# Patient Record
Sex: MALE | Race: WHITE | Employment: OTHER | URBAN - METROPOLITAN AREA
[De-identification: names, ages, dates, MRNs, and addresses within clinical notes are randomized per-mention and may not be internally consistent; named-entity substitution may affect disease eponyms.]

---

## 2017-01-20 ENCOUNTER — TRANSFERRED RECORDS (OUTPATIENT)
Dept: HEALTH INFORMATION MANAGEMENT | Facility: CLINIC | Age: 67
End: 2017-01-20

## 2017-04-21 ENCOUNTER — TRANSFERRED RECORDS (OUTPATIENT)
Dept: HEALTH INFORMATION MANAGEMENT | Facility: CLINIC | Age: 67
End: 2017-04-21

## 2017-04-25 ENCOUNTER — TRANSFERRED RECORDS (OUTPATIENT)
Dept: HEALTH INFORMATION MANAGEMENT | Facility: CLINIC | Age: 67
End: 2017-04-25

## 2017-05-16 ENCOUNTER — TRANSFERRED RECORDS (OUTPATIENT)
Dept: HEALTH INFORMATION MANAGEMENT | Facility: CLINIC | Age: 67
End: 2017-05-16

## 2017-05-23 ENCOUNTER — TRANSFERRED RECORDS (OUTPATIENT)
Dept: HEALTH INFORMATION MANAGEMENT | Facility: CLINIC | Age: 67
End: 2017-05-23

## 2017-05-31 ENCOUNTER — TRANSFERRED RECORDS (OUTPATIENT)
Dept: HEALTH INFORMATION MANAGEMENT | Facility: CLINIC | Age: 67
End: 2017-05-31

## 2017-06-08 ENCOUNTER — TRANSFERRED RECORDS (OUTPATIENT)
Dept: HEALTH INFORMATION MANAGEMENT | Facility: CLINIC | Age: 67
End: 2017-06-08

## 2017-06-13 ENCOUNTER — TRANSFERRED RECORDS (OUTPATIENT)
Dept: HEALTH INFORMATION MANAGEMENT | Facility: CLINIC | Age: 67
End: 2017-06-13

## 2017-06-14 ENCOUNTER — TRANSFERRED RECORDS (OUTPATIENT)
Dept: HEALTH INFORMATION MANAGEMENT | Facility: CLINIC | Age: 67
End: 2017-06-14

## 2017-06-27 ENCOUNTER — TRANSFERRED RECORDS (OUTPATIENT)
Dept: HEALTH INFORMATION MANAGEMENT | Facility: CLINIC | Age: 67
End: 2017-06-27

## 2017-07-24 ENCOUNTER — TRANSFERRED RECORDS (OUTPATIENT)
Dept: HEALTH INFORMATION MANAGEMENT | Facility: CLINIC | Age: 67
End: 2017-07-24

## 2017-08-03 ENCOUNTER — TRANSFERRED RECORDS (OUTPATIENT)
Dept: HEALTH INFORMATION MANAGEMENT | Facility: CLINIC | Age: 67
End: 2017-08-03

## 2017-09-18 ENCOUNTER — TRANSFERRED RECORDS (OUTPATIENT)
Dept: HEALTH INFORMATION MANAGEMENT | Facility: CLINIC | Age: 67
End: 2017-09-18

## 2017-12-29 ENCOUNTER — TRANSFERRED RECORDS (OUTPATIENT)
Dept: HEALTH INFORMATION MANAGEMENT | Facility: CLINIC | Age: 67
End: 2017-12-29

## 2018-01-02 PROBLEM — I21.9 MI (MYOCARDIAL INFARCTION) (H): Status: ACTIVE | Noted: 2018-01-02

## 2018-01-02 PROBLEM — F41.9 ANXIETY: Status: ACTIVE | Noted: 2018-01-02

## 2018-01-02 PROBLEM — I70.213 ATHEROSCLEROSIS OF NATIVE ARTERIES OF EXTREMITIES WITH INTERMITTENT CLAUDICATION, BILATERAL LEGS (H): Status: ACTIVE | Noted: 2018-01-02

## 2018-01-02 PROBLEM — R91.8 LUNG MASS: Status: ACTIVE | Noted: 2018-01-02

## 2018-01-02 PROBLEM — R06.09 DYSPNEA ON EXERTION: Status: ACTIVE | Noted: 2018-01-02

## 2018-01-02 PROBLEM — J44.9 COPD (CHRONIC OBSTRUCTIVE PULMONARY DISEASE) (H): Status: ACTIVE | Noted: 2018-01-02

## 2018-01-02 PROBLEM — E78.5 HYPERLIPIDEMIA: Status: ACTIVE | Noted: 2018-01-02

## 2018-01-02 PROBLEM — Z95.810 AUTOMATIC IMPLANTABLE CARDIOVERTER-DEFIBRILLATOR IN SITU: Status: ACTIVE | Noted: 2018-01-02

## 2018-01-02 PROBLEM — I50.9 CHF (CONGESTIVE HEART FAILURE) (H): Status: ACTIVE | Noted: 2018-01-02

## 2018-01-02 PROBLEM — C34.90 MALIGNANT NEOPLASM OF LUNG (H): Status: ACTIVE | Noted: 2018-01-02

## 2018-01-02 PROBLEM — I10 HTN (HYPERTENSION): Status: ACTIVE | Noted: 2018-01-02

## 2018-01-02 PROBLEM — I73.9 PVD (PERIPHERAL VASCULAR DISEASE) (H): Status: ACTIVE | Noted: 2018-01-02

## 2018-01-02 PROBLEM — F32.A DEPRESSION: Status: ACTIVE | Noted: 2018-01-02

## 2018-01-02 PROBLEM — I25.5 ISCHEMIC CARDIOMYOPATHY: Status: ACTIVE | Noted: 2018-01-02

## 2018-01-02 PROBLEM — I42.9 CARDIOMYOPATHY (H): Status: ACTIVE | Noted: 2018-01-02

## 2018-01-03 ENCOUNTER — RADIANT APPOINTMENT (OUTPATIENT)
Dept: GENERAL RADIOLOGY | Facility: CLINIC | Age: 68
End: 2018-01-03
Payer: COMMERCIAL

## 2018-01-03 ENCOUNTER — APPOINTMENT (OUTPATIENT)
Dept: ULTRASOUND IMAGING | Facility: CLINIC | Age: 68
DRG: 247 | End: 2018-01-03
Attending: EMERGENCY MEDICINE
Payer: COMMERCIAL

## 2018-01-03 ENCOUNTER — APPOINTMENT (OUTPATIENT)
Dept: GENERAL RADIOLOGY | Facility: CLINIC | Age: 68
DRG: 247 | End: 2018-01-03
Attending: EMERGENCY MEDICINE
Payer: COMMERCIAL

## 2018-01-03 ENCOUNTER — OFFICE VISIT (OUTPATIENT)
Dept: RADIOLOGY | Facility: CLINIC | Age: 68
End: 2018-01-03
Payer: COMMERCIAL

## 2018-01-03 ENCOUNTER — HOSPITAL ENCOUNTER (INPATIENT)
Facility: CLINIC | Age: 68
LOS: 20 days | Discharge: NURSING, REHAB OR LONG-TERM CARE | DRG: 247 | End: 2018-01-23
Attending: EMERGENCY MEDICINE | Admitting: INTERNAL MEDICINE
Payer: COMMERCIAL

## 2018-01-03 VITALS
SYSTOLIC BLOOD PRESSURE: 101 MMHG | DIASTOLIC BLOOD PRESSURE: 63 MMHG | HEART RATE: 67 BPM | RESPIRATION RATE: 16 BRPM | OXYGEN SATURATION: 96 %

## 2018-01-03 DIAGNOSIS — I73.9 PVD (PERIPHERAL VASCULAR DISEASE) (H): ICD-10-CM

## 2018-01-03 DIAGNOSIS — M79.661 PAIN OF RIGHT LOWER LEG: ICD-10-CM

## 2018-01-03 DIAGNOSIS — I70.209: ICD-10-CM

## 2018-01-03 DIAGNOSIS — M86.9 OSTEOMYELITIS, UNSPECIFIED SITE, UNSPECIFIED TYPE (H): ICD-10-CM

## 2018-01-03 DIAGNOSIS — I20.0 UNSTABLE ANGINA (H): Primary | ICD-10-CM

## 2018-01-03 DIAGNOSIS — I70.229 CRITICAL ISCHEMIA OF LOWER EXTREMITY (H): Primary | ICD-10-CM

## 2018-01-03 LAB
ALBUMIN SERPL-MCNC: 3.5 G/DL (ref 3.4–5)
ALP SERPL-CCNC: 58 U/L (ref 40–150)
ALT SERPL W P-5'-P-CCNC: 37 U/L (ref 0–70)
ANION GAP SERPL CALCULATED.3IONS-SCNC: 5 MMOL/L (ref 3–14)
AST SERPL W P-5'-P-CCNC: 21 U/L (ref 0–45)
BASOPHILS # BLD AUTO: 0 10E9/L (ref 0–0.2)
BASOPHILS NFR BLD AUTO: 0.2 %
BILIRUB SERPL-MCNC: 0.7 MG/DL (ref 0.2–1.3)
BUN SERPL-MCNC: 12 MG/DL (ref 7–30)
CALCIUM SERPL-MCNC: 9.2 MG/DL (ref 8.5–10.1)
CHLORIDE SERPL-SCNC: 104 MMOL/L (ref 94–109)
CK SERPL-CCNC: 56 U/L (ref 30–300)
CO2 SERPL-SCNC: 27 MMOL/L (ref 20–32)
CREAT SERPL-MCNC: 0.75 MG/DL (ref 0.66–1.25)
CRP SERPL-MCNC: 6.6 MG/L (ref 0–8)
DIFFERENTIAL METHOD BLD: ABNORMAL
EOSINOPHIL # BLD AUTO: 0.1 10E9/L (ref 0–0.7)
EOSINOPHIL NFR BLD AUTO: 0.8 %
ERYTHROCYTE [DISTWIDTH] IN BLOOD BY AUTOMATED COUNT: 14 % (ref 10–15)
GFR SERPL CREATININE-BSD FRML MDRD: >90 ML/MIN/1.7M2
GLUCOSE SERPL-MCNC: 81 MG/DL (ref 70–99)
HCT VFR BLD AUTO: 42.1 % (ref 40–53)
HGB BLD-MCNC: 14.3 G/DL (ref 13.3–17.7)
IMM GRANULOCYTES # BLD: 0 10E9/L (ref 0–0.4)
IMM GRANULOCYTES NFR BLD: 0.4 %
INR PPP: 0.99 (ref 0.86–1.14)
LACTATE BLD-SCNC: 1.1 MMOL/L (ref 0.7–2)
LYMPHOCYTES # BLD AUTO: 1.4 10E9/L (ref 0.8–5.3)
LYMPHOCYTES NFR BLD AUTO: 13.5 %
MCH RBC QN AUTO: 33.9 PG (ref 26.5–33)
MCHC RBC AUTO-ENTMCNC: 34 G/DL (ref 31.5–36.5)
MCV RBC AUTO: 100 FL (ref 78–100)
MONOCYTES # BLD AUTO: 1.2 10E9/L (ref 0–1.3)
MONOCYTES NFR BLD AUTO: 11.9 %
NEUTROPHILS # BLD AUTO: 7.4 10E9/L (ref 1.6–8.3)
NEUTROPHILS NFR BLD AUTO: 73.2 %
NRBC # BLD AUTO: 0 10*3/UL
NRBC BLD AUTO-RTO: 0 /100
PLATELET # BLD AUTO: 241 10E9/L (ref 150–450)
POTASSIUM SERPL-SCNC: 4 MMOL/L (ref 3.4–5.3)
PROT SERPL-MCNC: 6.7 G/DL (ref 6.8–8.8)
RBC # BLD AUTO: 4.22 10E12/L (ref 4.4–5.9)
SODIUM SERPL-SCNC: 136 MMOL/L (ref 133–144)
WBC # BLD AUTO: 10.2 10E9/L (ref 4–11)

## 2018-01-03 PROCEDURE — 99223 1ST HOSP IP/OBS HIGH 75: CPT | Mod: AI | Performed by: INTERNAL MEDICINE

## 2018-01-03 PROCEDURE — 99285 EMERGENCY DEPT VISIT HI MDM: CPT | Mod: Z6 | Performed by: EMERGENCY MEDICINE

## 2018-01-03 PROCEDURE — 83605 ASSAY OF LACTIC ACID: CPT | Performed by: EMERGENCY MEDICINE

## 2018-01-03 PROCEDURE — 85610 PROTHROMBIN TIME: CPT | Performed by: EMERGENCY MEDICINE

## 2018-01-03 PROCEDURE — 85025 COMPLETE CBC W/AUTO DIFF WBC: CPT | Performed by: EMERGENCY MEDICINE

## 2018-01-03 PROCEDURE — 96376 TX/PRO/DX INJ SAME DRUG ADON: CPT

## 2018-01-03 PROCEDURE — 96374 THER/PROPH/DIAG INJ IV PUSH: CPT

## 2018-01-03 PROCEDURE — 93925 LOWER EXTREMITY STUDY: CPT

## 2018-01-03 PROCEDURE — 99285 EMERGENCY DEPT VISIT HI MDM: CPT | Mod: 25

## 2018-01-03 PROCEDURE — 12000008 ZZH R&B INTERMEDIATE UMMC

## 2018-01-03 PROCEDURE — 86140 C-REACTIVE PROTEIN: CPT | Performed by: EMERGENCY MEDICINE

## 2018-01-03 PROCEDURE — 80053 COMPREHEN METABOLIC PANEL: CPT | Performed by: EMERGENCY MEDICINE

## 2018-01-03 PROCEDURE — 71046 X-RAY EXAM CHEST 2 VIEWS: CPT

## 2018-01-03 PROCEDURE — 25000132 ZZH RX MED GY IP 250 OP 250 PS 637: Performed by: EMERGENCY MEDICINE

## 2018-01-03 PROCEDURE — 82550 ASSAY OF CK (CPK): CPT | Performed by: EMERGENCY MEDICINE

## 2018-01-03 PROCEDURE — 25000128 H RX IP 250 OP 636: Performed by: EMERGENCY MEDICINE

## 2018-01-03 RX ORDER — FENTANYL 25 UG/1
1 PATCH TRANSDERMAL
Status: ON HOLD | COMMUNITY
End: 2018-01-31

## 2018-01-03 RX ORDER — CARVEDILOL 6.25 MG/1
6.25 TABLET ORAL 2 TIMES DAILY WITH MEALS
Status: DISCONTINUED | OUTPATIENT
Start: 2018-01-04 | End: 2018-01-13

## 2018-01-03 RX ORDER — SPIRONOLACTONE 25 MG/1
25 TABLET ORAL DAILY
Status: DISCONTINUED | OUTPATIENT
Start: 2018-01-04 | End: 2018-01-05

## 2018-01-03 RX ORDER — EZETIMIBE 10 MG/1
10 TABLET ORAL DAILY
Status: DISCONTINUED | OUTPATIENT
Start: 2018-01-04 | End: 2018-01-23 | Stop reason: HOSPADM

## 2018-01-03 RX ORDER — NALOXONE HYDROCHLORIDE 0.4 MG/ML
.1-.4 INJECTION, SOLUTION INTRAMUSCULAR; INTRAVENOUS; SUBCUTANEOUS
Status: DISCONTINUED | OUTPATIENT
Start: 2018-01-03 | End: 2018-01-06

## 2018-01-03 RX ORDER — PREDNISONE 10 MG/1
10 TABLET ORAL DAILY
Status: DISCONTINUED | OUTPATIENT
Start: 2018-01-04 | End: 2018-01-23 | Stop reason: HOSPADM

## 2018-01-03 RX ORDER — FENTANYL 25 UG/1
25 PATCH TRANSDERMAL
Status: DISCONTINUED | OUTPATIENT
Start: 2018-01-03 | End: 2018-01-11

## 2018-01-03 RX ORDER — FUROSEMIDE 20 MG
20 TABLET ORAL 2 TIMES DAILY
Status: DISCONTINUED | OUTPATIENT
Start: 2018-01-03 | End: 2018-01-05

## 2018-01-03 RX ORDER — OXYCODONE HYDROCHLORIDE 10 MG/1
10 TABLET ORAL
Status: DISCONTINUED | OUTPATIENT
Start: 2018-01-03 | End: 2018-01-04

## 2018-01-03 RX ORDER — HYDROMORPHONE HYDROCHLORIDE 1 MG/ML
0.5 INJECTION, SOLUTION INTRAMUSCULAR; INTRAVENOUS; SUBCUTANEOUS
Status: COMPLETED | OUTPATIENT
Start: 2018-01-03 | End: 2018-01-03

## 2018-01-03 RX ORDER — HYDROMORPHONE HYDROCHLORIDE 1 MG/ML
0.5 INJECTION, SOLUTION INTRAMUSCULAR; INTRAVENOUS; SUBCUTANEOUS
Status: DISCONTINUED | OUTPATIENT
Start: 2018-01-03 | End: 2018-01-03

## 2018-01-03 RX ORDER — ONDANSETRON 4 MG/1
4 TABLET, ORALLY DISINTEGRATING ORAL EVERY 6 HOURS PRN
Status: DISCONTINUED | OUTPATIENT
Start: 2018-01-03 | End: 2018-01-23 | Stop reason: HOSPADM

## 2018-01-03 RX ORDER — ENALAPRIL MALEATE 2.5 MG/1
2.5 TABLET ORAL 2 TIMES DAILY
Status: DISCONTINUED | OUTPATIENT
Start: 2018-01-03 | End: 2018-01-13

## 2018-01-03 RX ORDER — ESCITALOPRAM OXALATE 20 MG/1
20 TABLET ORAL DAILY
Status: DISCONTINUED | OUTPATIENT
Start: 2018-01-04 | End: 2018-01-23 | Stop reason: HOSPADM

## 2018-01-03 RX ORDER — ASPIRIN 81 MG/1
81 TABLET ORAL DAILY
Status: DISCONTINUED | OUTPATIENT
Start: 2018-01-04 | End: 2018-01-05

## 2018-01-03 RX ORDER — AMOXICILLIN 250 MG
1 CAPSULE ORAL 2 TIMES DAILY PRN
Status: DISCONTINUED | OUTPATIENT
Start: 2018-01-03 | End: 2018-01-23 | Stop reason: HOSPADM

## 2018-01-03 RX ORDER — HYDROMORPHONE HYDROCHLORIDE 2 MG/1
2 TABLET ORAL ONCE
Status: COMPLETED | OUTPATIENT
Start: 2018-01-03 | End: 2018-01-03

## 2018-01-03 RX ORDER — MULTIPLE VITAMINS W/ MINERALS TAB 9MG-400MCG
1 TAB ORAL DAILY
Status: DISCONTINUED | OUTPATIENT
Start: 2018-01-04 | End: 2018-01-23 | Stop reason: HOSPADM

## 2018-01-03 RX ORDER — CLOPIDOGREL BISULFATE 75 MG/1
75 TABLET ORAL DAILY
Status: DISCONTINUED | OUTPATIENT
Start: 2018-01-04 | End: 2018-01-16

## 2018-01-03 RX ORDER — ONDANSETRON 2 MG/ML
4 INJECTION INTRAMUSCULAR; INTRAVENOUS EVERY 6 HOURS PRN
Status: DISCONTINUED | OUTPATIENT
Start: 2018-01-03 | End: 2018-01-23 | Stop reason: HOSPADM

## 2018-01-03 RX ORDER — ROSUVASTATIN CALCIUM 20 MG/1
20 TABLET, COATED ORAL DAILY
Status: DISCONTINUED | OUTPATIENT
Start: 2018-01-04 | End: 2018-01-23 | Stop reason: HOSPADM

## 2018-01-03 RX ORDER — OXYCODONE HYDROCHLORIDE 10 MG/1
10 TABLET ORAL ONCE
Status: DISCONTINUED | OUTPATIENT
Start: 2018-01-03 | End: 2018-01-04

## 2018-01-03 RX ORDER — AMOXICILLIN 250 MG
2 CAPSULE ORAL 2 TIMES DAILY PRN
Status: DISCONTINUED | OUTPATIENT
Start: 2018-01-03 | End: 2018-01-23 | Stop reason: HOSPADM

## 2018-01-03 RX ORDER — MULTIPLE VITAMINS W/ MINERALS TAB 9MG-400MCG
1 TAB ORAL DAILY
Status: ON HOLD | COMMUNITY
End: 2018-01-04

## 2018-01-03 RX ADMIN — HYDROMORPHONE HYDROCHLORIDE 2 MG: 2 TABLET ORAL at 18:57

## 2018-01-03 RX ADMIN — HYDROMORPHONE HYDROCHLORIDE 0.5 MG: 1 INJECTION, SOLUTION INTRAMUSCULAR; INTRAVENOUS; SUBCUTANEOUS at 16:53

## 2018-01-03 RX ADMIN — HYDROMORPHONE HYDROCHLORIDE 0.5 MG: 1 INJECTION, SOLUTION INTRAMUSCULAR; INTRAVENOUS; SUBCUTANEOUS at 21:13

## 2018-01-03 ASSESSMENT — ENCOUNTER SYMPTOMS
FEVER: 0
SHORTNESS OF BREATH: 0
ABDOMINAL PAIN: 0

## 2018-01-03 ASSESSMENT — PAIN SCALES - GENERAL: PAINLEVEL: EXTREME PAIN (8)

## 2018-01-03 NOTE — IP AVS SNAPSHOT
[address] [address]    Phone:  [phone]                                       After Visit Summary   1/3/2018    Boom Kahn    MRN: [id number]           After Visit Summary Signature Page     I have received my discharge instructions, and my questions have been answered. I have discussed any challenges I see with this plan with the nurse or doctor.    ..........................................................................................................................................  Patient/Patient Representative Signature      ..........................................................................................................................................  Patient Representative Print Name and Relationship to Patient    ..................................................               ................................................  Date                                            Time    ..........................................................................................................................................  Reviewed by Signature/Title    ...................................................              ..............................................  Date                                                            Time

## 2018-01-03 NOTE — NURSING NOTE
Chief Complaint   Patient presents with     Consult     Consult wounds to lower extremities        Vitals:    01/03/18 1332   BP: 101/63   BP Location: Right arm   Pulse: 67   Resp: 16   SpO2: 96%       There is no height or weight on file to calculate BMI.                  Doris Baumann LPN

## 2018-01-03 NOTE — LETTER
1/3/2018       RE: Boom Kahn  [address] [place] [id number]     Dear Colleague,    Thank you for referring your patient, Boom Kahn, to the [facility] VASCULAR CLINIC at [facility]. Please see a copy of my visit note below.        Interventional Radiology Clinic Visit    Date of this visit: 1/4/2018    Boom Kahn presents for consultation for evaluation of chronic bilateral shin wounds and severe rest pain.    Primary Physician: Willian Arrington      History Of Present Illness:    Boom Kahn is a 67 year old male who presents with his son for evaluation of chronic severely painful bilateral shin wounds. He moved/relocated from [place] just yesterday to be closer to his family/son for support. He has a history of endovascular stent placement in his right leg.     He has a significant past medical history for ischemic cardiomyopathy s/p CABG with EF reported to be around 25-30%.     He reports his right shin wound first developed in 2015 after a minor trauma. He received wound cares and it healed. Following this he had a similar minor trauma inciting a near mirror image wound on his left shin.     During his cares for his left shin wound, the right wound reopened, this was apporoximately one year ago. He has been struggling to heal the wound since then, and he reports the wound has progressed. His biggest complaint is the severe, 10/10, rest pain associated with the wound. The pain is not only localized to the wound, it affects the surrounding tissue as well.     He is not walking much due to pain. He cannot sleep well. He is on a chronic pain regimen with fentanyl patch and Codeine which is not controlling the pain. He denies fevers, chills, or foul smelling discharge.'    Review of Systems:    10 Point ROS is positive for what is described in the HPI. Otherwise, the remainder of the ROS is negative.    Past Medical/Surgical History:    Past Medical  History:   Diagnosis Date     Anxiety      CAD (coronary artery disease)     s/p 3v CABG     CHF (congestive heart failure) (H)     EF 10-15%     Chronic pain      COPD (chronic obstructive pulmonary disease) (H)      Depression      Hyperlipidemia      Hypertension      Lung cancer (H)     s/p radiation therapy     PAD (peripheral artery disease) (H)     s/p lower extremity stents     Tobacco abuse      Past Surgical History:   Procedure Laterality Date     ANGIOPLASTY Right 10/2016     BYPASS GRAFT ARTERY CORONARY  1999    [place]     cardiac catheterization       Cardiac defibrillator placement       CHEST TUBE INSERTION       COLON SURGERY  2008    colon resection for diverticulitis     FINE NEEDLE ASPIRATION Right 12/2016     IMPLANT PACEMAKER       previous radiation       Current Medications:    No current outpatient prescriptions on file.     Allergies:    Collagenase clostridium histolyticum; Flagyl [metronidazole]; Iodine; and Santyl [collagenase]    Family History:    No family history on file.    Social History:    Social History     Social History     Marital status: Single     Spouse name: N/A     Number of children: N/A     Years of education: N/A     Social History Main Topics     Smoking status: Current Every Day Smoker     Packs/day: 1.00     Types: Cigarettes     Smokeless tobacco: Never Used      Comment: 0.5-1 ppd.     Alcohol use None     Drug use: None     Sexual activity: Not Asked     Other Topics Concern     None     Social History Narrative       Physical Exam:    /63 (BP Location: Right arm)  Pulse 67  Resp 16  SpO2 96%     GENERAL APPEARANCE: Lying on exam table, nad  HEENT: ncat  RESP: cta  CARDIOVASCULAR: rrr   ABDOMEN: soft, nontender  VASCULAR: +1 CFA pulses. 0/1 popliteal pulses.  R: +2 pitting edema to knee. Bronzing, erthema, not warm. Approx 4x6 cm ovoid wound to subq tissue anterior shin. No discharge. Some corona radiata in the ankle area. Dry scaly patches  without open wounds and dressings on the toes. Pulse evaluation limited by edema. Motor flexion/extension of foot and lower leg preserved.  Left: +1 pitting edema. Bronzing. 4x5 cm wound with healthy appearing fat layer exposed. No discharge. +1 PTA/DPA.       Laboratory Studies:    Lab Results   Component Value Date    HGB 14.1 01/04/2018     Lab Results   Component Value Date     01/04/2018     Lab Results   Component Value Date    WBC 10.0 01/04/2018       Lab Results   Component Value Date    INR 1.01 01/04/2018         Lab Results   Component Value Date    CR 0.57 01/04/2018     Lab Results   Component Value Date    BUN 11 01/04/2018       No results found for: FETO    Imaging:     I reviewed the x-rays of the right lower leg today which does not definitively show any signs of osteomyelitis.    ASSESSMENT/PLAN:      Boom Kahn is a 67 year old male with chronic bilateral shin wounds, right worse than left, with clear signs of chronic venous pooling/insufficiency right lower leg, CEAP 5/6 and arterial insuffiency [place] category 5.     I believe he has a multifactorial venous and arterial related wound in the right shin. The biggest issue right now for the patient and his family/son is the exquisite pain associated with it. He cannot sleep and cannot function. Before transferring down her from [place], he had to be hospitalized for a few days due to the pain.     His left leg wound is healing but still active, without as much associated pain.     Unfortunately due to the quick presentation I don't have any of his outside images only paper records. He has stents in his right SFA that appear to have occluded potentially.     He is clearly a high risk surgical candidate due to ischemic cardiomyopathy and EF of 25%. Unfortunately he is not MRI candidate due to incompatible pacer leads, so we cannot evaluate his shin wounds for osteo or perform a cardiac stress MRI. He has a scar along his left medial  thigh likely from GSV harvesting.     Plan:  - Transfer to ED for hopeful admission for expedited treatment of his wound and better pain management  - Called ER and discussed with physician informing of transfer.  - Called IR to inform of patient and plan for inpatient angiogram tomorrow  - Recommend referral to our vascular surgery colleagues for concomitant consultation regarding surgical revascularization options, limb salvage options.  - I recommend venous competency ultrasound, clear signs of venous insufficiency of the right leg contributing to poor wound healing.     A total of 45 minutes was spent in care for the patient, of which >50% was spent in counseling and co-ordination of care.     It was a pleasure seeing the patient.     SignedPerry M.D.  Department of Interventional Radiology  [facility]    CC  Patient Care Team:  Willian Arrington MD as PCP - Perry Clarke MD as Resident (Radiology)  SELF, REFERRED

## 2018-01-03 NOTE — ED PROVIDER NOTES
[place] EMERGENCY DEPARTMENT ([facility])  1/03/18 ED 4   History     Chief Complaint   Patient presents with     Wound Check     The history is provided by the patient and medical records ([facility] and [facility]).     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 He is relatively new to the [place] area, previously resided in [place] and has had most of his prior cares through [facility] or [facility].  The patient reports that he was seen in the IR clinic today to establish cares related to his peripheral vascular disease.  He describes long-standing PVD affecting both lower extremities.  He reports that he has had bilateral lower extremity ulcerations for over a year but most recently he developed increasing leg pain and sensation of weakness in the leg.  he describes that the weakness has been provoked with exertion, such as getting out of bed.  No numbness of the leg. In addition over the last 2-3 weeks he has new were ulcerations of the right toes.  There is also erythema of the right and left lower leg around the ulcerations which has been long-standing.  He also denies any fevers.  As above the patient has undergone numerous stenting procedures of both of these extremities.  The patient states that he was seeing interventional radiology today who recommended he come to the ED for pain control and expedited vascular surgery evaluation.  He is currently on Ativan, fentanyl patches, oxycodone 10 mg every 3 hours as needed, as well as codeine.  The patient is on  an anticoagulation therapy of Plavix and aspirin.  The patient was previously applying saline dressings as well as Vaseline to the ulcer, but this aggravated his pain so he has stopped doing so.      Current Facility-Administered Medications   Medication     HYDROmorphone (PF) (DILAUDID) injection 0.5 mg     Current Outpatient Prescriptions   Medication     OXYCODONE HCL PO     fentaNYL (DURAGESIC) 25 mcg/hr 72 hr patch     PREDNISONE PO     ENALAPRIL MALEATE PO     ASPIRIN EC PO     CARVEDILOL PO     Rosuvastatin Calcium (CRESTOR PO)     Ezetimibe (ZETIA PO)     CLOPIDOGREL BISULFATE PO     ESCITALOPRAM OXALATE PO     SPIRONOLACTONE PO     FUROSEMIDE PO     isosorbide dinitrate (ISOCHRON) 40 MG CR tablet     multivitamin, therapeutic with minerals (MULTI-VITAMIN) TABS tablet     History reviewed. No pertinent past medical history.    Past Surgical History:   Procedure Laterality Date     ANGIOPLASTY Right 10/2016     BYPASS GRAFT ARTERY CORONARY  1999    [place]     cardiac catheterization       Cardiac defibrillator placement       CHEST TUBE INSERTION       COLON SURGERY  2008    colon resection for diverticulitis     FINE NEEDLE ASPIRATION Right 12/2016     IMPLANT PACEMAKER       previous radiation         No family history on file.    Social History   Substance Use Topics     Smoking status: Current Every Day Smoker     Types: Cigarettes     Smokeless tobacco: Never Used     Alcohol use Not on file        Allergies   Allergen Reactions     Collagenase Clostridium Histolyticum      Flagyl [Metronidazole] Other (See Comments)     Flu symptoms  Flu like symptoms     Iodine Unknown     Santyl [Collagenase]          I have reviewed the Medications, Allergies, Past Medical and Surgical History, and Social History in the Epic system.    Review of Systems   Constitutional: Negative for fever.   Respiratory: Negative for shortness of breath.    Cardiovascular: Negative for chest pain.   Gastrointestinal:  Negative for abdominal pain.   Musculoskeletal:        Pain in both lower legs, L>R   Skin:        Ulceration of right shin and right toes with surrounding right leg erythema, known PVD; additionally with left leg erythema   All other systems reviewed and are negative.      Physical Exam   BP: (!) 146/98  Pulse: 58  Temp: 98.3  F (36.8  C)  Resp: 16  Weight: 64.6 kg (142 lb 6.7 oz)  SpO2: 97 %      Physical Exam  General: patient is alert and oriented and in no acute distress   Head: atraumatic and normocephalic   EENT: moist mucus membranes, pupils round and reactive, sclera anicteric  Neck: supple   Cardiovascular: regular rate and rhythm, extremities warm, edema in the right lower extremity to the mid shin, trace edema in the left foot, palpable DP pulses bilaterally  Pulmonary: lungs clear to auscultation bilaterally   Abdomen: soft, non-tender   Musculoskeletal: normal range of motion of extremities  Neurological: alert and oriented, moving all extremities symmetrically, strength 5/5 and symmetric hip flexion/extension, knee flexion/extension and ankle plantar/dorsiflexion  Skin: right deep shin ulceration without purulence and surrounding erythema not warm to touch, left superficial shin ulceration with distal erythema, multiple ulcerations on the right toes with pale surrounding tissue    ED Course     ED Course     Procedures             Critical Care time:  none             Labs Ordered and Resulted from Time of ED Arrival Up to the Time of Departure from the ED - No data to display         Assessments & Plan (with Medical Decision Making)   Mr. 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  He does have palpable distal pulses.  Arterial duplex US of the lower extremities obtained with results as follows:     1. Right leg: Significant atherosclerotic calcification with minimal  flow within the SFA in the distal thigh, consistent with  hemodynamically significant stenosis. Post obstructive waveforms in  the popliteal, PTA and TEDDY are noted.  2. Left leg: Significant atherosclerotic calcification with minimal  flow within the SFA in the mid and distal thigh, consistent with  hemodynamically significant stenosis. Post obstructive waveforms in  the popliteal, PTA and TEDDY are noted.    Labs including CBC, lactate and CMP are WNL.  Exam is not suggestive for cellulitis and xrays negative for osteomyelitis.  Vascular surgery was consulted.  Recommended admission for IR angiogram, pain control and operative planning/optomization.  Patient currently is staying with his son however is unable to walk even 10 feet without severe pain causing him to be unsteady.  Patient's son does not feel he is safe at home given his instability and may require short term rehab facility placement.      I have reviewed the nursing notes.    I have reviewed the findings, diagnosis, plan and need for follow up with the patient.    New Prescriptions    No medications on file       Final diagnoses:   Pain of right lower leg   Stenosis of femoral artery (H)     IJosue, am serving as a trained medical scribe to document services personally performed by Paty Reis MD, based on the provider's statements to me.      Paty WILBURN MD, was physically present and have reviewed and verified the accuracy of this note documented by Josue Platt.    1/3/2018   [facility], [place], EMERGENCY DEPARTMENT     Paty Reis MD  01/03/18 5531

## 2018-01-03 NOTE — IP AVS SNAPSHOT
` ` Patient Information     Patient Name Sex     Boom Kahn ([id number]) Male 1950       Room Bed    6234 6234-01      Patient Demographics     Address Phone    [address] [place], [place] [address] [phone] (Home)      Patient Ethnicity & Race     Ethnic Group Patient Race    American White      Emergency Contact(s)     Name Relation Home Work Mobile    Oleg Kahn Son [phone]      [place] Kahn Son [phone]        Documents on File        Status Date Received Description       Documents for the Patient    Consent for Services - [facility]       External Medication Information Consent       Consent for Services/Privacy Notice - Hospital/Clinic Received 18     Privacy Notice - [place] Received 18     Care Everywhere Prospective Auth Received 18     Consent for EHR Access-Received-ESign Received 18     Consent for EHR Access       Insurance Card Received 18 old bcbs-not current    Patient ID       [facility] Specified Other       Patient Photo   Photo of Patient       Documents for the Encounter    CMS IM for Patient Signature Received 18     Photograph   Bilateral LE ulcers, R toe ulcers    Photograph       Monitoring Device Output  18 INITIAL MONITORING STRIPS    Photograph   WOC 18 right leg     Cardiac Cath - HIM Scan   MacLab Report    Cardiac Cath Preliminary - HIM Scan   Cardiac Cath Preliminary report      Admission Information     Attending Provider Admitting Provider Admission Type Admission Date/Time    Lexis Ag MD Faizer, Rumi, MD Emergency 18  1554    Discharge Date Hospital Service Auth/Cert Status Service Area     Vascular Surgery [facility]    Unit Room/Bed Admission Status        U6B 6234/6234-01 Admission (Confirmed)       Admission     Complaint    PVD (peripheral vascular disease) (H), Peripheral Vascular Disease With Ulceration , Rest Pain, Ischemic Limb , Atherosclerotic PVD with  ulceration (H)      Hospital Account     Name Acct ID Class Status Primary Coverage    Boom Kahn [id number] Inpatient Open BCBS - MEDICARE BCBS OUT OF STATE REPLACEMENT            Guarantor Account (for Hospital Account #[id number])     Name Relation to Pt Service Area Active? Acct Type    Boom Kahn Self FCS Yes Personal/Family    Address Phone          [address] [place] [address] [phone](H)              Coverage Information (for Hospital Account #[id number])     F/O Payor/Plan Precert #    BCBS/MEDICARE BCBS OUT OF STATE REPLACEMENT     Subscriber Subscriber #    Boom Kahn [id number]    Address Phone    [address] [address]  [place] [address] [phone]

## 2018-01-03 NOTE — ED NOTES
Pt presents via W/C to triage from vascular clinic. Pt states has had increasing pain at bilateral anterior legs where Pt has open wounds r/t peripheral vascular disease. PT has fentanyl patch in place and last took oxycodone today around 1230. Pt states he needs to be admitted for pain and control. Pt states has been feeling dizzy since stating fentanyl.

## 2018-01-03 NOTE — ED NOTES
Bed: IN03  Expected date:   Expected time:   Means of arrival:   Comments:  Patient Name: Boom Kahn [id number]   Coming from  clinic   Critical limb ischemia, shin wounds, severe pain   No sign of infection, XR negative for osteomyelitis  Coming for pain control and possible admission for pain

## 2018-01-04 ENCOUNTER — APPOINTMENT (OUTPATIENT)
Dept: ULTRASOUND IMAGING | Facility: CLINIC | Age: 68
DRG: 247 | End: 2018-01-04
Attending: INTERNAL MEDICINE
Payer: COMMERCIAL

## 2018-01-04 ENCOUNTER — APPOINTMENT (OUTPATIENT)
Dept: INTERVENTIONAL RADIOLOGY/VASCULAR | Facility: CLINIC | Age: 68
DRG: 247 | End: 2018-01-04
Attending: RADIOLOGY PRACTITIONER ASSISTANT
Payer: COMMERCIAL

## 2018-01-04 ENCOUNTER — APPOINTMENT (OUTPATIENT)
Dept: CARDIOLOGY | Facility: CLINIC | Age: 68
DRG: 247 | End: 2018-01-04
Attending: INTERNAL MEDICINE
Payer: COMMERCIAL

## 2018-01-04 LAB
ANION GAP SERPL CALCULATED.3IONS-SCNC: 11 MMOL/L (ref 3–14)
BUN SERPL-MCNC: 11 MG/DL (ref 7–30)
CALCIUM SERPL-MCNC: 9.2 MG/DL (ref 8.5–10.1)
CHLORIDE SERPL-SCNC: 105 MMOL/L (ref 94–109)
CO2 SERPL-SCNC: 20 MMOL/L (ref 20–32)
CREAT SERPL-MCNC: 0.57 MG/DL (ref 0.66–1.25)
ERYTHROCYTE [DISTWIDTH] IN BLOOD BY AUTOMATED COUNT: 14 % (ref 10–15)
GFR SERPL CREATININE-BSD FRML MDRD: >90 ML/MIN/1.7M2
GLUCOSE SERPL-MCNC: 93 MG/DL (ref 70–99)
HCT VFR BLD AUTO: 42.3 % (ref 40–53)
HGB BLD-MCNC: 14.1 G/DL (ref 13.3–17.7)
INR PPP: 1.01 (ref 0.86–1.14)
INTERPRETATION ECG - MUSE: NORMAL
MCH RBC QN AUTO: 33.1 PG (ref 26.5–33)
MCHC RBC AUTO-ENTMCNC: 33.3 G/DL (ref 31.5–36.5)
MCV RBC AUTO: 99 FL (ref 78–100)
PLATELET # BLD AUTO: 248 10E9/L (ref 150–450)
POTASSIUM SERPL-SCNC: 4 MMOL/L (ref 3.4–5.3)
RBC # BLD AUTO: 4.26 10E12/L (ref 4.4–5.9)
SODIUM SERPL-SCNC: 136 MMOL/L (ref 133–144)
WBC # BLD AUTO: 10 10E9/L (ref 4–11)

## 2018-01-04 PROCEDURE — 27210804 ZZH SHEATH CR3

## 2018-01-04 PROCEDURE — 99153 MOD SED SAME PHYS/QHP EA: CPT

## 2018-01-04 PROCEDURE — 25000128 H RX IP 250 OP 636: Performed by: RADIOLOGY

## 2018-01-04 PROCEDURE — 27210905 ZZH KIT CR7

## 2018-01-04 PROCEDURE — 25000128 H RX IP 250 OP 636: Performed by: INTERNAL MEDICINE

## 2018-01-04 PROCEDURE — C1769 GUIDE WIRE: HCPCS

## 2018-01-04 PROCEDURE — 25000125 ZZHC RX 250: Performed by: RADIOLOGY

## 2018-01-04 PROCEDURE — 12000008 ZZH R&B INTERMEDIATE UMMC

## 2018-01-04 PROCEDURE — 93306 TTE W/DOPPLER COMPLETE: CPT

## 2018-01-04 PROCEDURE — 93306 TTE W/DOPPLER COMPLETE: CPT | Mod: 26 | Performed by: INTERNAL MEDICINE

## 2018-01-04 PROCEDURE — C1887 CATHETER, GUIDING: HCPCS

## 2018-01-04 PROCEDURE — 25000132 ZZH RX MED GY IP 250 OP 250 PS 637: Performed by: STUDENT IN AN ORGANIZED HEALTH CARE EDUCATION/TRAINING PROGRAM

## 2018-01-04 PROCEDURE — 75716 ARTERY X-RAYS ARMS/LEGS: CPT | Mod: RT

## 2018-01-04 PROCEDURE — 27210780 ZZH KIT CR3

## 2018-01-04 PROCEDURE — 99233 SBSQ HOSP IP/OBS HIGH 50: CPT | Performed by: INTERNAL MEDICINE

## 2018-01-04 PROCEDURE — 27210908 ZZH NEEDLE CR4

## 2018-01-04 PROCEDURE — 25000128 H RX IP 250 OP 636: Performed by: STUDENT IN AN ORGANIZED HEALTH CARE EDUCATION/TRAINING PROGRAM

## 2018-01-04 PROCEDURE — 85027 COMPLETE CBC AUTOMATED: CPT | Performed by: STUDENT IN AN ORGANIZED HEALTH CARE EDUCATION/TRAINING PROGRAM

## 2018-01-04 PROCEDURE — 25500064 ZZH RX 255 OP 636: Performed by: INTERNAL MEDICINE

## 2018-01-04 PROCEDURE — 99152 MOD SED SAME PHYS/QHP 5/>YRS: CPT

## 2018-01-04 PROCEDURE — 27210845 ZZH DEVICE INFLATION CR5

## 2018-01-04 PROCEDURE — 25000125 ZZHC RX 250: Performed by: STUDENT IN AN ORGANIZED HEALTH CARE EDUCATION/TRAINING PROGRAM

## 2018-01-04 PROCEDURE — 93922 UPR/L XTREMITY ART 2 LEVELS: CPT

## 2018-01-04 PROCEDURE — 85610 PROTHROMBIN TIME: CPT | Performed by: STUDENT IN AN ORGANIZED HEALTH CARE EDUCATION/TRAINING PROGRAM

## 2018-01-04 PROCEDURE — C1760 CLOSURE DEV, VASC: HCPCS

## 2018-01-04 PROCEDURE — 25000132 ZZH RX MED GY IP 250 OP 250 PS 637: Performed by: INTERNAL MEDICINE

## 2018-01-04 PROCEDURE — 36246 INS CATH ABD/L-EXT ART 2ND: CPT | Mod: 50

## 2018-01-04 PROCEDURE — 36415 COLL VENOUS BLD VENIPUNCTURE: CPT | Performed by: STUDENT IN AN ORGANIZED HEALTH CARE EDUCATION/TRAINING PROGRAM

## 2018-01-04 PROCEDURE — 27210914 ZZH SHEATH CR8

## 2018-01-04 PROCEDURE — 27210732 ZZH ACCESSORY CR1

## 2018-01-04 PROCEDURE — 80048 BASIC METABOLIC PNL TOTAL CA: CPT | Performed by: STUDENT IN AN ORGANIZED HEALTH CARE EDUCATION/TRAINING PROGRAM

## 2018-01-04 RX ORDER — IPRATROPIUM BROMIDE AND ALBUTEROL SULFATE 2.5; .5 MG/3ML; MG/3ML
3 SOLUTION RESPIRATORY (INHALATION) EVERY 4 HOURS PRN
Status: DISCONTINUED | OUTPATIENT
Start: 2018-01-04 | End: 2018-01-23 | Stop reason: HOSPADM

## 2018-01-04 RX ORDER — FOLIC ACID 1 MG/1
1 TABLET ORAL DAILY
Status: DISCONTINUED | OUTPATIENT
Start: 2018-01-04 | End: 2018-01-23 | Stop reason: HOSPADM

## 2018-01-04 RX ORDER — SODIUM CHLORIDE 9 MG/ML
INJECTION, SOLUTION INTRAVENOUS CONTINUOUS
Status: DISCONTINUED | OUTPATIENT
Start: 2018-01-04 | End: 2018-01-05

## 2018-01-04 RX ORDER — ACETAMINOPHEN 325 MG/1
975 TABLET ORAL 4 TIMES DAILY
Status: DISCONTINUED | OUTPATIENT
Start: 2018-01-04 | End: 2018-01-08

## 2018-01-04 RX ORDER — KETOROLAC TROMETHAMINE 30 MG/ML
15 INJECTION, SOLUTION INTRAMUSCULAR; INTRAVENOUS ONCE
Status: COMPLETED | OUTPATIENT
Start: 2018-01-04 | End: 2018-01-04

## 2018-01-04 RX ORDER — FENTANYL CITRATE 50 UG/ML
25-50 INJECTION, SOLUTION INTRAMUSCULAR; INTRAVENOUS EVERY 5 MIN PRN
Status: DISCONTINUED | OUTPATIENT
Start: 2018-01-04 | End: 2018-01-04 | Stop reason: HOSPADM

## 2018-01-04 RX ORDER — IPRATROPIUM BROMIDE AND ALBUTEROL SULFATE 2.5; .5 MG/3ML; MG/3ML
3 SOLUTION RESPIRATORY (INHALATION)
Status: DISCONTINUED | OUTPATIENT
Start: 2018-01-04 | End: 2018-01-04

## 2018-01-04 RX ORDER — ACETAMINOPHEN 500 MG
1000 TABLET ORAL EVERY 4 HOURS PRN
Status: DISCONTINUED | OUTPATIENT
Start: 2018-01-04 | End: 2018-01-04

## 2018-01-04 RX ORDER — LANOLIN ALCOHOL/MO/W.PET/CERES
100 CREAM (GRAM) TOPICAL DAILY
Status: COMPLETED | OUTPATIENT
Start: 2018-01-04 | End: 2018-01-06

## 2018-01-04 RX ORDER — LORAZEPAM 1 MG/1
1-4 TABLET ORAL EVERY 30 MIN PRN
Status: DISCONTINUED | OUTPATIENT
Start: 2018-01-04 | End: 2018-01-08

## 2018-01-04 RX ORDER — IODIXANOL 320 MG/ML
150 INJECTION, SOLUTION INTRAVASCULAR ONCE
Status: COMPLETED | OUTPATIENT
Start: 2018-01-04 | End: 2018-01-04

## 2018-01-04 RX ORDER — FLUMAZENIL 0.1 MG/ML
0.2 INJECTION, SOLUTION INTRAVENOUS
Status: DISCONTINUED | OUTPATIENT
Start: 2018-01-04 | End: 2018-01-04 | Stop reason: HOSPADM

## 2018-01-04 RX ORDER — ACETAMINOPHEN 500 MG
500-1000 TABLET ORAL EVERY 6 HOURS PRN
Status: DISCONTINUED | OUTPATIENT
Start: 2018-01-04 | End: 2018-01-06

## 2018-01-04 RX ORDER — HYDROMORPHONE HYDROCHLORIDE 1 MG/ML
0.5 INJECTION, SOLUTION INTRAMUSCULAR; INTRAVENOUS; SUBCUTANEOUS ONCE
Status: COMPLETED | OUTPATIENT
Start: 2018-01-04 | End: 2018-01-04

## 2018-01-04 RX ORDER — ISOSORBIDE MONONITRATE 60 MG/1
60 TABLET, EXTENDED RELEASE ORAL DAILY
Status: ON HOLD | COMMUNITY
End: 2018-01-31

## 2018-01-04 RX ORDER — GABAPENTIN 300 MG/1
300 CAPSULE ORAL 3 TIMES DAILY
Status: DISCONTINUED | OUTPATIENT
Start: 2018-01-04 | End: 2018-01-08

## 2018-01-04 RX ORDER — ISOSORBIDE MONONITRATE 30 MG/1
60 TABLET, EXTENDED RELEASE ORAL DAILY
Status: DISCONTINUED | OUTPATIENT
Start: 2018-01-05 | End: 2018-01-23 | Stop reason: HOSPADM

## 2018-01-04 RX ORDER — OXYCODONE HYDROCHLORIDE 5 MG/1
5-10 TABLET ORAL EVERY 4 HOURS PRN
Status: DISCONTINUED | OUTPATIENT
Start: 2018-01-04 | End: 2018-01-04

## 2018-01-04 RX ORDER — HYDROMORPHONE HYDROCHLORIDE 1 MG/ML
.5-1 INJECTION, SOLUTION INTRAMUSCULAR; INTRAVENOUS; SUBCUTANEOUS
Status: DISCONTINUED | OUTPATIENT
Start: 2018-01-04 | End: 2018-01-06

## 2018-01-04 RX ORDER — OXYCODONE HYDROCHLORIDE 5 MG/1
5-10 TABLET ORAL
Status: DISCONTINUED | OUTPATIENT
Start: 2018-01-04 | End: 2018-01-04

## 2018-01-04 RX ORDER — NALOXONE HYDROCHLORIDE 0.4 MG/ML
.1-.4 INJECTION, SOLUTION INTRAMUSCULAR; INTRAVENOUS; SUBCUTANEOUS
Status: DISCONTINUED | OUTPATIENT
Start: 2018-01-04 | End: 2018-01-04 | Stop reason: HOSPADM

## 2018-01-04 RX ORDER — ACETAMINOPHEN 500 MG
1000 TABLET ORAL 4 TIMES DAILY
Status: DISCONTINUED | OUTPATIENT
Start: 2018-01-04 | End: 2018-01-04

## 2018-01-04 RX ORDER — HYDROMORPHONE HYDROCHLORIDE 1 MG/ML
.3-.5 INJECTION, SOLUTION INTRAMUSCULAR; INTRAVENOUS; SUBCUTANEOUS
Status: DISCONTINUED | OUTPATIENT
Start: 2018-01-04 | End: 2018-01-04

## 2018-01-04 RX ADMIN — SULFUR HEXAFLUORIDE 5 ML: KIT at 09:32

## 2018-01-04 RX ADMIN — OXYCODONE HYDROCHLORIDE 10 MG: 5 TABLET ORAL at 08:42

## 2018-01-04 RX ADMIN — FUROSEMIDE 20 MG: 20 TABLET ORAL at 19:08

## 2018-01-04 RX ADMIN — KETOROLAC TROMETHAMINE 15 MG: 30 INJECTION, SOLUTION INTRAMUSCULAR at 06:07

## 2018-01-04 RX ADMIN — IODIXANOL 110 ML: 320 INJECTION, SOLUTION INTRAVASCULAR at 12:23

## 2018-01-04 RX ADMIN — FENTANYL CITRATE 25 MCG: 50 INJECTION INTRAMUSCULAR; INTRAVENOUS at 12:14

## 2018-01-04 RX ADMIN — PREDNISONE 10 MG: 10 TABLET ORAL at 08:45

## 2018-01-04 RX ADMIN — Medication 10000 UNITS: at 11:25

## 2018-01-04 RX ADMIN — FENTANYL CITRATE 25 MCG: 50 INJECTION INTRAMUSCULAR; INTRAVENOUS at 11:53

## 2018-01-04 RX ADMIN — NICOTINE 1 PATCH: 7 PATCH, EXTENDED RELEASE TRANSDERMAL at 19:08

## 2018-01-04 RX ADMIN — MIDAZOLAM 0.5 MG: 1 INJECTION INTRAMUSCULAR; INTRAVENOUS at 12:03

## 2018-01-04 RX ADMIN — EZETIMIBE 10 MG: 10 TABLET ORAL at 08:45

## 2018-01-04 RX ADMIN — CARVEDILOL 6.25 MG: 6.25 TABLET, FILM COATED ORAL at 08:45

## 2018-01-04 RX ADMIN — NICOTINE 1 PATCH: 7 PATCH, EXTENDED RELEASE TRANSDERMAL at 02:42

## 2018-01-04 RX ADMIN — LIDOCAINE HYDROCHLORIDE 15 ML: 10 INJECTION, SOLUTION EPIDURAL; INFILTRATION; INTRACAUDAL; PERINEURAL at 11:24

## 2018-01-04 RX ADMIN — FOLIC ACID 1 MG: 1 TABLET ORAL at 08:45

## 2018-01-04 RX ADMIN — Medication 1 MG: at 15:56

## 2018-01-04 RX ADMIN — FENTANYL 1 PATCH: 25 PATCH, EXTENDED RELEASE TRANSDERMAL at 06:07

## 2018-01-04 RX ADMIN — FENTANYL CITRATE 100 MCG: 50 INJECTION INTRAMUSCULAR; INTRAVENOUS at 11:10

## 2018-01-04 RX ADMIN — ROSUVASTATIN CALCIUM 20 MG: 20 TABLET, FILM COATED ORAL at 08:45

## 2018-01-04 RX ADMIN — ENALAPRIL MALEATE 2.5 MG: 2.5 TABLET ORAL at 08:46

## 2018-01-04 RX ADMIN — MIDAZOLAM 2 MG: 1 INJECTION INTRAMUSCULAR; INTRAVENOUS at 11:10

## 2018-01-04 RX ADMIN — Medication 1 MG: at 21:29

## 2018-01-04 RX ADMIN — SODIUM CHLORIDE: 9 INJECTION, SOLUTION INTRAVENOUS at 22:18

## 2018-01-04 RX ADMIN — ENALAPRIL MALEATE 2.5 MG: 2.5 TABLET ORAL at 02:41

## 2018-01-04 RX ADMIN — ESCITALOPRAM OXALATE 20 MG: 20 TABLET ORAL at 08:45

## 2018-01-04 RX ADMIN — MULTIPLE VITAMINS W/ MINERALS TAB 1 TABLET: TAB at 08:45

## 2018-01-04 RX ADMIN — FENTANYL CITRATE 25 MCG: 50 INJECTION INTRAMUSCULAR; INTRAVENOUS at 11:37

## 2018-01-04 RX ADMIN — MIDAZOLAM 0.5 MG: 1 INJECTION INTRAMUSCULAR; INTRAVENOUS at 11:36

## 2018-01-04 RX ADMIN — Medication 100 MG: at 08:46

## 2018-01-04 RX ADMIN — FUROSEMIDE 20 MG: 20 TABLET ORAL at 08:45

## 2018-01-04 RX ADMIN — OXYCODONE HYDROCHLORIDE 10 MG: 5 TABLET ORAL at 13:11

## 2018-01-04 RX ADMIN — Medication 1 MG: at 19:08

## 2018-01-04 RX ADMIN — Medication 0.5 MG: at 08:42

## 2018-01-04 RX ADMIN — CARVEDILOL 6.25 MG: 6.25 TABLET, FILM COATED ORAL at 18:18

## 2018-01-04 RX ADMIN — SPIRONOLACTONE 25 MG: 25 TABLET ORAL at 08:46

## 2018-01-04 RX ADMIN — MIDAZOLAM 0.5 MG: 1 INJECTION INTRAMUSCULAR; INTRAVENOUS at 12:13

## 2018-01-04 RX ADMIN — ENALAPRIL MALEATE 2.5 MG: 2.5 TABLET ORAL at 19:09

## 2018-01-04 RX ADMIN — ACETAMINOPHEN 975 MG: 325 TABLET, FILM COATED ORAL at 19:09

## 2018-01-04 RX ADMIN — MIDAZOLAM 0.5 MG: 1 INJECTION INTRAMUSCULAR; INTRAVENOUS at 11:53

## 2018-01-04 RX ADMIN — SODIUM CHLORIDE: 9 INJECTION, SOLUTION INTRAVENOUS at 18:03

## 2018-01-04 RX ADMIN — FENTANYL CITRATE 25 MCG: 50 INJECTION INTRAMUSCULAR; INTRAVENOUS at 12:04

## 2018-01-04 RX ADMIN — GABAPENTIN 300 MG: 300 CAPSULE ORAL at 19:08

## 2018-01-04 NOTE — PLAN OF CARE
Problem: Patient Care Overview  Goal: Plan of Care/Patient Progress Review  Outcome: No Change  Pt is A/Ox4. Pt is having pain and is receiving both IV and oral pain medications, pt is comfortable with new regime. Writer received call from pharmacy regarding isosorbide dinitrate pt home supply will need to be taken. When writer checked bag for medication, writer found a bag full of meds, writer called son [place] and was told son will  medications after work. Continue to monitor per plan of care.

## 2018-01-04 NOTE — H&P
"  History and Physical  Internal Medicine      Boom Kahn MRN:[id number] YOB: 1950  Date of Admission:1/3/2018  Primary care provider: Willian Arrington           History of Present Illness:   Boom Kahn is a 67 year old male with a past medical history significant for HTN, anxiety/depression, alcohol abuse (8-10 beers/day), tobacco abuse (1/2-1 PPD, 50 years), ischemic cardiomyopathy (MI 2008), CAD s/p 3v CABG (1999) and 3-4 stents, HFrEF (last EF 25-30%, approximately 6 months ago) s/p ICD, , R lung cancer s/p radiation therapy,  peripheral vascular disease s/p stenting of R iliac, R common femoral, right SFA, and \"3-4 stents in the left leg\" in [place], and chronic pain who presented to the ED complaining of worsening pain in his lower extremities and right shin wound.     Patient states he has a history of 17 years of progressive claudication.  However, since July 2015 his symptoms have become much worse.  Since July patient reports multiple minor traumas which have developed into chronic wounds.  Currently, he complains of pain with walking and at rest, pain is improved with rest, denies any radiation, pain is localized to lower calf bilaterally, pain is 10/10 even at rest.  Recently a right lower extremity wound has progressed, and several new wounds are noted in his right toes.   Pain is sufficient to complicate weightbearing.  Pain significantly impacts his ability to ambulate, restricted him to an estimated 30 feet before having to stop. He denies any history or symptoms of related infections including fevers, chills, or drainage.    On review of systems patient denies fever, chills, sweats, change in appetite, shortness of breath, chest pain, cough, wheezing, abdominal pain, urinary symptoms including frequency/urge/burning/change in flow, sick exposures, suicidal ideation, homicidal ideation, auditory or visual hallucinations, or any additional skin rashes or breakdown aside from " "present on the lower extremities.  Patient does endorse nausea, vomiting, constipation, depression, anxiety, easy bruising, easy bleeding.  Of note patient is on anticoagulation.    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  He is actively in the process of moving to the [place] area, in order to seek medical attention.  Previously he lived alone in [place], but given progression of disease and possibility of new interventions he wishes to relocate closer to family for support and cares while undergoing treatment.    History provided by patient.  Patient has limited records available in EMR.  Awaiting release of records from outside hospital.    REVIEW OF SYSTEMS: A 10 point review of systems was negative except as noted above.         Assessment and Plan:   Boom Kahn is a 67 year old male with a past medical history significant for HTN, anxiety/depression, alcohol abuse, tobacco abuse, ICM/MI, CAD s/p 3v CABG and PCI, HFrEF (last EF 25-30%, approximately 6 months ago) s/p ICD, , R lung cancer s/p radiation therapy,  peripheral vascular disease s/p multiple stents, and chronic pain who presented c/o of worsening pain in his lower extremities and right shin wound.     # Peripheral Artery Disease   # Chronic Wounds   Extensive hx of PAD w/multiple stents, current smoker, EtOH abuse, pain at rest, minimal exercise tolerance, no hx of infection. U/S demonstrating b/l significant calcifications with minimal flow distal to thigh.  X-ray without evidence of osteo-myelitis.  Exam notable for bilateral wounds, erythema consistent with vascular changes, loss of hair. States that he is on 10mg of prednisone for \"inflammation\" of his lower extremities - unclear if this is beneficial in the setting of poor wound healing. Will differ to vascular surgery and primary team  - consulted wound care  - consulted vascular surgery   - consulted IR for angiogram   - RAVI ordered for AM  - NPO at " midnight   - CBC/BMP/INR am labs  - continue prednisone 10mg      # EtOH Dependence  Patient reports 8-10 beers per day, with maximum of 2 days w/o drinking and no hx of withdrawal. States that recently has been cutting back in the setting of worsening PVD to 2-3 beers per day. Last drink was Tuesday, as best as he is able to recall.   - MSSA protocol w/ativan   - thiamin/folate   - continue multivitamin   - consider chem dep consult if pt willing      # Ischemic Pain, chronic uncontrolled  In the setting of extensive and progressive PVD, pain is likely ischemic in etiology. Unlikely DVT in the setting of chronic anticoagulation and lower extremity U/S findings. Pain management hx is per patient report as no OSH records available at this time. Given risk of reported doses, will begin with lower doses and titrate up as tolerated. Can consider adjunct therapies such as gabapentin, per primary team. Fentanyl patch is in place, patient is unclear when it was placed - will replace (sunday or Monday?).   - Tylenol 1000mg qid PRN  - Toradol 15mg IV once  - continue fentanyl patch, currently using  - Oxycodon 5-10mg q4 PRN, will adjust if needed (on re-examination, patient is sleeping comfortably)  - Diclofenac Gel PRN to lower extremities       # HFrEF (EF reported 25-30%)  # CAD s/p CABGx3 and PCIs  # ICM hx of MI (2008)  # HTN  Hx reported per patient. Unable to review records at this time. Can consider cardiology consult.  - f/u outside records   - ECHO  - 2L fluid/2gm Na restriction   - strict I/O and daily weights  - continue Plavix, ASA, Carvedilol, Rosuvastatin, Ezetimibe, Spironolactone, Furosemide, Isosorbide dinitrate, enalapril)      # Tobacco Dependence   50 pack year hx, last cigarette this AM (1/4). Patient endorses desire to quit smoking.  - nicotine patch 7mg  - Smoking Cessation Program IP consult  - consider nicotine lozenges/gum if needed      # Restrictive Airway Disease   Diffuse expiratory wheeze. CXR  possibly with some air trapping/hyperinflation. Likely COPD, given smoking hx. Patient denies any hx of COPD. Patient denies any symptoms of SOB or hypoxia.   - DuoNebs x4   - f/u outpatient       # Malignancy of Lung, Right Upper Lobe    Known malignancy, patient unable to tell specific diagnosis. States that cancer responded with radiation. F/u PET scan due in February   - f/u out patient       # Depression   Patient reports a hx of depression and reports depression and axiety  - holding home alprazolam 0.5 PRN  - home ecitalopram 20mg opd      CODE: Full  DVT: On Plavix and ASA  FEN: Cardiac Diet 2L fluid/2gm Na restriction, NPO after midnight   Disposition: Pending    This patient was staffed with Dr. NORRIS Childress, the attending physician on service.     Davey Dickson IV, MD, MSc  Internal Medicine Resident, PGY1  [phone]    PAST MEDICAL HISTORY:  Past Medical History:   Diagnosis Date     Anxiety      CAD (coronary artery disease)     s/p 3v CABG     CHF (congestive heart failure) (H)     EF 10-15%     Chronic pain      COPD (chronic obstructive pulmonary disease) (H)      Depression      Hyperlipidemia      Hypertension      Lung cancer (H)     s/p radiation therapy     PAD (peripheral artery disease) (H)     s/p lower extremity stents     Tobacco abuse      Patient Active Problem List   Diagnosis     Ischemic cardiomyopathy     CHF (congestive heart failure) (H)     PVD (peripheral vascular disease) (H)     COPD (chronic obstructive pulmonary disease) (H)     Anxiety     Atherosclerosis of native arteries of extremities with intermittent claudication, bilateral legs (H)     Automatic implantable cardioverter-defibrillator in situ     Depression     Cardiomyopathy (H)     Hyperlipidemia     HTN (hypertension)     Lung mass     Dyspnea on exertion     Malignant neoplasm of lung (H)     MI (myocardial infarction)       PAST SURGICAL HISTORY:  Past Surgical History:   Procedure Laterality Date      ANGIOPLASTY Right 10/2016     BYPASS GRAFT ARTERY CORONARY  1999    [facility]     cardiac catheterization       Cardiac defibrillator placement       CHEST TUBE INSERTION       COLON SURGERY  2008    colon resection for diverticulitis     FINE NEEDLE ASPIRATION Right 12/2016     IMPLANT PACEMAKER       previous radiation          MEDICATIONS:  PTA Meds  Prior to Admission medications    Medication Sig Last Dose Taking? Auth Provider   OXYCODONE HCL PO Take 10 mg by mouth every 3 hours as needed 1/3/2018 at Unknown time Yes Reported, Patient   fentaNYL (DURAGESIC) 25 mcg/hr 72 hr patch Place 1 patch onto the skin every 72 hours 1/3/2018 at Unknown time Yes Reported, Patient   PREDNISONE PO Take 10 mg by mouth daily 1/3/2018 at Unknown time Yes Reported, Patient   ENALAPRIL MALEATE PO Take 2.5 mg by mouth 2 times daily 1/3/2018 at Unknown time Yes Reported, Patient   ASPIRIN EC PO Take 81 mg by mouth daily 1/3/2018 at Unknown time Yes Reported, Patient   CARVEDILOL PO Take 6.25 mg by mouth 2 times daily (with meals) 1/3/2018 at Unknown time Yes Reported, Patient   Rosuvastatin Calcium (CRESTOR PO) Take 20 mg by mouth daily 1/3/2018 at Unknown time Yes Reported, Patient   Ezetimibe (ZETIA PO) Take 10 mg by mouth daily 1/3/2018 at Unknown time Yes Reported, Patient   CLOPIDOGREL BISULFATE PO Take 75 mg by mouth daily 1/3/2018 at Unknown time Yes Reported, Patient   ESCITALOPRAM OXALATE PO Take 20 mg by mouth daily 1/3/2018 at Unknown time Yes Reported, Patient   SPIRONOLACTONE PO Take 25 mg by mouth daily 1/3/2018 at Unknown time Yes Reported, Patient   FUROSEMIDE PO Take 20 mg by mouth 2 times daily 1/3/2018 at Unknown time Yes Reported, Patient   isosorbide dinitrate (ISOCHRON) 40 MG CR tablet Take 60 mg by mouth every 12 hours 1/3/2018 at Unknown time Yes Reported, Patient   multivitamin, therapeutic with minerals (MULTI-VITAMIN) TABS tablet Take 1 tablet by mouth daily Unknown at Unknown time  Reported,  "Patient        ALLERGIES:    Allergies   Allergen Reactions     Collagenase Clostridium Histolyticum      Flagyl [Metronidazole] Other (See Comments)     Flu symptoms  Flu like symptoms     Iodine Unknown     Santyl [Collagenase]        SOCIAL HISTORY:   Social History     Social History     Marital status: Single     Spouse name: N/A     Number of children: N/A     Years of education: N/A     Occupational History     Not on file.     Social History Main Topics     Smoking status: Current Every Day Smoker     Packs/day: 1.00     Types: Cigarettes     Smokeless tobacco: Never Used      Comment: 0.5-1 ppd.     Alcohol use Not on file     Drug use: Not on file     Sexual activity: Not on file     Other Topics Concern     Not on file     Social History Narrative       FAMILY MEDICAL HISTORY:   Asked, non-contributory    PHYSICAL EXAM:   /54 (BP Location: Right arm)  Pulse 66  Temp 96.5  F (35.8  C) (Oral)  Resp 18  Ht 0.68 m (2' 2.77\")  Wt 62.8 kg (138 lb 6.4 oz)  SpO2 94%  .76 kg/m2     Intake/Output Summary (Last 24 hours) at 01/03/18 2354  Last data filed at 01/03/18 2300   Gross per 24 hour   Intake              240 ml   Output                0 ml   Net              240 ml       GEN: A&Ox3, in NAD, pleasant, cooperative, thin  HEENT: AT/NC, PERRLA, MMM, no jaundice or scleral icterus  CV: RRR no m/r/g  LUNGS: faint expiratory wheeze in all lung fields otherwise CTAB, no increased work of breathing  ABD: +BS, soft, NT/ND, no HSM appreciated  EXT: Normal muscle bulk and tone  SKIN: w/d/i  NEURO: bicep & patellar reflexes 2+ b/l, no focal deficits appreciated, no tremor noted  PSYCH:    Mood:    Affect: Congruent    Tp/Tc: logical, linear, goal oriented    Denies SI/HI & VH/AH       LABS:   CMP  Recent Labs  Lab 01/03/18  1647      POTASSIUM 4.0   CHLORIDE 104   CO2 27   ANIONGAP 5   GLC 81   BUN 12   CR 0.75   GFRESTIMATED >90   GFRESTBLACK >90   SUMAN 9.2   PROTTOTAL 6.7*   ALBUMIN 3.5 "   BILITOTAL 0.7   ALKPHOS 58   AST 21   ALT 37     CBC  Recent Labs  Lab 01/03/18  1647   HGB 14.3   WBC 10.2   RBC 4.22*   HCT 42.1      MCH 33.9*   MCHC 34.0   RDW 14.0        INR  Recent Labs  Lab 01/03/18  1647   INR 0.99     ABGNo lab results found in last 7 days.   URINE STUDIES  No lab results found.  No lab results found.    IRON STUDIES  No lab results found.    IMAGING:  All imaging studies reviewed by me.       Attestation:  Physician Attestation   I, Alexandra Childress, saw this patient with the resident and agree with the resident s findings and plan of care as documented in the resident s note.       I personally reviewed vital signs, medications, labs and imaging.     Alexandra Childress  Date of Service (when I saw the patient): 1/3/18

## 2018-01-04 NOTE — PROGRESS NOTES
Emergency Social Work Services Note    Date of  Intervention: 01/03/18  Last Emergency Department Visit:  N/A  Care Plan:  N/A  Collaborated with:  Pt, RN, MD    Data:  SW consulted for Pt whose family has expressed concern about Pt staying with family as family works during the day.     Intervention:  Writer met with Pt who just came to [place] from [place] to seek specialty care for his PVD. Pt arrived in town yesterday. Pt has 3 sons in different states and discussed the plan with them. They all agreed that Pt would come here to be with his son, Dallas. Pt states that Dallas is now concerned about leaving Pt at home during the day due to concerns for fall risk. Of note, during interview Pt was walking around his ED room.    Pt states that he is open to discussing options, but would prefer to have his son present. Pt's son recently went home, but will be available tomorrow. Writer assured Pt that SW will be available on the unit tomorrow. Writer briefly discussed options such as private-duty in-home care, assisted living, and LTC. Pt appears to have some assets to help cover private costs and is aware that insurance will not cover personal care. Pt expressed frustration about his situation, which Writer validated. Pt is agreeable to meeting with SW tomorrow and grateful for the opportunity.    Assessment:  SW consulted regarding Pt who will require assistance with discharge planning.    Plan:    Anticipated Disposition:  pending further workup and discharge planning.    Barriers to d/c plan:  Discharge planning, medical clearance.    Follow Up:  Unit CHRIS to meet with Pt tomorrow.      Kim Syed [facility]  Emergency Department   Pager: [phone]

## 2018-01-04 NOTE — CONSULTS
Patient is on IR schedule 12/4/18 for a Right lower extremity angiogram   Labs WNL for procedure.    Orders for NPO, scrubs and antibiotics have been entered.   Consent will be done prior to procedure.   Please contact the IR charge RN at [phone] for estimated time of procedure.     Yahaira Valadez IR RPA  [phone] Call pager  [phone] pager

## 2018-01-04 NOTE — PROVIDER NOTIFICATION
Order in MAR for isosorbide dinitrate.  Chart shows that at home pt takes isosorbide mononitrate.  Web page sent to [phone] to see if MD wants to fix order. MD d/c'd isosorbide dinitrate and ordered imdur.

## 2018-01-04 NOTE — PROGRESS NOTES
"[facility] Nurse Inpatient Wound Assessment     Initial Assessment of wound(s) on pt's:   Bilateral Lower extremity wounds         Data:   Patient History:      per MD note(s): Boom Kahn is a 67 year old male with a past medical history significant for HTN, anxiety/depression, alcohol abuse (8-10 beers/day), tobacco abuse (1/2-1 PPD, 50 years), ischemic cardiomyopathy (MI ), CAD s/p 3v CABG () and 3-4 stents, HFrEF (last EF 25-30%, approximately 6 months ago) s/p ICD, , R lung cancer s/p radiation therapy,  peripheral vascular disease s/p stenting of R iliac, R common femoral, right SFA, and \"3-4 stents in the left leg\" in [place], and chronic pain who presented to the ED complaining of worsening pain in his lower extremities and right shin wound.      Patient states he has a history of 17 years of progressive claudication.  However, since 2015 his symptoms have become much worse.  Since July patient reports multiple minor traumas which have developed into chronic wounds.  Currently, he complains of pain with walking and at rest, pain is improved with rest, denies any radiation, pain is localized to lower calf bilaterally, pain is 10/10 even at rest.  Recently a right lower extremity wound has progressed, and several new wounds are noted in his right toes.   Pain is sufficient to complicate weightbearing.  Pain significantly impacts his ability to ambulate, restricted him to an estimated 30 feet before having to stop. He denies any history or symptoms of related infections including fevers, chills, or drainage.         Current Diet / Nutrition:           Active Diet Order      NPO per Anesthesia Guidelines for Procedure/Surgery Except for: Meds           Chance Assessment and sub scores:   Chance Score  Av  Min: 19  Max: 19     Labs:      Recent Labs   Lab Test  18   0600  18   1647   ALBUMIN   --   3.5   HGB  14.1  14.3   RBC  4.26*  4.22*   WBC  10.0  10.2   PLT "  248  241   INR  1.01  0.99   CRP   --   6.6      Wound Assessment (location #1Bilateral lower extremity wounds)  Wound History:      Wound Base:   non-granulation    Specific Dimensions (length x width x depth, in cm) :       Tunneling:  N/A    Undermining: N/A    Palpation of the wound bed:  boggy    Slough appearance:adherent  on the left leg wound     Eschar appearance:  dry and black  on the right leg wound     Periwound Skin: edema,      Color: normal and consistent with surrounding tissue    Temperature  cool    Drainage: . Amount: small . Color: serosanguinous     Odor: none    Pain:  moderate , sharp          Intervention:     Patient's chart evaluated.      Wound(s) was fully assessed    Wound Care: was done:      Orders  Written    Supplies  Reviewed and Ordered: as indicated belwo    Discussed plan of care with Patient and Nurse          Assessment:   Lower extremity wound          Plan:     Nursing to notify the Provider(s) and re-consult the WO Nurse if wound(s) deteriorate(s).    Plan of care for wound located on Right anterior shin wound : Paint with betadine allow to dry and cover with mepilex dressing change dressing every other day        Plan of care for wound located on Left anterior shin wound : cleanse the wound with Vashe solution pat dry           moisten a 2x2 with Vashe solution place over the wound and secure with Mepilex dressing change        dressing Daily      Plan of care for wound located on toes Paint with betadine allow to dry weave lambs wool between the toes     change dressing every other day    WO Nurse will return: Weekly     Face to face time: 15 Minutes

## 2018-01-04 NOTE — PLAN OF CARE
Problem: Patient Care Overview  Goal: Plan of Care/Patient Progress Review  Outcome: No Change  Pt alert and oriented. Vitally stable on ra. Up ad thony. PIV SL. Right LE wound scabbing and VIANEY; left LE wound dressing CDI. C/o of uncontrolled pain on bilateral LE; specifically where wounds are located. PRN oxycodone available and offered however pt refused. Mentioned that oxycodone didn't do much for him. [facility] contacted about inadequate pain management and one time Toradol ordered and administered. New Fentanyl patch on and old one removed. NPO since midnight for angiogram today. [facility] nurse consulted for wounds.  R: Continue to monitor and implement POC.

## 2018-01-04 NOTE — CONSULTS
"VASCULAR SURGERY CONSULTATION  1/3/2018    Date of Admission:  1/3/2018  Reason for Consultation: We were asked by Dr. Reis of ED to evaluate Boom Kahn for worsening pain and non-healing ulcer in the setting of chronic peripheral vascular disease. The patient was seen and evaluated.  Attending: Dr. Graf    HPI: Boom Kahn is a 67 year old male with significant history of peripheral vascular disease s/p stenting of R iliac, R common femoral, right SFA, and \"3-4 stents in the left leg\" in [place], CAD s/p 3v CABG, CHF with EF 10-15%, s/p ICD, R lung cancer s/p radiation therapy, and chronic pain who presented to the ED complaining of worsening pain in his lower extremities and right shin wound. His pain is R > L and is unable to bear weight on his R foot due to new ulcers on his 5th, 4th, and great toe. He otherwise thinks he could make it ~30 feet before having to stop walking due to calf/shin pain. His right shin wound has been chronic since 2015 with some periods of healing, however has grown from quarter- to greater than half-dollar-sized over the past 5-6 weeks. He is concerned about the swelling and redness associated with the wounds but denies any fevers or purulent drainage. He is chronically on oxycodone 10mg q3h PRN and just started a fentanyl patch 25mcg, but says that his pain is unbearable. His PCP prescribes his narcotics. Has to sleep with R leg dangling over his bed. He takes plavix and aspirin for anti-platelet therapy. He follows with a cardiologist in [place]. He's a 0.5-1 ppd smoker.    He has a complex social situation. He lives alone in [place]. When his PCP became worried about his worsening symptom profile, he urged the patient to seek vascular surgery consultation in hopes of an intervention. He wanted to do this closer to family, so he is living temporarily with his son, Dallas, whom is here with him today. Dallas does not feel that his family can take care of him, especially while they " are away at work with concern for the patient's mobility and safety. He is curious about housing options.     ROS:   The Review of Systems is negative other than noted in the HPI.    Past Medical History:  History reviewed. No pertinent past medical history.    Past Surgical History:   Past Surgical History:   Procedure Laterality Date     ANGIOPLASTY Right 10/2016     BYPASS GRAFT ARTERY CORONARY  1999    [place]     cardiac catheterization       Cardiac defibrillator placement       CHEST TUBE INSERTION       COLON SURGERY  2008    colon resection for diverticulitis     FINE NEEDLE ASPIRATION Right 12/2016     IMPLANT PACEMAKER       previous radiation         Medications:     No current facility-administered medications on file prior to encounter.   Current Outpatient Prescriptions on File Prior to Encounter:  OXYCODONE HCL PO Take 10 mg by mouth every 3 hours as needed   fentaNYL (DURAGESIC) 25 mcg/hr 72 hr patch Place 1 patch onto the skin every 72 hours   PREDNISONE PO Take 10 mg by mouth daily   ENALAPRIL MALEATE PO Take 2.5 mg by mouth 2 times daily   ASPIRIN EC PO Take 81 mg by mouth daily   CARVEDILOL PO Take 6.25 mg by mouth 2 times daily (with meals)   Rosuvastatin Calcium (CRESTOR PO) Take 20 mg by mouth daily   Ezetimibe (ZETIA PO) Take 10 mg by mouth daily   CLOPIDOGREL BISULFATE PO Take 75 mg by mouth daily   ESCITALOPRAM OXALATE PO Take 20 mg by mouth daily   SPIRONOLACTONE PO Take 25 mg by mouth daily   FUROSEMIDE PO Take 20 mg by mouth 2 times daily   isosorbide dinitrate (ISOCHRON) 40 MG CR tablet Take 60 mg by mouth every 12 hours   multivitamin, therapeutic with minerals (MULTI-VITAMIN) TABS tablet Take 1 tablet by mouth daily     Allergies:   Allergies   Allergen Reactions     Collagenase Clostridium Histolyticum      Flagyl [Metronidazole] Other (See Comments)     Flu symptoms  Flu like symptoms     Iodine Unknown     Santyl [Collagenase]      Social History:   Social History      Social History     Marital status: Single     Spouse name: N/A     Number of children: N/A     Years of education: N/A     Occupational History     Not on file.     Social History Main Topics     Smoking status: Current Every Day Smoker     Types: Cigarettes     Smokeless tobacco: Never Used     Alcohol use Not on file     Drug use: Not on file     Sexual activity: Not on file     Other Topics Concern     Not on file     Social History Narrative       Family History:   No family history on file.    Exam:  /59  Pulse 58  Temp 98.3  F (36.8  C) (Oral)  Resp 16  Wt 64.6 kg (142 lb 6.7 oz)  SpO2 100%  NAD  RRR  NLB on RA  RLE: Monophasic PT, palpable femoral pulse. 4cm eschar anterior shin, dry. Surrounding erythema and edema. No drainage. R 1st, 4th, 5th toe ulcers  LLE: Monophasic AT, Biphasic PT, palpable femoral pulse. Epithelialized wound anterior shin.      Labs: Reviewed in full.  136 104 12 / 81  4 27 0.75 \    10.2 > 14.3 < 241    Lactate 1.1  CK 56    Imaging: Reviewed.   Recent Results (from the past 24 hour(s))   XR Tibia & Fibula Right 2 Views    Narrative    EXAMINATION: XR TIBIA & FIBULA RT 2 VW  1/3/2018 9:46 AM     CLINICAL HISTORY:  ; Osteomyelitis, unspecified site, unspecified type  (H)     COMPARISON: None available    FINDINGS: AP and lateral views of the tibia and fibula were obtained.  There is no comparison available. A vascular stent posterior to the  distal femoral condyles is noted. There are some vascular  calcifications more distally. Joint spaces appear preserved. There is  a soft tissue wound anterior to the mid shaft of the tibia. The bone  does not reach the cortical surface of the tibia. No definite evidence  of osseous involvement. No evidence of acute osseous abnormalities on  this large field-of-view radiograph.      Impression    IMPRESSION: No evidence of acute osseous abnormalities on this large  field-of-view radiograph.  Should there be concern for osteomyelitis  a marker should be placed in  the area of concern and a dedicated small field-of-view radiograph  could be obtained.    JUTTA ELLERMANN, MD   US Lower Extremity Arterial Duplex Bilateral    Narrative    Exam: Duplex ultrasound of bilateral lower extremity arteries dated  1/3/2018 7:47 PM     Clinical information: PVD, nonhealing lower extremity wounds with a  new wounds and increasing pain     Comparison: None available    Technique: Includes Gray Scale images, color Doppler, spectral Doppler  waveforms and velocities with appropriate angles of 60 degrees or  less.    Findings:     Right lower extremity:     Common femoral artery: 291 cm/sec. Waveforms: Monophasic  Deep femoral artery: 200 cm/sec. Waveforms: Monophasic  SFA:  *  Mid thigh: 88 cm/sec. Waveforms: Monophasic  *  Distal thigh: Not clearly visualized    Popliteal artery: 23 cm/sec. Waveforms: Monophasic  PTA ankle: 28 cm/sec. Waveforms: Monophasic  TEDDY ankle: 9 cm/sec. Waveforms: Monophasic    Left lower extremity:    Common femoral artery: 184 cm/sec. Waveforms: Monophasic  Deep femoral artery: 290 cm/sec. Waveforms: Monophasic  SFA:  *  Proximal thigh: 173 cm/sec. Waveforms: Monophasic  *  Mid thigh: 10 cm/sec. Waveforms: Monophasic  *  Distal thigh: 6 cm/sec. Waveforms: Monophasic  Popliteal artery: 72 cm/sec. Waveforms: Monophasic  PTA ankle: 40 cm/sec. Waveforms: Monophasic  TEDDY ankle: 40 cm/sec. Waveforms: Monophasic      Impression    Impression:     1. Right leg: Significant atherosclerotic calcification with minimal  flow within the SFA in the distal thigh, consistent with  hemodynamically significant stenosis. Post obstructive waveforms in  the popliteal, PTA and TEDDY are noted.  2. Left leg: Significant atherosclerotic calcification with minimal  flow within the SFA in the mid and distal thigh, consistent with  hemodynamically significant stenosis. Post obstructive waveforms in  the popliteal, PTA and TEDDY are noted.    Guidelines:  [place]  [facility] duplex criteria for lower limb arterial  occlusive disease  -Percent stenosis- Normal (1-19%): Peak systolic velocity (cm/s):  <150, End-diastolic velocity (cm/s): <40, Velocity ration (Vr): <1.5,  Distal arterial waveform: Triphasic  -Percent stenosis- 20-49%: Peak systolic velocity (cm/s): 150-200,  End-diastolic velocity (cm/s): <40, Velocity ration (Vr): 1.5-2.0,  Distal arterial waveform: Triphasic  -Percent stenosis- 50-75%: Peak systolic velocity (cm/s): 200-300,  End-diastolic velocity (cm/s): <90, Velocity ration (Vr): 2.0-3.9,  Distal arterial waveform: Poststenotic turbulence distal to stenosis,  monophasic distal waveform  -Percent stenosis- >75%: Peak systolic velocity (cm/s): >300,  End-diastolic velocity (cm/s): <90, Velocity ration (Vr): >4.0, Distal  arterial waveform: Dampened distal waveform and low PSV/EDV* in the  stenosis  -Percent stenosis- Occlusion: Absent flow by color Doppler/pulsed  Doppler spectral analysis; length of occlusion estimated from distance  between exit and reentry collateral arteries  *PSV = peak systolic velocity, EDV = end-diastolic velocity  http://[email or web address].[email or web address]  html      Assessment:   Boom Kahn is a 67 year old male with significant history of PAD s/p stents to R iliac, R common femoral, right SFA, CAD s/p 3v CABG, CHF with EF 10-15%, and chronic pain now with ischemic rest pain and non-healing wounds in his lower extremities due to chronic limb ischemia.     Recommendations:  - Admit to medicine for management of complex medical issues and pain control.  - Consider Pain Management consult.  - IR consult for conventional angiogram bilateral lower extremities.  - NPO at midnight in case IR procedure can take place in the morning.  - ABIs  - Wound care consult.  - No indication for antibiotics or urgent surgical intervention.  - Smoking cessation.  - SW consult for discharge/disposition planning.  -  Obtain operative reports from [place].  - Vascular Surgery will follow as consulting service.    The patient was discussed with Dr. Graf, vascular surgery staff, who agrees with the plan.    Myron Cadet MD  General Surgery PGY-3  Pager [phone]

## 2018-01-04 NOTE — CONSULTS
Inpatient Pain Management Service: Consultation      DATE OF CONSULT: January 4, 2018      REASON FOR PAIN CONSULTATION:  Boom Kahn is a 67 year old male I am seeing in consultation at the request of Dr. Davey Dickson (pager #[phone]) Gold Team for evaluation and recommendations for his acute on chronic pain.       CHIEF PAIN COMPLAINT: pain in B/L lower extremities R>L        ASSESSMENT:   Acute pain in lower extremities associated with B/L ulcers anterior tibia due to PVD - XR negative for osteomyelitis    Chronic pain syndrome    Pt is opiate tolerant - Ativan 0.5mg TID PRN, fentanyl 25 mcg/hr patches changing q 48 hrs, oxycodone 10 mg every 3 hours PRN (6 tabs daily)    Tobacco abuse 0.5-1.0PPD for 50yrs    ETOH abuse - on [facility] protocol    Hypertension    Ischemic cardiomyopathy - MI 2008    Coronary Artery Disease s/p 3 vessel CABG 1999 with multiple stents and ICD placed    Chronic anticoagulation on plavix 75mg daily and aspirin 81mg daily     R) upper lobe lung cancer s/p radiation therapy    Peripheral Vascular disease s/p stenting R) iliac, R) common Femoral, R) SFA and 3-4 stents in L) leg in [place]    Consults this hospitalization:  Vascular Surgery, Wound Care, Social Work,      -- Outpatient opioid requirements prior to admission:   Per [place] :  Fentanyl 25mcg changing q 48 hrs last filled on 12/27/17 15 patches, Oxycodone/acet 10/325mg qty 90 tabs for 15 days last filled on 12/21/17 (6 tabs daily on average), gabapentin 300mg tabs 90 tabs for 30 days last filled on 11/6/17, alprazolam 0.5mg 90 tabs for 30 days last filled on 12/1/17.  Pt has been receiving the above medications since January 2017.    Primary Care Provider: Dr. Willian Arrington in [place]   Chronic Pain Provider: Dr. Willian Arrington in [place]        TREATMENT RECOMMENDATIONS/PLAN:   -Continue Fentanyl patch 25mcg q48hrs as he was previously prescribed    -Agree with temporary increase in oxycodone (decreased interval) but  "would increase the interval when able. If patient is not agreeable to using oxycodone or if it is ineffective (it appears that he has been refusing due to lack of perceived efficacy), could switch to dilaudid oral 2-4mg every 4 hours.    -Restart gabapentin 300mg TID, consider increasing this dose if tolerated. Would follow renal dosing if renal impairment is a concern.    -Agree with use of voltaren gel. If ineffective could donsider PLO ointment if no contraindications as far as wound status is concerned. If contraindicated consider compounded morphine ointment to be applied around his wounds.    -If patient is to undergo amputation, would highly recommend peripheral nerve catheters to be used pre-op as they have been found to decrease postop opioid use, development of phantom limb and chronic pain.    -Could also consider intraoperative or postoperative ketamine infusions for pain control if he is to undergo amputations.      Continue aggressive bowel regimen to prevent opioid induced constipation  Pain Service will Continue to follow peripherally, but will not put notes in every day.     Recommendations were discussed with   Plan was reviewed by the Pain Service consisting of the pain team and Dr. Lara, Anesthesiologist.    Thank you for consulting the Inpatient Pain Management Service.   The above recommendations are to be acted upon at the primary team s discretion.    To reach us:  Mon - Friday 8 AM - 3 PM: Pager [phone] (Text Page)  After hours, weekends and holidays: Primary service should call [phone] for the on-call pain specialist    HISTORY OF PRESENT ILLNESS:   According to H&P by Dr. yMron Cadet, Vascular Surgery Team:  Boom Kahn is a 67 year old male with significant history of peripheral vascular disease s/p stenting of R iliac, R common femoral, right SFA, and \"3-4 stents in the left leg\" in [place], CAD s/p 3v CABG, CHF with EF 10-15%, s/p ICD, R) upper lobe lung cancer s/p " "radiation therapy, and chronic pain who presented to the ED complaining of worsening pain in his lower extremities and right shin wound. His pain is R > L and is unable to bear weight on his R foot due to new ulcers on his 5th, 4th, and great toe. He otherwise thinks he could make it ~30 feet before having to stop walking due to calf/shin pain. His right shin wound has been chronic since 2015 with some periods of healing, however has grown from quarter- to greater than half-dollar-sized over the past 5-6 weeks. He is concerned about the swelling and redness associated with the wounds but denies any fevers or purulent drainage. He is chronically on oxycodone 10mg q3h PRN and just started a fentanyl patch 25mcg, but says that his pain is unbearable. His PCP prescribes his narcotics. Has to sleep with R) leg dangling over his bed. He takes plavix and aspirin for anti-platelet therapy. He follows with a cardiologist in [place]. He's a 0.5-1 ppd smoker.     He has a complex social situation. He lives alone in [place]. When his PCP became worried about his worsening symptom profile, he urged the patient to seek vascular surgery consultation in hopes of an intervention. He wanted to do this closer to family, so he is living temporarily with his son, Dallas. There are concerns regarding his family's ability to care for him at home and his current living situation (after discharge) is uncertain.     Mr. Kahn has been taking oxycodone for a long time and feels that he has become tolerant to the current doses, he states that it wasn't working very well and he was recently started on fentanyl patches. He feels this has provided some benefit. He also takes gabapentin currently but has been taking it intermittently when his pain is severe. He does not recall trying many other medications and has not tried any topicals in the past.    PAIN HISTORY:   Location: B/L LE  R>L  Duration: months-years  Pain intensity: \"severe\"  Quality " of the pain: burning/deep aching  Aggravating factors: Movement  Relieving factors : Rest    CAPA (Clinically Aligned Pain Assessment)  Comfort (How is your pain?): Tolerable with discomfort  Change in Pain (Since your last medication/intervention?): Getting worse  Pain Control (How are your pain treatments working?): Partially effective pain control  Functioning (Are you able to do activities to get better?) : Pain keeps me from doing most of what I need to do  Sleep (Does your pain management allow you to sleep or rest?): Awake with occasional pain       FUNCTIONAL STATUS:  Change:      getting worse  Oral intake:     Regular  Bowel function:    Normal  Activity level:     Bedrest  Sleep:      Occasionally waking due to pain  Mood:      depressed affect, flat and tired, poor energy      REVIEW OF 10 BODY SYSTEMS: 10 point ROS of systems including Constitutional, Eyes, Respiratory, Cardiovascular, Gastroenterology, Genitourinary, Integumentary, Musculoskeletal, Psychiatric were all negative except for pertinent positives noted in my HPI.       INPATIENT MEDICATIONS PERTINENT TO PAIN CONSULT:   Medications related to Pain Management (Future)    Start     Dose/Rate Route Frequency Ordered Stop    01/04/18 0922  HYDROmorphone (PF) (DILAUDID) injection 0.3-0.5 mg      0.3-0.5 mg Intravenous EVERY 3 HOURS PRN 01/04/18 0922      01/04/18 0821  oxyCODONE IR (ROXICODONE) tablet 5-10 mg      5-10 mg Oral EVERY 3 HOURS PRN 01/04/18 0821      01/04/18 0800  aspirin EC EC tablet 81 mg      81 mg Oral DAILY 01/03/18 2253      01/04/18 0516  acetaminophen (TYLENOL) tablet 1,000 mg      1,000 mg Oral EVERY 4 HOURS PRN 01/04/18 0516      01/04/18 0047  diclofenac (VOLTAREN) 1 % topical gel 2 g      2 g Topical 4 TIMES DAILY PRN 01/04/18 0047      01/04/18 0044  LORazepam (ATIVAN) tablet 1-4 mg      1-4 mg Oral EVERY 30 MIN PRN 01/04/18 0044      01/03/18 2300  fentaNYL (DURAGESIC) 25 mcg/hr 72 hr patch 1 patch      25 mcg  Transdermal EVERY 72 HOURS 01/03/18 2253 01/03/18 2253  senna-docusate (SENOKOT-S;PERICOLACE) 8.6-50 MG per tablet 1 tablet      1 tablet Oral 2 TIMES DAILY PRN 01/03/18 2253 01/03/18 2253  senna-docusate (SENOKOT-S;PERICOLACE) 8.6-50 MG per tablet 2 tablet      2 tablet Oral 2 TIMES DAILY PRN 01/03/18 2253 01/03/18 1806  oxyCODONE IR (ROXICODONE) tablet 10 mg     Comments:  DO NOT USE THIS FIELD FOR ADMIN INSTRUCTIONS; INFORMATION DOES NOT SHOW ON MAR. USE THE FIELD ABOVE MARKED ADMIN INSTRUCTIONS    10 mg Oral ONCE 01/03/18 1805              CURRENT MEDICATIONS:   Current Facility-Administered Medications Ordered in Epic   Medication Dose Route Frequency Provider Last Rate Last Dose     LORazepam (ATIVAN) tablet 1-4 mg  1-4 mg Oral Q30 Min PRN Davey Dickson MD         thiamine tablet 100 mg  100 mg Oral Daily Davey Dickson MD   100 mg at 01/04/18 0846     folic acid (FOLVITE) tablet 1 mg  1 mg Oral Daily Davey Dickson MD   1 mg at 01/04/18 0845     diclofenac (VOLTAREN) 1 % topical gel 2 g  2 g Topical 4x Daily PRN Davey Dickson MD         acetaminophen (TYLENOL) tablet 1,000 mg  1,000 mg Oral Q4H PRN Davey Dickson MD         oxyCODONE IR (ROXICODONE) tablet 5-10 mg  5-10 mg Oral Q3H PRN Blue Vail MD   10 mg at 01/04/18 0842     ipratropium - albuterol 0.5 mg/2.5 mg/3 mL (DUONEB) neb solution 3 mL  3 mL Nebulization Q4H PRN Alexandra Childress MD         HYDROmorphone (PF) (DILAUDID) injection 0.3-0.5 mg  0.3-0.5 mg Intravenous Q3H PRN Blue Vail MD         oxyCODONE IR (ROXICODONE) tablet 10 mg  10 mg Oral Once O'Clifton, Paty Bauer MD         aspirin EC EC tablet 81 mg  81 mg Oral Daily Dickson, Shilo, MD         carvedilol (COREG) tablet 6.25 mg  6.25 mg Oral BID w/meals Davey Dickson MD   6.25 mg at 01/04/18 0845     clopidogrel (PLAVIX) tablet 75 mg  75 mg Oral Daily Davey Dickson  MD Jose         enalapril (VASOTEC) tablet 2.5 mg  2.5 mg Oral BID Davey Dickson MD   2.5 mg at 01/04/18 0846     escitalopram (LEXAPRO) tablet 20 mg  20 mg Oral Daily Davey Dickson MD   20 mg at 01/04/18 0845     ezetimibe (ZETIA) tablet 10 mg  10 mg Oral Daily Davey Dickson MD   10 mg at 01/04/18 0845     fentaNYL (DURAGESIC) 25 mcg/hr 72 hr patch 1 patch  25 mcg Transdermal Q72H Davey Dickson MD   1 patch at 01/04/18 0607     furosemide (LASIX) tablet 20 mg  20 mg Oral BID Davey Dickson MD   20 mg at 01/04/18 0845     isosorbide dinitrate (ISOCHRON) CR tablet 80 mg  80 mg Oral Q12H Davey Dickson MD   Stopped at 01/04/18 0040     multivitamin, therapeutic with minerals (THERA-VIT-M) tablet 1 tablet  1 tablet Oral Daily Davey Dickson MD   1 tablet at 01/04/18 0845     predniSONE (DELTASONE) tablet 10 mg  10 mg Oral Daily Davey Dickson MD   10 mg at 01/04/18 0845     rosuvastatin (CRESTOR) tablet 20 mg  20 mg Oral Daily Davey Dickson MD   20 mg at 01/04/18 0845     spironolactone (ALDACTONE) tablet 25 mg  25 mg Oral Daily Davey Dickson MD   25 mg at 01/04/18 0846     naloxone (NARCAN) injection 0.1-0.4 mg  0.1-0.4 mg Intravenous Q2 Min PRN Davey Dickson MD         Patient is already receiving anticoagulation with heparin, enoxaparin (LOVENOX), warfarin (COUMADIN)  or other anticoagulant medication   Does not apply Continuous PRN Davey Dickson MD         senna-docusate (SENOKOT-S;PERICOLACE) 8.6-50 MG per tablet 1 tablet  1 tablet Oral BID PRN Davey Dickson MD        Or     senna-docusate (SENOKOT-S;PERICOLACE) 8.6-50 MG per tablet 2 tablet  2 tablet Oral BID PRN Davey Dickson MD         ondansetron (ZOFRAN-ODT) ODT tab 4 mg  4 mg Oral Q6H PRN Davey Dickson MD        Or     ondansetron (ZOFRAN) injection 4 mg  4 mg  Intravenous Q6H PRN Davey Dickson MD         [START ON 1/6/2018] fentaNYL (DURAGESIC) patch REMOVAL   Transdermal Q72H Alexandra Childress MD         fentaNYL (DURAGESIC) Patch in Place   Transdermal Q8H Alexandra Childress MD         nicotine Patch in Place   Transdermal Q8H Davey Dickson MD         nicotine patch REMOVAL   Transdermal Daily Davey Dickson MD   Stopped at 01/04/18 0905     nicotine (NICODERM CQ) 7 MG/24HR 24 hr patch 1 patch  1 patch Transdermal Daily Davey Dickson MD   1 patch at 01/04/18 0242     No current Epic-ordered outpatient prescriptions on file.           HOME/PREVIOUS MEDICATIONS:   Prior to Admission medications    Medication Sig Start Date End Date Taking? Authorizing Provider   OXYCODONE HCL PO Take 10 mg by mouth every 3 hours as needed   Yes Reported, Patient   fentaNYL (DURAGESIC) 25 mcg/hr 72 hr patch Place 1 patch onto the skin every 72 hours   Yes Reported, Patient   PREDNISONE PO Take 10 mg by mouth daily   Yes Reported, Patient   ENALAPRIL MALEATE PO Take 2.5 mg by mouth 2 times daily   Yes Reported, Patient   ASPIRIN EC PO Take 81 mg by mouth daily   Yes Reported, Patient   CARVEDILOL PO Take 6.25 mg by mouth 2 times daily (with meals)   Yes Reported, Patient   Rosuvastatin Calcium (CRESTOR PO) Take 20 mg by mouth daily   Yes Reported, Patient   Ezetimibe (ZETIA PO) Take 10 mg by mouth daily   Yes Reported, Patient   CLOPIDOGREL BISULFATE PO Take 75 mg by mouth daily   Yes Reported, Patient   ESCITALOPRAM OXALATE PO Take 20 mg by mouth daily   Yes Reported, Patient   SPIRONOLACTONE PO Take 25 mg by mouth daily   Yes Reported, Patient   FUROSEMIDE PO Take 20 mg by mouth 2 times daily   Yes Reported, Patient   isosorbide dinitrate (ISOCHRON) 40 MG CR tablet Take 60 mg by mouth every 12 hours   Yes Reported, Patient   multivitamin, therapeutic with minerals (MULTI-VITAMIN) TABS tablet Take 1 tablet by mouth daily     Reported, Patient         ALLERGIES:    Allergies   Allergen Reactions     Collagenase Clostridium Histolyticum      Flagyl [Metronidazole] Other (See Comments)     Flu symptoms  Flu like symptoms     Iodine Unknown     Santyl [Collagenase]           PAST MEDICAL AND PSYCHIATRIC HISTORY:    Past Medical History:   Diagnosis Date     Anxiety      CAD (coronary artery disease)     s/p 3v CABG     CHF (congestive heart failure) (H)     EF 10-15%     Chronic pain      COPD (chronic obstructive pulmonary disease) (H)      Depression      Hyperlipidemia      Hypertension      Lung cancer (H)     s/p radiation therapy     PAD (peripheral artery disease) (H)     s/p lower extremity stents     Tobacco abuse            PAST SURGICAL HISTORY:   Past Surgical History:   Procedure Laterality Date     ANGIOPLASTY Right 10/2016     BYPASS GRAFT ARTERY CORONARY  1999    [facility]     cardiac catheterization       Cardiac defibrillator placement       CHEST TUBE INSERTION       COLON SURGERY  2008    colon resection for diverticulitis     FINE NEEDLE ASPIRATION Right 12/2016     IMPLANT PACEMAKER       previous radiation             FAMILY HISTORY: family history is not on file.      HEALTH & LIFESTYLE PRACTICES:   Tobacco:  reports that he has been smoking Cigarettes.  He has been smoking about 1.00 pack per day. He has never used smokeless tobacco.  Alcohol:  has no alcohol history on file.  Illicit drugs:  has no drug history on file.      SOCIAL HISTORY: Pt is from [place].  He lives alone and has three sons.  One son, Dallas, lives in [place], [place].      LABORATORY VALUES:   Last Basic Metabolic Panel:  Lab Results   Component Value Date     01/04/2018      Lab Results   Component Value Date    POTASSIUM 4.0 01/04/2018     Lab Results   Component Value Date    CHLORIDE 105 01/04/2018     Lab Results   Component Value Date    SUMAN 9.2 01/04/2018     Lab Results   Component Value Date    CO2 20 01/04/2018     Lab  Results   Component Value Date    BUN 11 01/04/2018     Lab Results   Component Value Date    CR 0.57 01/04/2018     Lab Results   Component Value Date    GLC 93 01/04/2018       CBC results:  Lab Results   Component Value Date    WBC 10.0 01/04/2018     Lab Results   Component Value Date    HGB 14.1 01/04/2018     Lab Results   Component Value Date    HCT 42.3 01/04/2018     Lab Results   Component Value Date     01/04/2018         DIAGNOSTIC TESTS:   Recent Results (from the past 24 hour(s))   XR Tibia & Fibula Right 2 Views     Narrative     EXAMINATION: XR TIBIA & FIBULA RT 2 VW  1/3/2018 9:46 AM      CLINICAL HISTORY:  ; Osteomyelitis, unspecified site, unspecified type  (H)      COMPARISON: None available     FINDINGS: AP and lateral views of the tibia and fibula were obtained.  There is no comparison available. A vascular stent posterior to the  distal femoral condyles is noted. There are some vascular  calcifications more distally. Joint spaces appear preserved. There is  a soft tissue wound anterior to the mid shaft of the tibia. The bone  does not reach the cortical surface of the tibia. No definite evidence  of osseous involvement. No evidence of acute osseous abnormalities on  this large field-of-view radiograph.     Impression     IMPRESSION: No evidence of acute osseous abnormalities on this large  field-of-view radiograph.  Should there be concern for osteomyelitis a marker should be placed in  the area of concern and a dedicated small field-of-view radiograph  could be obtained.     JUTTA ELLERMANN, MD   US Lower Extremity Arterial Duplex Bilateral     Narrative     Exam: Duplex ultrasound of bilateral lower extremity arteries dated  1/3/2018 7:47 PM      Clinical information: PVD, nonhealing lower extremity wounds with a  new wounds and increasing pain      Comparison: None available     Technique: Includes Gray Scale images, color Doppler, spectral Doppler  waveforms and velocities with  appropriate angles of 60 degrees or  less.     Findings:      Right lower extremity:      Common femoral artery: 291 cm/sec. Waveforms: Monophasic  Deep femoral artery: 200 cm/sec. Waveforms: Monophasic  SFA:  *  Mid thigh: 88 cm/sec. Waveforms: Monophasic  *  Distal thigh: Not clearly visualized     Popliteal artery: 23 cm/sec. Waveforms: Monophasic  PTA ankle: 28 cm/sec. Waveforms: Monophasic  TEDDY ankle: 9 cm/sec. Waveforms: Monophasic     Left lower extremity:     Common femoral artery: 184 cm/sec. Waveforms: Monophasic  Deep femoral artery: 290 cm/sec. Waveforms: Monophasic  SFA:  *  Proximal thigh: 173 cm/sec. Waveforms: Monophasic  *  Mid thigh: 10 cm/sec. Waveforms: Monophasic  *  Distal thigh: 6 cm/sec. Waveforms: Monophasic  Popliteal artery: 72 cm/sec. Waveforms: Monophasic  PTA ankle: 40 cm/sec. Waveforms: Monophasic  TEDDY ankle: 40 cm/sec. Waveforms: Monophasic     Impression     Impression:      1. Right leg: Significant atherosclerotic calcification with minimal  flow within the SFA in the distal thigh, consistent with  hemodynamically significant stenosis. Post obstructive waveforms in  the popliteal, PTA and TEDDY are noted.  2. Left leg: Significant atherosclerotic calcification with minimal  flow within the SFA in the mid and distal thigh, consistent with  hemodynamically significant stenosis. Post obstructive waveforms in  the popliteal, PTA and TEDDY are noted.     Guidelines:  [facility] duplex criteria for lower limb arterial  occlusive disease  -Percent stenosis- Normal (1-19%): Peak systolic velocity (cm/s):  <150, End-diastolic velocity (cm/s): <40, Velocity ration (Vr): <1.5,  Distal arterial waveform: Triphasic  -Percent stenosis- 20-49%: Peak systolic velocity (cm/s): 150-200,  End-diastolic velocity (cm/s): <40, Velocity ration (Vr): 1.5-2.0,  Distal arterial waveform: Triphasic  -Percent stenosis- 50-75%: Peak systolic velocity (cm/s): 200-300,  End-diastolic velocity  (cm/s): <90, Velocity ration (Vr): 2.0-3.9,  Distal arterial waveform: Poststenotic turbulence distal to stenosis,  monophasic distal waveform  -Percent stenosis- >75%: Peak systolic velocity (cm/s): >300,  End-diastolic velocity (cm/s): <90, Velocity ration (Vr): >4.0, Distal  arterial waveform: Dampened distal waveform and low PSV/EDV* in the  stenosis  -Percent stenosis- Occlusion: Absent flow by color Doppler/pulsed  Doppler spectral analysis; length of occlusion estimated from distance  between exit and reentry collateral arteries  *PSV = peak systolic velocity, EDV = end-diastolic velocity  [email or web address]  html         Labs above reviewed as well as additional relevant diagnostic studies from the EPIC record.       PHYSICAL EXAMINATION: Exam limited due to recent angiogram and strict bed rest orders  VITAL SIGNS:  B/P: 117/47, T: 96.2, P: 60, R: 16    CONSTITUTIONAL/GENERAL APPEARANCE: Fatigued, coperative  HEENT: NC/AT. Sclera are clear. MMM. Hearing is functional.  RESPIRATORY: Non-labored on room air  CARDIOVASCULAR: RRR  GI: Soft, NT/ND  MUSCULOSKELETAL/BACK/SPINE/EXTREMITIES: 3 inch ulcer on surface of right lateral tibia. Covered wound on left tibia. Toes on right foot with ulcerations as well. Right leg is erythematous with purple discoloration of the right lower extremity.  NEURO:  Speech is intact, follows commands. Moves all extremities spontaneously.             Simeon Stephenson DO  Pain medicine Fellow  January 4, 2018, 12:45 PM  Inpatient Pain Management Service

## 2018-01-04 NOTE — PROGRESS NOTES
[facility], [place]   Hospitalist Daily Progress Note                                                 Date of Admission:1/3/2018  ___________________________________  INTERVAL HISTORY (24 Hrs)/SUBJECTIVE:   Last 24 hr events, care team notes reviewed.     Patient continues to report pain both legs- uncontrolled.   No other concern.   No fever or chills  No cp or sob  No NV    ROS: 4 point ROS neg other than the symptoms noted above in the interval history.    OBJECTIVE :   VITALS:    Temp:  [96.2  F (35.7  C)-97.6  F (36.4  C)] 97.6  F (36.4  C)  Pulse:  [57-79] 79  Heart Rate:  [58-61] 60  Resp:  [10-18] 16  BP: (103-147)/(38-69) 132/61  SpO2:  [94 %-100 %] 95 %     Temp (24hrs), Av.8  F (36  C), Min:96.2  F (35.7  C), Max:97.6  F (36.4  C)    Wt Readings from Last 5 Encounters:   18 62.8 kg (138 lb 6.4 oz)        Intake/Output Summary (Last 24 hours) at 18 1746  Last data filed at 18 0800   Gross per 24 hour   Intake              480 ml   Output              325 ml   Net              155 ml       PHYSICAL EXAM:  General: alert, interactive, NAD  HEENT: AT/NC, Moist MM  Neck: Supple, no JVD or Lymphadenopathy  Respi/Chest: Non labored. Clear BL  CVS/Heart: S1S2 regular,   Gi/Abd: Soft, non tender, non distended,   MSK/Ext: did not examine his legs as patient having severe pain.  D  Skin: R shin dry black wound. Further per WOCN  Neuro: AO x 4, Grossly intact.     Medications:   I have reviewed this patient's current medications.      Data:   All laboratory and imaging data in the past 24 hours reviewed:    LABS:  CMP  Recent Labs  Lab 18  0600 18  1647    136   POTASSIUM 4.0 4.0   CHLORIDE 105 104   CO2 20 27   ANIONGAP 11 5   GLC 93 81   BUN 11 12   CR 0.57* 0.75   GFRESTIMATED >90 >90   GFRESTBLACK >90 >90   SUMAN 9.2 9.2   PROTTOTAL  --  6.7*   ALBUMIN  --  3.5   BILITOTAL  --  0.7   ALKPHOS  --  58   AST  --  21   ALT  --  37     CBC  Recent  Labs  Lab 01/04/18  0600 01/03/18  1647   WBC 10.0 10.2   RBC 4.26* 4.22*   HGB 14.1 14.3   HCT 42.3 42.1   MCV 99 100   MCH 33.1* 33.9*   MCHC 33.3 34.0   RDW 14.0 14.0    241     INR  Recent Labs  Lab 01/04/18  0600 01/03/18  1647   INR 1.01 0.99       Echo Complete: 1/4/2018    Interpretation Summary  Ischemic CM. Severely reduced LV systoilc function. Biplane LVEF measured at  20%. Severe left ventricular dilation is present. Akinesis of the basal to mid  inferior, inferolateral, and lateral segments noted.  The right ventricle is normal size. Global right ventricular function is  normal.  Pulmonary artery systolic pressure cannot be assessed.  No pericardial effusion is present.  The inferior vena cava was normal in size with preserved respiratory  variability.  Previous study not available for comparison.      US RAVI Doppler No Exercise 1/4/2018     Impression:     Right leg: Resting RAVI is 0.29, Abnormal, 0.00-0.40 (Increased  cardiovascular risk with severe PAD). Findings consistent with severe  stenosis of the distal SFA on comparison duplex arterial ultrasound  examination. Unable to obtain right great toe pressure for calculation  of right TBI.    Left leg: Resting RAVI is 0.53, Abnormal, 0.41-0.90 (Increased  cardiovascular risk along with mild to moderate PVD). Findings  consistent with severe stenosis of the mid SFA on comparison duplex  arterial ultrasound examination. Abnormal left TBI of 0.28.      ASSESSMENT & PLAN :    Boom Kahn is a 67 year old male with a past medical history significant for HTN, anxiety/depression, alcohol abuse, tobacco abuse, ICM/MI, CAD s/p 3v CABG and PCI, HFrEF (last EF 25-30%, approximately 6 months ago) s/p ICD, , R lung cancer s/p radiation therapy,  peripheral vascular disease s/p multiple stents, and chronic pain who presented c/o of worsening pain in his lower extremities and right shin wound.      # Peripheral Artery Disease - Acute on chronic LE pain  #  Chronic Wounds (R shin)    Extensive hx of PAD w/multiple stents, current smoker, EtOH abuse, pain at rest, minimal exercise tolerance, no hx of infection. U/S demonstrating b/l significant calcifications with minimal flow distal to thigh.  X-ray without evidence of osteo-myelitis.  Exam notable for bilateral wounds, erythema consistent with vascular changes, loss of hair.     - Patient seen by Vascular surgery team. Rec: IR angio bl LE- done, final read pending. IR to discuss with vascular regarding finding and further plan.   - WOCN consult  - Ct wound care  - no antibiotics for now.     - Pain control  At current reports pain uncontrolled. Pain consult- pending. Will dc oral narcotics. Increase iv hydromorphone 0.5-1 Q 2hr prn. Start gabapentin 300 tid  Continue fentanyl patch  Diclofenac topical  Continue acetaminophen 975 qid    Fu Pain recs.     1/4/2018  D/w Vascular NP. Unlikely any procedure tomorrow. Ok to resume diet. Ordered  Vascular attending to round on him later.   Fu Vascular recs    - continue current prednisone 10mg      # HFrEF (EF reported 25-30%)  # CAD s/p CABGx3 and PCIs  # ICM hx of MI (2008)  # HTN  Clinically stable. Asymptomatic.     - ECHO as above- reviewed.   - 2L fluid/2gm Na restriction   - strict I/O and daily weights  - continue Plavix, ASA, Carvedilol, Rosuvastatin, Ezetimibe, Spironolactone, Furosemide, Isosorbide dinitrate, enalapril  - Cardiology consult prn.         # EtOH Dependence  Patient reports 8-10 beers per day, with maximum of 2 days w/o drinking and no hx of withdrawal. States that recently has been cutting back in the setting of worsening PVD to 2-3 beers per day. Last drink was Tuesday per pt.   - MSSA protocol w/ativan : no e/o withdrawal at current.  - MVI/thiamine/folate   - consider chem dep consult if pt willing       # Tobacco Dependence   50 pack year hx, last cigarette this AM (1/4). Patient endorses desire to quit smoking.  - nicotine patch 7mg  - Smoking  Cessation Program IP consult  - consider nicotine lozenges/gum if needed        # Restrictive Airway Disease   ? COPD, given smoking hx. Patient denies any hx of COPD.    Patient denies any symptoms of SOB or cough at current.    - DuoNebs x4 hr prn  - outpatient PFT  - smoking cessation        # Malignancy of Lung, Right Upper Lobe    Known malignancy, patient unable to tell specific diagnosis. States that cancer responded with radiation.   F/u PET scan due in February   - f/u outpatient         # Depression   Patient reports a hx of depression and reports depression and axiety  - holding home alprazolam 0.5 PRN  - home ecitalopram 20mg opd        CODE: Full  DVT: On Plavix and ASA  FEN: Cardiac Diet 2L fluid/2gm Na restriction.      Dispo: Disposition Plan   Expected discharge in several days ->TBD, pending clinical course.      Entered: [place] 01/04/2018, 5:46 PM         Plan discussed with patient, RN and SAMIRA PEREZ during Care Team Rounds.  Dw Vascular team.       Blue [place] MD   Hospitalist (Internal Medicine)  Pager: [phone].

## 2018-01-04 NOTE — PROGRESS NOTES
Interventional Radiology Intra-procedural Nursing Note    Patient Name: Boom Kahn  Medical Record Number: [id number]  Today's Date: January 4, 2018         Start Time: 1100  End of procedure time: 1210  Procedure: Right leg angiogram and intervention.  Fire Safety Score: 1    Consent Review/Timeout Performed by: Dr. Marcus Gong / Dr. Yossi Hernandez  Procedure Performed By: Dr. Marcus Gong / Dr. Yossi Hernandez     Fentanyl administered: 200 mcg  Versed administered: 4 mg    Start of Sedation Time: 1100  End of Sedation Time: 1220  Total Sedation Time: 80 minutes    Procedure start time: 1100  Puncture time: 1105  Procedure end time: 1210    Report given to: Rosetta FRANCIS  Time pt departs:  1235      Other Notes:  Alert male transported via cart from inpatient room to IR Procedure Room 4 for planned intervention.  ID band confirmed and patient acknowledges understanding of planned procedure. Patient repositioned to procedure table via hover-mat and positioned supine.  Patient prepped and draped per policy see VS flowsheet, MAR for further information.       Prior to procedure, only left post-tibial distal pulses were identified and marked via doppler. No pulses identified on right lower extermty.    LFA identified under image guidance.  Sheath in place at 1115.     Diagnostic procedure performed.  No intervention done.     Introducer removed at 1215.  Manual pressure held for 5 minutes.  MYNXGRIP Closure device (LOT # [id number] / Expiration date 2019-09-30) deployed at 1215.  Left groin site appearance is WDL.  Distal pulses post procedure are un changed.  Left groin site is cleansed and dressed per protocol.     Per MD, bedrest for 3  Hours. BEDREST UNTIL 1525.      Patient condition post procedure is stable.   Patient returned to inpatient room for post-procedure monitoring.    Doris Watson RN

## 2018-01-04 NOTE — PROGRESS NOTES
Pt admitted to [facility]- on 5B at  2250 from ED via w/c.  VSS, reports pain in BLEs.  Will give report to incoming RN.

## 2018-01-04 NOTE — ED NOTES
[facility], [place]   ED Nurse to Floor Handoff     Boom Kahn is a 67 year old male who speaks English and lives alone,  in a home  They arrived in the ED by car from home    ED Chief Complaint: Wound Check    ED Dx;   Final diagnoses:   Pain of right lower leg   Stenosis of femoral artery (H)         Needed?: No    Allergies:   Allergies   Allergen Reactions     Collagenase Clostridium Histolyticum      Flagyl [Metronidazole] Other (See Comments)     Flu symptoms  Flu like symptoms     Iodine Unknown     Santyl [Collagenase]    .  Past Medical Hx:   Past Medical History:   Diagnosis Date     Anxiety      CAD (coronary artery disease)     s/p 3v CABG     CHF (congestive heart failure) (H)     EF 10-15%     Chronic pain      COPD (chronic obstructive pulmonary disease) (H)      Depression      Hyperlipidemia      Hypertension      Lung cancer (H)     s/p radiation therapy     PAD (peripheral artery disease) (H)     s/p lower extremity stents     Tobacco abuse       Baseline Mental status: WDL  Current Mental Status changes: at basesline    Infection: No  Sepsis suspected: No  Isolation type: No active isolations     Activity level - Baseline/Home:  Independent  Activity Level - Current:   Independent    Bariatric equipment needed?: No    In the ED these meds were given:   Medications   oxyCODONE IR (ROXICODONE) tablet 10 mg (10 mg Oral Not Given 1/3/18 1815)   HYDROmorphone (PF) (DILAUDID) injection 0.5 mg (0.5 mg Intravenous Given 1/3/18 2113)   HYDROmorphone (PF) (DILAUDID) injection 0.5 mg (0.5 mg Intravenous Given 1/3/18 1653)   HYDROmorphone (DILAUDID) tablet 2 mg (2 mg Oral Given 1/3/18 1857)       Drips running?  No    Home pump or pre-existing LDA's present? No    Labs results:   Labs Ordered and Resulted from Time of ED Arrival Up to the Time of Departure from the ED   CBC WITH PLATELETS DIFFERENTIAL - Abnormal; Notable for the following:        Result Value    RBC  Count 4.22 (*)     MCH 33.9 (*)     All other components within normal limits   COMPREHENSIVE METABOLIC PANEL - Abnormal; Notable for the following:     Protein Total 6.7 (*)     All other components within normal limits   INR   CK TOTAL   LACTIC ACID WHOLE BLOOD   CRP INFLAMMATION       Imaging Studies:   Recent Results (from the past 24 hour(s))   XR Tibia & Fibula Right 2 Views    Narrative    EXAMINATION: XR TIBIA & FIBULA RT 2 VW  1/3/2018 9:46 AM     CLINICAL HISTORY:  ; Osteomyelitis, unspecified site, unspecified type  (H)     COMPARISON: None available    FINDINGS: AP and lateral views of the tibia and fibula were obtained.  There is no comparison available. A vascular stent posterior to the  distal femoral condyles is noted. There are some vascular  calcifications more distally. Joint spaces appear preserved. There is  a soft tissue wound anterior to the mid shaft of the tibia. The bone  does not reach the cortical surface of the tibia. No definite evidence  of osseous involvement. No evidence of acute osseous abnormalities on  this large field-of-view radiograph.      Impression    IMPRESSION: No evidence of acute osseous abnormalities on this large  field-of-view radiograph.  Should there be concern for osteomyelitis a marker should be placed in  the area of concern and a dedicated small field-of-view radiograph  could be obtained.    JUTTA ELLERMANN, MD   US Lower Extremity Arterial Duplex Bilateral    Narrative    Exam: Duplex ultrasound of bilateral lower extremity arteries dated  1/3/2018 7:47 PM     Clinical information: PVD, nonhealing lower extremity wounds with a  new wounds and increasing pain     Comparison: None available    Technique: Includes Gray Scale images, color Doppler, spectral Doppler  waveforms and velocities with appropriate angles of 60 degrees or  less.    Findings:     Right lower extremity:     Common femoral artery: 291 cm/sec. Waveforms: Monophasic  Deep femoral artery: 200  cm/sec. Waveforms: Monophasic  SFA:  *  Mid thigh: 88 cm/sec. Waveforms: Monophasic  *  Distal thigh: Not clearly visualized    Popliteal artery: 23 cm/sec. Waveforms: Monophasic  PTA ankle: 28 cm/sec. Waveforms: Monophasic  TEDDY ankle: 9 cm/sec. Waveforms: Monophasic    Left lower extremity:    Common femoral artery: 184 cm/sec. Waveforms: Monophasic  Deep femoral artery: 290 cm/sec. Waveforms: Monophasic  SFA:  *  Proximal thigh: 173 cm/sec. Waveforms: Monophasic  *  Mid thigh: 10 cm/sec. Waveforms: Monophasic  *  Distal thigh: 6 cm/sec. Waveforms: Monophasic  Popliteal artery: 72 cm/sec. Waveforms: Monophasic  PTA ankle: 40 cm/sec. Waveforms: Monophasic  TEDDY ankle: 40 cm/sec. Waveforms: Monophasic      Impression    Impression:     1. Right leg: Significant atherosclerotic calcification with minimal  flow within the SFA in the distal thigh, consistent with  hemodynamically significant stenosis. Post obstructive waveforms in  the popliteal, PTA and TEDDY are noted.  2. Left leg: Significant atherosclerotic calcification with minimal  flow within the SFA in the mid and distal thigh, consistent with  hemodynamically significant stenosis. Post obstructive waveforms in  the popliteal, PTA and TEDDY are noted.    Guidelines:  [facility] duplex criteria for lower limb arterial  occlusive disease  -Percent stenosis- Normal (1-19%): Peak systolic velocity (cm/s):  <150, End-diastolic velocity (cm/s): <40, Velocity ration (Vr): <1.5,  Distal arterial waveform: Triphasic  -Percent stenosis- 20-49%: Peak systolic velocity (cm/s): 150-200,  End-diastolic velocity (cm/s): <40, Velocity ration (Vr): 1.5-2.0,  Distal arterial waveform: Triphasic  -Percent stenosis- 50-75%: Peak systolic velocity (cm/s): 200-300,  End-diastolic velocity (cm/s): <90, Velocity ration (Vr): 2.0-3.9,  Distal arterial waveform: Poststenotic turbulence distal to stenosis,  monophasic distal waveform  -Percent stenosis- >75%: Peak systolic  velocity (cm/s): >300,  End-diastolic velocity (cm/s): <90, Velocity ration (Vr): >4.0, Distal  arterial waveform: Dampened distal waveform and low PSV/EDV* in the  stenosis  -Percent stenosis- Occlusion: Absent flow by color Doppler/pulsed  Doppler spectral analysis; length of occlusion estimated from distance  between exit and reentry collateral arteries  *PSV = peak systolic velocity, EDV = end-diastolic velocity  http://link.valverde.com/chapter/10.1007/[email or web address]_23/fulltext  html       Recent vital signs:   /59  Pulse 58  Temp 98.3  F (36.8  C) (Oral)  Resp 16  Wt 64.6 kg (142 lb 6.7 oz)  SpO2 100%    Cardiac Rhythm: Other  Pt needs tele? Yes  Skin/wound Issues: wounds bilaterally on lower legs    Code Status: Full Code    Pain control: fairly well controlled    Nausea control: pt had none    Abnormal labs/tests/findings requiring intervention: see vascular surgery note and ultrasound results. Pt also requires care coordination with social work  Family present during ED course? no  Family Comments/Social Situation comments: pt requires care coordination with social work    Tasks needing completion: RAVI doppler    Amy Colon, RN  ascom-- [phone] [address] ED  [phone] [address] ED

## 2018-01-04 NOTE — PROGRESS NOTES
Interventional Radiology Clinic Visit    Date of this visit: 1/4/2018    Boom Kahn presents for consultation for evaluation of chronic bilateral shin wounds and severe rest pain.    Primary Physician: Willian Arrington      History Of Present Illness:    Boom Kahn is a 67 year old male who presents with his son for evaluation of chronic severely painful bilateral shin wounds. He moved/relocated from [place] just yesterday to be closer to his family/son for support. He has a history of endovascular stent placement in his right leg.     He has a significant past medical history for ischemic cardiomyopathy s/p CABG with EF reported to be around 25-30%.     He reports his right shin wound first developed in 2015 after a minor trauma. He received wound cares and it healed. Following this he had a similar minor trauma inciting a near mirror image wound on his left shin.     During his cares for his left shin wound, the right wound reopened, this was apporoximately one year ago. He has been struggling to heal the wound since then, and he reports the wound has progressed. His biggest complaint is the severe, 10/10, rest pain associated with the wound. The pain is not only localized to the wound, it affects the surrounding tissue as well.     He is not walking much due to pain. He cannot sleep well. He is on a chronic pain regimen with fentanyl patch and Codeine which is not controlling the pain. He denies fevers, chills, or foul smelling discharge.'    Review of Systems:    10 Point ROS is positive for what is described in the HPI. Otherwise, the remainder of the ROS is negative.    Past Medical/Surgical History:    Past Medical History:   Diagnosis Date     Anxiety      CAD (coronary artery disease)     s/p 3v CABG     CHF (congestive heart failure) (H)     EF 10-15%     Chronic pain      COPD (chronic obstructive pulmonary disease) (H)      Depression      Hyperlipidemia      Hypertension      Lung cancer (H)      s/p radiation therapy     PAD (peripheral artery disease) (H)     s/p lower extremity stents     Tobacco abuse      Past Surgical History:   Procedure Laterality Date     ANGIOPLASTY Right 10/2016     BYPASS GRAFT ARTERY CORONARY  1999    [facility]     cardiac catheterization       Cardiac defibrillator placement       CHEST TUBE INSERTION       COLON SURGERY  2008    colon resection for diverticulitis     FINE NEEDLE ASPIRATION Right 12/2016     IMPLANT PACEMAKER       previous radiation       Current Medications:    No current outpatient prescriptions on file.     Allergies:    Collagenase clostridium histolyticum; Flagyl [metronidazole]; Iodine; and Santyl [collagenase]    Family History:    No family history on file.    Social History:    Social History     Social History     Marital status: Single     Spouse name: N/A     Number of children: N/A     Years of education: N/A     Social History Main Topics     Smoking status: Current Every Day Smoker     Packs/day: 1.00     Types: Cigarettes     Smokeless tobacco: Never Used      Comment: 0.5-1 ppd.     Alcohol use None     Drug use: None     Sexual activity: Not Asked     Other Topics Concern     None     Social History Narrative       Physical Exam:    /63 (BP Location: Right arm)  Pulse 67  Resp 16  SpO2 96%     GENERAL APPEARANCE: Lying on exam table, nad  HEENT: ncat  RESP: cta  CARDIOVASCULAR: rrr   ABDOMEN: soft, nontender  VASCULAR: +1 CFA pulses. 0/1 popliteal pulses.  R: +2 pitting edema to knee. Bronzing, erthema, not warm. Approx 4x6 cm ovoid wound to subq tissue anterior shin. No discharge. Some corona radiata in the ankle area. Dry scaly patches without open wounds and dressings on the toes. Pulse evaluation limited by edema. Motor flexion/extension of foot and lower leg preserved.  Left: +1 pitting edema. Bronzing. 4x5 cm wound with healthy appearing fat layer exposed. No discharge. +1 PTA/DPA.       Laboratory Studies:    Lab  Results   Component Value Date    HGB 14.1 01/04/2018     Lab Results   Component Value Date     01/04/2018     Lab Results   Component Value Date    WBC 10.0 01/04/2018       Lab Results   Component Value Date    INR 1.01 01/04/2018         Lab Results   Component Value Date    CR 0.57 01/04/2018     Lab Results   Component Value Date    BUN 11 01/04/2018       No results found for: FETO    Imaging:     I reviewed the x-rays of the right lower leg today which does not definitively show any signs of osteomyelitis.    ASSESSMENT/PLAN:      Boom Kahn is a 67 year old male with chronic bilateral shin wounds, right worse than left, with clear signs of chronic venous pooling/insufficiency right lower leg, CEAP 5/6 and arterial insuffiency [place] category 5.     I believe he has a multifactorial venous and arterial related wound in the right shin. The biggest issue right now for the patient and his family/son is the exquisite pain associated with it. He cannot sleep and cannot function. Before transferring down her from [place], he had to be hospitalized for a few days due to the pain.     His left leg wound is healing but still active, without as much associated pain.     Unfortunately due to the quick presentation I don't have any of his outside images only paper records. He has stents in his right SFA that appear to have occluded potentially.     He is clearly a high risk surgical candidate due to ischemic cardiomyopathy and EF of 25%. Unfortunately he is not MRI candidate due to incompatible pacer leads, so we cannot evaluate his shin wounds for osteo or perform a cardiac stress MRI. He has a scar along his left medial thigh likely from GSV harvesting.     Plan:  - Transfer to ED for hopeful admission for expedited treatment of his wound and better pain management  - Called ER and discussed with physician informing of transfer.  - Called IR to inform of patient and plan for inpatient angiogram  tomorrow  - Recommend referral to our vascular surgery colleagues for concomitant consultation regarding surgical revascularization options, limb salvage options.  - I recommend venous competency ultrasound, clear signs of venous insufficiency of the right leg contributing to poor wound healing.     A total of 45 minutes was spent in care for the patient, of which >50% was spent in counseling and co-ordination of care.     It was a pleasure seeing the patient.     SignedPerry M.D.  Department of Interventional Radiology  [facility]  Patient Care Team:  Willian Arrington MD as PCP - General  Perry Martinez MD as Resident (Radiology)  SELF, REFERRED

## 2018-01-04 NOTE — PROGRESS NOTES
"CLINICAL NUTRITION SERVICES - ASSESSMENT NOTE     Nutrition Prescription    RECOMMENDATIONS FOR MDs/PROVIDERS TO ORDER:  Diet advance post-procedure per provider discretion    Malnutrition Status:    Unable to determine due to pt unavailable during attempts to visit    Recommendations already ordered by Registered Dietitian (RD):  None at this time     Future/Additional Recommendations:  Encourage patient to consume at least 75% of meals TID or the equivalent with snacks/supplements.  If consuming less than this offer scheduled supplements and/or snacks.  Consider ordering calorie counts to assess PO intake adequacy.       REASON FOR ASSESSMENT  Boom Kahn is a/an 67 year old male assessed by the dietitian for Admission Nutrition Risk Screen for unintentional loss of 10# or more in the past two months    NUTRITION HISTORY  Pt unavailable during attempts to visit, obtained info from chart. PMH includes CHF, COPD, and PVD.    CURRENT NUTRITION ORDERS  Diet: NPO. 2 L fluid restriction  Intake/Tolerance: Was on low saturated fat/ Na <2400 mg diet last night until midnight.  NPO since midnight for IR procedure today    LABS  Labs reviewed  - Cr 0.57 (L), may indicate low muscle mass    MEDICATIONS  Medications reviewed  - Lasix, Spironolactone  - Thera-Vit-M  - Folic acid, thiamine    ANTHROPOMETRICS  Height: 170.2 cm (5' 7\")  Most Recent Weight: 62.8 kg (138 lb 6.4 oz)    IBW: 67.3 kg  BMI: Normal BMI  Weight History: Per Care Everywhere, pt weighed 68.2 kg 8/9/17 and 67.1 kg on 10/12/17.  Wt is down 4.3 kg over the past 3 months (6.4% wt loss)  Wt Readings from Last 10 Encounters:   01/03/18 62.8 kg (138 lb 6.4 oz)      Dosing Weight: 63 kg (actual wt from 1/3)    ASSESSED NUTRITION NEEDS  Estimated Energy Needs: 2495-2988 kcals/day (30 - 35 kcals/kg )  Justification: Repletion and Underweight (actual wt < IBW)  Estimated Protein Needs: 76-95 grams protein/day (1.2 - 1.5 grams of pro/kg)  Justification: " Hypercatabolism with acute illness  Estimated Fluid Needs: on a 2L fluid restriction per provider    PHYSICAL FINDINGS  See malnutrition section below.    MALNUTRITION  % Intake: Unable to assess  % Weight Loss: Up to 7.5% in 3 months (non-severe)  Subcutaneous Fat Loss: Unable to assess  Muscle Loss: Unable to assess  Fluid Accumulation/Edema: Trace/mild BLE per nursing documentation, but likely related to to lower extremity wounds  Malnutrition Diagnosis: Unable to determine due to pt unavailable during attempts to visit    NUTRITION DIAGNOSIS  Predicted inadequate nutrient intake (protein-energy) related to pt denying recent appetite changes PTA per provider note but documented 6% wt loss over the past ~3 months     INTERVENTIONS  Implementation  Nutrition Education: Unable to complete due to pt unavailable during attempts to visit     Goals  Patient to consume % of nutritionally adequate meal trays TID, or the equivalent with supplements/snacks.     Monitoring/Evaluation  Progress toward goals will be monitored and evaluated per protocol.     Wendy Calixto RD, LD   5A/5B RD Pager: [phone]

## 2018-01-05 ENCOUNTER — APPOINTMENT (OUTPATIENT)
Dept: ULTRASOUND IMAGING | Facility: CLINIC | Age: 68
DRG: 247 | End: 2018-01-05
Attending: SURGERY
Payer: COMMERCIAL

## 2018-01-05 ENCOUNTER — APPOINTMENT (OUTPATIENT)
Dept: PHYSICAL THERAPY | Facility: CLINIC | Age: 68
DRG: 247 | End: 2018-01-05
Attending: INTERNAL MEDICINE
Payer: COMMERCIAL

## 2018-01-05 ENCOUNTER — APPOINTMENT (OUTPATIENT)
Dept: CARDIOLOGY | Facility: CLINIC | Age: 68
DRG: 247 | End: 2018-01-05
Attending: INTERNAL MEDICINE
Payer: COMMERCIAL

## 2018-01-05 ENCOUNTER — APPOINTMENT (OUTPATIENT)
Dept: ULTRASOUND IMAGING | Facility: CLINIC | Age: 68
DRG: 247 | End: 2018-01-05
Attending: INTERNAL MEDICINE
Payer: COMMERCIAL

## 2018-01-05 LAB
ANION GAP SERPL CALCULATED.3IONS-SCNC: 7 MMOL/L (ref 3–14)
BUN SERPL-MCNC: 9 MG/DL (ref 7–30)
CALCIUM SERPL-MCNC: 8.5 MG/DL (ref 8.5–10.1)
CHLORIDE SERPL-SCNC: 107 MMOL/L (ref 94–109)
CO2 SERPL-SCNC: 25 MMOL/L (ref 20–32)
CREAT SERPL-MCNC: 0.68 MG/DL (ref 0.66–1.25)
CRP SERPL-MCNC: 6.5 MG/L (ref 0–8)
ERYTHROCYTE [DISTWIDTH] IN BLOOD BY AUTOMATED COUNT: 13.9 % (ref 10–15)
GFR SERPL CREATININE-BSD FRML MDRD: >90 ML/MIN/1.7M2
GLUCOSE SERPL-MCNC: 80 MG/DL (ref 70–99)
HCT VFR BLD AUTO: 43 % (ref 40–53)
HGB BLD-MCNC: 14.2 G/DL (ref 13.3–17.7)
KCT BLD-ACNC: 286 SEC (ref 105–167)
MAGNESIUM SERPL-MCNC: 2.2 MG/DL (ref 1.6–2.3)
MCH RBC QN AUTO: 33.1 PG (ref 26.5–33)
MCHC RBC AUTO-ENTMCNC: 33 G/DL (ref 31.5–36.5)
MCV RBC AUTO: 100 FL (ref 78–100)
PLATELET # BLD AUTO: 273 10E9/L (ref 150–450)
POTASSIUM SERPL-SCNC: 3.8 MMOL/L (ref 3.4–5.3)
RBC # BLD AUTO: 4.29 10E12/L (ref 4.4–5.9)
SODIUM SERPL-SCNC: 140 MMOL/L (ref 133–144)
WBC # BLD AUTO: 9.5 10E9/L (ref 4–11)

## 2018-01-05 PROCEDURE — 27210760 ZZH DEVICE INFLATION CR7

## 2018-01-05 PROCEDURE — 97116 GAIT TRAINING THERAPY: CPT | Mod: GP

## 2018-01-05 PROCEDURE — 27210946 ZZH KIT HC TOTES DISP CR8

## 2018-01-05 PROCEDURE — 27210787 ZZH MANIFOLD CR2

## 2018-01-05 PROCEDURE — 25000128 H RX IP 250 OP 636: Performed by: INTERNAL MEDICINE

## 2018-01-05 PROCEDURE — 99222 1ST HOSP IP/OBS MODERATE 55: CPT | Mod: 25 | Performed by: INTERNAL MEDICINE

## 2018-01-05 PROCEDURE — C9600 PERC DRUG-EL COR STENT SING: HCPCS

## 2018-01-05 PROCEDURE — 27211089 ZZH KIT ACIST INJECTOR CR3

## 2018-01-05 PROCEDURE — 97530 THERAPEUTIC ACTIVITIES: CPT | Mod: GP

## 2018-01-05 PROCEDURE — 83735 ASSAY OF MAGNESIUM: CPT | Performed by: INTERNAL MEDICINE

## 2018-01-05 PROCEDURE — 27210843 ZZH DEVICE COMPRESSION CR12

## 2018-01-05 PROCEDURE — 99233 SBSQ HOSP IP/OBS HIGH 50: CPT | Performed by: INTERNAL MEDICINE

## 2018-01-05 PROCEDURE — 25000132 ZZH RX MED GY IP 250 OP 250 PS 637: Performed by: INTERNAL MEDICINE

## 2018-01-05 PROCEDURE — 93455 CORONARY ART/GRFT ANGIO S&I: CPT | Mod: 26 | Performed by: INTERNAL MEDICINE

## 2018-01-05 PROCEDURE — 93005 ELECTROCARDIOGRAM TRACING: CPT

## 2018-01-05 PROCEDURE — C1874 STENT, COATED/COV W/DEL SYS: HCPCS

## 2018-01-05 PROCEDURE — 97161 PT EVAL LOW COMPLEX 20 MIN: CPT | Mod: GP

## 2018-01-05 PROCEDURE — 99207 ZZC NO CHARGE FIRST FOLLOW UP PS: CPT

## 2018-01-05 PROCEDURE — 93455 CORONARY ART/GRFT ANGIO S&I: CPT

## 2018-01-05 PROCEDURE — 40000556 ZZH STATISTIC PERIPHERAL IV START W US GUIDANCE

## 2018-01-05 PROCEDURE — 92928 PRQ TCAT PLMT NTRAC ST 1 LES: CPT | Mod: LC | Performed by: INTERNAL MEDICINE

## 2018-01-05 PROCEDURE — C1887 CATHETER, GUIDING: HCPCS

## 2018-01-05 PROCEDURE — 25000132 ZZH RX MED GY IP 250 OP 250 PS 637: Performed by: STUDENT IN AN ORGANIZED HEALTH CARE EDUCATION/TRAINING PROGRAM

## 2018-01-05 PROCEDURE — 027034Z DILATION OF CORONARY ARTERY, ONE ARTERY WITH DRUG-ELUTING INTRALUMINAL DEVICE, PERCUTANEOUS APPROACH: ICD-10-PCS | Performed by: INTERNAL MEDICINE

## 2018-01-05 PROCEDURE — B2111ZZ FLUOROSCOPY OF MULTIPLE CORONARY ARTERIES USING LOW OSMOLAR CONTRAST: ICD-10-PCS | Performed by: INTERNAL MEDICINE

## 2018-01-05 PROCEDURE — 93970 EXTREMITY STUDY: CPT

## 2018-01-05 PROCEDURE — B2181ZZ FLUOROSCOPY OF LEFT INTERNAL MAMMARY BYPASS GRAFT USING LOW OSMOLAR CONTRAST: ICD-10-PCS | Performed by: INTERNAL MEDICINE

## 2018-01-05 PROCEDURE — 86140 C-REACTIVE PROTEIN: CPT | Performed by: INTERNAL MEDICINE

## 2018-01-05 PROCEDURE — 93010 ELECTROCARDIOGRAM REPORT: CPT | Performed by: INTERNAL MEDICINE

## 2018-01-05 PROCEDURE — 93567 NJX CAR CTH SPRVLV AORTGRPHY: CPT

## 2018-01-05 PROCEDURE — C1725 CATH, TRANSLUMIN NON-LASER: HCPCS

## 2018-01-05 PROCEDURE — C1769 GUIDE WIRE: HCPCS

## 2018-01-05 PROCEDURE — 25000125 ZZHC RX 250: Performed by: INTERNAL MEDICINE

## 2018-01-05 PROCEDURE — 99152 MOD SED SAME PHYS/QHP 5/>YRS: CPT

## 2018-01-05 PROCEDURE — 36415 COLL VENOUS BLD VENIPUNCTURE: CPT | Performed by: INTERNAL MEDICINE

## 2018-01-05 PROCEDURE — 27210998 ZZH ACCESS HEART CATH CR6

## 2018-01-05 PROCEDURE — 85027 COMPLETE CBC AUTOMATED: CPT | Performed by: INTERNAL MEDICINE

## 2018-01-05 PROCEDURE — 80048 BASIC METABOLIC PNL TOTAL CA: CPT | Performed by: INTERNAL MEDICINE

## 2018-01-05 PROCEDURE — C1894 INTRO/SHEATH, NON-LASER: HCPCS

## 2018-01-05 PROCEDURE — 25000125 ZZHC RX 250: Performed by: STUDENT IN AN ORGANIZED HEALTH CARE EDUCATION/TRAINING PROGRAM

## 2018-01-05 PROCEDURE — 40000193 ZZH STATISTIC PT WARD VISIT

## 2018-01-05 PROCEDURE — B2131ZZ FLUOROSCOPY OF MULTIPLE CORONARY ARTERY BYPASS GRAFTS USING LOW OSMOLAR CONTRAST: ICD-10-PCS | Performed by: INTERNAL MEDICINE

## 2018-01-05 PROCEDURE — 93880 EXTRACRANIAL BILAT STUDY: CPT

## 2018-01-05 PROCEDURE — 85347 COAGULATION TIME ACTIVATED: CPT

## 2018-01-05 PROCEDURE — 12000001 ZZH R&B MED SURG/OB UMMC

## 2018-01-05 PROCEDURE — 99153 MOD SED SAME PHYS/QHP EA: CPT

## 2018-01-05 PROCEDURE — 25000132 ZZH RX MED GY IP 250 OP 250 PS 637: Performed by: NURSE PRACTITIONER

## 2018-01-05 RX ORDER — NITROGLYCERIN 20 MG/100ML
.07-2 INJECTION INTRAVENOUS CONTINUOUS PRN
Status: DISCONTINUED | OUTPATIENT
Start: 2018-01-05 | End: 2018-01-05

## 2018-01-05 RX ORDER — SPIRONOLACTONE 25 MG/1
25 TABLET ORAL
Status: DISCONTINUED | OUTPATIENT
Start: 2018-01-05 | End: 2018-01-23 | Stop reason: HOSPADM

## 2018-01-05 RX ORDER — NALOXONE HYDROCHLORIDE 0.4 MG/ML
.2-.4 INJECTION, SOLUTION INTRAMUSCULAR; INTRAVENOUS; SUBCUTANEOUS
Status: ACTIVE | OUTPATIENT
Start: 2018-01-05 | End: 2018-01-06

## 2018-01-05 RX ORDER — SODIUM CHLORIDE 9 MG/ML
INJECTION, SOLUTION INTRAVENOUS CONTINUOUS
Status: DISCONTINUED | OUTPATIENT
Start: 2018-01-05 | End: 2018-01-06

## 2018-01-05 RX ORDER — NICARDIPINE HYDROCHLORIDE 2.5 MG/ML
100 INJECTION INTRAVENOUS
Status: DISCONTINUED | OUTPATIENT
Start: 2018-01-05 | End: 2018-01-05

## 2018-01-05 RX ORDER — SODIUM CHLORIDE 9 MG/ML
INJECTION, SOLUTION INTRAVENOUS CONTINUOUS
Status: ACTIVE | OUTPATIENT
Start: 2018-01-05 | End: 2018-01-06

## 2018-01-05 RX ORDER — ENALAPRILAT 1.25 MG/ML
1.25-2.5 INJECTION INTRAVENOUS
Status: DISCONTINUED | OUTPATIENT
Start: 2018-01-05 | End: 2018-01-05

## 2018-01-05 RX ORDER — CLOPIDOGREL BISULFATE 75 MG/1
75 TABLET ORAL
Status: DISCONTINUED | OUTPATIENT
Start: 2018-01-05 | End: 2018-01-05

## 2018-01-05 RX ORDER — PHENYLEPHRINE HCL IN 0.9% NACL 1 MG/10 ML
20-100 SYRINGE (ML) INTRAVENOUS
Status: DISCONTINUED | OUTPATIENT
Start: 2018-01-05 | End: 2018-01-05

## 2018-01-05 RX ORDER — ASPIRIN 81 MG/1
81 TABLET ORAL DAILY
Status: DISCONTINUED | OUTPATIENT
Start: 2018-01-05 | End: 2018-01-16

## 2018-01-05 RX ORDER — LORAZEPAM 2 MG/ML
.5-2 INJECTION INTRAMUSCULAR EVERY 4 HOURS PRN
Status: DISCONTINUED | OUTPATIENT
Start: 2018-01-05 | End: 2018-01-05

## 2018-01-05 RX ORDER — DIPHENHYDRAMINE HYDROCHLORIDE 50 MG/ML
25-50 INJECTION INTRAMUSCULAR; INTRAVENOUS
Status: DISCONTINUED | OUTPATIENT
Start: 2018-01-05 | End: 2018-01-05

## 2018-01-05 RX ORDER — FENTANYL CITRATE 50 UG/ML
25-50 INJECTION, SOLUTION INTRAMUSCULAR; INTRAVENOUS
Status: ACTIVE | OUTPATIENT
Start: 2018-01-05 | End: 2018-01-06

## 2018-01-05 RX ORDER — NALOXONE HYDROCHLORIDE 0.4 MG/ML
.1-.4 INJECTION, SOLUTION INTRAMUSCULAR; INTRAVENOUS; SUBCUTANEOUS
Status: DISCONTINUED | OUTPATIENT
Start: 2018-01-05 | End: 2018-01-16

## 2018-01-05 RX ORDER — ASPIRIN 325 MG
325 TABLET ORAL
Status: DISCONTINUED | OUTPATIENT
Start: 2018-01-05 | End: 2018-01-05

## 2018-01-05 RX ORDER — POTASSIUM CHLORIDE 7.45 MG/ML
10 INJECTION INTRAVENOUS
Status: DISCONTINUED | OUTPATIENT
Start: 2018-01-05 | End: 2018-01-05

## 2018-01-05 RX ORDER — MAGNESIUM HYDROXIDE 1200 MG/15ML
1000 LIQUID ORAL CONTINUOUS PRN
Status: DISCONTINUED | OUTPATIENT
Start: 2018-01-05 | End: 2018-01-05

## 2018-01-05 RX ORDER — IOPAMIDOL 755 MG/ML
250 INJECTION, SOLUTION INTRAVASCULAR ONCE
Status: COMPLETED | OUTPATIENT
Start: 2018-01-05 | End: 2018-01-05

## 2018-01-05 RX ORDER — FUROSEMIDE 10 MG/ML
20-100 INJECTION INTRAMUSCULAR; INTRAVENOUS
Status: DISCONTINUED | OUTPATIENT
Start: 2018-01-05 | End: 2018-01-05

## 2018-01-05 RX ORDER — FUROSEMIDE 20 MG
20 TABLET ORAL
Status: DISCONTINUED | OUTPATIENT
Start: 2018-01-05 | End: 2018-01-10

## 2018-01-05 RX ORDER — ARGATROBAN 1 MG/ML
350 INJECTION, SOLUTION INTRAVENOUS
Status: DISCONTINUED | OUTPATIENT
Start: 2018-01-05 | End: 2018-01-05

## 2018-01-05 RX ORDER — ATROPINE SULFATE 0.1 MG/ML
.5-1 INJECTION INTRAVENOUS
Status: DISCONTINUED | OUTPATIENT
Start: 2018-01-05 | End: 2018-01-05

## 2018-01-05 RX ORDER — PROMETHAZINE HYDROCHLORIDE 25 MG/ML
6.25-25 INJECTION, SOLUTION INTRAMUSCULAR; INTRAVENOUS EVERY 4 HOURS PRN
Status: DISCONTINUED | OUTPATIENT
Start: 2018-01-05 | End: 2018-01-05

## 2018-01-05 RX ORDER — ASPIRIN 81 MG/1
81-324 TABLET, CHEWABLE ORAL
Status: DISCONTINUED | OUTPATIENT
Start: 2018-01-05 | End: 2018-01-05

## 2018-01-05 RX ORDER — NITROGLYCERIN 0.4 MG/1
0.4 TABLET SUBLINGUAL EVERY 5 MIN PRN
Status: DISCONTINUED | OUTPATIENT
Start: 2018-01-05 | End: 2018-01-23 | Stop reason: HOSPADM

## 2018-01-05 RX ORDER — PROTAMINE SULFATE 10 MG/ML
1-5 INJECTION, SOLUTION INTRAVENOUS
Status: DISCONTINUED | OUTPATIENT
Start: 2018-01-05 | End: 2018-01-05

## 2018-01-05 RX ORDER — ATROPINE SULFATE 0.1 MG/ML
0.5 INJECTION INTRAVENOUS EVERY 5 MIN PRN
Status: ACTIVE | OUTPATIENT
Start: 2018-01-05 | End: 2018-01-06

## 2018-01-05 RX ORDER — METHYLPREDNISOLONE SODIUM SUCCINATE 125 MG/2ML
125 INJECTION, POWDER, LYOPHILIZED, FOR SOLUTION INTRAMUSCULAR; INTRAVENOUS
Status: DISCONTINUED | OUTPATIENT
Start: 2018-01-05 | End: 2018-01-05

## 2018-01-05 RX ORDER — ADENOSINE 3 MG/ML
12-12000 INJECTION, SOLUTION INTRAVENOUS
Status: DISCONTINUED | OUTPATIENT
Start: 2018-01-05 | End: 2018-01-05

## 2018-01-05 RX ORDER — VERAPAMIL HYDROCHLORIDE 2.5 MG/ML
1-5 INJECTION, SOLUTION INTRAVENOUS
Status: DISCONTINUED | OUTPATIENT
Start: 2018-01-05 | End: 2018-01-05

## 2018-01-05 RX ORDER — NITROGLYCERIN 0.4 MG/1
0.4 TABLET SUBLINGUAL EVERY 5 MIN PRN
Status: DISCONTINUED | OUTPATIENT
Start: 2018-01-05 | End: 2018-01-05

## 2018-01-05 RX ORDER — ARGATROBAN 1 MG/ML
150 INJECTION, SOLUTION INTRAVENOUS
Status: DISCONTINUED | OUTPATIENT
Start: 2018-01-05 | End: 2018-01-05

## 2018-01-05 RX ORDER — SODIUM NITROPRUSSIDE 25 MG/ML
100-200 INJECTION INTRAVENOUS
Status: DISCONTINUED | OUTPATIENT
Start: 2018-01-05 | End: 2018-01-05

## 2018-01-05 RX ORDER — ONDANSETRON 2 MG/ML
4 INJECTION INTRAMUSCULAR; INTRAVENOUS EVERY 4 HOURS PRN
Status: DISCONTINUED | OUTPATIENT
Start: 2018-01-05 | End: 2018-01-05

## 2018-01-05 RX ORDER — PRASUGREL 10 MG/1
10-60 TABLET, FILM COATED ORAL
Status: DISCONTINUED | OUTPATIENT
Start: 2018-01-05 | End: 2018-01-05

## 2018-01-05 RX ORDER — NIFEDIPINE 10 MG/1
10 CAPSULE ORAL
Status: DISCONTINUED | OUTPATIENT
Start: 2018-01-05 | End: 2018-01-05

## 2018-01-05 RX ORDER — HYDRALAZINE HYDROCHLORIDE 20 MG/ML
10-20 INJECTION INTRAMUSCULAR; INTRAVENOUS
Status: DISCONTINUED | OUTPATIENT
Start: 2018-01-05 | End: 2018-01-05

## 2018-01-05 RX ORDER — EPINEPHRINE 1 MG/ML
0.3 INJECTION, SOLUTION, CONCENTRATE INTRAVENOUS
Status: DISCONTINUED | OUTPATIENT
Start: 2018-01-05 | End: 2018-01-05

## 2018-01-05 RX ORDER — METOPROLOL TARTRATE 1 MG/ML
5 INJECTION, SOLUTION INTRAVENOUS EVERY 5 MIN PRN
Status: DISCONTINUED | OUTPATIENT
Start: 2018-01-05 | End: 2018-01-05

## 2018-01-05 RX ORDER — LIDOCAINE HYDROCHLORIDE 10 MG/ML
30 INJECTION, SOLUTION EPIDURAL; INFILTRATION; INTRACAUDAL; PERINEURAL
Status: DISCONTINUED | OUTPATIENT
Start: 2018-01-05 | End: 2018-01-05

## 2018-01-05 RX ORDER — DOPAMINE HYDROCHLORIDE 160 MG/100ML
2-20 INJECTION, SOLUTION INTRAVENOUS CONTINUOUS PRN
Status: DISCONTINUED | OUTPATIENT
Start: 2018-01-05 | End: 2018-01-05

## 2018-01-05 RX ORDER — NITROGLYCERIN 5 MG/ML
100-200 VIAL (ML) INTRAVENOUS
Status: DISCONTINUED | OUTPATIENT
Start: 2018-01-05 | End: 2018-01-05

## 2018-01-05 RX ORDER — DEXTROSE MONOHYDRATE 25 G/50ML
12.5-5 INJECTION, SOLUTION INTRAVENOUS EVERY 30 MIN PRN
Status: DISCONTINUED | OUTPATIENT
Start: 2018-01-05 | End: 2018-01-05

## 2018-01-05 RX ORDER — EPTIFIBATIDE 2 MG/ML
2 INJECTION, SOLUTION INTRAVENOUS CONTINUOUS PRN
Status: DISCONTINUED | OUTPATIENT
Start: 2018-01-05 | End: 2018-01-05

## 2018-01-05 RX ORDER — DOBUTAMINE HYDROCHLORIDE 200 MG/100ML
2-20 INJECTION INTRAVENOUS CONTINUOUS PRN
Status: DISCONTINUED | OUTPATIENT
Start: 2018-01-05 | End: 2018-01-05

## 2018-01-05 RX ORDER — NALOXONE HYDROCHLORIDE 0.4 MG/ML
0.4 INJECTION, SOLUTION INTRAMUSCULAR; INTRAVENOUS; SUBCUTANEOUS EVERY 5 MIN PRN
Status: DISCONTINUED | OUTPATIENT
Start: 2018-01-05 | End: 2018-01-05

## 2018-01-05 RX ORDER — FLUMAZENIL 0.1 MG/ML
0.2 INJECTION, SOLUTION INTRAVENOUS
Status: DISCONTINUED | OUTPATIENT
Start: 2018-01-05 | End: 2018-01-05

## 2018-01-05 RX ORDER — FENTANYL CITRATE 50 UG/ML
25-50 INJECTION, SOLUTION INTRAMUSCULAR; INTRAVENOUS
Status: DISCONTINUED | OUTPATIENT
Start: 2018-01-05 | End: 2018-01-05

## 2018-01-05 RX ORDER — EPTIFIBATIDE 2 MG/ML
180 INJECTION, SOLUTION INTRAVENOUS EVERY 10 MIN PRN
Status: DISCONTINUED | OUTPATIENT
Start: 2018-01-05 | End: 2018-01-05

## 2018-01-05 RX ORDER — LIDOCAINE 40 MG/G
CREAM TOPICAL
Status: DISCONTINUED | OUTPATIENT
Start: 2018-01-05 | End: 2018-01-19

## 2018-01-05 RX ORDER — NITROGLYCERIN 5 MG/ML
100-500 VIAL (ML) INTRAVENOUS
Status: DISCONTINUED | OUTPATIENT
Start: 2018-01-05 | End: 2018-01-05

## 2018-01-05 RX ORDER — FLUMAZENIL 0.1 MG/ML
0.2 INJECTION, SOLUTION INTRAVENOUS
Status: ACTIVE | OUTPATIENT
Start: 2018-01-05 | End: 2018-01-06

## 2018-01-05 RX ORDER — PROTAMINE SULFATE 10 MG/ML
25-100 INJECTION, SOLUTION INTRAVENOUS EVERY 5 MIN PRN
Status: DISCONTINUED | OUTPATIENT
Start: 2018-01-05 | End: 2018-01-05

## 2018-01-05 RX ORDER — POTASSIUM CHLORIDE 29.8 MG/ML
20 INJECTION INTRAVENOUS
Status: DISCONTINUED | OUTPATIENT
Start: 2018-01-05 | End: 2018-01-05

## 2018-01-05 RX ORDER — HEPARIN SODIUM 1000 [USP'U]/ML
1000-10000 INJECTION, SOLUTION INTRAVENOUS; SUBCUTANEOUS EVERY 5 MIN PRN
Status: DISCONTINUED | OUTPATIENT
Start: 2018-01-05 | End: 2018-01-05

## 2018-01-05 RX ORDER — CLOPIDOGREL BISULFATE 75 MG/1
300-600 TABLET ORAL
Status: COMPLETED | OUTPATIENT
Start: 2018-01-05 | End: 2018-01-05

## 2018-01-05 RX ADMIN — PREDNISONE 10 MG: 10 TABLET ORAL at 09:05

## 2018-01-05 RX ADMIN — ROSUVASTATIN CALCIUM 20 MG: 20 TABLET, FILM COATED ORAL at 09:03

## 2018-01-05 RX ADMIN — Medication 1 MG: at 20:07

## 2018-01-05 RX ADMIN — FENTANYL CITRATE 50 MCG: 50 INJECTION, SOLUTION INTRAMUSCULAR; INTRAVENOUS at 17:20

## 2018-01-05 RX ADMIN — HEPARIN SODIUM 2000 UNITS: 1000 INJECTION, SOLUTION INTRAVENOUS; SUBCUTANEOUS at 18:40

## 2018-01-05 RX ADMIN — Medication 500 UNITS: at 17:20

## 2018-01-05 RX ADMIN — GABAPENTIN 300 MG: 300 CAPSULE ORAL at 20:07

## 2018-01-05 RX ADMIN — Medication 1 MG: at 01:16

## 2018-01-05 RX ADMIN — MULTIPLE VITAMINS W/ MINERALS TAB 1 TABLET: TAB at 09:04

## 2018-01-05 RX ADMIN — GABAPENTIN 300 MG: 300 CAPSULE ORAL at 13:33

## 2018-01-05 RX ADMIN — ASPIRIN 81 MG: 81 TABLET, COATED ORAL at 09:05

## 2018-01-05 RX ADMIN — ESCITALOPRAM OXALATE 20 MG: 20 TABLET ORAL at 09:04

## 2018-01-05 RX ADMIN — Medication 1 MG: at 06:06

## 2018-01-05 RX ADMIN — SODIUM CHLORIDE: 9 INJECTION, SOLUTION INTRAVENOUS at 20:28

## 2018-01-05 RX ADMIN — EZETIMIBE 10 MG: 10 TABLET ORAL at 09:05

## 2018-01-05 RX ADMIN — Medication 100 MG: at 09:05

## 2018-01-05 RX ADMIN — MIDAZOLAM 1 MG: 1 INJECTION INTRAMUSCULAR; INTRAVENOUS at 17:21

## 2018-01-05 RX ADMIN — CARVEDILOL 6.25 MG: 6.25 TABLET, FILM COATED ORAL at 09:05

## 2018-01-05 RX ADMIN — Medication 1 MG: at 22:32

## 2018-01-05 RX ADMIN — IOPAMIDOL 250 ML: 755 INJECTION, SOLUTION INTRAVASCULAR at 19:00

## 2018-01-05 RX ADMIN — FOLIC ACID 1 MG: 1 TABLET ORAL at 09:04

## 2018-01-05 RX ADMIN — Medication 1 MG: at 16:04

## 2018-01-05 RX ADMIN — GABAPENTIN 300 MG: 300 CAPSULE ORAL at 09:05

## 2018-01-05 RX ADMIN — MIDAZOLAM 1 MG: 1 INJECTION INTRAMUSCULAR; INTRAVENOUS at 18:18

## 2018-01-05 RX ADMIN — CLOPIDOGREL BISULFATE 300 MG: 75 TABLET ORAL at 18:57

## 2018-01-05 RX ADMIN — ACETAMINOPHEN 975 MG: 325 TABLET, FILM COATED ORAL at 09:04

## 2018-01-05 RX ADMIN — ACETAMINOPHEN 975 MG: 325 TABLET, FILM COATED ORAL at 20:07

## 2018-01-05 RX ADMIN — Medication 1 MG: at 13:34

## 2018-01-05 RX ADMIN — Medication 1 MG: at 11:01

## 2018-01-05 RX ADMIN — Medication 1 MG: at 09:12

## 2018-01-05 RX ADMIN — ASPIRIN 81 MG: 81 TABLET, COATED ORAL at 20:10

## 2018-01-05 RX ADMIN — ENALAPRIL MALEATE 2.5 MG: 2.5 TABLET ORAL at 09:03

## 2018-01-05 RX ADMIN — HEPARIN SODIUM 5000 UNITS: 1000 INJECTION, SOLUTION INTRAVENOUS; SUBCUTANEOUS at 17:23

## 2018-01-05 RX ADMIN — Medication 1500 UNITS: at 17:20

## 2018-01-05 RX ADMIN — FENTANYL CITRATE 50 MCG: 50 INJECTION, SOLUTION INTRAMUSCULAR; INTRAVENOUS at 18:12

## 2018-01-05 RX ADMIN — HEPARIN SODIUM 3000 UNITS: 1000 INJECTION, SOLUTION INTRAVENOUS; SUBCUTANEOUS at 18:07

## 2018-01-05 RX ADMIN — ISOSORBIDE MONONITRATE 60 MG: 60 TABLET, EXTENDED RELEASE ORAL at 09:04

## 2018-01-05 RX ADMIN — CARVEDILOL 6.25 MG: 6.25 TABLET, FILM COATED ORAL at 20:08

## 2018-01-05 RX ADMIN — NICOTINE 1 PATCH: 7 PATCH, EXTENDED RELEASE TRANSDERMAL at 21:47

## 2018-01-05 RX ADMIN — CLOPIDOGREL 75 MG: 75 TABLET, FILM COATED ORAL at 09:05

## 2018-01-05 ASSESSMENT — PAIN DESCRIPTION - DESCRIPTORS
DESCRIPTORS: STABBING
DESCRIPTORS: STABBING

## 2018-01-05 NOTE — PLAN OF CARE
Problem: Patient Care Overview  Goal: Plan of Care/Patient Progress Review  OT 5B: Per discussion with PT, pt currently independent with bed mobility and ambulation; limited by LE pain. OT to hold at this time pending medical course and decisions made.

## 2018-01-05 NOTE — PLAN OF CARE
Problem: Patient Care Overview  Goal: Plan of Care/Patient Progress Review  Outcome: No Change  A&Ox4.  Pt c/o pain in BLEs, received dilaudid which was effective.  MSSA completed, score of 5.  Wound care completed (pt allergic to betadine, did not use).  Pt son came to visit, took pt's home medications with him.  Appetite is fair, FR of 2L maintained.  Denies SOB.  L groin entry site covered with transparent drsg, no bleeding or hematoma present.  Continue to monitor and follow POC.

## 2018-01-05 NOTE — PLAN OF CARE
Problem: Patient Care Overview  Goal: Plan of Care/Patient Progress Review  Discharge Planner PT 5B  Patient plan for discharge: home vs local housing with family pending course of medical care  Current status: completes bed mobility IND as well as functional transfers. Ambulates x 70' with FWW and SBA. Limited by BLE pain and fatigue.   Barriers to return to prior living situation: pending course of medical care  Recommendations for discharge: currently anticipate safe d/c home with assist; may require TCU placement pending course of medical care.  Rationale for recommendations: pt completing mobility near baseline, per pt, the course of medical treatment is still undefined with potential of LE amputation.        Entered by: Td Tran 01/05/2018 10:43 AM

## 2018-01-05 NOTE — PROGRESS NOTES
Patient: Boom Kahn  Date of Service: January 5, 2018 Admission Date:1/3/2018   * No surgery found *     Chief Pain Endorsement: bilateral lower extremity pain (right more painful than left)    Recommendations were discussed and relayed to Dr. Blue Vail  Plan was reviewed by the Inpatient Pain Service and staff attending, Dr. Bree Lara      1. Discontinue IV hydromorphone  2. Start hydromorphone 4-6 mg orally Q 3 hours PRN. If pain is uncontrolled on this regimen over the weekend, can consider increasing to 6-8 mg Q 3 hours PRN vs. Restarting IV hydromorphone  3. Start gabapentin 8%, ketamine 5%, lidocaine 2.5% in PLO gel TID. Apply to a small area first, if tolerated, apply to areas around and not over leg ulcers.  4. Continue Fentanyl 25 mcg/hr patch, 1 patch TD Q 72 hours    Patient was changing patch every 2-3 days. If requiring high doses of PRN hydromorphone, could consider decreasing interval to Q 48 hours.  5. Continue gabapentin 300 mg orally TID    On 1/7, increase gabapentin to 300 mg Q AM and Q PM, 600 mg Q HS  6. Continue acetaminophen 975 mg orally QID  7. Discontinue PRN acetaminophen order  8. If patient is to undergo amputation, would highly recommend peripheral nerve catheters to be used pre-op.  9. Could also consider intraoperative or postoperative ketamine infusions for pain control if he is to undergo amputation.  10. Anxiety and depression management per primary team  11. Bowel regimen per primary team to prevent opioid induced constipation.    Pain Service will continue to follow.    Thank you for consulting the Inpatient Pain Management Service. The above recommendations are to be acted upon at the primary team s discretion.     To reach us:  Mon - Friday 8 AM - 3 PM: Pager [phone] (Text Page)  After hours, weekends and holidays: Primary service should call [phone] for the on-call pain specialist    PAIN MEDICATION SAFE USE:   Prior to discharge instruct patient  on the following in addition to the medication fact sheet:    Caution: these medications can cause sedation    Take prescription medicine only if it has been prescribed by your doctor    Do not take more medicine or take it more often than instructed     Call your doctor if pain gets worse    Never mix pain medicine with alcohol, sleeping pills, or any illicit drugs    Do not operate heavy machinery, including vehicles, when initiation opioid therapy or increasing dosage    Store prescription opioids in a locked container, whenever possible     Dispose of unused opioids appropriately     Do not stop abruptly once at higher doses.  These medications must be tapered off.    Opioid pain medications do carry the risk for physical dependence and addiction and patients should be counseled about this.       1. Acute pain in lower extremities associated with B/L ulcers anterior tibia due to PVD - XR negative for osteomyelitis  2. Chronic pain syndrome  3. Pt is opiate tolerant  4. Tobacco abuse 0.5-1.0PPD for 50yrs  5. ETOH abuse - on [facility] protocol  6. Hypertension  7. Ischemic cardiomyopathy (MI 2008), Congestive heart failure (EF ~25%)  8. Coronary Artery Disease s/p 3 vessel CABG 1999 with multiple stents and ICD placed  9. Chronic anticoagulation on plavix 75mg daily and aspirin 81mg daily   10. Right upper lobe lung cancer s/p radiation therapy  11. Peripheral Vascular disease s/p stenting of right iliac, right common Femoral, right SFA and 3-4 stents in left leg in [place]    -- Outpatient opioid requirements prior to admission:  --Fentanyl 25 mcg/hr, 1 patch TD Q 48 hours (patient changes patch every 2-3 days in an inconsistent manner). Last filled 12/27/17 for 15 patches  --Oxycodone acetaminophen 10/325 mg tabs, Qty 90 tabs for 15 days supply. Takes on average 6 tablets per day    --Adjuvants: gabapentin 300 mg TID  --Others: alprazolam 0.5 mg TID PRN    Primary Care Provider: Dr. Willian Arrington(in  [place])  Chronic Pain Provider: Dr. Willian Arrington    Interval History:  Boom Kahn was seen today (January 5, 2018) and he reports that his pain is okay today but a little worse due to skin irritation at the edge of his right leg ulcer from a wound dressing. He rates his pain at a 6-7/10 but states 2-3 is more tolerable for him. The current IV hydromorphone only lasts about 2 hours and he's been using it pretty much every 2-3 hours. He reports his pain as burning, sharp, tingling, and very tender to touch. He was open to the idea of using a topical cream for local pain control. We also discussed the duration of IV vs oral pain medication and that he'd have a longer lasting benefit from orals since he's tolerating a regular diet.    CAPA (Clinically Aligned Pain Assessment):    Comfort (How is your pain?): Tolerable with discomfort  Change in Pain (Since your last medication/intervention?): About the same  Pain Control (How are your pain treatments working?):  Partially effective control  Functioning (Are you able to do activities to get better?) : Can do most things, but pain gets in the way of some   Sleep (Does your pain management allow you to sleep or rest?): Normal sleep      FUNCTIONAL STATUS:  Change:      staying the same  Oral intake:     Regular (2 gm sodium restriction)  Activity level:     Up with assistance ambulating in room  Mood:      adjusting to situation     -- Inpatient Medications Related to Pain Management:   Medications related to Pain Management (Future)    Start     Dose/Rate Route Frequency Ordered Stop    01/04/18 2000  gabapentin (NEURONTIN) capsule 300 mg      300 mg Oral 3 TIMES DAILY 01/04/18 1745      01/04/18 2000  acetaminophen (TYLENOL) tablet 975 mg      975 mg Oral 4 TIMES DAILY 01/04/18 1758      01/04/18 1724  acetaminophen (TYLENOL) tablet 500-1,000 mg      500-1,000 mg Oral EVERY 6 HOURS PRN 01/04/18 1724      01/04/18 1544  HYDROmorphone (PF) (DILAUDID) injection 0.5-1  mg      0.5-1 mg Intravenous EVERY 2 HOURS PRN 01/04/18 1544      01/04/18 0800  aspirin EC EC tablet 81 mg      81 mg Oral DAILY 01/03/18 2253      01/04/18 0047  diclofenac (VOLTAREN) 1 % topical gel 2 g      2 g Topical 4 TIMES DAILY PRN 01/04/18 0047      01/04/18 0044  LORazepam (ATIVAN) tablet 1-4 mg      1-4 mg Oral EVERY 30 MIN PRN 01/04/18 0044      01/03/18 2300  fentaNYL (DURAGESIC) 25 mcg/hr 72 hr patch 1 patch      25 mcg Transdermal EVERY 72 HOURS 01/03/18 2253      01/03/18 2253  senna-docusate (SENOKOT-S;PERICOLACE) 8.6-50 MG per tablet 1 tablet      1 tablet Oral 2 TIMES DAILY PRN 01/03/18 2253      01/03/18 2253  senna-docusate (SENOKOT-S;PERICOLACE) 8.6-50 MG per tablet 2 tablet      2 tablet Oral 2 TIMES DAILY PRN 01/03/18 2253            LAB DATA:    Recent Labs  Lab 01/05/18  0643 01/04/18  0600 01/03/18  1647   CR 0.68 0.57* 0.75   WBC 9.5 10.0 10.2   HGB 14.2 14.1 14.3   AST  --   --  21   ALT  --   --  37         ----------------------------------------------------------------------------------  Darleen Wright [facility]  Inpatient Pain Service     To reach us:  Mon - Friday 8 AM - 3 PM: Pager [phone] (Text Page)  After hours, weekends and holidays: Primary service should call [phone] for the on-call pain specialist    Helpful Resources:  Getting Rid of Unwanted Medications (printable PDF for patients)   Opioid Overdose Prevention Toolkit (printable PDF for patients)   Prescription Opioids: What You Need To Know (printable PDF for patients)

## 2018-01-05 NOTE — PLAN OF CARE
Problem: Patient Care Overview  Goal: Plan of Care/Patient Progress Review  Pt A/Ox4. Pt has had pain and has been receiving IV dilaudid Q2-3hrs this shift. Pt is NPO for procedure. Pt left floor at for Ultrasound. Writer will update oncoming staff.  VSS. Continue to monitor per plan of care.

## 2018-01-05 NOTE — PROGRESS NOTES
[facility], [place]   Hospitalist Daily Progress Note                                                 Date of Admission:1/3/2018  ___________________________________  INTERVAL HISTORY (24 Hrs)/SUBJECTIVE:   Last 24 hr events, care team notes reviewed.     Pain both legs- Rt > Lt LE  Low bp overnight, felt slightly dizzy  No other concern.   No fever or chills  No cp or sob  No NV    ROS: 4 point ROS neg other than the symptoms noted above in the interval history.    OBJECTIVE :   VITALS:    Temp:  [96.2  F (35.7  C)-97.6  F (36.4  C)] 96.2  F (35.7  C)  Pulse:  [53-84] 67  Heart Rate:  [58-61] 60  Resp:  [10-16] 16  BP: ()/(41-68) 106/56  SpO2:  [93 %-98 %] 98 %     Temp (24hrs), Av.3  F (35.7  C), Min:96.2  F (35.7  C), Max:96.5  F (35.8  C)      Wt Readings from Last 5 Encounters:   18 62.8 kg (138 lb 6.4 oz)        Intake/Output Summary (Last 24 hours) at 18 1538  Last data filed at 18 1339   Gross per 24 hour   Intake             1860 ml   Output             1900 ml   Net              -40 ml         PHYSICAL EXAM:  General: alert, interactive, NAD  HEENT: AT/NC, Moist MM  Respi/Chest: Non labored. Clear BL  CVS/Heart: S1S2 regular,   Gi/Abd: Soft, non tender, non distended,   MSK/Ext: both shin wound. Chronic changes from PAD.   Neuro: AO x 4, Grossly intact.     Medications:   I have reviewed this patient's current medications.      Data:   All laboratory and imaging data in the past 24 hours reviewed:    LABS:  CMP    Recent Labs  Lab 18  0643 18  0600 18  1647    136 136   POTASSIUM 3.8 4.0 4.0   CHLORIDE 107 105 104   CO2 25 20 27   ANIONGAP 7 11 5   GLC 80 93 81   BUN 9 11 12   CR 0.68 0.57* 0.75   GFRESTIMATED >90 >90 >90   GFRESTBLACK >90 >90 >90   SUMAN 8.5 9.2 9.2   MAG 2.2  --   --    PROTTOTAL  --   --  6.7*   ALBUMIN  --   --  3.5   BILITOTAL  --   --  0.7   ALKPHOS  --   --  58   AST  --   --  21   ALT  --   --  37      CBC    Recent Labs  Lab 01/05/18  0643 01/04/18  0600 01/03/18  1647   WBC 9.5 10.0 10.2   RBC 4.29* 4.26* 4.22*   HGB 14.2 14.1 14.3   HCT 43.0 42.3 42.1    99 100   MCH 33.1* 33.1* 33.9*   MCHC 33.0 33.3 34.0   RDW 13.9 14.0 14.0    248 241     INR    Recent Labs  Lab 01/04/18  0600 01/03/18  1647   INR 1.01 0.99       Echo Complete: 1/4/2018    Interpretation Summary  Ischemic CM. Severely reduced LV systoilc function. Biplane LVEF measured at  20%. Severe left ventricular dilation is present. Akinesis of the basal to mid  inferior, inferolateral, and lateral segments noted.  The right ventricle is normal size. Global right ventricular function is  normal.  Pulmonary artery systolic pressure cannot be assessed.  No pericardial effusion is present.  The inferior vena cava was normal in size with preserved respiratory  variability.  Previous study not available for comparison.      US RAVI Doppler No Exercise 1/4/2018     Impression:     Right leg: Resting RAVI is 0.29, Abnormal, 0.00-0.40 (Increased  cardiovascular risk with severe PAD). Findings consistent with severe  stenosis of the distal SFA on comparison duplex arterial ultrasound  examination. Unable to obtain right great toe pressure for calculation  of right TBI.    Left leg: Resting RAVI is 0.53, Abnormal, 0.41-0.90 (Increased  cardiovascular risk along with mild to moderate PVD). Findings  consistent with severe stenosis of the mid SFA on comparison duplex  arterial ultrasound examination. Abnormal left TBI of 0.28.      ASSESSMENT & PLAN :    Boom Kahn is a 67 year old male with a past medical history significant for HTN, anxiety/depression, alcohol abuse, tobacco abuse, ICM/MI, CAD s/p 3v CABG and PCI, HFrEF (last EF 25-30%, approximately 6 months ago) s/p ICD, , R lung cancer s/p radiation therapy,  peripheral vascular disease s/p multiple stents, and chronic pain who presented c/o of worsening pain in his lower extremities and  right shin wound.      # Peripheral Artery Disease - Acute on chronic LE pain  # Chronic Wounds (R shin)    Extensive hx of PAD w/multiple stents, current smoker, EtOH abuse, pain at rest, minimal exercise tolerance, no hx of infection. U/S demonstrating b/l significant calcifications with minimal flow distal to thigh.  X-ray without evidence of osteo-myelitis.  Exam notable for bilateral wounds, erythema consistent with vascular changes, loss of hair.     - Patient seen by Vascular surgery team. Rec: IR angio bl LE- done, final read pending. IR to discuss with vascular regarding finding and further plan.   - WOCN consult  - Ct wound care  - no antibiotics for now.     - Pain control   Pain fu consult- pending. Keep on iv hydromorphone 0.5-1 Q 2hr prn.   gabapentin 300 tid  Continue fentanyl patch  Diclofenac topical  Continue acetaminophen 975 qid    Fu Pain recs.     - continue current prednisone 10mg  - Further plan per Vascular. See recs--> In reviewing his angiogram from yesterday patient would benefit from a right femoral endarterectomy with femoral to posterior tibial bypass.  Awaiting vein mapping US.  - Cardiology consultation for Pre op evaluation given high risk patient.       # HFrEF (EF reported 25-30%)  # CAD s/p CABGx3 and PCIs  # ICM hx of MI (2008)  # HTN  Clinically stable. Asymptomatic.     - ECHO as above- reviewed.   - 2L fluid/2gm Na restriction   - strict I/O and daily weights  - continue Plavix, ASA, Carvedilol, Rosuvastatin, Ezetimibe, Spironolactone, Furosemide, Isosorbide dinitrate, enalapril  - Cardiology consult     1/5/2018  Low bp early am, symptomatic- mild  Hold diuretics  Dc fluid restriction for now  Ct to monitor.         # EtOH Dependence  Patient reports 8-10 beers per day, with maximum of 2 days w/o drinking and no hx of withdrawal. States that recently has been cutting back in the setting of worsening PVD to 2-3 beers per day. Last drink was Tuesday per pt.   - MSSA protocol  w/ativan : no e/o withdrawal at current.  - MVI/thiamine/folate   - consider chem dep consult if pt willing       # Tobacco Dependence   50 pack year hx, last cigarette this AM (1/4). Patient endorses desire to quit smoking.  - nicotine patch 7mg  - Smoking Cessation Program IP consult  - consider nicotine lozenges/gum if needed        # Restrictive Airway Disease   ? COPD, given smoking hx. Patient denies any hx of COPD.    Patient denies any symptoms of SOB or cough at current.    - DuoNebs x4 hr prn  - outpatient PFT  - smoking cessation        # Malignancy of Lung, Right Upper Lobe    Known malignancy, patient unable to tell specific diagnosis. States that cancer responded with radiation.   F/u PET scan due in February   - f/u outpatient         # Depression   Patient reports a hx of depression and reports depression and axiety  - holding home alprazolam 0.5 PRN  - home ecitalopram 20mg opd        CODE: Full  DVT: On Plavix and ASA  FEN: Cardiac Diet 2L fluid/2gm Na restriction.      Dispo: Disposition Plan   Expected discharge in several days ->TBD, pending clinical course.      Entered: Blue Vail 01/05/2018, 11:00 AM         Plan discussed with patient, RN and [facility] CC during Care Team Rounds.  Dw Son at bedside.       Blue Vail MD   Hospitalist (Internal Medicine)  Pager: [phone].         Addendum:   Talked to cardiology. Rec: Coronary angio. Likely today. Npo.   Also rec Carotid US- ordered  Pt son updated.

## 2018-01-05 NOTE — CONSULTS
"     Cardiology Inpatient Consultation  January 5, 2018    Reason for Consult:  A cardiology consult was requested by Dr. Vail from the [facility] service to provide clinical guidance regarding pre-op cardiac evaluation for upcoming vascular surgery.    HPI:   Boom Kahn is a 67 year old male with hx of CAD s/p 3v ('99) and numerous PCI since (last '16?), ICM with HFrEF (EF 25-30% in 06/17) s/p ICD, HTN, HLD, active tobacco and alcohol abuse, R lung cancer s/p radiation therapy, PAD s/p multiple stents, and chronic pain who recently moved from [place] and was admitted to the hospital for worsening b/l LE wounds. Pt reports he previously has done all of his care through two [place] hospital systems. At baseline, he has been dealing with non-healing ulcers of his LEs for \"years.\" He reports his last coronary angiogram was \"a year ago,\" but he has continued to have stable angina with walking up 1-2 flights of stairs that resolves with nitro. Pt otherwise denies any chest pain, shortness of breath, or palpitations. Pt has been dealing with issues of \"dizziness for awhile.\" Pt reports he recently was being evaluated for vascular surgery on his legs in [place] this past Fall, but he underwent a cardiac stress test that was abnormal, and he was recommended to undergo another coronary angiogram, but he moved up here before the test could be completed. Pt has extreme pain in both of his legs from the knees down that have been difficult to control with pain medications. Pt otherwise has no acute complaints at this time.    Review of Systems:    Complete review of systems was performed and negative except per HPI.    PMH:  Past Medical History:   Diagnosis Date     Anxiety      CAD (coronary artery disease)     s/p 3v CABG     CHF (congestive heart failure) (H)     EF 10-15%     Chronic pain      COPD (chronic obstructive pulmonary disease) (H)      Depression      Hyperlipidemia      Hypertension      Lung cancer (H)     " s/p radiation therapy     PAD (peripheral artery disease) (H)     s/p lower extremity stents     Tobacco abuse      Active Problems:  Patient Active Problem List    Diagnosis Date Noted     Ischemic cardiomyopathy 01/02/2018     Priority: Medium     CHF (congestive heart failure) (H) 01/02/2018     Priority: Medium     PVD (peripheral vascular disease) (H) 01/02/2018     Priority: Medium     COPD (chronic obstructive pulmonary disease) (H) 01/02/2018     Priority: Medium     Anxiety 01/02/2018     Priority: Medium     Atherosclerosis of native arteries of extremities with intermittent claudication, bilateral legs (H) 01/02/2018     Priority: Medium     Automatic implantable cardioverter-defibrillator in situ 01/02/2018     Priority: Medium     2009 in [place] at [facility] 01/02/2018     Priority: Medium     Cardiomyopathy (H) 01/02/2018     Priority: Medium     Hyperlipidemia 01/02/2018     Priority: Medium     HTN (hypertension) 01/02/2018     Priority: Medium     Lung mass 01/02/2018     Priority: Medium     Dyspnea on exertion 01/02/2018     Priority: Medium     Malignant neoplasm of lung (H) 01/02/2018     Priority: Medium     MI (myocardial infarction) 01/02/2018     Priority: Medium     Social History:  Social History   Substance Use Topics     Smoking status: Current Every Day Smoker     Packs/day: 1.00     Types: Cigarettes     Smokeless tobacco: Never Used      Comment: 0.5-1 ppd.     Alcohol use Not on file     Family History:  No family history on file.    Medications:    thiamine  100 mg Oral Daily     folic acid  1 mg Oral Daily     gabapentin  300 mg Oral TID     isosorbide mononitrate  60 mg Oral Daily     acetaminophen  975 mg Oral 4x Daily     aspirin EC EC tablet 81 mg  81 mg Oral Daily     carvedilol (COREG) tablet 6.25 mg  6.25 mg Oral BID w/meals     clopidogrel (PLAVIX) tablet 75 mg  75 mg Oral Daily     enalapril (VASOTEC) tablet 2.5 mg  2.5 mg Oral BID     escitalopram  (LEXAPRO) tablet 20 mg  20 mg Oral Daily     ezetimibe (ZETIA) tablet 10 mg  10 mg Oral Daily     fentaNYL  25 mcg Transdermal Q72H     multivitamin, therapeutic with minerals  1 tablet Oral Daily     predniSONE (DELTASONE) tablet 10 mg  10 mg Oral Daily     rosuvastatin (CRESTOR) tablet 20 mg  20 mg Oral Daily     [START ON 1/6/2018] fentaNYL   Transdermal Q72H     fentaNYL   Transdermal Q8H     nicotine   Transdermal Q8H     nicotine   Transdermal Daily     nicotine  1 patch Transdermal Daily         NaCl       - MEDICATION INSTRUCTIONS -       - MEDICATION INSTRUCTIONS -         Physical Exam:  Temp:  [96.2  F (35.7  C)-97.6  F (36.4  C)] 96.2  F (35.7  C)  Pulse:  [53-84] 67  Resp:  [16] 16  BP: ()/(41-61) 106/56  SpO2:  [93 %-98 %] 98 %    Intake/Output Summary (Last 24 hours) at 01/05/18 1315  Last data filed at 01/05/18 1028   Gross per 24 hour   Intake             1860 ml   Output             1800 ml   Net               60 ml     GEN: alert, interactive, pleasant, no acute distress  HEENT: no icterus, b/l soft carotid bruits  CV: RRR, normal s1/s2, no murmurs/rubs/s3/s4, no heave. No JVD  CHEST: clear to ausculation bilaterally, no crackles or wheezes  ABD: soft, non-tender, normal active bowel sounds  EXTR: large right shin dry wound with black eschar and induration, smaller wound on left shin, erythema along b/l shins, numerous foot wounds, significant pain with light touch, unable to palpate pulses 2/2 severe pain  NEURO: alert and oriented x3, speech fluent/appropriate, motor grossly nonfocal    Diagnostics:  All labs and imaging were reviewed, of note:    All laboratory and imaging data in the past 24 hours reviewed    No results found for: TROPI, TROPONIN, TROPR, TROPN    EKG:  Atrial paced, LVH, T-wave inversions of leads I, aVL, V4-V6    Transthoracic echocardiogram:   Interpretation Summary  Ischemic CM. Severely reduced LV systoilc function. Biplane LVEF measured at 20%. Severe left  ventricular dilation is present. Akinesis of the basal to mid inferior, inferolateral, and lateral segments noted.  The right ventricle is normal size. Global right ventricular function is normal.  Pulmonary artery systolic pressure cannot be assessed.  No pericardial effusion is present.  The inferior vena cava was normal in size with preserved respiratory  variability.  Previous study not available for comparison.  _____________________________________________________________________     Left Ventricle  Akinesis of the basal to mid inferior, inferolateral, and lateral segments noted. Left ventricular wall thickness is normal. Severe left ventricular dilation is present. Severely reduced LV systoilc function. Biplane LVEF measured at 20%.     Right Ventricle  The right ventricle is normal size. Global right ventricular function is normal. A pacemaker lead is noted in the right ventricle.     Atria  The right atria appears normal. Severe left atrial enlargement is present.        Mitral Valve  Restricted posterior mitral leaflet with an eccentric posteriorly directed MR jet. Mild mitral annular calcification is present. Mild mitral insufficiency is present.     Aortic Valve  The aortic valve is tricuspid. Mild aortic valve sclerosis is present.     Tricuspid Valve  Trace to mild tricuspid insufficiency is present. The peak velocity of the tricuspid regurgitant jet is not obtainable. Pulmonary artery systolic pressure cannot be assessed.     Pulmonic Valve  The pulmonic valve is normal.     Vessels  The aorta root is normal. The inferior vena cava was normal in size with preserved respiratory variability. Estimated mean right atrial pressure is 3 mmHg.     Pericardium  No pericardial effusion is present.     Compared to Previous Study  Previous study not available for comparison.  _____________________________________________________________________  MMode/2D Measurements & Calculations  IVSd: 0.81 cm  LVIDd: 8.0  cm  LVIDs: 7.5 cm  LVPWd: 0.94 cm  FS: 5.6 %  EDV(Teich): 341.7 ml  ESV(Teich): 300.1 ml  LV mass(C)d: 343.4 grams  LV mass(C)dI: 196.7 grams/m2  Ao root diam: 3.7 cm  asc Aorta Diam: 3.3 cm  LVOT diam: 2.7 cm  LVOT area: 5.7 cm2  LA Volume (BP): 106.0 ml     LA Volume Index (BP): 60.6 ml/m2  RWT: 0.24     Doppler Measurements & Calculations  MV E max ana maria: 45.1 cm/sec  MV A max ana maria: 70.8 cm/sec  MV E/A: 0.64  MV dec slope: 177.0 cm/sec2  PA acc time: 0.13 sec  E/E' avg: 10.4  Lateral E/e': 9.6  Medial E/e': 11.2    Nuclear stress test with MPI Stress/Rest (10/23/17):  1. Abnormal Lexiscan Cardiolite study  2. Large inferior and lateral wall infarct. Polar maps suggest medium size lateral reversible ischemia that is not well appreciated on SPECT imaging.  3. Moderate-severe LV systolic dysfunction with calculated LVEF 27% with akinetic inferior and lateral walls.  4. Elevated transient ischemic dilation, ratio of 1.3   (See Care Everywhere for further details)    Assessment and Recommendation:  Boom Kahn is a 67 year old male with hx of CAD s/p 3v ('99) and numerous PCI since (last '16?), ICM with HFrEF (EF 25-30% in 06/17) s/p ICD, HTN, HLD, active tobacco and alcohol abuse, R lung cancer s/p radiation therapy, PAD s/p multiple stents, and chronic pain who recently moved from [place] and was admitted to the hospital for worsening b/l LE wounds. Cardiology consulted for pre-op evaluation for potential right femoral endarterectomy with femoral to posterior tibial bypass.    # Pre-Op Eval  # Hx of Severe CAD s/p 3v CABG in '99 and Multiple PCI Since  # ICM, HFrEF (EF 20%)  Currently asymptomatic from cardiac standpoint and HD stable. Given recent hx of CAD with prior CABG and PCI, Nuc Stress with MPI in Oct. '17 at an OSH that was abnormal with large territory of infarct and medium size area of reversible ischemia and elevated transient ischemic dilation, ICM with most recent EF of 20% (currently compensated),  poor functional capacity (<4 METS), ongoing tobacco and ETOH use, and planning to undergo vascular surgery, Pt is currently high risk for the procedure.  - Given high risk, recommend proceeding with coronary angiogram (already ordered for today, made NPO, and consented)  - Will make final recommendations based on results of coronary angiogram  - Would also recommend b/l carotid ultrasounds if not recently completed given b/l bruits  - For now continue ASA 81 mg daily, carvedilol 6.25 mg BID, plavix 75 mg daily, ezetimibe 10 mg daily, Imdur 60 mg daily, and rosuvastatin 20 mg daily  - Encouraged smoking cessation  - Agree with ongoing management of potential ETOH withdrawals    I have discussed the above with Dr. Brown, who agrees with the assessment and plan.    Thank you for consulting the cardiovascular services at the [facility]. Please do not hesitate to call us with any questions.     Jenny Cuevas MD  PGY-3, Internal Medicine  Pager [phone] or [phone]

## 2018-01-05 NOTE — PHARMACY-ADMISSION MEDICATION HISTORY
"Admission medication history interview status for the 1/3/2018 admission is complete. See Epic admission navigator for allergy information, pharmacy, prior to admission medications and immunization status.     Medication history interview sources:  Patient    Changes made to PTA medication list (reason)  Added:   Docusate: Patients states he takes one OTC stool softener daily.  Deleted:   Multivitamin: Patient stated he no longer takes.  Changed:   Prednisone: Changed 10 mg daily to 5 mg BID per patient. Patient states he splits his 10 mg tablet and takes one half tablet (5 mg) twice daily.    Additional medication history information (including reliability of information, actions taken by pharmacist):  Patient was a moderate historian. He used a medication list kept in his wallet to confirm his current medications and doses. He stated the fentanyl patch, prednisone, and oxycodone were his \"new\" medications. Patient stated he filled his medication at [facility] ([phone]) in [place]. They were closed for the evening, so the medications could not be confirmed with them. Patient confirmed receiving the flu shot this season.  - Fentanyl: Patient stated that his doctor wrote the prescription for changing the patch every 48 hours. However, the patient read that it should be changed every 72 hours, so the patient changes it every 2-3 days (not consistent).   - Prednisone: Patient admitted to struggling with adherence to prednisone. He stated its more like he is taking it every other day, but he should be taking it every day.  - Oxycodone: Patient estimates his total daily dose is 30-40 mg.       Prior to Admission medications    Medication Sig Last Dose Taking? Auth Provider   isosorbide mononitrate (IMDUR) 60 MG 24 hr tablet Take 60 mg by mouth daily 1/3/2018 at Unknown time Yes Unknown, Entered By History   DOCUSATE SODIUM PO Take 100 mg by mouth daily 1/3/2018 at Unknown time Yes Unknown, Entered By History "   OXYCODONE HCL PO Take 10 mg by mouth every 3 hours as needed 1/3/2018 at Unknown time Yes Reported, Patient   fentaNYL (DURAGESIC) 25 mcg/hr 72 hr patch Place 1 patch onto the skin every 72 hours 1/3/2018 at Unknown time Yes Reported, Patient   PREDNISONE PO Take 5 mg by mouth 2 times daily  1/3/2018 at Unknown time Yes Reported, Patient   ENALAPRIL MALEATE PO Take 2.5 mg by mouth 2 times daily 1/3/2018 at Unknown time Yes Reported, Patient   ASPIRIN EC PO Take 81 mg by mouth daily 1/3/2018 at Unknown time Yes Reported, Patient   CARVEDILOL PO Take 6.25 mg by mouth 2 times daily (with meals) 1/3/2018 at Unknown time Yes Reported, Patient   Rosuvastatin Calcium (CRESTOR PO) Take 20 mg by mouth daily 1/3/2018 at Unknown time Yes Reported, Patient   Ezetimibe (ZETIA PO) Take 10 mg by mouth daily 1/3/2018 at Unknown time Yes Reported, Patient   CLOPIDOGREL BISULFATE PO Take 75 mg by mouth daily 1/3/2018 at Unknown time Yes Reported, Patient   ESCITALOPRAM OXALATE PO Take 20 mg by mouth daily 1/3/2018 at Unknown time Yes Reported, Patient   SPIRONOLACTONE PO Take 25 mg by mouth daily 1/3/2018 at Unknown time Yes Reported, Patient   FUROSEMIDE PO Take 20 mg by mouth 2 times daily 1/3/2018 at Unknown time Yes Reported, Patient         Medication history completed by: Zully Wakefield

## 2018-01-05 NOTE — PROGRESS NOTES
No acute events overnight, overall pain moderately controlled.  S/p angiogram and RAVI yesterday    B/P: 87/57, T: 96.2, P: 63, R: 16  Alert oriented no acute distress  Right anterior shin wound ~4 cm dry with surrounding erythema, mild induration  Multiple toe wounds, + pain  Palp bilateral femoral pulses    WBC   Date Value Ref Range Status   01/05/2018 9.5 4.0 - 11.0 10e9/L Final   ]  Hemoglobin   Date Value Ref Range Status   01/05/2018 14.2 13.3 - 17.7 g/dL Final   ]  INR/Prothrombin Time  Creatinine   Date Value Ref Range Status   01/05/2018 0.68 0.66 - 1.25 mg/dL Final   ]      Intake/Output Summary (Last 24 hours) at 01/05/18 0808  Last data filed at 01/05/18 0609   Gross per 24 hour   Intake             1620 ml   Output             1400 ml   Net              220 ml       Assessment/Plan:  68 yo male with critical limb ischemia, bilateral LE wounds right worse than left    In reviewing his angiogram from yesterday patient would benefit from a right femoral endarterectomy with femoral to posterior tibial bypass.  Awaiting vein mapping US.  Per patient he says that his work up at a hospital down [facility] revealed him to be a too high of a cardiac risk.  Would appreciate further medical work up to assess this.  He does ambulate but not far enough to assess full functional status.  Discussed with patient keeping wounds clean and dry.  Continue aspirin plavix and statin  Further vascular surgery recommendations to follow.  Patient understands that he is at high risk for amputation given his severe disease and extent of wounds.    Leandra Marques MD  Vascular Surgery Fellow  Pager [phone]

## 2018-01-05 NOTE — PLAN OF CARE
Problem: Patient Care Overview  Goal: Plan of Care/Patient Progress Review  Outcome: No Change  Pt alert and oriented. Vitals stable on ra. Up with SBA. LE wounds CDI. C/o of LE pain; prn dilaudid administered X2 with relief. Slept throughout the night. PIV running IVMF at 100cc/hr. Angiogram site transparent dressing CDI. MSSA resulted at 2. Fentanyl patch place on right deltoid.   R: Providers to clarify IVMF order because pt was also placed on 2L fluid restriction. Otherwise continue to monitor and implement poc

## 2018-01-05 NOTE — PROGRESS NOTES
Care Coordinator Progress Note     Admission Date/Time:  1/3/2018  Attending MD:  Alexandra Childress*     Data  Chart reviewed, discussed with interdisciplinary team.   Patient was admitted for:    Pain of right lower leg  Stenosis of femoral artery (H).    Coordination of Care and Referrals: Provided patient/family with options for nothing at this time.        Assessment  Attempted to see patient.  He is down for procedure at this time.  PT/OT recommending home with assist.  Chart reviewed.  See SW note.  Patient just moved here from [place] to live with his son.  Patient would be home alone during the day as son works.  Patient undergoing vascular and cardiac work up.  Will see patient when discharge needs known.     Plan  Anticipated Discharge Date:  TBD  Anticipated Discharge Plan:  TBD    Santa Ames RN, BSN  Care Coordinator [facility]  Pager: [phone]  Phone: [phone]

## 2018-01-05 NOTE — PROGRESS NOTES
01/05/18 0957   Quick Adds   Type of Visit Initial PT Evaluation   Living Environment   Lives With alone   Living Arrangements house   Home Accessibility bed and bath on same level   Number of Stairs to Enter Home 13  (from garage; or ~10 total to main door)   Number of Stairs Within Home 0   Stair Railings at Home outside, present on right side   Transportation Available car;family or friend will provide   Living Environment Comment Pt reports he may split time at his home which is in [place]; reports he will be likely staying locally with his family but the living environment is not known at this time.   Self-Care   Dominant Hand right   Usual Activity Tolerance poor   Current Activity Tolerance poor   Regular Exercise no   Equipment Currently Used at Home none   Activity/Exercise/Self-Care Comment reports owning a cane however he very rarely uses it.   Functional Level Prior   Ambulation 0-->independent   Transferring 0-->independent   Toileting 0-->independent   Bathing 0-->independent   Dressing 0-->independent   Eating 0-->independent   Communication 0-->understands/communicates without difficulty   Swallowing 0-->swallows foods/liquids without difficulty   Cognition 0 - no cognition issues reported   Fall history within last six months yes   Number of times patient has fallen within last six months 1  (trying to get out of the bathtub.)   Which of the above functional risks had a recent onset or change? fall history   General Information   Onset of Illness/Injury or Date of Surgery - Date 01/03/18   Referring Physician Davey Dickson MD    Patient/Family Goals Statement Pt did not state specific PT goals   Pertinent History of Current Problem (include personal factors and/or comorbidities that impact the POC) Boom Kahn is a 67 year old male with a past medical history significant for HTN, anxiety/depression, alcohol abuse (8-10 beers/day), tobacco abuse (1/2-1 PPD, 50 years), ischemic  "cardiomyopathy (MI 2008), CAD s/p 3v CABG (1999) and 3-4 stents, HFrEF (last EF 25-30%, approximately 6 months ago) s/p ICD, , R lung cancer s/p radiation therapy,  peripheral vascular disease s/p stenting of R iliac, R common femoral, right SFA, and \"3-4 stents in the left leg\" in [place], and chronic pain who presented to the ED complaining of worsening pain in his lower extremities and right shin wound.   Precautions/Limitations fall precautions   Weight-Bearing Status - LUE full weight-bearing   Weight-Bearing Status - RUE full weight-bearing   Weight-Bearing Status - LLE full weight-bearing   Weight-Bearing Status - RLE full weight-bearing   General Observations PIV; R > L sided LE pain due to PVD.   General Info Comments Activity: up ad thony   Cognitive Status Examination   Orientation orientation to person, place and time   Level of Consciousness alert   Follows Commands and Answers Questions 100% of the time   Personal Safety and Judgment intact   Memory intact   Cognitive Comment Pt is alert and oriented   Pain Assessment   Patient Currently in Pain Yes, see Vital Sign flowsheet  (improved control today (3/10 with meds))   Integumentary/Edema   Integumentary/Edema Comments wounds and skin appearance consistent with PVD in BLE; R anterior shin wound dry; L anterior shin wound moist with reddened appearance.   Posture    Posture Forward head position;Protracted shoulders   Range of Motion (ROM)   ROM Comment BUE/BLE wfl    Strength   Strength Comments BUE/BLE >3+/5 per observation   Bed Mobility   Bed Mobility Comments IND supine <> sit   Transfer Skills   Transfer Comments IND sit <> stand    Gait   Gait Comments Amb x 20' with FWW and SBA, short step lengths, flat foot landing, forward flexed posture at hips, forward head positioning   Balance   Balance Comments SBA without AE   Sensory Examination   Sensory Perception Comments denies light touch sensation deficit; endorses N/T in bilateral LE toes " "  General Therapy Interventions   Planned Therapy Interventions balance training;gait training;strengthening;neuromuscular re-education;transfer training;ROM;home program guidelines   Clinical Impression   Criteria for Skilled Therapeutic Intervention yes, treatment indicated   PT Diagnosis Impaired activity tolerance   Influenced by the following impairments medical condition, deconditioning, pain   Functional limitations due to impairments decreased tolerance of functional activity   Clinical Presentation Evolving/Changing   Clinical Presentation Rationale pt presentation, clinical reasoning   Clinical Decision Making (Complexity) Low complexity   Therapy Frequency` 5 times/week   Predicted Duration of Therapy Intervention (days/wks) pending medical course   Anticipated Equipment Needs at Discharge shower chair;front wheeled walker   Anticipated Discharge Disposition Home with Assist   Risk & Benefits of therapy have been explained Yes   Patient, Family & other staff in agreement with plan of care Yes   Clinical Impression Comments evaluation completed, treatment initiated   [facility] \"6 Clicks\"   2016, Trustees of [facility], under license to [employer].  All rights reserved.   6 Clicks Short Forms Basic Mobility Inpatient Short Form   [facility]  \"6 Clicks\" V.2 Basic Mobility Inpatient Short Form   1. Turning from your back to your side while in a flat bed without using bedrails? 4 - None   2. Moving from lying on your back to sitting on the side of a flat bed without using bedrails? 4 - None   3. Moving to and from a bed to a chair (including a wheelchair)? 4 - None   4. Standing up from a chair using your arms (e.g., wheelchair, or bedside chair)? 4 - None   5. To walk in hospital room? 4 - None   6. Climbing 3-5 steps with a railing? 4 - None  (estimated)   Basic Mobility Raw Score (Score out of 24.Lower scores equate to lower levels of function) 24   Total Evaluation " Time   Total Evaluation Time (Minutes) 10

## 2018-01-06 ENCOUNTER — APPOINTMENT (OUTPATIENT)
Dept: ULTRASOUND IMAGING | Facility: CLINIC | Age: 68
DRG: 247 | End: 2018-01-06
Attending: SURGERY
Payer: COMMERCIAL

## 2018-01-06 ENCOUNTER — APPOINTMENT (OUTPATIENT)
Dept: PHYSICAL THERAPY | Facility: CLINIC | Age: 68
DRG: 247 | End: 2018-01-06
Attending: INTERNAL MEDICINE
Payer: COMMERCIAL

## 2018-01-06 LAB
ANION GAP SERPL CALCULATED.3IONS-SCNC: 7 MMOL/L (ref 3–14)
BUN SERPL-MCNC: 8 MG/DL (ref 7–30)
CALCIUM SERPL-MCNC: 8.3 MG/DL (ref 8.5–10.1)
CHLORIDE SERPL-SCNC: 110 MMOL/L (ref 94–109)
CO2 SERPL-SCNC: 24 MMOL/L (ref 20–32)
CREAT SERPL-MCNC: 0.66 MG/DL (ref 0.66–1.25)
ERYTHROCYTE [DISTWIDTH] IN BLOOD BY AUTOMATED COUNT: 13.8 % (ref 10–15)
GFR SERPL CREATININE-BSD FRML MDRD: >90 ML/MIN/1.7M2
GLUCOSE BLDC GLUCOMTR-MCNC: 110 MG/DL (ref 70–99)
GLUCOSE SERPL-MCNC: 79 MG/DL (ref 70–99)
HCT VFR BLD AUTO: 38.8 % (ref 40–53)
HGB BLD-MCNC: 12.7 G/DL (ref 13.3–17.7)
MCH RBC QN AUTO: 33 PG (ref 26.5–33)
MCHC RBC AUTO-ENTMCNC: 32.7 G/DL (ref 31.5–36.5)
MCV RBC AUTO: 101 FL (ref 78–100)
PLATELET # BLD AUTO: 236 10E9/L (ref 150–450)
POTASSIUM SERPL-SCNC: 4 MMOL/L (ref 3.4–5.3)
RBC # BLD AUTO: 3.85 10E12/L (ref 4.4–5.9)
SODIUM SERPL-SCNC: 141 MMOL/L (ref 133–144)
WBC # BLD AUTO: 8.4 10E9/L (ref 4–11)

## 2018-01-06 PROCEDURE — 93970 EXTREMITY STUDY: CPT

## 2018-01-06 PROCEDURE — 25000132 ZZH RX MED GY IP 250 OP 250 PS 637: Performed by: STUDENT IN AN ORGANIZED HEALTH CARE EDUCATION/TRAINING PROGRAM

## 2018-01-06 PROCEDURE — 25000125 ZZHC RX 250: Performed by: STUDENT IN AN ORGANIZED HEALTH CARE EDUCATION/TRAINING PROGRAM

## 2018-01-06 PROCEDURE — 99233 SBSQ HOSP IP/OBS HIGH 50: CPT | Performed by: INTERNAL MEDICINE

## 2018-01-06 PROCEDURE — 25000132 ZZH RX MED GY IP 250 OP 250 PS 637: Performed by: INTERNAL MEDICINE

## 2018-01-06 PROCEDURE — 12000001 ZZH R&B MED SURG/OB UMMC

## 2018-01-06 PROCEDURE — 12000008 ZZH R&B INTERMEDIATE UMMC

## 2018-01-06 PROCEDURE — 80048 BASIC METABOLIC PNL TOTAL CA: CPT | Performed by: INTERNAL MEDICINE

## 2018-01-06 PROCEDURE — 99233 SBSQ HOSP IP/OBS HIGH 50: CPT | Mod: 24 | Performed by: INTERNAL MEDICINE

## 2018-01-06 PROCEDURE — 36415 COLL VENOUS BLD VENIPUNCTURE: CPT | Performed by: INTERNAL MEDICINE

## 2018-01-06 PROCEDURE — 00000146 ZZHCL STATISTIC GLUCOSE BY METER IP

## 2018-01-06 PROCEDURE — 40000193 ZZH STATISTIC PT WARD VISIT: Performed by: REHABILITATION PRACTITIONER

## 2018-01-06 PROCEDURE — 85027 COMPLETE CBC AUTOMATED: CPT | Performed by: INTERNAL MEDICINE

## 2018-01-06 PROCEDURE — 25000128 H RX IP 250 OP 636: Performed by: RADIOLOGY

## 2018-01-06 PROCEDURE — 25000132 ZZH RX MED GY IP 250 OP 250 PS 637: Performed by: NURSE PRACTITIONER

## 2018-01-06 PROCEDURE — 25000128 H RX IP 250 OP 636: Performed by: INTERNAL MEDICINE

## 2018-01-06 PROCEDURE — 97116 GAIT TRAINING THERAPY: CPT | Mod: GP | Performed by: REHABILITATION PRACTITIONER

## 2018-01-06 RX ORDER — HYDROMORPHONE HYDROCHLORIDE 1 MG/ML
.3-.5 INJECTION, SOLUTION INTRAMUSCULAR; INTRAVENOUS; SUBCUTANEOUS 2 TIMES DAILY PRN
Status: DISCONTINUED | OUTPATIENT
Start: 2018-01-06 | End: 2018-01-07

## 2018-01-06 RX ORDER — HYDROMORPHONE HYDROCHLORIDE 2 MG/1
4-6 TABLET ORAL
Status: DISCONTINUED | OUTPATIENT
Start: 2018-01-06 | End: 2018-01-07

## 2018-01-06 RX ADMIN — Medication: at 20:27

## 2018-01-06 RX ADMIN — ENALAPRIL MALEATE 2.5 MG: 2.5 TABLET ORAL at 08:22

## 2018-01-06 RX ADMIN — MULTIPLE VITAMINS W/ MINERALS TAB 1 TABLET: TAB at 08:24

## 2018-01-06 RX ADMIN — PREDNISONE 10 MG: 10 TABLET ORAL at 08:19

## 2018-01-06 RX ADMIN — FENTANYL 1 PATCH: 25 PATCH, EXTENDED RELEASE TRANSDERMAL at 22:10

## 2018-01-06 RX ADMIN — ACETAMINOPHEN 975 MG: 325 TABLET, FILM COATED ORAL at 08:19

## 2018-01-06 RX ADMIN — GABAPENTIN 300 MG: 300 CAPSULE ORAL at 13:15

## 2018-01-06 RX ADMIN — GABAPENTIN 300 MG: 300 CAPSULE ORAL at 20:26

## 2018-01-06 RX ADMIN — Medication 1 MG: at 13:14

## 2018-01-06 RX ADMIN — ACETAMINOPHEN 975 MG: 325 TABLET, FILM COATED ORAL at 20:25

## 2018-01-06 RX ADMIN — Medication 100 MG: at 08:22

## 2018-01-06 RX ADMIN — CARVEDILOL 6.25 MG: 6.25 TABLET, FILM COATED ORAL at 08:23

## 2018-01-06 RX ADMIN — Medication 1 MG: at 03:10

## 2018-01-06 RX ADMIN — Medication 1 MG: at 08:15

## 2018-01-06 RX ADMIN — HYDROMORPHONE HYDROCHLORIDE 4 MG: 2 TABLET ORAL at 15:00

## 2018-01-06 RX ADMIN — ROSUVASTATIN CALCIUM 20 MG: 20 TABLET, FILM COATED ORAL at 20:25

## 2018-01-06 RX ADMIN — ASPIRIN 81 MG: 81 TABLET, COATED ORAL at 08:22

## 2018-01-06 RX ADMIN — CLOPIDOGREL 75 MG: 75 TABLET, FILM COATED ORAL at 20:26

## 2018-01-06 RX ADMIN — Medication 1 MG: at 00:50

## 2018-01-06 RX ADMIN — SODIUM CHLORIDE: 9 INJECTION, SOLUTION INTRAVENOUS at 08:09

## 2018-01-06 RX ADMIN — GABAPENTIN 300 MG: 300 CAPSULE ORAL at 08:24

## 2018-01-06 RX ADMIN — ACETAMINOPHEN 975 MG: 325 TABLET, FILM COATED ORAL at 16:11

## 2018-01-06 RX ADMIN — ACETAMINOPHEN 975 MG: 325 TABLET, FILM COATED ORAL at 13:15

## 2018-01-06 RX ADMIN — Medication 1 MG: at 10:09

## 2018-01-06 RX ADMIN — Medication 1 MG: at 05:57

## 2018-01-06 RX ADMIN — HYDROMORPHONE HYDROCHLORIDE 4 MG: 2 TABLET ORAL at 20:25

## 2018-01-06 RX ADMIN — ISOSORBIDE MONONITRATE 60 MG: 60 TABLET, EXTENDED RELEASE ORAL at 08:22

## 2018-01-06 RX ADMIN — ESCITALOPRAM OXALATE 20 MG: 20 TABLET ORAL at 08:19

## 2018-01-06 RX ADMIN — EZETIMIBE 10 MG: 10 TABLET ORAL at 20:26

## 2018-01-06 RX ADMIN — NICOTINE 1 PATCH: 7 PATCH, EXTENDED RELEASE TRANSDERMAL at 20:28

## 2018-01-06 RX ADMIN — FOLIC ACID 1 MG: 1 TABLET ORAL at 08:22

## 2018-01-06 NOTE — PROGRESS NOTES
Card cath completed yesterday, overall feeling ok today, no complaints.  Wounds stable, ambulating with walker. Tolerating po no fevers or chills.    B/P: 137/45, T: 97.1, P: 60, R: 18  Alert oriented no acute distress  Palpable bilateral femoral pulses  Left shin wound with pink granulation tissue,  Right shin wound stable with dry eschar, mild surrounding erythema  Toes dusky on right foot with small wounds, dressing with lamb's wool and wrap in place.    WBC   Date Value Ref Range Status   01/06/2018 8.4 4.0 - 11.0 10e9/L Final   ]  Hemoglobin   Date Value Ref Range Status   01/06/2018 12.7 (L) 13.3 - 17.7 g/dL Final   ]  INR/Prothrombin Time  Creatinine   Date Value Ref Range Status   01/06/2018 0.66 0.66 - 1.25 mg/dL Final   ]      Intake/Output Summary (Last 24 hours) at 01/06/18 1245  Last data filed at 01/06/18 0809   Gross per 24 hour   Intake              695 ml   Output             2275 ml   Net            -1580 ml       Assessment/Plan:  66 yo male with critical limb ischemia R>L with bilateral LE wounds, left side appears to be healing.    Extensive conversation between Dr. Ag and patient regarding options for revascularization.  Patient would most benefit from a right femoral endarterectomy with femoral to posterior tibial bypass with in situ greater saphenous vein.  This admission talk of possible venous insufficiency although patient denies ever being told he had venous disease in the past.  US deep veins ordered.    Dr. Ag discussed with cardiology and interventional radiology teams regarding surgical intervention.  All teams are on board and cardiology deemed patient appropriate risk for the procedure despite his recent cardiac intervention and cardiac history.  Risks and benefits discussed with the patient who is in agreement with plan for surgery this Tuesday 1/9/18.    Continue aspirin and plavix no need to discontinue for surgery.  Continue statin.  Local wound care, protection of right  toes with Rooke boot.    Leandra Marques MD  Vascular Surgery Fellow  Pager [phone]

## 2018-01-06 NOTE — PROGRESS NOTES
D/I/A: Pt arrived on unit at 1915 w/ TR band in place. MAPs noted to be somewhat low, Gold 2 paged and advised holding BP meds. Cardiology was also asked to look at pt and determined that pt's current BP readings were not much different from his range at home and pt remained asymptomatic or at his baseline of dizziness while sitting or changing positions. Pt did require reminders not to move his wrist or do heavy lifting with it. After air was removed from TR band a small amount of bleeding was noted. TR band re-inflated per CSI and deflated again after half an hour. This time the site remained stable. After TR band removed a pea-sized hematoma was palpable proximal to the site; this resolved w/ application of pressure. IV dilaudid given for leg pain.  P: Pt transferred to 5B, report called to staff and radial site examined w/ 5B RN.

## 2018-01-06 NOTE — PLAN OF CARE
Problem: Patient Care Overview  Goal: Plan of Care/Patient Progress Review  PT - per plan established by the Physical Therapist, according to functional mobility the  discharge recommendation is home with assist as needed. Pt is progressing well but limited by pain. Pt demo all bed mob with SBA and sit to stand to WW. Pt amb up to 100'x 2 with WW needing V.c for up right posture.   Discharge Planner PT   Patient plan for discharge: home with assist.   Current status: see above  Barriers to return to prior living situation: pain,   Recommendations for discharge: home with assist.   Rationale for recommendations: pt is progressing welling and has good system at home.        Entered by: Wyatt Boucher 01/06/2018 11:04 AM

## 2018-01-06 NOTE — PROGRESS NOTES
[facility], [place]   Hospitalist Daily Progress Note                                                 Date of Admission:1/3/2018  ___________________________________  INTERVAL HISTORY (24 Hrs)/SUBJECTIVE:   Last 24 hr events, care team notes reviewed.     S/p Coronary Angiography & Successful deployment of a 3.0 x 28 mm drug eluting stent to the proximal LCX and inflated with 3.5 mm NC balloon                                   Patient doing well at current  Pain both legs- Rt > Lt LE  Bp stable.   No fever or chills  No cp or sob  No NV  No other concern.       ROS: 4 point ROS neg other than the symptoms noted above in the interval history.    OBJECTIVE :   VITALS:    Temp:  [96.2  F (35.7  C)-97.1  F (36.2  C)] 97.1  F (36.2  C)  Pulse:  [60-67] 60  Heart Rate:  [62-95] 62  Resp:  [16-18] 18  BP: ()/(45-84) 137/45  SpO2:  [92 %-99 %] 97 %     Temp (24hrs), Av.7  F (35.9  C), Min:96.2  F (35.7  C), Max:97.1  F (36.2  C)        Wt Readings from Last 5 Encounters:   18 62.8 kg (138 lb 6.4 oz)        Intake/Output Summary (Last 24 hours) at 18 1354  Last data filed at 18 0809   Gross per 24 hour   Intake              695 ml   Output             2175 ml   Net            -1480 ml         PHYSICAL EXAM:  General: alert, interactive, NAD  HEENT: AT/NC, Moist MM  Respi/Chest: Non labored. Clear BL  CVS/Heart: S1S2 regular,   Gi/Abd: Soft, non tender, non distended,   MSK/Ext: ulcers both shin wound. r shin w dry black escar w mild erythema around. Chronic changes from PAD. Distal pulses not palpable  Neuro: AO x 4, Grossly intact.     Medications:   I have reviewed this patient's current medications.      Data:   All laboratory and imaging data in the past 24 hours reviewed:    LABS:  CMP    Recent Labs  Lab 18  0628 18  0643 18  0600 18  1647    140 136 136   POTASSIUM 4.0 3.8 4.0 4.0   CHLORIDE 110* 107 105 104   CO2 24 25 20 27    ANIONGAP 7 7 11 5   GLC 79 80 93 81   BUN 8 9 11 12   CR 0.66 0.68 0.57* 0.75   GFRESTIMATED >90 >90 >90 >90   GFRESTBLACK >90 >90 >90 >90   SUMAN 8.3* 8.5 9.2 9.2   MAG  --  2.2  --   --    PROTTOTAL  --   --   --  6.7*   ALBUMIN  --   --   --  3.5   BILITOTAL  --   --   --  0.7   ALKPHOS  --   --   --  58   AST  --   --   --  21   ALT  --   --   --  37     CBC    Recent Labs  Lab 01/06/18  0628 01/05/18  0643 01/04/18  0600 01/03/18  1647   WBC 8.4 9.5 10.0 10.2   RBC 3.85* 4.29* 4.26* 4.22*   HGB 12.7* 14.2 14.1 14.3   HCT 38.8* 43.0 42.3 42.1   * 100 99 100   MCH 33.0 33.1* 33.1* 33.9*   MCHC 32.7 33.0 33.3 34.0   RDW 13.8 13.9 14.0 14.0    273 248 241     INR    Recent Labs  Lab 01/04/18  0600 01/03/18  1647   INR 1.01 0.99       Echo Complete: 1/4/2018    Interpretation Summary  Ischemic CM. Severely reduced LV systoilc function. Biplane LVEF measured at  20%. Severe left ventricular dilation is present. Akinesis of the basal to mid  inferior, inferolateral, and lateral segments noted.  The right ventricle is normal size. Global right ventricular function is  normal.  Pulmonary artery systolic pressure cannot be assessed.  No pericardial effusion is present.  The inferior vena cava was normal in size with preserved respiratory  variability.  Previous study not available for comparison.      US LE Venous duplex: No DVT      IR Lower Extremity Angiogram 01/04/18    Impression:    Right:  1. Right lower extremity angiogram shows totally occluded mid SFA to  above-the-knee popliteal stents, short segment below the knee  popliteal occlusion, and an occluded anterior tibial artery.  2. Angiogram also demonstrates a severe (80-90%) short segment  shelflike right CFA stenosis. Therefore, recanalization of the chronic  total occlusion was not performed.  3. Patent upper SFA and iliac stents.    Left:  1. 13 cm chronic total occlusion of the mid SFA.  2. Two-vessel runoff with total occlusion of the  TEDDY.  3. Short segment narrowing of the TP trunk.  4. Patent iliac stents with less severe short segment common femoral  disease than on the right.    01/05/18  Coronary Angiography:  SUMMARY:   Severe 3 vessel CAD  100% stenosis of the RCA  100% occlusion of the proximal LAD   95% stenosis of the proximal circumflex artery  Patent LIMA to LAD   Patent SVG to RPDA     Successful deployment of a 3.0 x 28 mm drug eluting stent to the proximal LCX and inflated with 3.5 mm NC balloon                                           ASSESSMENT & PLAN :    Boom Kahn is a 67 year old male with a past medical history significant for HTN, anxiety/depression, alcohol abuse, tobacco abuse, ICM/MI, CAD s/p 3v CABG and PCI, HFrEF (last EF 25-30%, approximately 6 months ago) s/p ICD, , R lung cancer s/p radiation therapy,  peripheral vascular disease s/p multiple stents, and chronic pain who presented c/o of worsening pain in his lower extremities and right shin wound.      # Severe Peripheral Artery Disease -   Critical limb ischemia R>L with bilateral LE wounds; Acute on Chronic pain    Extensive hx of PAD w/multiple stents, current smoker, EtOH abuse, pain at rest, minimal exercise tolerance, no hx of infection. U/S demonstrating b/l significant calcifications with minimal flow distal to thigh.  X-ray without evidence of osteo-myelitis.  Exam notable for bilateral wounds, erythema consistent with vascular changes, loss of hair.     IR angio LE done, see above.     Vascular surgery on board. Following. Per Vascular: Patient would most benefit from a right femoral endarterectomy with femoral to posterior tibial bypass with in situ greater saphenous vein.  Cardiology consulted for Pre op evaluation. Under Coronary Angio and AYDE stenting as above. Carotid US bl done.     Final revascularization plan per Vascular team. D/w Vascular team 1/6/2018. Vascular team to talk to Cardiology team and decide on final procedure plan/date.     -  Wound care per WOCN, Vascular.   - no antibiotics for now.   - Continue antiplatelet agents. Statin.   - continue current prednisone 10mg daily    >>Pain control: acute on Chronic pain.   Appreciate pain team recommendations.     1/6/2018  Will switch iv hydromorphone to PO per Pain team recs 01/05. Plus couple iv hydromorphone for breakthrough.   Topical gabapentin 8%, ketamine 5%, lidocaine 2.5% in PLO gel TID  Gabapentin 300 tid, On 1/7, increase gabapentin to 300 mg Q AM and Q PM, 600 mg Q HS  Continue fentanyl patch 25 mcg/hr   Continue acetaminophen 975 qid    Fu Pain team PRN           # HFrEF (EF reported 25-30%)  # CAD s/p CABGx3 and PCIs  # ICM hx of MI (2008)  # HTN  Clinically stable. Asymptomatic.   Angio and AYDE 01/05 as above.   ECHO as above- reviewed.     Per Cardiology:   - Continue ASA and clopidogrel for 12 months  - Continue Carvedilol, Rosuvastatin, Ezetimibe, Isosorbide dinitrate, enalapril  - Holding diuretics for soft bp and cath 01/05.resume as davonte.      - 2L fluid/2gm Na restriction   - strict I/O and daily weights    FU Cardiology PRN     # EtOH Dependence  Patient reports 8-10 beers per day, with maximum of 2 days w/o drinking and no hx of withdrawal. States that recently has been cutting back in the setting of worsening PVD to 2-3 beers per day. Last drink was Tuesday per pt.   - MSSA protocol w/ativan : no e/o withdrawal at current.  - MVI/thiamine/folate   - consider chem dep consult if pt willing     # Tobacco Dependence   50 pack year hx, last cigarette this AM (1/4). Patient endorses desire to quit smoking.  - nicotine patch 7mg  - Smoking Cessation Program IP consult  - consider nicotine lozenges/gum if needed      # Restrictive Airway Disease   ? COPD, given smoking hx. Patient denies any hx of COPD.    Patient denies any symptoms of SOB or cough at current.    - DuoNebs x4 hr prn  - outpatient PFT  - smoking cessation     # Malignancy of Lung, Right Upper Lobe    Known  malignancy, patient unable to tell specific diagnosis. States that cancer responded with radiation.   F/u PET scan due in February   - f/u outpatient      # Depression   Patient reports a hx of depression and reports depression and axiety  - holding home alprazolam 0.5 PRN  - home ecitalopram 20mg opd        CODE: Full  DVT: On Plavix and ASA  FEN: Cardiac Diet 2L fluid/2gm Na restriction.      Dispo: Disposition Plan   Expected discharge in several days ->TBD, pending clinical course.      Entered: [place] 01/06/2018, 8:30 AM         Plan discussed with patient, RN and [facility]IS CC during Care Team Rounds.    D/w Cardiology and Vascular team. 1/6/2018      Blue [place] MD   Hospitalist (Internal Medicine)  Pager: [phone].

## 2018-01-06 NOTE — PLAN OF CARE
Problem: Patient Care Overview  Goal: Plan of Care/Patient Progress Review  Pt was transferred to us from . Pt had angiogram done. Access was obtained through L arterial artery. A stent was placed. Access site was monitored overnight. No signs of a hematoma. Pt's BPs have been higher but still WDL. Pt came off of NPO and tolerated a regular diet well. Pt has fragile skin and skin can tear easily. Wound nurse will come to change wound care to lower extremity stasis ulcers because pt is allergic to betadine. VSS, A&Ox4, LS clear, heart sounds irregular. Pt stated that he might be going through withdrawals from codeine, nicotine or alcohol but didn't want me to tell the doctors. Writer couldn't quite understand why. Pt is independent in room but needs a walker for longer distances. Will continue to monitor and follow POC. Dilaudid given q2 hrs for leg pain with relief.

## 2018-01-06 NOTE — PROCEDURES
CARDIAC CATH REPORT:   PROCEDURES PERFORMED:   Coronary Angiography  Coronary Bypass Angiography  Percutaneous Coronary Intervention    PHYSICIANS:  Attending Physician: Kota Hatch MD  Interventional Cardiology Fellow: Homero Fish MD  Cardiology Fellow: Antony Olmos MD    INDICATION:  Boom Kahn is a 67 year old male with hx of CAD s/p 3v CABG (possibly LIMA to LAD, SVGs to PDA and OM in 1999) and numerous PCI since (no records available), ICM with HFrEF (EF 25-30% in 06/17) s/p ICD, HTN, HLD, active tobacco and alcohol abuse, R lung cancer s/p radiation therapy, PAD s/p multiple stents, and chronic pain who was admitted to the hospital for critical limb ischemia. He also has chronic stable angina. Coronary angiogram was ordered to evaluate for preoperative evaluation.     DESCRIPTION:  1. Consent obtained with discussion of risks.  All questions were answered.  2. Sterile prep and procedure.  3. Location with Sheaths:   Lf Radial Arterial  5 Fr Slender 10 cm [short]  4. Access: Local anesthetic with lidocaine.  A micropuncture 21 guage needle with ultrasound guidance was used to establish vascular access using a modified Seldinger technique.  5. Diagnostic Catheters:   6 Fr  JR4 and AL 1 Pigtail  6. Guiding Catheters:  6 Fr  XB LF 3.5 GC  6. Estimated blood loss: < 50 ml    MEDICATIONS:  The procedure was performed under conscious sedation for 95 minutes from 1718 to 1857.  The patient was assessed immediately before the first sedation medication was administered.  Midazolam 2 mg and Fentanyl 100 mcg were administered.  Heart rate, BP, respiration, oxygen saturation and patient responses were monitored throughout the procedure with the assistance of the RN under my supervision.  >> IA Nicardipine and IA NTG were administered to prophylax against radial artery spasm.  >> Antiplatelet Therapy: ASA 81 mg  and Clopidogrel 75mg    Procedures:  CORONARY ANGIOGRAM:   1. Both coronary arteries arise from  "their respective cusps.  2. Dominance: Right  3. The LM is very short and heavily calcified with diffuse 30% disease.   4. The LAD is a Type 3 vessel. It has many stents and is 100% occluded just distal to the D1. Proximal to the occlusion there is a long 70-80% stenosis. D1 is a small caliber vessel with serial 80-90% stenoses.   5. LCX is a large vessel giving rise to 3 moderate caliber OM vessels.  The pLCx has a 95% stenosis just proximal to a stent with up to 75% in-stent restenosis at the proximal edge, mLCx has diffuse (40%) disease and continues as a tiny vessel in the AV groove. OM 1 has moderate (40%) diffuse disease. There is a 50-60% stenosis at the bifurcation of OM2 and OM3.   6. RCA has a 99% proximal stenosis followed by a 100% occlusion in the mRCA.     CORONARY ARTERY BYPASS ANGIOGRAPHY:  Coronary bypass angiography was performed on LIMA to LAD and SVG to RPDA. We were unable to identify a third bypass graft even after a pigtail was used for an aortic root shot.  1. LIMA to LAD is patent and anastomosis in the mLAD. There is anterograde and retrograde flow. There is a 40-50% stenosis in the dLAD.   2. SVG fills the RPDA and RPLA. The SVG has a proximal 30% stenosis followed by mild luminal irregularities. The RPDA has a 50% stenosis distal to the SVG touchdown.  The RPLA has mild (10-20%) diffuse disease.  3. An aortic root angiogram was performed to assess for additional grafts but no further grafts were observed.  While it remains unclear if there was an SVG-OM graft, there is no patent graft at this time.    PERCUTANEOUS CORONARY INTERVENTION:   Lesion #1:  LCX: proximal  A 6 Fr  XB LF 3.5 GC was positioned at the ostium of the LMCA.  >> IV UFH was administered to achieve anticoagulation.    A 6Fr XB 3.5 GC was positioned at the ostium of LM. A 0.014\" runthrough wire was advanced across the heavily calcifed proximal circumflex lesion and positioned in the OM.  Due to calcification, the pLCx " lesion was pre-dilated with sequential 2.5 x20 mm compliant, 2.5 x 12 mm NC, and 3.0 x20 mm NC balloons up to 18 asael. Attempt to pass a Synergy stent across the proximal circumflex was unsuccessful. A 6F guidelier was then advanced and positioned in the proximal circumflex artery. Finally 3.0x28 mm Synergy drug eluting stent was successfully deployed across the proximal circumflex lesion with inflation to 16 asael.  The stent was post-dilated with a 3.5 x12 mm non-compliant balloon upto 22 asael.  Final angiography showed no evidence of perforation or dissection with residual stenosis of 10% and RYAN 3 flow.  No complications.    Sheath Removal:  The LRA sheath was manually removed in the cardiac catheterization laboratory.    Contrast: Isovue, 250 ml     Fluoroscopy Time: 33.9 min    COMPLICATIONS:  1. None    SUMMARY:   --Severe 3 vessel CAD with prior LIMA-LAD and SVG to RPDA.   of the mRCA and pLAD.  Severe diffuse disease of the LCx.  --Patent LIMA to LAD and SVG to RPDA  --No graft to the OM circulation is observed  --Successful deployment of a drug eluting stent to the proximal LCX post-dilated to 3.5mm                                  PLAN:   >> Continue ASA and clopidogrel for 12 months  >> Bedrest per protocol.  >> Continued medical management and lifestyle modification for cardiovascular risk factor optimization.   >> Return to the primary inpatient team for further evaluation and management.    The attending interventional cardiologist was present and supervised all critical aspects the procedure.    Findings discussed with Dr. Brown.    See CVIS report for final draft.    Cardiology Fellow  MD Homero Brito MD        ATTENDING ATTESTATION: I was present and supervised the cardiology fellow throughout the procedure and reviewed the findings with the fellow at the completion of the procedure.  I agree with the documentation above.    Kota Hatch MD, PhD  Interventional/Critical  [facility] Cardiology  [phone]    January 5, 2018

## 2018-01-07 LAB
ANION GAP SERPL CALCULATED.3IONS-SCNC: 6 MMOL/L (ref 3–14)
BUN SERPL-MCNC: 7 MG/DL (ref 7–30)
CALCIUM SERPL-MCNC: 9 MG/DL (ref 8.5–10.1)
CHLORIDE SERPL-SCNC: 106 MMOL/L (ref 94–109)
CO2 SERPL-SCNC: 25 MMOL/L (ref 20–32)
CREAT SERPL-MCNC: 0.53 MG/DL (ref 0.66–1.25)
ERYTHROCYTE [DISTWIDTH] IN BLOOD BY AUTOMATED COUNT: 13.6 % (ref 10–15)
GFR SERPL CREATININE-BSD FRML MDRD: >90 ML/MIN/1.7M2
GLUCOSE SERPL-MCNC: 78 MG/DL (ref 70–99)
HCT VFR BLD AUTO: 41.1 % (ref 40–53)
HGB BLD-MCNC: 13.4 G/DL (ref 13.3–17.7)
MCH RBC QN AUTO: 32.7 PG (ref 26.5–33)
MCHC RBC AUTO-ENTMCNC: 32.6 G/DL (ref 31.5–36.5)
MCV RBC AUTO: 100 FL (ref 78–100)
PLATELET # BLD AUTO: 249 10E9/L (ref 150–450)
POTASSIUM SERPL-SCNC: 3.7 MMOL/L (ref 3.4–5.3)
RBC # BLD AUTO: 4.1 10E12/L (ref 4.4–5.9)
SODIUM SERPL-SCNC: 137 MMOL/L (ref 133–144)
WBC # BLD AUTO: 9.3 10E9/L (ref 4–11)

## 2018-01-07 PROCEDURE — 25000128 H RX IP 250 OP 636: Performed by: SURGERY

## 2018-01-07 PROCEDURE — 25000132 ZZH RX MED GY IP 250 OP 250 PS 637: Performed by: INTERNAL MEDICINE

## 2018-01-07 PROCEDURE — 85027 COMPLETE CBC AUTOMATED: CPT | Performed by: INTERNAL MEDICINE

## 2018-01-07 PROCEDURE — 25000132 ZZH RX MED GY IP 250 OP 250 PS 637: Performed by: PHYSICIAN ASSISTANT

## 2018-01-07 PROCEDURE — 80048 BASIC METABOLIC PNL TOTAL CA: CPT | Performed by: INTERNAL MEDICINE

## 2018-01-07 PROCEDURE — 99233 SBSQ HOSP IP/OBS HIGH 50: CPT | Performed by: INTERNAL MEDICINE

## 2018-01-07 PROCEDURE — 25000132 ZZH RX MED GY IP 250 OP 250 PS 637: Performed by: NURSE PRACTITIONER

## 2018-01-07 PROCEDURE — 12000008 ZZH R&B INTERMEDIATE UMMC

## 2018-01-07 PROCEDURE — 25000125 ZZHC RX 250: Performed by: STUDENT IN AN ORGANIZED HEALTH CARE EDUCATION/TRAINING PROGRAM

## 2018-01-07 PROCEDURE — 12000001 ZZH R&B MED SURG/OB UMMC

## 2018-01-07 PROCEDURE — 25000132 ZZH RX MED GY IP 250 OP 250 PS 637: Performed by: STUDENT IN AN ORGANIZED HEALTH CARE EDUCATION/TRAINING PROGRAM

## 2018-01-07 PROCEDURE — 40000141 ZZH STATISTIC PERIPHERAL IV START W/O US GUIDANCE

## 2018-01-07 PROCEDURE — 36415 COLL VENOUS BLD VENIPUNCTURE: CPT | Performed by: INTERNAL MEDICINE

## 2018-01-07 PROCEDURE — 25000128 H RX IP 250 OP 636: Performed by: INTERNAL MEDICINE

## 2018-01-07 RX ORDER — FUROSEMIDE 20 MG
20 TABLET ORAL ONCE
Status: COMPLETED | OUTPATIENT
Start: 2018-01-07 | End: 2018-01-07

## 2018-01-07 RX ORDER — BISACODYL 10 MG
10 SUPPOSITORY, RECTAL RECTAL DAILY PRN
Status: DISCONTINUED | OUTPATIENT
Start: 2018-01-07 | End: 2018-01-23 | Stop reason: HOSPADM

## 2018-01-07 RX ORDER — HYDROMORPHONE HYDROCHLORIDE 2 MG/1
6-8 TABLET ORAL
Status: DISCONTINUED | OUTPATIENT
Start: 2018-01-07 | End: 2018-01-11

## 2018-01-07 RX ORDER — HYDROMORPHONE HYDROCHLORIDE 1 MG/ML
.3-.5 INJECTION, SOLUTION INTRAMUSCULAR; INTRAVENOUS; SUBCUTANEOUS EVERY 6 HOURS PRN
Status: DISCONTINUED | OUTPATIENT
Start: 2018-01-07 | End: 2018-01-20 | Stop reason: CLARIF

## 2018-01-07 RX ORDER — CEFAZOLIN SODIUM 2 G/100ML
2 INJECTION, SOLUTION INTRAVENOUS
Status: CANCELLED | OUTPATIENT
Start: 2018-01-07

## 2018-01-07 RX ADMIN — HYDROMORPHONE HYDROCHLORIDE 6 MG: 2 TABLET ORAL at 18:36

## 2018-01-07 RX ADMIN — GABAPENTIN 300 MG: 300 CAPSULE ORAL at 20:24

## 2018-01-07 RX ADMIN — ISOSORBIDE MONONITRATE 60 MG: 60 TABLET, EXTENDED RELEASE ORAL at 08:32

## 2018-01-07 RX ADMIN — HYDROMORPHONE HYDROCHLORIDE 6 MG: 2 TABLET ORAL at 04:06

## 2018-01-07 RX ADMIN — Medication 0.3 MG: at 02:39

## 2018-01-07 RX ADMIN — FOLIC ACID 1 MG: 1 TABLET ORAL at 08:32

## 2018-01-07 RX ADMIN — HYDROMORPHONE HYDROCHLORIDE 8 MG: 2 TABLET ORAL at 13:32

## 2018-01-07 RX ADMIN — HYDROMORPHONE HYDROCHLORIDE 8 MG: 2 TABLET ORAL at 10:13

## 2018-01-07 RX ADMIN — ENALAPRIL MALEATE 2.5 MG: 2.5 TABLET ORAL at 20:24

## 2018-01-07 RX ADMIN — HYDROMORPHONE HYDROCHLORIDE 4 MG: 2 TABLET ORAL at 00:28

## 2018-01-07 RX ADMIN — EZETIMIBE 10 MG: 10 TABLET ORAL at 20:24

## 2018-01-07 RX ADMIN — ACETAMINOPHEN 975 MG: 325 TABLET, FILM COATED ORAL at 13:32

## 2018-01-07 RX ADMIN — ACETAMINOPHEN 975 MG: 325 TABLET, FILM COATED ORAL at 08:32

## 2018-01-07 RX ADMIN — SENNOSIDES AND DOCUSATE SODIUM 2 TABLET: 8.6; 5 TABLET ORAL at 21:57

## 2018-01-07 RX ADMIN — NICOTINE 1 PATCH: 7 PATCH, EXTENDED RELEASE TRANSDERMAL at 20:18

## 2018-01-07 RX ADMIN — GABAPENTIN 300 MG: 300 CAPSULE ORAL at 08:32

## 2018-01-07 RX ADMIN — ACETAMINOPHEN 325 MG: 325 TABLET, FILM COATED ORAL at 20:22

## 2018-01-07 RX ADMIN — MULTIPLE VITAMINS W/ MINERALS TAB 1 TABLET: TAB at 08:32

## 2018-01-07 RX ADMIN — ESCITALOPRAM OXALATE 20 MG: 20 TABLET ORAL at 08:32

## 2018-01-07 RX ADMIN — PREDNISONE 10 MG: 10 TABLET ORAL at 08:32

## 2018-01-07 RX ADMIN — HYDROMORPHONE HYDROCHLORIDE 8 MG: 2 TABLET ORAL at 21:58

## 2018-01-07 RX ADMIN — ENALAPRIL MALEATE 2.5 MG: 2.5 TABLET ORAL at 08:32

## 2018-01-07 RX ADMIN — HYDROMORPHONE HYDROCHLORIDE 0.5 MG: 1 INJECTION, SOLUTION INTRAMUSCULAR; INTRAVENOUS; SUBCUTANEOUS at 08:45

## 2018-01-07 RX ADMIN — SENNOSIDES AND DOCUSATE SODIUM 2 TABLET: 8.6; 5 TABLET ORAL at 10:04

## 2018-01-07 RX ADMIN — ROSUVASTATIN CALCIUM 20 MG: 20 TABLET, FILM COATED ORAL at 21:58

## 2018-01-07 RX ADMIN — FUROSEMIDE 20 MG: 20 TABLET ORAL at 10:18

## 2018-01-07 RX ADMIN — BISACODYL 10 MG: 10 SUPPOSITORY RECTAL at 22:00

## 2018-01-07 RX ADMIN — ACETAMINOPHEN 325 MG: 325 TABLET, FILM COATED ORAL at 16:38

## 2018-01-07 RX ADMIN — SODIUM CHLORIDE 250 ML: 9 INJECTION, SOLUTION INTRAVENOUS at 15:39

## 2018-01-07 RX ADMIN — ASPIRIN 81 MG: 81 TABLET, COATED ORAL at 08:32

## 2018-01-07 RX ADMIN — CLOPIDOGREL 75 MG: 75 TABLET, FILM COATED ORAL at 20:24

## 2018-01-07 RX ADMIN — GABAPENTIN 300 MG: 300 CAPSULE ORAL at 13:32

## 2018-01-07 RX ADMIN — CARVEDILOL 6.25 MG: 6.25 TABLET, FILM COATED ORAL at 08:32

## 2018-01-07 ASSESSMENT — PAIN DESCRIPTION - DESCRIPTORS
DESCRIPTORS: SHARP
DESCRIPTORS: CONSTANT
DESCRIPTORS: SHARP

## 2018-01-07 NOTE — PROGRESS NOTES
Severe pain to right shin after application of topical lotion to wound by nursing.    B/P: 145/63, T: 96.6, P: 71, R: 16  Alert oriented mild distress due to pain  Right shin wound with erythema, scab receding from border, no significant drainage  Left leg wounds stable.    WBC   Date Value Ref Range Status   01/07/2018 9.3 4.0 - 11.0 10e9/L Final   ]  Hemoglobin   Date Value Ref Range Status   01/07/2018 13.4 13.3 - 17.7 g/dL Final   ]  INR/Prothrombin Time  Creatinine   Date Value Ref Range Status   01/07/2018 0.53 (L) 0.66 - 1.25 mg/dL Final   ]      Intake/Output Summary (Last 24 hours) at 01/07/18 0754  Last data filed at 01/07/18 0500   Gross per 24 hour   Intake              490 ml   Output             1950 ml   Net            -1460 ml       Assessment/Plan:  68 yo male with bilateral lower extremity wounds, right critical limb ischemia    Plan for right femoral endarterectomy with femoral to posterior tibial bypass with in situ greater saphenous vein.  Please keep right leg wounds dry, no application of topical agents.  Left shin wet to dry dressing as needed.  Ambulate as able  Continue aspirin and plavix, do not stop for surgery  Pain management, appreciate pain consult team involvement.    Leandra Marques MD  Vascular Surgery Fellow  Pager [phone]

## 2018-01-07 NOTE — PROGRESS NOTES
[facility], [place]   Hospitalist Daily Progress Note                                                 Date of Admission:1/3/2018  ___________________________________  INTERVAL HISTORY (24 Hrs)/SUBJECTIVE:   Last 24 hr events, care team notes reviewed.     S/p Coronary Angiography & Successful deployment of a 3.0 x 28 mm drug eluting stent to the proximal LCX and inflated with 3.5 mm NC balloon    Vascular planning for right femoral endarterectomy with femoral to posterior tibial bypass with in situ greater saphenous vein. Likely 18                                   Patient reports uncontrolled - Pain both legs- Rt > Lt LE  Bp stable.   No fever or chills  No cp or sob  No NV  No other concern.       ROS: 4 point ROS neg other than the symptoms noted above in the interval history.    OBJECTIVE :   VITALS:    Temp:  [96  F (35.6  C)-96.6  F (35.9  C)] 96.2  F (35.7  C)  Pulse:  [68-71] 68  Heart Rate:  [51-60] 51  Resp:  [16-18] 16  BP: ()/(39-63) 87/42  SpO2:  [95 %-98 %] 97 %     Temp (24hrs), Av.3  F (35.7  C), Min:96  F (35.6  C), Max:96.6  F (35.9  C)      Wt Readings from Last 5 Encounters:   18 66.8 kg (147 lb 3.2 oz)     Intake/Output Summary (Last 24 hours) at 18 1504  Last data filed at 18 1329   Gross per 24 hour   Intake              970 ml   Output             2450 ml   Net            -1480 ml       PHYSICAL EXAM:  General: alert, interactive, NAD  HEENT: AT/NC, Moist MM  Respi/Chest: Non labored. Clear BL  CVS/Heart: S1S2 regular,   Gi/Abd: Soft, non tender, non distended,   MSK/Ext: ulcers both shin wound. r shin w dry black escar w mild erythema around. Chronic changes from PAD. Distal pulses not palpable  Neuro: AO x 4, Grossly intact.     Medications:   I have reviewed this patient's current medications.      Data:   All laboratory and imaging data in the past 24 hours reviewed:    LABS:  CMP    Recent Labs  Lab 18  0681  01/06/18  0628 01/05/18  0643 01/04/18  0600 01/03/18  1647    141 140 136 136   POTASSIUM 3.7 4.0 3.8 4.0 4.0   CHLORIDE 106 110* 107 105 104   CO2 25 24 25 20 27   ANIONGAP 6 7 7 11 5   GLC 78 79 80 93 81   BUN 7 8 9 11 12   CR 0.53* 0.66 0.68 0.57* 0.75   GFRESTIMATED >90 >90 >90 >90 >90   GFRESTBLACK >90 >90 >90 >90 >90   SUMAN 9.0 8.3* 8.5 9.2 9.2   MAG  --   --  2.2  --   --    PROTTOTAL  --   --   --   --  6.7*   ALBUMIN  --   --   --   --  3.5   BILITOTAL  --   --   --   --  0.7   ALKPHOS  --   --   --   --  58   AST  --   --   --   --  21   ALT  --   --   --   --  37     CBC    Recent Labs  Lab 01/07/18  0624 01/06/18  0628 01/05/18  0643 01/04/18  0600   WBC 9.3 8.4 9.5 10.0   RBC 4.10* 3.85* 4.29* 4.26*   HGB 13.4 12.7* 14.2 14.1   HCT 41.1 38.8* 43.0 42.3    101* 100 99   MCH 32.7 33.0 33.1* 33.1*   MCHC 32.6 32.7 33.0 33.3   RDW 13.6 13.8 13.9 14.0    236 273 248     INR    Recent Labs  Lab 01/04/18  0600 01/03/18  1647   INR 1.01 0.99       Echo Complete: 1/4/2018    Ischemic CM. Severely reduced LV systoilc function. Biplane LVEF measured at  20%. Severe left ventricular dilation is present. Akinesis of the basal to mid  inferior, inferolateral, and lateral segments noted.  The right ventricle is normal size. Global right ventricular function is  normal.  Pulmonary artery systolic pressure cannot be assessed.  No pericardial effusion is present.  The inferior vena cava was normal in size with preserved respiratory  variability.  Previous study not available for comparison.      US LE Venous duplex: No DVT    IR Lower Extremity Angiogram 01/04/18    Right:  1. Right lower extremity angiogram shows totally occluded mid SFA to  above-the-knee popliteal stents, short segment below the knee  popliteal occlusion, and an occluded anterior tibial artery.  2. Angiogram also demonstrates a severe (80-90%) short segment  shelflike right CFA stenosis. Therefore, recanalization of the  chronic  total occlusion was not performed.  3. Patent upper SFA and iliac stents.    Left:  1. 13 cm chronic total occlusion of the mid SFA.  2. Two-vessel runoff with total occlusion of the TEDDY.  3. Short segment narrowing of the TP trunk.  4. Patent iliac stents with less severe short segment common femoral  disease than on the right.    01/05/18  Coronary Angiography:  SUMMARY:   Severe 3 vessel CAD  100% stenosis of the RCA  100% occlusion of the proximal LAD   95% stenosis of the proximal circumflex artery  Patent LIMA to LAD   Patent SVG to RPDA     Successful deployment of a 3.0 x 28 mm drug eluting stent to the proximal LCX and inflated with 3.5 mm NC balloon                                           ASSESSMENT & PLAN :    Boom Kahn is a 67 year old male with a past medical history significant for HTN, anxiety/depression, alcohol abuse, tobacco abuse, ICM/MI, CAD s/p 3v CABG and PCI, HFrEF (last EF 25-30%, approximately 6 months ago) s/p ICD, , R lung cancer s/p radiation therapy,  peripheral vascular disease s/p multiple stents, and chronic pain who presented c/o of worsening pain in his lower extremities and right shin wound.      # Severe Peripheral Artery Disease -   Critical limb ischemia R>L with bilateral LE wounds; Acute on Chronic pain    Extensive hx of PAD w/multiple stents, current smoker, EtOH abuse, pain at rest, minimal exercise tolerance, no hx of infection. U/S demonstrating b/l significant calcifications with minimal flow distal to thigh.  X-ray without evidence of osteo-myelitis.  Exam notable for bilateral wounds, erythema consistent with vascular changes, loss of hair.     IR angio LE done, see above.     Vascular surgery on board. Following.   Cardiology consulted for Pre op evaluation. Under Coronary Angio and AYDE stenting as above. Carotid US bl done.   Final revascularization plan per Vascular team. Likely -right femoral endarterectomy with femoral to posterior tibial bypass  with in situ greater saphenous vein.  01/09/18     - Wound care per WOCN, Vascular.   - no antibiotics for now.   - Continue antiplatelet agents. Statin.   - continue current prednisone 10mg daily    >>Pain control: acute on Chronic pain.   Appreciate pain team recommendations.     1/6/2018  Will switch iv hydromorphone to PO per Pain team recs 01/05. Plus couple iv hydromorphone for breakthrough.   Topical gabapentin 8%, ketamine 5%, lidocaine 2.5% in PLO gel TID  Gabapentin 300 tid, On 1/7, increase gabapentin to 300 mg Q AM and Q PM, 600 mg Q HS  Continue fentanyl patch 25 mcg/hr   Continue acetaminophen 975 qid    Fu Pain team PRN     01/7/2018  Pain uncontrolled.   Increase hydromorphone 6-8 Q 3 hr prn.   Continue iv hydromorphone for breakthrough Q 6 hour prn 0.3-0.5  Rest as above      # HFrEF (EF reported 25-30%)  # CAD s/p CABGx3 and PCIs  # ICM hx of MI (2008)  # HTN  Clinically stable. Asymptomatic.   Angio and AYDE 01/05 as above.   ECHO as above- reviewed.     Per Cardiology:   - Continue ASA and clopidogrel for 12 months  - Continue Carvedilol, Rosuvastatin, Ezetimibe, Isosorbide dinitrate, enalapril  - Holding diuretics for soft bp and cath 01/05.resume as davonte.      - 2L fluid/2gm Na restriction   - strict I/O and daily weights    1/7/2018  Furosemide 20 mg po x1     FU Cardiology PRN     # EtOH Dependence  Patient reports 8-10 beers per day, with maximum of 2 days w/o drinking and no hx of withdrawal. States that recently has been cutting back in the setting of worsening PVD to 2-3 beers per day. Last drink was Tuesday per pt.   - MSSA protocol w/ativan : no e/o withdrawal at current.  - MVI/thiamine/folate   - consider chem dep consult if pt willing     # Tobacco Dependence   50 pack year hx, last cigarette this AM (1/4). Patient endorses desire to quit smoking.  - nicotine patch 7mg  - Smoking Cessation Program IP consult  - consider nicotine lozenges/gum if needed      # Restrictive Airway Disease    ? COPD, given smoking hx. Patient denies any hx of COPD.    Patient denies any symptoms of SOB or cough at current.    - DuoNebs x4 hr prn  - outpatient PFT  - smoking cessation     # Malignancy of Lung, Right Upper Lobe    Known malignancy, patient unable to tell specific diagnosis. States that cancer responded with radiation.   F/u PET scan due in February   - f/u outpatient      # Depression   Patient reports a hx of depression and reports depression and axiety  - holding home alprazolam 0.5 PRN  - home ecitalopram 20mg opd         CODE: Full  DVT: On Plavix and ASA  FEN: Cardiac Diet 2L fluid/2gm Na restriction.      Dispo: Disposition Plan   Expected discharge in several days ->TBD, pending clinical course.      Entered: [place] 01/07/2018, 3:04 PM         Plan discussed with patient, PENNY Vail MD   Hospitalist (Internal Medicine)  Pager: [phone].

## 2018-01-07 NOTE — PROGRESS NOTES
[facility] Cardiology Progress Note           Assessment and Plan:   # Pre-Op Eval  # Hx of Severe CAD s/p 3v CABG in '99 and Multiple PCI Since  # ICM, HFrEF (EF 20%)  Angiogram done yesterday.  Report detailed below.  No s/p LCx stenting.  No symptoms currently.  On ASA and Plavix which was okayed prior to cath that he could remain on those medications as he is evaluated for vascular surgery  - Uninterupted DAPT x 12 months at least with ASA and Plavix  - ASA lifelong  - Continue other cardiac medications including: ASA 81 mg daily, carvedilol 6.25 mg BID, plavix 75 mg daily,  Imdur 60 mg daily, and rosuvastatin 20 mg daily  - Encouraged smoking cessation    We will sign off.  Please contact us if we can be of further assistance in the perioperative period.    Patient seen and discussed with Dr. Stephanie Marques MD  Cardiovascular Disease Fellow        Interval History:   No significant events overnight  Underwent angiogram as detailed below with stenting  No chest pain          Medications:       ketamine (KETALAR) 5%-gabapentin (NEURONTIN) 8%-lidocaine 2.5%   Topical TID     sodium chloride (PF)  3 mL Intracatheter Q8H     aspirin EC  81 mg Oral Daily     folic acid  1 mg Oral Daily     gabapentin  300 mg Oral TID     isosorbide mononitrate  60 mg Oral Daily     acetaminophen  975 mg Oral 4x Daily     carvedilol (COREG) tablet 6.25 mg  6.25 mg Oral BID w/meals     clopidogrel (PLAVIX) tablet 75 mg  75 mg Oral Daily     enalapril (VASOTEC) tablet 2.5 mg  2.5 mg Oral BID     escitalopram (LEXAPRO) tablet 20 mg  20 mg Oral Daily     ezetimibe (ZETIA) tablet 10 mg  10 mg Oral Daily     fentaNYL  25 mcg Transdermal Q72H     multivitamin, therapeutic with minerals  1 tablet Oral Daily     predniSONE (DELTASONE) tablet 10 mg  10 mg Oral Daily     rosuvastatin (CRESTOR) tablet 20 mg  20 mg Oral Daily     fentaNYL   Transdermal Q72H     fentaNYL   Transdermal Q8H     nicotine    Transdermal Q8H     nicotine   Transdermal Daily     nicotine  1 patch Transdermal Daily             Physical Exam:   Temp:  [96.9  F (36.1  C)-97.1  F (36.2  C)] 96.9  F (36.1  C)  Pulse:  [60] 60  Heart Rate:  [62-95] 62  Resp:  [18] 18  BP: ()/(45-84) 106/57  SpO2:  [92 %-99 %] 97 %    Intake/Output Summary (Last 24 hours) at 01/06/18 1837  Last data filed at 01/06/18 0809   Gross per 24 hour   Intake              695 ml   Output             1675 ml   Net             -980 ml     GEN: alert, interactive, pleasant, no acute distress  HEENT: no icterus, b/l soft carotid bruits  CV: RRR, normal s1/s2, no murmurs/rubs/s3/s4, no heave. No JVD  CHEST: clear to ausculation bilaterally, no crackles or wheezes  ABD: soft, non-tender, normal active bowel sounds  EXTR: large right shin dry wound with black eschar and induration, smaller wound on left shin, erythema along b/l shins, numerous foot wounds, significant pain with light touch, unable to palpate pulses 2/2 severe pain.  Left wrist cath site appears well without hematoma.  NEURO: alert and oriented x3, speech fluent/appropriate, motor grossly nonfocal            Data:   ROUTINE IP LABS (Last four results)  BMP  Recent Labs  Lab 01/06/18 0628 01/05/18 0643 01/04/18 0600 01/03/18  1647    140 136 136   POTASSIUM 4.0 3.8 4.0 4.0   CHLORIDE 110* 107 105 104   SUMAN 8.3* 8.5 9.2 9.2   CO2 24 25 20 27   BUN 8 9 11 12   CR 0.66 0.68 0.57* 0.75   GLC 79 80 93 81     CBC  Recent Labs  Lab 01/06/18 0628 01/05/18  0643 01/04/18  0600 01/03/18  1647   WBC 8.4 9.5 10.0 10.2   RBC 3.85* 4.29* 4.26* 4.22*   HGB 12.7* 14.2 14.1 14.3   HCT 38.8* 43.0 42.3 42.1   * 100 99 100   MCH 33.0 33.1* 33.1* 33.9*   MCHC 32.7 33.0 33.3 34.0   RDW 13.8 13.9 14.0 14.0    273 248 241     INR  Recent Labs  Lab 01/04/18  0600 01/03/18  1647   INR 1.01 0.99     Angiogram:  CORONARY ARTERY BYPASS ANGIOGRAPHY:  Coronary bypass angiography was performed on LIMA to LAD and  "SVG to RPDA. We were unable to identify a third bypass graft even after a pigtail was used for an aortic root shot.  1. LIMA to LAD is patent and anastomosis in the mLAD. There is anterograde and retrograde flow. There is a 40-50% stenosis in the dLAD.   2. SVG fills the RPDA with retrograde flow to the mRCA. The SVG has a proximal 50% stenosis followed by mild luminal irregularities. The RPDA has a 70-80% stenosis distal to the SVG touchdown.     PERCUTANEOUS CORONARY INTERVENTION:   Lesion #1:  LCX: proximal  A 6 Fr  XB LF 3.5 GC was positioned at the ostium of the LMCA.  >> IV UFH was administered to achieve anticoagulation.     XB 3.5 GC was positioned at the ostium of LM. A 0.014\" runthrough wire was advanced across the heavily calcifed proximal circumflex lesion and positioned in the OM.  A 2.5 x20 mm balloon was used to pre-dilate the proximal circumflex lesion upto 14 asael. A 2.5 x 12 mm NC balloon was used to pre-dilate the proximal circumflex lesion upto 20 asael. Another 3.0 x20 mm NC balloon was used to pre-dilate the proximal circumflex lesion upto 18 asael. Attempt to pass a Synergy stent across the proximal circumflex was unsuccessful. Using over the balloon technique , a 6F guidelier was then advanced and positioned proximal circumflex artery. Finally  3.0x28 mm Synergy drug eluting stent was successfully deployed across the proximal circumflex lesion with inflation to 16 asael.  The stent was post-dilated with a 3.5 x12 mm non-compliant balloon upto 22 asael.  Final angiography showed no evidence of perforation or dissection with residual stenosis of 10% and RYAN 3 flow.  No complications.  "

## 2018-01-07 NOTE — PLAN OF CARE
Problem: Patient Care Overview  Goal: Plan of Care/Patient Progress Review  Outcome: No Change  WC to R shin completed. R shin cleansed with vashe d/t allergies to betadine. Pt removed part of scab on R anterior shin (no bleeding). Writer encouraged patient to leave wound alone. Fentanyl patch replaced. PRN PO dilaudid 4mg controlling pain to legs. Pt SR HR 60's with PVC's. Pt declined walk.

## 2018-01-07 NOTE — PLAN OF CARE
Problem: Patient Care Overview  Goal: Plan of Care/Patient Progress Review  Outcome: No Change  A&O x4, VSS except BP, Dilaudid tablet given x1 4mg, x1 6mg, used IV dilaudid once for breakthrough pain. Pt said that the PO medication was not helping with his pain and that he really wanted to talk to the doctors about better pain management, Gold team was paged. Will continue to monitor pain and treat pain and follow POC.

## 2018-01-08 LAB
ANION GAP SERPL CALCULATED.3IONS-SCNC: 7 MMOL/L (ref 3–14)
BUN SERPL-MCNC: 7 MG/DL (ref 7–30)
CALCIUM SERPL-MCNC: 8.9 MG/DL (ref 8.5–10.1)
CHLORIDE SERPL-SCNC: 104 MMOL/L (ref 94–109)
CO2 SERPL-SCNC: 27 MMOL/L (ref 20–32)
CREAT SERPL-MCNC: 0.68 MG/DL (ref 0.66–1.25)
ERYTHROCYTE [DISTWIDTH] IN BLOOD BY AUTOMATED COUNT: 13.7 % (ref 10–15)
GFR SERPL CREATININE-BSD FRML MDRD: >90 ML/MIN/1.7M2
GLUCOSE SERPL-MCNC: 89 MG/DL (ref 70–99)
HCT VFR BLD AUTO: 40.4 % (ref 40–53)
HGB BLD-MCNC: 13.3 G/DL (ref 13.3–17.7)
INTERPRETATION ECG - MUSE: NORMAL
MCH RBC QN AUTO: 33 PG (ref 26.5–33)
MCHC RBC AUTO-ENTMCNC: 32.9 G/DL (ref 31.5–36.5)
MCV RBC AUTO: 100 FL (ref 78–100)
PLATELET # BLD AUTO: 235 10E9/L (ref 150–450)
POTASSIUM SERPL-SCNC: 4 MMOL/L (ref 3.4–5.3)
RBC # BLD AUTO: 4.03 10E12/L (ref 4.4–5.9)
SODIUM SERPL-SCNC: 138 MMOL/L (ref 133–144)
WBC # BLD AUTO: 11.8 10E9/L (ref 4–11)

## 2018-01-08 PROCEDURE — 25000132 ZZH RX MED GY IP 250 OP 250 PS 637: Performed by: INTERNAL MEDICINE

## 2018-01-08 PROCEDURE — 25000132 ZZH RX MED GY IP 250 OP 250 PS 637: Performed by: CLINICAL NURSE SPECIALIST

## 2018-01-08 PROCEDURE — 25000125 ZZHC RX 250: Performed by: STUDENT IN AN ORGANIZED HEALTH CARE EDUCATION/TRAINING PROGRAM

## 2018-01-08 PROCEDURE — 36415 COLL VENOUS BLD VENIPUNCTURE: CPT | Performed by: INTERNAL MEDICINE

## 2018-01-08 PROCEDURE — 99207 ZZC NO CHARGE FOLLOW UP PS: CPT

## 2018-01-08 PROCEDURE — 99233 SBSQ HOSP IP/OBS HIGH 50: CPT | Performed by: INTERNAL MEDICINE

## 2018-01-08 PROCEDURE — 25000132 ZZH RX MED GY IP 250 OP 250 PS 637: Performed by: STUDENT IN AN ORGANIZED HEALTH CARE EDUCATION/TRAINING PROGRAM

## 2018-01-08 PROCEDURE — 25000132 ZZH RX MED GY IP 250 OP 250 PS 637: Performed by: NURSE PRACTITIONER

## 2018-01-08 PROCEDURE — 12000008 ZZH R&B INTERMEDIATE UMMC

## 2018-01-08 PROCEDURE — 80048 BASIC METABOLIC PNL TOTAL CA: CPT | Performed by: INTERNAL MEDICINE

## 2018-01-08 PROCEDURE — 40000556 ZZH STATISTIC PERIPHERAL IV START W US GUIDANCE

## 2018-01-08 PROCEDURE — 85027 COMPLETE CBC AUTOMATED: CPT | Performed by: INTERNAL MEDICINE

## 2018-01-08 RX ORDER — GABAPENTIN 600 MG/1
600 TABLET ORAL AT BEDTIME
Status: DISCONTINUED | OUTPATIENT
Start: 2018-01-08 | End: 2018-01-23 | Stop reason: HOSPADM

## 2018-01-08 RX ORDER — ACETAMINOPHEN 325 MG/1
650 TABLET ORAL EVERY 8 HOURS
Status: DISCONTINUED | OUTPATIENT
Start: 2018-01-08 | End: 2018-01-09

## 2018-01-08 RX ORDER — AMOXICILLIN 250 MG
2 CAPSULE ORAL 2 TIMES DAILY
Status: DISCONTINUED | OUTPATIENT
Start: 2018-01-08 | End: 2018-01-23 | Stop reason: HOSPADM

## 2018-01-08 RX ORDER — CEFAZOLIN SODIUM 2 G/100ML
2 INJECTION, SOLUTION INTRAVENOUS
Status: CANCELLED | OUTPATIENT
Start: 2018-01-08

## 2018-01-08 RX ORDER — POLYETHYLENE GLYCOL 3350 17 G/17G
17 POWDER, FOR SOLUTION ORAL DAILY
Status: DISCONTINUED | OUTPATIENT
Start: 2018-01-08 | End: 2018-01-23 | Stop reason: HOSPADM

## 2018-01-08 RX ORDER — GABAPENTIN 300 MG/1
300 CAPSULE ORAL 2 TIMES DAILY
Status: DISCONTINUED | OUTPATIENT
Start: 2018-01-08 | End: 2018-01-23 | Stop reason: HOSPADM

## 2018-01-08 RX ADMIN — ACETAMINOPHEN 975 MG: 325 TABLET, FILM COATED ORAL at 08:01

## 2018-01-08 RX ADMIN — HYDROMORPHONE HYDROCHLORIDE 8 MG: 2 TABLET ORAL at 06:00

## 2018-01-08 RX ADMIN — ASPIRIN 81 MG: 81 TABLET, COATED ORAL at 08:00

## 2018-01-08 RX ADMIN — HYDROMORPHONE HYDROCHLORIDE 6 MG: 2 TABLET ORAL at 20:48

## 2018-01-08 RX ADMIN — ACETAMINOPHEN 650 MG: 325 TABLET, FILM COATED ORAL at 20:51

## 2018-01-08 RX ADMIN — GABAPENTIN 300 MG: 300 CAPSULE ORAL at 14:13

## 2018-01-08 RX ADMIN — CARVEDILOL 6.25 MG: 6.25 TABLET, FILM COATED ORAL at 08:01

## 2018-01-08 RX ADMIN — HYDROMORPHONE HYDROCHLORIDE 8 MG: 2 TABLET ORAL at 17:32

## 2018-01-08 RX ADMIN — ISOSORBIDE MONONITRATE 60 MG: 60 TABLET, EXTENDED RELEASE ORAL at 08:01

## 2018-01-08 RX ADMIN — FOLIC ACID 1 MG: 1 TABLET ORAL at 08:01

## 2018-01-08 RX ADMIN — NICOTINE 1 PATCH: 7 PATCH, EXTENDED RELEASE TRANSDERMAL at 20:50

## 2018-01-08 RX ADMIN — CLOPIDOGREL 75 MG: 75 TABLET, FILM COATED ORAL at 20:51

## 2018-01-08 RX ADMIN — HYDROMORPHONE HYDROCHLORIDE 8 MG: 2 TABLET ORAL at 02:43

## 2018-01-08 RX ADMIN — HYDROMORPHONE HYDROCHLORIDE 6 MG: 2 TABLET ORAL at 14:13

## 2018-01-08 RX ADMIN — ENALAPRIL MALEATE 2.5 MG: 2.5 TABLET ORAL at 08:01

## 2018-01-08 RX ADMIN — ESCITALOPRAM OXALATE 20 MG: 20 TABLET ORAL at 08:01

## 2018-01-08 RX ADMIN — HYDROMORPHONE HYDROCHLORIDE 8 MG: 2 TABLET ORAL at 09:45

## 2018-01-08 RX ADMIN — GABAPENTIN 300 MG: 300 CAPSULE ORAL at 16:32

## 2018-01-08 RX ADMIN — MULTIPLE VITAMINS W/ MINERALS TAB 1 TABLET: TAB at 08:01

## 2018-01-08 RX ADMIN — GABAPENTIN 300 MG: 300 CAPSULE ORAL at 08:01

## 2018-01-08 RX ADMIN — GABAPENTIN 600 MG: 600 TABLET, FILM COATED ORAL at 20:51

## 2018-01-08 RX ADMIN — POLYETHYLENE GLYCOL 3350 17 G: 17 POWDER, FOR SOLUTION ORAL at 16:32

## 2018-01-08 RX ADMIN — ROSUVASTATIN CALCIUM 20 MG: 20 TABLET, FILM COATED ORAL at 20:49

## 2018-01-08 RX ADMIN — EZETIMIBE 10 MG: 10 TABLET ORAL at 20:52

## 2018-01-08 RX ADMIN — PREDNISONE 10 MG: 10 TABLET ORAL at 08:01

## 2018-01-08 NOTE — PROVIDER NOTIFICATION
Patient reporting constipation. He also told writer he attempted to stick a Q tip into rectum in an attempt to remove stool.  Per previous RN dilaudid dose was increased today. Patient not currently on any scheduled bowel medications. DENI Jain notified. Order received for PRN suppository. In addition, PRN senna administered.     Suppository ineffective. MD Anushka notified, order received for tap water enema. Enema administered and was also ineffective. Patient w/ impacted stool. Digital stimulation attempted, patient unable to tolerate. Currently sitting on commode. Will report to oncoming staff.

## 2018-01-08 NOTE — PLAN OF CARE
Problem: Patient Care Overview  Goal: Plan of Care/Patient Progress Review  Outcome: No Change  Pt alert and oriented. Vss on ra. Telemetry indicates paced rhythm with trigeminy; providers notified. Pt asymptomatic. Up ad thony with use of walker. C/o bilateral LE pain; prn dilaudid administered with some relief. Pt refused prn senna and stated that he is currently having loose stools following the suppository and enema that he received last night. Writer educated pt to notify staff when experiencing symptoms of constipation to avoid being impacted from narcotic use. Son here this morning visiting pt. Plan to have pt consented for endarterectomy tomorrow AM. Writer witness consent for procedure. Pt to change dressing later today; held off this AM d/t sleep and pain. Writer will pass this on to incoming RN.   R: Continue to monitor and implement poc. Continue anticoagulation therapy as scheduled per vascular surgery.

## 2018-01-08 NOTE — PLAN OF CARE
Problem: Patient Care Overview  Goal: Plan of Care/Patient Progress Review  OT: 5B: OT orders received, OT cx'd and reschedule as needed post surgery 1/10/18

## 2018-01-08 NOTE — PLAN OF CARE
Problem: Patient Care Overview  Goal: Plan of Care/Patient Progress Review  Outcome: No Change  A&O x4, VSS. Dilaudid 8mg given x2 for BLE pain with relief. Pt had constipation earlier in the shift. Enema was given and rectal digital stimulation was done, pt reported later that he had a big bowel movement. Will continue to monitor and treat pain as well as prevent constipation. Procedure on Tuesday.

## 2018-01-08 NOTE — PLAN OF CARE
Problem: Patient Care Overview  Goal: Plan of Care/Patient Progress Review  Outcome: No Change   01/07/18 7949   OTHER   Plan Of Care Reviewed With patient   Plan of Care Review   Progress no change     A&Ox4. A/VSS ex. Hypotensive episode, bolus given, bp returning to baseline. Pt moves with walker/SBA. Pt c/o of pain on R shin where venous ulcer is present. Pt prefers to have wound open to air d/t pain. Pain prns increased per pain mgmt needs. Pt received IV dilaudid this am for breakthrough pain and is now managed with q3hr PO dilaudid. Pt ate breakfast and dinner.  wnl, uses bedside urinal. Pt received senna prn x1 this shift, no bm. Continue with POC.

## 2018-01-08 NOTE — PROGRESS NOTES
Patient: Boom Kahn  Date of Service: January 8, 2018 Admission Date:1/3/2018   * No surgery date entered *     Chief Pain Endorsement:  Right shin pain    Recommendations were discussed and relayed to DEDRA Ron (Vascular)  Plan was reviewed by the Inpatient Pain Service and staff attending, Dr. Bree Lara      1. Continue fentanyl 25 mcg/hr q72h (this is a home dose).    On POD #0, continue this medication at the home dose.  2. Continue hydromorphone 6-8mg po q3h prn moderate to severe pain.    On POD #0, resume this oral hydromorphone dose.  3. Continue hydromorphone 0.3-0.5mg IV q6h prn severe pain not controlled by oral opioids.    On POD #0, increase the hydromorphone to 0.3-0.5mg IV q2h prn severe pain.  4. Increase gabapentin to [id number].  Give morning dose on the day of surgery 1/9/18.    On POD #0, resume this dosing.  Will continue to up titrate the dose every 2-3 days if tolerated.  5. Change acetaminophen to 650mg po q8h.    On POD #0, continue this dosing, dose reduced per patient request.  6. Continue nicotine patch 7mg TD daily.    On POD #0, resume this scheduled dose.  7. Discontinue diclofenac gel.  8. Hold gabapentin - ketamine - lidocaine in PLO gel until after surgery (Vascular Surgery wants right leg wound kept dry).    On POD #0, consider restarting gabapentin 8% - ketamine 5% - lidocaine 2.5% in PLO cream topically tid to right leg wound area if Vascular Surgery is in agreement.  9. Bowel regimen per primary team to prevent opioid induced constipation.  Would suggest scheduled bowel regimen post op due to recent episode of impacted stool.    Pain Service will Continue to follow at this time, will not see patient on the day of surgery 1/9/18, will follow up 1/10/18.    Thank you for consulting the Inpatient Pain Management Service. The above recommendations are to be acted upon at the primary team s discretion.     To reach us:  Mon - Friday 8 AM - 3 PM: Pager  [phone] (Text Page)  After hours, weekends and holidays: Primary service should call [phone] for the on-call pain specialist    PAIN MEDICATION SAFE USE:   Prior to discharge instruct patient on the following in addition to the medication fact sheet:    Caution: these medications can cause sedation    Take prescription medicine only if it has been prescribed by your doctor    Do not take more medicine or take it more often than instructed     Call your doctor if pain gets worse    Never mix pain medicine with alcohol, sleeping pills, or any illicit drugs    Do not operate heavy machinery, including vehicles, when initiation opioid therapy or increasing dosage    Store prescription opioids in a locked container, whenever possible     Dispose of unused opioids appropriately     Do not stop abruptly once at higher doses.  These medications must be tapered off.    Opioid pain medications do carry the risk for physical dependence and addiction and patients should be counseled about this.         1. Acute pain in lower extremities associated with B/L ulcers anterior tibia due to PVD - XR negative for osteomyelitis  2. Chronic pain syndrome  3. Pt is opiate tolerant  4. Tobacco abuse 0.5-1.0PPD for 50yrs  5. [facility]TOH abuse - [facility]  6. Hypertension  7. Ischemic cardiomyopathy (MI 2008), Congestive heart failure (EF ~25%)  8. Coronary Artery Disease s/p 3 vessel CABG 1999 with multiple stents and ICD placed  9. Chronic anticoagulation on plavix 75mg daily and aspirin 81mg daily   10. Right upper lobe lung cancer s/p radiation therapy  11. Peripheral Vascular disease s/p stenting of right iliac, right common Femoral, right SFA and 3-4 stents in left leg in [place]    -- Outpatient opioid requirements prior to admission:   --Fentanyl 25 mcg/hr, 1 patch TD Q 48 hours (patient changes patch every 2-3 days in an inconsistent manner). Last filled 12/27/17 for 15 patches  --Oxycodone acetaminophen 10/325 mg tabs,  "Qty 90 tabs for 15 days supply. Takes on average 6 tablets per day     --Adjuvants: gabapentin 300 mg TID  --Others: alprazolam 0.5 mg TID PRN    Primary Care Provider: Willian Arringotn (Provider in [place])  Chronic Pain Provider: none, opioids prescribed by Dr. Arrington prior to admission    Interval History:  Boom Kahn was seen today (January 8, 2018) and he reports that he is disappointed in his pain management thus far because he was told on admission that he would be pain free.  We talked about realistic expectations related to pain control and that it would be unlikely the patient would be pain free.  Pain is located in the right leg (open wound is on the shin) and is described as burning, sharp and piercing.  The pain comes and goes, is made better by opioid medication, ice and lowering his leg, is made worse with walking and turning.  Patient ambulates to the bathroom and does walk around the nursing unit.  Reports poor sleep, has only been able to sleep for 2-3 hours per night for a couple of weeks.  Oral hydromorphone dosing was increased yesterday, patient reports feeling \"goofy\" with the dose increase yesterday, is tolerating better today.  Patient had issues with impacted stool yesterday, would recommend a scheduled bowel regimen post op.      CAPA (Clinically Aligned Pain Assessment):    Comfort (How is your pain?): Tolerable with discomfort  Change in Pain (Since your last medication/intervention?): About the same  Pain Control (How are your pain treatments working?):  Partially effective control  Functioning (Are you able to do activities to get better?) : Can do most things, but pain gets in the way of some   Sleep (Does your pain management allow you to sleep or rest?): Awake with pain most of night      FUNCTIONAL STATUS:  Change:      Staying the same, plan is for surgery tomorrow  Oral intake:     Regular  Activity level:     Ambulating in horta, up to bathroom  Mood:      Stable     -- " Inpatient Medications Related to Pain Management:   Medications related to Pain Management (Future)    Start     Dose/Rate Route Frequency Ordered Stop    01/07/18 2130  bisacodyl (DULCOLAX) Suppository 10 mg      10 mg Rectal DAILY PRN 01/07/18 2130      01/07/18 0934  HYDROmorphone (DILAUDID) tablet 6-8 mg      6-8 mg Oral EVERY 3 HOURS PRN 01/07/18 0945      01/07/18 0800  HYDROmorphone (PF) (DILAUDID) injection 0.3-0.5 mg      0.3-0.5 mg Intravenous EVERY 6 HOURS PRN 01/07/18 0754      01/06/18 1400  ketamine (KETALAR) 5%-gabapentin (NEURONTIN) 8%-lidocaine 2.5% topical PLO cream       Topical 3 TIMES DAILY 01/06/18 1353      01/05/18 1945  aspirin EC EC tablet 81 mg      81 mg Oral DAILY 01/05/18 1933      01/05/18 1933  lidocaine 1 % 1 mL      1 mL Other EVERY 1 HOUR PRN 01/05/18 1933      01/05/18 1933  lidocaine (LMX4) kit       Topical EVERY 1 HOUR PRN 01/05/18 1933      01/04/18 2000  gabapentin (NEURONTIN) capsule 300 mg      300 mg Oral 3 TIMES DAILY 01/04/18 1745      01/04/18 2000  acetaminophen (TYLENOL) tablet 975 mg      975 mg Oral 4 TIMES DAILY 01/04/18 1758      01/04/18 0047  diclofenac (VOLTAREN) 1 % topical gel 2 g      2 g Topical 4 TIMES DAILY PRN 01/04/18 0047      01/04/18 0044  LORazepam (ATIVAN) tablet 1-4 mg      1-4 mg Oral EVERY 30 MIN PRN 01/04/18 0044      01/03/18 2300  fentaNYL (DURAGESIC) 25 mcg/hr 72 hr patch 1 patch      25 mcg Transdermal EVERY 72 HOURS 01/03/18 2253      01/03/18 2253  senna-docusate (SENOKOT-S;PERICOLACE) 8.6-50 MG per tablet 1 tablet      1 tablet Oral 2 TIMES DAILY PRN 01/03/18 2253      01/03/18 2253  senna-docusate (SENOKOT-S;PERICOLACE) 8.6-50 MG per tablet 2 tablet      2 tablet Oral 2 TIMES DAILY PRN 01/03/18 8468            LAB DATA:    Recent Labs  Lab 01/08/18  0739 01/07/18  0624 01/06/18  0628  01/03/18  1647   CR 0.68 0.53* 0.66  < > 0.75   WBC 11.8* 9.3 8.4  < > 10.2   HGB 13.3 13.4 12.7*  < > 14.3   AST  --   --   --   --  21   ALT  --   --    --   --  37   < > = values in this interval not displayed.      ----------------------------------------------------------------------------------  Joann Gant PharmD, MS  Inpatient Pain Service     To reach us:  Mon - Friday 8 AM - 3 PM: Pager [phone] (Text Page)  After hours, weekends and holidays: Primary service should call [phone] for the on-call pain specialist    Helpful Resources:  Getting Rid of Unwanted Medications (printable PDF for patients)   Opioid Overdose Prevention Toolkit (printable PDF for patients)   Prescription Opioids: What You Need To Know (printable PDF for patients)

## 2018-01-08 NOTE — PLAN OF CARE
Problem: Patient Care Overview  Goal: Plan of Care/Patient Progress Review  PT/5B: Attempted to see pt in PM, family member present stating pt fatigued, wanting to rest. Cancel therapy

## 2018-01-08 NOTE — PROGRESS NOTES
Patient with insurance issues unable to perform surgery tomorrow.  Otherwise no acute events, patient has significant concerns about post operative pain management.    B/P: 153/90, T: 99.6, P: 83, R: 16  Alert oriented no acute distress  Lower extremity wounds stable, mild edema    WBC   Date Value Ref Range Status   01/08/2018 11.8 (H) 4.0 - 11.0 10e9/L Final   ]  Hemoglobin   Date Value Ref Range Status   01/08/2018 13.3 13.3 - 17.7 g/dL Final   ]  INR/Prothrombin Time  Creatinine   Date Value Ref Range Status   01/07/2018 0.53 (L) 0.66 - 1.25 mg/dL Final   ]      Intake/Output Summary (Last 24 hours) at 01/08/18 0817  Last data filed at 01/07/18 1840   Gross per 24 hour   Intake              960 ml   Output              780 ml   Net              180 ml       Assessment/Plan:  66 yo male with bilateral lower extremity wounds, right critical limb ischemia     Plan for right femoral endarterectomy with femoral to posterior tibial bypass with in situ greater saphenous vein once insurance approves.  Please keep right leg wounds dry, no application of topical agents.  Left shin wet to dry dressing as needed.  Ambulate as able  Continue aspirin and plavix, do not stop for surgery.  Pain management, appreciate pain consult team involvement.    Leandra Marques MD  Vascular Surgery Fellow  Pager [phone]

## 2018-01-08 NOTE — PROGRESS NOTES
Care Coordinator Progress Note     Admission Date/Time:  1/3/2018  Attending MD:  Blue Vail MD     Data  Chart reviewed, discussed with interdisciplinary team.   Patient was admitted for:    Pain of right lower leg  Stenosis of femoral artery (H)  Unstable angina (H)  PVD (peripheral vascular disease) (H).    Concerns with insurance coverage for discharge needs: family is not certain if hospital is covered here.  Son has called insurance x 2 and received 2 different answeres (no coverage or out of network).  Current Living Situation: Patient lives alone and here to stay with son until he can get back home to [place].  Support System: Supportive and Involved  Services Involved: none  Transportation: Family or Friend will provide  Barriers to Discharge: chronically ill and pending medical clearance    Coordination of Care and Referrals: Provided patient/family with options for Assisted Living, Home Care and Skilled Nursing Facility, TCU.        Assessment  Spoke with son at bedside.  Patient kept sleeping.  Patient still has his home in [place] and plans to return there after his medical issues are resolved here.  Son states patient was supposed to be staying with him, but the house is already full with family.  Son was hoping to be able to get patient into an [facility] or have TCU stay prior to going back home.  Discussed HHC as well as private pay PCA services.  Family all work during the day and patient would be alone if home with assist is recommended.  Gave list of [facility] private pay costs and [employer] son is going to contact to help with finding [facility].  Family plans to provide transportation upon discharge if he is able to get in and out of a car.  Patient will have right femoral endartectomy tomorrow.  Await PT/OT recommendations.  Also called Financial counselors to talk with son regarding out of state insurance questions.     Plan  Anticipated Discharge Date:  TBD  Anticipated Discharge Plan:   TBD    Santa Ames RN, BSN  Care Coordinator [facility]  Pager: [phone]  Phone: [phone]

## 2018-01-08 NOTE — PROGRESS NOTES
[facility], [place]   Hospitalist Daily Progress Note                                                 Date of Admission:1/3/2018  ___________________________________  INTERVAL HISTORY (24 Hrs)/SUBJECTIVE:   Last 24 hr events, care team notes reviewed.     S/p Coronary Angiography & Successful deployment of a 3.0 x 28 mm drug eluting stent to the proximal LCX and inflated with 3.5 mm NC balloon    Vascular planning for right femoral endarterectomy with femoral to posterior tibial bypass with in situ greater saphenous vein.  18                                   Patient pain better controlled  Constipation++    No fever or chills  No cp or sob  No LH or dizziness.   No NV  No other concern.       ROS: 4 point ROS neg other than the symptoms noted above in the interval history.    OBJECTIVE :   VITALS:    Temp:  [98.3  F (36.8  C)-99.6  F (37.6  C)] 98.3  F (36.8  C)  Pulse:  [60-83] 83  Heart Rate:  [90] 90  Resp:  [16-18] 18  BP: ()/(41-90) 95/47  SpO2:  [99 %-100 %] 100 %     Temp (24hrs), Av  F (37.2  C), Min:98.3  F (36.8  C), Max:99.6  F (37.6  C)        Wt Readings from Last 5 Encounters:   18 66.2 kg (145 lb 15.1 oz)       Intake/Output Summary (Last 24 hours) at 18 1527  Last data filed at 18 1400   Gross per 24 hour   Intake              240 ml   Output              880 ml   Net             -640 ml         PHYSICAL EXAM:  General: alert, interactive, NAD  HEENT: AT/NC, Moist MM  Respi/Chest: Non labored. Clear BL  CVS/Heart: S1S2 regular,   Gi/Abd: Soft, non tender, non distended,   MSK/Ext: ulcers both shin wound. r shin w dry black escar w mild erythema around. Chronic changes from PAD. Distal pulses not palpable  Neuro: AO x 4, Grossly intact.     Medications:   I have reviewed this patient's current medications.      Data:   All laboratory and imaging data in the past 24 hours reviewed:    LABS:  CMP    Recent Labs  Lab 18  0762  01/07/18  0624 01/06/18  0628 01/05/18  0643  01/03/18  1647    137 141 140  < > 136   POTASSIUM 4.0 3.7 4.0 3.8  < > 4.0   CHLORIDE 104 106 110* 107  < > 104   CO2 27 25 24 25  < > 27   ANIONGAP 7 6 7 7  < > 5   GLC 89 78 79 80  < > 81   BUN 7 7 8 9  < > 12   CR 0.68 0.53* 0.66 0.68  < > 0.75   GFRESTIMATED >90 >90 >90 >90  < > >90   GFRESTBLACK >90 >90 >90 >90  < > >90   SUMAN 8.9 9.0 8.3* 8.5  < > 9.2   MAG  --   --   --  2.2  --   --    PROTTOTAL  --   --   --   --   --  6.7*   ALBUMIN  --   --   --   --   --  3.5   BILITOTAL  --   --   --   --   --  0.7   ALKPHOS  --   --   --   --   --  58   AST  --   --   --   --   --  21   ALT  --   --   --   --   --  37   < > = values in this interval not displayed.  CBC    Recent Labs  Lab 01/08/18  0739 01/07/18  0624 01/06/18  0628 01/05/18  0643   WBC 11.8* 9.3 8.4 9.5   RBC 4.03* 4.10* 3.85* 4.29*   HGB 13.3 13.4 12.7* 14.2   HCT 40.4 41.1 38.8* 43.0    100 101* 100   MCH 33.0 32.7 33.0 33.1*   MCHC 32.9 32.6 32.7 33.0   RDW 13.7 13.6 13.8 13.9    249 236 273     INR    Recent Labs  Lab 01/04/18  0600 01/03/18  1647   INR 1.01 0.99       Echo Complete: 1/4/2018    Ischemic CM. Severely reduced LV systoilc function. Biplane LVEF measured at  20%. Severe left ventricular dilation is present. Akinesis of the basal to mid  inferior, inferolateral, and lateral segments noted.  The right ventricle is normal size. Global right ventricular function is  normal.  Pulmonary artery systolic pressure cannot be assessed.  No pericardial effusion is present.  The inferior vena cava was normal in size with preserved respiratory  variability.  Previous study not available for comparison.      US LE Venous duplex: No DVT    IR Lower Extremity Angiogram 01/04/18    Right:  1. Right lower extremity angiogram shows totally occluded mid SFA to  above-the-knee popliteal stents, short segment below the knee  popliteal occlusion, and an occluded anterior tibial artery.  2.  Angiogram also demonstrates a severe (80-90%) short segment  shelflike right CFA stenosis. Therefore, recanalization of the chronic  total occlusion was not performed.  3. Patent upper SFA and iliac stents.    Left:  1. 13 cm chronic total occlusion of the mid SFA.  2. Two-vessel runoff with total occlusion of the TEDDY.  3. Short segment narrowing of the TP trunk.  4. Patent iliac stents with less severe short segment common femoral  disease than on the right.    01/05/18  Coronary Angiography:  SUMMARY:   Severe 3 vessel CAD  100% stenosis of the RCA  100% occlusion of the proximal LAD   95% stenosis of the proximal circumflex artery  Patent LIMA to LAD   Patent SVG to RPDA     Successful deployment of a 3.0 x 28 mm drug eluting stent to the proximal LCX and inflated with 3.5 mm NC balloon                                           ASSESSMENT & PLAN :    Boom Kahn is a 67 year old male with a past medical history significant for HTN, anxiety/depression, alcohol abuse, tobacco abuse, ICM/MI, CAD s/p 3v CABG and PCI, HFrEF (last EF 25-30%, approximately 6 months ago) s/p ICD, , R lung cancer s/p radiation therapy,  peripheral vascular disease s/p multiple stents, and chronic pain who presented c/o of worsening pain in his lower extremities and right shin wound.      # Severe Peripheral Artery Disease -   Critical limb ischemia R>L with bilateral LE wounds; Acute on Chronic pain    Extensive hx of PAD w/multiple stents, current smoker, EtOH abuse, pain at rest, minimal exercise tolerance, no hx of infection. U/S demonstrating b/l significant calcifications with minimal flow distal to thigh.  X-ray without evidence of osteo-myelitis.  Exam notable for bilateral wounds, erythema consistent with vascular changes, loss of hair.     IR angio LE done, see above.     Vascular surgery on board. Following.   Cardiology consulted for Pre op evaluation. Under Coronary Angio and AYDE stenting as above. Carotid US bl done.    Final revascularization plan per Vascular team. Likely -right femoral endarterectomy with femoral to posterior tibial bypass with in situ greater saphenous vein.  01/09/18     - Wound care per WOCN, Vascular.   - no antibiotics for now.   - Continue antiplatelet agents. Statin.   - continue current prednisone 10mg daily    * Further per Vascular team  Vascular team ordering Pre op Orders  See Cardiology note for Pre op eval  Continue aspirin and plavix  Hold acei am of the procedure  Holding diuretics    >>Pain control: acute on Chronic pain.   Appreciate pain team recommendations.     1/6/2018  Will switch iv hydromorphone to PO per Pain team recs 01/05. Plus couple iv hydromorphone for breakthrough.   Topical gabapentin 8%, ketamine 5%, lidocaine 2.5% in PLO gel TID  Gabapentin 300 tid, On 1/7, increase gabapentin to 300 mg Q AM and Q PM, 600 mg Q HS  Continue fentanyl patch 25 mcg/hr   Continue acetaminophen 975 qid    Fu Pain team PRN     01/7/2018  Pain uncontrolled.   Increase hydromorphone 6-8 Q 3 hr prn.   Continue iv hydromorphone for breakthrough Q 6 hour prn 0.3-0.5  Rest as above      1/8/2018  Pain medicine adjusted per pain team recs  Fu post op pain recs  Bowel regimen-escalated    # HFrEF (EF reported 25-30%)  # CAD s/p CABGx3 and PCIs  # ICM hx of MI (2008)  # HTN  Clinically stable. Asymptomatic.   Angio and AYDE 01/05 as above.   ECHO as above- reviewed.     Per Cardiology:   - Continue ASA and clopidogrel for 12 months  - Continue Carvedilol, Rosuvastatin, Ezetimibe, Isosorbide dinitrate, enalapril  - Holding diuretics for soft bp and cath 01/05 and now procedure 01/09/18     - 2L fluid/2gm Na restriction   - strict I/O and daily weights    1/7/2018  Furosemide 20 mg po x1     FU Cardiology PRN     # EtOH Dependence  Patient reports 8-10 beers per day, with maximum of 2 days w/o drinking and no hx of withdrawal. States that recently has been cutting back in the setting of worsening PVD to 2-3  beers per day. Last drink was Tuesday per pt.   - MSSA protocol w/ativan : no e/o withdrawal at current.  - MVI/thiamine/folate   - consider chem dep consult if pt willing     1/8/2018  [facility]    # Tobacco Dependence   50 pack year hx, last cigarette this AM (1/4). Patient endorses desire to quit smoking.  - nicotine patch 7mg  - Smoking Cessation Program IP consult  - consider nicotine lozenges/gum if needed      # Restrictive Airway Disease   ? COPD, given smoking hx. Patient denies any hx of COPD.    Patient denies any symptoms of SOB or cough at current.    - DuoNebs x4 hr prn  - outpatient PFT  - smoking cessation     # Malignancy of Lung, Right Upper Lobe    Known malignancy, patient unable to tell specific diagnosis. States that cancer responded with radiation.   F/u PET scan due in February   - f/u outpatient      # Depression   Patient reports a hx of depression and reports depression and axiety  - holding home alprazolam 0.5 PRN  - home ecitalopram 20mg opd         CODE: Full  DVT: On Plavix and ASA  FEN: Cardiac Diet 2L fluid/2gm Na restriction. Npo past MN for the procedure tomorrow.       Dispo: Disposition Plan   Expected discharge in several days ->TBD, pending clinical course.      Entered: Blue Vail 01/08/2018, 3:23 PM         Plan discussed with patient, RN, Sw, CC        Blue Vail MD   Hospitalist (Internal Medicine)  Pager: [phone].

## 2018-01-09 LAB
ABO + RH BLD: NORMAL
ABO + RH BLD: NORMAL
ANION GAP SERPL CALCULATED.3IONS-SCNC: 8 MMOL/L (ref 3–14)
BUN SERPL-MCNC: 11 MG/DL (ref 7–30)
CALCIUM SERPL-MCNC: 8.6 MG/DL (ref 8.5–10.1)
CHLORIDE SERPL-SCNC: 103 MMOL/L (ref 94–109)
CO2 SERPL-SCNC: 25 MMOL/L (ref 20–32)
CREAT SERPL-MCNC: 0.57 MG/DL (ref 0.66–1.25)
ERYTHROCYTE [DISTWIDTH] IN BLOOD BY AUTOMATED COUNT: 14 % (ref 10–15)
GFR SERPL CREATININE-BSD FRML MDRD: >90 ML/MIN/1.7M2
GLUCOSE SERPL-MCNC: 108 MG/DL (ref 70–99)
HCT VFR BLD AUTO: 38.8 % (ref 40–53)
HGB BLD-MCNC: 12.8 G/DL (ref 13.3–17.7)
INR PPP: 1.05 (ref 0.86–1.14)
MAGNESIUM SERPL-MCNC: 2.2 MG/DL (ref 1.6–2.3)
MCH RBC QN AUTO: 33 PG (ref 26.5–33)
MCHC RBC AUTO-ENTMCNC: 33 G/DL (ref 31.5–36.5)
MCV RBC AUTO: 100 FL (ref 78–100)
PLATELET # BLD AUTO: 204 10E9/L (ref 150–450)
POTASSIUM SERPL-SCNC: 3.6 MMOL/L (ref 3.4–5.3)
RBC # BLD AUTO: 3.88 10E12/L (ref 4.4–5.9)
SODIUM SERPL-SCNC: 136 MMOL/L (ref 133–144)
SPECIMEN EXP DATE BLD: NORMAL
WBC # BLD AUTO: 8.5 10E9/L (ref 4–11)

## 2018-01-09 PROCEDURE — 25000132 ZZH RX MED GY IP 250 OP 250 PS 637: Performed by: STUDENT IN AN ORGANIZED HEALTH CARE EDUCATION/TRAINING PROGRAM

## 2018-01-09 PROCEDURE — 25000132 ZZH RX MED GY IP 250 OP 250 PS 637: Performed by: INTERNAL MEDICINE

## 2018-01-09 PROCEDURE — 25000132 ZZH RX MED GY IP 250 OP 250 PS 637: Performed by: NURSE PRACTITIONER

## 2018-01-09 PROCEDURE — 25000132 ZZH RX MED GY IP 250 OP 250 PS 637: Performed by: CLINICAL NURSE SPECIALIST

## 2018-01-09 PROCEDURE — 36415 COLL VENOUS BLD VENIPUNCTURE: CPT | Performed by: INTERNAL MEDICINE

## 2018-01-09 PROCEDURE — 83735 ASSAY OF MAGNESIUM: CPT | Performed by: INTERNAL MEDICINE

## 2018-01-09 PROCEDURE — 25000128 H RX IP 250 OP 636: Performed by: SURGERY

## 2018-01-09 PROCEDURE — 99233 SBSQ HOSP IP/OBS HIGH 50: CPT | Performed by: INTERNAL MEDICINE

## 2018-01-09 PROCEDURE — 85610 PROTHROMBIN TIME: CPT | Performed by: INTERNAL MEDICINE

## 2018-01-09 PROCEDURE — 80048 BASIC METABOLIC PNL TOTAL CA: CPT | Performed by: INTERNAL MEDICINE

## 2018-01-09 PROCEDURE — 86900 BLOOD TYPING SEROLOGIC ABO: CPT | Performed by: INTERNAL MEDICINE

## 2018-01-09 PROCEDURE — 85027 COMPLETE CBC AUTOMATED: CPT | Performed by: INTERNAL MEDICINE

## 2018-01-09 PROCEDURE — 12000008 ZZH R&B INTERMEDIATE UMMC

## 2018-01-09 PROCEDURE — 99207 ZZC NO CHARGE FOLLOW UP PS: CPT

## 2018-01-09 PROCEDURE — 93010 ELECTROCARDIOGRAM REPORT: CPT | Performed by: INTERNAL MEDICINE

## 2018-01-09 PROCEDURE — 93005 ELECTROCARDIOGRAM TRACING: CPT

## 2018-01-09 PROCEDURE — 25000125 ZZHC RX 250: Performed by: STUDENT IN AN ORGANIZED HEALTH CARE EDUCATION/TRAINING PROGRAM

## 2018-01-09 PROCEDURE — 86901 BLOOD TYPING SEROLOGIC RH(D): CPT | Performed by: INTERNAL MEDICINE

## 2018-01-09 RX ORDER — POTASSIUM CHLORIDE 750 MG/1
20-40 TABLET, EXTENDED RELEASE ORAL
Status: DISCONTINUED | OUTPATIENT
Start: 2018-01-09 | End: 2018-01-16

## 2018-01-09 RX ORDER — POTASSIUM CHLORIDE 1.5 G/1.58G
20-40 POWDER, FOR SOLUTION ORAL
Status: DISCONTINUED | OUTPATIENT
Start: 2018-01-09 | End: 2018-01-16

## 2018-01-09 RX ORDER — POTASSIUM CHLORIDE 7.45 MG/ML
10 INJECTION INTRAVENOUS
Status: DISCONTINUED | OUTPATIENT
Start: 2018-01-09 | End: 2018-01-16

## 2018-01-09 RX ORDER — MAGNESIUM SULFATE HEPTAHYDRATE 40 MG/ML
4 INJECTION, SOLUTION INTRAVENOUS EVERY 4 HOURS PRN
Status: DISCONTINUED | OUTPATIENT
Start: 2018-01-09 | End: 2018-01-16

## 2018-01-09 RX ORDER — POTASSIUM CL/LIDO/0.9 % NACL 10MEQ/0.1L
10 INTRAVENOUS SOLUTION, PIGGYBACK (ML) INTRAVENOUS
Status: DISCONTINUED | OUTPATIENT
Start: 2018-01-09 | End: 2018-01-16

## 2018-01-09 RX ORDER — POTASSIUM CHLORIDE 29.8 MG/ML
20 INJECTION INTRAVENOUS
Status: DISCONTINUED | OUTPATIENT
Start: 2018-01-09 | End: 2018-01-16

## 2018-01-09 RX ADMIN — ACETAMINOPHEN 325 MG: 325 TABLET, FILM COATED ORAL at 03:22

## 2018-01-09 RX ADMIN — HYDROMORPHONE HYDROCHLORIDE 8 MG: 2 TABLET ORAL at 03:22

## 2018-01-09 RX ADMIN — HYDROMORPHONE HYDROCHLORIDE 8 MG: 2 TABLET ORAL at 10:39

## 2018-01-09 RX ADMIN — GABAPENTIN 300 MG: 300 CAPSULE ORAL at 08:32

## 2018-01-09 RX ADMIN — HYDROMORPHONE HYDROCHLORIDE 0.5 MG: 1 INJECTION, SOLUTION INTRAMUSCULAR; INTRAVENOUS; SUBCUTANEOUS at 08:56

## 2018-01-09 RX ADMIN — HYDROMORPHONE HYDROCHLORIDE 0.5 MG: 1 INJECTION, SOLUTION INTRAMUSCULAR; INTRAVENOUS; SUBCUTANEOUS at 16:37

## 2018-01-09 RX ADMIN — HYDROMORPHONE HYDROCHLORIDE 8 MG: 2 TABLET ORAL at 06:25

## 2018-01-09 RX ADMIN — GABAPENTIN 300 MG: 300 CAPSULE ORAL at 15:00

## 2018-01-09 RX ADMIN — HYDROMORPHONE HYDROCHLORIDE 8 MG: 2 TABLET ORAL at 14:56

## 2018-01-09 RX ADMIN — CARVEDILOL 6.25 MG: 6.25 TABLET, FILM COATED ORAL at 08:33

## 2018-01-09 RX ADMIN — FOLIC ACID 1 MG: 1 TABLET ORAL at 08:33

## 2018-01-09 RX ADMIN — ISOSORBIDE MONONITRATE 60 MG: 60 TABLET, EXTENDED RELEASE ORAL at 08:32

## 2018-01-09 RX ADMIN — HYDROMORPHONE HYDROCHLORIDE 8 MG: 2 TABLET ORAL at 18:19

## 2018-01-09 RX ADMIN — HYDROMORPHONE HYDROCHLORIDE 8 MG: 2 TABLET ORAL at 21:10

## 2018-01-09 RX ADMIN — PREDNISONE 10 MG: 10 TABLET ORAL at 08:32

## 2018-01-09 RX ADMIN — CLOPIDOGREL 75 MG: 75 TABLET, FILM COATED ORAL at 20:18

## 2018-01-09 RX ADMIN — GABAPENTIN 600 MG: 600 TABLET, FILM COATED ORAL at 20:18

## 2018-01-09 RX ADMIN — CARVEDILOL 6.25 MG: 6.25 TABLET, FILM COATED ORAL at 18:50

## 2018-01-09 RX ADMIN — FENTANYL 1 PATCH: 25 PATCH, EXTENDED RELEASE TRANSDERMAL at 22:37

## 2018-01-09 RX ADMIN — EZETIMIBE 10 MG: 10 TABLET ORAL at 21:10

## 2018-01-09 RX ADMIN — MULTIPLE VITAMINS W/ MINERALS TAB 1 TABLET: TAB at 08:33

## 2018-01-09 RX ADMIN — ROSUVASTATIN CALCIUM 20 MG: 20 TABLET, FILM COATED ORAL at 21:10

## 2018-01-09 RX ADMIN — HYDROMORPHONE HYDROCHLORIDE 8 MG: 2 TABLET ORAL at 00:34

## 2018-01-09 RX ADMIN — ESCITALOPRAM OXALATE 20 MG: 20 TABLET ORAL at 08:32

## 2018-01-09 RX ADMIN — NICOTINE 1 PATCH: 7 PATCH, EXTENDED RELEASE TRANSDERMAL at 22:43

## 2018-01-09 RX ADMIN — ASPIRIN 81 MG: 81 TABLET, COATED ORAL at 08:33

## 2018-01-09 ASSESSMENT — PAIN DESCRIPTION - DESCRIPTORS
DESCRIPTORS: ACHING
DESCRIPTORS: ACHING

## 2018-01-09 NOTE — PLAN OF CARE
Problem: Patient Care Overview  Goal: Plan of Care/Patient Progress Review  Outcome: No Change  Pt alert and oriented. Vitals stable on ra. However writer noticed that pt is becoming increasingly more forgetful. Son has reported that pt at times would be asking random questions, hallucinating and thinking that he was at home. Dr. Snowden notified and advised to taper off dilaudid. Telemetry indicates paced rhythm with occasional PVCs; electrolyte replacement protocol ordered and EKG completed and pending. Up with walker. PIV SL. C/o of moderate-severe pain; prn dilaudid administered. Wound cares to left LE and right toes completed per poc. Care coordinator paged and communicated with son regarding insurance coverage for procedure tomorrow; procedure plans pending.  R: Continue to monitor and implement poc; taper dilaudid and place pt on BA when son leaves.

## 2018-01-09 NOTE — PLAN OF CARE
Problem: Pain, Chronic (Adult)  Intervention: Manage Persistent Pain Analgesia  Pt's AVSS. Bp 105/57. Tele with trigeminy. Pt asymptomatic. C/O pain in lower extremities requesting for dilaudid Q3H. Scheduled tylenol 325 mg PO given per pt's request, order is for 650 mg. Dressing intact on left shin. Wound on right lower leg open to air per order. Pt requesting to restart the prn ketalamine, neurontin, lidocaine topical cream. Med on hold right now. Order says hold until resumed. Surgery cancelled due to insurance issues. Pt wants to see primary team in AM regarding insurance questions. New redness on back of hands and buttock. Encouraged pt to reposition frequently to prevent bed sore and to discuss with primary team about his new redness in his hands. Continue to monitor closely and follow POC.

## 2018-01-09 NOTE — PROGRESS NOTES
Patient: Boom Kahn  Date of Service: January 9, 2018 Admission Date:1/3/2018   * No surgery date entered *     Chief Pain Endorsement:  Right shin pain    Recommendations were discussed and relayed to DEDRA Ron (Vascular)  Plan was reviewed by the Inpatient Pain Service and staff attending, Dr. Felicia Price      1. Continue fentanyl patch 25 mcg/hr TD q72h.  2. Continue hydromorphone 6-8mg po q3h prn moderate to severe pain.    If patient would prefer to rotate back to oxycodone would suggest 10-15mg po q3h prn moderate to severe pain, discontinue prn oral hydromorphone  3. Continue hydromorphone 0.3-0.5mg IV q6h prn severe pain, use oral opioids first.  4. Increase nicotine patch to 14mg TD daily, patient reports smoking 0.5 ppd prior to admission.  5. Discontinue acetaminophen.  Will restart after surgery.  6. Continue gabapentin [id number].  7. Bowel regimen per primary team to prevent opioid induced constipation.    Pain Service will Continue to follow peripherally, but will not put notes in every day.     Thank you for consulting the Inpatient Pain Management Service. The above recommendations are to be acted upon at the primary team s discretion.     To reach us:  Mon - Friday 8 AM - 3 PM: Pager [phone] (Text Page)  After hours, weekends and holidays: Primary service should call [phone] for the on-call pain specialist    PAIN MEDICATION SAFE USE:   Prior to discharge instruct patient on the following in addition to the medication fact sheet:    Caution: these medications can cause sedation    Take prescription medicine only if it has been prescribed by your doctor    Do not take more medicine or take it more often than instructed     Call your doctor if pain gets worse    Never mix pain medicine with alcohol, sleeping pills, or any illicit drugs    Do not operate heavy machinery, including vehicles, when initiation opioid therapy or increasing dosage    Store prescription  opioids in a locked container, whenever possible     Dispose of unused opioids appropriately     Do not stop abruptly once at higher doses.  These medications must be tapered off.    Opioid pain medications do carry the risk for physical dependence and addiction and patients should be counseled about this.         1. Acute pain in lower extremities associated with B/L ulcers anterior tibia due to PVD - XR negative for osteomyelitis  2. Chronic pain syndrome  3. Pt is opiate tolerant  4. Tobacco abuse 0.5-1.0PPD for 50yrs  5. ETOH abuse - on [facility] protocol  6. Hypertension  7. Ischemic cardiomyopathy (MI 2008), Congestive heart failure (EF ~25%)  8. Coronary Artery Disease s/p 3 vessel CABG 1999 with multiple stents and ICD placed  9. Chronic anticoagulation on plavix 75mg daily and aspirin 81mg daily   10. Right upper lobe lung cancer s/p radiation therapy  11. Peripheral Vascular disease s/p stenting of right iliac, right common Femoral, right SFA and 3-4 stents in left leg in [place]    -- Outpatient opioid requirements prior to admission:   --Fentanyl 25 mcg/hr, 1 patch TD Q 48 hours (patient changes patch every 2-3 days in an inconsistent manner). Last filled 12/27/17 for 15 patches  --Oxycodone acetaminophen 10/325 mg tabs, Qty 90 tabs for 15 days supply. Takes on average 6 tablets per day      --Adjuvants: gabapentin 300 mg TID  --Others: alprazolam 0.5 mg TID PRN    Primary Care Provider: Willian Arrington  Chronic Pain Provider: none, opioids prescribed by Dr. Arrington prior to admission    Interval History:  Boom Kahn was seen today (January 9, 2018) and he reports that his surgery was postponed due to insurance issues.  States his left leg pain is under good control, right leg (shin) pain is terrible, patient wondering if he has some infection in the right foot.  Patient also c/o rash on the tops of his fingers, thighs and buttocks.  Patient appears comfortable sitting in bed, does not appear sedated.   States pain is worse in the late evening when he has episodes of twitching.  Patient was thinking he was having withdrawal from opioids and has symptoms of twitching, headache and confusion (likely not due to opioid withdrawal).  Son present in room, helped to clarify the symptoms could also be from nicotine withdrawal or alcohol withdrawal.  Shared with son and patient that current oral hydromorphone dose is equivalent to double the home oxycodone dose so opioid withdrawal is unlikely. Patient was requesting to have both oral oxycodone and hydromorphone available, explained that we maximize one short acting opioid then would move to the next.  Patient can continue oral hydromorphone or start oxycodone but cannot do both at the same time.  Post operative plan is for the patient is to stay in the [place] area temporarily while he recovers and then he may return to home in [place] with some additional home services.  When patient gets closer to discharge will need a plan for prescribing of chronic opioids as an outpatient in [place].    CAPA (Clinically Aligned Pain Assessment):    Comfort (How is your pain?): Tolerable with discomfort  Change in Pain (Since your last medication/intervention?): About the same  Pain Control (How are your pain treatments working?):  Partially effective control  Functioning (Are you able to do activities to get better?) : Pain keeps me from doing most of what I need to do  Sleep (Does your pain management allow you to sleep or rest?): Awake with pain most of night      FUNCTIONAL STATUS:  Change:      Staying the same  Oral intake:     Regular  Activity level:     Ambulating in horta  Mood:      Stable     -- Inpatient Medications Related to Pain Management:   Medications related to Pain Management (Future)    Start     Dose/Rate Route Frequency Ordered Stop    01/08/18 2100  gabapentin (NEURONTIN) tablet 600 mg      600 mg Oral AT BEDTIME 01/08/18 1525      01/08/18 2006  acetaminophen  (TYLENOL) tablet 650 mg      650 mg Oral EVERY 8 HOURS 01/08/18 1345      01/08/18 2000  senna-docusate (SENOKOT-S;PERICOLACE) 8.6-50 MG per tablet 2 tablet      2 tablet Oral 2 TIMES DAILY 01/08/18 1534      01/08/18 1600  gabapentin (NEURONTIN) capsule 300 mg      300 mg Oral 2 TIMES DAILY 01/08/18 1525      01/08/18 1545  polyethylene glycol (MIRALAX/GLYCOLAX) Packet 17 g      17 g Oral DAILY 01/08/18 1534      01/08/18 1530  [Rx hold ]  ketamine (KETALAR) 5%-gabapentin (NEURONTIN) 8%-lidocaine 2.5% topical PLO cream     (Rx hold  since 01/08/18 1552)     Topical HOLD 01/08/18 1524      01/07/18 2130  bisacodyl (DULCOLAX) Suppository 10 mg      10 mg Rectal DAILY PRN 01/07/18 2130      01/07/18 0934  HYDROmorphone (DILAUDID) tablet 6-8 mg      6-8 mg Oral EVERY 3 HOURS PRN 01/07/18 0945      01/07/18 0800  HYDROmorphone (PF) (DILAUDID) injection 0.3-0.5 mg      0.3-0.5 mg Intravenous EVERY 6 HOURS PRN 01/07/18 0754      01/05/18 1945  aspirin EC EC tablet 81 mg      81 mg Oral DAILY 01/05/18 1933      01/05/18 1933  lidocaine 1 % 1 mL      1 mL Other EVERY 1 HOUR PRN 01/05/18 1933      01/05/18 1933  lidocaine (LMX4) kit       Topical EVERY 1 HOUR PRN 01/05/18 1933      01/03/18 2300  fentaNYL (DURAGESIC) 25 mcg/hr 72 hr patch 1 patch      25 mcg Transdermal EVERY 72 HOURS 01/03/18 2253      01/03/18 2253  senna-docusate (SENOKOT-S;PERICOLACE) 8.6-50 MG per tablet 1 tablet      1 tablet Oral 2 TIMES DAILY PRN 01/03/18 2253      01/03/18 2253  senna-docusate (SENOKOT-S;PERICOLACE) 8.6-50 MG per tablet 2 tablet      2 tablet Oral 2 TIMES DAILY PRN 01/03/18 2253            LAB DATA:    Recent Labs  Lab 01/09/18  0610 01/08/18  0739 01/07/18  0624  01/03/18  3937   CR 0.57* 0.68 0.53*  < > 0.75   WBC 8.5 11.8* 9.3  < > 10.2   HGB 12.8* 13.3 13.4  < > 14.3   AST  --   --   --   --  21   ALT  --   --   --   --  37   < > = values in this interval not  displayed.      ----------------------------------------------------------------------------------  Joann Gant PharmD, MS  Inpatient Pain Service     To reach us:  Mon - Friday 8 AM - 3 PM: Pager [phone] (Text Page)  After hours, weekends and holidays: Primary service should call [phone] for the on-call pain specialist    Helpful Resources:  Getting Rid of Unwanted Medications (printable PDF for patients)   Opioid Overdose Prevention Toolkit (printable PDF for patients)   Prescription Opioids: What You Need To Know (printable PDF for patients)

## 2018-01-09 NOTE — PROGRESS NOTES
[facility], [place]    Internal Medicine Progress Note - Gold Service      Assessment & Plan   Boom Kahn is a 67 year old male with a past medical history significant for HTN, anxiety/depression, alcohol abuse, tobacco abuse, ICM/MI, CAD s/p 3v CABG and PCI, HFrEF (last EF 25-30%, approximately 6 months ago) s/p ICD, , R lung cancer s/p radiation therapy,  peripheral vascular disease s/p multiple stents, and chronic pain who presented c/o of worsening pain in his lower extremities and right shin wound.       # Severe Peripheral Artery Disease -   Critical limb ischemia R>L with bilateral LE wounds; Acute on Chronic pain  U/S demonstrating b/l significant calcifications with minimal flow distal to thigh.  X-ray without evidence of osteo-myelitis.  Exam notable for bilateral wounds, erythema consistent with vascular changes, loss of hair.   Vascular surgery on board. Following.   Cardiology consulted for Pre op evaluation. Under Coronary Angio and AYDE stenting as above. Carotid US bl done.   Final revascularization plan per Vascular team. Likely -right femoral endarterectomy with femoral to posterior tibial bypass with in situ greater saphenous vein.  cancelled 01/09/18 pending insurance approval   - Wound care per WOCN, Vascular.   - Continue antiplatelet agents. Statin.   - continue current prednisone 10mg daily  - Continue aspirin and plavix  Hold acei am of the procedure  Holding diuretics     >>Pain control: acute on Chronic pain.   Appreciate pain team recommendations.      Topical gabapentin 8%, ketamine 5%, lidocaine 2.5% in PLO gel TID  Gabapentin 300 tid, On 1/7, increase gabapentin to 300 mg Q AM and Q PM, 600 mg Q HS  Continue fentanyl patch 25 mcg/hr    acetaminophen 975 qid- discontinued 1/9     *01/09-  redness over hands and back of knee- nearly resolved. No urticaria. Denies any new med/ cosmetic.    - if worsens may try benadryl (cautious for sedation)     # HFrEF (EF  reported 25-30%)  # CAD s/p CABGx3 and PCIs  # ICM hx of MI (2008)  # HTN  Clinically stable. Asymptomatic.   Angio and AYDE 01/05 as above.   ECHO as above- reviewed.      Per Cardiology:   - Continue ASA and clopidogrel for 12 months  - Continue Carvedilol, Rosuvastatin, Ezetimibe, Isosorbide dinitrate, enalapril  - Holding diuretics     - 2L fluid/2gm Na restriction   - strict I/O and daily weights     1/9 - D.W Cardiology: has frequent PVC.       # EtOH Dependence-Patient reports 8-10 beers per day  - s/p MSSA protocol w/ativan : no e/o withdrawal at current.  - MVI/thiamine/folate   - consider chem dep consult if pt willing    # Tobacco Dependence - nicotine patch 7mg  - Smoking Cessation Program IP consult      # Restrictive Airway Disease   ? COPD, given smoking hx. Patient denies any hx of COPD.   - DuoNebs x4 hr prn  - outpatient PFT  - smoking cessation      # Malignancy of Lung, Right Upper Lobe    Known malignancy, patient unable to tell specific diagnosis. States that cancer responded with radiation.   F/u PET scan due in February   - f/u outpatient       # Depression   Patient reports a hx of depression and reports depression and axiety  - holding home alprazolam 0.5 PRN  - home ecitalopram 20mg opd        CODE: Full  DVT: On Plavix and ASA  FEN: Cardiac Diet 2L fluid/2gm Na restriction.         Disposition Plan   Expected discharge: unknown, recommended to unknown once adequate pain management/ tolerating PO medications and plan for vascular surgery.     Entered: Rodriguez Snowden MD 01/09/2018, 2:20 PM   Information in the above section will display in the discharge planner report.      The patient's care was discussed with the Bedside Nurse, Care Coordinator/, Patient and Patient's Family.    Rodriguez Snowden MD  Internal Medicine Staff Hospitalist Service  [facility]  Pager: [phone]  Please see sticky note for cross cover information    Interval History   C/o noticing new  rash this am on hands and behind knee- better already    No fever.  4 point ROS done and neg unless mentioned    Data reviewed today: I reviewed all medications, new labs and imaging results over the last 24 hours. I personally reviewed the EKG tracing showing NSR w ectopy/ trigeminy .    Physical Exam   Vital Signs: Temp: 96.6  F (35.9  C) Temp src: Oral BP: 130/71 Pulse: 89 Heart Rate: 60 Resp: 16 SpO2: 97 % O2 Device: None (Room air)    Weight: 146 lbs 2.64 oz  General Appearance: pleasant  Respiratory: CTAB  Cardiovascular: SI+II irregular   GI: abdo soft, non tender  Skin: ulcers both shin. Skin changes both feet (further details as per Vascular Sx).    Hands-dorsum mildly red, no urticarial rash  Other: oriented        Data   BMP  Recent Labs  Lab 01/09/18  0610 01/08/18  0739 01/07/18  0624 01/06/18  0628    138 137 141   POTASSIUM 3.6 4.0 3.7 4.0   CHLORIDE 103 104 106 110*   SUMAN 8.6 8.9 9.0 8.3*   CO2 25 27 25 24   BUN 11 7 7 8   CR 0.57* 0.68 0.53* 0.66   * 89 78 79     CBC  Recent Labs  Lab 01/09/18  0610 01/08/18  0739 01/07/18  0624 01/06/18  0628   WBC 8.5 11.8* 9.3 8.4   RBC 3.88* 4.03* 4.10* 3.85*   HGB 12.8* 13.3 13.4 12.7*   HCT 38.8* 40.4 41.1 38.8*    100 100 101*   MCH 33.0 33.0 32.7 33.0   MCHC 33.0 32.9 32.6 32.7   RDW 14.0 13.7 13.6 13.8    235 249 236     INR  Recent Labs  Lab 01/09/18  0610 01/04/18  0600 01/03/18  1647   INR 1.05 1.01 0.99     LFTs  Recent Labs  Lab 01/03/18  1647   ALKPHOS 58   AST 21   ALT 37   BILITOTAL 0.7   PROTTOTAL 6.7*   ALBUMIN 3.5      PANCNo lab results found in last 7 days.    Echo Complete: 1/4/2018     Ischemic CM. Severely reduced LV systoilc function. Biplane LVEF measured at  20%. Severe left ventricular dilation is present. Akinesis of the basal to mid  inferior, inferolateral, and lateral segments noted.  The right ventricle is normal size. Global right ventricular function is  normal.  Pulmonary artery systolic pressure cannot  be assessed.  No pericardial effusion is present.  The inferior vena cava was normal in size with preserved respiratory  variability.  Previous study not available for comparison.        US LE Venous duplex: No DVT     IR Lower Extremity Angiogram 01/04/18     Right:  1. Right lower extremity angiogram shows totally occluded mid SFA to  above-the-knee popliteal stents, short segment below the knee  popliteal occlusion, and an occluded anterior tibial artery.  2. Angiogram also demonstrates a severe (80-90%) short segment  shelflike right CFA stenosis. Therefore, recanalization of the chronic  total occlusion was not performed.  3. Patent upper SFA and iliac stents.    Left:  1. 13 cm chronic total occlusion of the mid SFA.  2. Two-vessel runoff with total occlusion of the TEDDY.  3. Short segment narrowing of the TP trunk.  4. Patent iliac stents with less severe short segment common femoral  disease than on the right.     01/05/18  Coronary Angiography:  SUMMARY:   Severe 3 vessel CAD  100% stenosis of the RCA  100% occlusion of the proximal LAD   95% stenosis of the proximal circumflex artery  Patent LIMA to LAD   Patent SVG to RPDA      Successful deployment of a 3.0 x 28 mm drug eluting stent to the proximal LCX and inflated with 3.5 mm NC balloon

## 2018-01-09 NOTE — PLAN OF CARE
Problem: Patient Care Overview  Goal: Plan of Care/Patient Progress Review  Outcome: No Change  Vascular surgery on 1/9 cancelled d/t insurance issues. Per pt and son they would like to speak with team in am regarding insurance questions (see sticky note). Low BP in evening with recheck WNL. Scheduled BP meds held. PRN dilaudid x 2 for pain to BLE. WC completed to L anterior leg wound. Pt declined going for walk on unit. Tele SR HR 60's with occasional PVC's.

## 2018-01-09 NOTE — PROGRESS NOTES
Care Coordinator- Discharge Planning     Admission Date/Time:  1/3/2018  Attending MD:  Rodriguez Snowden MD     Data  Date of initial CC assessment:    Chart reviewed, discussed with interdisciplinary team.   Patient was admitted for:   1. Unstable angina (H)    2. Pain of right lower leg    3. Stenosis of femoral artery (H)    4. PVD (peripheral vascular disease) (H)         Assessment  Full assessment completed in previous note    Coordination of Care and Referrals: Notified patient states he needs a PA before doing endartectomy.  Progress notes for prior authorization were faxed to [employer] this morning.  Called [employer] at 1300 to check status.  Ref# [id number].  Updated UR to work on pre cert.    1/10/18 Addendum: Spoke with Megan in finance ([phone]).  Patient came through the emergency room and this stay would be covered because he came emergently.  Authorization# [id number].      Plan  Anticipated Discharge Date:  TBD  Anticipated Discharge Plan:  TBD    CTS Handoff completed:  NO    Santa Ames RN, BSN  Care Coordinator [facility]  Pager: [phone]  Phone: [phone]

## 2018-01-10 ENCOUNTER — APPOINTMENT (OUTPATIENT)
Dept: PHYSICAL THERAPY | Facility: CLINIC | Age: 68
DRG: 247 | End: 2018-01-10
Attending: INTERNAL MEDICINE
Payer: COMMERCIAL

## 2018-01-10 PROCEDURE — 40000193 ZZH STATISTIC PT WARD VISIT

## 2018-01-10 PROCEDURE — 99207 ZZC NO CHARGE FOLLOW UP PS: CPT

## 2018-01-10 PROCEDURE — 25000132 ZZH RX MED GY IP 250 OP 250 PS 637: Performed by: NURSE PRACTITIONER

## 2018-01-10 PROCEDURE — 97116 GAIT TRAINING THERAPY: CPT | Mod: GP

## 2018-01-10 PROCEDURE — 97530 THERAPEUTIC ACTIVITIES: CPT | Mod: GP

## 2018-01-10 PROCEDURE — 25000132 ZZH RX MED GY IP 250 OP 250 PS 637: Performed by: INTERNAL MEDICINE

## 2018-01-10 PROCEDURE — 12000008 ZZH R&B INTERMEDIATE UMMC

## 2018-01-10 PROCEDURE — 25000128 H RX IP 250 OP 636: Performed by: NURSE PRACTITIONER

## 2018-01-10 PROCEDURE — 99233 SBSQ HOSP IP/OBS HIGH 50: CPT | Performed by: INTERNAL MEDICINE

## 2018-01-10 PROCEDURE — 25000128 H RX IP 250 OP 636: Performed by: SURGERY

## 2018-01-10 PROCEDURE — 25000125 ZZHC RX 250: Performed by: STUDENT IN AN ORGANIZED HEALTH CARE EDUCATION/TRAINING PROGRAM

## 2018-01-10 PROCEDURE — 25000132 ZZH RX MED GY IP 250 OP 250 PS 637: Performed by: STUDENT IN AN ORGANIZED HEALTH CARE EDUCATION/TRAINING PROGRAM

## 2018-01-10 RX ORDER — FUROSEMIDE 20 MG
20 TABLET ORAL 2 TIMES DAILY
Status: DISCONTINUED | OUTPATIENT
Start: 2018-01-10 | End: 2018-01-11

## 2018-01-10 RX ADMIN — HYDROMORPHONE HYDROCHLORIDE 8 MG: 2 TABLET ORAL at 10:56

## 2018-01-10 RX ADMIN — CARVEDILOL 6.25 MG: 6.25 TABLET, FILM COATED ORAL at 19:01

## 2018-01-10 RX ADMIN — HYDROMORPHONE HYDROCHLORIDE 0.5 MG: 1 INJECTION, SOLUTION INTRAMUSCULAR; INTRAVENOUS; SUBCUTANEOUS at 01:23

## 2018-01-10 RX ADMIN — FUROSEMIDE 20 MG: 20 TABLET ORAL at 14:02

## 2018-01-10 RX ADMIN — HYDROMORPHONE HYDROCHLORIDE 8 MG: 2 TABLET ORAL at 16:46

## 2018-01-10 RX ADMIN — EZETIMIBE 10 MG: 10 TABLET ORAL at 20:41

## 2018-01-10 RX ADMIN — ISOSORBIDE MONONITRATE 60 MG: 60 TABLET, EXTENDED RELEASE ORAL at 09:20

## 2018-01-10 RX ADMIN — CARVEDILOL 6.25 MG: 6.25 TABLET, FILM COATED ORAL at 09:21

## 2018-01-10 RX ADMIN — FOLIC ACID 1 MG: 1 TABLET ORAL at 09:21

## 2018-01-10 RX ADMIN — ASPIRIN 81 MG: 81 TABLET, COATED ORAL at 09:22

## 2018-01-10 RX ADMIN — FUROSEMIDE 20 MG: 20 TABLET ORAL at 20:29

## 2018-01-10 RX ADMIN — GABAPENTIN 600 MG: 600 TABLET, FILM COATED ORAL at 20:29

## 2018-01-10 RX ADMIN — ROSUVASTATIN CALCIUM 20 MG: 20 TABLET, FILM COATED ORAL at 20:41

## 2018-01-10 RX ADMIN — PREDNISONE 10 MG: 10 TABLET ORAL at 09:21

## 2018-01-10 RX ADMIN — HYDROMORPHONE HYDROCHLORIDE 8 MG: 2 TABLET ORAL at 00:09

## 2018-01-10 RX ADMIN — GABAPENTIN 300 MG: 300 CAPSULE ORAL at 09:20

## 2018-01-10 RX ADMIN — ENALAPRIL MALEATE 2.5 MG: 2.5 TABLET ORAL at 09:21

## 2018-01-10 RX ADMIN — HYDROMORPHONE HYDROCHLORIDE 8 MG: 2 TABLET ORAL at 03:11

## 2018-01-10 RX ADMIN — HYDROMORPHONE HYDROCHLORIDE 8 MG: 2 TABLET ORAL at 06:41

## 2018-01-10 RX ADMIN — ESCITALOPRAM OXALATE 20 MG: 20 TABLET ORAL at 09:20

## 2018-01-10 RX ADMIN — NICOTINE 1 PATCH: 7 PATCH, EXTENDED RELEASE TRANSDERMAL at 20:41

## 2018-01-10 RX ADMIN — HYDROMORPHONE HYDROCHLORIDE 8 MG: 2 TABLET ORAL at 20:29

## 2018-01-10 RX ADMIN — GABAPENTIN 300 MG: 300 CAPSULE ORAL at 14:02

## 2018-01-10 RX ADMIN — MULTIPLE VITAMINS W/ MINERALS TAB 1 TABLET: TAB at 09:21

## 2018-01-10 RX ADMIN — HYDROMORPHONE HYDROCHLORIDE 8 MG: 2 TABLET ORAL at 13:58

## 2018-01-10 RX ADMIN — SODIUM CHLORIDE 250 ML: 9 INJECTION, SOLUTION INTRAVENOUS at 21:13

## 2018-01-10 RX ADMIN — CLOPIDOGREL 75 MG: 75 TABLET, FILM COATED ORAL at 20:29

## 2018-01-10 NOTE — PLAN OF CARE
Problem: Patient Care Overview  Goal: Plan of Care/Patient Progress Review  Outcome: No Change  Pt alert and oriented. Vitals stable ra. Up with walker. Telemetry paced with occasional PVCs and pt is asymptomatic. C/o LE pain and prn dilaudid administered. Wound dressing on left LE intact. Son by bedside all of AM. Personal Fentanyl patch sent to security. Plan for endarterectomy next week; whether or pt will be waiting as inpatient for procedure is being discussed with team and decision pending.  R: Continue with poc

## 2018-01-10 NOTE — PLAN OF CARE
Problem: Patient Care Overview  Goal: Individualization & Mutuality  Afebrile,heart rate  60's Sinus rythym with PVC's.Prn dilaudid 8 mg given every 3 hrs for control of lower leg pain,also had prn .5mg prn IV dilaudid for breakthrough pain,no signs of confusion noted this shift..Ambulated with walker..Apettite good.Patient waiting for surgery approval by his insurance.Continue per plan of care..

## 2018-01-10 NOTE — PROGRESS NOTES
No issues overnight. Eating breakfast on my rounds This a.m. Still trying to work out insurance issues. Tentatively try for surgery next week Tuesday.

## 2018-01-10 NOTE — PLAN OF CARE
Problem: Patient Care Overview  Goal: Plan of Care/Patient Progress Review  OT: 5B: PT currently following pt, OT will continue to hold until post surgery unless otherwise needed per PT recs. OT rescheduled for 1/12/18 as appropriate.

## 2018-01-10 NOTE — PROGRESS NOTES
[facility], [place]    Internal Medicine Progress Note - Gold Service    Assessment & Plan   Boom Kahn is a 67 year old male with a past medical history significant for HTN, anxiety/depression, alcohol abuse, tobacco abuse, ICM/MI, CAD s/p 3v CABG and PCI, HFrEF (last EF 25-30%, approximately 6 months ago) s/p ICD, , R lung cancer s/p radiation therapy,  peripheral vascular disease s/p multiple stents, and chronic pain who presented c/o of worsening pain in his lower extremities and right shin wound.       # Severe Peripheral Artery Disease -   Critical limb ischemia R>L with bilateral LE wounds; Acute on Chronic pain  U/S demonstrating b/l significant calcifications with minimal flow distal to thigh.  X-ray without evidence of osteo-myelitis.  Exam notable for bilateral wounds, erythema consistent with vascular changes, loss of hair.   Vascular surgery on board. Following.   Cardiology consulted for Pre op evaluation. Under Coronary Angio and AYDE stenting as above. Carotid US bl done.   Final revascularization plan per Vascular team. Likely -right femoral endarterectomy with femoral to posterior tibial bypass with in situ greater saphenous vein.  cancelled 01/09/18 pending insurance approval -- 1/10- D/W CC, Vascular Sx apparently it is covered and now Sx being re-scheduled.   - Wound care per WOCN, Vascular.   - Continue antiplatelet agents. Statin.   - continue current prednisone 10mg daily  - Continue aspirin and plavix  Hold acei am of the procedure     >>Pain control: acute on Chronic pain.   Appreciate pain team recommendations.      Topical gabapentin 8%, ketamine 5%, lidocaine 2.5% in PLO gel TID  Gabapentin 300 tid, On 1/7, increase gabapentin to 300 mg Q AM and Q PM, 600 mg Q HS  Continue fentanyl patch 25 mcg/hr   acetaminophen 975 qid- discontinued 1/9     *01/09-  redness over hands and back of knee- better. No urticaria. Denies any new med/ cosmetic.    - if worsens  may try benadryl (cautious for sedation)     # HFrEF (EF reported 25-30%)  # CAD s/p CABGx3 and PCIs  # ICM hx of MI (2008)  # HTN  Clinically stable. Asymptomatic.   Angio and AYDE 01/05 as above.   ECHO as above- reviewed.      Per Cardiology:   - Continue ASA and clopidogrel for 12 months  - Continue Carvedilol, Rosuvastatin, Ezetimibe, Isosorbide dinitrate, enalapril  - 1/10- resumed Lasix as leg swelling    - 2L fluid/2gm Na restriction   - strict I/O and daily weights     1/9 - D.W Cardiology: has frequent PVC.       # EtOH Dependence-Patient reports 8-10 beers per day  - s/p MSSA protocol w/ativan : no e/o withdrawal at current.  - MVI/thiamine/folate   - consider chem dep consult if pt willing    # Tobacco Dependence - nicotine patch 7mg  - Smoking Cessation Program IP consult      # Restrictive Airway Disease   ? COPD, given smoking hx. Patient denies any hx of COPD.   - DuoNebs x4 hr prn  - outpatient PFT  - smoking cessation      # Malignancy of Lung, Right Upper Lobe    Known malignancy, patient unable to tell specific diagnosis. States that cancer responded with radiation.   F/u PET scan due in February   - f/u outpatient       # Depression   Patient reports a hx of depression and reports depression and axiety  - holding home alprazolam 0.5 PRN  - home ecitalopram 20mg opd        CODE: Full  DVT: On Plavix and ASA  FEN: Cardiac Diet 2L fluid/2gm Na restriction.         Disposition Plan   Expected discharge: unknown, recommended to unknown once adequate pain management/ tolerating PO medications and plan for vascular surgery.     Entered: Rodriguez Snowden MD 01/10/2018, 1:18 PM   Information in the above section will display in the discharge planner report.      The patient's care was discussed with the Bedside Nurse, Care Coordinator/, Patient and Patient's Family.    Rodriguez Snowden MD  Internal Medicine Staff Hospitalist Service  [facility]  Pager: [phone]  Please see sticky  note for cross cover information    Interval History   Feels better today. Much clearer. Rash better but increased swelling of legs.    No fever.  4 point ROS done and neg unless mentioned    Data reviewed today: I reviewed all medications, new labs and imaging results over the last 24 hours. I personally reviewed the EKG tracing showing NSR w ectopy/ trigeminy .    Physical Exam   Vital Signs: Temp: 97.6  F (36.4  C) Temp src: Oral BP: 110/48   Heart Rate: 60 Resp: 16 SpO2: 97 % O2 Device: None (Room air)    Weight: 145 lbs 1.6 oz  General Appearance: pleasant  Respiratory: CTAB  Cardiovascular: SI+II irregular   GI: abdo soft, non tender  Skin: ulcers both shin. Skin changes both feet (further details as per Vascular Sx).    Hands-dorsum mildly red, no urticarial rash  Other: oriented        Data   BMP    Recent Labs  Lab 01/09/18  0610 01/08/18  0739 01/07/18  0624 01/06/18  0628    138 137 141   POTASSIUM 3.6 4.0 3.7 4.0   CHLORIDE 103 104 106 110*   SUMAN 8.6 8.9 9.0 8.3*   CO2 25 27 25 24   BUN 11 7 7 8   CR 0.57* 0.68 0.53* 0.66   * 89 78 79     CBC    Recent Labs  Lab 01/09/18  0610 01/08/18  0739 01/07/18  0624 01/06/18  0628   WBC 8.5 11.8* 9.3 8.4   RBC 3.88* 4.03* 4.10* 3.85*   HGB 12.8* 13.3 13.4 12.7*   HCT 38.8* 40.4 41.1 38.8*    100 100 101*   MCH 33.0 33.0 32.7 33.0   MCHC 33.0 32.9 32.6 32.7   RDW 14.0 13.7 13.6 13.8    235 249 236     INR    Recent Labs  Lab 01/09/18  0610 01/04/18  0600 01/03/18  1647   INR 1.05 1.01 0.99     LFTs    Recent Labs  Lab 01/03/18  1647   ALKPHOS 58   AST 21   ALT 37   BILITOTAL 0.7   PROTTOTAL 6.7*   ALBUMIN 3.5      PANCNo lab results found in last 7 days.    Echo Complete: 1/4/2018     Ischemic CM. Severely reduced LV systoilc function. Biplane LVEF measured at  20%. Severe left ventricular dilation is present. Akinesis of the basal to mid  inferior, inferolateral, and lateral segments noted.  The right ventricle is normal size. Global  right ventricular function is  normal.  Pulmonary artery systolic pressure cannot be assessed.  No pericardial effusion is present.  The inferior vena cava was normal in size with preserved respiratory  variability.  Previous study not available for comparison.        US LE Venous duplex: No DVT     IR Lower Extremity Angiogram 01/04/18     Right:  1. Right lower extremity angiogram shows totally occluded mid SFA to  above-the-knee popliteal stents, short segment below the knee  popliteal occlusion, and an occluded anterior tibial artery.  2. Angiogram also demonstrates a severe (80-90%) short segment  shelflike right CFA stenosis. Therefore, recanalization of the chronic  total occlusion was not performed.  3. Patent upper SFA and iliac stents.    Left:  1. 13 cm chronic total occlusion of the mid SFA.  2. Two-vessel runoff with total occlusion of the TDEDY.  3. Short segment narrowing of the TP trunk.  4. Patent iliac stents with less severe short segment common femoral  disease than on the right.     01/05/18  Coronary Angiography:  SUMMARY:   Severe 3 vessel CAD  100% stenosis of the RCA  100% occlusion of the proximal LAD   95% stenosis of the proximal circumflex artery  Patent LIMA to LAD   Patent SVG to RPDA      Successful deployment of a 3.0 x 28 mm drug eluting stent to the proximal LCX and inflated with 3.5 mm NC balloon

## 2018-01-10 NOTE — PLAN OF CARE
Problem: Patient Care Overview  Goal: Plan of Care/Patient Progress Review  Discharge Planner PT   Patient plan for discharge: Remain in rehab vs home with home PT vs hotel  Current status: Patient seen today for PT as possibility of d/c home as surgery not until Tuesday. Completes bed mobility with supervision and transfers with SBA. Had patient complete all dressing activities EOB at SBA, fatigued following. Ambulated in horta 2 x 150' with CGA. Had multiple small LOBs and 1 significant LOB. Required significant seated rest break in between. Spent significant time discussing d/c options and recommendation with patient and son. Also question possible cognitive component, OT notified to assess  Barriers to return to prior living situation: safety, independence, balance, activity tolerance, stairs, cognition  Recommendations for discharge: Rehab  Rationale for recommendations: In order to better prepare for surgery on Tuesday. Not safe to d/c home alone and unable to stay with son as son has roommates.       Entered by: Kathi Alanis 01/10/2018 4:14 PM

## 2018-01-10 NOTE — PLAN OF CARE
Problem: Patient Care Overview  Goal: Individualization & Mutuality  D/I: Pt is alert and oriented x 4, on telemetry monitoring with paced rhythm and PVC's. Chronic leg pain is controlled with Dilaudid  8 mg every 3 hrs and  0.5mg prn IV dilaudid for breakthrough pain. .Ambulated with walker. Slept in between cares. Calm and cooperative with cares.   P: Procedure was cancelled yesterday and patient now waiting for surgery approval by his insurance. Continue with plan of care..

## 2018-01-11 ENCOUNTER — APPOINTMENT (OUTPATIENT)
Dept: PHYSICAL THERAPY | Facility: CLINIC | Age: 68
DRG: 247 | End: 2018-01-11
Attending: INTERNAL MEDICINE
Payer: COMMERCIAL

## 2018-01-11 LAB
INTERPRETATION ECG - MUSE: NORMAL
POTASSIUM SERPL-SCNC: 3.7 MMOL/L (ref 3.4–5.3)

## 2018-01-11 PROCEDURE — 36415 COLL VENOUS BLD VENIPUNCTURE: CPT | Performed by: INTERNAL MEDICINE

## 2018-01-11 PROCEDURE — 25000132 ZZH RX MED GY IP 250 OP 250 PS 637: Performed by: PHYSICIAN ASSISTANT

## 2018-01-11 PROCEDURE — 40000902 ZZH STATISTIC WOC PT EDUCATION, 16-30 MIN

## 2018-01-11 PROCEDURE — 99233 SBSQ HOSP IP/OBS HIGH 50: CPT | Performed by: INTERNAL MEDICINE

## 2018-01-11 PROCEDURE — 97530 THERAPEUTIC ACTIVITIES: CPT | Mod: GP

## 2018-01-11 PROCEDURE — 25000132 ZZH RX MED GY IP 250 OP 250 PS 637: Performed by: INTERNAL MEDICINE

## 2018-01-11 PROCEDURE — 84132 ASSAY OF SERUM POTASSIUM: CPT | Performed by: INTERNAL MEDICINE

## 2018-01-11 PROCEDURE — 12000008 ZZH R&B INTERMEDIATE UMMC

## 2018-01-11 PROCEDURE — 25000125 ZZHC RX 250: Performed by: STUDENT IN AN ORGANIZED HEALTH CARE EDUCATION/TRAINING PROGRAM

## 2018-01-11 PROCEDURE — 25000132 ZZH RX MED GY IP 250 OP 250 PS 637: Performed by: STUDENT IN AN ORGANIZED HEALTH CARE EDUCATION/TRAINING PROGRAM

## 2018-01-11 PROCEDURE — 99222 1ST HOSP IP/OBS MODERATE 55: CPT | Performed by: PSYCHIATRY & NEUROLOGY

## 2018-01-11 PROCEDURE — 40000193 ZZH STATISTIC PT WARD VISIT

## 2018-01-11 PROCEDURE — 99207 ZZC NO CHARGE FOLLOW UP PS: CPT

## 2018-01-11 PROCEDURE — 25000132 ZZH RX MED GY IP 250 OP 250 PS 637: Performed by: NURSE PRACTITIONER

## 2018-01-11 RX ORDER — FUROSEMIDE 20 MG
20 TABLET ORAL DAILY
Status: DISCONTINUED | OUTPATIENT
Start: 2018-01-11 | End: 2018-01-11

## 2018-01-11 RX ORDER — DIPHENHYDRAMINE HCL 25 MG
25 CAPSULE ORAL EVERY 6 HOURS PRN
Status: DISCONTINUED | OUTPATIENT
Start: 2018-01-11 | End: 2018-01-23 | Stop reason: HOSPADM

## 2018-01-11 RX ORDER — HYDROMORPHONE HYDROCHLORIDE 2 MG/1
4-6 TABLET ORAL
Status: DISCONTINUED | OUTPATIENT
Start: 2018-01-11 | End: 2018-01-17

## 2018-01-11 RX ORDER — EMOLLIENT BASE
CREAM (GRAM) TOPICAL 3 TIMES DAILY
Status: DISCONTINUED | OUTPATIENT
Start: 2018-01-11 | End: 2018-01-23 | Stop reason: HOSPADM

## 2018-01-11 RX ORDER — MOMETASONE FUROATE 1 MG/G
OINTMENT TOPICAL DAILY
Status: DISCONTINUED | OUTPATIENT
Start: 2018-01-11 | End: 2018-01-17

## 2018-01-11 RX ORDER — DIPHENHYDRAMINE HYDROCHLORIDE 50 MG/ML
25 INJECTION INTRAMUSCULAR; INTRAVENOUS EVERY 6 HOURS PRN
Status: DISCONTINUED | OUTPATIENT
Start: 2018-01-11 | End: 2018-01-23 | Stop reason: HOSPADM

## 2018-01-11 RX ORDER — FENTANYL 12.5 UG/1
12 PATCH TRANSDERMAL
Status: DISCONTINUED | OUTPATIENT
Start: 2018-01-11 | End: 2018-01-12

## 2018-01-11 RX ORDER — FENTANYL 25 UG/1
25 PATCH TRANSDERMAL
Status: DISCONTINUED | OUTPATIENT
Start: 2018-01-11 | End: 2018-01-12

## 2018-01-11 RX ORDER — QUETIAPINE FUMARATE 25 MG/1
25 TABLET, FILM COATED ORAL AT BEDTIME
Status: DISCONTINUED | OUTPATIENT
Start: 2018-01-11 | End: 2018-01-12

## 2018-01-11 RX ADMIN — ESCITALOPRAM OXALATE 20 MG: 20 TABLET ORAL at 09:23

## 2018-01-11 RX ADMIN — POTASSIUM CHLORIDE 20 MEQ: 750 TABLET, EXTENDED RELEASE ORAL at 11:47

## 2018-01-11 RX ADMIN — NICOTINE 1 PATCH: 7 PATCH, EXTENDED RELEASE TRANSDERMAL at 22:10

## 2018-01-11 RX ADMIN — ENALAPRIL MALEATE 2.5 MG: 2.5 TABLET ORAL at 09:23

## 2018-01-11 RX ADMIN — HYDROMORPHONE HYDROCHLORIDE 8 MG: 2 TABLET ORAL at 13:57

## 2018-01-11 RX ADMIN — HYDROMORPHONE HYDROCHLORIDE 8 MG: 2 TABLET ORAL at 00:12

## 2018-01-11 RX ADMIN — MULTIPLE VITAMINS W/ MINERALS TAB 1 TABLET: TAB at 09:22

## 2018-01-11 RX ADMIN — HYDROMORPHONE HYDROCHLORIDE 6 MG: 2 TABLET ORAL at 17:32

## 2018-01-11 RX ADMIN — FOLIC ACID 1 MG: 1 TABLET ORAL at 09:23

## 2018-01-11 RX ADMIN — GABAPENTIN 300 MG: 300 CAPSULE ORAL at 09:23

## 2018-01-11 RX ADMIN — HYDROMORPHONE HYDROCHLORIDE 6 MG: 2 TABLET ORAL at 03:34

## 2018-01-11 RX ADMIN — PREDNISONE 10 MG: 10 TABLET ORAL at 09:23

## 2018-01-11 RX ADMIN — ROSUVASTATIN CALCIUM 20 MG: 20 TABLET, FILM COATED ORAL at 22:08

## 2018-01-11 RX ADMIN — FENTANYL 1 PATCH: 25 PATCH, EXTENDED RELEASE TRANSDERMAL at 17:33

## 2018-01-11 RX ADMIN — CARVEDILOL 6.25 MG: 6.25 TABLET, FILM COATED ORAL at 09:22

## 2018-01-11 RX ADMIN — HYDROMORPHONE HYDROCHLORIDE 6 MG: 2 TABLET ORAL at 09:24

## 2018-01-11 RX ADMIN — GABAPENTIN 300 MG: 300 CAPSULE ORAL at 15:05

## 2018-01-11 RX ADMIN — FENTANYL 1 PATCH: 12 PATCH TRANSDERMAL at 17:33

## 2018-01-11 RX ADMIN — HYDROMORPHONE HYDROCHLORIDE 6 MG: 2 TABLET ORAL at 20:47

## 2018-01-11 RX ADMIN — MOMETASONE FUROATE: 1 OINTMENT TOPICAL at 22:19

## 2018-01-11 RX ADMIN — ASPIRIN 81 MG: 81 TABLET, COATED ORAL at 09:23

## 2018-01-11 RX ADMIN — Medication 25 MG: at 22:16

## 2018-01-11 RX ADMIN — DIPHENHYDRAMINE HYDROCHLORIDE 25 MG: 25 CAPSULE ORAL at 13:58

## 2018-01-11 RX ADMIN — GABAPENTIN 600 MG: 600 TABLET, FILM COATED ORAL at 22:08

## 2018-01-11 RX ADMIN — EZETIMIBE 10 MG: 10 TABLET ORAL at 22:07

## 2018-01-11 RX ADMIN — ENALAPRIL MALEATE 2.5 MG: 2.5 TABLET ORAL at 22:17

## 2018-01-11 RX ADMIN — CLOPIDOGREL 75 MG: 75 TABLET, FILM COATED ORAL at 22:07

## 2018-01-11 RX ADMIN — ISOSORBIDE MONONITRATE 60 MG: 60 TABLET, EXTENDED RELEASE ORAL at 09:22

## 2018-01-11 ASSESSMENT — PAIN DESCRIPTION - DESCRIPTORS: DESCRIPTORS: ACHING;SHOOTING;STABBING

## 2018-01-11 NOTE — PLAN OF CARE
"Problem: Patient Care Overview  Goal: Plan of Care/Patient Progress Review  Outcome: No Change  D/I: Pt remains  alert and oriented x 4, but son states \"he's more confused then I've ever seen\".  Patient on telemetry monitoring with paced rhythm and occasional PVC's. C/O Chronic leg pain which is controlled with Dilaudid  8 mg every 3 hrs and  0.5mg prn IV dilaudid for breakthrough pain. Up ambulating with walker and SBA as needed.  Son Dallas here most of the evening.   BP's soft.  70's - 80's /30's - 40's.   cc's IV Bolus x one with BP up to 90's /50's.   Calm and cooperative with cares.   P: Procedure was cancelled yesterday and patient now waiting for surgery approval by his insurance which son state's was given, may be able to coordinate procedure for Friday.   Continue with plan of care. Son will return in am.            "

## 2018-01-11 NOTE — PROGRESS NOTES
CLINICAL NUTRITION SERVICES - REASSESSMENT NOTE     Nutrition Prescription    RECOMMENDATIONS FOR MDs/PROVIDERS TO ORDER:  None     Malnutrition Status:    Unable to determine due to pt unavailable during attempts to visit    Recommendations already ordered by Registered Dietitian (RD):  Ensure plus once daily     Future/Additional Recommendations:  None      EVALUATION OF THE PROGRESS TOWARD GOALS   Diet: 2 gm Na+ diet   Intake: Patient not available to see.   Per RN/flow sheet review, patient is consuming % of 1-2 meals daily.      NEW FINDINGS   Chart reviewed: Past medical history for HFrEF (last EF 25-30%, approximately 6 months ago), R lung cancer s/p radiation therapy,  PVD s/p multiple stents, and chronic pain.  - Presented c/o of worsening pain in his lower extremities and right shin wound.     - Wound cares to lower extremities completed by C RN - Assessment on 1/11 ( Lower extremity wound ).     - Wt stable since admit at ~ 64 kg       MALNUTRITION  % Intake: Decreased intake does not meet criteria  % Weight Loss: None noted this admit   Subcutaneous Fat Loss: Unable to assess  Muscle Loss: Unable to assess  Fluid Accumulation/Edema: Unable to assess  Malnutrition Diagnosis: Unable to determine due to lack of information     Previous Goals   Patient to consume % of nutritionally adequate meal trays TID, or the equivalent with supplements/snacks.  Evaluation: Likely Met    Previous Nutrition Diagnosis  Predicted inadequate nutrient intake (protein-energy) related to pt denying recent appetite changes PTA per provider note but documented 6% wt loss over the past ~3 months      Evaluation: No change    CURRENT NUTRITION DIAGNOSIS  Predicted inadequate nutrient intake related to acute illness, variable Po intake     INTERVENTIONS  Implementation  Medical food supplement therapy - Ordered Ensure plus once daily to augment po     Goals  Patient to consume % of nutritionally adequate meal  trays TID, or the equivalent with supplements/snacks.    Monitoring/Evaluation  Progress toward goals will be monitored and evaluated per protocol.    Mariah Davila RD/LIZA  Pager [phone]

## 2018-01-11 NOTE — PROGRESS NOTES
[facility], [place]    Internal Medicine Progress Note - Gold Service    Assessment & Plan   Boom Kahn is a 67 year old male with a past medical history significant for HTN, anxiety/depression, alcohol abuse, tobacco abuse, ICM/MI, CAD s/p 3v CABG and PCI, HFrEF (last EF 25-30%, approximately 6 months ago) s/p ICD, , R lung cancer s/p radiation therapy,  peripheral vascular disease s/p multiple stents, and chronic pain who presented c/o of worsening pain in his lower extremities and right shin wound.       # Severe Peripheral Artery Disease -   Critical limb ischemia R>L with bilateral LE wounds; Acute on Chronic pain  U/S demonstrating b/l significant calcifications with minimal flow distal to thigh.  X-ray without evidence of osteo-myelitis.  Exam notable for bilateral wounds, erythema consistent with vascular changes, loss of hair.   Vascular surgery on board. Following.   Cardiology consulted for Pre op evaluation. Under Coronary Angio and AYDE stenting as above. Carotid US bl done.   Final revascularization plan per Vascular team. Likely -right femoral endarterectomy with femoral to posterior tibial bypass with in situ greater saphenous vein.  cancelled 01/09/18 pending insurance approval -- 1/10- D/W CC, Vascular Sx apparently it is covered and now Sx being re-scheduled.   1/11- D/w Vascular- Sx planned for Monday   - Wound care per CAESAR, Vascular.   - Continue antiplatelet agents. Statin.   - continue current prednisone 10mg daily  - Continue aspirin and plavix  Hold acei am of the procedure     >>Pain control: acute on Chronic pain.   Appreciate pain team recommendations. reconsult 1/11      Topical gabapentin 8%, ketamine 5%, lidocaine 2.5% in PLO gel TID  Gabapentin 300 tid, On 1/7, increase gabapentin to 300 mg Q AM and Q PM, 600 mg Q HS  Continue fentanyl patch 25 mcg/hr   acetaminophen 975 qid- discontinued 1/9   -   *01/09-  redness over hands and back of knee- better.  No urticaria. Denies any new med/ cosmetic.    - benadryl   - dermatology consult     # HFrEF (EF reported 25-30%)  # CAD s/p CABGx3 and PCIs  # ICM hx of MI (2008)  # HTN  Clinically stable. Asymptomatic.   Angio and AYDE 01/05 as above.   ECHO as above- reviewed.      Per Cardiology:   - Continue ASA and clopidogrel for 12 months  - Continue Carvedilol, Rosuvastatin, Ezetimibe, Isosorbide dinitrate, enalapril  - 1/10- resumed Lasix as leg swelling but d/c 1/11 as soft BP  - 2L fluid/2gm Na restriction   - strict I/O and daily weights     1/9 - D.W Cardiology: has frequent PVC.       # EtOH Dependence-Patient reports 8-10 beers per day  - s/p MSSA protocol w/ativan : no e/o withdrawal at current.  - MVI/thiamine/folate     # Tobacco Dependence - nicotine patch 7mg  - Smoking Cessation Program IP consult      # Restrictive Airway Disease   ? COPD, given smoking hx. Patient denies any hx of COPD.   - DuoNebs x4 hr prn  - outpatient PFT  - smoking cessation      # Malignancy of Lung, Right Upper Lobe    Known malignancy, patient unable to tell specific diagnosis. States that cancer responded with radiation.   F/u PET scan due in February   - f/u outpatient       # Depression   Patient reports a hx of depression and reports depression and axiety  - holding home alprazolam 0.5 PRN  - home ecitalopram 20mg opd        CODE: Full  DVT: On Plavix and ASA  FEN: Cardiac Diet 2L fluid/2gm Na restriction.      Disposition Plan   Expected discharge: unknown, recommended to unknown once adequate pain management/ tolerating PO medications and plan for vascular surgery.     Entered: Rodriguez Snowden MD 01/11/2018, 11:30 AM   Information in the above section will display in the discharge planner report.      The patient's care was discussed with the Bedside Nurse, Care Coordinator/, Patient and Patient's Family.    Rodriguez Snowden MD  Internal Medicine Staff Hospitalist Service  [facility]  Pager:  [phone]  Please see sticky note for cross cover information    Interval History   Worsened pain with the rash on hands and behind knees.  Mentation better but feels hallucinating early morning while waking up.     No fever.  4 point ROS done and neg unless mentioned    Data reviewed today: I reviewed all medications, new labs and imaging results over the last 24 hours.Physical Exam   Vital Signs: Temp: 98  F (36.7  C) Temp src: Oral BP: 133/58 Pulse: 85   Resp: 16 SpO2: 98 % O2 Device: None (Room air)    Weight: 141 lbs 8 oz  General Appearance: pleasant  Respiratory: CTAB  Cardiovascular: SI+II irregular   GI: abdo soft, non tender  Skin: ulcers both shin. Skin changes both feet (further details as per Vascular Sx).    Hands-dorsum mildly red, no urticarial rash  Other: oriented      Data   BMP    Recent Labs  Lab 01/11/18  1038 01/09/18  0610 01/08/18  0739 01/07/18  0624 01/06/18  0628   NA  --  136 138 137 141   POTASSIUM 3.7 3.6 4.0 3.7 4.0   CHLORIDE  --  103 104 106 110*   SUMAN  --  8.6 8.9 9.0 8.3*   CO2  --  25 27 25 24   BUN  --  11 7 7 8   CR  --  0.57* 0.68 0.53* 0.66   GLC  --  108* 89 78 79     CBC    Recent Labs  Lab 01/09/18  0610 01/08/18  0739 01/07/18  0624 01/06/18  0628   WBC 8.5 11.8* 9.3 8.4   RBC 3.88* 4.03* 4.10* 3.85*   HGB 12.8* 13.3 13.4 12.7*   HCT 38.8* 40.4 41.1 38.8*    100 100 101*   MCH 33.0 33.0 32.7 33.0   MCHC 33.0 32.9 32.6 32.7   RDW 14.0 13.7 13.6 13.8    235 249 236     INR    Recent Labs  Lab 01/09/18  0610   INR 1.05     Echo Complete: 1/4/2018     Ischemic CM. Severely reduced LV systoilc function. Biplane LVEF measured at  20%. Severe left ventricular dilation is present. Akinesis of the basal to mid  inferior, inferolateral, and lateral segments noted.  The right ventricle is normal size. Global right ventricular function is  normal.  Pulmonary artery systolic pressure cannot be assessed.  No pericardial effusion is present.  The inferior vena cava was normal  in size with preserved respiratory  variability.  Previous study not available for comparison.        US LE Venous duplex: No DVT     IR Lower Extremity Angiogram 01/04/18     Right:  1. Right lower extremity angiogram shows totally occluded mid SFA to  above-the-knee popliteal stents, short segment below the knee  popliteal occlusion, and an occluded anterior tibial artery.  2. Angiogram also demonstrates a severe (80-90%) short segment  shelflike right CFA stenosis. Therefore, recanalization of the chronic  total occlusion was not performed.  3. Patent upper SFA and iliac stents.    Left:  1. 13 cm chronic total occlusion of the mid SFA.  2. Two-vessel runoff with total occlusion of the TEDDY.  3. Short segment narrowing of the TP trunk.  4. Patent iliac stents with less severe short segment common femoral  disease than on the right.     01/05/18  Coronary Angiography:  SUMMARY:   Severe 3 vessel CAD  100% stenosis of the RCA  100% occlusion of the proximal LAD   95% stenosis of the proximal circumflex artery  Patent LIMA to LAD   Patent SVG to RPDA      Successful deployment of a 3.0 x 28 mm drug eluting stent to the proximal LCX and inflated with 3.5 mm NC balloon

## 2018-01-11 NOTE — PLAN OF CARE
Problem: Patient Care Overview  Goal: Individualization & Mutuality  D/I: Pt is alert but intermittently confused this shift. , on telemetry monitoring with paced rhythm and PVC's. Chronic leg pain is controlled with Dilaudid  8mg every 3 hrs, however, @ 0300 only gave pt 6mg due to his intermittent confusion. Ambulated with walker. Slept in between cares. Calm and cooperative with cares.   P: Procedure approved by insurance per family and surgery being rescheduled. Continue with plan of care..

## 2018-01-11 NOTE — CONSULTS
PSYCHIATRIC CONSULTATION      DATE OF SERVICE:  01/11/2018      IDENTIFICATION:  Mr. Boom Kahn is a 67-year-old white male who is currently hospitalized with severe claudication, large wounds bilaterally on his shins and a variety of significant medical issues including cardio issues.  I am asked to evaluate his hallucinations and insomnia by Dr. Snowden.      Prior to interviewing the patient, I had an opportunity to review the electronic medical record and note that the patient has had a very long and significant cardiac and peripheral vascular history.  He also has a significant history of alcohol use, as does many of his family members.      On interview, he reports that since starting on fentanyl when he wakes up he is quite confused and sometimes sees things.  He does not think this is a problem during the day.  He has family members who are really very high functioning in the room with him who suggest that actually he has intermittent confusion during the day, but it does seem to be worse at night.  Although the patient was drinking quite a large quantity of beer prior to coming to our hospital, he does not appear to be in alcohol withdrawal, rather the confusion and hallucinations seem more likely to be secondary to his narcotic analgesia in combination with his peripheral vascular disease and a variety of other health concerns including CHF, COPD and atherosclerosis.  He also has an implantable cardiac defibrillator and a history of carcinoma of the lung.      Along with his intermittent delirium, he does have a history of anxiety and depression and is being treated with escitalopram 20 mg.  At home, he was also receiving alprazolam 0.5 p.r.n. which has appropriately been held.      PAST MEDICAL HISTORY:  Includes ischemic cardiomyopathy, CHF, peripheral vascular disease, COPD, anxiety, atherosclerosis, automatic implantable cardio-defibrillator, depression, hyperlipidemia, hypertension, carcinoma of the  lung, dyspnea on exertion and myocardial infarction.      PAST SURGICAL HISTORY:  Includes angioplasty and bypass graft, colon surgery, aspiration of the lung and radiation to the lung.      FAMILY HISTORY:  The patient and his family members report that many of his relatives have had alcohol use disorder.  His father had depression as well as alcohol use disorder.  The family members in the room suggest that they and their families have generally been treated for depression and anxiety.      SOCIAL HISTORY:  The patient was a  of a hospital, he had to retire.  He is a current cigarette smoker and the chart suggests that he drinks 5-8 beers per day.  He was living in [place].  His family members were in [place], but they have all moved to the [facility].      ALLERGIES:  Collaginase, Flagyl, iodine.      REVIEW OF SYSTEMS:  The patient reports he does have a headache.  He has no difficulty with hearing or seeing.  He does not have chest pain but has intermittent shortness of breath.  He had a bowel impaction and has had very runny stools ever since which has forced him to use a diaper and he does not appear happy about that at all.  He did not have genitourinary symptoms.  He has significant pain, particularly in his right leg, but actually in both legs, and his legs appear very red with large ulcers and somewhat swollen.      MENTAL STATUS EXAMINATION:  On my interview, the patient was generally pleasant and cooperative.  He says his mood is somewhat dysphoric and anxious about an upcoming procedure.  He had a full affect and was able to smile and laugh appropriately.  His speech was coherent and goal oriented.  His associations were tight.  His thought process logical and linear.  His content of thought was without psychosis or suicidal ideation.  Recent and remote memory, concentration, fund of knowledge and use of language appear to be at baseline.  He is alert and oriented x3.  Insight and judgment are  somewhat guarded regarding his alcohol consumption.  Muscle strength is decreased in the lowers.  Recent vital signs include a temperature of 98, heart rate of 60, respiration rate of 16 with a blood pressure of 133/58 and 98% oxygen saturation.      ASSESSMENT:     1.  Intermittent delirium.   2.  History of major depressive disorder.   3.  Alcohol use disorder.      RECOMMENDATION:  Seroquel 25 mg at bedtime; if he does not sleep, please repeat x1.  If he continues to have confusion during the day, he could also have Seroquel 25 mg b.i.d. as needed during the day.  I note that his QTc is 453.         RIAZ PINEDA MD             D: 2018 13:37   T: 2018 14:29   MT: SK      Name:     MARS STARKS   MRN:      [id number]        Account:       [id number]   :      1950           Consult Date:  2018      Document: [id number]

## 2018-01-11 NOTE — PLAN OF CARE
Problem: Patient Care Overview  Goal: Plan of Care/Patient Progress Review  Outcome: No Change  Pt alert and oriented. Vss on ra. Up with walker. Wound cares to lower extremities completed by WOC RN. Dilaudid dosing decreased; pain partially managed so far. Son by bedside most of AM. Continues on telemetry monitoring; no change in paced rhythm (occasional PVCs). Vascular surgery and team had chance to communicate with pt and pt's son about plan for endarterectomy; scheduled for next week.  R: To continue monitoring and implementing POC

## 2018-01-11 NOTE — CONSULTS
[facility] Inpatient Consult Dermatology Note    Impression/Plan:  1. Eczema craquele- Morphologically, the rash on the patient's lower extremities, thighs, and hands are consistent with eczema. The lace-like scaly pattern is a clue to eczema craquele, often due to dry skin. However, he has underlying venous stasis, due to the redness surrounding his lower extremity ulcers. He has significant history of PAD, so likely ulcers are mainly arterial in origin, but chronic venous insufficiency may also cause lower extremity ulcers. Venous stasis can also precipitate eczema. He is being followed by vascular surgery and plan for revascularization on Monday.  Start Elocon (mometasone) 0.1% ointment BID to giorgio of stasis change (avoid ulcers)  [place] vanicream or Aquaphor to areas of eczema craquele on legs, hands, and abdomen  Elevate legs  If no medical/surgical contraindication, then can start Trental 800 mg TID with meals for the pain  2. Leg ulcers- Likely arterial due to significant PAD history, but the patient does also have component of chronic venous insufficiency and this may also be contributing to the ulcers. Often arterial ulcers are more punched out in appearance, while venous ulcers are more superficial with granulation tissue.    Management per vascular surgery and as above    Thank you for the dermatology consultation. Please do not hesitate to contact the dermatology resident/faculty on call for any additional questions or concerns. We will sign off at this time    Xena Sanabria MD  Internal Medicine-Dermatology, PGY-2    I have seen and examined this patient and agree with the assessment and plan as documented in the resident's note    Td Martinez MD  Dermatology Attending      Dermatology Problem List:  1. Leg ulcers- likely arterial due to PAD but also has chronic venous insufficiency  2. Chronic venous insufficiency  3. Eczema craquele    Date of Admission: Stan 3, 2018  "  Encounter Date: 1/11/2018     Reason for Consultation:   Rash    History of Present Illness:  Mr. Boom Kahn is a 67 year old male with severe PAD (pending revascularization therapy) who was admitted for worsening pain in lower extremities and right shin wound. Dermatology was consulted for rash.   The patient states that he has started to develop a pruritic rash on his lower extremities and thighs. He mentions he has dry skin.  He states that the leg ulcers are quite painful and he has started to get more redness around the lesions. He mentions sensitivity to numerous topical, including \"saline.\" He feels the rash improved after not putting anything on his legs, but after \"the saline\" the pruritic rash got worse. He also mentions he has redness and irritation on his hands.      Past Medical History:   Patient Active Problem List   Diagnosis     Ischemic cardiomyopathy     CHF (congestive heart failure) (H)     PVD (peripheral vascular disease) (H)     COPD (chronic obstructive pulmonary disease) (H)     Anxiety     Atherosclerosis of native arteries of extremities with intermittent claudication, bilateral legs (H)     Automatic implantable cardioverter-defibrillator in situ     Depression     Cardiomyopathy (H)     Hyperlipidemia     HTN (hypertension)     Lung mass     Dyspnea on exertion     Malignant neoplasm of lung (H)     MI (myocardial infarction)     Past Medical History:   Diagnosis Date     Anxiety      CAD (coronary artery disease)     s/p 3v CABG     CHF (congestive heart failure) (H)     EF 10-15%     Chronic pain      COPD (chronic obstructive pulmonary disease) (H)      Depression      Hyperlipidemia      Hypertension      Lung cancer (H)     s/p radiation therapy     PAD (peripheral artery disease) (H)     s/p lower extremity stents     Tobacco abuse      Past Surgical History:   Procedure Laterality Date     ANGIOPLASTY Right 10/2016     BYPASS GRAFT ARTERY CORONARY  1999    [place] "     cardiac catheterization       Cardiac defibrillator placement       CHEST TUBE INSERTION       COLON SURGERY  2008    colon resection for diverticulitis     FINE NEEDLE ASPIRATION Right 12/2016     IMPLANT PACEMAKER       previous radiation           Social History:  Reviewed    Family History:  There is no family history of melanoma.    Medications:  Current Facility-Administered Medications   Medication     diphenhydrAMINE (BENADRYL) capsule 25 mg    Or     diphenhydrAMINE (BENADRYL) injection 25 mg     fentaNYL (DURAGESIC) patch REMOVAL     fentaNYL (DURAGESIC) 12 mcg/hr 72 hr patch 1 patch     fentaNYL (DURAGESIC) 25 mcg/hr 72 hr patch 1 patch     HYDROmorphone (DILAUDID) tablet 4-6 mg     potassium chloride SA (K-DUR/KLOR-CON M) CR tablet 20-40 mEq     potassium chloride (KLOR-CON) Packet 20-40 mEq     potassium chloride 10 mEq in 100 mL sterile water intermittent infusion (premix)     potassium chloride 10 mEq in 100 mL intermittent infusion with 10 mg lidocaine     potassium chloride 20 mEq in 50 mL intermittent infusion     magnesium sulfate 2 g in NS intermittent infusion ([facility]ar[facility] or [facility] Cmpd)     magnesium sulfate 4 g in 100 mL sterile water (premade)     [Rx hold ] ketamine (KETALAR) 5%-gabapentin (NEURONTIN) 8%-lidocaine 2.5% topical PLO cream     gabapentin (NEURONTIN) capsule 300 mg     gabapentin (NEURONTIN) tablet 600 mg     senna-docusate (SENOKOT-S;PERICOLACE) 8.6-50 MG per tablet 2 tablet     polyethylene glycol (MIRALAX/GLYCOLAX) Packet 17 g     HYDROmorphone (PF) (DILAUDID) injection 0.3-0.5 mg     bisacodyl (DULCOLAX) Suppository 10 mg     spironolactone (ALDACTONE) tablet 25 mg     lidocaine 1 % 1 mL     lidocaine (LMX4) kit     sodium chloride (PF) 0.9% PF flush 3 mL     sodium chloride (PF) 0.9% PF flush 3 mL     nitroGLYcerin (NITROSTAT) sublingual tablet 0.4 mg     aspirin EC EC tablet 81 mg     naloxone (NARCAN) injection 0.1-0.4 mg     Percutaneous Coronary Intervention orders  placed (this is information for BPA alerting)     Continuing antiplatelet from home medication list OR antiplatelet order already placed during this visit     Continuing beta blocker from home medication list OR beta blocker order already placed during this visit     Continuing ACE inhibitor/ARB/ARNI from home medication list OR ACE inhibitor/ARB order already placed during this visit     Continuing statin from home medication list OR statin order already placed during this visit     folic acid (FOLVITE) tablet 1 mg     ipratropium - albuterol 0.5 mg/2.5 mg/3 mL (DUONEB) neb solution 3 mL     isosorbide mononitrate (IMDUR) 24 hr tablet 60 mg     carvedilol (COREG) tablet 6.25 mg     clopidogrel (PLAVIX) tablet 75 mg     enalapril (VASOTEC) tablet 2.5 mg     escitalopram (LEXAPRO) tablet 20 mg     ezetimibe (ZETIA) tablet 10 mg     multivitamin, therapeutic with minerals (THERA-VIT-M) tablet 1 tablet     predniSONE (DELTASONE) tablet 10 mg     rosuvastatin (CRESTOR) tablet 20 mg     Patient is already receiving anticoagulation with heparin, enoxaparin (LOVENOX), warfarin (COUMADIN)  or other anticoagulant medication     senna-docusate (SENOKOT-S;PERICOLACE) 8.6-50 MG per tablet 1 tablet    Or     senna-docusate (SENOKOT-S;PERICOLACE) 8.6-50 MG per tablet 2 tablet     ondansetron (ZOFRAN-ODT) ODT tab 4 mg    Or     ondansetron (ZOFRAN) injection 4 mg     fentaNYL (DURAGESIC) Patch in Place     nicotine Patch in Place     nicotine patch REMOVAL     nicotine (NICODERM CQ) 7 MG/24HR 24 hr patch 1 patch          Allergies   Allergen Reactions     Betadine [Povidone Iodine] Blisters     Pt reports erythema, increased pain, and blistering when using betadine on R big toe in past     Collagenase Clostridium Histolyticum      Flagyl [Metronidazole] Other (See Comments)     Flu symptoms  Flu like symptoms     Iodine Unknown     Santyl [Collagenase]          Review of Systems:  -As per HPI      Physical exam:  Vitals: /50  "(BP Location: Left arm)  Pulse 60  Temp 97.5  F (36.4  C) (Oral)  Resp 18  Ht 0.68 m (2' 2.77\")  Wt 64.2 kg (141 lb 8 oz)  SpO2 97%  .8 kg/m2  GEN: This is a well developed, well-nourished male in no acute distress, in a pleasant mood.    SKIN: Total skin excluding the undergarment areas was performed. The exam included the head/face, neck, both arms, chest, back, abdomen, both legs, digits and/or nails.   -Right lower extremity shin with large 4x4 cm crusted plaque. Surrounding is red patch of circumferential erythema. Proximally up the leg, the patient has developed lace-like scaly plaques and on the upper thighs the patient has more confluent scaly pink plaques. The LLE ulcer is bandaged well.   -Dorsum of hands with thin red scaly plaques  -Abdomen with thin scaly pink plaques, well demarcated in areas of prior fentanyl patches  -No other lesions of concern on areas examined.     Laboratory:  Results for orders placed or performed during the hospital encounter of 01/03/18 (from the past 24 hour(s))   Psychiatry IP Consult: hallucinations ? delirium; Consultant may enter orders: Yes; Patient to be seen: Routine; Call back #: [phone]    Jair Servin MD     1/11/2018  1:41 PM  Patient seen and chart reviewed. Formal consult   dictated.(initial)    Jair Hannah MD   Potassium   Result Value Ref Range    Potassium 3.7 3.4 - 5.3 mmol/L       Dr. Martinez staffed the patient.    Staff Involved:  Resident(Xena Sanabria)/Staff(as above)        "

## 2018-01-11 NOTE — PROGRESS NOTES
Mr. Burns is here with advanced prefer a vascular disease and significant risk of limbloss with active wounds and rest pain. He came here with an emergency admission and an urgent work up was recognized to have critical coronary disease and required coronary intervention last week. I ve been reluctant to rush into surgery to ensure the stability of his coronary intervention, but he cannot be discharged and on the go elective operation for his limb in my opinion because of the high risk of progression of his disease to the point where he result an amputation. I think it is reasonableTo plan for surgery this coming Monday, about one week from the time of his coronary intervention. I will keep him in hospital until then. Will also keep wound care involved as well as the pain service due to his cHronic neuropathic and ischemic pain.

## 2018-01-11 NOTE — PLAN OF CARE
Problem: Patient Care Overview  Goal: Plan of Care/Patient Progress Review  Discharge Planner PT 5B  Patient plan for discharge: did not state  Current status: completes functional transfers and short distance ambulation with SBA with use of FWW. Mild LOB noted without use of FWW when ambulating around EOB.  Barriers to return to prior living situation: safety; limited assist available; pain/medical complexity  Recommendations for discharge: rehab  Rationale for recommendations: to progress functional strength, mobility and endurance.        Entered by: Td Tran 01/11/2018 12:56 PM

## 2018-01-11 NOTE — PROGRESS NOTES
Patient: Boom Kahn  Date of Service: January 11, 2018 Admission Date:1/3/2018   * No surgery date entered *     Chief Pain Endorsement: right shin pain    Recommendations were discussed and relayed to Dr. Snowden  Plan was reviewed by the Inpatient Pain Service and staff attending, Dr. Sainz      1. Increase fentanyl patch to 37.5 mcg/hr TD q72h  2. Decrease hydromorphone to 4-6 mg PO q3h PRN moderate to severe pain  3. Continue hydromorphone 0.3 - 0.5 mg IV q6h PRN severe pain; use oral opioids first  4.  Continue gabapentin PO at 300 mg in the morning, 300 mg in the afternoon, and 600 mg in the evening.  5.  Discussed epidural catheter with staff attending and RAPS team; consider putting in an epidural catheter only if patient is off aspirin and clopidogrel. Peripheral catheter would not be an option in this case, as location covered by peripheral catheter would not be adequate; epidural would be preferred.  6.  Bowel regimen per primary team to prevent opioid induced constipation.  7.  When patient is discharged, could consider initiating PO methadone in the outpatient setting    Pain Service will Continue to follow at this time.     Thank you for consulting the Inpatient Pain Management Service. The above recommendations are to be acted upon at the primary team s discretion.     To reach us:  Mon - Friday 8 AM - 3 PM: Pager [phone] (Text Page)  After hours, weekends and holidays: Primary service should call [phone] for the on-call pain specialist    PAIN MEDICATION SAFE USE:   Prior to discharge instruct patient on the following in addition to the medication fact sheet:    Caution: these medications can cause sedation    Take prescription medicine only if it has been prescribed by your doctor    Do not take more medicine or take it more often than instructed     Call your doctor if pain gets worse    Never mix pain medicine with alcohol, sleeping pills, or any illicit drugs    Do not operate  heavy machinery, including vehicles, when initiation opioid therapy or increasing dosage    Store prescription opioids in a locked container, whenever possible     Dispose of unused opioids appropriately     Do not stop abruptly once at higher doses.  These medications must be tapered off.    Opioid pain medications do carry the risk for physical dependence and addiction and patients should be counseled about this.         1. Acute pain in lower extremities associated with B/L ulcers anterior tibia due to PVD - XR negative for osteomyelitis  2. Chronic pain syndrome  3. Pt is opiate tolerant  4. Tobacco abuse 0.5-1.0PPD for 50yrs  5. ETOH abuse - on [facility] protocol  6. Hypertension  7. Ischemic cardiomyopathy (MI 2008), Congestive heart failure (EF ~25%)  8. Coronary Artery Disease s/p 3 vessel CABG 1999 with multiple stents and ICD placed  9. Chronic anticoagulation on plavix 75mg daily and aspirin 81 mg daily   10. Right upper lobe lung cancer s/p radiation therapy  11. Peripheral Vascular disease s/p stenting of right iliac, right common Femoral, right SFA and 3-4 stents in left leg in [place]     -- Outpatient opioid requirements prior to admission:   --Fentanyl 25 mcg/hr, 1 patch TD Q 48 hours (patient changes patch every 2-3 days in an inconsistent manner). Last filled 12/27/17 for 15 patches  --Oxycodone acetaminophen 10/325 mg tabs, Qty 90 tabs for 15 days supply. Takes on average 6 tablets per day      --Adjuvants: gabapentin 300 mg TID  --Others: alprazolam 0.5 mg TID PRN     Primary Care Provider: Willian Arrington  Chronic Pain Provider: none, opioids prescribed by Dr. Arrington prior to admission     Interval History:  Boom Kahn was seen today (January 11, 2018). Pt expresses distress over his pain control; endorses 10/10 pain in his right shin (in ulcer and surrounding erythemic skin); pt is resting in bed, eating breakfast, and appears to be moderately comfortable. Pt is able to ambulate in room and in  "horta today; reports pain is improved if he hangs right leg over edge of bed \"below my heart.\" Pain is worse with movement. Overnight, pt was exhibiting confusion; pt was given hydromorphone 6 mg x 2 doses overnight and this morning instead of prior dosing of 8 mg q3h PRN pt received consistently during previous day. Pt is moderately forgetful this morning; is unable to recall names of some of his current and past medications but does not seem to be overly confused or lethargic. Patient expresses concern that his pain has not been managed to his satisfaction since admission. Explained to patient that his goal of \"being pain-free\" may be unrealistic at this time given the nature of his wounds, especially prior to surgery. Pt states he is nervous that having uncontrolled pain before surgery may make his recovery more difficult. His last bowel movement was today, and it was soft.   Would opt to increase fentanyl over increasing PO hydromorphone, since pt expresses concern with absorption issues and stated that IV hydromorphone was most effective. Additionally, would not opt to increase PO hydromorphone in order to avoid peaks and troughs that may be contributing to sedation/confusion.      CAPA (Clinically Aligned Pain Assessment):    Comfort (How is your pain?): Intolerable  Change in Pain (Since your last medication/intervention?): About the same  Pain Control (How are your pain treatments working?):  Inadequate pain control  Functioning (Are you able to do activities to get better?) : Can do most things, but pain gets in the way of some   Sleep (Does your pain management allow you to sleep or rest?): Awake with pain most of night      FUNCTIONAL STATUS:  Change:      Staying the same  Oral intake:     Regular  Activity level:     Ambulating in horta  Mood:      Irritable; adjusting to situation    -- Inpatient Medications Related to Pain Management:   Medications related to Pain Management (Future)    Start     " Dose/Rate Route Frequency Ordered Stop    01/11/18 0852  diphenhydrAMINE (BENADRYL) capsule 25 mg      25 mg Oral EVERY 6 HOURS PRN 01/11/18 0853      01/11/18 0852  diphenhydrAMINE (BENADRYL) injection 25 mg      25 mg  over 1-2 Minutes Intravenous EVERY 6 HOURS PRN 01/11/18 0853      01/08/18 2100  gabapentin (NEURONTIN) tablet 600 mg      600 mg Oral AT BEDTIME 01/08/18 1525      01/08/18 2000  senna-docusate (SENOKOT-S;PERICOLACE) 8.6-50 MG per tablet 2 tablet      2 tablet Oral 2 TIMES DAILY 01/08/18 1534      01/08/18 1600  gabapentin (NEURONTIN) capsule 300 mg      300 mg Oral 2 TIMES DAILY 01/08/18 1525      01/08/18 1545  polyethylene glycol (MIRALAX/GLYCOLAX) Packet 17 g      17 g Oral DAILY 01/08/18 1534      01/08/18 1530  [Rx hold ]  ketamine (KETALAR) 5%-gabapentin (NEURONTIN) 8%-lidocaine 2.5% topical PLO cream     (Rx hold  since 01/08/18 1552)     Topical HOLD 01/08/18 1524      01/07/18 2130  bisacodyl (DULCOLAX) Suppository 10 mg      10 mg Rectal DAILY PRN 01/07/18 2130      01/07/18 0934  HYDROmorphone (DILAUDID) tablet 6-8 mg      6-8 mg Oral EVERY 3 HOURS PRN 01/07/18 0945      01/07/18 0800  HYDROmorphone (PF) (DILAUDID) injection 0.3-0.5 mg      0.3-0.5 mg Intravenous EVERY 6 HOURS PRN 01/07/18 0754      01/05/18 1945  aspirin EC EC tablet 81 mg      81 mg Oral DAILY 01/05/18 1933      01/05/18 1933  lidocaine 1 % 1 mL      1 mL Other EVERY 1 HOUR PRN 01/05/18 1933      01/05/18 1933  lidocaine (LMX4) kit       Topical EVERY 1 HOUR PRN 01/05/18 1933      01/03/18 2300  fentaNYL (DURAGESIC) 25 mcg/hr 72 hr patch 1 patch      25 mcg Transdermal EVERY 72 HOURS 01/03/18 2253 01/03/18 2253  senna-docusate (SENOKOT-S;PERICOLACE) 8.6-50 MG per tablet 1 tablet      1 tablet Oral 2 TIMES DAILY PRN 01/03/18 2253 01/03/18 2253  senna-docusate (SENOKOT-S;PERICOLACE) 8.6-50 MG per tablet 2 tablet      2 tablet Oral 2 TIMES DAILY PRN 01/03/18 2253            LAB DATA:    Recent Labs  Lab  01/09/18  0610 01/08/18  0739 01/07/18  0624   CR 0.57* 0.68 0.53*   WBC 8.5 11.8* 9.3   HGB 12.8* 13.3 13.4         ----------------------------------------------------------------------------------  Graciela Gilliam PharmD  Inpatient Pain Service     To reach us:  Mon - Friday 8 AM - 3 PM: Pager [phone] (Text Page)  After hours, weekends and holidays: Primary service should call [phone] for the on-call pain specialist    Helpful Resources:  Getting Rid of Unwanted Medications (printable PDF for patients)   Opioid Overdose Prevention Toolkit (printable PDF for patients)   Prescription Opioids: What You Need To Know (printable PDF for patients)

## 2018-01-11 NOTE — PROGRESS NOTES
"[facility] Nurse Inpatient Wound Assessment     Initial Assessment of wound(s) on pt's:   Bilateral Lower extremity wounds         Data:   Patient History:      per MD note(s): Boom Kahn is a 67 year old male with a past medical history significant for HTN, anxiety/depression, alcohol abuse (8-10 beers/day), tobacco abuse (1/2-1 PPD, 50 years), ischemic cardiomyopathy (MI ), CAD s/p 3v CABG () and 3-4 stents, HFrEF (last EF 25-30%, approximately 6 months ago) s/p ICD, , R lung cancer s/p radiation therapy,  peripheral vascular disease s/p stenting of R iliac, R common femoral, right SFA, and \"3-4 stents in the left leg\" in [place], and chronic pain who presented to the ED complaining of worsening pain in his lower extremities and right shin wound.      Patient states he has a history of 17 years of progressive claudication.  However, since 2015 his symptoms have become much worse.  Since July patient reports multiple minor traumas which have developed into chronic wounds.  Currently, he complains of pain with walking and at rest, pain is improved with rest, denies any radiation, pain is localized to lower calf bilaterally, pain is 10/10 even at rest.  Recently a right lower extremity wound has progressed, and several new wounds are noted in his right toes.   Pain is sufficient to complicate weightbearing.  Pain significantly impacts his ability to ambulate, restricted him to an estimated 30 feet before having to stop. He denies any history or symptoms of related infections including fevers, chills, or drainage.         Current Diet / Nutrition:         Active Diet Order      2 Gram Sodium Diet           Chance Assessment and sub scores:   Chance Score  Av  Min: 19  Max: 19     Labs:      Recent Labs   Lab Test  18   0600  18   1647   ALBUMIN   --   3.5   HGB  14.1  14.3   RBC  4.26*  4.22*   WBC  10.0  10.2   PLT  248  241   INR  1.01  0.99   CRP   --   6.6      " Wound Assessment (location #1Bilateral lower extremity wounds)  Wound History:  Was to have LE bypass surg 1/9 which was cancelled 2/2 insurance.  Awaiting re-schedule    Wound Base:   non-granulation    Specific Dimensions (length x width x depth, in cm) :       Right: 4 x 4 x at least 0.2 cm.  Firmly adherent dried drainage/slough    Left: 4 x 2.5 x 0.2 cm and 1 x 1 x 0.1 cm yellow dried drainage.  Pt states he's been leaving open to air.     Tunneling:  N/A    Undermining: N/A    Palpation of the wound bed:  boggy    Slough appearance:adherent  on the left leg wound     Eschar appearance:  dry and black  on the right leg wound     Periwound Skin: edema,      Color: normal and consistent with surrounding tissue    Temperature  cool    Drainage: . Amount: small . Color: serosanguinous     Odor: none    Pain:  moderate , sharp          Intervention:     Patient's chart evaluated.      Wound(s) was fully assessed    Wound Care: was done:      Orders  Written    Supplies  Reviewed and Ordered: as indicated belwo    Discussed plan of care with Patient and Nurse          Assessment:   Lower extremity wound          Plan:     Nursing to notify the Provider(s) and re-consult the WOC Nurse if wound(s) deteriorate(s).    Plan of care for wound located on Right anterior shin wound : Paint with betadine allow to dry and cover with mepilex dressing change dressing every other day        Plan of care for wound located on Left anterior shin wound : cleanse the wound with Vashe solution pat dry           moisten a 2x2 with Vashe solution place over the wound and secure with Mepilex dressing change        dressing Daily      Plan of care for wound located on toes Paint with betadine allow to dry weave lambs wool between the toes     change dressing every other day    WOC Nurse will return: Weekly     Face to face time: 20 Minutes

## 2018-01-12 ENCOUNTER — MEDICAL CORRESPONDENCE (OUTPATIENT)
Dept: HEALTH INFORMATION MANAGEMENT | Facility: CLINIC | Age: 68
End: 2018-01-12

## 2018-01-12 LAB
ALBUMIN SERPL-MCNC: 3.2 G/DL (ref 3.4–5)
ALP SERPL-CCNC: 50 U/L (ref 40–150)
ALT SERPL W P-5'-P-CCNC: 27 U/L (ref 0–70)
ANION GAP SERPL CALCULATED.3IONS-SCNC: 10 MMOL/L (ref 3–14)
AST SERPL W P-5'-P-CCNC: 19 U/L (ref 0–45)
BILIRUB SERPL-MCNC: 0.5 MG/DL (ref 0.2–1.3)
BUN SERPL-MCNC: 6 MG/DL (ref 7–30)
CALCIUM SERPL-MCNC: 8.6 MG/DL (ref 8.5–10.1)
CHLORIDE SERPL-SCNC: 102 MMOL/L (ref 94–109)
CO2 SERPL-SCNC: 23 MMOL/L (ref 20–32)
CREAT SERPL-MCNC: 0.57 MG/DL (ref 0.66–1.25)
CRP SERPL-MCNC: 32 MG/L (ref 0–8)
ERYTHROCYTE [DISTWIDTH] IN BLOOD BY AUTOMATED COUNT: 13.5 % (ref 10–15)
GFR SERPL CREATININE-BSD FRML MDRD: >90 ML/MIN/1.7M2
GLUCOSE SERPL-MCNC: 91 MG/DL (ref 70–99)
HCT VFR BLD AUTO: 36.1 % (ref 40–53)
HGB BLD-MCNC: 12 G/DL (ref 13.3–17.7)
MCH RBC QN AUTO: 32.9 PG (ref 26.5–33)
MCHC RBC AUTO-ENTMCNC: 33.2 G/DL (ref 31.5–36.5)
MCV RBC AUTO: 99 FL (ref 78–100)
PLATELET # BLD AUTO: 258 10E9/L (ref 150–450)
POTASSIUM SERPL-SCNC: 3.6 MMOL/L (ref 3.4–5.3)
PROCALCITONIN SERPL-MCNC: <0.05 NG/ML
PROT SERPL-MCNC: 6.4 G/DL (ref 6.8–8.8)
RBC # BLD AUTO: 3.65 10E12/L (ref 4.4–5.9)
SODIUM SERPL-SCNC: 135 MMOL/L (ref 133–144)
WBC # BLD AUTO: 8.6 10E9/L (ref 4–11)

## 2018-01-12 PROCEDURE — 25000132 ZZH RX MED GY IP 250 OP 250 PS 637: Performed by: STUDENT IN AN ORGANIZED HEALTH CARE EDUCATION/TRAINING PROGRAM

## 2018-01-12 PROCEDURE — 25000125 ZZHC RX 250: Performed by: STUDENT IN AN ORGANIZED HEALTH CARE EDUCATION/TRAINING PROGRAM

## 2018-01-12 PROCEDURE — 36415 COLL VENOUS BLD VENIPUNCTURE: CPT | Performed by: INTERNAL MEDICINE

## 2018-01-12 PROCEDURE — 25000132 ZZH RX MED GY IP 250 OP 250 PS 637: Performed by: PHYSICIAN ASSISTANT

## 2018-01-12 PROCEDURE — 25000132 ZZH RX MED GY IP 250 OP 250 PS 637: Performed by: INTERNAL MEDICINE

## 2018-01-12 PROCEDURE — 85027 COMPLETE CBC AUTOMATED: CPT | Performed by: INTERNAL MEDICINE

## 2018-01-12 PROCEDURE — 86140 C-REACTIVE PROTEIN: CPT | Performed by: INTERNAL MEDICINE

## 2018-01-12 PROCEDURE — 12000008 ZZH R&B INTERMEDIATE UMMC

## 2018-01-12 PROCEDURE — 25000132 ZZH RX MED GY IP 250 OP 250 PS 637: Performed by: PEDIATRICS

## 2018-01-12 PROCEDURE — 80053 COMPREHEN METABOLIC PANEL: CPT | Performed by: INTERNAL MEDICINE

## 2018-01-12 PROCEDURE — 99233 SBSQ HOSP IP/OBS HIGH 50: CPT | Performed by: INTERNAL MEDICINE

## 2018-01-12 PROCEDURE — 84145 PROCALCITONIN (PCT): CPT | Performed by: INTERNAL MEDICINE

## 2018-01-12 PROCEDURE — 25000132 ZZH RX MED GY IP 250 OP 250 PS 637: Performed by: NURSE PRACTITIONER

## 2018-01-12 RX ORDER — ACETAMINOPHEN 325 MG/1
650 TABLET ORAL EVERY 8 HOURS
Status: DISCONTINUED | OUTPATIENT
Start: 2018-01-12 | End: 2018-01-21

## 2018-01-12 RX ORDER — MINERAL OIL/HYDROPHIL PETROLAT
OINTMENT (GRAM) TOPICAL
Status: DISCONTINUED | OUTPATIENT
Start: 2018-01-12 | End: 2018-01-23 | Stop reason: HOSPADM

## 2018-01-12 RX ORDER — FENTANYL 25 UG/1
25 PATCH TRANSDERMAL
Status: DISCONTINUED | OUTPATIENT
Start: 2018-01-15 | End: 2018-01-12

## 2018-01-12 RX ORDER — FENTANYL 25 UG/1
25 PATCH TRANSDERMAL
Status: DISCONTINUED | OUTPATIENT
Start: 2018-01-12 | End: 2018-01-16

## 2018-01-12 RX ORDER — OLANZAPINE 5 MG/1
5 TABLET, ORALLY DISINTEGRATING ORAL AT BEDTIME
Status: DISCONTINUED | OUTPATIENT
Start: 2018-01-12 | End: 2018-01-23 | Stop reason: HOSPADM

## 2018-01-12 RX ORDER — ACETAMINOPHEN 325 MG/1
650 TABLET ORAL EVERY 4 HOURS PRN
Status: DISCONTINUED | OUTPATIENT
Start: 2018-01-12 | End: 2018-01-12

## 2018-01-12 RX ORDER — OLANZAPINE 2.5 MG/1
2.5 TABLET, FILM COATED ORAL ONCE
Status: COMPLETED | OUTPATIENT
Start: 2018-01-12 | End: 2018-01-12

## 2018-01-12 RX ORDER — MOMETASONE FUROATE 1 MG/G
CREAM TOPICAL DAILY
Status: DISCONTINUED | OUTPATIENT
Start: 2018-01-12 | End: 2018-01-17

## 2018-01-12 RX ADMIN — EZETIMIBE 10 MG: 10 TABLET ORAL at 20:29

## 2018-01-12 RX ADMIN — MULTIPLE VITAMINS W/ MINERALS TAB 1 TABLET: TAB at 08:36

## 2018-01-12 RX ADMIN — ENALAPRIL MALEATE 2.5 MG: 2.5 TABLET ORAL at 08:37

## 2018-01-12 RX ADMIN — PREDNISONE 10 MG: 10 TABLET ORAL at 08:36

## 2018-01-12 RX ADMIN — ISOSORBIDE MONONITRATE 60 MG: 60 TABLET, EXTENDED RELEASE ORAL at 08:36

## 2018-01-12 RX ADMIN — ACETAMINOPHEN 650 MG: 325 TABLET ORAL at 16:10

## 2018-01-12 RX ADMIN — ENALAPRIL MALEATE 2.5 MG: 2.5 TABLET ORAL at 20:29

## 2018-01-12 RX ADMIN — FENTANYL 1 PATCH: 12 PATCH TRANSDERMAL at 10:20

## 2018-01-12 RX ADMIN — CLOPIDOGREL 75 MG: 75 TABLET, FILM COATED ORAL at 20:29

## 2018-01-12 RX ADMIN — Medication: at 20:30

## 2018-01-12 RX ADMIN — HYDROMORPHONE HYDROCHLORIDE 6 MG: 2 TABLET ORAL at 21:54

## 2018-01-12 RX ADMIN — HYDROMORPHONE HYDROCHLORIDE 4 MG: 2 TABLET ORAL at 18:14

## 2018-01-12 RX ADMIN — FOLIC ACID 1 MG: 1 TABLET ORAL at 08:36

## 2018-01-12 RX ADMIN — FENTANYL 1 PATCH: 25 PATCH, EXTENDED RELEASE TRANSDERMAL at 16:11

## 2018-01-12 RX ADMIN — CARVEDILOL 6.25 MG: 6.25 TABLET, FILM COATED ORAL at 18:14

## 2018-01-12 RX ADMIN — OLANZAPINE 2.5 MG: 2.5 TABLET, FILM COATED ORAL at 06:08

## 2018-01-12 RX ADMIN — CARVEDILOL 6.25 MG: 6.25 TABLET, FILM COATED ORAL at 08:36

## 2018-01-12 RX ADMIN — SENNOSIDES AND DOCUSATE SODIUM 2 TABLET: 8.6; 5 TABLET ORAL at 08:37

## 2018-01-12 RX ADMIN — NICOTINE 1 PATCH: 7 PATCH, EXTENDED RELEASE TRANSDERMAL at 20:29

## 2018-01-12 RX ADMIN — Medication: at 08:44

## 2018-01-12 RX ADMIN — DIPHENHYDRAMINE HYDROCHLORIDE 25 MG: 25 CAPSULE ORAL at 02:09

## 2018-01-12 RX ADMIN — SENNOSIDES AND DOCUSATE SODIUM 1 TABLET: 8.6; 5 TABLET ORAL at 20:29

## 2018-01-12 RX ADMIN — ROSUVASTATIN CALCIUM 20 MG: 20 TABLET, FILM COATED ORAL at 20:29

## 2018-01-12 RX ADMIN — ACETAMINOPHEN 650 MG: 325 TABLET ORAL at 22:51

## 2018-01-12 RX ADMIN — GABAPENTIN 300 MG: 300 CAPSULE ORAL at 08:37

## 2018-01-12 RX ADMIN — Medication 12.5 MG: at 02:09

## 2018-01-12 RX ADMIN — HYDROMORPHONE HYDROCHLORIDE 6 MG: 2 TABLET ORAL at 10:13

## 2018-01-12 RX ADMIN — FENTANYL 1 PATCH: 25 PATCH, EXTENDED RELEASE TRANSDERMAL at 10:13

## 2018-01-12 RX ADMIN — HYDROMORPHONE HYDROCHLORIDE 6 MG: 2 TABLET ORAL at 06:08

## 2018-01-12 RX ADMIN — GABAPENTIN 600 MG: 600 TABLET, FILM COATED ORAL at 20:29

## 2018-01-12 RX ADMIN — HYDROMORPHONE HYDROCHLORIDE 6 MG: 2 TABLET ORAL at 02:17

## 2018-01-12 RX ADMIN — ESCITALOPRAM OXALATE 20 MG: 20 TABLET ORAL at 08:36

## 2018-01-12 RX ADMIN — ASPIRIN 81 MG: 81 TABLET, COATED ORAL at 08:36

## 2018-01-12 ASSESSMENT — ACTIVITIES OF DAILY LIVING (ADL)
GROOMING: INDEPENDENT
ORAL_HYGIENE: INDEPENDENT
ORAL_HYGIENE: INDEPENDENT
DRESS: STREET CLOTHES
LAUNDRY: UNABLE TO COMPLETE
GROOMING: INDEPENDENT
DRESS: STREET CLOTHES;INDEPENDENT

## 2018-01-12 NOTE — PROGRESS NOTES
01/12/18 1400   Behavioral Health   Hallucinations auditory;visual;appears responding   Thinking confused   Orientation place, disoriented;date, disoriented;time, disoriented;person: oriented   Memory new learning, recall loss   Insight poor   Judgement impaired   Eye Contact at examiner   Affect full range affect   Mood anxious   Physical Appearance/Attire attire appropriate to age and situation   Hygiene well groomed   Suicidality other (see comments)  (None stated or observed )   1. Wish to be Dead No   2. Non-Specific Active Suicidal Thoughts  No   3. Active Sucidal Ideation with any Methods (Not Plan) Without Intent to Act  No   4. Active Suicidal Ideation with Some Intent to Act, Without Specific Plan  No   5. Active Suicidal Ideation with Specific Plan and Intent  No   Duration (Lifetime) NA   Change in Protective Factors? Yes (see comments)  (No permanant living arrangement locally)   Enviromental Risk Factors Medical bed   Self Injury other (see comment)  (None stated or observed)   Elopement Statements about wanting to leave   Activity restless   Speech clear;coherent   Medication Sensitivity sedation   Psychomotor / Gait unsteady  (Uses walker)   Activities of Daily Living   Hygiene/Grooming independent   Oral Hygiene independent   Dress street clothes;independent   Laundry unable to complete   Room Organization unable   Bedside Attendant   Attendant Observation Sleeping     Pt was reportedly up for most of the night with acute agitation (See RN note). Awake when writer arrived at approximately 0715. Pt is only intermittently oriented to place, date, time, and situation. Generally confused, restless, and appeared to be responding to internal stimuli intermittently. Of note, pt had several instances in which he asked writer to repeat self even though writer did not say anything. On another occasion, pt pointed and referred to a perceived individual who was not in the room. Also made mention of there  "being a stove in the room and \"going to my bed upstairs.\" However, pt is receptive to reorientation and verbal redirection and is able to answer direct questions logically. Able to make needs known. Generally restless when awake, picks at items and bedding. At times insists on walking purposefully with no stated intention or destination. Seems to have little insight of unsteadiness on feet, frequently requires prompting to use walker and plan movements when ambulating. No episodes of acute agitation. Did sleep approximately 3 hours this afternoon. Has not eaten today as patient declined to order food when prompted. Son present and helpful at bedside. Pasquale AMES. 1/12/18  "

## 2018-01-12 NOTE — PROGRESS NOTES
Patient: Boom Kahn  Date of Service: January 12, 2018 Admission Date:1/3/2018   * No surgery date entered *     Chief Pain Endorsement: right shin pain    Recommendations were relayed to Dr. Snowden  Plan was reviewed by the Inpatient Pain Service and staff attending, Dr. Sainz        1. Initiate acetaminophen 650 mg PO q8h  2. Continue fentanyl patch 37 mcg/hr TD q72h  3. Continue hydromorphone 4-6 mg PO q3h PRN moderate to severe pain  4. Continue hydromorphone 0.3 - 0.5 mg IV q6h PRN severe pain; use oral opioids first  5. Continue gabapentin  mg-300 mg-600 mg  6. Bowel regimen per primary team to prevent opioid induced constipation.  7. Discussed epidural catheter with staff attending and RAPS team; consider putting in an epidural catheter only if patient is off aspirin and clopidogrel. Peripheral catheter would not be an option in this case, as location covered by peripheral catheter would not be adequate; epidural would be preferred.      Post surgical plan:  IF patient is no longer delirious and is determined to be a good candidate for PCA:  1. Hydromorphone PCA 0.2 - 0.3 mg with 10 min lockout, no basal rate  2. Resume fentanyl patch at dose pt was receiving immediately prior to surgery  3. Continue gabapentin 300 mg-300 mg-600 mg  4. Continue acetaminophen 650 mg PO q8h    If patient is still delirious and is determined to not be a good candidate for PCA:  1. Continue hydromorphone 4-6 mg PO q3h PRN moderate to severe pain with a low threshold to increase to 6-8 mg q3h PRN if patient is tolerating and pain is uncontrolled  2. Continue hydromorphone 0.3 - 0.5 mg IV q6h PRN severe pain; use oral opioids first  3. Resume fentanyl patch at dose pt was receiving immediately prior to surgery  4. Continue gabapentin 300 mg-300 mg-600 mg  5. Continue acetaminophen 650 mg q8h    Pain Service will Continue to follow at this time.     Thank you for consulting the Inpatient Pain Management Service. The  above recommendations are to be acted upon at the primary team s discretion.     To reach us:  Mon - Friday 8 AM - 3 PM: Pager [phone] (Text Page)  After hours, weekends and holidays: Primary service should call [phone] for the on-call pain specialist    PAIN MEDICATION SAFE USE:   Prior to discharge instruct patient on the following in addition to the medication fact sheet:    Caution: these medications can cause sedation    Take prescription medicine only if it has been prescribed by your doctor    Do not take more medicine or take it more often than instructed     Call your doctor if pain gets worse    Never mix pain medicine with alcohol, sleeping pills, or any illicit drugs    Do not operate heavy machinery, including vehicles, when initiation opioid therapy or increasing dosage    Store prescription opioids in a locked container, whenever possible     Dispose of unused opioids appropriately     Do not stop abruptly once at higher doses.  These medications must be tapered off.    Opioid pain medications do carry the risk for physical dependence and addiction and patients should be counseled about this.         1. Acute pain in lower extremities associated with B/L ulcers anterior tibia due to PVD - XR negative for osteomyelitis  2. Chronic pain syndrome  3. Pt is opiate tolerant  4. Tobacco abuse 0.5-1.0PPD for 50yrs  5. ETOH abuse - on [facility] protocol  6. Hypertension  7. Ischemic cardiomyopathy (MI 2008), Congestive heart failure (EF ~25%)  8. Coronary Artery Disease s/p 3 vessel CABG 1999 with multiple stents and ICD placed  9. Chronic anticoagulation on plavix 75mg daily and aspirin 81 mg daily   10. Right upper lobe lung cancer s/p radiation therapy  11. Peripheral Vascular disease s/p stenting of right iliac, right common Femoral, right SFA and 3-4 stents in left leg in [place]  12. Pt delirious overnight on 1/11-1/12. Quetiapine started 1/11.      -- Outpatient opioid requirements prior to  "admission:   --Fentanyl 25 mcg/hr, 1 patch TD Q 48 hours (patient changes patch every 2-3 days in an inconsistent manner). Last filled 12/27/17 for 15 patches  --Oxycodone acetaminophen 10/325 mg tabs, Qty 90 tabs for 15 days supply. Takes on average 6 tablets per day      --Adjuvants: gabapentin 300 mg TID  --Others: alprazolam 0.5 mg TID PRN      Primary Care Provider: Willian Arrington  Chronic Pain Provider: none, opioids prescribed by Dr. Arrington prior to admission    Interval History:  Boom Kahn was seen today (January 12, 2018). Pt was delirious overnight; attempted to leave AMA and had a code 21 called. Pt is not excessively delirious this morning. Is alert and conversing appropriately, although he did state it was Sunday and that the year was \"19.\" Pt complains of 10/10 pain but appears to be resting comfortably. Of note, pt's right leg wound is leaking fluid today, and pt's son present in room believes patient's lower leg is \"larger\" and more edematous than yesterday. Discussed restarting acetaminophen with patient. This was originally discontinued due to pt request, as he was planning on drinking alcohol; however, pt was agreeable to restarting acetaminophen today. Regarding pt's delirium, it is unlikely that increase in fentanyl patch from 25 to 37 mcg/hr started last night would have contributed significantly to delirium. Pt was on a 25 mcg/hr fentanyl patch prior to admission; additionally pt's overall opioid dose was decreased yesterday due to concurrent decrease in oral hydromorphone dosing. Patient reports getting very little sleep each night (estimates he got 3 hours of sleep in the past 48 hours), which could be a contributing factor to worsening delirium.    His last bowel movement was yesterday, and it was soft.  CAPA (Clinically Aligned Pain Assessment):    Comfort (How is your pain?): Tolerable with discomfort  Change in Pain (Since your last medication/intervention?): About the same  Pain " Control (How are your pain treatments working?):  Partially effective control  Functioning (Are you able to do activities to get better?) : Can do most things, but pain gets in the way of some   Sleep (Does your pain management allow you to sleep or rest?): Awake with pain most of night      FUNCTIONAL STATUS:  Change:      Staying the same  Oral intake:     Regular  Activity level:     Ambulating in horta  Mood:      Mildly confused; adjusting to situation     -- Inpatient Medications Related to Pain Management:   Medications related to Pain Management (Future)    Start     Dose/Rate Route Frequency Ordered Stop    01/11/18 1415  fentaNYL (DURAGESIC) 12 mcg/hr 72 hr patch 1 patch      12 mcg Transdermal EVERY 72 HOURS 01/11/18 1402      01/11/18 1415  fentaNYL (DURAGESIC) 25 mcg/hr 72 hr patch 1 patch      25 mcg Transdermal EVERY 72 HOURS 01/11/18 1402      01/11/18 1402  HYDROmorphone (DILAUDID) tablet 4-6 mg      4-6 mg Oral EVERY 3 HOURS PRN 01/11/18 1402      01/11/18 0852  diphenhydrAMINE (BENADRYL) capsule 25 mg      25 mg Oral EVERY 6 HOURS PRN 01/11/18 0853      01/11/18 0852  diphenhydrAMINE (BENADRYL) injection 25 mg      25 mg  over 1-2 Minutes Intravenous EVERY 6 HOURS PRN 01/11/18 0853      01/08/18 2100  gabapentin (NEURONTIN) tablet 600 mg      600 mg Oral AT BEDTIME 01/08/18 1525      01/08/18 2000  senna-docusate (SENOKOT-S;PERICOLACE) 8.6-50 MG per tablet 2 tablet      2 tablet Oral 2 TIMES DAILY 01/08/18 1534      01/08/18 1600  gabapentin (NEURONTIN) capsule 300 mg      300 mg Oral 2 TIMES DAILY 01/08/18 1525      01/08/18 1545  polyethylene glycol (MIRALAX/GLYCOLAX) Packet 17 g      17 g Oral DAILY 01/08/18 1534      01/08/18 1530  [Rx hold ]  ketamine (KETALAR) 5%-gabapentin (NEURONTIN) 8%-lidocaine 2.5% topical PLO cream     (Rx hold  since 01/08/18 1552)     Topical HOLD 01/08/18 1524      01/07/18 2130  bisacodyl (DULCOLAX) Suppository 10 mg      10 mg Rectal DAILY PRN 01/07/18 3809       01/07/18 0800  HYDROmorphone (PF) (DILAUDID) injection 0.3-0.5 mg      0.3-0.5 mg Intravenous EVERY 6 HOURS PRN 01/07/18 0754      01/05/18 1945  aspirin EC EC tablet 81 mg      81 mg Oral DAILY 01/05/18 1933 01/05/18 1933  lidocaine 1 % 1 mL      1 mL Other EVERY 1 HOUR PRN 01/05/18 1933 01/05/18 1933  lidocaine (LMX4) kit       Topical EVERY 1 HOUR PRN 01/05/18 1933 01/03/18 2253  senna-docusate (SENOKOT-S;PERICOLACE) 8.6-50 MG per tablet 1 tablet      1 tablet Oral 2 TIMES DAILY PRN 01/03/18 2253 01/03/18 2253  senna-docusate (SENOKOT-S;PERICOLACE) 8.6-50 MG per tablet 2 tablet      2 tablet Oral 2 TIMES DAILY PRN 01/03/18 2253            LAB DATA:    Recent Labs  Lab 01/12/18  0712 01/09/18  0610 01/08/18  0739   CR 0.57* 0.57* 0.68   WBC 8.6 8.5 11.8*   HGB 12.0* 12.8* 13.3   AST 19  --   --    ALT 27  --   --          ----------------------------------------------------------------------------------  Graciela Gilliam PharmD  Inpatient Pain Service     To reach us:  Mon - Friday 8 AM - 3 PM: Pager [phone] (Text Page)  After hours, weekends and holidays: Primary service should call [phone] for the on-call pain specialist    Helpful Resources:  Getting Rid of Unwanted Medications (printable PDF for patients)   Opioid Overdose Prevention Toolkit (printable PDF for patients)   Prescription Opioids: What You Need To Know (printable PDF for patients)

## 2018-01-12 NOTE — PLAN OF CARE
Problem: Patient Care Overview  Goal: Plan of Care/Patient Progress Review  Pt a&O x4. VSS on ra. Wound care complete by WOC this morning. Dilaudid x2 dosing decrease today. Fentanyl patches in place back right shoulder blade and left shoulder. Nicotine patch in place R side of chest. Continuing telemetry monitoring. PVCs present. Plan for endartectomy scheduled for next week. Will continue to monitor.

## 2018-01-12 NOTE — CONSULTS
PSYCHIATRIC CONSULTATION       DATE OF SERVICE:  01/12/2017.       IDENTIFICATION:  Mr. Boom Kahn is a 57-year-old white male who is hospitalized and potentially awaiting a fem-pop bypass.  I am asked to reevaluate his delirium by Dr. Snowden.      HISTORY OF PRESENT ILLNESS:  Mr. Kahn apparently became quite agitated last night, demanding to leave, trying to pull out lines and for reasons that are unclear, taking off his pants in front of security.  He did get 2.5 of olanzapine and calmed down somewhat.  On my interview this morning, he was still quite confused.  He felt that people had been loading and unloading something in his room all night.  He had a very difficult time explaining what happened and he was still delusional about what was going on.  His son is very helpful and was in the room.  His son reports that this patient's delirium has been increasing in both frequency and severity for the last 4 days.  The son thinks it has nothing to do with the Seroquel last night but rather thinks this is a trend towards increasing issues with delirium and confusion.      MENTAL STATUS EXAMINATION:  On my interview today, the patient was sitting in his chair.  His shirt was off, but he was pleasant and cooperative.  His mood was neutral.  His affect was somewhat labile.  Speech was coherent and generally goal oriented.  Associations were generally tight and thought processes were logical and linear, except when regarding his belief that people were loading and unloading items throughout the night and he is somewhat confused.  His content of thought has included hallucinations and delusions, not suicidal ideation.  Recent memory is impaired.  Remote memory is better spared.  Fund of knowledge and use of language were within normal limits.  Concentration is impaired.  He is alert and oriented x3 but has been variably oriented.  Last night he demanded to be taken to the [facility] and did not  actually believe that he was here.  His insight and judgment are extremely poor.  Muscle strength and tone appear to be at his baseline.  Recent vital signs include a temperature of 97.2, heart rate is 69, respiration rate of 18 with 96% oxygen saturation and a blood pressure of 122/54.      ASSESSMENT:  Delirium, potentially secondary to infection versus medication.      RECOMMENDATIONS:  Consider decreasing fentanyl, please discontinue Seroquel and try Zyprexa Zydis 2.5 mg b.i.d. p.r.n. and 5 mg at bedtime.  If the patient continues to have severe and potentially life-threatening agitation, I would use Haldol 2-4 mg IV q.4 p.r.n.  I did have an opportunity to discuss the case with Dr. Snowden.         RIAZ PINEDA MD             D: 2018 11:29   T: 2018 11:44   MT: MARGRET      Name:     MARS STARKS   MRN:      [id number]        Account:       [id number]   :      1950           Consult Date:  2018      Document: [id number]

## 2018-01-12 NOTE — PROGRESS NOTES
Social Work Services Progress Note    Hospital Day: 10  Collaborated with:  Primary team Gold 2, pt's son     Data:  Pt is a 67-year-old male who presented c/o worsening pain in his lower extremities and right shin wound. TCU is recommended at discharge.     Intervention:  Pt originally from [place], son lives in [place]. Pt does not have his own residence in [place] at this time. TCU is recommended at discharge. Met with pt and pt's son. Pt currently delirious and on 1:1. Pt's son in agreement with TCU placement. Pt's son lives in [place] in [place] code [address] and would like placement near his home, does not have any specific facilities in mind. Pt has planned surgery on Monday, 1/15. Will send referrals after pt's surgery and/or when off 1:1 attendant.      Assessment:  Pt needs TCU placement    Plan:    Anticipated Disposition:  Facility:  TCU    Barriers to d/c plan:  1:1; medical stability    Follow Up:  SW to follow for discharge planning    SID Rocha, LGSW  5A Unit   Pager [phone]  Phone [phone]

## 2018-01-12 NOTE — PROGRESS NOTES
VASCULAR SURGERY PROGRESS NOTE    SUBJECTIVE:  Patient sitting up in bed, delirious.  Asking to go home.  Threatened to leave AMA last night and was smoking in his room.  72 hour hold was placed.    OBJECTIVE:  Vital signs:  Temp: 97.2  F (36.2  C) Temp src: Oral BP: 122/54 Pulse: 64 Heart Rate: 69 Resp: 18 SpO2: 96 % O2 Device: None (Room air)        Intake/Output Summary (Last 24 hours) at 01/12/18 1025  Last data filed at 01/12/18 0900   Gross per 24 hour   Intake                0 ml   Output              625 ml   Net             -625 ml       Vitals:    01/08/18 2039 01/09/18 2220 01/10/18 1840   Weight: 66.3 kg (146 lb 2.6 oz) 65.8 kg (145 lb 1.6 oz) 64.2 kg (141 lb 8 oz)       PHYSICAL EXAM:  NEURO/PSYCH: The patient is confused and impulsive.   Moves all extremities.    SKIN: warm and dry.  PULMONARY: non-labored breathing, not requiring supplemental oxygen.  EXTREMITIES: lower extremity wounds stable, moderate amount LE edema, right worse than left       BMP  Recent Labs  Lab 01/12/18  0712 01/11/18  1038 01/09/18  0610 01/08/18  0739 01/07/18  0624     --  136 138 137   POTASSIUM 3.6 3.7 3.6 4.0 3.7   CHLORIDE 102  --  103 104 106   CO2 23  --  25 27 25   BUN 6*  --  11 7 7   CR 0.57*  --  0.57* 0.68 0.53*   GLC 91  --  108* 89 78   MAG  --   --  2.2  --   --      CBC  Recent Labs  Lab 01/12/18  0712 01/09/18  0610 01/08/18  0739 01/07/18  0624   WBC 8.6 8.5 11.8* 9.3   HGB 12.0* 12.8* 13.3 13.4    204 235 249     INR/PTT  Recent Labs  Lab 01/09/18  0610   INR 1.05     ABGNo lab results found in last 7 days.  LFT  Recent Labs  Lab 01/12/18  0712   AST 19   ALT 27   ALKPHOS 50   BILITOTAL 0.5   ALBUMIN 3.2*         ASSESSMENT:  67 year old male with PAD,  bilateral lower extremity wounds and right lower extremity critical limb ischemia         PLAN:  - plans for OR on Monday, right femoral endarterectomy with femoral to posterior tibial bypass with in situ greater saphenous vein.  - NPO after  midnight Sunday, pre-op orders placed   - continue to keep right lower extremity wound dry, no topical agents  - left lower extremity wound management per WOC  - continue Plavix and ASA, do not stop for surgery  - appreciate pain team consultation  - psychiatry consulted to evaluate for hallucinations and insomnia, Seroquel started        Shannan COLMENARES, CNS  Division of Vascular Surgery  [facility]  Pager [phone]

## 2018-01-12 NOTE — PROGRESS NOTES
"XC note:    S: Asked to see patient, whom was pulling out IV, threatening to leave.  Pt voiced several times wished to leave hospital.   Over course of 20 minutes voices lack of understanding that is in hospital, later that wishes to transfer to [facility] (did not believe when told he was here), attempted to remove IV in front of examiner (tells me that [facility] rules indicate these cannot be in longer than 2 days, voices that has to show a property in morning (indicates that it is Tuesday AM); and that has been in hospital 3 days.   Later when agitated in hallway with security pulls pants down purposefully)    BP 91/51 (BP Location: Left arm)  Pulse 64  Temp 98.6  F (37  C) (Oral)  Resp 24  Ht 0.68 m (2' 2.77\")  Wt 64.2 kg (141 lb 8 oz)  SpO2 96%  .8 kg/m2  Gen: anxious, agitated.  Occasionally jokes  HEENT: MMM  CV: RRR, no r/g/m  Pulm: CTAB  Abd: soft, NT, ND  PV: cool bilateral extremities.    Covered ulcer on right side; uncovered on left.   Rash (petechiael?) on both shins-- nursing indicates there for appx 3 days.    Neuro: AO x 1.  No tremor    MAR: no ativan x several days.  Had just received olanzapine    A/P: 67 year old male with h/o heavy alcohol use, severe PVF, HFrEF, vascular ulcers, chronic pain now with process most consistent with delirium.   Contributors could include age, multiple medical problems, pain, polypharmacy, sundowning.   Alcohol withdrawal seems less likely given timing since admission, lack of benzos last several days to prolong withdrawal, etc.  -delirium protocol  -low dose zyprexa x 1  -1:1 (psych if possible)  -hope to avoid restratrains  -serial re-examining  -discuss pain mgmt further in am  -72 hr hold (attempted to leave hospital)  -family notified.    Td Woo MD  Med-Peds Hospitalist      "

## 2018-01-12 NOTE — PLAN OF CARE
Problem: Patient Care Overview  Goal: Plan of Care/Patient Progress Review  PT / 5A - Cancel. Per RN, pt not appropriate for therapy. Pt continues to be confused this day, sleeping on PT attempt.  Re-scheduled per POC.

## 2018-01-12 NOTE — PLAN OF CARE
Problem: Patient Care Overview  Goal: Plan of Care/Patient Progress Review  Pt arrived to 5A at 0215 from 5B. Pt restless, anxious and delirious. C/O pain to R leg ulcer, PRN Dilaudid administered. Seroquel administered for anxiety. Benadryl for C/O itching. Pt continued to be delirious and trying to leave AMA, MD came to see pt and 72 hr hold was placed. Pt smoking cigarettes in room, belongings removed. Code 21 called when pt still trying to go AMA and ambulated down the horta. Bedside attendant placed. RLE cool to touch and pulses weak to absent. RLE wound open to air. Plan for vascular surgery Monday. Up with SBA.

## 2018-01-12 NOTE — PROGRESS NOTES
[facility], [place]    Internal Medicine Progress Note - Gold Service    Assessment & Plan   Boom Kahn is a 67 year old male with a past medical history significant for HTN, anxiety/depression, alcohol abuse, tobacco abuse, ICM/MI, CAD s/p 3v CABG and PCI, HFrEF (last EF 25-30%, approximately 6 months ago) s/p ICD, , R lung cancer s/p radiation therapy,  peripheral vascular disease s/p multiple stents, and chronic pain who presented c/o of worsening pain in his lower extremities and right shin wound.       1/12: Worsened delirium: D?W Psych: currently on 1:1 , will decrease fentanyl back to 25 mcg and change seroquel to zyprexa  - electrolytes ok and WBC 8.6 and procal <0.05 : unlikely infection contributing    Other  # Severe Peripheral Artery Disease -   Critical limb ischemia R>L with bilateral LE wounds; Acute on Chronic pain  U/S demonstrating b/l significant calcifications with minimal flow distal to thigh.  X-ray without evidence of osteo-myelitis.  Exam notable for bilateral wounds, erythema consistent with vascular changes, loss of hair.   Vascular surgery on board. Following.   Cardiology consulted for Pre op evaluation. Under Coronary Angio and AYDE stenting as above. Carotid US bl done.   Final revascularization plan per Vascular team. Likely -right femoral endarterectomy with femoral to posterior tibial bypass with in situ greater saphenous vein.  cancelled 01/09/18 pending insurance approval -- 1/10- D/W CC, Vascular Sx apparently it is covered and now Sx being re-scheduled.   1/11- D/w Vascular- Sx planned for Monday   - Wound care per WOCN, Vascular.   - Continue antiplatelet agents. Statin.   - continue current prednisone 10mg daily  - Continue aspirin and plavix  Hold acei am of the procedure     >>Pain control: acute on Chronic pain.   Appreciate pain team recommendations. reconsult 1/11 - decreased Dilaudid and increased fentanyl (1/12- will decrease  fentanyl)     Topical gabapentin 8%, ketamine 5%, lidocaine 2.5% in PLO gel TID  Gabapentin 300 tid, On 1/7, increase gabapentin to 300 mg Q AM and Q PM, 600 mg Q HS  Continue fentanyl patch 25 mcg/hr   acetaminophen 975 qid- discontinued 1/9   -   *01/09-  redness over hands and back of knee- better. No urticaria. Denies any new med/ cosmetic.    - benadryl   - dermatology consult appreciated    # HFrEF (EF reported 25-30%)  # CAD s/p CABGx3 and PCIs  # ICM hx of MI (2008)  # HTN  Clinically stable. Asymptomatic.   Angio and AYDE 01/05 as above.   ECHO as above- reviewed.      Per Cardiology:   - Continue ASA and clopidogrel for 12 months  - Continue Carvedilol, Rosuvastatin, Ezetimibe, Isosorbide dinitrate, enalapril  - 1/10- resumed Lasix as leg swelling but d/c 1/11 due to soft BP   - 2L fluid/2gm Na restriction   - strict I/O and daily weights     1/9 - D.W Cardiology: has frequent PVC.       # EtOH Dependence-Patient reports 8-10 beers per day  - s/p MSSA protocol w/ativan : no e/o withdrawal at current.  - MVI/thiamine/folate     # Tobacco Dependence - nicotine patch 7mg  - Smoking Cessation Program IP consult      # Restrictive Airway Disease   ? COPD, given smoking hx. Patient denies any hx of COPD.   - DuoNebs x4 hr prn  - outpatient PFT  - smoking cessation      # Malignancy of Lung, Right Upper Lobe    Known malignancy, patient unable to tell specific diagnosis. States that cancer responded with radiation.   F/u PET scan due in February   - f/u outpatient       # Depression   Patient reports a hx of depression and reports depression and axiety  - holding home alprazolam 0.5 PRN  - home ecitalopram 20mg opd        CODE: Full  DVT: On Plavix and ASA  FEN: Cardiac Diet 2L fluid/2gm Na restriction.      Disposition Plan   Expected discharge: unknown, recommended to unknown once adequate pain management/ tolerating PO medications and plan for vascular surgery.     Entered: Rodriguez Snowden MD 01/12/2018, 2:04  PM   Information in the above section will display in the discharge planner report.      The patient's care was discussed with the Bedside Nurse, Care Coordinator/, Patient and Patient's Family.    Rodriguez Snowden MD  Internal Medicine Staff Hospitalist Service  [facility]  Pager: [phone]  Please see sticky note for cross cover information    Interval History   Last night events noted, worsened delirium, some improvement after zyprexa.   Feels ok. Pain 60% controlled.    No chills/ fever     No fever.  4 point ROS done and neg unless mentioned    Data reviewed today: I reviewed all medications, new labs and imaging results over the last 24 hours.Physical Exam   Vital Signs: Temp: 97.2  F (36.2  C) Temp src: Oral BP: 122/54 Pulse: 64 Heart Rate: 69 Resp: 18 SpO2: 96 % O2 Device: None (Room air)    Weight: 141 lbs 8 oz  General Appearance: anxious  Respiratory: CTAB, decreased at base  Cardiovascular: SI+II irregular   GI: abdo soft, non tender  Skin: ulcers both shin. Skin changes both feet (further details as per Vascular Sx).    Hands-dorsum mildly red, no urticarial rash  Other: oriented      Data   BMP    Recent Labs  Lab 01/12/18  0712 01/11/18  1038 01/09/18  0610 01/08/18  0739 01/07/18  0624     --  136 138 137   POTASSIUM 3.6 3.7 3.6 4.0 3.7   CHLORIDE 102  --  103 104 106   SUMAN 8.6  --  8.6 8.9 9.0   CO2 23  --  25 27 25   BUN 6*  --  11 7 7   CR 0.57*  --  0.57* 0.68 0.53*   GLC 91  --  108* 89 78     CBC    Recent Labs  Lab 01/12/18  0712 01/09/18  0610 01/08/18  0739 01/07/18  0624   WBC 8.6 8.5 11.8* 9.3   RBC 3.65* 3.88* 4.03* 4.10*   HGB 12.0* 12.8* 13.3 13.4   HCT 36.1* 38.8* 40.4 41.1   MCV 99 100 100 100   MCH 32.9 33.0 33.0 32.7   MCHC 33.2 33.0 32.9 32.6   RDW 13.5 14.0 13.7 13.6    204 235 249     INR    Recent Labs  Lab 01/09/18  0610   INR 1.05     Echo Complete: 1/4/2018     Ischemic CM. Severely reduced LV systoilc function. Biplane LVEF measured  at  20%. Severe left ventricular dilation is present. Akinesis of the basal to mid  inferior, inferolateral, and lateral segments noted.  The right ventricle is normal size. Global right ventricular function is  normal.  Pulmonary artery systolic pressure cannot be assessed.  No pericardial effusion is present.  The inferior vena cava was normal in size with preserved respiratory  variability.  Previous study not available for comparison.        US LE Venous duplex: No DVT     IR Lower Extremity Angiogram 01/04/18     Right:  1. Right lower extremity angiogram shows totally occluded mid SFA to  above-the-knee popliteal stents, short segment below the knee  popliteal occlusion, and an occluded anterior tibial artery.  2. Angiogram also demonstrates a severe (80-90%) short segment  shelflike right CFA stenosis. Therefore, recanalization of the chronic  total occlusion was not performed.  3. Patent upper SFA and iliac stents.    Left:  1. 13 cm chronic total occlusion of the mid SFA.  2. Two-vessel runoff with total occlusion of the TEDDY.  3. Short segment narrowing of the TP trunk.  4. Patent iliac stents with less severe short segment common femoral  disease than on the right.     01/05/18  Coronary Angiography:  SUMMARY:   Severe 3 vessel CAD  100% stenosis of the RCA  100% occlusion of the proximal LAD   95% stenosis of the proximal circumflex artery  Patent LIMA to LAD   Patent SVG to RPDA      Successful deployment of a 3.0 x 28 mm drug eluting stent to the proximal LCX and inflated with 3.5 mm NC balloon

## 2018-01-12 NOTE — PROVIDER NOTIFICATION
Pt has not slept all night. Delirious. Disoriented to place. Pt restless, agitated, threatening to leave AMA, and attempting to pull out PIV. PRN Seroquel already administered. MD notified and came to assess pt.

## 2018-01-13 ENCOUNTER — APPOINTMENT (OUTPATIENT)
Dept: PHYSICAL THERAPY | Facility: CLINIC | Age: 68
DRG: 247 | End: 2018-01-13
Attending: INTERNAL MEDICINE
Payer: COMMERCIAL

## 2018-01-13 LAB
ALBUMIN SERPL-MCNC: 2.7 G/DL (ref 3.4–5)
ALP SERPL-CCNC: 46 U/L (ref 40–150)
ALT SERPL W P-5'-P-CCNC: 28 U/L (ref 0–70)
ANION GAP SERPL CALCULATED.3IONS-SCNC: 6 MMOL/L (ref 3–14)
AST SERPL W P-5'-P-CCNC: 21 U/L (ref 0–45)
BILIRUB SERPL-MCNC: 0.4 MG/DL (ref 0.2–1.3)
BUN SERPL-MCNC: 7 MG/DL (ref 7–30)
CALCIUM SERPL-MCNC: 8.3 MG/DL (ref 8.5–10.1)
CHLORIDE SERPL-SCNC: 107 MMOL/L (ref 94–109)
CO2 SERPL-SCNC: 26 MMOL/L (ref 20–32)
CREAT SERPL-MCNC: 0.56 MG/DL (ref 0.66–1.25)
ERYTHROCYTE [DISTWIDTH] IN BLOOD BY AUTOMATED COUNT: 13.8 % (ref 10–15)
GFR SERPL CREATININE-BSD FRML MDRD: >90 ML/MIN/1.7M2
GLUCOSE SERPL-MCNC: 123 MG/DL (ref 70–99)
HCT VFR BLD AUTO: 35 % (ref 40–53)
HGB BLD-MCNC: 11.7 G/DL (ref 13.3–17.7)
LACTATE BLD-SCNC: 1.4 MMOL/L (ref 0.7–2)
MAGNESIUM SERPL-MCNC: 2.4 MG/DL (ref 1.6–2.3)
MCH RBC QN AUTO: 33.4 PG (ref 26.5–33)
MCHC RBC AUTO-ENTMCNC: 33.4 G/DL (ref 31.5–36.5)
MCV RBC AUTO: 100 FL (ref 78–100)
PLATELET # BLD AUTO: 251 10E9/L (ref 150–450)
POTASSIUM SERPL-SCNC: 4 MMOL/L (ref 3.4–5.3)
PROT SERPL-MCNC: 5.8 G/DL (ref 6.8–8.8)
RBC # BLD AUTO: 3.5 10E12/L (ref 4.4–5.9)
SODIUM SERPL-SCNC: 139 MMOL/L (ref 133–144)
WBC # BLD AUTO: 9.3 10E9/L (ref 4–11)

## 2018-01-13 PROCEDURE — 40000193 ZZH STATISTIC PT WARD VISIT

## 2018-01-13 PROCEDURE — 85027 COMPLETE CBC AUTOMATED: CPT | Performed by: INTERNAL MEDICINE

## 2018-01-13 PROCEDURE — 36415 COLL VENOUS BLD VENIPUNCTURE: CPT | Performed by: INTERNAL MEDICINE

## 2018-01-13 PROCEDURE — 83605 ASSAY OF LACTIC ACID: CPT | Performed by: INTERNAL MEDICINE

## 2018-01-13 PROCEDURE — 25000132 ZZH RX MED GY IP 250 OP 250 PS 637: Performed by: INTERNAL MEDICINE

## 2018-01-13 PROCEDURE — 25000128 H RX IP 250 OP 636: Performed by: INTERNAL MEDICINE

## 2018-01-13 PROCEDURE — 25000125 ZZHC RX 250: Performed by: STUDENT IN AN ORGANIZED HEALTH CARE EDUCATION/TRAINING PROGRAM

## 2018-01-13 PROCEDURE — 25000132 ZZH RX MED GY IP 250 OP 250 PS 637: Performed by: STUDENT IN AN ORGANIZED HEALTH CARE EDUCATION/TRAINING PROGRAM

## 2018-01-13 PROCEDURE — 25000132 ZZH RX MED GY IP 250 OP 250 PS 637: Performed by: NURSE PRACTITIONER

## 2018-01-13 PROCEDURE — 83735 ASSAY OF MAGNESIUM: CPT | Performed by: INTERNAL MEDICINE

## 2018-01-13 PROCEDURE — 99233 SBSQ HOSP IP/OBS HIGH 50: CPT | Performed by: INTERNAL MEDICINE

## 2018-01-13 PROCEDURE — 12000008 ZZH R&B INTERMEDIATE UMMC

## 2018-01-13 PROCEDURE — 80053 COMPREHEN METABOLIC PANEL: CPT | Performed by: INTERNAL MEDICINE

## 2018-01-13 PROCEDURE — 97530 THERAPEUTIC ACTIVITIES: CPT | Mod: GP

## 2018-01-13 RX ADMIN — ACETAMINOPHEN 650 MG: 325 TABLET ORAL at 08:05

## 2018-01-13 RX ADMIN — OLANZAPINE 5 MG: 5 TABLET, ORALLY DISINTEGRATING ORAL at 01:47

## 2018-01-13 RX ADMIN — EZETIMIBE 10 MG: 10 TABLET ORAL at 19:52

## 2018-01-13 RX ADMIN — HYDROMORPHONE HYDROCHLORIDE 6 MG: 2 TABLET ORAL at 11:53

## 2018-01-13 RX ADMIN — SODIUM CHLORIDE 250 ML: 9 INJECTION, SOLUTION INTRAVENOUS at 02:45

## 2018-01-13 RX ADMIN — HYDROMORPHONE HYDROCHLORIDE 6 MG: 2 TABLET ORAL at 00:56

## 2018-01-13 RX ADMIN — HYDROMORPHONE HYDROCHLORIDE 4 MG: 2 TABLET ORAL at 16:55

## 2018-01-13 RX ADMIN — SENNOSIDES AND DOCUSATE SODIUM 2 TABLET: 8.6; 5 TABLET ORAL at 19:51

## 2018-01-13 RX ADMIN — ACETAMINOPHEN 650 MG: 325 TABLET ORAL at 14:29

## 2018-01-13 RX ADMIN — MOMETASONE FUROATE: 1 CREAM TOPICAL at 08:10

## 2018-01-13 RX ADMIN — Medication: at 08:10

## 2018-01-13 RX ADMIN — FOLIC ACID 1 MG: 1 TABLET ORAL at 08:09

## 2018-01-13 RX ADMIN — ISOSORBIDE MONONITRATE 60 MG: 60 TABLET, EXTENDED RELEASE ORAL at 08:10

## 2018-01-13 RX ADMIN — CLOPIDOGREL 75 MG: 75 TABLET, FILM COATED ORAL at 19:51

## 2018-01-13 RX ADMIN — ASPIRIN 81 MG: 81 TABLET, COATED ORAL at 08:08

## 2018-01-13 RX ADMIN — SENNOSIDES AND DOCUSATE SODIUM 2 TABLET: 8.6; 5 TABLET ORAL at 08:09

## 2018-01-13 RX ADMIN — Medication: at 19:56

## 2018-01-13 RX ADMIN — OLANZAPINE 5 MG: 5 TABLET, ORALLY DISINTEGRATING ORAL at 21:25

## 2018-01-13 RX ADMIN — Medication: at 14:30

## 2018-01-13 RX ADMIN — ROSUVASTATIN CALCIUM 20 MG: 20 TABLET, FILM COATED ORAL at 19:52

## 2018-01-13 RX ADMIN — ESCITALOPRAM OXALATE 20 MG: 20 TABLET ORAL at 08:09

## 2018-01-13 RX ADMIN — PREDNISONE 10 MG: 10 TABLET ORAL at 08:09

## 2018-01-13 RX ADMIN — POTASSIUM CHLORIDE 20 MEQ: 750 TABLET, EXTENDED RELEASE ORAL at 14:30

## 2018-01-13 RX ADMIN — GABAPENTIN 300 MG: 300 CAPSULE ORAL at 14:29

## 2018-01-13 RX ADMIN — GABAPENTIN 300 MG: 300 CAPSULE ORAL at 08:06

## 2018-01-13 RX ADMIN — ACETAMINOPHEN 650 MG: 325 TABLET ORAL at 22:46

## 2018-01-13 RX ADMIN — POLYETHYLENE GLYCOL 3350 17 G: 17 POWDER, FOR SOLUTION ORAL at 08:05

## 2018-01-13 RX ADMIN — HYDROMORPHONE HYDROCHLORIDE 6 MG: 2 TABLET ORAL at 21:25

## 2018-01-13 RX ADMIN — HYDROMORPHONE HYDROCHLORIDE 6 MG: 2 TABLET ORAL at 05:48

## 2018-01-13 RX ADMIN — GABAPENTIN 600 MG: 600 TABLET, FILM COATED ORAL at 19:52

## 2018-01-13 RX ADMIN — MULTIPLE VITAMINS W/ MINERALS TAB 1 TABLET: TAB at 08:08

## 2018-01-13 NOTE — PROGRESS NOTES
"   01/13/18 0523   Vital Signs   BP 93/59   BP Location Left arm     BP went up since last reading. Pt seemed happy with this since it is his \"normal numbers\".   "

## 2018-01-13 NOTE — PROGRESS NOTES
"Vascular Surgery Progress Note     Patient seen and evaluated at the bedside.  Sitting up on the bedside. No complaints.     BP 92/42 (BP Location: Left arm)  Pulse 61  Temp 96  F (35.6  C) (Oral)  Resp 18  Ht 2' 2.77\"  Wt 141 lb 8 oz  SpO2 95%  .8 kg/m2    Intake/Output Summary (Last 24 hours) at 01/13/18 0945  Last data filed at 01/13/18 0831   Gross per 24 hour   Intake             1160 ml   Output                0 ml   Net             1160 ml     General: Elderly  male resting supine NAD   Cor: RRR  Pulm: Unlabored breathing   LE: Bilateral ulcers localized to the anterior shin overlying the tibia.  Dependent rubor with pallor on elevation is evident.     Assessment:  Mr. Kahn is a 67 year old male with a history of active tobacco abuse, PAD,  bilateral lower extremity wounds and right lower extremity critical limb ischemia.     Plan:     1. Right Femoral distal Bypass with femoral endart to be performed on Monday.   2. Preop orders placed   3. Continue Asa/Plavix  4. Psychiatry Recs appreciated.      Kevan Lawson MD  Vascular Surgery Fellow  Pager: [phone]            "

## 2018-01-13 NOTE — PLAN OF CARE
Problem: Patient Care Overview  Goal: Plan of Care/Patient Progress Review  Outcome: No Change  Pt is oriented to self, VSS, tele in place and on room air. Pt placed on 72 hour hold, belongings in med room. Pt has 1 on 1 sitter. Pt leg wounds uncovered, pt pulled off dressings and refused to be changed. Fentynal patches removed per order. Psych consulted w/ pt. Oral Dilaudid given for leg pain.  Droplet isolation discontinued. Pt tolerating 2g Na+ diet well. Son in room at bedside. Up w/ SBA in room. Continue to monitor and POC.

## 2018-01-13 NOTE — PROGRESS NOTES
Gold [place]:   Notified by RN of lower blood pressure readings of 92/55 and then repeated BP of 82/45 while patient sitting up at edge of bed, Pt asymptomatic. BP trend reviewed and although readings have ranged somewhat over last 2 days from SBP , BP reading this AM lower than recent pressures. Confirmed same blood pressure cuff used for readings this AM. Pt with minimal PO intake including minimal PO hydration per nursing. Will give gentle fluid bolus of NS 250mL over 2 hours (given reduced EF and recent echo findings) and will discontinue enalapril and carvedilol for now until BP improving.   Sienna Aragon MD  [phone]

## 2018-01-13 NOTE — PLAN OF CARE
Problem: Patient Care Overview  Goal: Plan of Care/Patient Progress Review  Discharge Planner PT   Patient plan for discharge: TCU  Current status: Patient presented with symptomatic low BP during vital screen at onset of session. 5 attempts to get BP reading. Discussed BP with RN after session. Performed postural training EOB during BP attempts and educated patient on posture affecting neck pain. Unable to perform any OOB activity this date.  Barriers to return to prior living situation: limited activity tolerance, symptomatic low BP, weakness  Recommendations for discharge: TCU  Rationale for recommendations: Patient unable to perform OOB activity this date second to BP and lightheadedness. Requires TCU to progress independence with bed mobility, transfers, gait and stairs.        Entered by: Subha Marcus 01/13/2018 12:07 PM

## 2018-01-13 NOTE — PROGRESS NOTES
"Upon arrival, the pt was sleeping and woke up less than an hour later. When he woke up the pt was confused and disoriented to place, date and time. I did not see the pt responding to auditory or visual hallucinations throughout the shift. He was talking about \"being at a party\" which was part of a dream he was having. The pt complained of having very vivid dreams that confused him, that he thinks was caused by the medication. The pt is intermittently oriented X4 after his helpful son oriented him. He ate almost all of his dinner, had 1 BM and voided X3. Pt got approximately 2 hours of sleep this evening shift. Son visited till 9pm. Pt's judgement is impaired and insight is poor. No acute agitation this evening. Pt is unsteady on his feet. No SI or SIB.      01/12/18 2200   Behavioral Health   Hallucinations denies / not responding to hallucinations   Thinking confused   Orientation person: oriented;place: oriented;date: oriented;time: oriented   Memory new learning, recall loss   Insight poor   Judgement impaired   Eye Contact at examiner   Affect full range affect   Mood mood is calm   Physical Appearance/Attire appears stated age   Hygiene well groomed   Suicidality other (see comments)  (none stated or observed )   1. Wish to be Dead No   2. Non-Specific Active Suicidal Thoughts  No   Self Injury other (see comment)  (none stated or observed )   Elopement (none observed )   Activity restless   Speech clear;coherent   Psychomotor / Gait unsteady  (uses walker )   Activities of Daily Living   Hygiene/Grooming independent   Oral Hygiene independent   Dress street clothes   Room Organization independent   Bedside Attendant   Attendant Observation Awake;Quiet   Attendant Comment pt is awake, alert and oriented.      "

## 2018-01-13 NOTE — PLAN OF CARE
"Problem: Patient Care Overview  Goal: Plan of Care/Patient Progress Review  Outcome: Improving  [id number]: Pt alert to self only at start of shift, making illogical comments and very lethargic. Throughout shift, mentation improved and pt now A&Ox4, but forgetful. VSS on RA, ex hypotensive. MD notified and 1 time bolus given. 1:1 sitter, on 72 hour hold. C/o pain in BLE, most severe in R leg, managed with PRN dilaudid and scheduled Tylenol. Wound cares and new mepilex on L leg, R leg VIANEY per family request. BLE edema. Up SBA + walker, ambulating around unit. New dose fentanyl patch in place on L arm. Nicotine patch on R arm. Pt tolerating 2g Na diet, and states that appetite is improving. BM x2. Scheduled zyprexa qhs. Slept ~2hrs overnight, but pt stating he feels \"well rested.\" Plan for endartectomy on Monday. Will continue to monitor and follow POC.          "

## 2018-01-13 NOTE — PLAN OF CARE
"Problem: Patient Care Overview  Goal: Plan of Care/Patient Progress Review  Pt A&Ox4 w/ intermittent confusion. VSS on RA, displaying orthostatic drop in BP, please see flow sheet.  C/o pain,  PRN dilaudid given 1X. Need influenza nasal swab & RSV sample. K 4.0, replaced & recheck in a.m. Pt on 72 hour hold, has 1:1 sitter. Leg wounds uncovered by patient, \"hates the feeling of tape or covering on area.\"  Suggested wrapping w/ kerlix, Pt open to it, will consult with P.M. Nurse. Plan for endartectomy on Monday. Will continue w/ POC      "

## 2018-01-14 ENCOUNTER — APPOINTMENT (OUTPATIENT)
Dept: PHYSICAL THERAPY | Facility: CLINIC | Age: 68
DRG: 247 | End: 2018-01-14
Attending: INTERNAL MEDICINE
Payer: COMMERCIAL

## 2018-01-14 PROBLEM — I70.229 CRITICAL LOWER LIMB ISCHEMIA (H): Status: ACTIVE | Noted: 2018-01-14

## 2018-01-14 PROCEDURE — 85025 COMPLETE CBC W/AUTO DIFF WBC: CPT | Performed by: CLINICAL NURSE SPECIALIST

## 2018-01-14 PROCEDURE — 25000132 ZZH RX MED GY IP 250 OP 250 PS 637: Performed by: STUDENT IN AN ORGANIZED HEALTH CARE EDUCATION/TRAINING PROGRAM

## 2018-01-14 PROCEDURE — 25000132 ZZH RX MED GY IP 250 OP 250 PS 637: Performed by: INTERNAL MEDICINE

## 2018-01-14 PROCEDURE — 25000125 ZZHC RX 250: Performed by: STUDENT IN AN ORGANIZED HEALTH CARE EDUCATION/TRAINING PROGRAM

## 2018-01-14 PROCEDURE — 25000132 ZZH RX MED GY IP 250 OP 250 PS 637: Performed by: NURSE PRACTITIONER

## 2018-01-14 PROCEDURE — 86923 COMPATIBILITY TEST ELECTRIC: CPT | Performed by: CLINICAL NURSE SPECIALIST

## 2018-01-14 PROCEDURE — 99233 SBSQ HOSP IP/OBS HIGH 50: CPT | Performed by: INTERNAL MEDICINE

## 2018-01-14 PROCEDURE — 97116 GAIT TRAINING THERAPY: CPT | Mod: GP

## 2018-01-14 PROCEDURE — 40000193 ZZH STATISTIC PT WARD VISIT

## 2018-01-14 PROCEDURE — 86900 BLOOD TYPING SEROLOGIC ABO: CPT | Performed by: CLINICAL NURSE SPECIALIST

## 2018-01-14 PROCEDURE — 12000008 ZZH R&B INTERMEDIATE UMMC

## 2018-01-14 PROCEDURE — 86850 RBC ANTIBODY SCREEN: CPT | Performed by: CLINICAL NURSE SPECIALIST

## 2018-01-14 PROCEDURE — 97530 THERAPEUTIC ACTIVITIES: CPT | Mod: GP

## 2018-01-14 PROCEDURE — 25000128 H RX IP 250 OP 636: Performed by: SURGERY

## 2018-01-14 PROCEDURE — 40000141 ZZH STATISTIC PERIPHERAL IV START W/O US GUIDANCE

## 2018-01-14 PROCEDURE — 84132 ASSAY OF SERUM POTASSIUM: CPT | Performed by: CLINICAL NURSE SPECIALIST

## 2018-01-14 PROCEDURE — 36415 COLL VENOUS BLD VENIPUNCTURE: CPT | Performed by: CLINICAL NURSE SPECIALIST

## 2018-01-14 PROCEDURE — 25000132 ZZH RX MED GY IP 250 OP 250 PS 637: Performed by: PHYSICIAN ASSISTANT

## 2018-01-14 PROCEDURE — 86901 BLOOD TYPING SEROLOGIC RH(D): CPT | Performed by: CLINICAL NURSE SPECIALIST

## 2018-01-14 PROCEDURE — 82565 ASSAY OF CREATININE: CPT | Performed by: CLINICAL NURSE SPECIALIST

## 2018-01-14 RX ORDER — CEFAZOLIN SODIUM 2 G/100ML
2 INJECTION, SOLUTION INTRAVENOUS
Status: DISCONTINUED | OUTPATIENT
Start: 2018-01-15 | End: 2018-01-15 | Stop reason: HOSPADM

## 2018-01-14 RX ORDER — CARVEDILOL 3.12 MG/1
6.25 TABLET ORAL 2 TIMES DAILY WITH MEALS
Status: DISCONTINUED | OUTPATIENT
Start: 2018-01-14 | End: 2018-01-16

## 2018-01-14 RX ADMIN — POTASSIUM CHLORIDE 20 MEQ: 750 TABLET, EXTENDED RELEASE ORAL at 14:50

## 2018-01-14 RX ADMIN — MULTIPLE VITAMINS W/ MINERALS TAB 1 TABLET: TAB at 09:09

## 2018-01-14 RX ADMIN — Medication: at 13:20

## 2018-01-14 RX ADMIN — PREDNISONE 10 MG: 10 TABLET ORAL at 09:10

## 2018-01-14 RX ADMIN — HYDROMORPHONE HYDROCHLORIDE 6 MG: 2 TABLET ORAL at 22:38

## 2018-01-14 RX ADMIN — OLANZAPINE 5 MG: 5 TABLET, ORALLY DISINTEGRATING ORAL at 22:39

## 2018-01-14 RX ADMIN — NICOTINE 1 PATCH: 7 PATCH, EXTENDED RELEASE TRANSDERMAL at 20:26

## 2018-01-14 RX ADMIN — CLOPIDOGREL 75 MG: 75 TABLET, FILM COATED ORAL at 20:26

## 2018-01-14 RX ADMIN — SENNOSIDES AND DOCUSATE SODIUM 1 TABLET: 8.6; 5 TABLET ORAL at 09:09

## 2018-01-14 RX ADMIN — EZETIMIBE 10 MG: 10 TABLET ORAL at 20:26

## 2018-01-14 RX ADMIN — SENNOSIDES AND DOCUSATE SODIUM 1 TABLET: 8.6; 5 TABLET ORAL at 20:25

## 2018-01-14 RX ADMIN — HYDROMORPHONE HYDROCHLORIDE 6 MG: 2 TABLET ORAL at 00:36

## 2018-01-14 RX ADMIN — HYDROMORPHONE HYDROCHLORIDE 6 MG: 2 TABLET ORAL at 16:21

## 2018-01-14 RX ADMIN — ESCITALOPRAM OXALATE 20 MG: 20 TABLET ORAL at 09:09

## 2018-01-14 RX ADMIN — GABAPENTIN 600 MG: 600 TABLET, FILM COATED ORAL at 20:26

## 2018-01-14 RX ADMIN — ISOSORBIDE MONONITRATE 60 MG: 60 TABLET, EXTENDED RELEASE ORAL at 09:09

## 2018-01-14 RX ADMIN — MOMETASONE FUROATE: 1 CREAM TOPICAL at 13:21

## 2018-01-14 RX ADMIN — CARVEDILOL 6.25 MG: 6.25 TABLET, FILM COATED ORAL at 11:53

## 2018-01-14 RX ADMIN — HYDROMORPHONE HYDROCHLORIDE 6 MG: 2 TABLET ORAL at 09:08

## 2018-01-14 RX ADMIN — ASPIRIN 81 MG: 81 TABLET, COATED ORAL at 09:09

## 2018-01-14 RX ADMIN — Medication: at 17:57

## 2018-01-14 RX ADMIN — GABAPENTIN 300 MG: 300 CAPSULE ORAL at 09:08

## 2018-01-14 RX ADMIN — HYDROMORPHONE HYDROCHLORIDE 6 MG: 2 TABLET ORAL at 12:58

## 2018-01-14 RX ADMIN — ACETAMINOPHEN 650 MG: 325 TABLET ORAL at 22:39

## 2018-01-14 RX ADMIN — HYDROMORPHONE HYDROCHLORIDE 6 MG: 2 TABLET ORAL at 03:45

## 2018-01-14 RX ADMIN — GABAPENTIN 300 MG: 300 CAPSULE ORAL at 13:21

## 2018-01-14 RX ADMIN — ROSUVASTATIN CALCIUM 20 MG: 20 TABLET, FILM COATED ORAL at 20:25

## 2018-01-14 RX ADMIN — HYDROMORPHONE HYDROCHLORIDE 0.5 MG: 1 INJECTION, SOLUTION INTRAMUSCULAR; INTRAVENOUS; SUBCUTANEOUS at 11:53

## 2018-01-14 RX ADMIN — HYDROMORPHONE HYDROCHLORIDE 6 MG: 2 TABLET ORAL at 19:26

## 2018-01-14 RX ADMIN — MOMETASONE FUROATE: 1 OINTMENT TOPICAL at 13:21

## 2018-01-14 RX ADMIN — FOLIC ACID 1 MG: 1 TABLET ORAL at 09:09

## 2018-01-14 RX ADMIN — ACETAMINOPHEN 650 MG: 325 TABLET ORAL at 16:21

## 2018-01-14 RX ADMIN — ACETAMINOPHEN 650 MG: 325 TABLET ORAL at 06:02

## 2018-01-14 NOTE — PLAN OF CARE
Problem: Patient Care Overview  Goal: Plan of Care/Patient Progress Review  Outcome: No Change  Pt is A&O x 4 w/ intermittent confusion, intermittent hypotensive, tele in place, and on room air. Pt has 1 on 1 for 72 hour hold that ends on 1/18. Leg wounds dressing changed, pt requested legs wrapped w/ kerlix instead of covered w/ mepilex. Fentynal patch in place on L shoulder and nicotine patch in place on R chest. Flu swab d/c per MD. Pt up w/ SBA in room. Calls appropriately. PRN Dilaudid given for leg pain. Tolerating 2g Na+ diet well. Plan for vascular surgery on Monday. Continue to monitor and POC.

## 2018-01-14 NOTE — PROGRESS NOTES
[facility], [place]    Internal Medicine Progress Note - Gold Service    Assessment & Plan   Boom Kahn is a 67 year old male with a past medical history significant for HTN, anxiety/depression, alcohol abuse, tobacco abuse, ICM/MI, CAD s/p 3v CABG and PCI, HFrEF (last EF 25-30%, approximately 6 months ago) s/p ICD, , R lung cancer s/p radiation therapy,  peripheral vascular disease s/p multiple stents, and chronic pain who presented c/o of worsening pain in his lower extremities and right shin wound.     Today- resume Carvedilol and ct to hold enalapril. Encourage PO fluids and monitor BP closely  - no indication to continue 72 hr hold/ sitter    Other  # Severe Peripheral Artery Disease -   Critical limb ischemia R>L with bilateral LE wounds; Acute on Chronic pain  U/S demonstrating b/l significant calcifications with minimal flow distal to thigh.  X-ray without evidence of osteo-myelitis.  Exam notable for bilateral wounds, erythema consistent with vascular changes, loss of hair.   Vascular surgery on board. Following.   Cardiology consulted for Pre op evaluation. Under Coronary Angio and AYDE stenting as above. Carotid US bl done.   Final revascularization plan per Vascular team. Likely -right femoral endarterectomy with femoral to posterior tibial bypass with in situ greater saphenous vein.  cancelled 01/09/18 pending insurance approval -- 1/10- D/W CC, Vascular Sx apparently it is covered and now Sx being re-scheduled.   1/11- D/w Vascular- Sx planned for Monday   - Wound care per WOCN, Vascular.   - Continue antiplatelet agents. Statin.   - continue current prednisone 10mg daily  - Continue aspirin and plavix  Hold acei am of the procedure     >>Pain control: acute on Chronic pain.   Appreciate pain team recommendations. reconsult 1/11 - decreased Dilaudid and increased fentanyl (1/12- will decrease fentanyl)     Topical gabapentin 8%, ketamine 5%, lidocaine 2.5% in PLO gel  TID  Gabapentin 300 tid, On 1/7, increase gabapentin to 300 mg Q AM and Q PM, 600 mg Q HS  Continue fentanyl patch 25 mcg/hr   acetaminophen 975 qid- discontinued 1/9   -   *01/09-  redness over hands and back of knee- better. No urticaria. Denies any new med/ cosmetic.    - benadryl   - dermatology consult appreciated    # HFrEF (EF reported 25-30%)  # CAD s/p CABGx3 and PCIs  # ICM hx of MI (2008)  # HTN  Angio and AYDE 01/05 as above.   ECHO as above- reviewed.      Per Cardiology:   - Continue ASA and clopidogrel for 12 months  - Continue Carvedilol, Rosuvastatin, Ezetimibe, Isosorbide dinitrate, enalapril  - 1/10- resumed Lasix as leg swelling but d/c 1/11 due to soft BP   - 2L fluid/2gm Na restriction   - strict I/O and daily weights      1/13: HOLD Carvedilol and enalapril as well. Encourage PO fluids and monitor BP closely  1/14- resume carvedilol    # EtOH Dependence-Patient reports 8-10 beers per day  - s/p MSSA protocol w/ativan : no e/o withdrawal at current.  - MVI/thiamine/folate     # Tobacco Dependence - nicotine patch 7mg  - Smoking Cessation Program IP consult      # Restrictive Airway Disease   ? COPD, given smoking hx. Patient denies any hx of COPD.   - DuoNebs x4 hr prn  - outpatient PFT  - smoking cessation      # Malignancy of Lung, Right Upper Lobe    Known malignancy, patient unable to tell specific diagnosis. States that cancer responded with radiation.   F/u PET scan due in February   - f/u outpatient       # Depression   Patient reports a hx of depression and reports depression and axiety  - holding home alprazolam 0.5 PRN  - home ecitalopram 20mg opd       1/13 - better   1/12: Worsened delirium: D/W Psych: currently on 1:1 , decreased fentanyl back to 25 mcg and changed seroquel to zyprexa  - electrolytes ok and WBC 8.6 and procal <0.05 : unlikely infection contributing  1/14- resolved    CODE: Full  DVT: On Plavix and ASA  FEN: Cardiac Diet 2L fluid/2gm Na restriction.      Disposition  Plan   Expected discharge: unknown, recommended to unknown once adequate pain management/ tolerating PO medications and plan for vascular surgery.     Entered: Rodriguez Snowden MD 01/14/2018, 1:37 PM   Information in the above section will display in the discharge planner report.      The patient's care was discussed with the Bedside Nurse, Care Coordinator/, Patient and Patient's Family.    Rodriguez Snowden MD  Internal Medicine Staff Hospitalist Service  [facility]  Pager: [phone]  Please see sticky note for cross cover information    Interval History   Mentally feels much better, no more confusion/ hallucinations.   C/o pain     No fever.  4 point ROS done and neg unless mentioned    Data reviewed today: I reviewed all medications, new labs and imaging results over the last 24 hours.Physical Exam   Vital Signs: Temp: 97.8  F (36.6  C) Temp src: Oral BP: 111/45   Heart Rate: 82 Resp: 18 SpO2: 97 % O2 Device: None (Room air)    Weight: 140 lbs 4.8 oz  General Appearance: pleasant  Respiratory: CTAB  Cardiovascular: SI+II irregular   GI: abdo soft, non tender  Skin: ulcers both shin. Skin changes both feet (further details as per Vascular Sx).    Hands-dorsum mildly red, no urticarial rash  Other: oriented      Data   BMP    Recent Labs  Lab 01/13/18  1009 01/12/18  0712 01/11/18  1038 01/09/18  0610 01/08/18  0739    135  --  136 138   POTASSIUM 4.0 3.6 3.7 3.6 4.0   CHLORIDE 107 102  --  103 104   SUMAN 8.3* 8.6  --  8.6 8.9   CO2 26 23  --  25 27   BUN 7 6*  --  11 7   CR 0.56* 0.57*  --  0.57* 0.68   * 91  --  108* 89     CBC    Recent Labs  Lab 01/13/18  1009 01/12/18  0712 01/09/18  0610 01/08/18  0739   WBC 9.3 8.6 8.5 11.8*   RBC 3.50* 3.65* 3.88* 4.03*   HGB 11.7* 12.0* 12.8* 13.3   HCT 35.0* 36.1* 38.8* 40.4    99 100 100   MCH 33.4* 32.9 33.0 33.0   MCHC 33.4 33.2 33.0 32.9   RDW 13.8 13.5 14.0 13.7    258 204 235     INR    Recent Labs  Lab  01/09/18  0610   INR 1.05     Echo Complete: 1/4/2018     Ischemic CM. Severely reduced LV systoilc function. Biplane LVEF measured at  20%. Severe left ventricular dilation is present. Akinesis of the basal to mid  inferior, inferolateral, and lateral segments noted.  The right ventricle is normal size. Global right ventricular function is  normal.  Pulmonary artery systolic pressure cannot be assessed.  No pericardial effusion is present.  The inferior vena cava was normal in size with preserved respiratory  variability.  Previous study not available for comparison.        US LE Venous duplex: No DVT     IR Lower Extremity Angiogram 01/04/18     Right:  1. Right lower extremity angiogram shows totally occluded mid SFA to  above-the-knee popliteal stents, short segment below the knee  popliteal occlusion, and an occluded anterior tibial artery.  2. Angiogram also demonstrates a severe (80-90%) short segment  shelflike right CFA stenosis. Therefore, recanalization of the chronic  total occlusion was not performed.  3. Patent upper SFA and iliac stents.    Left:  1. 13 cm chronic total occlusion of the mid SFA.  2. Two-vessel runoff with total occlusion of the TEDDY.  3. Short segment narrowing of the TP trunk.  4. Patent iliac stents with less severe short segment common femoral  disease than on the right.     01/05/18  Coronary Angiography:  SUMMARY:   Severe 3 vessel CAD  100% stenosis of the RCA  100% occlusion of the proximal LAD   95% stenosis of the proximal circumflex artery  Patent LIMA to LAD   Patent SVG to RPDA      Successful deployment of a 3.0 x 28 mm drug eluting stent to the proximal LCX and inflated with 3.5 mm NC balloon

## 2018-01-14 NOTE — PLAN OF CARE
Problem: Patient Care Overview  Goal: Plan of Care/Patient Progress Review  Discharge Planner PT   Patient plan for discharge: TCU  Current status: Patient showing significant improvements in functional mobility with increased independence noted in bed mobility and transfers. Patient ambulated 175 feet continuously with FWW with CGA.   Barriers to return to prior living situation: decreased activity tolerance, decreased functional strength  Recommendations for discharge: TCU  Rationale for recommendations: Patient requires continued skilled PT intervention to progress independence with functional mobility and increase overall activity tolerance for safe transition home.        Entered by: Subha Marcus 01/14/2018 5:00 PM

## 2018-01-14 NOTE — PROVIDER NOTIFICATION
Primary team text paged to pass on pt's son's request of bedside update on POC and clarification concerning wound care.

## 2018-01-14 NOTE — PLAN OF CARE
Problem: Patient Care Overview  Goal: Plan of Care/Patient Progress Review  Outcome: No Change  Pt is A&O x 4 w/ intermittent confusion,VSS, tele in place, and on room air. Pt has 1 on 1 for 72 hour hold that ends on 1/18. Leg wounds dressing CDI. Fentynal patch in place on L shoulder and nicotine patch in place on R chest. Pt up w/ SBA in room. Calls appropriately. PRN Dilaudid given for leg pain. Tolerating 2g Na+ diet well. Plan for vascular surgery on Monday. Continue to monitor and POC.

## 2018-01-14 NOTE — PLAN OF CARE
Problem: Patient Care Overview  Goal: Plan of Care/Patient Progress Review  Pt A&Ox4. VSS on RA, permanent pacemaker. UP SBA w/ walker to bathroom. Family at bedside and helps w/ cares.  R PIV SL. NPO at midnight for morning procedure. 72-hour hold cancelled, VPM ordered for safety. C/o of leg pain, PRN PO dilaudid given 2X & PRN IV dilaudid given 1X for breakthrough. Wound cares completed. Mg 2.4 & K 4.0, replaced K only and recheck in am. Will continue w/ POC

## 2018-01-14 NOTE — PROGRESS NOTES
"Vascular Surgery Progress Note     Patient seen and evaluated at the bedside.  No overnight events. Pain well controlled. Many questions regarding operative intervention answered    /45 (BP Location: Right arm)  Pulse 61  Temp 97.8  F (36.6  C) (Oral)  Resp 18  Ht 2' 2.77\"  Wt 140 lb 4.8 oz  SpO2 97%  .63 kg/m2    Intake/Output Summary (Last 24 hours) at 01/14/18 1415  Last data filed at 01/14/18 0834   Gross per 24 hour   Intake              240 ml   Output              400 ml   Net             -160 ml     General: Elderly  male resting supine NAD dangling right foot from the bed.  Cor: RRR  Pulm: Unlabored breathing   LE: Bilateral ulcers localized to the anterior shin overlying the tibia.  Dependent rubor with pallor on elevation is evident.      Assessment:  Mr. Kahn is a 67 year old male with a history of active tobacco abuse, PAD,  bilateral lower extremity wounds and right lower extremity critical limb ischemia.      Plan:      1. NPO after midnight.   2. Right Femoral distal Bypass with femoral endart to be performed tomorrow.   3. Preop orders placed   4. Continue Asa/Plavix    Kevan Lawson MD  Vascular Surgery Fellow  Pager: [phone]            "

## 2018-01-15 ENCOUNTER — ANESTHESIA EVENT (OUTPATIENT)
Dept: SURGERY | Facility: CLINIC | Age: 68
DRG: 247 | End: 2018-01-15
Payer: COMMERCIAL

## 2018-01-15 ENCOUNTER — ANESTHESIA (OUTPATIENT)
Dept: SURGERY | Facility: CLINIC | Age: 68
DRG: 247 | End: 2018-01-15
Payer: COMMERCIAL

## 2018-01-15 ENCOUNTER — APPOINTMENT (OUTPATIENT)
Dept: GENERAL RADIOLOGY | Facility: CLINIC | Age: 68
DRG: 247 | End: 2018-01-15
Attending: ANESTHESIOLOGY
Payer: COMMERCIAL

## 2018-01-15 ENCOUNTER — ALLIED HEALTH/NURSE VISIT (OUTPATIENT)
Dept: CARDIOLOGY | Facility: CLINIC | Age: 68
DRG: 247 | End: 2018-01-15
Attending: INTERNAL MEDICINE
Payer: COMMERCIAL

## 2018-01-15 DIAGNOSIS — I25.5 ISCHEMIC CARDIOMYOPATHY: Primary | ICD-10-CM

## 2018-01-15 PROBLEM — I70.209 ATHEROSCLEROTIC PVD WITH ULCERATION (H): Status: ACTIVE | Noted: 2018-01-15

## 2018-01-15 PROBLEM — L98.499 ATHEROSCLEROTIC PVD WITH ULCERATION (H): Status: ACTIVE | Noted: 2018-01-15

## 2018-01-15 LAB
ABO + RH BLD: NORMAL
ABO + RH BLD: NORMAL
BASE DEFICIT BLDA-SCNC: 0.7 MMOL/L
BASE DEFICIT BLDA-SCNC: 1 MMOL/L
BASE DEFICIT BLDA-SCNC: 2 MMOL/L
BASOPHILS # BLD AUTO: 0 10E9/L (ref 0–0.2)
BASOPHILS NFR BLD AUTO: 0.3 %
BLD GP AB SCN SERPL QL: NORMAL
BLD PROD TYP BPU: NORMAL
BLOOD BANK CMNT PATIENT-IMP: NORMAL
CA-I BLD-MCNC: 4.6 MG/DL (ref 4.4–5.2)
CA-I BLD-MCNC: 4.8 MG/DL (ref 4.4–5.2)
CA-I BLD-MCNC: 4.8 MG/DL (ref 4.4–5.2)
CREAT SERPL-MCNC: 0.55 MG/DL (ref 0.66–1.25)
CRP SERPL-MCNC: 20 MG/L (ref 0–8)
DIFFERENTIAL METHOD BLD: ABNORMAL
EOSINOPHIL # BLD AUTO: 0 10E9/L (ref 0–0.7)
EOSINOPHIL NFR BLD AUTO: 0.4 %
ERYTHROCYTE [DISTWIDTH] IN BLOOD BY AUTOMATED COUNT: 14.1 % (ref 10–15)
GFR SERPL CREATININE-BSD FRML MDRD: >90 ML/MIN/1.7M2
GLUCOSE BLD-MCNC: 150 MG/DL (ref 70–99)
GLUCOSE BLD-MCNC: 181 MG/DL (ref 70–99)
GLUCOSE BLD-MCNC: 190 MG/DL (ref 70–99)
GLUCOSE BLDC GLUCOMTR-MCNC: 103 MG/DL (ref 70–99)
GLUCOSE BLDC GLUCOMTR-MCNC: 116 MG/DL (ref 70–99)
GLUCOSE BLDC GLUCOMTR-MCNC: 86 MG/DL (ref 70–99)
GRAM STN SPEC: ABNORMAL
HCO3 BLD-SCNC: 24 MMOL/L (ref 21–28)
HCO3 BLD-SCNC: 25 MMOL/L (ref 21–28)
HCO3 BLD-SCNC: 25 MMOL/L (ref 21–28)
HCT VFR BLD AUTO: 34.1 % (ref 40–53)
HGB BLD-MCNC: 10.7 G/DL (ref 13.3–17.7)
HGB BLD-MCNC: 11.2 G/DL (ref 13.3–17.7)
HGB BLD-MCNC: 11.2 G/DL (ref 13.3–17.7)
HGB BLD-MCNC: 11.4 G/DL (ref 13.3–17.7)
IMM GRANULOCYTES # BLD: 0 10E9/L (ref 0–0.4)
IMM GRANULOCYTES NFR BLD: 0.2 %
INR PPP: 0.97 (ref 0.86–1.14)
KCT BLD-ACNC: 176 SEC (ref 105–167)
KCT BLD-ACNC: 188 SEC (ref 105–167)
KCT BLD-ACNC: 221 SEC (ref 105–167)
KCT BLD-ACNC: 225 SEC (ref 105–167)
KCT BLD-ACNC: 233 SEC (ref 105–167)
LYMPHOCYTES # BLD AUTO: 1 10E9/L (ref 0.8–5.3)
LYMPHOCYTES NFR BLD AUTO: 10.5 %
MAGNESIUM SERPL-MCNC: 2.1 MG/DL (ref 1.6–2.3)
MCH RBC QN AUTO: 33.2 PG (ref 26.5–33)
MCHC RBC AUTO-ENTMCNC: 32.8 G/DL (ref 31.5–36.5)
MCV RBC AUTO: 101 FL (ref 78–100)
MONOCYTES # BLD AUTO: 1 10E9/L (ref 0–1.3)
MONOCYTES NFR BLD AUTO: 10.8 %
NEUTROPHILS # BLD AUTO: 7.2 10E9/L (ref 1.6–8.3)
NEUTROPHILS NFR BLD AUTO: 77.8 %
NRBC # BLD AUTO: 0 10*3/UL
NRBC BLD AUTO-RTO: 0 /100
NUM BPU REQUESTED: 2
O2/TOTAL GAS SETTING VFR VENT: 50 %
PCO2 BLD: 44 MM HG (ref 35–45)
PCO2 BLD: 46 MM HG (ref 35–45)
PCO2 BLD: 47 MM HG (ref 35–45)
PH BLD: 7.34 PH (ref 7.35–7.45)
PH BLD: 7.34 PH (ref 7.35–7.45)
PH BLD: 7.35 PH (ref 7.35–7.45)
PLATELET # BLD AUTO: 291 10E9/L (ref 150–450)
PO2 BLD: 106 MM HG (ref 80–105)
PO2 BLD: 114 MM HG (ref 80–105)
PO2 BLD: 115 MM HG (ref 80–105)
POTASSIUM BLD-SCNC: 4.1 MMOL/L (ref 3.4–5.3)
POTASSIUM BLD-SCNC: 4.3 MMOL/L (ref 3.4–5.3)
POTASSIUM BLD-SCNC: 4.3 MMOL/L (ref 3.4–5.3)
POTASSIUM SERPL-SCNC: 4 MMOL/L (ref 3.4–5.3)
POTASSIUM SERPL-SCNC: 4.1 MMOL/L (ref 3.4–5.3)
PROCALCITONIN SERPL-MCNC: <0.05 NG/ML
RBC # BLD AUTO: 3.37 10E12/L (ref 4.4–5.9)
SODIUM BLD-SCNC: 140 MMOL/L (ref 133–144)
SODIUM BLD-SCNC: 141 MMOL/L (ref 133–144)
SODIUM BLD-SCNC: 141 MMOL/L (ref 133–144)
SPECIMEN EXP DATE BLD: NORMAL
SPECIMEN SOURCE: ABNORMAL
TROPONIN I SERPL-MCNC: 0.02 UG/L (ref 0–0.04)
TROPONIN I SERPL-MCNC: 0.03 UG/L (ref 0–0.04)
WBC # BLD AUTO: 9.2 10E9/L (ref 4–11)

## 2018-01-15 PROCEDURE — 93005 ELECTROCARDIOGRAM TRACING: CPT

## 2018-01-15 PROCEDURE — 25000128 H RX IP 250 OP 636: Performed by: SURGERY

## 2018-01-15 PROCEDURE — 87186 SC STD MICRODIL/AGAR DIL: CPT | Performed by: SURGERY

## 2018-01-15 PROCEDURE — 83735 ASSAY OF MAGNESIUM: CPT | Performed by: INTERNAL MEDICINE

## 2018-01-15 PROCEDURE — 00000146 ZZHCL STATISTIC GLUCOSE BY METER IP

## 2018-01-15 PROCEDURE — 87076 CULTURE ANAEROBE IDENT EACH: CPT | Performed by: SURGERY

## 2018-01-15 PROCEDURE — 99233 SBSQ HOSP IP/OBS HIGH 50: CPT | Performed by: INTERNAL MEDICINE

## 2018-01-15 PROCEDURE — 25000128 H RX IP 250 OP 636: Performed by: CLINICAL NURSE SPECIALIST

## 2018-01-15 PROCEDURE — 87077 CULTURE AEROBIC IDENTIFY: CPT | Performed by: SURGERY

## 2018-01-15 PROCEDURE — 84145 PROCALCITONIN (PCT): CPT | Performed by: INTERNAL MEDICINE

## 2018-01-15 PROCEDURE — C9399 UNCLASSIFIED DRUGS OR BIOLOG: HCPCS | Performed by: NURSE ANESTHETIST, CERTIFIED REGISTERED

## 2018-01-15 PROCEDURE — 25000125 ZZHC RX 250: Performed by: ANESTHESIOLOGY

## 2018-01-15 PROCEDURE — 25000132 ZZH RX MED GY IP 250 OP 250 PS 637: Performed by: INTERNAL MEDICINE

## 2018-01-15 PROCEDURE — 25000125 ZZHC RX 250: Performed by: NURSE ANESTHETIST, CERTIFIED REGISTERED

## 2018-01-15 PROCEDURE — 87070 CULTURE OTHR SPECIMN AEROBIC: CPT | Performed by: SURGERY

## 2018-01-15 PROCEDURE — 36000053 ZZH SURGERY LEVEL 2 EA 15 ADDTL MIN - UMMC: Performed by: SURGERY

## 2018-01-15 PROCEDURE — 85610 PROTHROMBIN TIME: CPT | Performed by: CLINICAL NURSE SPECIALIST

## 2018-01-15 PROCEDURE — 37000008 ZZH ANESTHESIA TECHNICAL FEE, 1ST 30 MIN: Performed by: SURGERY

## 2018-01-15 PROCEDURE — 99207 ZZC NO CHARGE FOLLOW UP PS: CPT

## 2018-01-15 PROCEDURE — 40000170 ZZH STATISTIC PRE-PROCEDURE ASSESSMENT II: Performed by: SURGERY

## 2018-01-15 PROCEDURE — 12000008 ZZH R&B INTERMEDIATE UMMC

## 2018-01-15 PROCEDURE — 40000014 ZZH STATISTIC ARTERIAL MONITORING DAILY

## 2018-01-15 PROCEDURE — 06BP4ZZ EXCISION OF RIGHT SAPHENOUS VEIN, PERCUTANEOUS ENDOSCOPIC APPROACH: ICD-10-PCS | Performed by: SURGERY

## 2018-01-15 PROCEDURE — 88304 TISSUE EXAM BY PATHOLOGIST: CPT | Performed by: SURGERY

## 2018-01-15 PROCEDURE — 25000132 ZZH RX MED GY IP 250 OP 250 PS 637: Performed by: NURSE PRACTITIONER

## 2018-01-15 PROCEDURE — 84132 ASSAY OF SERUM POTASSIUM: CPT | Performed by: ANESTHESIOLOGY

## 2018-01-15 PROCEDURE — 25000128 H RX IP 250 OP 636: Performed by: ANESTHESIOLOGY

## 2018-01-15 PROCEDURE — 37000009 ZZH ANESTHESIA TECHNICAL FEE, EACH ADDTL 15 MIN: Performed by: SURGERY

## 2018-01-15 PROCEDURE — C9290 INJ, BUPIVACAINE LIPOSOME: HCPCS | Performed by: ANESTHESIOLOGY

## 2018-01-15 PROCEDURE — 88311 DECALCIFY TISSUE: CPT | Performed by: SURGERY

## 2018-01-15 PROCEDURE — 36415 COLL VENOUS BLD VENIPUNCTURE: CPT | Performed by: CLINICAL NURSE SPECIALIST

## 2018-01-15 PROCEDURE — 041K09N BYPASS RIGHT FEMORAL ARTERY TO POSTERIOR TIBIAL ARTERY WITH AUTOLOGOUS VENOUS TISSUE, OPEN APPROACH: ICD-10-PCS | Performed by: SURGERY

## 2018-01-15 PROCEDURE — 84295 ASSAY OF SERUM SODIUM: CPT | Performed by: INTERNAL MEDICINE

## 2018-01-15 PROCEDURE — 82330 ASSAY OF CALCIUM: CPT | Performed by: INTERNAL MEDICINE

## 2018-01-15 PROCEDURE — 25000125 ZZHC RX 250: Performed by: INTERNAL MEDICINE

## 2018-01-15 PROCEDURE — 84132 ASSAY OF SERUM POTASSIUM: CPT | Performed by: INTERNAL MEDICINE

## 2018-01-15 PROCEDURE — 25000565 ZZH ISOFLURANE, EA 15 MIN: Performed by: SURGERY

## 2018-01-15 PROCEDURE — 82947 ASSAY GLUCOSE BLOOD QUANT: CPT | Performed by: INTERNAL MEDICINE

## 2018-01-15 PROCEDURE — 0JBL0ZZ EXCISION OF RIGHT UPPER LEG SUBCUTANEOUS TISSUE AND FASCIA, OPEN APPROACH: ICD-10-PCS | Performed by: SURGERY

## 2018-01-15 PROCEDURE — 82803 BLOOD GASES ANY COMBINATION: CPT | Performed by: INTERNAL MEDICINE

## 2018-01-15 PROCEDURE — 87075 CULTR BACTERIA EXCEPT BLOOD: CPT | Performed by: SURGERY

## 2018-01-15 PROCEDURE — 36000051 ZZH SURGERY LEVEL 2 1ST 30 MIN - UMMC: Performed by: SURGERY

## 2018-01-15 PROCEDURE — 36415 COLL VENOUS BLD VENIPUNCTURE: CPT | Performed by: INTERNAL MEDICINE

## 2018-01-15 PROCEDURE — 27210794 ZZH OR GENERAL SUPPLY STERILE: Performed by: SURGERY

## 2018-01-15 PROCEDURE — 40000986 XR CHEST PORT 1 VW

## 2018-01-15 PROCEDURE — 93283 PRGRMG EVAL IMPLANTABLE DFB: CPT | Mod: 26 | Performed by: INTERNAL MEDICINE

## 2018-01-15 PROCEDURE — 84484 ASSAY OF TROPONIN QUANT: CPT | Performed by: STUDENT IN AN ORGANIZED HEALTH CARE EDUCATION/TRAINING PROGRAM

## 2018-01-15 PROCEDURE — 82947 ASSAY GLUCOSE BLOOD QUANT: CPT | Performed by: ANESTHESIOLOGY

## 2018-01-15 PROCEDURE — 40000275 ZZH STATISTIC RCP TIME EA 10 MIN

## 2018-01-15 PROCEDURE — 25000128 H RX IP 250 OP 636: Performed by: INTERNAL MEDICINE

## 2018-01-15 PROCEDURE — 87176 TISSUE HOMOGENIZATION CULTR: CPT | Performed by: SURGERY

## 2018-01-15 PROCEDURE — 27210995 ZZH RX 272: Performed by: SURGERY

## 2018-01-15 PROCEDURE — 86140 C-REACTIVE PROTEIN: CPT | Performed by: INTERNAL MEDICINE

## 2018-01-15 PROCEDURE — 25000128 H RX IP 250 OP 636: Performed by: NURSE ANESTHETIST, CERTIFIED REGISTERED

## 2018-01-15 PROCEDURE — P9041 ALBUMIN (HUMAN),5%, 50ML: HCPCS | Performed by: NURSE ANESTHETIST, CERTIFIED REGISTERED

## 2018-01-15 PROCEDURE — 25000128 H RX IP 250 OP 636: Performed by: STUDENT IN AN ORGANIZED HEALTH CARE EDUCATION/TRAINING PROGRAM

## 2018-01-15 PROCEDURE — 71000017 ZZH RECOVERY PHASE 1 LEVEL 3 EA ADDTL HR: Performed by: SURGERY

## 2018-01-15 PROCEDURE — 82330 ASSAY OF CALCIUM: CPT | Performed by: ANESTHESIOLOGY

## 2018-01-15 PROCEDURE — 93283 PRGRMG EVAL IMPLANTABLE DFB: CPT | Mod: ZF

## 2018-01-15 PROCEDURE — 25000132 ZZH RX MED GY IP 250 OP 250 PS 637: Performed by: STUDENT IN AN ORGANIZED HEALTH CARE EDUCATION/TRAINING PROGRAM

## 2018-01-15 PROCEDURE — C1757 CATH, THROMBECTOMY/EMBOLECT: HCPCS | Performed by: SURGERY

## 2018-01-15 PROCEDURE — 84484 ASSAY OF TROPONIN QUANT: CPT | Performed by: INTERNAL MEDICINE

## 2018-01-15 PROCEDURE — 87205 SMEAR GRAM STAIN: CPT | Performed by: SURGERY

## 2018-01-15 PROCEDURE — 85347 COAGULATION TIME ACTIVATED: CPT

## 2018-01-15 PROCEDURE — 25000132 ZZH RX MED GY IP 250 OP 250 PS 637: Performed by: SURGERY

## 2018-01-15 PROCEDURE — 82803 BLOOD GASES ANY COMBINATION: CPT | Performed by: ANESTHESIOLOGY

## 2018-01-15 PROCEDURE — 71000016 ZZH RECOVERY PHASE 1 LEVEL 3 FIRST HR: Performed by: SURGERY

## 2018-01-15 PROCEDURE — 25000125 ZZHC RX 250: Performed by: STUDENT IN AN ORGANIZED HEALTH CARE EDUCATION/TRAINING PROGRAM

## 2018-01-15 PROCEDURE — 84295 ASSAY OF SERUM SODIUM: CPT | Performed by: ANESTHESIOLOGY

## 2018-01-15 RX ORDER — BUPIVACAINE HYDROCHLORIDE 5 MG/ML
INJECTION, SOLUTION EPIDURAL; INTRACAUDAL PRN
Status: DISCONTINUED | OUTPATIENT
Start: 2018-01-15 | End: 2018-01-15

## 2018-01-15 RX ORDER — FLUMAZENIL 0.1 MG/ML
0.2 INJECTION, SOLUTION INTRAVENOUS
Status: CANCELLED | OUTPATIENT
Start: 2018-01-15

## 2018-01-15 RX ORDER — ETOMIDATE 2 MG/ML
INJECTION INTRAVENOUS PRN
Status: DISCONTINUED | OUTPATIENT
Start: 2018-01-15 | End: 2018-01-15

## 2018-01-15 RX ORDER — SODIUM CHLORIDE 9 MG/ML
INJECTION, SOLUTION INTRAVENOUS CONTINUOUS
Status: DISCONTINUED | OUTPATIENT
Start: 2018-01-15 | End: 2018-01-22 | Stop reason: CLARIF

## 2018-01-15 RX ORDER — HEPARIN SODIUM 1000 [USP'U]/ML
INJECTION, SOLUTION INTRAVENOUS; SUBCUTANEOUS PRN
Status: DISCONTINUED | OUTPATIENT
Start: 2018-01-15 | End: 2018-01-15

## 2018-01-15 RX ORDER — ALBUMIN, HUMAN INJ 5% 5 %
SOLUTION INTRAVENOUS CONTINUOUS PRN
Status: DISCONTINUED | OUTPATIENT
Start: 2018-01-15 | End: 2018-01-15

## 2018-01-15 RX ORDER — ERTAPENEM 1 G/1
INJECTION, POWDER, LYOPHILIZED, FOR SOLUTION INTRAMUSCULAR; INTRAVENOUS PRN
Status: DISCONTINUED | OUTPATIENT
Start: 2018-01-15 | End: 2018-01-15

## 2018-01-15 RX ORDER — CALCIUM CHLORIDE 100 MG/ML
INJECTION INTRAVENOUS; INTRAVENTRICULAR PRN
Status: DISCONTINUED | OUTPATIENT
Start: 2018-01-15 | End: 2018-01-15

## 2018-01-15 RX ORDER — METOPROLOL TARTRATE 1 MG/ML
5 INJECTION, SOLUTION INTRAVENOUS EVERY 6 HOURS
Status: DISCONTINUED | OUTPATIENT
Start: 2018-01-16 | End: 2018-01-17

## 2018-01-15 RX ORDER — CEFAZOLIN SODIUM 1 G/3ML
1 INJECTION, POWDER, FOR SOLUTION INTRAMUSCULAR; INTRAVENOUS EVERY 8 HOURS
Status: DISCONTINUED | OUTPATIENT
Start: 2018-01-15 | End: 2018-01-16

## 2018-01-15 RX ORDER — CLOPIDOGREL BISULFATE 75 MG/1
75 TABLET ORAL DAILY
Status: DISCONTINUED | OUTPATIENT
Start: 2018-01-16 | End: 2018-01-23 | Stop reason: HOSPADM

## 2018-01-15 RX ORDER — ONDANSETRON 2 MG/ML
4 INJECTION INTRAMUSCULAR; INTRAVENOUS EVERY 30 MIN PRN
Status: DISCONTINUED | OUTPATIENT
Start: 2018-01-15 | End: 2018-01-16 | Stop reason: HOSPADM

## 2018-01-15 RX ORDER — SODIUM CHLORIDE, SODIUM LACTATE, POTASSIUM CHLORIDE, CALCIUM CHLORIDE 600; 310; 30; 20 MG/100ML; MG/100ML; MG/100ML; MG/100ML
INJECTION, SOLUTION INTRAVENOUS CONTINUOUS
Status: DISCONTINUED | OUTPATIENT
Start: 2018-01-15 | End: 2018-01-16 | Stop reason: HOSPADM

## 2018-01-15 RX ORDER — FENTANYL CITRATE 50 UG/ML
25-50 INJECTION, SOLUTION INTRAMUSCULAR; INTRAVENOUS
Status: CANCELLED | OUTPATIENT
Start: 2018-01-15

## 2018-01-15 RX ORDER — FENTANYL CITRATE 50 UG/ML
25-50 INJECTION, SOLUTION INTRAMUSCULAR; INTRAVENOUS EVERY 5 MIN PRN
Status: DISCONTINUED | OUTPATIENT
Start: 2018-01-15 | End: 2018-01-16 | Stop reason: HOSPADM

## 2018-01-15 RX ORDER — SODIUM CHLORIDE 9 MG/ML
INJECTION, SOLUTION INTRAVENOUS CONTINUOUS
Status: DISCONTINUED | OUTPATIENT
Start: 2018-01-15 | End: 2018-01-15

## 2018-01-15 RX ORDER — ASPIRIN 600 MG/1
600 SUPPOSITORY RECTAL ONCE
Status: COMPLETED | OUTPATIENT
Start: 2018-01-15 | End: 2018-01-15

## 2018-01-15 RX ORDER — SODIUM CHLORIDE, SODIUM LACTATE, POTASSIUM CHLORIDE, CALCIUM CHLORIDE 600; 310; 30; 20 MG/100ML; MG/100ML; MG/100ML; MG/100ML
INJECTION, SOLUTION INTRAVENOUS CONTINUOUS PRN
Status: DISCONTINUED | OUTPATIENT
Start: 2018-01-15 | End: 2018-01-15

## 2018-01-15 RX ORDER — ONDANSETRON 4 MG/1
4 TABLET, ORALLY DISINTEGRATING ORAL EVERY 30 MIN PRN
Status: DISCONTINUED | OUTPATIENT
Start: 2018-01-15 | End: 2018-01-16 | Stop reason: HOSPADM

## 2018-01-15 RX ORDER — HALOPERIDOL 5 MG/ML
2 INJECTION INTRAMUSCULAR EVERY 5 MIN PRN
Status: COMPLETED | OUTPATIENT
Start: 2018-01-15 | End: 2018-01-15

## 2018-01-15 RX ORDER — NALOXONE HYDROCHLORIDE 0.4 MG/ML
.1-.4 INJECTION, SOLUTION INTRAMUSCULAR; INTRAVENOUS; SUBCUTANEOUS
Status: ACTIVE | OUTPATIENT
Start: 2018-01-15 | End: 2018-01-16

## 2018-01-15 RX ORDER — FENTANYL CITRATE 50 UG/ML
INJECTION, SOLUTION INTRAMUSCULAR; INTRAVENOUS PRN
Status: DISCONTINUED | OUTPATIENT
Start: 2018-01-15 | End: 2018-01-15

## 2018-01-15 RX ORDER — ALBUMIN (HUMAN) 12.5 G/50ML
12.5 SOLUTION INTRAVENOUS ONCE
Status: DISCONTINUED | OUTPATIENT
Start: 2018-01-15 | End: 2018-01-16 | Stop reason: HOSPADM

## 2018-01-15 RX ORDER — HEPARIN SODIUM 5000 [USP'U]/.5ML
5000 INJECTION, SOLUTION INTRAVENOUS; SUBCUTANEOUS EVERY 8 HOURS
Status: DISCONTINUED | OUTPATIENT
Start: 2018-01-16 | End: 2018-01-23 | Stop reason: HOSPADM

## 2018-01-15 RX ORDER — NALOXONE HYDROCHLORIDE 0.4 MG/ML
.1-.4 INJECTION, SOLUTION INTRAMUSCULAR; INTRAVENOUS; SUBCUTANEOUS
Status: CANCELLED | OUTPATIENT
Start: 2018-01-15

## 2018-01-15 RX ORDER — SODIUM CHLORIDE 9 MG/ML
INJECTION, SOLUTION INTRAVENOUS CONTINUOUS PRN
Status: DISCONTINUED | OUTPATIENT
Start: 2018-01-15 | End: 2018-01-15

## 2018-01-15 RX ORDER — VANCOMYCIN HYDROCHLORIDE 1 G/200ML
1000 INJECTION, SOLUTION INTRAVENOUS ONCE
Status: DISCONTINUED | OUTPATIENT
Start: 2018-01-15 | End: 2018-01-16

## 2018-01-15 RX ORDER — LIDOCAINE HYDROCHLORIDE 20 MG/ML
INJECTION, SOLUTION INFILTRATION; PERINEURAL PRN
Status: DISCONTINUED | OUTPATIENT
Start: 2018-01-15 | End: 2018-01-15

## 2018-01-15 RX ORDER — LIDOCAINE 40 MG/G
CREAM TOPICAL
Status: DISCONTINUED | OUTPATIENT
Start: 2018-01-15 | End: 2018-01-23 | Stop reason: HOSPADM

## 2018-01-15 RX ORDER — ASPIRIN 81 MG/1
81 TABLET ORAL DAILY
Status: DISCONTINUED | OUTPATIENT
Start: 2018-01-16 | End: 2018-01-23 | Stop reason: HOSPADM

## 2018-01-15 RX ADMIN — ROCURONIUM BROMIDE 10 MG: 10 INJECTION INTRAVENOUS at 16:42

## 2018-01-15 RX ADMIN — SUGAMMADEX 150 MG: 100 INJECTION, SOLUTION INTRAVENOUS at 17:59

## 2018-01-15 RX ADMIN — PHENYLEPHRINE HYDROCHLORIDE 100 MCG: 10 INJECTION, SOLUTION INTRAMUSCULAR; INTRAVENOUS; SUBCUTANEOUS at 15:23

## 2018-01-15 RX ADMIN — FENTANYL CITRATE 50 MCG: 50 INJECTION, SOLUTION INTRAMUSCULAR; INTRAVENOUS at 11:47

## 2018-01-15 RX ADMIN — HYDROMORPHONE HYDROCHLORIDE 6 MG: 2 TABLET ORAL at 02:42

## 2018-01-15 RX ADMIN — NOREPINEPHRINE BITARTRATE 3.2 MCG: 1 INJECTION INTRAVENOUS at 13:33

## 2018-01-15 RX ADMIN — PHENYLEPHRINE HYDROCHLORIDE 100 MCG: 10 INJECTION, SOLUTION INTRAMUSCULAR; INTRAVENOUS; SUBCUTANEOUS at 11:50

## 2018-01-15 RX ADMIN — ROCURONIUM BROMIDE 10 MG: 10 INJECTION INTRAVENOUS at 12:40

## 2018-01-15 RX ADMIN — Medication: at 08:44

## 2018-01-15 RX ADMIN — LIDOCAINE HYDROCHLORIDE 80 MG: 20 INJECTION, SOLUTION INFILTRATION; PERINEURAL at 11:47

## 2018-01-15 RX ADMIN — FENTANYL CITRATE 25 MCG: 50 INJECTION, SOLUTION INTRAMUSCULAR; INTRAVENOUS at 17:15

## 2018-01-15 RX ADMIN — HEPARIN SODIUM 5000 UNITS: 1000 INJECTION, SOLUTION INTRAVENOUS; SUBCUTANEOUS at 14:19

## 2018-01-15 RX ADMIN — ROCURONIUM BROMIDE 10 MG: 10 INJECTION INTRAVENOUS at 14:48

## 2018-01-15 RX ADMIN — ROCURONIUM BROMIDE 10 MG: 10 INJECTION INTRAVENOUS at 13:28

## 2018-01-15 RX ADMIN — PHENYLEPHRINE HYDROCHLORIDE 200 MCG: 10 INJECTION, SOLUTION INTRAMUSCULAR; INTRAVENOUS; SUBCUTANEOUS at 16:08

## 2018-01-15 RX ADMIN — HYDROCORTISONE SODIUM SUCCINATE 100 MG: 100 INJECTION, POWDER, FOR SOLUTION INTRAMUSCULAR; INTRAVENOUS at 12:22

## 2018-01-15 RX ADMIN — NOREPINEPHRINE BITARTRATE 3.2 MCG: 1 INJECTION INTRAVENOUS at 13:47

## 2018-01-15 RX ADMIN — PHENYLEPHRINE HYDROCHLORIDE 100 MCG: 10 INJECTION, SOLUTION INTRAMUSCULAR; INTRAVENOUS; SUBCUTANEOUS at 11:49

## 2018-01-15 RX ADMIN — ROCURONIUM BROMIDE 50 MG: 10 INJECTION INTRAVENOUS at 11:47

## 2018-01-15 RX ADMIN — FENTANYL CITRATE 25 MCG: 50 INJECTION, SOLUTION INTRAMUSCULAR; INTRAVENOUS at 17:37

## 2018-01-15 RX ADMIN — PHENYLEPHRINE HYDROCHLORIDE 100 MCG: 10 INJECTION, SOLUTION INTRAMUSCULAR; INTRAVENOUS; SUBCUTANEOUS at 15:26

## 2018-01-15 RX ADMIN — ERTAPENEM SODIUM 1 G: 1 INJECTION, POWDER, LYOPHILIZED, FOR SOLUTION INTRAMUSCULAR; INTRAVENOUS at 12:28

## 2018-01-15 RX ADMIN — VASOPRESSIN 0.5 UNITS: 20 INJECTION, SOLUTION INTRAMUSCULAR; SUBCUTANEOUS at 17:15

## 2018-01-15 RX ADMIN — NOREPINEPHRINE BITARTRATE 3.2 MCG: 1 INJECTION INTRAVENOUS at 16:00

## 2018-01-15 RX ADMIN — CEFAZOLIN 1 G: 330 INJECTION, POWDER, FOR SOLUTION INTRAMUSCULAR; INTRAVENOUS at 23:50

## 2018-01-15 RX ADMIN — MOMETASONE FUROATE: 1 CREAM TOPICAL at 08:44

## 2018-01-15 RX ADMIN — PHENYLEPHRINE HYDROCHLORIDE 100 MCG: 10 INJECTION, SOLUTION INTRAMUSCULAR; INTRAVENOUS; SUBCUTANEOUS at 15:28

## 2018-01-15 RX ADMIN — NOREPINEPHRINE BITARTRATE 3.2 MCG: 1 INJECTION INTRAVENOUS at 13:19

## 2018-01-15 RX ADMIN — PHENYLEPHRINE HYDROCHLORIDE 100 MCG: 10 INJECTION, SOLUTION INTRAMUSCULAR; INTRAVENOUS; SUBCUTANEOUS at 11:47

## 2018-01-15 RX ADMIN — HYDROMORPHONE HYDROCHLORIDE 0.5 MG: 1 INJECTION, SOLUTION INTRAMUSCULAR; INTRAVENOUS; SUBCUTANEOUS at 07:55

## 2018-01-15 RX ADMIN — SODIUM CHLORIDE: 9 INJECTION, SOLUTION INTRAVENOUS at 20:38

## 2018-01-15 RX ADMIN — MIDAZOLAM 1 MG: 1 INJECTION INTRAMUSCULAR; INTRAVENOUS at 18:15

## 2018-01-15 RX ADMIN — HALOPERIDOL LACTATE 2 MG: 5 INJECTION, SOLUTION INTRAMUSCULAR at 18:40

## 2018-01-15 RX ADMIN — NOREPINEPHRINE BITARTRATE 3.2 MCG: 1 INJECTION INTRAVENOUS at 13:37

## 2018-01-15 RX ADMIN — VASOPRESSIN 1 UNITS: 20 INJECTION, SOLUTION INTRAMUSCULAR; SUBCUTANEOUS at 16:20

## 2018-01-15 RX ADMIN — NOREPINEPHRINE BITARTRATE 3.2 MCG: 1 INJECTION INTRAVENOUS at 13:10

## 2018-01-15 RX ADMIN — ALBUMIN HUMAN: 0.05 INJECTION, SOLUTION INTRAVENOUS at 13:24

## 2018-01-15 RX ADMIN — MOMETASONE FUROATE: 1 OINTMENT TOPICAL at 08:44

## 2018-01-15 RX ADMIN — HYDROMORPHONE HYDROCHLORIDE 6 MG: 2 TABLET ORAL at 06:20

## 2018-01-15 RX ADMIN — PHENYLEPHRINE HYDROCHLORIDE 200 MCG: 10 INJECTION, SOLUTION INTRAMUSCULAR; INTRAVENOUS; SUBCUTANEOUS at 12:21

## 2018-01-15 RX ADMIN — HEPARIN SODIUM 2000 UNITS: 1000 INJECTION, SOLUTION INTRAVENOUS; SUBCUTANEOUS at 15:33

## 2018-01-15 RX ADMIN — SODIUM CHLORIDE, POTASSIUM CHLORIDE, SODIUM LACTATE AND CALCIUM CHLORIDE: 600; 310; 30; 20 INJECTION, SOLUTION INTRAVENOUS at 11:10

## 2018-01-15 RX ADMIN — NOREPINEPHRINE BITARTRATE 0.03 MCG/KG/MIN: 1 INJECTION INTRAVENOUS at 13:46

## 2018-01-15 RX ADMIN — VASOPRESSIN 0.5 UNITS: 20 INJECTION, SOLUTION INTRAMUSCULAR; SUBCUTANEOUS at 17:08

## 2018-01-15 RX ADMIN — SODIUM CHLORIDE, POTASSIUM CHLORIDE, SODIUM LACTATE AND CALCIUM CHLORIDE: 600; 310; 30; 20 INJECTION, SOLUTION INTRAVENOUS at 13:00

## 2018-01-15 RX ADMIN — NOREPINEPHRINE BITARTRATE 6.4 MCG: 1 INJECTION INTRAVENOUS at 12:32

## 2018-01-15 RX ADMIN — ROCURONIUM BROMIDE 10 MG: 10 INJECTION INTRAVENOUS at 13:29

## 2018-01-15 RX ADMIN — CALCIUM CHLORIDE 0.5 G: 100 INJECTION, SOLUTION INTRAVENOUS at 16:18

## 2018-01-15 RX ADMIN — PHENYLEPHRINE HYDROCHLORIDE 100 MCG: 10 INJECTION, SOLUTION INTRAMUSCULAR; INTRAVENOUS; SUBCUTANEOUS at 12:08

## 2018-01-15 RX ADMIN — ALBUMIN HUMAN: 0.05 INJECTION, SOLUTION INTRAVENOUS at 15:23

## 2018-01-15 RX ADMIN — PHENYLEPHRINE HYDROCHLORIDE 100 MCG: 10 INJECTION, SOLUTION INTRAMUSCULAR; INTRAVENOUS; SUBCUTANEOUS at 15:29

## 2018-01-15 RX ADMIN — NOREPINEPHRINE BITARTRATE 3.2 MCG: 1 INJECTION INTRAVENOUS at 13:45

## 2018-01-15 RX ADMIN — HALOPERIDOL LACTATE 2 MG: 5 INJECTION, SOLUTION INTRAMUSCULAR at 18:55

## 2018-01-15 RX ADMIN — ASPIRIN 600 MG: 600 SUPPOSITORY RECTAL at 19:18

## 2018-01-15 RX ADMIN — NOREPINEPHRINE BITARTRATE 3.2 MCG: 1 INJECTION INTRAVENOUS at 12:56

## 2018-01-15 RX ADMIN — ROCURONIUM BROMIDE 10 MG: 10 INJECTION INTRAVENOUS at 16:10

## 2018-01-15 RX ADMIN — PREDNISONE 10 MG: 10 TABLET ORAL at 08:39

## 2018-01-15 RX ADMIN — PHENYLEPHRINE HYDROCHLORIDE 100 MCG: 10 INJECTION, SOLUTION INTRAMUSCULAR; INTRAVENOUS; SUBCUTANEOUS at 11:48

## 2018-01-15 RX ADMIN — ASPIRIN 81 MG: 81 TABLET, COATED ORAL at 08:39

## 2018-01-15 RX ADMIN — NOREPINEPHRINE BITARTRATE 6.4 MCG: 1 INJECTION INTRAVENOUS at 12:27

## 2018-01-15 RX ADMIN — VASOPRESSIN 1 UNITS: 20 INJECTION, SOLUTION INTRAMUSCULAR; SUBCUTANEOUS at 16:16

## 2018-01-15 RX ADMIN — ROCURONIUM BROMIDE 10 MG: 10 INJECTION INTRAVENOUS at 12:53

## 2018-01-15 RX ADMIN — ISOSORBIDE MONONITRATE 60 MG: 60 TABLET, EXTENDED RELEASE ORAL at 08:43

## 2018-01-15 RX ADMIN — PHENYLEPHRINE HYDROCHLORIDE 100 MCG: 10 INJECTION, SOLUTION INTRAMUSCULAR; INTRAVENOUS; SUBCUTANEOUS at 15:30

## 2018-01-15 RX ADMIN — NOREPINEPHRINE BITARTRATE 3.2 MCG: 1 INJECTION INTRAVENOUS at 16:05

## 2018-01-15 RX ADMIN — PHENYLEPHRINE HYDROCHLORIDE 100 MCG: 10 INJECTION, SOLUTION INTRAMUSCULAR; INTRAVENOUS; SUBCUTANEOUS at 12:07

## 2018-01-15 RX ADMIN — ACETAMINOPHEN 650 MG: 325 TABLET ORAL at 08:34

## 2018-01-15 RX ADMIN — PHENYLEPHRINE HYDROCHLORIDE 200 MCG: 10 INJECTION, SOLUTION INTRAMUSCULAR; INTRAVENOUS; SUBCUTANEOUS at 15:40

## 2018-01-15 RX ADMIN — SODIUM CHLORIDE: 9 INJECTION, SOLUTION INTRAVENOUS at 12:35

## 2018-01-15 RX ADMIN — GABAPENTIN 300 MG: 300 CAPSULE ORAL at 08:37

## 2018-01-15 RX ADMIN — NOREPINEPHRINE BITARTRATE 6.4 MCG: 1 INJECTION INTRAVENOUS at 12:25

## 2018-01-15 RX ADMIN — PHENYLEPHRINE HYDROCHLORIDE 100 MCG: 10 INJECTION, SOLUTION INTRAMUSCULAR; INTRAVENOUS; SUBCUTANEOUS at 15:38

## 2018-01-15 RX ADMIN — HYDROMORPHONE HYDROCHLORIDE 0.2 MG: 1 INJECTION, SOLUTION INTRAMUSCULAR; INTRAVENOUS; SUBCUTANEOUS at 20:26

## 2018-01-15 RX ADMIN — ROCURONIUM BROMIDE 10 MG: 10 INJECTION INTRAVENOUS at 15:14

## 2018-01-15 RX ADMIN — CALCIUM CHLORIDE 0.5 G: 100 INJECTION, SOLUTION INTRAVENOUS at 16:16

## 2018-01-15 RX ADMIN — ROCURONIUM BROMIDE 10 MG: 10 INJECTION INTRAVENOUS at 15:36

## 2018-01-15 RX ADMIN — HYDROMORPHONE HYDROCHLORIDE 0.4 MG: 1 INJECTION, SOLUTION INTRAMUSCULAR; INTRAVENOUS; SUBCUTANEOUS at 23:19

## 2018-01-15 RX ADMIN — BUPIVACAINE 10 ML: 13.3 INJECTION, SUSPENSION, LIPOSOMAL INFILTRATION at 11:00

## 2018-01-15 RX ADMIN — NOREPINEPHRINE BITARTRATE 6.4 MCG: 1 INJECTION INTRAVENOUS at 12:49

## 2018-01-15 RX ADMIN — CARVEDILOL 6.25 MG: 6.25 TABLET, FILM COATED ORAL at 08:38

## 2018-01-15 RX ADMIN — PHENYLEPHRINE HYDROCHLORIDE 100 MCG: 10 INJECTION, SOLUTION INTRAMUSCULAR; INTRAVENOUS; SUBCUTANEOUS at 15:42

## 2018-01-15 RX ADMIN — ROCURONIUM BROMIDE 10 MG: 10 INJECTION INTRAVENOUS at 17:10

## 2018-01-15 RX ADMIN — ROCURONIUM BROMIDE 10 MG: 10 INJECTION INTRAVENOUS at 14:12

## 2018-01-15 RX ADMIN — ETOMIDATE 20 MG: 2 INJECTION INTRAVENOUS at 11:49

## 2018-01-15 RX ADMIN — EPINEPHRINE 0.01 MCG/KG/MIN: 1 INJECTION PARENTERAL at 12:33

## 2018-01-15 RX ADMIN — BUPIVACAINE HYDROCHLORIDE 10 ML: 5 INJECTION, SOLUTION EPIDURAL; INTRACAUDAL at 11:00

## 2018-01-15 RX ADMIN — PHENYLEPHRINE HYDROCHLORIDE 200 MCG: 10 INJECTION, SOLUTION INTRAMUSCULAR; INTRAVENOUS; SUBCUTANEOUS at 12:15

## 2018-01-15 RX ADMIN — ALBUMIN HUMAN: 0.05 INJECTION, SOLUTION INTRAVENOUS at 11:44

## 2018-01-15 RX ADMIN — FENTANYL CITRATE 100 MCG: 50 INJECTION, SOLUTION INTRAMUSCULAR; INTRAVENOUS at 12:34

## 2018-01-15 RX ADMIN — FENTANYL CITRATE 50 MCG: 50 INJECTION, SOLUTION INTRAMUSCULAR; INTRAVENOUS at 17:43

## 2018-01-15 RX ADMIN — ESCITALOPRAM OXALATE 20 MG: 20 TABLET ORAL at 08:36

## 2018-01-15 ASSESSMENT — PAIN DESCRIPTION - DESCRIPTORS: DESCRIPTORS: ACHING

## 2018-01-15 ASSESSMENT — COPD QUESTIONNAIRES
CAT_SEVERITY: MILD
COPD: 1

## 2018-01-15 NOTE — PLAN OF CARE
Problem: Patient Care Overview  Goal: Plan of Care/Patient Progress Review  Outcome: No Change  Pt A&Ox4, but intermittently confused. VPM for pt safety and son at bedside. VSS on RA, ex hypotensive. NSR w/ PVCs on tele. C/o pain in BLE, worse in R leg, managed with prn Diluadid. Pt NPO at 0000 for procedure tomorrow. BG 86 at 0200. Plan for R femoral bypass and endarterectomy 1/15 at 1210. Will continue to monitor and follow POC.

## 2018-01-15 NOTE — ANESTHESIA PREPROCEDURE EVALUATION
Anesthesia Evaluation     .             ROS/MED HX    ENT/Pulmonary:     (+)mild COPD, , . .    Neurologic:  - neg neurologic ROS     Cardiovascular:     (+) hypertension--CAD, --. : . CHF . BROWNLEE, . pacemaker Reason placed: Low Ejection fraction 20%:- Patientt is not dependent on pacemaker . .       METS/Exercise Tolerance:     Hematologic:  - neg hematologic  ROS       Musculoskeletal:  - neg musculoskeletal ROS       GI/Hepatic:  - neg GI/hepatic ROS       Renal/Genitourinary:  - ROS Renal section negative       Endo:  - neg endo ROS       Psychiatric:  - neg psychiatric ROS       Infectious Disease:  - neg infectious disease ROS       Malignancy:      - no malignancy   Other:    (+) H/O Chronic Pain,H/O chronic opiod use ,                    Physical Exam  Normal systems: cardiovascular, pulmonary and dental    Airway   Mallampati: I  TM distance: >3 FB  Neck ROM: full    Dental     Cardiovascular   Rhythm and rate: regular and normal      Pulmonary    breath sounds clear to auscultation                    Anesthesia Plan      History & Physical Review  History and physical reviewed and following examination; no interval change.    ASA Status:  4 .    NPO Status:  > 8 hours    Plan for General, ETT and Periph. Nerve Block for postop pain with Intravenous and Etomidate induction. Maintenance will be Balanced.    PONV prophylaxis:  Ondansetron (or other 5HT-3) and Dexamethasone or Solumedrol  Additional equipment: Videolaryngoscope, Arterial Line and Central Line      Postoperative Care  Postoperative pain management:  IV analgesics and Peripheral nerve block (Single Shot).      Consents  Anesthetic plan, risks, benefits and alternatives discussed with:  Patient..                          .

## 2018-01-15 NOTE — ANESTHESIA PROCEDURE NOTES
Arterial Line Procedure Note  Staff:     Anesthesiologist:  HARPREET ASKEW    Resident/CRNA:  DAIJA ALTAMIRANO    Arterial line performed by resident/CRNA in presence of a teaching physician    Location: In OR Before Induction  Procedure Start/Stop Times:      1/15/2018 11:30 AM     1/15/2018 11:35 AM    patient identified, IV checked, site marked, risks and benefits discussed, informed consent, monitors and equipment checked, pre-op evaluation and at physician/surgeon's request      Correct Patient: Yes      Correct Position: Yes      Correct Site: Yes      Correct Procedure: Yes      Correct Laterality:  Yes    Site Marked:  Yes  Line Placement:     Procedure:  Arterial Line    Insertion Site:  Radial    Insertion laterality:  Left    Skin Prep: Chloraprep      Patient Prep: patient draped, mask, sterile gloves and hat      Local skin infiltration:  1% lidocaine    amount (mL):  2    Ultrasound Guided?: Yes      Artery evaluated via ultrasound confirming patency.   Using realtime imaging, the artery was punctured and the needle was observed entering the artery.      A permanent image is entered into patient's chart.      Catheter size:  20 gauge, 12 cm    Cath secured with: suture      Dressing:  Tegaderm    Complications:  None obvious    Arterial waveform: Yes      IBP within 10% of NIBP: Yes

## 2018-01-15 NOTE — MR AVS SNAPSHOT
"              After Visit Summary   1/15/2018    Boom Kahn    MRN: [id number]           Patient Information     Date Of Birth          1950        Visit Information        Provider Department      1/15/2018 6:00 AM 1, [facility]        Today's Diagnoses     Ischemic cardiomyopathy    -  1       Follow-ups after your visit        Who to contact     If you have questions or need follow up information about today's clinic visit or your schedule please contact [facility] directly at [phone].  Normal or non-critical lab and imaging results will be communicated to you by [employer], letter or phone within 4 business days after the clinic has received the results. If you do not hear from us within 7 days, please contact the clinic through [employer] or phone. If you have a critical or abnormal lab result, we will notify you by phone as soon as possible.  Submit refill requests through [employer] or call your pharmacy and they will forward the refill request to us. Please allow 3 business days for your refill to be completed.          Additional Information About Your Visit        [employer] Information     [employer] lets you send messages to your doctor, view your test results, renew your prescriptions, schedule appointments and more. To sign up, go to [email or web address] . Click on \"Log in\" on the left side of the screen, which will take you to the Welcome page. Then click on \"Sign up Now\" on the right side of the page.     You will be asked to enter the access code listed below, as well as some personal information. Please follow the directions to create your username and password.     Your access code is: [email or web address]  Expires: 2018  6:31 AM     Your access code will  in 90 days. If you need help or a new code, please call your [place] clinic or [phone].        Care EveryWhere ID     This is your Care EveryWhere ID. This could be used by other organizations to access " your [place] medical records  [id number]         Blood Pressure from Last 3 Encounters:   01/15/18 (!) 117/91   01/03/18 101/63    Weight from Last 3 Encounters:   01/14/18 63.6 kg (140 lb 4.8 oz)              We Performed the Following     Arterial Panel     Arterial Panel     ICD DEVICE PROGRAMMING EVAL, DUAL LEAD ICD          Today's Medication Changes      Notice     This visit is during an admission. Changes to the med list made in this visit will be reflected in the After Visit Summary of the admission.             Primary Care Provider Office Phone # Fax #    Willian Arrington [phone]       [facility] [address]  [place] [address]        Equal Access to Services     CHANEL DORADO : Hadii vishnu soares hadasho Soomaali, waaxda luqadaha, qaybta kaalmada adeegyada, vijay castillo . So [facility] [phone].    ATENCIÓN: Si habla español, tiene a bhatia disposición servicios gratuitos de asistencia lingüística. Llame al [phone].    We comply with applicable federal civil rights laws and [place] laws. We do not discriminate on the basis of race, color, national origin, age, disability, sex, sexual orientation, or gender identity.            Thank you!     Thank you for choosing [facility]  for your care. Our goal is always to provide you with excellent care. Hearing back from our patients is one way we can continue to improve our services. Please take a few minutes to complete the written survey that you may receive in the mail after your visit with us. Thank you!             Your Updated Medication List - Protect others around you: Learn how to safely use, store and throw away your medicines at www.disposemymeds.org.      Notice     This visit is during an admission. Changes to the med list made in this visit will be reflected in the After Visit Summary of the admission.

## 2018-01-15 NOTE — PROGRESS NOTES
Pt seen on PCU 3C for evaluation and reprogramming of his Medtronic dual chamber ICD per MD order, pre op fem-pop bypass surgery.  His ICD check shows 4 NSVT episodes, 1-3 sec.  His PVC counters show 293 PVC's/ hr, he is RV pacing <0.1% of the time, his heart rate histograms show good heart rate variation and his optivol suggests significant fluid retention and his ICD battery estimates 10.4 years left.  Pts ICD was turend OFF for surgery and his ICD was programmed AAI<>DDD  bpm from AAI<>DDD  bpm.  Please page Shannan Angel RN for kayleigh/post op reprogramming @ [phone] bpm.    Dual ICD

## 2018-01-15 NOTE — PROGRESS NOTES
[facility], [place]    Internal Medicine Progress Note - Gold Service    Assessment & Plan   Boom Kahn is a 67 year old male with a past medical history significant for HTN, anxiety/depression, alcohol abuse, tobacco abuse, ICM/MI, CAD s/p 3v CABG and PCI, HFrEF (last EF 25-30%, approximately 6 months ago) s/p ICD, , R lung cancer s/p radiation therapy,  peripheral vascular disease s/p multiple stents, and chronic pain who presented c/o of worsening pain in his lower extremities and right shin wound.     Today- a/w Surgery: will need PCA post op (Pain team recs) - note 1/12  - Consider Abx as possible early infection (I see cefazolin ordered pre-op, may consider post op as well)    Other  # Severe Peripheral Artery Disease -   Critical limb ischemia R>L with bilateral LE wounds; Acute on Chronic pain  U/S demonstrating b/l significant calcifications with minimal flow distal to thigh.  X-ray without evidence of osteo-myelitis.  Exam notable for bilateral wounds, erythema consistent with vascular changes, loss of hair.   Vascular surgery on board. Following.   Cardiology consulted for Pre op evaluation. Under Coronary Angio and AYDE stenting as above. Carotid US bl done.   Final revascularization plan per Vascular team. Likely -right femoral endarterectomy with femoral to posterior tibial bypass with in situ greater saphenous vein.  cancelled 01/09/18 pending insurance approval -- 1/10- D/W CC, Vascular Sx apparently it is covered and now Sx being re-scheduled.   1/11- D/w Vascular- Sx planned for today  - Wound care per CAESAR, Vascular.   - Continue antiplatelet agents. Statin.   - continue current prednisone 10mg daily  - Continue aspirin and plavix  Hold acei am of the procedure     >>Pain control: acute on Chronic pain.   Appreciate pain team recommendations. reconsult 1/11 - decreased Dilaudid and increased fentanyl (1/12- will decrease fentanyl)     Topical gabapentin 8%, ketamine  5%, lidocaine 2.5% in PLO gel TID  Gabapentin 300 tid, On 1/7, increase gabapentin to 300 mg Q AM and Q PM, 600 mg Q HS  Continue fentanyl patch 25 mcg/hr   acetaminophen 975 qid- discontinued 1/9   -   *01/09-  redness over hands and back of knee- better. No urticaria. Denies any new med/ cosmetic.    - benadryl   - dermatology consult appreciated    # HFrEF (EF reported 25-30%)  # CAD s/p CABGx3 and PCIs  # ICM hx of MI (2008)  # HTN  Angio and AYDE 01/05 as above.   ECHO as above- reviewed.      Per Cardiology:   - Continue ASA and clopidogrel for 12 months  - Continue Carvedilol, Rosuvastatin, Ezetimibe, Isosorbide dinitrate, enalapril  - 1/10- resumed Lasix as leg swelling but d/c 1/11 due to soft BP   - 2L fluid/2gm Na restriction   - strict I/O and daily weights      1/13: HOLD Carvedilol and enalapril as well. Encourage PO fluids and monitor BP closely  1/14- resume carvedilol    # EtOH Dependence-Patient reports 8-10 beers per day  - s/p MSSA protocol w/ativan : no e/o withdrawal at current.  - MVI/thiamine/folate     # Tobacco Dependence - nicotine patch 7mg  - Smoking Cessation Program IP consult      # Restrictive Airway Disease   ? COPD, given smoking hx. Patient denies any hx of COPD.   - DuoNebs x4 hr prn  - outpatient PFT  - smoking cessation      # Malignancy of Lung, Right Upper Lobe    Known malignancy, patient unable to tell specific diagnosis. States that cancer responded with radiation.   F/u PET scan due in February   - f/u outpatient       # Depression   Patient reports a hx of depression and reports depression and axiety  - holding home alprazolam 0.5 PRN  - home ecitalopram 20mg opd       1/13 - better   1/12: Worsened delirium: D/W Psych: currently on 1:1 , decreased fentanyl back to 25 mcg and changed seroquel to zyprexa  - electrolytes ok and WBC 8.6 and procal <0.05 : unlikely infection contributing  1/14- resolved    CODE: Full  DVT: On Plavix and ASA  FEN: Cardiac Diet 2L fluid/2gm Na  restriction.      Disposition Plan   Expected discharge: unknown, recommended to unknown once adequate pain management/ tolerating PO medications and plan for vascular surgery.     Entered: Rodriguez Snowden MD 01/15/2018, 11:39 AM   Information in the above section will display in the discharge planner report.      The patient's care was discussed with the Bedside Nurse, Care Coordinator/, Patient and Patient's Family.    Rodriguez Snowden MD  Internal Medicine Staff Hospitalist Service  [facility]  Pager: [phone]  Please see sticky note for cross cover information    Interval History   No new concerns, anxious related to surgery     No fever.  4 point ROS done and neg unless mentioned    Data reviewed today: I reviewed all medications, new labs and imaging results over the last 24 hours.Physical Exam   Vital Signs: Temp: 97.7  F (36.5  C) Temp src: Oral BP: (!) 117/91 Pulse: 74 Heart Rate: 52 Resp: 14 SpO2: 100 % O2 Device: Nasal cannula Oxygen Delivery: 4 LPM  Weight: 140 lbs 4.8 oz  General Appearance: pleasant  Respiratory: CTAB  Cardiovascular: SI+II irregular   GI: abdo soft, non tender  Skin: ulcers both shin. Skin changes both feet (further details as per Vascular Sx).    Hands-dorsum mildly red, no urticarial rash  Other: oriented      Data   BMP    Recent Labs  Lab 01/15/18  0728 01/14/18  2356 01/13/18  1009 01/12/18  0712  01/09/18  0610   NA  --   --  139 135  --  136   POTASSIUM 4.1 4.0 4.0 3.6  < > 3.6   CHLORIDE  --   --  107 102  --  103   SUMAN  --   --  8.3* 8.6  --  8.6   CO2  --   --  26 23  --  25   BUN  --   --  7 6*  --  11   CR  --  0.55* 0.56* 0.57*  --  0.57*   GLC  --   --  123* 91  --  108*   < > = values in this interval not displayed.  CBC    Recent Labs  Lab 01/14/18 2356 01/13/18 1009 01/12/18  0712 01/09/18  0610   WBC 9.2 9.3 8.6 8.5   RBC 3.37* 3.50* 3.65* 3.88*   HGB 11.2* 11.7* 12.0* 12.8*   HCT 34.1* 35.0* 36.1* 38.8*   * 100 99 100   MCH 33.2*  33.4* 32.9 33.0   MCHC 32.8 33.4 33.2 33.0   RDW 14.1 13.8 13.5 14.0    251 258 204     INR    Recent Labs  Lab 01/15/18  0655 01/09/18  0610   INR 0.97 1.05     Echo Complete: 1/4/2018     Ischemic CM. Severely reduced LV systoilc function. Biplane LVEF measured at  20%. Severe left ventricular dilation is present. Akinesis of the basal to mid  inferior, inferolateral, and lateral segments noted.  The right ventricle is normal size. Global right ventricular function is  normal.  Pulmonary artery systolic pressure cannot be assessed.  No pericardial effusion is present.  The inferior vena cava was normal in size with preserved respiratory  variability.  Previous study not available for comparison.        US LE Venous duplex: No DVT     IR Lower Extremity Angiogram 01/04/18     Right:  1. Right lower extremity angiogram shows totally occluded mid SFA to  above-the-knee popliteal stents, short segment below the knee  popliteal occlusion, and an occluded anterior tibial artery.  2. Angiogram also demonstrates a severe (80-90%) short segment  shelflike right CFA stenosis. Therefore, recanalization of the chronic  total occlusion was not performed.  3. Patent upper SFA and iliac stents.    Left:  1. 13 cm chronic total occlusion of the mid SFA.  2. Two-vessel runoff with total occlusion of the TEDDY.  3. Short segment narrowing of the TP trunk.  4. Patent iliac stents with less severe short segment common femoral  disease than on the right.     01/05/18  Coronary Angiography:  SUMMARY:   Severe 3 vessel CAD  100% stenosis of the RCA  100% occlusion of the proximal LAD   95% stenosis of the proximal circumflex artery  Patent LIMA to LAD   Patent SVG to RPDA      Successful deployment of a 3.0 x 28 mm drug eluting stent to the proximal LCX and inflated with 3.5 mm NC balloon

## 2018-01-15 NOTE — PLAN OF CARE
Problem: Patient Care Overview  Goal: Plan of Care/Patient Progress Review  Pt A&Ox3, disoriented to time intermittently. VPM for pt safety, 72 hour hold discontinued. Son at bedside. VSS on RA, ex hypotensive. BP 88/33, pt asymptomatic, MD notified and instructed to reasse in 1 hr. BP up to 118/44. Up SBA + walker. Took shower this keith. Tolerating 2g Na diet with fair appetite. C/o pain in BLE wounds, worse in R. Manged with PRN diluadid q3h and scheduled tylenol. Both wounds VIANEY. Drainage noted from R wound. PIV SL. Nicotine patch in place on R rib cage. Fetanyl path in place on L deltoid. Plan for R femoral bypass and endarterectomy tomorrow. Pt to be NPO at 0000. Will continue to monitor and follow POC.

## 2018-01-15 NOTE — PROGRESS NOTES
"VASCULAR SURGERY PROGRESS NOTE    SUBJECTIVE:  Patient sitting up in bed, complains of right leg pain.     OBJECTIVE:  Vital signs:  /50 (BP Location: Left arm)  Pulse 74  Temp 97.7  F (36.5  C) (Oral)  Resp 20  Ht 0.68 m (2' 2.77\")  Wt 63.6 kg (140 lb 4.8 oz)  SpO2 96%  .63 kg/m2        Intake/Output Summary (Last 24 hours) at 01/15/18 0813  Last data filed at 01/14/18 2200   Gross per 24 hour   Intake              660 ml   Output                0 ml   Net              660 ml         Vitals:    01/09/18 2220 01/10/18 1840 01/14/18 0806   Weight: 65.8 kg (145 lb 1.6 oz) 64.2 kg (141 lb 8 oz) 63.6 kg (140 lb 4.8 oz)       PHYSICAL EXAM:  NEURO/PSYCH: Alert and oriented X 3,   Moves all extremities.    SKIN: warm and dry.  PULMONARY: non-labored breathing, not requiring supplemental oxygen.  EXTREMITIES: bilateral shin ulcers, malodorous,  moderate amount LE edema, right worse than left , dependent rubor       BMP  Recent Labs  Lab 01/15/18  0728 01/14/18 2356 01/13/18  1447 01/13/18  1009 01/12/18  0712  01/09/18  0610   NA  --   --   --  139 135  --  136   POTASSIUM 4.1 4.0  --  4.0 3.6  < > 3.6   CHLORIDE  --   --   --  107 102  --  103   CO2  --   --   --  26 23  --  25   BUN  --   --   --  7 6*  --  11   CR  --  0.55*  --  0.56* 0.57*  --  0.57*   GLC  --   --   --  123* 91  --  108*   MAG  --   --  2.4*  --   --   --  2.2   < > = values in this interval not displayed.  CBC    Recent Labs  Lab 01/14/18 2356 01/13/18  1009 01/12/18  0712 01/09/18  0610   WBC 9.2 9.3 8.6 8.5   HGB 11.2* 11.7* 12.0* 12.8*    251 258 204     INR/PTT    Recent Labs  Lab 01/15/18  0655 01/09/18  0610   INR 0.97 1.05     LFT    Recent Labs  Lab 01/13/18  1009 01/12/18  0712   AST 21 19   ALT 28 27   ALKPHOS 46 50   BILITOTAL 0.4 0.5   ALBUMIN 2.7* 3.2*         ASSESSMENT:  67 year old male with PAD,  bilateral lower extremity wounds and right lower extremity critical limb ischemia         PLAN:  - plans for " OR this afternoon, right femoral endarterectomy with femoral to posterior tibial bypass with in situ greater saphenous vein.  - NPO   - continue Plavix and ASA, do not stop for surgery  - appreciate pain team consultation  - smoking cessation strongly encouraged       Shannan COLMENARES, CNS  Division of Vascular Surgery  [facility]  Pager [phone]

## 2018-01-15 NOTE — ANESTHESIA PROCEDURE NOTES
Peripheral Nerve Block Procedure Note    Staff:     Anesthesiologist:  ADAM GARCIA    Resident/CRNA:  DAIJA ALTAMIRANO    Block performed by resident/CRNA in the presence of a teaching physician    Location: Pre-op  Procedure Start/Stop TImes:      1/15/2018 10:55 AM     1/15/2018 11:00 AM    patient identified, IV checked, site marked, risks and benefits discussed, informed consent, monitors and equipment checked, pre-op evaluation, at physician/surgeon's request and post-op pain management      Correct Patient: Yes      Correct Position: Yes      Correct Site: Yes      Correct Procedure: Yes      Correct Laterality:  Yes    Site Marked:  Yes  Procedure details:     Procedure:  Femoral    ASA:  4    Laterality:  Right    Position:  Supine    Sterile Prep: chloraprep, mask and sterile gloves      Needle:  Short bevel and insulated    Needle gauge:  21    Needle length (mm):  100    Ultrasound: Yes      Ultrasound used to identify targeted nerve, plexus, or vascular structure and placed a needle adjacent to it      Permanent Image entered into patiient's record      Abnormal pain on injection: No      Blood Aspirated: No      Paresthesias:  No    Bleeding at site: No      Bolus via:  Needle    Infusion Method:  Single Shot    Complications:  None  Assessment/Narrative:     Injection made incrementally with aspirations every (mL):  5

## 2018-01-15 NOTE — ANESTHESIA PROCEDURE NOTES
Central Line Procedure Note  Staff:     Anesthesiologist:  HARPREET ASKEW    Resident/CRNA:  DAIJA ALTAMIRANO    Central line placed by Resident/CRNA in the presence of a teaching physician    Location: In OR after induction  Procedure Start/Stop Times:     patient identified, IV checked, site marked, risks and benefits discussed, informed consent, monitors and equipment checked, pre-op evaluation and at physician/surgeon's request      Correct Patient: Yes      Correct Position: Yes      Correct Site: Yes      Correct Procedure: Yes      Correct Laterality:  Yes    Site Marked:  Yes  Line Placement:     Procedure:  Central Line    Insertion laterality:  Right    Insertion site:  Internal Jugular    Position:  Trendelenburg      Maximal Sterile Barriers: All elements of maximal sterile barrier technique followed      (Maximal sterile barriers include:   Sterile gown, Sterile Gloves, Mask, Cap, Whole body draped, hand hygiene and acceptable skin prep).       Injection Technique:  Ultrasound guided    Sterile Ultrasound Technique:  Sterile probe cover and Sterile gel    Vein evaluated via U/S for patency/adequacy of catheter insertion and is adequate.  Using realtime U/S imaging the vein was punctured, and needle was observed entering vein on U/S      Permanent Image entered into patient's record      Local skin infiltration:  None    Catheter size:  7 Fr, 3 lumen, 20 cm    Catheter length at skin (cm):  15    Cath secured with: suture      Dressing:  Tegaderm and Biopatch    Complications:  None obvious    Blood aspirated all lumens: Yes      All Lumens Flushed: Yes      Verification method:  Placement to be verified post-op

## 2018-01-15 NOTE — OR NURSING
Dr. Munguia at bedside, spoke with Shannan Angel in regards to pacemaker management for today's case

## 2018-01-16 ENCOUNTER — APPOINTMENT (OUTPATIENT)
Dept: PHYSICAL THERAPY | Facility: CLINIC | Age: 68
DRG: 247 | End: 2018-01-16
Attending: ANESTHESIOLOGY
Payer: COMMERCIAL

## 2018-01-16 LAB
ANION GAP SERPL CALCULATED.3IONS-SCNC: 10 MMOL/L (ref 3–14)
BASOPHILS # BLD AUTO: 0 10E9/L (ref 0–0.2)
BASOPHILS NFR BLD AUTO: 0.1 %
BUN SERPL-MCNC: 8 MG/DL (ref 7–30)
CALCIUM SERPL-MCNC: 7.9 MG/DL (ref 8.5–10.1)
CHLORIDE SERPL-SCNC: 111 MMOL/L (ref 94–109)
CO2 SERPL-SCNC: 23 MMOL/L (ref 20–32)
CREAT SERPL-MCNC: 0.5 MG/DL (ref 0.66–1.25)
DIFFERENTIAL METHOD BLD: ABNORMAL
EOSINOPHIL # BLD AUTO: 0 10E9/L (ref 0–0.7)
EOSINOPHIL NFR BLD AUTO: 0.1 %
ERYTHROCYTE [DISTWIDTH] IN BLOOD BY AUTOMATED COUNT: 13.8 % (ref 10–15)
GFR SERPL CREATININE-BSD FRML MDRD: >90 ML/MIN/1.7M2
GLUCOSE SERPL-MCNC: 112 MG/DL (ref 70–99)
HCT VFR BLD AUTO: 27.8 % (ref 40–53)
HGB BLD-MCNC: 9.3 G/DL (ref 13.3–17.7)
IMM GRANULOCYTES # BLD: 0 10E9/L (ref 0–0.4)
IMM GRANULOCYTES NFR BLD: 0.3 %
INR PPP: 1.18 (ref 0.86–1.14)
INTERPRETATION ECG - MUSE: NORMAL
INTERPRETATION ECG - MUSE: NORMAL
LYMPHOCYTES # BLD AUTO: 0.8 10E9/L (ref 0.8–5.3)
LYMPHOCYTES NFR BLD AUTO: 7.2 %
MAGNESIUM SERPL-MCNC: 2 MG/DL (ref 1.6–2.3)
MCH RBC QN AUTO: 33.1 PG (ref 26.5–33)
MCHC RBC AUTO-ENTMCNC: 33.5 G/DL (ref 31.5–36.5)
MCV RBC AUTO: 99 FL (ref 78–100)
MONOCYTES # BLD AUTO: 1 10E9/L (ref 0–1.3)
MONOCYTES NFR BLD AUTO: 8.7 %
NEUTROPHILS # BLD AUTO: 9.8 10E9/L (ref 1.6–8.3)
NEUTROPHILS NFR BLD AUTO: 83.6 %
NRBC # BLD AUTO: 0 10*3/UL
NRBC BLD AUTO-RTO: 0 /100
PHOSPHATE SERPL-MCNC: 2.8 MG/DL (ref 2.5–4.5)
PLATELET # BLD AUTO: 251 10E9/L (ref 150–450)
POTASSIUM SERPL-SCNC: 3.3 MMOL/L (ref 3.4–5.3)
POTASSIUM SERPL-SCNC: 3.4 MMOL/L (ref 3.4–5.3)
RBC # BLD AUTO: 2.81 10E12/L (ref 4.4–5.9)
SODIUM SERPL-SCNC: 144 MMOL/L (ref 133–144)
TROPONIN I SERPL-MCNC: 0.03 UG/L (ref 0–0.04)
TROPONIN I SERPL-MCNC: 0.04 UG/L (ref 0–0.04)
TROPONIN I SERPL-MCNC: 0.05 UG/L (ref 0–0.04)
WBC # BLD AUTO: 11.7 10E9/L (ref 4–11)

## 2018-01-16 PROCEDURE — 85025 COMPLETE CBC W/AUTO DIFF WBC: CPT | Performed by: STUDENT IN AN ORGANIZED HEALTH CARE EDUCATION/TRAINING PROGRAM

## 2018-01-16 PROCEDURE — 25000132 ZZH RX MED GY IP 250 OP 250 PS 637: Performed by: INTERNAL MEDICINE

## 2018-01-16 PROCEDURE — 85049 AUTOMATED PLATELET COUNT: CPT | Performed by: STUDENT IN AN ORGANIZED HEALTH CARE EDUCATION/TRAINING PROGRAM

## 2018-01-16 PROCEDURE — 12000006 ZZH R&B IMCU INTERMEDIATE UMMC

## 2018-01-16 PROCEDURE — 25000128 H RX IP 250 OP 636: Performed by: STUDENT IN AN ORGANIZED HEALTH CARE EDUCATION/TRAINING PROGRAM

## 2018-01-16 PROCEDURE — 25000132 ZZH RX MED GY IP 250 OP 250 PS 637: Performed by: PHYSICIAN ASSISTANT

## 2018-01-16 PROCEDURE — 80048 BASIC METABOLIC PNL TOTAL CA: CPT | Performed by: SURGERY

## 2018-01-16 PROCEDURE — 84484 ASSAY OF TROPONIN QUANT: CPT | Performed by: STUDENT IN AN ORGANIZED HEALTH CARE EDUCATION/TRAINING PROGRAM

## 2018-01-16 PROCEDURE — 93005 ELECTROCARDIOGRAM TRACING: CPT

## 2018-01-16 PROCEDURE — 97116 GAIT TRAINING THERAPY: CPT | Mod: GP

## 2018-01-16 PROCEDURE — 25000132 ZZH RX MED GY IP 250 OP 250 PS 637: Performed by: SURGERY

## 2018-01-16 PROCEDURE — 99207 ZZC NO CHARGE FOLLOW UP PS: CPT

## 2018-01-16 PROCEDURE — 25000125 ZZHC RX 250: Performed by: SURGERY

## 2018-01-16 PROCEDURE — 87081 CULTURE SCREEN ONLY: CPT | Performed by: INTERNAL MEDICINE

## 2018-01-16 PROCEDURE — 25000132 ZZH RX MED GY IP 250 OP 250 PS 637: Performed by: STUDENT IN AN ORGANIZED HEALTH CARE EDUCATION/TRAINING PROGRAM

## 2018-01-16 PROCEDURE — 25000128 H RX IP 250 OP 636: Performed by: SURGERY

## 2018-01-16 PROCEDURE — 99231 SBSQ HOSP IP/OBS SF/LOW 25: CPT | Performed by: NURSE PRACTITIONER

## 2018-01-16 PROCEDURE — 25000132 ZZH RX MED GY IP 250 OP 250 PS 637: Performed by: NURSE PRACTITIONER

## 2018-01-16 PROCEDURE — 40000193 ZZH STATISTIC PT WARD VISIT

## 2018-01-16 PROCEDURE — 84484 ASSAY OF TROPONIN QUANT: CPT | Performed by: SURGERY

## 2018-01-16 PROCEDURE — 25000128 H RX IP 250 OP 636

## 2018-01-16 PROCEDURE — 84100 ASSAY OF PHOSPHORUS: CPT | Performed by: STUDENT IN AN ORGANIZED HEALTH CARE EDUCATION/TRAINING PROGRAM

## 2018-01-16 PROCEDURE — 84132 ASSAY OF SERUM POTASSIUM: CPT | Performed by: STUDENT IN AN ORGANIZED HEALTH CARE EDUCATION/TRAINING PROGRAM

## 2018-01-16 PROCEDURE — 99232 SBSQ HOSP IP/OBS MODERATE 35: CPT | Mod: GC | Performed by: ANESTHESIOLOGY

## 2018-01-16 PROCEDURE — 25000125 ZZHC RX 250: Performed by: STUDENT IN AN ORGANIZED HEALTH CARE EDUCATION/TRAINING PROGRAM

## 2018-01-16 PROCEDURE — 40000275 ZZH STATISTIC RCP TIME EA 10 MIN

## 2018-01-16 PROCEDURE — 36592 COLLECT BLOOD FROM PICC: CPT | Performed by: SURGERY

## 2018-01-16 PROCEDURE — 25000128 H RX IP 250 OP 636: Performed by: ANESTHESIOLOGY

## 2018-01-16 PROCEDURE — 85610 PROTHROMBIN TIME: CPT | Performed by: STUDENT IN AN ORGANIZED HEALTH CARE EDUCATION/TRAINING PROGRAM

## 2018-01-16 PROCEDURE — 97530 THERAPEUTIC ACTIVITIES: CPT | Mod: GP

## 2018-01-16 PROCEDURE — 83735 ASSAY OF MAGNESIUM: CPT | Performed by: STUDENT IN AN ORGANIZED HEALTH CARE EDUCATION/TRAINING PROGRAM

## 2018-01-16 RX ORDER — POTASSIUM CHLORIDE 1.5 G/1.58G
20-40 POWDER, FOR SOLUTION ORAL
Status: DISCONTINUED | OUTPATIENT
Start: 2018-01-16 | End: 2018-01-23 | Stop reason: HOSPADM

## 2018-01-16 RX ORDER — POTASSIUM CL/LIDO/0.9 % NACL 10MEQ/0.1L
10 INTRAVENOUS SOLUTION, PIGGYBACK (ML) INTRAVENOUS
Status: DISCONTINUED | OUTPATIENT
Start: 2018-01-16 | End: 2018-01-23 | Stop reason: HOSPADM

## 2018-01-16 RX ORDER — POTASSIUM CHLORIDE 7.45 MG/ML
10 INJECTION INTRAVENOUS
Status: DISCONTINUED | OUTPATIENT
Start: 2018-01-16 | End: 2018-01-16 | Stop reason: RX

## 2018-01-16 RX ORDER — POTASSIUM CHLORIDE 29.8 MG/ML
20 INJECTION INTRAVENOUS ONCE
Status: DISCONTINUED | OUTPATIENT
Start: 2018-01-16 | End: 2018-01-16 | Stop reason: CLARIF

## 2018-01-16 RX ORDER — POTASSIUM CHLORIDE 750 MG/1
20-40 TABLET, EXTENDED RELEASE ORAL
Status: DISCONTINUED | OUTPATIENT
Start: 2018-01-16 | End: 2018-01-23 | Stop reason: HOSPADM

## 2018-01-16 RX ORDER — MAGNESIUM SULFATE HEPTAHYDRATE 40 MG/ML
4 INJECTION, SOLUTION INTRAVENOUS EVERY 4 HOURS PRN
Status: DISCONTINUED | OUTPATIENT
Start: 2018-01-16 | End: 2018-01-23 | Stop reason: HOSPADM

## 2018-01-16 RX ORDER — LORAZEPAM 0.5 MG/1
.5-4 TABLET ORAL SEE ADMIN INSTRUCTIONS
Status: DISCONTINUED | OUTPATIENT
Start: 2018-01-16 | End: 2018-01-23 | Stop reason: HOSPADM

## 2018-01-16 RX ORDER — FENTANYL 25 UG/1
25 PATCH TRANSDERMAL
Status: DISCONTINUED | OUTPATIENT
Start: 2018-01-16 | End: 2018-01-23 | Stop reason: HOSPADM

## 2018-01-16 RX ORDER — VANCOMYCIN HYDROCHLORIDE 1 G/200ML
1000 INJECTION, SOLUTION INTRAVENOUS EVERY 12 HOURS
Status: DISCONTINUED | OUTPATIENT
Start: 2018-01-16 | End: 2018-01-17

## 2018-01-16 RX ORDER — POTASSIUM CHLORIDE 29.8 MG/ML
20 INJECTION INTRAVENOUS
Status: DISCONTINUED | OUTPATIENT
Start: 2018-01-16 | End: 2018-01-23 | Stop reason: HOSPADM

## 2018-01-16 RX ORDER — HYDROMORPHONE HYDROCHLORIDE 1 MG/ML
.3-.5 INJECTION, SOLUTION INTRAMUSCULAR; INTRAVENOUS; SUBCUTANEOUS
Status: CANCELLED | OUTPATIENT
Start: 2018-01-16

## 2018-01-16 RX ORDER — LORAZEPAM 2 MG/ML
.5-4 INJECTION INTRAMUSCULAR SEE ADMIN INSTRUCTIONS
Status: DISCONTINUED | OUTPATIENT
Start: 2018-01-16 | End: 2018-01-23 | Stop reason: HOSPADM

## 2018-01-16 RX ORDER — OXYCODONE HYDROCHLORIDE 10 MG/1
10 TABLET ORAL
Status: CANCELLED | OUTPATIENT
Start: 2018-01-16

## 2018-01-16 RX ADMIN — FOLIC ACID 1 MG: 1 TABLET ORAL at 08:59

## 2018-01-16 RX ADMIN — SENNOSIDES AND DOCUSATE SODIUM 2 TABLET: 8.6; 5 TABLET ORAL at 21:04

## 2018-01-16 RX ADMIN — VANCOMYCIN HYDROCHLORIDE 1000 MG: 1 INJECTION, SOLUTION INTRAVENOUS at 02:31

## 2018-01-16 RX ADMIN — ROSUVASTATIN CALCIUM 20 MG: 20 TABLET, FILM COATED ORAL at 21:05

## 2018-01-16 RX ADMIN — HYDROMORPHONE HYDROCHLORIDE 0.5 MG: 1 INJECTION, SOLUTION INTRAMUSCULAR; INTRAVENOUS; SUBCUTANEOUS at 01:13

## 2018-01-16 RX ADMIN — PIPERACILLIN SODIUM AND TAZOBACTAM SODIUM 3.38 G: 36; 4.5 INJECTION, POWDER, LYOPHILIZED, FOR SOLUTION INTRAVENOUS at 21:06

## 2018-01-16 RX ADMIN — FENTANYL 1 PATCH: 25 PATCH, EXTENDED RELEASE TRANSDERMAL at 10:20

## 2018-01-16 RX ADMIN — HYDROMORPHONE HYDROCHLORIDE 4 MG: 2 TABLET ORAL at 06:32

## 2018-01-16 RX ADMIN — ESCITALOPRAM OXALATE 20 MG: 20 TABLET ORAL at 09:00

## 2018-01-16 RX ADMIN — NICOTINE 1 PATCH: 7 PATCH, EXTENDED RELEASE TRANSDERMAL at 10:24

## 2018-01-16 RX ADMIN — METOPROLOL TARTRATE 5 MG: 5 INJECTION INTRAVENOUS at 15:32

## 2018-01-16 RX ADMIN — HYDROMORPHONE HYDROCHLORIDE 2 MG: 2 TABLET ORAL at 08:05

## 2018-01-16 RX ADMIN — SENNOSIDES AND DOCUSATE SODIUM 2 TABLET: 8.6; 5 TABLET ORAL at 09:00

## 2018-01-16 RX ADMIN — CLOPIDOGREL 75 MG: 75 TABLET, FILM COATED ORAL at 08:59

## 2018-01-16 RX ADMIN — VANCOMYCIN HYDROCHLORIDE 1000 MG: 1 INJECTION, SOLUTION INTRAVENOUS at 15:35

## 2018-01-16 RX ADMIN — HYDROMORPHONE HYDROCHLORIDE 4 MG: 2 TABLET ORAL at 19:45

## 2018-01-16 RX ADMIN — PIPERACILLIN SODIUM AND TAZOBACTAM SODIUM 3.38 G: 36; 4.5 INJECTION, POWDER, LYOPHILIZED, FOR SOLUTION INTRAVENOUS at 15:26

## 2018-01-16 RX ADMIN — HYDROMORPHONE HYDROCHLORIDE 0.4 MG: 1 INJECTION, SOLUTION INTRAMUSCULAR; INTRAVENOUS; SUBCUTANEOUS at 00:02

## 2018-01-16 RX ADMIN — Medication 2.5 MG: at 02:34

## 2018-01-16 RX ADMIN — POLYETHYLENE GLYCOL 3350 17 G: 17 POWDER, FOR SOLUTION ORAL at 08:59

## 2018-01-16 RX ADMIN — HYDROMORPHONE HYDROCHLORIDE 4 MG: 2 TABLET ORAL at 23:14

## 2018-01-16 RX ADMIN — HYDROMORPHONE HYDROCHLORIDE 4 MG: 2 TABLET ORAL at 15:50

## 2018-01-16 RX ADMIN — PIPERACILLIN SODIUM AND TAZOBACTAM SODIUM 3.38 G: 36; 4.5 INJECTION, POWDER, LYOPHILIZED, FOR SOLUTION INTRAVENOUS at 03:34

## 2018-01-16 RX ADMIN — ACETAMINOPHEN 650 MG: 325 TABLET ORAL at 01:46

## 2018-01-16 RX ADMIN — ISOSORBIDE MONONITRATE 60 MG: 60 TABLET, EXTENDED RELEASE ORAL at 08:59

## 2018-01-16 RX ADMIN — HEPARIN SODIUM 5000 UNITS: 5000 INJECTION, SOLUTION INTRAVENOUS; SUBCUTANEOUS at 11:55

## 2018-01-16 RX ADMIN — HEPARIN SODIUM 5000 UNITS: 5000 INJECTION, SOLUTION INTRAVENOUS; SUBCUTANEOUS at 21:03

## 2018-01-16 RX ADMIN — ASPIRIN 81 MG: 81 TABLET, COATED ORAL at 08:59

## 2018-01-16 RX ADMIN — ACETAMINOPHEN 650 MG: 325 TABLET ORAL at 10:20

## 2018-01-16 RX ADMIN — Medication 2 G: at 05:12

## 2018-01-16 RX ADMIN — ACETAMINOPHEN 650 MG: 325 TABLET ORAL at 17:40

## 2018-01-16 RX ADMIN — HYDROMORPHONE HYDROCHLORIDE 0.5 MG: 1 INJECTION, SOLUTION INTRAMUSCULAR; INTRAVENOUS; SUBCUTANEOUS at 10:48

## 2018-01-16 RX ADMIN — SODIUM CHLORIDE 1000 ML: 9 INJECTION, SOLUTION INTRAVENOUS at 21:07

## 2018-01-16 RX ADMIN — MOMETASONE FUROATE: 1 CREAM TOPICAL at 10:20

## 2018-01-16 RX ADMIN — POTASSIUM CHLORIDE 20 MEQ: 29.8 INJECTION, SOLUTION INTRAVENOUS at 04:48

## 2018-01-16 RX ADMIN — Medication: at 10:20

## 2018-01-16 RX ADMIN — GABAPENTIN 300 MG: 300 CAPSULE ORAL at 15:38

## 2018-01-16 RX ADMIN — OLANZAPINE 5 MG: 5 TABLET, ORALLY DISINTEGRATING ORAL at 21:05

## 2018-01-16 RX ADMIN — POTASSIUM CHLORIDE 20 MEQ: 29.8 INJECTION, SOLUTION INTRAVENOUS at 05:47

## 2018-01-16 RX ADMIN — GABAPENTIN 600 MG: 600 TABLET, FILM COATED ORAL at 21:03

## 2018-01-16 RX ADMIN — Medication: at 15:39

## 2018-01-16 RX ADMIN — EZETIMIBE 10 MG: 10 TABLET ORAL at 21:03

## 2018-01-16 RX ADMIN — PREDNISONE 10 MG: 10 TABLET ORAL at 08:59

## 2018-01-16 RX ADMIN — GABAPENTIN 300 MG: 300 CAPSULE ORAL at 09:04

## 2018-01-16 RX ADMIN — MULTIPLE VITAMINS W/ MINERALS TAB 1 TABLET: TAB at 08:59

## 2018-01-16 RX ADMIN — Medication: at 21:02

## 2018-01-16 RX ADMIN — PIPERACILLIN SODIUM AND TAZOBACTAM SODIUM 3.38 G: 36; 4.5 INJECTION, POWDER, LYOPHILIZED, FOR SOLUTION INTRAVENOUS at 10:28

## 2018-01-16 ASSESSMENT — PAIN DESCRIPTION - DESCRIPTORS
DESCRIPTORS: ACHING

## 2018-01-16 ASSESSMENT — ACTIVITIES OF DAILY LIVING (ADL)
ADLS_ACUITY_SCORE: 9
ADLS_ACUITY_SCORE: 9

## 2018-01-16 NOTE — PROGRESS NOTES
"REGIONAL ANESTHESIA PAIN SERVICE  SUBJECTIVE: Pt reports pain adequate controlled with analgesics and R) femoral nerve block injection.  Pt reports he really doesn't have any pain in R) leg. Patient is tolerating a diet,  denies nausea.  Denies any weakness, paresthesias, circumoral numbness, metallic taste or tinnitus.       Clinically Aligned Pain Assessment (CAPA):  Comfort (How is your pain?): Comfortably manageable  Change in Pain (Since your last medication/intervention?): About the same  Pain Control (How are your pain treatments working?):  Fully effective pain control    Medications related to Pain Management (Future)    Start     Dose/Rate Route Frequency Ordered Stop    01/16/18 1000  fentaNYL (DURAGESIC) 25 mcg/hr 72 hr patch 1 patch      25 mcg Transdermal EVERY 72 HOURS 01/16/18 0955      01/16/18 0800  aspirin EC EC tablet 81 mg      81 mg Oral DAILY 01/15/18 2229      01/15/18 2229  lidocaine (LMX4) kit       Topical EVERY 1 HOUR PRN 01/15/18 2229      01/15/18 1100  bupivacaine liposome (EXPAREL) LONG ACTING injection was administered into the infiltration site to produce postsurgical analgesia. Duration of action is up to 72 hours, and other \"sommer\" medications should not be given for 96 hours with the exception of the lidocaine 5% patch (LIDODERM) and the lidocaine 10mg in potassium infusions. This entry is for INFORMATION ONLY.       Does not apply CONTINUOUS PRN 01/15/18 2229 01/19/18 1059    01/12/18 1500  acetaminophen (TYLENOL) tablet 650 mg      650 mg Oral EVERY 8 HOURS 01/12/18 1451      01/11/18 1402  HYDROmorphone (DILAUDID) tablet 4-6 mg      4-6 mg Oral EVERY 3 HOURS PRN 01/11/18 1402      01/11/18 0852  diphenhydrAMINE (BENADRYL) capsule 25 mg      25 mg Oral EVERY 6 HOURS PRN 01/11/18 0853      01/11/18 0852  diphenhydrAMINE (BENADRYL) injection 25 mg      25 mg  over 1-2 Minutes Intravenous EVERY 6 HOURS PRN 01/11/18 0853      01/08/18 2100  gabapentin (NEURONTIN) tablet 600 mg      " "600 mg Oral AT BEDTIME 01/08/18 1525      01/08/18 2000  senna-docusate (SENOKOT-S;PERICOLACE) 8.6-50 MG per tablet 2 tablet      2 tablet Oral 2 TIMES DAILY 01/08/18 1534      01/08/18 1600  gabapentin (NEURONTIN) capsule 300 mg      300 mg Oral 2 TIMES DAILY 01/08/18 1525      01/08/18 1545  polyethylene glycol (MIRALAX/GLYCOLAX) Packet 17 g      17 g Oral DAILY 01/08/18 1534      01/08/18 1530  [Rx hold ]  ketamine (KETALAR) 5%-gabapentin (NEURONTIN) 8%-lidocaine 2.5% topical PLO cream     (Rx hold  since 01/08/18 1552)     Topical HOLD 01/08/18 1524      01/07/18 2130  bisacodyl (DULCOLAX) Suppository 10 mg      10 mg Rectal DAILY PRN 01/07/18 2130 01/07/18 0800  HYDROmorphone (PF) (DILAUDID) injection 0.3-0.5 mg      0.3-0.5 mg Intravenous EVERY 6 HOURS PRN 01/07/18 0754      01/05/18 1933  lidocaine 1 % 1 mL      1 mL Other EVERY 1 HOUR PRN 01/05/18 1933      01/05/18 1933  lidocaine (LMX4) kit       Topical EVERY 1 HOUR PRN 01/05/18 1933      01/03/18 2253  senna-docusate (SENOKOT-S;PERICOLACE) 8.6-50 MG per tablet 1 tablet      1 tablet Oral 2 TIMES DAILY PRN 01/03/18 2253      01/03/18 2253  senna-docusate (SENOKOT-S;PERICOLACE) 8.6-50 MG per tablet 2 tablet      2 tablet Oral 2 TIMES DAILY PRN 01/03/18 2253            OBJECTIVE:    Blood pressure 95/52, pulse 76, temperature 99.1  F (37.3  C), temperature source Axillary, resp. rate 8, height 1.727 m (5' 8\"), weight 63.6 kg (140 lb 4.8 oz), SpO2 99 %.  Pt lying in hospital bed NAD.  Son at bedside.      ASSESSMENT/PLAN:    Boom Kahn is a 67 year old male with R) lower extremity critical limb ischemia, now POD #1 s/p  BYPASS GRAFT FEMOROPOPLITEAL  BYPASS GRAFT FEMOROTIBIAL with single shot R) femoral nerve block injection.  Total bupivacaine 0.25% with epinephrine 1:200,000 10mL total, then liposomal bupivacaine (Exparel) 1.3% 10mL total administered 1/15/18 for postop pain control.  Pt is ambulating without difficulty.  No weakness or " paresthesias.  No evidence of adverse side effects associated with R) femoral nerve block injection.  Pt acheiving adequate pain control, with nerve block, oral and IV analgesics.  Anticipate up to 72 hours of pain control with long-acting local anesthetic. Pt will continue to require opioid/nonopioid analgesics for visceral and muscle pain not controlled with local anesthetic.  Pt will transfer to the floor this afternoon.    - NO other local anesthetic use within 96 hours of liposomal bupivacaine (Exparel)  - patient received verbal and written instructions about liposomal bupivacaine and counseling about pharmacologic and nonpharmacologic measures for acute postoperative pain management  - please call if questions or concerns      DEDRA Tucker CNP  Regional Anesthesia Pain Service  1/16/2018 12:08 PM    Contact Info (for in-house use only):  Job code ID: [id number]   [place] [employer] [address]  [facility] [address]  [employer] phone: dial * * * [phone], enter jobcode ID, then enter call-back number.    Text: Use [employer] on the Intranet <Paging/Directory> tab and enter Jobcode ID.   If no call back at any time, contact the hospital  and ask for RAPS attending or backup

## 2018-01-16 NOTE — SIGNIFICANT EVENT
01/16/18 1024   Visit Information   Visit Made By Priest Haney   Type of Visit Initial   Visited Patient;Family  (son Dallas)   Interventions   Basic Spiritual Interventions    introduction/orientation to Spiritual Health Services;Assessment of spiritual needs/resources;Reflective conversation;Prayer   Ritual/Sacramental Encounter  Christian anointing;Communion   Advanced Assessments/Interventions   Presenting Concerns/Issues Spiritual/Yarsani/emotional support   SPIRITUAL HEALTH SERVICES Significant Event  Christian Sacrament of ANOINTING  [facility] ([place] 4A  I anointed patient per request of patient    I visited Boom during routine visit to unit. He is recovering from vascular procedure. Boom lives in [place] and came to [facility] because of care available here and he has a son (Dallas) who is a research fellow at [employer]. Two other sons also live a distance from Boom's [place] home.    We share communion and Sacrament of Anointing.      Father Boom Nathaly Haney

## 2018-01-16 NOTE — OR NURSING
Dr. Bulmaro Fisher - Vascular Surgery Resident, returned call and said she was not following Pt but would let proper person know.

## 2018-01-16 NOTE — ANESTHESIA CARE TRANSFER NOTE
Patient: Boom Kahn    Procedure(s):  Right Femorotibial Bypass Graft with insitu saphenous vein, wound debriedment of right lower leg - Wound Class: I-Clean    Diagnosis: Rest Pain, Ischemic Limb   Diagnosis Additional Information: No value filed.    Anesthesia Type:   General, ETT, Periph. Nerve Block for postop pain     Note:  Airway :Face Mask  Patient transferred to:PACU  Comments: ETT suctioned for copious amounts thick white secretions.  Oropharynx suctioned for copious amounts clear secretions.  Extubated.  Exchanging well.  Refuses to wear Facemask.  Attempting to removed Central Line.  Transported to PACU with Fellow.   Report given to RN at bedside.  Handoff Report: Identifed the Patient, Identified the Reponsible Provider, Reviewed the pertinent medical history, Discussed the surgical course, Reviewed Intra-OP anesthesia mangement and issues during anesthesia, Set expectations for post-procedure period and Allowed opportunity for questions and acknowledgement of understanding      Vitals: (Last set prior to Anesthesia Care Transfer)    CRNA VITALS  1/15/2018 1743 - 1/15/2018 1833      1/15/2018             Resp Rate (observed): (!)  1                Electronically Signed By: DEDRA Sierra CRNA  January 15, 2018  6:33 PM

## 2018-01-16 NOTE — PROGRESS NOTES
SURGICAL ICU PROGRESS NOTE  January 16, 2018      CO-MORBIDITIES:   Unstable angina (H)  (primary encounter diagnosis)  Pain of right lower leg  Stenosis of femoral artery (H)  PVD (peripheral vascular disease) (H)    ASSESSMENT: Boom Kahn is a 67 year old male with a PMHx of HTN, anxiety/depression, alcohol abuse, tobacco use, ICM/MI, CAD s/p 3v CABG and PCI, HFrEF (20%) w/ ICD, R lung CA s/p rad, chronic pain , PVD s/p prior stenting procedures PRISCA LE presented with RLE critical limb ischemia and is now POD # 1 s/p R femorotibial bypass graft w/ insitu saphenous vein and wound depridement. He was admitted to the SICU for hemodynamic monitoring, pulse checks and pain management.    CHANGES:  - regular diet  - SQH  - WBAT  - d/c ku cath   - pain recs for analgesia   - Qday troponin x 3 days   - IVF TKO   - d/c arterial line  - wean sup O2     PLAN:  Neuro/ pain/ sedation:  #chronic pain  #Hx of alcohol abuse  #agitation  - Monitor neurological status. Notify the MD for any acute changes in exam.  - pain team consult, appreciate recs (1/12)  - tylenol, dilaudid, fentanyl patch, gabapentin.  - PTA escitalopram   - zyprexa 2.5mg PRN BID, 5mg HS      Pulmonary care:  #Restrictive airway disease  #Hx RUL lung cancer   - Sup O2 to keep sats above 92% .  - Incentive spirometer every 15- 30 minutes when awake.     Cardiovascular:    #HFrEF (EF ~20% on 1/4)  #s/p LCx and RCA AYDE deployment  - Monitor hemodynamic status.   - SBP goals of 100-150 mm Hg, MAP >65  - Metop 5mg IV q 6 hrs per vascular  - Holding home carvedilol 6.25 BID  - rosuvastatin restarted, ezetimibe   - ASA, clopidogrel  - trend troponin Qday x 3 days (1/16-1/18)  - EKG posotp unchanged from prior     GI care:   - Advance diet to regular from clears      Fluids/ Electrolytes/ Nutrition:   - TKO IVF   - No indication for parenteral nutrition.    Renal/ Fluid Balance:    - Urine output is adequate so far.  - Will continue to monitor intake and  output.  - d/c ku      Endocrine:    - prednisone 10mg daily - originally Px for COPD, pt taking PTA for LE pain, continuation per primary team      ID/ Antibiotics:  # s/p wound debridement  - Gram stain with GPC and GNR, cultures pending   - Vanc/Zosyn      Heme:     - Hemoglobin stable.      MSK:  - WBAT, no activity restrictions per Vascular   - RLE to remain elevated while in bed      Prophylaxis:    - SQH, 5000U Q8h      Lines/ tubes/ drains:  - R IJ CVC, per Vasc Surg  - PIV x 2 (R wrist, R forearm)   - Art line d/c   - Ku catheter d/c     Disposition:  - Discharge to      Patient seen, findings and plan discussed with surgical ICU staff, Dr. Forest Hunt MD.    -----------------------------------  Ruby Simmons MD, MS  Neurosurgery PGY-1  ====================================    SUBJECTIVE:   NAEO. Mentation is clear this AM. PRISCA LE pain persists R>L w/ exquisite pain in the R foot. Pt also reports low/mid back pain 2/2 supine positioning. Otherwise denies CP, SOB, abdominal pain, nausea.     OBJECTIVE:   1. VITAL SIGNS:   Temp:  [97.8  F (36.6  C)-99.3  F (37.4  C)] 99.1  F (37.3  C)  Heart Rate:  [61-92] 80  Resp:  [12-21] 16  BP: ()/() 99/60  MAP:  [49 mmHg-96 mmHg] 62 mmHg  Arterial Line BP: ()/(41-75) 96/41  SpO2:  [96 %-100 %] 100 %  Resp: 16    2. INTAKE/ OUTPUT:   I/O last 3 completed shifts:  In: 2451.83 [P.O.:140; I.V.:1561.83]  Out: 2015 [Urine:1715; Blood:300]    3. PHYSICAL EXAMINATION:   General: calm and comfortable when still, sitting up in bed, conversational  Neuro: A&Ox3, NAD, answers questions appropriately  Resp: Breathing non-labored  CV: RRR  Abdomen: Soft, Non-distended, Non-tender  Incisions: dressings CDI medial proximal R thigh/medial distal RLE  Extremities: warm and well perfused, dorsalis pedis and posterior tibial pulses palpable on L and dopplerable on R, anterior shin ulcerations ~6cm PRISCA. R shin ulceration dressed wet to dry, L ulceration open to  room air and dry. RLE erythematous and edematous. LLE non-erythematous without edema. PRISCA feet tender to touch and PRISCA toes w/ skin breakdown and chronic ischemic changes.     4. INVESTIGATIONS:   Arterial Blood Gases     Recent Labs  Lab 01/15/18  1605 01/15/18  1501 01/15/18  1315   PH 7.34* 7.35 7.34*   PCO2 44 46* 47*   PO2 106* 115* 114*   HCO3 24 25 25     Complete Blood Count     Recent Labs  Lab 01/16/18  0322 01/15/18  1605 01/15/18  1501 01/15/18  1315 01/14/18  2356 01/13/18  1009 01/12/18  0712   WBC 11.7*  --   --   --  9.2 9.3 8.6   HGB 9.3* 10.7* 11.2* 11.4* 11.2* 11.7* 12.0*     --   --   --  291 251 258     Basic Metabolic Panel    Recent Labs  Lab 01/16/18  0322 01/15/18  1605 01/15/18  1501 01/15/18  1315  01/14/18  2356 01/13/18  1009 01/12/18  0712    141 140 141  --   --  139 135   POTASSIUM 3.4  3.3* 4.1 4.3 4.3  < > 4.0 4.0 3.6   CHLORIDE 111*  --   --   --   --   --  107 102   CO2 23  --   --   --   --   --  26 23   BUN 8  --   --   --   --   --  7 6*   CR 0.50*  --   --   --   --  0.55* 0.56* 0.57*   * 190* 181* 150*  --   --  123* 91   < > = values in this interval not displayed.  Liver Function Tests    Recent Labs  Lab 01/16/18  0322 01/15/18  0655 01/13/18  1009 01/12/18  0712   AST  --   --  21 19   ALT  --   --  28 27   ALKPHOS  --   --  46 50   BILITOTAL  --   --  0.4 0.5   ALBUMIN  --   --  2.7* 3.2*   INR 1.18* 0.97  --   --      Pancreatic Enzymes  No lab results found in last 7 days.  Coagulation Profile    Recent Labs  Lab 01/16/18  0322 01/15/18  0655   INR 1.18* 0.97         5. RADIOLOGY:   Recent Results (from the past 24 hour(s))   XR Chest Port 1 View    Narrative    Exam: XR CHEST PORT 1 VW, 1/15/2018 7:32 PM    Indication: Check right IJ central line placement     Comparison: 1/3/2018    Findings:   Right internal jugular central venous catheter tip projects at the mid  thoracic trachea. Surgical changes of CABG with median sternotomy  wires and  mediastinal surgical clips. Left chest wall dual-lead ICD  appears intact and in stable position (however, the distal tip of the  right ventricular lead is collimated off the image). Stable prominent  cardiomediastinal silhouette. The pulmonary vasculature is indistinct.  No pleural effusion or pneumothorax. Stable to slightly decreased  patchy right midlung opacity with associated pleural tenting. Known  lung nodular density in the right lung is described on outside report  dated 8/3/2017, although images are unavailable at the time of this  dictation.  No new focal airspace opacity.      Impression    Impression: Right internal jugular central venous catheter tip  projects at the mid SVC.  No pneumothorax.    I have personally reviewed the examination and initial interpretation  and I agree with the findings.    NAYELI TUBBS MD       =========================================

## 2018-01-16 NOTE — PHARMACY-VANCOMYCIN DOSING SERVICE
Pharmacy Vancomycin Initial Note  Date of Service January 15, 2018  Patient's  1950  67 year old, male    Indication: Skin and Soft Tissue Infection    Current estimated CrCl = Estimated Creatinine Clearance: 118.8 mL/min (based on Cr of 0.55).    Creatinine for last 3 days  2018: 10:09 AM Creatinine 0.56 mg/dL  2018: 11:56 PM Creatinine 0.55 mg/dL    Recent Vancomycin Level(s) for last 3 days  No results found for requested labs within last 72 hours.      Vancomycin IV Administrations (past 72 hours)      No vancomycin orders with administrations in past 72 hours.                Nephrotoxins and other renal medications (Future)    Start     Dose/Rate Route Frequency Ordered Stop    01/15/18 2315  piperacillin-tazobactam (ZOSYN) 3.375 g vial to attach to  mL bag      3.375 g  over 30 Minutes Intravenous EVERY 8 HOURS SCHEDULED 01/15/18 2304      01/15/18 2315  vancomycin (VANCOCIN) 1000 mg in dextrose 5% 200 mL PREMIX      1,000 mg  200 mL/hr over 1 Hours Intravenous ONCE 01/15/18 2314      01/15/18 1345  norepinephrine (LEVOPHED) 16 mg in D5W 250 mL infusion      0.03-0.4 mcg/kg/min × 63.6 kg  1.8-23.9 mL/hr  Intravenous CONTINUOUS 01/15/18 1341            Contrast Orders - past 72 hours     None                Plan:  1.  Start vancomycin  1000 mg IV q12h.   2.  Goal Trough Level: 10-15 mg/L   3.  Pharmacy will check trough levels as appropriate in 1-3 days.  4. Serum creatinine levels will be ordered daily for the first week of therapy and at least twice weekly for subsequent weeks.    5. Paguate method utilized to dose vancomycin therapy: Method 2    Celina Garcia, Pharm.D.

## 2018-01-16 NOTE — PROGRESS NOTES
Patient: Boom Kahn  Date of Service: January 16, 2018 Admission Date:1/3/2018   1 Day Post-Op     Chief Pain Endorsement:  Right shin pain    Recommendations were discussed and relayed to Dr. Ruby Simmons (SICU)  Plan was reviewed by the Inpatient Pain Service and staff attending, Dr. Kevan Walls      1. Increase hydromorphone to 0.3-0.5mg IV q2h prn severe pain.    Tomorrow (1/17/18), decrease frequency to q4h prn severe pain.    On 1/18/18, decrease frequency to q6h prn severe pain.  2. Discontinue hydromorphone 4-6mg po q3h prn.  3. Start oxycodone 10-15mg po q3h prn moderate to severe pain.  4. Continue fentanyl patch 25 mcg/hr TD q72h.  5. Continue acetaminophen 650mg po q8h.  6. Continue gabapentin [id number]  7.  Patient received a femoral block with liposomal bupivacaine on 1/15/18.  8. Bowel regimen per primary team to prevent opioid induced constipation.    Pain Service will Continue to follow at this time.     Thank you for consulting the Inpatient Pain Management Service. The above recommendations are to be acted upon at the primary team s discretion.     To reach us:  Mon - Friday 8 AM - 3 PM: Pager [phone] (Text Page)  After hours, weekends and holidays: Primary service should call [phone] for the on-call pain specialist    PAIN MEDICATION SAFE USE:   Prior to discharge instruct patient on the following in addition to the medication fact sheet:    Caution: these medications can cause sedation    Take prescription medicine only if it has been prescribed by your doctor    Do not take more medicine or take it more often than instructed     Call your doctor if pain gets worse    Never mix pain medicine with alcohol, sleeping pills, or any illicit drugs    Do not operate heavy machinery, including vehicles, when initiation opioid therapy or increasing dosage    Store prescription opioids in a locked container, whenever possible     Dispose of unused opioids appropriately     Do  "not stop abruptly once at higher doses.  These medications must be tapered off.    Opioid pain medications do carry the risk for physical dependence and addiction and patients should be counseled about this.         1. Acute pain in lower extremities associated with B/L ulcers anterior tibia due to PVD - XR negative for osteomyelitis  2. Right femoro-tibial bypass graft with insitu saphenous vein, wound debridement on 1/15/18.  3. Chronic pain syndrome  4. Pt is opiate tolerant  5. Tobacco abuse 0.5-1.0PPD for 50yrs  6. ETOH abuse - on [facility] protocol  7. Hypertension  8. Ischemic cardiomyopathy (MI 2008), Congestive heart failure (EF ~25%)  9. Coronary Artery Disease s/p 3 vessel CABG 1999 with multiple stents and ICD placed  10. Chronic anticoagulation on plavix 75mg daily and aspirin 81 mg daily   11. Right upper lobe lung cancer s/p radiation therapy  12. Peripheral Vascular disease s/p stenting of right iliac, right common Femoral, right SFA and 3-4 stents in left leg in [place]  13. Pt delirious overnight on 1/11/18-1/12/18. Quetiapine started 1/11/18.    -- Outpatient opioid requirements prior to admission: OME = 140 mg/day  --Fentanyl 25 mcg/hr, 1 patch TD Q 48 hours (patient changes patch every 2-3 days in an inconsistent manner). Last filled 12/27/17 for 15 patches  --Oxycodone acetaminophen 10/325 mg tabs, Qty 90 tabs for 15 days supply. Takes on average 6 tablets per day.    Primary Care Provider: Willian Arrington  Chronic Pain Provider:  None, opioids prescribed by Dr. Arrington.    Interval History:  Boom Kahn was seen today (January 16, 2018) and he reports that his pain is located in his back (new location) and right shin at the wound site.  The pain at the right shin wound is described as burning and shooting, is constant and is at times intolerable, the back is described as aching.  Patient states \"dilaudid doesn't do anything for me\".  Patient had been on oxycodone prior to admission along with the " "fentanyl patch.  Appears tired, reports no sleep overnight, is drowsy during our conversation.  Will try an opioid rotation to see if this helps improve pain control.    CAPA (Clinically Aligned Pain Assessment):    Comfort (How is your pain?): Tolerable with discomfort to intolerable  Change in Pain (Since your last medication/intervention?): About the same  Pain Control (How are your pain treatments working?):  Partially effective control  Functioning (Are you able to do activities to get better?) : Pain keeps me from doing most of what I need to do  Sleep (Does your pain management allow you to sleep or rest?): Awake with pain most of night      FUNCTIONAL STATUS:  Change:      Staying the same  Oral intake:     Advancing  Activity level:     Up on the edge of the bed.  Mood:      Tired, poor energy     -- Inpatient Medications Related to Pain Management:   Medications related to Pain Management (Future)    Start     Dose/Rate Route Frequency Ordered Stop    01/16/18 0800  aspirin EC EC tablet 81 mg      81 mg Oral DAILY 01/15/18 2229      01/15/18 2229  lidocaine (LMX4) kit       Topical EVERY 1 HOUR PRN 01/15/18 2229      01/15/18 1100  bupivacaine liposome (EXPAREL) LONG ACTING injection was administered into the infiltration site to produce postsurgical analgesia. Duration of action is up to 72 hours, and other \"sommer\" medications should not be given for 96 hours with the exception of the lidocaine 5% patch (LIDODERM) and the lidocaine 10mg in potassium infusions. This entry is for INFORMATION ONLY.       Does not apply CONTINUOUS PRN 01/15/18 2229 01/19/18 1059    01/12/18 1543  fentaNYL (DURAGESIC) 25 mcg/hr 72 hr patch 1 patch      25 mcg Transdermal EVERY 72 HOURS 01/12/18 1543      01/12/18 1500  acetaminophen (TYLENOL) tablet 650 mg      650 mg Oral EVERY 8 HOURS 01/12/18 1451      01/11/18 1402  HYDROmorphone (DILAUDID) tablet 4-6 mg      4-6 mg Oral EVERY 3 HOURS PRN 01/11/18 1402      01/11/18 0852  " diphenhydrAMINE (BENADRYL) capsule 25 mg      25 mg Oral EVERY 6 HOURS PRN 01/11/18 0853      01/11/18 0852  diphenhydrAMINE (BENADRYL) injection 25 mg      25 mg  over 1-2 Minutes Intravenous EVERY 6 HOURS PRN 01/11/18 0853      01/08/18 2100  gabapentin (NEURONTIN) tablet 600 mg      600 mg Oral AT BEDTIME 01/08/18 1525      01/08/18 2000  senna-docusate (SENOKOT-S;PERICOLACE) 8.6-50 MG per tablet 2 tablet      2 tablet Oral 2 TIMES DAILY 01/08/18 1534      01/08/18 1600  gabapentin (NEURONTIN) capsule 300 mg      300 mg Oral 2 TIMES DAILY 01/08/18 1525      01/08/18 1545  polyethylene glycol (MIRALAX/GLYCOLAX) Packet 17 g      17 g Oral DAILY 01/08/18 1534      01/08/18 1530  [Rx hold ]  ketamine (KETALAR) 5%-gabapentin (NEURONTIN) 8%-lidocaine 2.5% topical PLO cream     (Rx hold  since 01/08/18 1552)     Topical HOLD 01/08/18 1524      01/07/18 2130  bisacodyl (DULCOLAX) Suppository 10 mg      10 mg Rectal DAILY PRN 01/07/18 2130      01/07/18 0800  HYDROmorphone (PF) (DILAUDID) injection 0.3-0.5 mg      0.3-0.5 mg Intravenous EVERY 6 HOURS PRN 01/07/18 0754      01/05/18 1933  lidocaine 1 % 1 mL      1 mL Other EVERY 1 HOUR PRN 01/05/18 1933      01/05/18 1933  lidocaine (LMX4) kit       Topical EVERY 1 HOUR PRN 01/05/18 1933      01/03/18 2253  senna-docusate (SENOKOT-S;PERICOLACE) 8.6-50 MG per tablet 1 tablet      1 tablet Oral 2 TIMES DAILY PRN 01/03/18 2253      01/03/18 2253  senna-docusate (SENOKOT-S;PERICOLACE) 8.6-50 MG per tablet 2 tablet      2 tablet Oral 2 TIMES DAILY PRN 01/03/18 2253            LAB DATA:    Recent Labs  Lab 01/16/18  0322 01/15/18  1605 01/15/18  1501  01/14/18  2356 01/13/18  1009 01/12/18  0712   CR 0.50*  --   --   --  0.55* 0.56* 0.57*   WBC 11.7*  --   --   --  9.2 9.3 8.6   HGB 9.3* 10.7* 11.2*  < > 11.2* 11.7* 12.0*   AST  --   --   --   --   --  21 19   ALT  --   --   --   --   --  28 27   < > = values in this interval not  displayed.      ----------------------------------------------------------------------------------  Joann Gant PharmD, MS  Inpatient Pain Service     To reach us:  Mon - Friday 8 AM - 3 PM: Pager [phone] (Text Page)  After hours, weekends and holidays: Primary service should call [phone] for the on-call pain specialist    Helpful Resources:  Getting Rid of Unwanted Medications (printable PDF for patients)   Opioid Overdose Prevention Toolkit (printable PDF for patients)   Prescription Opioids: What You Need To Know (printable PDF for patients)

## 2018-01-16 NOTE — PLAN OF CARE
Problem: Patient Care Overview  Goal: Plan of Care/Patient Progress Review  D/I: Patient on unit 4A Surgical/Neuro ICU   Neuro- A&Ox4, PERRLA, follow commands.  CV- Sinus, PVC's PAC's- frequent, Paced. HR 80's. B/P's within parameters, MAP goal above 65.  Pulm- Clear, 98% NC 2L.  GI- WDL  - Phillips removed @ 1000. Due to void.  Gtts- Right IJ, III lumen. 1 PIV. NS @ 50/hr  Skin- Bilateral leg incisions, CDI. Groin incision CDI.  Pain- Leg pain, PRN medications given.  See flow sheets for further interventions and assessments.   A: Stable, Transferred to , report given to Shadia FRANCIS.   P: Continue to monitor pt closely. Notify MD of significant changes.    Enma Connor, RN

## 2018-01-16 NOTE — BRIEF OP NOTE
[facility], [place]    Brief Operative Note    Pre-operative diagnosis: Rest Pain, Ischemic Limb   Post-operative diagnosis Right lower extremity critical limb ischemia  Procedure: Procedure(s):  Right Femorotibial Bypass Graft with insitu saphenous vein, wound debriedment of right lower leg - Wound Class: I-Clean  Surgeon: Surgeon(s) and Role:     * Lexis Ag MD - Primary     * Lejeune, Stacey, MD - Assisting  Anesthesia: General   Estimated blood loss: 300 ml  Drains: None  Specimens:   ID Type Source Tests Collected by Time Destination   1 : right shin culture Tissue Other ANAEROBIC BACTERIAL CULTURE, GRAM STAIN, TISSUE CULTURE AEROBIC BACTERIAL Lexis Ag MD 1/15/2018  5:43 PM    A : Right Femoral Plaque Other (specify in comments) Other SURGICAL PATHOLOGY EXAM Lexis Ag MD 1/15/2018  2:26 PM      Findings:   Debridement of right shin, cultures sent, multiphasic right PT signal, faintly palpable as well..  Complications: None.  Implants: None.

## 2018-01-16 NOTE — OR NURSING
Discussed patient status with Dr De Los Santos. Would like maintanence IVF's decreased to 50ml/hr. Would also like repeat EKG and Troponin at 10pm. Discussed appearance of foot (red/meena with dusky areas on toes, edema) and MD aware. Pulses Dopplerable. Awaiting 4A bed. Dr. De Los Santos at bedside to examine.

## 2018-01-16 NOTE — OR NURSING
Bilateral pedal pulses heard with doppler. Both pulses difficult to find. Pt has good CMS to both feet/toes. Bilateral feet hurt when touched other than lightly. Vitals stable. Pt stable.

## 2018-01-16 NOTE — PROVIDER NOTIFICATION
Pt's monitor appears to show increased ST depression from previous EKG. Troponin also 0.036 up from 0.026.  Pt denies any chest pain or other symptoms, vitals unchanged.  SICU MD De Los Santos notified and assessed pt.  EKG obtained and reviewed by  and Cardiology.  Will continue to trend troponins.

## 2018-01-16 NOTE — PROGRESS NOTES
Admission          1/3/2018  3:54 PM  -----------------------------------------------------------  Reason for admission:  Primary team is aware of transfer to 6B.   transfered from: ICU  Via: on a bed.   Accompanied by: son  Belongings: Placed in closet;  Admission Profile: inprogress  Teaching: orientation to unit and call light- call light within reach, call don't fall, use of console, meal times, when to call for the RN, and enforced importance of safety   Access: PIV sl'd , Right IJ infusing  Telemetry:Placed on pt  Ht./Wt.: complete  2 RN Skin Assessment Completed By: Carin BOTELLO RN and Nitza SALOMON RN .   Pt status: alert and oriented , denied pain or discomfort. Able to use call light to let his needs known. Pt's son at bedside is involved on a care.   Will continue to monitor.     Temp:  [97.8  F (36.6  C)-99.3  F (37.4  C)] 98.7  F (37.1  C)  Pulse:  [76] 76  Heart Rate:  [61-92] 92  Resp:  [8-26] 23  BP: ()/() 104/67  MAP:  [49 mmHg-96 mmHg] 67 mmHg  Arterial Line BP: ()/(41-75) 96/50  SpO2:  [96 %-100 %] 98 %

## 2018-01-16 NOTE — PLAN OF CARE
Problem: Patient Care Overview  Goal: Plan of Care/Patient Progress Review  Outcome: Improving  Lethargic, awakes to name, Ox4, pain relief with PRN dilaudid, Tylenol- see MAR for details. Sitter at bedside as pt was impulsive pre-op and has history of confusion s/p anesthesia. Pt has been calm and cooperative overnight.  CMS checks to RLE q 1 hour- unchanged. Pulses assessed with Doppler. Groin site soft and dressing CDI. No numbness or tingling.   12 lead obtained overnight, HR 70s-90s, atrial pacing noted at times, frequent PVCs, BP within goal MAP >65, afebrile. Replacing K and Mag.   2L NC O2, no resp distress, lungs clear.   Clear liquids overnight, advance as tolerated today.   Good urine output via ku.   Pt updated on all cares throughout shift.   No family visitors or phone calls overnight.   Please see flowsheets and charting for further detailed information.

## 2018-01-16 NOTE — OR NURSING
Patient arrived to PACU agitated, combative, multiple staff utliized. Patient trying to pull out Right IJ CVC Dr. Beal at bedside. Haldol given per orders. Patient unable to follow commands. Patient calm now, resting but still disoriented.

## 2018-01-16 NOTE — H&P
SURGICAL ICU H&P  January 15, 2018      CO-MORBIDITIES:   Unstable angina (H)  (primary encounter diagnosis)  Pain of right lower leg  Stenosis of femoral artery (H)  PVD (peripheral vascular disease) (H)    ASSESSMENT: Boom Kahn is a 67 year old male with PMHx HTN, anxiety/depression, alcohol abuse, tobacco abuse, ICM/MI, CAD s/p 3v CABG and PCI, HFrEF (20%) with ICD, R lung cancer s/p radiation therapy, chronic pain, PVD s/p multiple prior stenting procedures in bilateral lower extremities who presented with critical limb ischemia of bilateral lower legs now POD #0 s/p right femorotibial bypass graft with insitu saphenous vein, wound debridement of right lower leg. Admitted to SICU for hemodynamic monitoring, pulse checks, pain control.    PLAN:  Neuro/ pain/ sedation:  #chronic pain  #Hx of alcohol abuse  - Monitor neurological status. Notify the MD for any acute changes in exam.  - pain team consult, appreciate recs (1/12)  - tylenol, dilaudid IV and PO PRN, fentanyl patch for pain.  - PTA escitalopram     #agitation  - sched zyprexa 5mg HS   - 4mg Haldol given for agitation in PACU    Pulmonary care:  #Restrictive airway disease  #Hx RUL lung cancer   - Supplemental oxygen to keep saturation above 92 %.  - Incentive spirometer every 15- 30 minutes when awake.    Cardiovascular:    #HFrEF (EF ~20% on 1/4)  #s/p LCx and RCA AYDE deployment  - Monitor hemodynamic status.   - SBP goals of 100-150 mm Hg  - Metop 5mg IV q 6 hrs per vascular  - Holding home carvedilol 6.25 BID  - rosuvastatin restarted  - ASA, clopidogrel  - trend troponins  - EKG posotp unchanged from prior    GI care:   - Clear liquid diet, likely advance tomorrow    Fluids/ Electrolytes/ Nutrition:   - LR @ 50mL/hr for IV fluid hydration  - No indication for parenteral nutrition.    Renal/ Fluid Balance:    - Urine output is adequate so far.  - Will continue to monitor intake and output.  - Phillips to remain overnight    Endocrine:    -  prednisone 10mg daily    ID/ Antibiotics:  # s/p wound debridement  - Gram stain with GPC and GNR  - ancef x 24hours postop    Heme:     - Hemoglobin stable.   - Recheck in am    MSK:  - activity per vascular    Prophylaxis:    - Mechanical prophylaxis for DVT.  - Will discuss timing of initiating heparin with vascular in am.    Lines/ tubes/ drains:  - R IJ CVC  - PIV x 2  - art line  - Phillips catheter    Disposition:  - Surgical ICU. May transfer tomorrow pending clinical course    - - - - - - - - - - - - - - - - - - - - - - - - - - - - - - - - - - - - - - - - - - - - - - - - - - - - - - - - -     PRIMARY TEAM: Vascular Surgery  PRIMARY PHYSICIAN: Dr. Ag    REASON FOR CRITICAL CARE ADMISSION: Hemodynamic monitoring, pulse checks, pain control   ADMITTING PHYSICIAN: Dr. Méndez    HISTORY PRESENTING ILLNESS: 67 year old male with PMHx HTN, anxiety/depression, alcohol abuse, tobacco abuse, ICM/MI, CAD s/p 3v CABG and PCI with AYDE x 1 to prox LCx (1/5/18), HFrEF (20%) with ICD, R lung cancer s/p radiation therapy, PVD, chronic pain now POD #0 s/p right femorotibial bypass graft with insitu saphenous vein, wound debridement of right lower leg.    Intraoperatively was on pressors that were discontinued upon arrival to PACU. EBL 300cc. Completion of aoortotibial bypass and RLE wound debridement.    REVIEW OF SYSTEMS: As noted above. No headache, dizziness. No fever, chills. No chest pain or shortness of breath. No abdominal pain, nausea, vomiting. No diarrhea or constipation.+ Phillips in place. Bilateral leg soreness    PAST MEDICAL HISTORY:   Past Medical History:   Diagnosis Date     Anxiety      CAD (coronary artery disease)     s/p 3v CABG     CHF (congestive heart failure) (H)     EF 10-15%     Chronic pain      COPD (chronic obstructive pulmonary disease) (H)      Depression      Hyperlipidemia      Hypertension      Lung cancer (H)     s/p radiation therapy     PAD (peripheral artery disease) (H)     s/p lower  extremity stents     Tobacco abuse        SURGICAL HISTORY:   Past Surgical History:   Procedure Laterality Date     ANGIOPLASTY Right 10/2016     BYPASS GRAFT ARTERY CORONARY  1999    [place]     cardiac catheterization       Cardiac defibrillator placement       CHEST TUBE INSERTION       COLON SURGERY  2008    colon resection for diverticulitis     FINE NEEDLE ASPIRATION Right 12/2016     IMPLANT PACEMAKER       previous radiation         SOCIAL HISTORY: Tobacco - Yes. Etoh - Yes.     FAMILY HISTORY: Noncontributory    ALLERGIES:      Allergies   Allergen Reactions     Betadine [Povidone Iodine] Blisters     Pt reports erythema, increased pain, and blistering when using betadine on R big toe in past     Collagenase Clostridium Histolyticum      Flagyl [Metronidazole] Other (See Comments)     Flu symptoms  Flu like symptoms     Iodine Unknown     Santyl [Collagenase]        MEDICATIONS:    No current facility-administered medications on file prior to encounter.   Current Outpatient Prescriptions on File Prior to Encounter:  OXYCODONE HCL PO Take 10 mg by mouth every 3 hours as needed   fentaNYL (DURAGESIC) 25 mcg/hr 72 hr patch Place 1 patch onto the skin every 72 hours   PREDNISONE PO Take 5 mg by mouth 2 times daily    ENALAPRIL MALEATE PO Take 2.5 mg by mouth 2 times daily   ASPIRIN EC PO Take 81 mg by mouth daily   CARVEDILOL PO Take 6.25 mg by mouth 2 times daily (with meals)   Rosuvastatin Calcium (CRESTOR PO) Take 20 mg by mouth daily   Ezetimibe (ZETIA PO) Take 10 mg by mouth daily   CLOPIDOGREL BISULFATE PO Take 75 mg by mouth daily   ESCITALOPRAM OXALATE PO Take 20 mg by mouth daily   SPIRONOLACTONE PO Take 25 mg by mouth daily   FUROSEMIDE PO Take 20 mg by mouth 2 times daily       PHYSICAL EXAMINATION:  Temp:  [97.7  F (36.5  C)-97.9  F (36.6  C)] 97.7  F (36.5  C)  Heart Rate:  [52-63] 52  Resp:  [14-20] 14  BP: (106-156)/() 156/140  SpO2:  [96 %-100 %] 96 %  General: Alert, male in no  acute distress.  HEENT: Normocephalic, atraumatic. Patent nares.   Chest wall: Symmetric thorax. No masses or tenderness to palpation.   Respiratory: Non-labored breathing. Lung sounds clear to auscultation bilaterally.   Cardiovascular: Regular rate and rhythm.   Gastrointestinal: Abdomen soft, non-distended, non-tender to palpation.  Genitourinary: Phillips in place with straw colored urine  Extremities: Moving all four extremities. RLE with surgical bandages in place with minimal shadowing. Palpable RLE PT pulse. + right distal swelling. Sensation intact bilaterally    LABS: Reviewed.   Arterial Blood Gases     Recent Labs  Lab 01/15/18  1605 01/15/18  1501 01/15/18  1315   PH 7.34* 7.35 7.34*   PCO2 44 46* 47*   PO2 106* 115* 114*   HCO3 24 25 25     Complete Blood Count     Recent Labs  Lab 01/15/18  1605 01/15/18  1501 01/15/18  1315 01/14/18  2356 01/13/18  1009 01/12/18  0712 01/09/18  0610   WBC  --   --   --  9.2 9.3 8.6 8.5   HGB 10.7* 11.2* 11.4* 11.2* 11.7* 12.0* 12.8*   PLT  --   --   --  291 251 258 204     Basic Metabolic Panel    Recent Labs  Lab 01/15/18  1605 01/15/18  1501 01/15/18  1315 01/15/18  0728 01/14/18  2356 01/13/18  1009 01/12/18  0712  01/09/18  0610    140 141  --   --  139 135  --  136   POTASSIUM 4.1 4.3 4.3 4.1 4.0 4.0 3.6  < > 3.6   CHLORIDE  --   --   --   --   --  107 102  --  103   CO2  --   --   --   --   --  26 23  --  25   BUN  --   --   --   --   --  7 6*  --  11   CR  --   --   --   --  0.55* 0.56* 0.57*  --  0.57*   * 181* 150*  --   --  123* 91  --  108*   < > = values in this interval not displayed.  Liver Function Tests    Recent Labs  Lab 01/15/18  0655 01/13/18  1009 01/12/18  0712 01/09/18  0610   AST  --  21 19  --    ALT  --  28 27  --    ALKPHOS  --  46 50  --    BILITOTAL  --  0.4 0.5  --    ALBUMIN  --  2.7* 3.2*  --    INR 0.97  --   --  1.05     Pancreatic Enzymes  No lab results found in last 7 days.  Coagulation Profile    Recent Labs  Lab  01/15/18  0655 01/09/18  0610   INR 0.97 1.05     Lactate  Invalid input(s): LACTATE    IMAGING:  Results for orders placed or performed during the hospital encounter of 01/03/18   US Lower Extremity Arterial Duplex Bilateral    Narrative    Exam: Duplex ultrasound of bilateral lower extremity arteries dated  1/3/2018 7:47 PM     Clinical information: PVD, nonhealing lower extremity wounds with a  new wounds and increasing pain     Comparison: None available    Technique: Includes Gray Scale images, color Doppler, spectral Doppler  waveforms and velocities with appropriate angles of 60 degrees or  less.    Findings:     Right lower extremity:     Common femoral artery: 291 cm/sec. Waveforms: Monophasic  Deep femoral artery: 200 cm/sec. Waveforms: Monophasic  SFA:  *  Mid thigh: 88 cm/sec. Waveforms: Monophasic  *  Distal thigh: Not clearly visualized    Popliteal artery: 23 cm/sec. Waveforms: Monophasic  PTA ankle: 28 cm/sec. Waveforms: Monophasic  TEDDY ankle: 9 cm/sec. Waveforms: Monophasic    Left lower extremity:    Common femoral artery: 184 cm/sec. Waveforms: Monophasic  Deep femoral artery: 290 cm/sec. Waveforms: Monophasic  SFA:  *  Proximal thigh: 173 cm/sec. Waveforms: Monophasic  *  Mid thigh: 10 cm/sec. Waveforms: Monophasic  *  Distal thigh: 6 cm/sec. Waveforms: Monophasic  Popliteal artery: 72 cm/sec. Waveforms: Monophasic  PTA ankle: 40 cm/sec. Waveforms: Monophasic  TEDDY ankle: 40 cm/sec. Waveforms: Monophasic      Impression    Impression:     1. Right leg: Significant atherosclerotic calcification with minimal  flow within the SFA in the distal thigh, consistent with  hemodynamically significant stenosis. Post obstructive waveforms in  the popliteal, PTA and TEDDY are noted.  2. Left leg: Significant atherosclerotic calcification with minimal  flow within the SFA in the mid and distal thigh, consistent with  hemodynamically significant stenosis. Post obstructive waveforms in  the popliteal, PTA and TEDDY  are noted.    Guidelines:  [facility] duplex criteria for lower limb arterial  occlusive disease  -Percent stenosis- Normal (1-19%): Peak systolic velocity (cm/s):  <150, End-diastolic velocity (cm/s): <40, Velocity ration (Vr): <1.5,  Distal arterial waveform: Triphasic  -Percent stenosis- 20-49%: Peak systolic velocity (cm/s): 150-200,  End-diastolic velocity (cm/s): <40, Velocity ration (Vr): 1.5-2.0,  Distal arterial waveform: Triphasic  -Percent stenosis- 50-75%: Peak systolic velocity (cm/s): 200-300,  End-diastolic velocity (cm/s): <90, Velocity ration (Vr): 2.0-3.9,  Distal arterial waveform: Poststenotic turbulence distal to stenosis,  monophasic distal waveform  -Percent stenosis- >75%: Peak systolic velocity (cm/s): >300,  End-diastolic velocity (cm/s): <90, Velocity ration (Vr): >4.0, Distal  arterial waveform: Dampened distal waveform and low PSV/EDV* in the  stenosis  -Percent stenosis- Occlusion: Absent flow by color Doppler/pulsed  Doppler spectral analysis; length of occlusion estimated from distance  between exit and reentry collateral arteries  *PSV = peak systolic velocity, EDV = end-diastolic velocity  http://link.valverde.com/chapter/10.1007/[email or web address]_23/fulltext  html    I have personally reviewed the examination and initial interpretation  and I agree with the findings.    FRANCESCO HORTON MD   XR Chest 2 Views    Narrative    Exam: XR CHEST 2 VW, 1/3/2018 10:20 PM    Indication: Eval pacemaker placement    Comparison: None available. Outside radiology PET CT report 8/3/2017  ([facility])    Findings:   PA and lateral views of the chest were obtained. Cardiac silhouette is  mildly prominent. Left chest pacemaker/ICD with leads projecting over  the right atrium and right ventricle. No pneumothorax or pleural  effusion. Irregular opacity in the right midlung.      Impression    Impression:   1. Left chest pacemaker/ICD with leads projecting over the right  atrium  and right ventricle.  2. Irregular opacity in the right midlung, likely corresponding to  hypermetabolic mass mentioned in outside radiology report 8/3/2017.  Recommend correlation with outside imaging to determine stability.    I have personally reviewed the examination and initial interpretation  and I agree with the findings.    FRANCESCO HORTON MD   US RAVI Doppler No Exercise    Narrative    Exam: Bilateral lower extremity resting ankle brachial indices dated  1/4/2018 10:38 AM    Comparison study: Lower extremity arterial duplex ultrasound 1/3/2018    Clinical history: bilateral PVD;      Ordering provider: Davey Carcamo    Technique: Bilateral lower extremity resting ankle brachial indices  obtained.    Findings:    Right:      Arm: 123 mmHg   PT at ankle: 36 mmHg   DP at foot: No signal detected.   RAVI: 0.29   TBI: Unable to obtain due to presence of nonhealing wound and bandage  over the right great toe.    Digital PPG: Unable to obtain waveform     Left:     Arm: 87 mmHg   PT at ankle: 37 mmHg   DP at foot: 65 mmHg   First digit: 34 mmHg   RAVI: 0.53   TBI: 0.28    Digital PPG moderately diminished      Impression    Impression:     Right leg: Resting RAVI is 0.29, Abnormal, 0.00-0.40 (Increased  cardiovascular risk with severe PAD). Unable to obtain right great toe  pressure for calculation of right TBI.    Left leg: Resting RAVI is 0.53, Abnormal, 0.41-0.90 (Increased  cardiovascular risk along with mild to moderate PVD). Abnormal left  TBI of 0.28.      Guidelines:    RAVI Diagnostic Criteria (Based on criteria published in Circulation  2011; 124: 7860-8652):    > 1.4: Non compressible    1.00 - 1.40: Normal    0.91 - 0.99: Borderline    At or below 0.90: Abnormal    RAVI Diagnostic Criteria (Based on ACC/AHA guideline 2008):    >/=1.3 - non compressible vessels    1.00  -1.29 - Normal    0.91 - 0.99 - Borderline    0.41 - 0.90 - Mild to moderate PAD    0.00 - 0.40 - Severe PAD    I have personally  reviewed the examination and initial interpretation  and I agree with the findings.    BLAKE KRUSE MD   IR Lower Extremity Angiogram Right    Narrative    Procedures:  1. Ultrasound-guided left common femoral arteriotomy.  2. Aortobiiliac pelvic angiogram.  3. Right lower extremity angiogram.  4. Left lower extremity angiogram.    History: Right lower extremity nonhealing wounds and right greater  than left claudication.    Comparison: 1/3/2018 ultrasound    Staff: Tyra ANAND I, TYRA HAYNES MD, attest that I was present in the procedure room  for the entire procedure.    Fellow: Nehemiah Hernandez M.D.    Medications:   1. 200 mcg Fentanyl  2. 4 mg Versed    Moderate sedation administered by the IR nurse at the supervision of  the attending. Vital signs and oxygenation continuously monitored. The  patient remained stable throughout the procedure.    Sedation time: 80 minutes    Fluoroscopy time: 18.4 minutes    Contrast: 110 mL Visi 320    Findings/procedure:    Prior to the procedure, both verbal and written informed consent  obtained from the patient. Timeout performed.     Ultrasound demonstrated a patent left CFA.  The patient was prepped  and draped in the usual sterile fashion. The dermatotomy site was  anesthetized with1% lidocaine. A 22g micropuncture needle was advanced  under ultrasound visualization and an image saved for the medical  record. A 0.018 soft tipped wire was advanced under fluoroscopic  visualization and appeared in appropriate position, but there was some  resistance in the external iliac artery. A 3 Citizen of [place] dilator was  inserted a short distance into the CFA. A 0.018 gold tip Glidewire was  used to advance into the abdominal aorta. The 3 Citizen [place] dilator was  removed and 3-5 Citizen [place] dilator inserted into the iliac. Inner dilator  and wire removed. Bentson wire inserted into the aorta. 6 [place] 10 cm  sheath inserted. 5 [place] Omni Flush pigtail catheter inserted into  the distal  aorta and pelvic angiogram performed:    Patent bilateral common iliac artery-external iliac artery stents.  Both hypogastrics are patent. There is a short segment of at least  moderate left external iliac stenosis, however flow appears brisk  around the sheath. There is a severe short segment stenosis at the  right common femoral artery just proximal to its bifurcation.    Omni Flush catheter used to direct a 0.035 angled Glidewire down the  right common iliac artery. A 0.05 angled Glidewire was used to gain  purchase down the SFA, and the catheter advanced to the external iliac  artery. Angiogram of the right lower extremity was performed:    Proximal SFA stent is widely patent. Origin of the PFA is patent. This  there is multifocal moderate narrowing in the calcified mid SFA. A  distal SFA-above-the-knee popliteal stent is completely occluded with  extensive collateralization. There is a 6 mm short segment severe  stenosis of the below the knee popliteal artery shortly after its  reconstitution. There is complete occlusion of the proximal TEDDY.  Two-vessel runoff provided to the right foot. A quick cross catheter  was used to advance an angled Glidewire into the SFA and higher  quality angiogram performed of the thigh. Due to severe stenosis of  the CFA, additional intervention was not undertaken.    Left lower extremity angiogram was performed through the sheath. This  showed expected location of the arteriotomy at the mid femoral head.  There is total occlusion of the SFA in the upper thigh. There is  narrowing at the profunda origin but extensive collateralization with  revascularization of the distal SFA just above the adductor canal.  There is a short segment of moderate narrowing in the distal SFA.  Again 2 vessel runoff is identified to the left foot with completely  occluded TEDDY. The plantar arch is widely patent with some backflow  into the DP.    Sheaths and wires removed and a 6-7 [place] minx closure  device  successfully deployed at the left CFA arteriotomy.      Impression    Impression:    Right:  1. Right lower extremity angiogram shows totally occluded mid SFA to  above-the-knee popliteal stents, short segment below the knee  popliteal occlusion, and an occluded anterior tibial artery.  2. Angiogram also demonstrates a severe (80-90%) short segment  shelflike right CFA stenosis. Therefore, recanalization of the chronic  total occlusion was not performed.  3. Patent upper SFA and iliac stents.    Left:  1. 13 cm chronic total occlusion of the mid SFA.  2. Two-vessel runoff with total occlusion of the TEDDY.  3. Short segment narrowing of the TP trunk.  4. Patent iliac stents with less severe short segment common femoral  disease than on the right.    Plan:     1. 3 hour bed rest with left leg straight.  2. Consult with vascular surgery for CFA disease. Recanalization of  both the left and right SFA could be performed after addressing his  common femoral disease.    I have personally reviewed the examination and initial interpretation  and I agree with the findings.    YTRA HAYNES MD   US Lower Extremity Venous Mapping Bilateral    Narrative    Exam: Ultrasound Doppler vein mapping of bilateral lower extremities  dated  1/5/2018 10:51 AM    Comparison study: None available    Clinical history: Possible LE bypass    Ordering clinician: Stacey Lejeune    Technique: Grayscale (B-mode) with duplex Doppler ultrasound, along  with compression and augmentation, of the lower extremity veins.    RIGHT LEG:    GSV:  Proximal thigh: Thrombus: No, Wall thickness: Normal, 3.5 mm  Mid thigh: Thrombus: No, Wall thickness: Normal, 3.6 mm  Distal thigh: Thrombus: No, Wall thickness: Normal, 3.1 mm  Knee: Thrombus: No, Wall thickness: Normal, 2.6 mm  Superior calf: Thrombus: No, Wall thickness: Normal, 2. mm  Mid calf: Thrombus: No, Wall thickness: Normal, 2.6 mm  Distal calf: Thrombus: No, Wall thickness: Normal, 3.0  mm        LEFT LEG:      GSV:  Proximal thigh: Surgically absent, previously harvested.  Mid thigh: Surgically absent, previously harvested.  Distal thigh: Surgically absent, previously harvested.  Knee: Surgically absent, previously harvested.  Superior calf: Thrombus: No, Wall thickness: Normal, 2.0 mm  Mid calf: Thrombus: No, Wall thickness: Normal, 2.0 mm  Distal calf: Thrombus: No, Wall thickness: Normal, 1.8 mm        Impression    Impression:   1. Great saphenous vein diameters as above.  2. Left great saphenous vein is surgically absent from the proximal  thigh down to the knee from previous harvest.    I have personally reviewed the examination and initial interpretation  and I agree with the findings.    JOSE FELIX MD   US Carotid Bilateral    Narrative    Exam: Bilateral carotid duplex Doppler ultrasound dated 1/5/2018 3:37  PM    Clinical history: carotid bruit.;     Comparison Study: None available    Ordering provider: [place]    Technique: Grayscale (B-mode) and duplex and spectral Doppler  ultrasound of the extracranial internal carotid, external carotid,  vertebral artery origins, right brachiocephalic/subclavian and left  subclavian arteries. Velocity measurements obtained with angle  correction at or less than 60 degrees.    Findings:    Right side:     Plaque Morphology: Predominantly echogenic, Irregular       Proximal CCA: 145 cm/sec     Mid CCA: 80 cm/sec     Distal CCA: 55 cm/sec       External CA: 69 cm/sec       Proximal ICA: 92 cm/sec     Mid ICA: 120 cm/sec     Distal ICA: 69 cm/sec       Vertebral artery: Antegrade, 26 cm/sec     ICA/CCA ratio: 2.32     Diminished upstroke in the waveform in the mid and distal common  carotid artery as compared to the proximal common carotid artery.    Left side:     Plaque Morphology: Predominantly echogenic, Irregular       Proximal CCA: 90 cm/sec     Mid CCA: 53 cm/sec     Distal CCA: 100 cm/sec       External CA: 123 cm/sec        Proximal ICA: 138 cm/sec     Mid ICA: 161 cm/sec     Distal ICA: 74 cm/sec      ICA/CCA ratio: 1.61       Impression    Impression:    1. Right side: Substantial plaque in the right internal carotid artery  without significant elevation in velocities is compatible with less  than 50% stenosis of the right internal carotid artery. Abnormal  waveform in the mid and distal common carotid artery as compared to  the proximal common carotid artery with slow systolic upstroke  suggests potential unseen stenosis in the proximal common carotid  artery. Consider CTA for further evaluation if clinically indicated.    2. Left side: Substantial plaque in the left internal carotid artery  with peak systolic velocity measuring up to 161 cm/sec is compatible  with a 50-69% stenosis of the left internal carotid artery.    3. Antegrade flow in both vertebral arteries.    Consensus Panel Gray-Scale and Doppler US Criteria for Diagnosis of  ICA Stenosis (Radiology 11/2003) additionally modified by Cabrera et  al. in Journal of Vascular Surgery 1/2011, (47)53-53.       Normal         ICA PSV < 140 cm/sec       Plaque Estimate None       ICA/CCA  PSV Ratio < 2.0       ICA EDV < 40 cm/sec       < 50%          ICA PSV < 140 cm/sec       Plaque Estimate < 50%       ICA/CCA  PSV Ratio < 2.0       ICA EDV < 40 cm/sec       50- 69%       ICA -230 cm/sec       Plaque Estimate > or = 50%       ICA/CCA PSV Ratio 2.0-4.0       ICA EDV  cm/sec         > or = 70%, less than near occlusion       ICA PSV > 230 cm/sec       Plaque Estimate > or = 50%       ICA/CCA Ratio > 4.0       ICA EDV > 100 cm/sec                                            Additional criteria from vascular surgery     > 80%       EDV > 120 cm/sec     I have personally reviewed the examination and initial interpretation  and I agree with the findings.    JOSE FELIX MD   US Lower Extremity Venous Duplex Bilateral    Narrative    EXAMINATION: EXTREMITY VENOUS  DUPLEX BILATERAL, 1/6/2018 1:25 PM     COMPARISON: None    HISTORY: Rule out DVT, wounds    TECHNIQUE:  Gray-scale evaluation with compression, spectral flow and  color Doppler assessment of the deep venous system of both legs from  groin to knee, and then at the ankles.    FINDINGS:  In the both lower extremities, the common femoral, femoral, popliteal  and posterior tibial veins demonstrate normal compressibility and  blood flow.      Impression    IMPRESSION:  No evidence of deep venous thrombosis in either lower extremity.    I have personally reviewed the examination and initial interpretation  and I agree with the findings.    MINA WOO MD (Brandon)       Discussed with staff, Dr. Dev De Los Santos MD  PGY-2 General Surgery Resident   [phone]

## 2018-01-16 NOTE — ANESTHESIA POSTPROCEDURE EVALUATION
Patient: Boom Kahn    Procedure(s):  Right Femorotibial Bypass Graft with insitu saphenous vein, wound debriedment of right lower leg - Wound Class: I-Clean    Diagnosis:Rest Pain, Ischemic Limb   Diagnosis Additional Information: No value filed.    Anesthesia Type:  General, ETT, Periph. Nerve Block for postop pain    Note:  Anesthesia Post Evaluation    Patient location during evaluation: PACU  Patient participation: Able to fully participate in evaluation  Level of consciousness: awake and alert  Pain management: adequate  Airway patency: patent  Cardiovascular status: acceptable  Respiratory status: acceptable  Hydration status: acceptable  PONV: none     Anesthetic complications: None          Last vitals:  Vitals:    01/15/18 1900 01/15/18 1915 01/15/18 1930   BP: 90/57 106/60 117/68   Pulse:      Resp:      Temp:      SpO2: 97% 98% 100%         Electronically Signed By: Jaime Beal MD  January 15, 2018  8:03 PM

## 2018-01-16 NOTE — PROGRESS NOTES
Confusion somewhat improved overnight, pain moderately controlled    B/P: 109/86, T: 99, P: 74, R: 20  Alert oriented no acute distress  Right leg dressings clean dry and intact  Moderate right leg edema, + capillary refill  Open shin wound with clean base, moderate serous drainage.    WBC   Date Value Ref Range Status   01/16/2018 11.7 (H) 4.0 - 11.0 10e9/L Final   ]  Hemoglobin   Date Value Ref Range Status   01/16/2018 9.3 (L) 13.3 - 17.7 g/dL Final   ]  INR/Prothrombin Time  Creatinine   Date Value Ref Range Status   01/14/2018 0.55 (L) 0.66 - 1.25 mg/dL Final   ]      Intake/Output Summary (Last 24 hours) at 01/16/18 0741  Last data filed at 01/16/18 0700   Gross per 24 hour   Intake          2507.83 ml   Output             2015 ml   Net           492.83 ml       Assessment/Plan:  68 yo male POD#1 s/p right femoral endarterectomy with in-situ GSV femoral to posterior tibial artery bypass    Continue aspirin and plavix  PT, up with assistance no restrictions from surgery standpoint  Sitter for confusion and pulling at lines  D/c ku cathter  Wet to dry dressings daily to shin, pulse checks q4hr  Elevate RLE  Advance diet as tolerated, decrease IVF once improved po intake  Pain team managing.  OK to transfer to step down unit today.  DVT prophylaxis: SQ heparin    Leandra Marques MD  Vascular Surgery Fellow  Pager [phone]

## 2018-01-16 NOTE — PLAN OF CARE
Problem: Patient Care Overview  Goal: Plan of Care/Patient Progress Review  Discharge Planner PT   Patient plan for discharge: Pt is agreeable to TCU placement  Current status: Pt on 2 L/min NC. Pt VSS with hypotension at baseline. Pt performed mod I bed scooting and supine <> sit with heavy use of UE. Pt sat EOB ~5 min. Pt sit <> stand  CGA/min A x 1. Pt performed pre gait exercises in standing. Pt ambulated 3 x 5 ft forward/backward with FWW and CGA x1. Pt's BP standing/sitting was hypotensive after ambulation see vital flow. Pt denied increased dizziness. Pt min A x 1 for RLE with supine <> sit to return to bed.   Barriers to return to prior living situation: medical needs, level of A (lives alone), impaired functional mobility, stair negotiation, pain management  Recommendations for discharge: TCU  Rationale for recommendations: Pt would benefit from continued therapy services to improve functional strength for transfers, safe ambulation with least restrictive device, stair negotiation, and to improve tolerance/independence with ADLs/IADLs. Pt is motivated to return to prior level of function.       Entered by: Kirsty Amezcua 01/16/2018 11:57 AM

## 2018-01-16 NOTE — PLAN OF CARE
Problem: Patient Care Overview  Goal: Plan of Care/Patient Progress Review  Had Pt from 1828-1012. Taken down to O.R. For scheduled procedure. Report given to Preop nurse. Pt A&Ox4. VSS on RA. Gave PRN IV dilaudid 1X. VPM still in place. R PIV SL. Family at bedside and helps w/ cares. Pt planned to transferred to MICU, gave report to aria Patrick and will be sent when bed becomes available.

## 2018-01-16 NOTE — OP NOTE
DATE OF OPERATION:  01/15/2018.      LOCATION:  [facility], [place], main operating room.      PREOPERATIVE DIAGNOSIS:  Right leg peripheral vascular disease with ulceration.      POSTOPERATIVE DIAGNOSIS:  Right leg peripheral vascular disease with ulceration.      PROCEDURE:  Right leg femoral to posterior tibial artery bypass with in situ saphenous vein graft.      SURGEON:  Lexis Ag MD      FIRST ASSISTANT:  Stacy Lejeune, MD      ANESTHESIA:  General.      FINDINGS:  Healthy-appearing saphenous vein along its entire course with size greater than 3 mm along the entire path by intraoperative ultrasound, common femoral artery with critical focal plaque at the upper edge of the femoral head on intraoperative ultrasound as well as a second near occlusive plaque at the profunda femoris artery origin.  On intraoperative exploration, this was confirmed and the bulky plaque was completely cleared, resulting in excellent inflow to that level and into the profunda.  The posterior tibial artery was a healthy vessel without disease.  After bypass creation, excellent palpable posterior tibial pulse and Doppler signals that augmented nicely with compression and release of the graft.  Intraoperative angiogram was not done; however, branches were identified on ultrasound and at the completion, there was an obstructed signal in the vein graft when the distal anastomosis was compressed, suggesting that there were no important outflow branches.  Plaque from the common femoral endarterectomy was sent for formal pathology.  Wound tissue was also sent for culture and formal pathology as well as Gram stain.  The patient tolerated the procedure, but required pressors during the entire case.  He has a history of some degree of heart failure, recent coronary stents, and had gone into delirium tremens and confusion on the weekend, but because of the severity of his foot infection, it was felt to warrant  higher risk surgery.  The patient also received a regional block prior to the start of the case.      BRIEF CLINICAL HISTORY:  Mr. Boom Kahn is a 67-year-old gentleman with known peripheral vascular disease and coronary disease who a little over a week ago underwent coronary stenting successfully for revascularization and now presents for limb revascularization for limb salvage.  He is a known drinker and had encountered some confusion from withdrawal over the weekend, but this resolved by Sunday with the patient fully oriented and advocating aggressively for surgery for limb salvage.  Because of his known chronic pain he underwent a regional block to help with postoperative pain management.      DETAILS OF PROCEDURE:  The patient was prepped and draped for access to his right leg after ultrasound was performed and the common femoral artery, posterior tibial artery and saphenous vein graft were marked out on the skin.  An oblique incision was made in the right groin and extended down to subcutaneous tissue and fascia to expose the common femoral artery.  There were sutures in the wall of the artery that suggested a prior surgical repair and we had noticed a small area of scar that we were not clear of from etiology standpoint.  He does not have anything in his records suggesting this, having had prior care in [place], and he did not recollect an operation in that groin.  There was significant scar in his groin.  We dissected out the common femoral artery along its entire course up to the level of the inguinal ligament and the origin of the profunda and SFA were also circumferentially dissected out.  The saphenous vein was encased in some degree of scar at this groin area but was a healthy vessel without disease and we followed the saphenofemoral junction clearing the saphenofemoral junction as well.  We now dissected out the saphenous vein at the below-knee medial aspect just on the medial aspect of the tibia.   There was a healthy vein and then dissecting deep to this, we took the soleus muscle head down, muscle attachment down, and here identified the posterior tibial vein and artery.  We circumferentially dissected out the vein.  The artery was a healthy-appearing vessel.  We now fully heparinized the patient.  We clamped the common femoral artery at the profunda and SFA, and opened the vessel longitudinally for a short segment just proximal to the profunda femoris artery origin and extending across into the origin of the SFA.  The plaque was here dissected out using a Penfield dissector and was cleared from the origin of the profunda, leaving a wide open orifice.  We continued our dissection cephalad, not necessarily opening the vessel on its front surface, but dissecting in the lumen of the vessel all the way up to our clamp and clearing all this debris.  This resulted in excellent pulsatile inflow when we tested with vessel occluded at the open segment.  We now transected the saphenous vein off the saphenofemoral junction.  We divided the first valve in the vein and then performed an end-to-side spatulated anastomosis to the opening of the common femoral artery with 6-0 running Prolene suture.  Prior to completing anastomosis, we flushed the vessels, we completed the anastomosis and restored flow.  We now transected the saphenous vein at the level of the lower edge of our lower incision, ligating the distal.  We flushed and then passed a LeMaitre valvulotome all the way up and lysed all our valves.  This required 5 passes to ensure that we got the most proximal valve which was somewhat difficult to get at.  Once this was done, we had excellent inflow to the lower leg incision.  We circumferentially controlled the posterior tibial artery, opened it longitudinally, and performed a spatulated end-to-side anastomosis of approximately 2 cm in length with 7-0 Prolene suture.  Prior to completing the anastomosis, we back  flushed and forward flushed the vessels and we completed our anastomosis and restored flow.  On the thigh, there were 3 branches of the saphenous vein which we made small incisions and ligated with clips or 3-0 silk ties.  There was another incision at the knee joint, just distal to the knee joint, where we ligated a branch as well.  With this we had an obstructed Doppler signal in the saphenous vein when we compressed the graft at the level of the distal anastomosis with the Doppler at the proximal saphenous vein just distal to the proximal anastomosis.  This confirmed to me that we had taken care of all major branches.  We took time now to irrigate our wounds and we closed them with 2-0 Vicryl for fascia and 4-0 subcuticular for skin.  In the groin we used 3 layers with 3-0 Vicryl for subcutaneous tissue.  We now resected the wound.  This was an approximately 5 cm circular wound on the anterior shin.  We debrided it down to healthy tissue and sent some of this tissue for culture and Gram stain and packed it.  The procedure was well tolerated.         KARENA BEACH MD             D: 01/15/2018 17:41   T: 2018 03:51   MT: maynor      Name:     MARS STARKS   MRN:      [id number]        Account:        [id number]   :      1950           Procedure Date: 01/15/2018      Document: [id number]       cc: STACEY LEJEUNE MD

## 2018-01-17 ENCOUNTER — APPOINTMENT (OUTPATIENT)
Dept: OCCUPATIONAL THERAPY | Facility: CLINIC | Age: 68
DRG: 247 | End: 2018-01-17
Attending: ANESTHESIOLOGY
Payer: COMMERCIAL

## 2018-01-17 LAB
ANION GAP SERPL CALCULATED.3IONS-SCNC: 10 MMOL/L (ref 3–14)
BUN SERPL-MCNC: 7 MG/DL (ref 7–30)
CALCIUM SERPL-MCNC: 8.3 MG/DL (ref 8.5–10.1)
CHLORIDE SERPL-SCNC: 106 MMOL/L (ref 94–109)
CO2 SERPL-SCNC: 23 MMOL/L (ref 20–32)
CREAT SERPL-MCNC: 0.62 MG/DL (ref 0.66–1.25)
ERYTHROCYTE [DISTWIDTH] IN BLOOD BY AUTOMATED COUNT: 14.2 % (ref 10–15)
GFR SERPL CREATININE-BSD FRML MDRD: >90 ML/MIN/1.7M2
GLUCOSE SERPL-MCNC: 88 MG/DL (ref 70–99)
HCT VFR BLD AUTO: 30.2 % (ref 40–53)
HGB BLD-MCNC: 9.8 G/DL (ref 13.3–17.7)
LACTATE BLD-SCNC: 1.4 MMOL/L (ref 0.7–2)
MCH RBC QN AUTO: 32.5 PG (ref 26.5–33)
MCHC RBC AUTO-ENTMCNC: 32.5 G/DL (ref 31.5–36.5)
MCV RBC AUTO: 100 FL (ref 78–100)
PLATELET # BLD AUTO: 325 10E9/L (ref 150–450)
POTASSIUM SERPL-SCNC: 3.7 MMOL/L (ref 3.4–5.3)
RBC # BLD AUTO: 3.02 10E12/L (ref 4.4–5.9)
SODIUM SERPL-SCNC: 139 MMOL/L (ref 133–144)
TROPONIN I SERPL-MCNC: 0.03 UG/L (ref 0–0.04)
VANCOMYCIN SERPL-MCNC: 8.3 MG/L
WBC # BLD AUTO: 13 10E9/L (ref 4–11)

## 2018-01-17 PROCEDURE — 25000128 H RX IP 250 OP 636: Performed by: SURGERY

## 2018-01-17 PROCEDURE — 36592 COLLECT BLOOD FROM PICC: CPT | Performed by: STUDENT IN AN ORGANIZED HEALTH CARE EDUCATION/TRAINING PROGRAM

## 2018-01-17 PROCEDURE — 25000128 H RX IP 250 OP 636: Performed by: STUDENT IN AN ORGANIZED HEALTH CARE EDUCATION/TRAINING PROGRAM

## 2018-01-17 PROCEDURE — 80202 ASSAY OF VANCOMYCIN: CPT | Performed by: SURGERY

## 2018-01-17 PROCEDURE — 25000132 ZZH RX MED GY IP 250 OP 250 PS 637: Performed by: STUDENT IN AN ORGANIZED HEALTH CARE EDUCATION/TRAINING PROGRAM

## 2018-01-17 PROCEDURE — 97535 SELF CARE MNGMENT TRAINING: CPT | Mod: GO | Performed by: OCCUPATIONAL THERAPIST

## 2018-01-17 PROCEDURE — 40000133 ZZH STATISTIC OT WARD VISIT: Performed by: OCCUPATIONAL THERAPIST

## 2018-01-17 PROCEDURE — 97166 OT EVAL MOD COMPLEX 45 MIN: CPT | Mod: GO | Performed by: OCCUPATIONAL THERAPIST

## 2018-01-17 PROCEDURE — 85027 COMPLETE CBC AUTOMATED: CPT | Performed by: SURGERY

## 2018-01-17 PROCEDURE — 12000006 ZZH R&B IMCU INTERMEDIATE UMMC

## 2018-01-17 PROCEDURE — 25000128 H RX IP 250 OP 636

## 2018-01-17 PROCEDURE — 36592 COLLECT BLOOD FROM PICC: CPT | Performed by: SURGERY

## 2018-01-17 PROCEDURE — 25000132 ZZH RX MED GY IP 250 OP 250 PS 637: Performed by: INTERNAL MEDICINE

## 2018-01-17 PROCEDURE — 99207 ZZC NO CHARGE FOLLOW UP PS: CPT

## 2018-01-17 PROCEDURE — 83605 ASSAY OF LACTIC ACID: CPT | Performed by: SURGERY

## 2018-01-17 PROCEDURE — 80048 BASIC METABOLIC PNL TOTAL CA: CPT | Performed by: SURGERY

## 2018-01-17 PROCEDURE — 25000125 ZZHC RX 250: Performed by: SURGERY

## 2018-01-17 PROCEDURE — 25000125 ZZHC RX 250: Performed by: STUDENT IN AN ORGANIZED HEALTH CARE EDUCATION/TRAINING PROGRAM

## 2018-01-17 PROCEDURE — 25000132 ZZH RX MED GY IP 250 OP 250 PS 637: Performed by: NURSE PRACTITIONER

## 2018-01-17 PROCEDURE — 25000132 ZZH RX MED GY IP 250 OP 250 PS 637: Performed by: CLINICAL NURSE SPECIALIST

## 2018-01-17 PROCEDURE — 84484 ASSAY OF TROPONIN QUANT: CPT | Performed by: STUDENT IN AN ORGANIZED HEALTH CARE EDUCATION/TRAINING PROGRAM

## 2018-01-17 PROCEDURE — 25000132 ZZH RX MED GY IP 250 OP 250 PS 637: Performed by: SURGERY

## 2018-01-17 RX ORDER — NALOXONE HYDROCHLORIDE 0.4 MG/ML
.1-.4 INJECTION, SOLUTION INTRAMUSCULAR; INTRAVENOUS; SUBCUTANEOUS
Status: DISCONTINUED | OUTPATIENT
Start: 2018-01-17 | End: 2018-01-23 | Stop reason: HOSPADM

## 2018-01-17 RX ORDER — CARVEDILOL 3.12 MG/1
6.25 TABLET ORAL 2 TIMES DAILY WITH MEALS
Status: DISCONTINUED | OUTPATIENT
Start: 2018-01-17 | End: 2018-01-23 | Stop reason: HOSPADM

## 2018-01-17 RX ORDER — ENALAPRIL MALEATE 2.5 MG/1
2.5 TABLET ORAL 2 TIMES DAILY
Status: DISCONTINUED | OUTPATIENT
Start: 2018-01-17 | End: 2018-01-17 | Stop reason: CLARIF

## 2018-01-17 RX ORDER — FUROSEMIDE 20 MG
20 TABLET ORAL 2 TIMES DAILY
Status: DISCONTINUED | OUTPATIENT
Start: 2018-01-17 | End: 2018-01-17 | Stop reason: CLARIF

## 2018-01-17 RX ORDER — OXYCODONE HYDROCHLORIDE 5 MG/1
10-15 TABLET ORAL EVERY 4 HOURS PRN
Status: DISCONTINUED | OUTPATIENT
Start: 2018-01-17 | End: 2018-01-23 | Stop reason: HOSPADM

## 2018-01-17 RX ADMIN — GABAPENTIN 300 MG: 300 CAPSULE ORAL at 13:20

## 2018-01-17 RX ADMIN — POLYETHYLENE GLYCOL 3350 17 G: 17 POWDER, FOR SOLUTION ORAL at 08:19

## 2018-01-17 RX ADMIN — MULTIPLE VITAMINS W/ MINERALS TAB 1 TABLET: TAB at 08:20

## 2018-01-17 RX ADMIN — ACETAMINOPHEN 650 MG: 325 TABLET ORAL at 01:59

## 2018-01-17 RX ADMIN — OXYCODONE HYDROCHLORIDE 15 MG: 5 TABLET ORAL at 17:18

## 2018-01-17 RX ADMIN — METOPROLOL TARTRATE 5 MG: 5 INJECTION INTRAVENOUS at 01:59

## 2018-01-17 RX ADMIN — VANCOMYCIN HYDROCHLORIDE 1000 MG: 1 INJECTION, SOLUTION INTRAVENOUS at 01:58

## 2018-01-17 RX ADMIN — PREDNISONE 10 MG: 10 TABLET ORAL at 08:20

## 2018-01-17 RX ADMIN — ESCITALOPRAM OXALATE 20 MG: 20 TABLET ORAL at 08:18

## 2018-01-17 RX ADMIN — GABAPENTIN 300 MG: 300 CAPSULE ORAL at 08:20

## 2018-01-17 RX ADMIN — MOMETASONE FUROATE: 1 CREAM TOPICAL at 08:21

## 2018-01-17 RX ADMIN — SENNOSIDES AND DOCUSATE SODIUM 2 TABLET: 8.6; 5 TABLET ORAL at 08:19

## 2018-01-17 RX ADMIN — ACETAMINOPHEN 650 MG: 325 TABLET ORAL at 17:18

## 2018-01-17 RX ADMIN — HYDROMORPHONE HYDROCHLORIDE 4 MG: 2 TABLET ORAL at 07:24

## 2018-01-17 RX ADMIN — PIPERACILLIN SODIUM AND TAZOBACTAM SODIUM 3.38 G: 36; 4.5 INJECTION, POWDER, LYOPHILIZED, FOR SOLUTION INTRAVENOUS at 09:35

## 2018-01-17 RX ADMIN — ACETAMINOPHEN 650 MG: 325 TABLET ORAL at 09:35

## 2018-01-17 RX ADMIN — ENALAPRIL MALEATE 2.5 MG: 2.5 TABLET ORAL at 09:35

## 2018-01-17 RX ADMIN — VANCOMYCIN HYDROCHLORIDE 500 MG: 500 INJECTION, POWDER, LYOPHILIZED, FOR SOLUTION INTRAVENOUS at 17:19

## 2018-01-17 RX ADMIN — GABAPENTIN 600 MG: 600 TABLET, FILM COATED ORAL at 20:17

## 2018-01-17 RX ADMIN — NICOTINE 1 PATCH: 7 PATCH, EXTENDED RELEASE TRANSDERMAL at 11:03

## 2018-01-17 RX ADMIN — OXYCODONE HYDROCHLORIDE 15 MG: 5 TABLET ORAL at 13:20

## 2018-01-17 RX ADMIN — ASPIRIN 81 MG: 81 TABLET, COATED ORAL at 08:25

## 2018-01-17 RX ADMIN — POTASSIUM CHLORIDE 20 MEQ: 750 TABLET, EXTENDED RELEASE ORAL at 08:16

## 2018-01-17 RX ADMIN — OXYCODONE HYDROCHLORIDE 15 MG: 5 TABLET ORAL at 09:43

## 2018-01-17 RX ADMIN — FUROSEMIDE 20 MG: 20 TABLET ORAL at 13:20

## 2018-01-17 RX ADMIN — NICOTINE 1 PATCH: 7 PATCH, EXTENDED RELEASE TRANSDERMAL at 20:16

## 2018-01-17 RX ADMIN — ISOSORBIDE MONONITRATE 60 MG: 60 TABLET, EXTENDED RELEASE ORAL at 08:20

## 2018-01-17 RX ADMIN — PIPERACILLIN SODIUM AND TAZOBACTAM SODIUM 3.38 G: 36; 4.5 INJECTION, POWDER, LYOPHILIZED, FOR SOLUTION INTRAVENOUS at 03:16

## 2018-01-17 RX ADMIN — SENNOSIDES AND DOCUSATE SODIUM 2 TABLET: 8.6; 5 TABLET ORAL at 20:17

## 2018-01-17 RX ADMIN — HEPARIN SODIUM 5000 UNITS: 5000 INJECTION, SOLUTION INTRAVENOUS; SUBCUTANEOUS at 10:57

## 2018-01-17 RX ADMIN — HEPARIN SODIUM 5000 UNITS: 5000 INJECTION, SOLUTION INTRAVENOUS; SUBCUTANEOUS at 03:18

## 2018-01-17 RX ADMIN — VANCOMYCIN HYDROCHLORIDE 1000 MG: 1 INJECTION, SOLUTION INTRAVENOUS at 14:13

## 2018-01-17 RX ADMIN — HYDROMORPHONE HYDROCHLORIDE 4 MG: 2 TABLET ORAL at 03:16

## 2018-01-17 RX ADMIN — PIPERACILLIN SODIUM AND TAZOBACTAM SODIUM 3.38 G: 36; 4.5 INJECTION, POWDER, LYOPHILIZED, FOR SOLUTION INTRAVENOUS at 21:26

## 2018-01-17 RX ADMIN — EZETIMIBE 10 MG: 10 TABLET ORAL at 20:16

## 2018-01-17 RX ADMIN — PIPERACILLIN SODIUM AND TAZOBACTAM SODIUM 3.38 G: 36; 4.5 INJECTION, POWDER, LYOPHILIZED, FOR SOLUTION INTRAVENOUS at 15:42

## 2018-01-17 RX ADMIN — HYDROMORPHONE HYDROCHLORIDE 0.5 MG: 1 INJECTION, SOLUTION INTRAMUSCULAR; INTRAVENOUS; SUBCUTANEOUS at 08:16

## 2018-01-17 RX ADMIN — Medication: at 13:23

## 2018-01-17 RX ADMIN — CLOPIDOGREL 75 MG: 75 TABLET, FILM COATED ORAL at 08:21

## 2018-01-17 RX ADMIN — FUROSEMIDE 20 MG: 20 TABLET ORAL at 08:33

## 2018-01-17 RX ADMIN — CARVEDILOL 6.25 MG: 3.12 TABLET, FILM COATED ORAL at 08:33

## 2018-01-17 RX ADMIN — FOLIC ACID 1 MG: 1 TABLET ORAL at 08:20

## 2018-01-17 RX ADMIN — SODIUM CHLORIDE 500 ML: 9 INJECTION, SOLUTION INTRAVENOUS at 14:55

## 2018-01-17 RX ADMIN — ROSUVASTATIN CALCIUM 20 MG: 20 TABLET, FILM COATED ORAL at 20:17

## 2018-01-17 RX ADMIN — OXYCODONE HYDROCHLORIDE 15 MG: 5 TABLET ORAL at 21:26

## 2018-01-17 RX ADMIN — HEPARIN SODIUM 5000 UNITS: 5000 INJECTION, SOLUTION INTRAVENOUS; SUBCUTANEOUS at 20:16

## 2018-01-17 ASSESSMENT — ACTIVITIES OF DAILY LIVING (ADL)
ADLS_ACUITY_SCORE: 9
ADLS_ACUITY_SCORE: 11
ADLS_ACUITY_SCORE: 9
ADLS_ACUITY_SCORE: 11
ADLS_ACUITY_SCORE: 9
ADLS_ACUITY_SCORE: 9

## 2018-01-17 ASSESSMENT — PAIN DESCRIPTION - DESCRIPTORS
DESCRIPTORS: ACHING

## 2018-01-17 NOTE — PLAN OF CARE
Problem: Patient Care Overview  Goal: Plan of Care/Patient Progress Review  Neuro: A&Ox4.   Cardiac: SR. VSS.   Respiratory: Sating 96% on RA.  GI/: Adequate urine output.no BM , + bowel sounds.  Diet/appetite: Tolerating regular diet. Eating well.  Activity:  Assist of one , turn and reposition.   Pain: At acceptable level on current regimen.   Skin: dressing changed to shin wound , pt tolerated well.   LDA's: PIV infiltrated. IJ infusing.   Plan: Continue with POC. Notify primary team with changes.

## 2018-01-17 NOTE — PROGRESS NOTES
Pt with some lower blood pressures while standing up, otherwise 90/60's lying down at rest.  Pt voiding.  Right lower leg dressing changed by surgery team. Pain managed with oxycodone q4hrs and IV dilaudid for breakthrough.  Right IJ saline locked.  Pulses able to be dopplered. Right leg elevated on pillows for edema.

## 2018-01-17 NOTE — PLAN OF CARE
Problem: Patient Care Overview  Goal: Plan of Care/Patient Progress Review  Outcome: No Change  Cardiac: SR. VSS.                 Respiratory: Sating 96% on RA.  GI/: Adequate urine output, no BM this shift.  Diet/appetite: Tolerating regular diet. Eating well.  Activity:  A1.   Pain: At acceptable level on current regimen.   Skin: dressing changed to shin wound , pt tolerated well.   LDA's: PIV infiltrated. IJ infusing.   Plan: Continue with POC. Notify primary team with changes.

## 2018-01-17 NOTE — PHARMACY-VANCOMYCIN DOSING SERVICE
Pharmacy Vancomycin Note  Date of Service 2018  Patient's  1950   67 year old, male    Indication: Skin and Soft Tissue Infection  Goal Trough Level: 10-15 mg/L  Day of Therapy: 3  Current Vancomycin regimen:  1000 mg IV q12h    Current estimated CrCl = Estimated Creatinine Clearance: 110.9 mL/min (based on Cr of 0.62).    Creatinine for last 3 days  2018: 11:56 PM Creatinine 0.55 mg/dL  2018:  3:22 AM Creatinine 0.50 mg/dL  2018:  7:22 AM Creatinine 0.62 mg/dL    Recent Vancomycin Levels (past 3 days)  2018:  2:10 PM Vancomycin Level 8.3 mg/L    Vancomycin IV Administrations (past 72 hours)                   vancomycin (VANCOCIN) 1000 mg in dextrose 5% 200 mL PREMIX (mg) 1,000 mg New Bag 18 1413     1,000 mg New Bag  0158     1,000 mg New Bag 18 1535     1,000 mg New Bag  0231                Nephrotoxins and other renal medications (Future)    Start     Dose/Rate Route Frequency Ordered Stop    18 0400  vancomycin (VANCOCIN) 1,500 mg in NaCl 0.9 % 250 mL intermittent infusion      1,500 mg  over 90 Minutes Intravenous EVERY 12 HOURS 18 1518      18 1600  vancomycin (VANCOCIN) 500 mg in NaCl 0.9 % 100 mL intermittent infusion      500 mg  over 1 Hours Intravenous ONCE 18 1516      18 0330  piperacillin-tazobactam (ZOSYN) 3.375g in 15 mL NS Premix Syringe      3.375 g  over 3 Minutes Intravenous EVERY 6 HOURS 01/15/18 2304               Contrast Orders - past 72 hours     None          Interpretation of levels and current regimen:  Trough level is  Subtherapeutic    Has serum creatinine changed > 50% in last 72 hours: No    Urine output:  good urine output    Renal Function: Stable    Plan:  1.  As WBC increasing will increase dose to 1500 mg IV q12h.  The 1400 dose of 1000 mg was already given so will give and extra 500 mg dose.  2.  Plan to recheck level in 24-48h.    Shayy Paul, PharmD, pager [phone]        .

## 2018-01-17 NOTE — PLAN OF CARE
Problem: Patient Care Overview  Goal: Plan of Care/Patient Progress Review    OT-6b: OT le complete. Pt CGA for sit>stand, ambulating into bathroom and briefly in hallway with FWW and CGAx1, limited primarily by pain. BP 94/43 in sitting initially, but down to 63/36 after activity, however pt reporting only mild lightheadedness, and all other VSS. Pt normally I with most ADLs, but has assist for shopping and housekeeping. Limited to short distance ambulation at baseline due to pain. Is from [place] and plans to get his own place locally after rehab for a short time.     Discharge Planner OT   Patient plan for discharge: TCU  Current status: see above  Barriers to return to prior living situation: decreased activity tolerance and ADL independence  Recommendations for discharge: TCU  Rationale for recommendations: increase functional endurance and ADL independence       Entered by: Giovanni De Los Santos 01/17/2018 12:42 PM

## 2018-01-17 NOTE — PROGRESS NOTES
"VASCULAR SURGERY PROGRESS NOTE    SUBJECTIVE:  Patient sitting up in bed, complains of right leg pain and swelling.    OBJECTIVE:  Vital signs:  /43 (BP Location: Left arm)  Pulse 76  Temp 98.4  F (36.9  C) (Oral)  Resp 20  Ht 1.727 m (5' 8\")  Wt 66.9 kg (147 lb 7.8 oz)  SpO2 97%  BMI 22.43 kg/m2      Intake/Output Summary (Last 24 hours) at 01/17/18 0807  Last data filed at 01/17/18 0716   Gross per 24 hour   Intake             1560 ml   Output             2300 ml   Net             -740 ml           Vitals:    01/10/18 1840 01/14/18 0806 01/17/18 0448   Weight: 64.2 kg (141 lb 8 oz) 63.6 kg (140 lb 4.8 oz) 66.9 kg (147 lb 7.8 oz)       PHYSICAL EXAM:  NEURO/PSYCH: Alert and oriented X 3,   Moves all extremities.    SKIN: warm and dry.  PULMONARY: non-labored breathing, not requiring supplemental oxygen.  EXTREMITIES: right leg incisions C/D/I, right leg wound moderate serous drainage, non-malodorous, wet to dry saline dressing placed on right leg wound, right leg more edematous today,  doppler right PT, right groin incision covered with primepore     BMP  Recent Labs  Lab 01/16/18  0322 01/15/18  1638 01/15/18  1605 01/15/18  1501 01/15/18  1315  01/14/18  2356 01/13/18  1447 01/13/18  1009 01/12/18  0712     --  141 140 141  --   --   --  139 135   POTASSIUM 3.4  3.3*  --  4.1 4.3 4.3  < > 4.0  --  4.0 3.6   CHLORIDE 111*  --   --   --   --   --   --   --  107 102   CO2 23  --   --   --   --   --   --   --  26 23   BUN 8  --   --   --   --   --   --   --  7 6*   CR 0.50*  --   --   --   --   --  0.55*  --  0.56* 0.57*   *  --  190* 181* 150*  --   --   --  123* 91   MAG 2.0 2.1  --   --   --   --   --  2.4*  --   --    PHOS 2.8  --   --   --   --   --   --   --   --   --    < > = values in this interval not displayed.  CBC    Recent Labs  Lab 01/17/18  0722 01/16/18  0322 01/15/18  1605 01/15/18  1501  01/14/18  2356 01/13/18  1009   WBC 13.0* 11.7*  --   --   --  9.2 9.3   HGB 9.8* 9.3* " 10.7* 11.2*  < > 11.2* 11.7*    251  --   --   --  291 251   < > = values in this interval not displayed.  INR/PTT    Recent Labs  Lab 01/16/18  0322 01/15/18  0655   INR 1.18* 0.97     LFT    Recent Labs  Lab 01/13/18  1009 01/12/18  0712   AST 21 19   ALT 28 27   ALKPHOS 46 50   BILITOTAL 0.4 0.5   ALBUMIN 2.7* 3.2*         ASSESSMENT:  67 year old male with PAD,  bilateral lower extremity wounds and right lower extremity critical limb ischemia. Status post POD #2 right femoral endarterectomy with in-situ GSV femoral to posterior tibial artery bypass         PLAN:  - continue Plavix and ASA  - wet to dry dressing daily for right shin wound  - will ask WOC nurses to evaluate lower extremity wounds  - right lower extremity wound gram stain growing moderate gram negative rods and gram positive cocci  - on vancomycin and zosyn- will ask ID to evaluate the patient  - will discontinue IV metoprolol and start home Coreg, Vasotec and Lasix  - social service consult for discharge planning, anticipate patient will need TCU   - appreciate pain team consultation, will change to Oxycodone and discontinue Dilaudid   - transfer to   - smoking cessation strongly encouraged       Shannan COLMENARES, CNS  Division of Vascular Surgery  [facility]  Pager [phone]

## 2018-01-17 NOTE — CONSULTS
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 Infection present in RLE wound         Assessment and Recommendations:     Impression:  1. RLE critical limb ischemia, s/p femorotibial bypass graft, improved  2. Chronic RLE anterior shin wound, with debrided tissue positive for multiple organisms (Staphylococcus aureus, Streptococcus constellatus, lactose non-fermenting GNRs awaiting speciation)  3. Chronic combinatorial lower extremity vascular insufficiency, with poor wound healing and multiple chronic wounds  4. Severe PAD, with multiple vascular stents in place  5. CHF, EF ~20%, with ICD in place  6. CAD s/p CABG x 3  7. History of right lung cancer s/p radiation, in remission  8. Chronic pain  9. EtOH dependence  10. Smoker  11. Anxiety/Depression    Recommendations:  1. Multiple bacteria identified from region of RLE chronic wound are likely associated with a chronic surficial lesion in the setting of chronic vascular insufficiency rather than an active, ongoing infectious process  2. Would stop Vancomycin and Zosyn  3. Continue to follow cultures, WBC count, etc in early post-operative period  4. Agree with aggressive wound cares for the bilateral lower extremities as reasonable measure for chronic wounds after revascularization/debridement    Discussion:  67 year old man with significant vascular issues with recent revascularization of the RLE in the setting of critical limb ischemia. Per patient, he has dealt with wound-related complications of his limbs on a long-term basis.     Based on the multiple organisms identified (all surface-associated organisms, with some speciation pending), they represent collectively an assembly of organisms to  be expected in a chronic vascular insufficiency wound bed. Moreover, the patient does not describe clear evidence of an active or evolving infectious process in that wound at the time of surgery. He has previously had only a short course of antimicrobials, but this was many months prior.     With adequate debridement of any potentially colonized and devitalized tissue alongside revascularization, we would not opt to treat with additional antimicrobials in favor of encouraging wound healing which, up to this point, had been impaired for some time.     Thank you for this consult. ID service will SIGN OFF, pending any additional concerns. Please call or page with any questions.    Discussed with Staff, Dr. Rock Gonzalo Sifuentes DO, MPH  Fellow Physician, Infectious Disease and Medical Microbiology  Dept of Infectious Diseases, [facility]  Pg [phone]    Attestation:  This patient has been seen and evaluated by me, Blair Nayak MD.  I discussed this patient with the fellow and/or resident(s) and agree with the findings and plan in this note. I also personally edited this note to reflect my findings. I have reviewed today's vital signs, medications, labs and imaging.     NAY Nayak M.D.  [facility] ID Service Staff  [phone]        History of Present Illness:     History obtained by: patient, chart review    68 YO man with longstanding history of vascular insufficiency (characterized as PAD s/p multiple leg stents, CAD s/p CABG, HTN) as well as right lung cancer in remission, chronic pain, anxiety/depression, and chronic EtOH use is seen in consultation following a successful surgical revascularization of the RLE in the setting of critical limb ischemia. Operative report is reviewed; at the time of the surgery, a right lower extremity chronic lower extremity wound was debrided, with tissues subsequently showing growth of multiple organisms by morphology, speciation pending. We are asked to provide guidance in this  setting.    I met the patient in his room on 6B; he is a capable and affable historian. Pain in the limb post-surgery is manageable, but the regional block is apparently beginning to wear off; describes pain as more chronic than acute at the moment.     Has been dealing with vascular problems with the limb for many years, but worsening over the past two years or so. Not able to get around much. Son is doing a post-doctorate Fellowship in [place], and consequently he wanted to move closer to family support while undergoing management for these problems. He describes longstanding wounds of his lower extremities; the left leg has been reasonably well managed and has shown improvement, but the right leg has been beset by multiple complications over time.    Wound on RLE began as a scratch from his dog; applied bandage subsequently removed large amount of skin, leaving a chronic wound. Various attempts at healing via outpatient wound clinics resulted in dermatologic reactions after topical preparations and inability to heal completely. Eventually started on prednisone to manage dermopathy. About 8 months ago, the RLE area appeared infected, and was given 10 days of outpatient IV Vancomycin, with purported resolution. Cannot recall a specific named pathogen. Ultimately wound progressed, with some chronic serous-like oozing. Has had gangrene on toes, more right than left.     Normally resides in [place], living alone, one small dog, and used to do a lot of activities to keep up his lawn, but less able over the past few years. He is retired as a hospital Executive. Seldom travels anymore. No unusual exposures, denies drug use, and admits to prior smoking and alcohol use.     Prior to the operation, he denies significant systemic symptoms of fever, chills, shaking, sweats, weight loss, joint pains, or significant areas of redness/inflammation on his skin aside from the numerous bruises.     He describes an adverse  reaction to Metronidazole as flu-like symptoms after 2-3 days of use; he denies other known antimicrobial allergies. Denies any prior known serious infections. Has multiple stents in situ and CABGs.     Recent culture results include:  Culture Micro   Date Value Ref Range Status   01/16/2018 Culture negative < 24 hours, reincubate  Preliminary   01/15/2018 Culture negative monitoring continues  Preliminary   01/15/2018 Culture in progress  Preliminary   01/15/2018 Moderate growth  Gram negative rods   (A)  Preliminary            Current Medications (antimicrobials listed in bold):       carvedilol  6.25 mg Oral BID w/meals     vancomycin (VANCOCIN) IV  1,000 mg Intravenous Q12H     fentaNYL  25 mcg Transdermal Q72H     fentaNYL   Transdermal Q72H     LORazepam  0.5-4 mg Intravenous See Admin Instructions    Or     LORazepam  0.5-4 mg Oral See Admin Instructions     sodium chloride (PF)  3 mL Intracatheter Q8H     aspirin EC  81 mg Oral Daily     heparin  5,000 Units Subcutaneous Q8H     clopidogrel  75 mg Oral Daily     piperacillin-tazobactam  3.375 g Intravenous Q6H     mometasone   Topical Daily     OLANZapine zydis  5 mg Oral At Bedtime     acetaminophen  650 mg Oral Q8H     fentaNYL   Transdermal Q8H     mometasone   Topical Daily     emollient   Topical TID     gabapentin  300 mg Oral BID     gabapentin  600 mg Oral At Bedtime     senna-docusate  2 tablet Oral BID     polyethylene glycol  17 g Oral Daily     folic acid  1 mg Oral Daily     isosorbide mononitrate  60 mg Oral Daily     escitalopram (LEXAPRO) tablet 20 mg  20 mg Oral Daily     ezetimibe (ZETIA) tablet 10 mg  10 mg Oral Daily     multivitamin, therapeutic with minerals  1 tablet Oral Daily     predniSONE (DELTASONE) tablet 10 mg  10 mg Oral Daily     rosuvastatin (CRESTOR) tablet 20 mg  20 mg Oral Daily     nicotine   Transdermal Q8H     nicotine   Transdermal Daily     nicotine  1 patch Transdermal Daily            Review of Systems:     ROS as  above in HPI; all other systems queried, reviewed, and negative.        Past Medical History:     Past Medical History:   Diagnosis Date     Anxiety      CAD (coronary artery disease)     s/p 3v CABG     CHF (congestive heart failure) (H)     EF 10-15%     Chronic pain      COPD (chronic obstructive pulmonary disease) (H)      Depression      Hyperlipidemia      Hypertension      Lung cancer (H)     s/p radiation therapy     PAD (peripheral artery disease) (H)     s/p lower extremity stents     Tobacco abuse           Past Surgical History:     Past Surgical History:   Procedure Laterality Date     ANGIOPLASTY Right 10/2016     BYPASS GRAFT ARTERY CORONARY  1999    [facility]     BYPASS GRAFT FEMOROTIBIAL Right 1/15/2018    Procedure: BYPASS GRAFT FEMOROTIBIAL;  Right Femorotibial Bypass Graft with insitu saphenous vein, wound debriedment of right lower leg;  Surgeon: Lexis Ag MD;  Location: [facility] OR     cardiac catheterization       Cardiac defibrillator placement       CHEST TUBE INSERTION       COLON SURGERY  2008    colon resection for diverticulitis     FINE NEEDLE ASPIRATION Right 12/2016     IMPLANT PACEMAKER       previous radiation            Allergies:      Allergies   Allergen Reactions     Betadine [Povidone Iodine] Blisters     Pt reports erythema, increased pain, and blistering when using betadine on R big toe in past     Collagenase Clostridium Histolyticum      Flagyl [Metronidazole] Other (See Comments)     Flu symptoms  Flu like symptoms     Iodine Unknown     Santyl [Collagenase]           Family History:   Reviewed and noncontributory  History reviewed. No pertinent family history.       Social History:   Reviewed and noncontributory, except for what is noted in the HPI  Social History     Social History     Marital status: Single     Spouse name: N/A     Number of children: N/A     Years of education: N/A     Occupational History     Not on file.     Social History Main Topics      Smoking status: Current Every Day Smoker     Packs/day: 1.00     Types: Cigarettes     Smokeless tobacco: Never Used      Comment: 0.5-1 ppd.     Alcohol use Not on file     Drug use: Not on file     Sexual activity: Not on file     Other Topics Concern     Not on file     Social History Narrative     History   Sexual Activity     Sexual activity: Not on file            Physical Exam:   Vitals were reviewed  Patient Vitals for the past 24 hrs:   BP Temp Temp src Pulse Heart Rate Resp SpO2 Weight   01/17/18 0448 - - - - - - - 66.9 kg (147 lb 7.8 oz)   01/17/18 0315 - 98.4  F (36.9  C) Oral - - 20 97 % -   01/16/18 2330 - 98.4  F (36.9  C) Oral - - 20 96 % -   01/16/18 1928 100/43 98.6  F (37  C) Oral - 76 22 100 % -   01/16/18 1600 - - - - - - 100 % -   01/16/18 1531 129/57 98.4  F (36.9  C) Oral - 70 22 100 % -   01/16/18 1400 105/80 98.4  F (36.9  C) Oral - 71 22 97 % -   01/16/18 1300 104/67 - - - 92 23 98 % -   01/16/18 1200 (!) 89/50 98.7  F (37.1  C) Oral - 73 20 97 % -   01/16/18 1149 95/52 - - - 71 - - -   01/16/18 1124 (!) 89/58 - - - 81 - - -   01/16/18 1111 (!) 81/65 - - 76 - - - -   01/16/18 1000 - - - - 87 8 99 % -   01/16/18 0900 - - - - 89 26 99 % -     Ranges for his vital signs:  Temp:  [98.4  F (36.9  C)-98.7  F (37.1  C)] 98.4  F (36.9  C)  Pulse:  [76] 76  Heart Rate:  [70-92] 76  Resp:  [8-26] 20  BP: ()/(43-80) 100/43  MAP:  [67 mmHg] 67 mmHg  Arterial Line BP: (96)/(50) 96/50  SpO2:  [96 %-100 %] 97 %    Intake/Output Summary (Last 24 hours) at 01/17/18 0810  Last data filed at 01/17/18 0716   Gross per 24 hour   Intake             1560 ml   Output             2300 ml   Net             -740 ml     Physical Exam:  Gen: pleasant elderly man sitting upright in bed, awake, alert, genial, cooperative, in no cardiorespiratory distress  HEENT: EOMI, PERRLA, MMM, fair dentition with several carious teeth, no overt oral lesions, tongue midline  Neck: no gross adenopathy   Right neck central line  in place with blood staining of dressing  Chest: nontender throughout, pacemaker in place left chest without overlying pain/redness/fluctuance  Cardiac: grossly irregular rhythm without adventitious heart sounds  Lungs: clear throughout with diminishment along right basolateral fields  Abd: soft, nontender, no masses, no RRG, normoactive BS  : suprapubic tenderness, mild  Back: no midline tenderness  Lymph: no grossly apparently adenopathy  Skin: warm, dry, multiple senile purpura with abundance of scattered ecchymoses of various sizes with thin skin and tissue paper folding appreciated   LLE anterior shin eschar ~4cm, ovoid, with regional venous stasis discoloration  Ext: RLE in near-total leg dressing; toes mobile with warmth appreciated, escharified lesion on medial surface of right great toe and 3rd toe, with accompanying skin flaking on bilateral feet  Neuro: grossly nonfocal examination, purposefully moving all extremities  Psych: A&O x 4         Laboratory Data:   Iron Testing  Recent Labs   Lab Test  01/17/18   0722   MCV  100       Inflammatory Markers  Recent Labs   Lab Test  01/15/18   0728  01/12/18   0712  01/05/18   0643  01/03/18   1647   CRP  20.0*  32.0*  6.5  6.6       Hematology Studies  Recent Labs   Lab Test  01/17/18   0722  01/16/18   0322  01/15/18   1605  01/15/18   1501  01/15/18   1315  01/14/18   2356  01/13/18   1009  01/12/18   0712  01/09/18   0610   01/03/18   1647   WBC  13.0*  11.7*   --    --    --   9.2  9.3  8.6  8.5   < >  10.2   ANEU   --   9.8*   --    --    --   7.2   --    --    --    --   7.4   AEOS   --   0.0   --    --    --   0.0   --    --    --    --   0.1   HGB  9.8*  9.3*  10.7*  11.2*  11.4*  11.2*  11.7*  12.0*  12.8*   < >  14.3   MCV  100  99   --    --    --   101*  100  99  100   < >  100   PLT  325  251   --    --    --   291  251  258  204   < >  241    < > = values in this interval not displayed.       Immune Globulin Studies  No lab results  found.    Metabolic Studies   Recent Labs   Lab Test  01/17/18   0722  01/16/18   0322  01/15/18   1605  01/15/18   1501  01/15/18   1315   01/14/18   2356  01/13/18   1009  01/12/18   0712   01/09/18   0610   NA  139  144  141  140  141   --    --   139  135   --   136   POTASSIUM  3.7  3.4  3.3*  4.1  4.3  4.3   < >  4.0  4.0  3.6   < >  3.6   CHLORIDE  106  111*   --    --    --    --    --   107  102   --   103   CO2  23  23   --    --    --    --    --   26  23   --   25   BUN  7  8   --    --    --    --    --   7  6*   --   11   CR  0.62*  0.50*   --    --    --    --   0.55*  0.56*  0.57*   --   0.57*   GFRESTIMATED  >90  >90   --    --    --    --   >90  >90  >90   --   >90    < > = values in this interval not displayed.       Hepatic Studies  Recent Labs   Lab Test  01/13/18   1009  01/12/18   0712  01/03/18   1647   BILITOTAL  0.4  0.5  0.7   ALKPHOS  46  50  58   ALBUMIN  2.7*  3.2*  3.5   AST  21  19  21   ALT  28  27  37       Thyroid Studies  No lab results found.    Invalid input(s): FT2    Microbiology:  Culture Micro   Date Value Ref Range Status   01/16/2018 Culture negative < 24 hours, reincubate  Preliminary   01/15/2018 Culture negative monitoring continues  Preliminary   01/15/2018 Culture in progress  Preliminary   01/15/2018 Moderate growth  Gram negative rods   (A)  Preliminary              Imaging:     Reviewed all relevant imaging for this consult patient as of 01/17/2018     Recent Results (from the past 48 hour(s))   XR Chest Port 1 View    Narrative    Exam: XR CHEST PORT 1 VW, 1/15/2018 7:32 PM    Indication: Check right IJ central line placement     Comparison: 1/3/2018    Findings:   Right internal jugular central venous catheter tip projects at the mid  thoracic trachea. Surgical changes of CABG with median sternotomy  wires and mediastinal surgical clips. Left chest wall dual-lead ICD  appears intact and in stable position (however, the distal tip of the  right ventricular lead  is collimated off the image). Stable prominent  cardiomediastinal silhouette. The pulmonary vasculature is indistinct.  No pleural effusion or pneumothorax. Stable to slightly decreased  patchy right midlung opacity with associated pleural tenting. Known  lung nodular density in the right lung is described on outside report  dated 8/3/2017, although images are unavailable at the time of this  dictation.  No new focal airspace opacity.      Impression    Impression: Right internal jugular central venous catheter tip  projects at the mid SVC.  No pneumothorax.    I have personally reviewed the examination and initial interpretation  and I agree with the findings.    NAYELI TUBBS MD

## 2018-01-17 NOTE — PROGRESS NOTES
01/17/18 1222   Quick Adds   Type of Visit Initial Occupational Therapy Evaluation   Living Environment   Lives With alone   Living Arrangements house   Home Accessibility stairs to enter home;tub/shower is not walk in   Transportation Available car;family or friend will provide   Living Environment Comment Pt lives in [place], but has family in [place]. Plans to get his own place short-term in [place] after rehab.   Self-Care   Dominant Hand right   Usual Activity Tolerance moderate   Current Activity Tolerance poor   Regular Exercise no   Equipment Currently Used at Home shower chair  (Has cane, but doesn't use)   Activity/Exercise/Self-Care Comment Poor activity tolerance due to LE pain, limited to short distance ambulation   Functional Level Prior   Ambulation 0-->independent   Transferring 0-->independent   Toileting 0-->independent   Bathing 1-->assistive equipment   Dressing 0-->independent   Cognition 1 - attention or memory deficits   Fall history within last six months yes   Number of times patient has fallen within last six months 1  (Fell in tub off shower chair)   Prior Functional Level Comment Pt normally I in most BADL/IADLs, but has assist for most cleaning and yardwork. His  usually does his grocery shopping. Pt sets up his own medications and manages his finances.    General Information   Referring Physician Ruby Simmons MD   Patient/Family Goals Statement To be mobile enough to travel throughout the year to visit his family.   Additional Occupational Profile Info/Pertinent History of Current Problem 67 year old male with PAD,  bilateral lower extremity wounds and right lower extremity critical limb ischemia. Status post POD #2 right femoral endarterectomy with in-situ GSV femoral to posterior tibial artery bypass    Precautions/Limitations fall precautions   Cognitive Status Examination   Cognitive Comment Pt reports difficulty with sustained attention, but is not impacting his life in a  major functional way.   Visual Perception   Visual Acuity Wears trifocals   Transfer Skill: Bed to Chair/Chair to Bed   Level of Kerrick: Bed to Chair contact guard   Physical Assist/Nonphysical Assist: Bed to Chair set-up required;supervision;nonverbal cues (demo/gestures);verbal cues;1 person assist   Transfer Skill: Sit to Stand   Level of Kerrick: Sit/Stand contact guard   Physical Assist/Nonphysical Assist: Sit/Stand set-up required;supervision;verbal cues;nonverbal cues (demo/gestures);1 person assist   Assistive Device for Transfer: Sit/Stand rolling walker   Transfer Skill: Toilet Transfer   Level of Kerrick: Toilet contact guard   Physical Assist/Nonphysical Assist: Toilet set-up required;supervision;verbal cues;nonverbal cues (demo/gestures);1 person assist   Assistive Device grab bars;rolling walker   Lower Body Dressing   Level of Kerrick: Dress Lower Body moderate assist (50% patients effort)   Physical Assist/Nonphysical Assist: Dress Lower Body set-up required;supervision;verbal cues;nonverbal cues (demo/gestures);1 person assist   Grooming   Level of Kerrick: Grooming stand-by assist   Physical Assist/Nonphysical Assist: Grooming set-up required;supervision;1 person assist   Clinical Impression   Criteria for Skilled Therapeutic Interventions Met yes, treatment indicated   OT Diagnosis Decreased I in ADLs due to deconditioning   Assessment of Occupational Performance 3-5 Performance Deficits   Identified Performance Deficits dressing, bathing, toileting, functional endurance   Clinical Decision Making (Complexity) Moderate complexity   Therapy Frequency 5 times/wk   Predicted Duration of Therapy Intervention (days/wks) 2 weeks   Anticipated Discharge Disposition Transitional Care Facility   Risks and Benefits of Treatment have been explained. Yes   Patient, Family & other staff in agreement with plan of care Yes   Total Evaluation Time   Total Evaluation Time (Minutes) 10

## 2018-01-17 NOTE — PROVIDER NOTIFICATION
Patient has order for CIWA protocol but that is currently on hold.  Requested order for MSSA protocol if needed.

## 2018-01-18 ENCOUNTER — APPOINTMENT (OUTPATIENT)
Dept: ULTRASOUND IMAGING | Facility: CLINIC | Age: 68
DRG: 247 | End: 2018-01-18
Attending: CLINICAL NURSE SPECIALIST
Payer: COMMERCIAL

## 2018-01-18 ENCOUNTER — APPOINTMENT (OUTPATIENT)
Dept: PHYSICAL THERAPY | Facility: CLINIC | Age: 68
DRG: 247 | End: 2018-01-18
Attending: CLINICAL NURSE SPECIALIST
Payer: COMMERCIAL

## 2018-01-18 LAB
ANION GAP SERPL CALCULATED.3IONS-SCNC: 8 MMOL/L (ref 3–14)
BACTERIA SPEC CULT: NORMAL
BLD PROD TYP BPU: NORMAL
BLD PROD TYP BPU: NORMAL
BLD UNIT ID BPU: 0
BLD UNIT ID BPU: 0
BLOOD PRODUCT CODE: NORMAL
BLOOD PRODUCT CODE: NORMAL
BPU ID: NORMAL
BPU ID: NORMAL
BUN SERPL-MCNC: 10 MG/DL (ref 7–30)
CALCIUM SERPL-MCNC: 8.2 MG/DL (ref 8.5–10.1)
CHLORIDE SERPL-SCNC: 111 MMOL/L (ref 94–109)
CO2 SERPL-SCNC: 24 MMOL/L (ref 20–32)
COPATH REPORT: NORMAL
CREAT SERPL-MCNC: 0.62 MG/DL (ref 0.66–1.25)
ERYTHROCYTE [DISTWIDTH] IN BLOOD BY AUTOMATED COUNT: 14.3 % (ref 10–15)
GFR SERPL CREATININE-BSD FRML MDRD: >90 ML/MIN/1.7M2
GLUCOSE SERPL-MCNC: 93 MG/DL (ref 70–99)
HCT VFR BLD AUTO: 27.2 % (ref 40–53)
HGB BLD-MCNC: 8.8 G/DL (ref 13.3–17.7)
Lab: NORMAL
MAGNESIUM SERPL-MCNC: 2.2 MG/DL (ref 1.6–2.3)
MCH RBC QN AUTO: 32.5 PG (ref 26.5–33)
MCHC RBC AUTO-ENTMCNC: 32.4 G/DL (ref 31.5–36.5)
MCV RBC AUTO: 100 FL (ref 78–100)
PHOSPHATE SERPL-MCNC: 2.6 MG/DL (ref 2.5–4.5)
PLATELET # BLD AUTO: 278 10E9/L (ref 150–450)
POTASSIUM SERPL-SCNC: 3.2 MMOL/L (ref 3.4–5.3)
POTASSIUM SERPL-SCNC: 4.2 MMOL/L (ref 3.4–5.3)
RBC # BLD AUTO: 2.71 10E12/L (ref 4.4–5.9)
SODIUM SERPL-SCNC: 143 MMOL/L (ref 133–144)
SPECIMEN SOURCE: NORMAL
TRANSFUSION STATUS PATIENT QL: NORMAL
TROPONIN I SERPL-MCNC: 0.03 UG/L (ref 0–0.04)
WBC # BLD AUTO: 9.8 10E9/L (ref 4–11)

## 2018-01-18 PROCEDURE — 25000125 ZZHC RX 250: Performed by: STUDENT IN AN ORGANIZED HEALTH CARE EDUCATION/TRAINING PROGRAM

## 2018-01-18 PROCEDURE — 25000132 ZZH RX MED GY IP 250 OP 250 PS 637: Performed by: SURGERY

## 2018-01-18 PROCEDURE — 25000128 H RX IP 250 OP 636: Performed by: STUDENT IN AN ORGANIZED HEALTH CARE EDUCATION/TRAINING PROGRAM

## 2018-01-18 PROCEDURE — 12000006 ZZH R&B IMCU INTERMEDIATE UMMC

## 2018-01-18 PROCEDURE — 80048 BASIC METABOLIC PNL TOTAL CA: CPT | Performed by: STUDENT IN AN ORGANIZED HEALTH CARE EDUCATION/TRAINING PROGRAM

## 2018-01-18 PROCEDURE — 85027 COMPLETE CBC AUTOMATED: CPT | Performed by: STUDENT IN AN ORGANIZED HEALTH CARE EDUCATION/TRAINING PROGRAM

## 2018-01-18 PROCEDURE — 36592 COLLECT BLOOD FROM PICC: CPT | Performed by: STUDENT IN AN ORGANIZED HEALTH CARE EDUCATION/TRAINING PROGRAM

## 2018-01-18 PROCEDURE — 97530 THERAPEUTIC ACTIVITIES: CPT | Mod: GP | Performed by: REHABILITATION PRACTITIONER

## 2018-01-18 PROCEDURE — 25000128 H RX IP 250 OP 636: Performed by: SURGERY

## 2018-01-18 PROCEDURE — 25000132 ZZH RX MED GY IP 250 OP 250 PS 637: Performed by: STUDENT IN AN ORGANIZED HEALTH CARE EDUCATION/TRAINING PROGRAM

## 2018-01-18 PROCEDURE — 25000132 ZZH RX MED GY IP 250 OP 250 PS 637: Performed by: INTERNAL MEDICINE

## 2018-01-18 PROCEDURE — 93922 UPR/L XTREMITY ART 2 LEVELS: CPT

## 2018-01-18 PROCEDURE — 25000128 H RX IP 250 OP 636

## 2018-01-18 PROCEDURE — 83735 ASSAY OF MAGNESIUM: CPT | Performed by: STUDENT IN AN ORGANIZED HEALTH CARE EDUCATION/TRAINING PROGRAM

## 2018-01-18 PROCEDURE — 25000132 ZZH RX MED GY IP 250 OP 250 PS 637: Performed by: NURSE PRACTITIONER

## 2018-01-18 PROCEDURE — 84132 ASSAY OF SERUM POTASSIUM: CPT | Performed by: SURGERY

## 2018-01-18 PROCEDURE — 25000132 ZZH RX MED GY IP 250 OP 250 PS 637: Performed by: CLINICAL NURSE SPECIALIST

## 2018-01-18 PROCEDURE — 25000132 ZZH RX MED GY IP 250 OP 250 PS 637: Performed by: PHYSICIAN ASSISTANT

## 2018-01-18 PROCEDURE — 84484 ASSAY OF TROPONIN QUANT: CPT | Performed by: STUDENT IN AN ORGANIZED HEALTH CARE EDUCATION/TRAINING PROGRAM

## 2018-01-18 PROCEDURE — 97116 GAIT TRAINING THERAPY: CPT | Mod: GP | Performed by: REHABILITATION PRACTITIONER

## 2018-01-18 PROCEDURE — 99207 ZZC NO CHARGE FOLLOW UP PS: CPT

## 2018-01-18 PROCEDURE — 40000193 ZZH STATISTIC PT WARD VISIT: Performed by: REHABILITATION PRACTITIONER

## 2018-01-18 PROCEDURE — 36592 COLLECT BLOOD FROM PICC: CPT | Performed by: SURGERY

## 2018-01-18 PROCEDURE — 84100 ASSAY OF PHOSPHORUS: CPT | Performed by: STUDENT IN AN ORGANIZED HEALTH CARE EDUCATION/TRAINING PROGRAM

## 2018-01-18 RX ORDER — SODIUM HYPOCHLORITE 2.5 MG/ML
SOLUTION TOPICAL 2 TIMES DAILY
Status: DISCONTINUED | OUTPATIENT
Start: 2018-01-18 | End: 2018-01-23 | Stop reason: HOSPADM

## 2018-01-18 RX ORDER — FUROSEMIDE 20 MG
20 TABLET ORAL 2 TIMES DAILY
Status: DISCONTINUED | OUTPATIENT
Start: 2018-01-18 | End: 2018-01-23 | Stop reason: HOSPADM

## 2018-01-18 RX ADMIN — FOLIC ACID 1 MG: 1 TABLET ORAL at 08:07

## 2018-01-18 RX ADMIN — HEPARIN SODIUM 5000 UNITS: 5000 INJECTION, SOLUTION INTRAVENOUS; SUBCUTANEOUS at 20:43

## 2018-01-18 RX ADMIN — HYDROMORPHONE HYDROCHLORIDE 0.5 MG: 1 INJECTION, SOLUTION INTRAMUSCULAR; INTRAVENOUS; SUBCUTANEOUS at 00:19

## 2018-01-18 RX ADMIN — OXYCODONE HYDROCHLORIDE 15 MG: 5 TABLET ORAL at 18:53

## 2018-01-18 RX ADMIN — PREDNISONE 10 MG: 10 TABLET ORAL at 08:07

## 2018-01-18 RX ADMIN — ISOSORBIDE MONONITRATE 60 MG: 60 TABLET, EXTENDED RELEASE ORAL at 08:06

## 2018-01-18 RX ADMIN — OLANZAPINE 5 MG: 5 TABLET, ORALLY DISINTEGRATING ORAL at 23:48

## 2018-01-18 RX ADMIN — POTASSIUM PHOSPHATE, MONOBASIC AND POTASSIUM PHOSPHATE, DIBASIC 10 MMOL: 224; 236 INJECTION, SOLUTION INTRAVENOUS at 11:10

## 2018-01-18 RX ADMIN — EZETIMIBE 10 MG: 10 TABLET ORAL at 20:44

## 2018-01-18 RX ADMIN — OXYCODONE HYDROCHLORIDE 15 MG: 5 TABLET ORAL at 10:28

## 2018-01-18 RX ADMIN — ACETAMINOPHEN 325 MG: 325 TABLET ORAL at 10:28

## 2018-01-18 RX ADMIN — VANCOMYCIN HYDROCHLORIDE 1500 MG: 10 INJECTION, POWDER, LYOPHILIZED, FOR SOLUTION INTRAVENOUS at 16:22

## 2018-01-18 RX ADMIN — OXYCODONE HYDROCHLORIDE 15 MG: 5 TABLET ORAL at 02:01

## 2018-01-18 RX ADMIN — ACETAMINOPHEN 650 MG: 325 TABLET ORAL at 02:01

## 2018-01-18 RX ADMIN — CARVEDILOL 6.25 MG: 3.12 TABLET, FILM COATED ORAL at 08:06

## 2018-01-18 RX ADMIN — ASPIRIN 81 MG: 81 TABLET, COATED ORAL at 08:07

## 2018-01-18 RX ADMIN — OXYCODONE HYDROCHLORIDE 15 MG: 5 TABLET ORAL at 06:06

## 2018-01-18 RX ADMIN — ACETAMINOPHEN 650 MG: 325 TABLET ORAL at 18:53

## 2018-01-18 RX ADMIN — ESCITALOPRAM OXALATE 20 MG: 20 TABLET ORAL at 08:06

## 2018-01-18 RX ADMIN — GABAPENTIN 300 MG: 300 CAPSULE ORAL at 08:06

## 2018-01-18 RX ADMIN — PIPERACILLIN SODIUM AND TAZOBACTAM SODIUM 3.38 G: 36; 4.5 INJECTION, POWDER, LYOPHILIZED, FOR SOLUTION INTRAVENOUS at 16:22

## 2018-01-18 RX ADMIN — MULTIPLE VITAMINS W/ MINERALS TAB 1 TABLET: TAB at 08:06

## 2018-01-18 RX ADMIN — GABAPENTIN 300 MG: 300 CAPSULE ORAL at 13:09

## 2018-01-18 RX ADMIN — HYDROMORPHONE HYDROCHLORIDE 0.5 MG: 1 INJECTION, SOLUTION INTRAMUSCULAR; INTRAVENOUS; SUBCUTANEOUS at 08:37

## 2018-01-18 RX ADMIN — HEPARIN SODIUM 5000 UNITS: 5000 INJECTION, SOLUTION INTRAVENOUS; SUBCUTANEOUS at 04:51

## 2018-01-18 RX ADMIN — PIPERACILLIN SODIUM AND TAZOBACTAM SODIUM 3.38 G: 36; 4.5 INJECTION, POWDER, LYOPHILIZED, FOR SOLUTION INTRAVENOUS at 04:51

## 2018-01-18 RX ADMIN — CARVEDILOL 6.25 MG: 3.12 TABLET, FILM COATED ORAL at 20:43

## 2018-01-18 RX ADMIN — VANCOMYCIN HYDROCHLORIDE 1500 MG: 10 INJECTION, POWDER, LYOPHILIZED, FOR SOLUTION INTRAVENOUS at 04:51

## 2018-01-18 RX ADMIN — POTASSIUM CHLORIDE 20 MEQ: 1.5 POWDER, FOR SOLUTION ORAL at 10:28

## 2018-01-18 RX ADMIN — GABAPENTIN 600 MG: 600 TABLET, FILM COATED ORAL at 21:01

## 2018-01-18 RX ADMIN — HEPARIN SODIUM 5000 UNITS: 5000 INJECTION, SOLUTION INTRAVENOUS; SUBCUTANEOUS at 11:12

## 2018-01-18 RX ADMIN — NICOTINE 1 PATCH: 7 PATCH, EXTENDED RELEASE TRANSDERMAL at 20:43

## 2018-01-18 RX ADMIN — CLOPIDOGREL 75 MG: 75 TABLET, FILM COATED ORAL at 08:06

## 2018-01-18 RX ADMIN — OLANZAPINE 5 MG: 5 TABLET, ORALLY DISINTEGRATING ORAL at 00:18

## 2018-01-18 RX ADMIN — FUROSEMIDE 20 MG: 20 TABLET ORAL at 15:13

## 2018-01-18 RX ADMIN — ROSUVASTATIN CALCIUM 20 MG: 20 TABLET, FILM COATED ORAL at 20:43

## 2018-01-18 RX ADMIN — POTASSIUM CHLORIDE 40 MEQ: 1.5 POWDER, FOR SOLUTION ORAL at 08:06

## 2018-01-18 RX ADMIN — OXYCODONE HYDROCHLORIDE 15 MG: 5 TABLET ORAL at 22:54

## 2018-01-18 RX ADMIN — OXYCODONE HYDROCHLORIDE 15 MG: 5 TABLET ORAL at 15:13

## 2018-01-18 RX ADMIN — PIPERACILLIN SODIUM AND TAZOBACTAM SODIUM 3.38 G: 36; 4.5 INJECTION, POWDER, LYOPHILIZED, FOR SOLUTION INTRAVENOUS at 22:41

## 2018-01-18 RX ADMIN — HYDROMORPHONE HYDROCHLORIDE 0.5 MG: 1 INJECTION, SOLUTION INTRAMUSCULAR; INTRAVENOUS; SUBCUTANEOUS at 20:54

## 2018-01-18 RX ADMIN — PIPERACILLIN SODIUM AND TAZOBACTAM SODIUM 3.38 G: 36; 4.5 INJECTION, POWDER, LYOPHILIZED, FOR SOLUTION INTRAVENOUS at 10:28

## 2018-01-18 RX ADMIN — FUROSEMIDE 20 MG: 20 TABLET ORAL at 10:28

## 2018-01-18 RX ADMIN — POLYETHYLENE GLYCOL 3350 17 G: 17 POWDER, FOR SOLUTION ORAL at 08:06

## 2018-01-18 ASSESSMENT — ACTIVITIES OF DAILY LIVING (ADL)
ADLS_ACUITY_SCORE: 11

## 2018-01-18 ASSESSMENT — PAIN DESCRIPTION - DESCRIPTORS
DESCRIPTORS: ACHING

## 2018-01-18 NOTE — PROGRESS NOTES
"VASCULAR SURGERY PROGRESS NOTE    SUBJECTIVE:  Patient sitting at the side of the bed, complains of right leg pain and swelling. Pt reports pain is tolerable. Pt taking good PO.     OBJECTIVE:  Vital signs:  /69 (BP Location: Right arm)  Pulse 76  Temp 98.3  F (36.8  C) (Oral)  Resp 18  Ht 1.727 m (5' 8\")  Wt 65.3 kg (144 lb)  SpO2 96%  BMI 21.9 kg/m2    Intake/Output Summary (Last 24 hours) at 01/18/18 0908  Last data filed at 01/18/18 0800   Gross per 24 hour   Intake             1257 ml   Output             1100 ml   Net              157 ml     Vitals:    01/14/18 0806 01/17/18 0448 01/18/18 0036   Weight: 63.6 kg (140 lb 4.8 oz) 66.9 kg (147 lb 7.8 oz) 65.3 kg (144 lb)     PHYSICAL EXAM:  NEURO/PSYCH: Alert and oriented X 3,   Moves all extremities.    SKIN: warm and dry.  PULMONARY: non-labored breathing, not requiring supplemental oxygen.  EXTREMITIES: right leg incisions C/D/I, right leg wound moderate serous drainage, mildly malodorous with muscle exposed, wet to dry saline dressing placed on right leg wound with Kerlex and ace wrap, right leg more edematous today, graft pulse palpable.     BMP  Recent Labs  Lab 01/18/18  0510 01/17/18  0722 01/16/18  0322 01/15/18  1638 01/15/18  1605  01/14/18  2356 01/13/18  1447 01/13/18  1009    139 144  --  141  < >  --   --  139   POTASSIUM 3.2* 3.7 3.4  3.3*  --  4.1  < > 4.0  --  4.0   CHLORIDE 111* 106 111*  --   --   --   --   --  107   CO2 24 23 23  --   --   --   --   --  26   BUN 10 7 8  --   --   --   --   --  7   CR 0.62* 0.62* 0.50*  --   --   --  0.55*  --  0.56*   GLC 93 88 112*  --  190*  < >  --   --  123*   MAG 2.2  --  2.0 2.1  --   --   --  2.4*  --    PHOS 2.6  --  2.8  --   --   --   --   --   --    < > = values in this interval not displayed.  CBC    Recent Labs  Lab 01/18/18  0510 01/17/18  0722 01/16/18  0322 01/15/18  1605  01/14/18  2356   WBC 9.8 13.0* 11.7*  --   --  9.2   HGB 8.8* 9.8* 9.3* 10.7*  < > 11.2*    325 " 251  --   --  291   < > = values in this interval not displayed.  INR/PTT    Recent Labs  Lab 01/16/18  0322 01/15/18  0655   INR 1.18* 0.97     LFT    Recent Labs  Lab 01/13/18  1009 01/12/18  0712   AST 21 19   ALT 28 27   ALKPHOS 46 50   BILITOTAL 0.4 0.5   ALBUMIN 2.7* 3.2*       ASSESSMENT:  67 year old male with PAD,  bilateral lower extremity wounds and right lower extremity critical limb ischemia. Status post POD #2 right femoral endarterectomy with in-situ GSV femoral to posterior tibial artery bypass     PLAN:  -Change wet to dry dressing to Dakins solution BID to shin wound  -Restart PTA Lasix, continue Coreg, Will continue to hold Vasotec  - continue Plavix and ASA  - WOC nurses to evaluate lower extremity wounds  - right lower extremity wound gram stain growing moderate gram negative rods and gram positive cocci  - on vancomycin and zosyn- will continue antibiotics for now per Dr. Ag  -ABIs with toe pressures today  - social service consult for discharge planning, anticipate patient will need TCU   - appreciate pain team consultation, Scheduled Tyelnol, PRN PO Oxycodone and IV Dilaudid PRN. PRN Dilaudid is q6 PRN per pain service recommendations   - transfer to   - smoking cessation strongly encouraged       Taylor Mancera PA-C   [facility]   Vascular Surgery

## 2018-01-18 NOTE — PROGRESS NOTES
"[facility] Nurse Inpatient Wound Assessment     Initial Assessment of wound(s) on pt's:   Bilateral Lower extremity wounds         Data:   Patient History:      per MD note(s): Boom Kahn is a 67 year old male with a past medical history significant for HTN, anxiety/depression, alcohol abuse (8-10 beers/day), tobacco abuse (1/2-1 PPD, 50 years), ischemic cardiomyopathy (MI ), CAD s/p 3v CABG () and 3-4 stents, HFrEF (last EF 25-30%, approximately 6 months ago) s/p ICD, , R lung cancer s/p radiation therapy,  peripheral vascular disease s/p stenting of R iliac, R common femoral, right SFA, and \"3-4 stents in the left leg\" in [place], and chronic pain who presented to the ED complaining of worsening pain in his lower extremities and right shin wound.      Patient states he has a history of 17 years of progressive claudication.  However, since 2015 his symptoms have become much worse.  Since July patient reports multiple minor traumas which have developed into chronic wounds.  Currently, he complains of pain with walking and at rest, pain is improved with rest, denies any radiation, pain is localized to lower calf bilaterally, pain is 10/10 even at rest.  Recently a right lower extremity wound has progressed, and several new wounds are noted in his right toes.   Pain is sufficient to complicate weightbearing.  Pain significantly impacts his ability to ambulate, restricted him to an estimated 30 feet before having to stop. He denies any history or symptoms of related infections including fevers, chills, or drainage.         Current Diet / Nutrition:         Active Diet Order      Regular Diet Adult           Chance Assessment and sub scores:   Chance Score  Av  Min: 19  Max: 19     Labs:      Recent Labs   Lab Test  18   0600  18   1647   ALBUMIN   --   3.5   HGB  14.1  14.3   RBC  4.26*  4.22*   WBC  10.0  10.2   PLT  248  241   INR  1.01  0.99   CRP   --   6.6      " Wound Assessment (location #1Bilateral lower extremity wounds)  Wound History:  Was to have LE bypass surg 1/9 which was cancelled 2/2 insurance.  Awaiting re-schedule.  Bypass surg including I&D of Right LE wound on 1/15/18.    1/18/18 Right LE       Wound Base:   non-granulation    Specific Dimensions (length x width x depth, in cm) :       Right: 6 x 5 x 0.2 cm muscle and tendon.  Draining mod serosang.  Periwound skin edematous and intact.    Left: 4 x 2.5 x 0.2 cm and 1 x 1 x 0.1 cm yellow dried drainage.  Pt states he's been leaving open to air.     Tunneling:  N/A    Undermining: N/A    Palpation of the wound bed:  boggy    Slough appearance:adherent  on the left leg wound     Eschar appearance:  dry and black  on the right leg wound     Periwound Skin: edema,      Color: normal and consistent with surrounding tissue    Temperature  cool    Drainage: . Amount: small . Color: serosanguinous     Odor: none    Pain:  moderate , sharp; 1/18/18: post op now able to touch toes, though still painful.  Pain is much improved.           Intervention:     Patient's chart evaluated.      Wound(s) was fully assessed    Wound Care: was done:      Orders  Written    Supplies  Reviewed and Ordered: as indicated belwo    Discussed plan of care with Patient and Nurse          Assessment:   Lower extremity wound  Arterial  Right LE: not appropriate for wound vac at this time due to pt still having pain at site.  Continue Vashe per MD orders.  May consider plastics consult for skin graft.          Plan:     Nursing to notify the Provider(s) and re-consult the [facility] Nurse if wound(s) deteriorate(s).  Plan of care for wound located on Right anterior shin wound : Twice daily cleanse with microklenz and pack with Vashe moistened 2x2s, cover with guaze, ABD and kerlix.     Plan of care for wound located on Left anterior shin wound : cleanse the wound with Vashe solution pat dry           moisten a 2x2 with Vashe solution place over the  wound and secure with Mepilex dressing change        dressing Daily      Plan of care for wound located on toes Paint with betadine allow to dry weave lambs wool between the toes     change dressing every other day    WOC Nurse will return: Weekly     Face to face time: 20 Minutes

## 2018-01-18 NOTE — PROGRESS NOTES
Notified MD cross cover of patient's continued orthostatic hypotension. No new orders at this time. Will continue current plan of care and update MDs with any changes.

## 2018-01-18 NOTE — PLAN OF CARE
Problem: Patient Care Overview  Goal: Plan of Care/Patient Progress Review  Discharge Planner PT   Patient plan for discharge: TCU  Current status: Pt performs sit<>stand to FWW with CGA-min A. Ambulates 80' x2 with CGA and FWW, seated rest break in between. O2 sats >97% on room air. Decreased L step length 2/2 R LE wounds on hallux. VC for upright posture and gaze. 6x sit<>stand from standard height chair with use of UEs, VSS afterwards. Apprehensive about d/c to TCU because of LE wounds.  Barriers to return to prior living situation: LE wounds, functional mobility below baseline.  Recommendations for discharge: TCU  Rationale for recommendations: Impaired activity tolerance and functional mobility, vascular claudication, and LE wounds. Would benefit from further intervention.       Entered by: Naman Walden 01/18/2018 3:18 PM

## 2018-01-18 NOTE — PROGRESS NOTES
Progress Note:    Hospital Day: 15    Data: Boom Kahn is a 66 yo male admitted to [facility] 01/03/2018.    Intervention: Met w/pt in the AM and met with pt and son Dallas again this afternoon.    Assessment: Pt lives in [place] but came to [place] for his medical care. Pt's son Dallas lives in [place] and is very involved with pt's care. Pt is planning to go to TCU in [place] then rent an apartment or move in with Dallas. Pt/son reviewed list of Medicare certified TCUs and requested referrals to the following facilities:    Referrals sent to:  1. [facility] (P: [phone]): Accepted pending bed availability and pt would need to be off IV pain meds.  2. [facility] (P: [phone], F: [phone])  3. [facility] (P: [phone], F: [phone])  4. [facility] (P: [phone], F: [phone]): No beds through the weekend.    Plan:  -Discharge plans in progress: Possible dc 1-2 days.   -Barriers to discharge plan: medical clearance and TCU placement.  -Follow up plan: SW will continue to follow and assist as needed.    SID Castillo, [facility]  6B Intermediate Care Unit   Phone: [phone]  Pager: [phone]

## 2018-01-18 NOTE — PROGRESS NOTES
CLINICAL NUTRITION SERVICES - REASSESSMENT NOTE     Nutrition Prescription    RECOMMENDATIONS FOR MDs/PROVIDERS TO ORDER:  Encourage PO intakes    Malnutrition Status:    Non-severe malnutrition in the context of chronic illness    Recommendations already ordered by Registered Dietitian (RD):  Pt would like Ensure chocolate with each meal    Future/Additional Recommendations:  If PO intakes begin to decline, or pt begins to have ongoing wt loss, would recommend calorie counts     EVALUATION OF THE PROGRESS TOWARD GOALS   Diet: Regular  Intake: Pt consuming % of meals per RN flowsheets. Pt reports having a very good appetite and is very impressed with the food here. He reports no nutrition issues at this time.     NEW FINDINGS   Weight: ~3-4# wt gain over the past week and no wt loss since admit wt.   Labs: K: 3.2 (L), Cr: .62 (L)  GI: Per team: Has been having some loose stools, may have over done the stool softeners.  Skin: About 8 months ago, the RLE area appeared infected, and was given 10 days of outpatient IV Vancomycin, with purported resolution. Cannot recall a specific named pathogen. Ultimately wound progressed, with some chronic serous-like oozing. Has had gangrene on toes, more right than left.     MALNUTRITION  % Intake: Decreased intake does not meet criteria  % Weight Loss: None noted  Subcutaneous Fat Loss: Facial region, Upper arm and Thoracic/intercostal: mild  Muscle Loss: Temporal, Facial & jaw region, Thoracic region (clavicle, acromium bone, deltoid, trapezius, pectoral), Upper arm (bicep, tricep) and Lower arm  (forearm): mild-moderate  Fluid Accumulation/Edema: Mild  Malnutrition Diagnosis: Non-severe malnutrition in the context of chronic illness    Previous Goals   Patient to consume % of nutritionally adequate meal trays TID, or the equivalent with supplements/snacks.  Evaluation: Met    Previous Nutrition Diagnosis  Predicted inadequate nutrient intake related to acute illness,  variable Po intake  Evaluation: No change    CURRENT NUTRITION DIAGNOSIS  Predicted inadequate nutrient intake related to acute illness, variable Po intake    INTERVENTIONS  Implementation  Collaboration with other providers- 6B rounds    Goals  Patient to consume % of nutritionally adequate meal trays TID, or the equivalent with supplements/snacks.    Monitoring/Evaluation  Progress toward goals will be monitored and evaluated per protocol.      Blanca Castro RD, MS, LD  6B- Pager: [phone]

## 2018-01-18 NOTE — PROGRESS NOTES
Patient: Boom Kahn  Date of Service: January 18, 2018 Admission Date:1/3/2018   3 Days Post-Op     Chief Pain Endorsement:  Right leg pain    Recommendations were discussed and relayed to Shannan Dobbs PA-C (Vascular)  Plan was reviewed by the Inpatient Pain Service and staff attending, Dr. Bree Lara      1. Continue fentanyl patch 25 mcg/hr TD q72h.    When discharged, continue this home dose.  2. Continue oxycodone 10-15mg po q4h prn moderate to severe pain.    If patient becomes more painful could increase to oxycodone 10-15mg po q3h prn moderate pain.    When discharged, taper to home regimen of oxycodone - acetaminophen 10/325mg, 6 tabs per day over the normal course of healing.  3. Continue hydromorphone 0.3-0.5mg IV q6h prn severe pain not controlled by oral opioids.    Discontinue 24 hours prior to discharge.  4. Continue acetaminophen 650mg po q8h.    When discharged can change to as needed dosing.  5. Continue gabapentin [id number].    When discharged, taper to off over ~ 2 weeks.  6. Bowel regimen per primary team to prevent opioid induced constipation.    Pain Service will Sign Off at this time.     Thank you for consulting the Inpatient Pain Management Service. The above recommendations are to be acted upon at the primary team s discretion.     To reach us:  Mon - Friday 8 AM - 3 PM: Pager [phone] (Text Page)  After hours, weekends and holidays: Primary service should call [phone] for the on-call pain specialist    PAIN MEDICATION SAFE USE:   Prior to discharge instruct patient on the following in addition to the medication fact sheet:    Caution: these medications can cause sedation    Take prescription medicine only if it has been prescribed by your doctor    Do not take more medicine or take it more often than instructed     Call your doctor if pain gets worse    Never mix pain medicine with alcohol, sleeping pills, or any illicit drugs    Do not operate heavy  machinery, including vehicles, when initiation opioid therapy or increasing dosage    Store prescription opioids in a locked container, whenever possible     Dispose of unused opioids appropriately     Do not stop abruptly once at higher doses.  These medications must be tapered off.    Opioid pain medications do carry the risk for physical dependence and addiction and patients should be counseled about this.         1. Acute pain in lower extremities associated with B/L ulcers anterior tibia due to PVD - XR negative for osteomyelitis  2. Right femoro-tibial bypass graft with insitu saphenous vein, wound debridement on 1/15/18.  3. Chronic pain syndrome  4. Pt is opiate tolerant  5. Tobacco abuse 0.5-1.0PPD for 50yrs  6. ETOH abuse - on [facility] protocol  7. Hypertension  8. Ischemic cardiomyopathy (MI 2008), Congestive heart failure (EF ~25%)  9. Coronary Artery Disease s/p 3 vessel CABG 1999 with multiple stents and ICD placed  10. Chronic anticoagulation on plavix 75mg daily and aspirin 81 mg daily   11. Right upper lobe lung cancer s/p radiation therapy  12. Peripheral Vascular disease s/p stenting of right iliac, right common Femoral, right SFA and 3-4 stents in left leg in [place]  13. Pt delirious overnight on 1/11/18-1/12/18. Quetiapine started 1/11/18.    -- Outpatient opioid requirements prior to admission: OME = 140 mg/day  --Fentanyl 25 mcg/hr, 1 patch TD Q 48 hours (patient changes patch every 2-3 days in an inconsistent manner). Last filled 12/27/17 for 15 patches  --Oxycodone acetaminophen 10/325 mg tabs, Qty 90 tabs for 15 days supply. Takes on average 6 tablets per day.    Primary Care Provider: Willian Arrington  Chronic Pain Provider: none    Interval History:  Boom Kahn was seen today (January 18, 2018) and he reports that the rotation from hydromorphone to oxycodone has provided a significant improvement in his pain relief.  Patient feels the liposomal bupivacaine may be wearing off today.   "Primary team has some concerns about infection in the right leg wound.  Patient reports walking in the hallway and participating in PT. Has been having some loose stools, may have over done the stool softeners.      CAPA (Clinically Aligned Pain Assessment):    Comfort (How is your pain?): Tolerable with discomfort  Change in Pain (Since your last medication/intervention?): Getting better  Pain Control (How are your pain treatments working?):  Partially effective control  Functioning (Are you able to do activities to get better?) : Can do most things, but pain gets in the way of some   Sleep (Does your pain management allow you to sleep or rest?): Awake with occasional pain      FUNCTIONAL STATUS:  Change:      Improving  Oral intake:     Regular  Activity level:     Ambulating in horta  Mood:      Stable     -- Inpatient Medications Related to Pain Management:   Medications related to Pain Management (Future)    Start     Dose/Rate Route Frequency Ordered Stop    01/17/18 0804  oxyCODONE IR (ROXICODONE) tablet 10-15 mg      10-15 mg Oral EVERY 4 HOURS PRN 01/17/18 0805      01/16/18 2016  LORazepam (ATIVAN) injection 0.5-4 mg      0.5-4 mg Intravenous SEE ADMIN INSTRUCTIONS 01/16/18 2017 01/16/18 2016  LORazepam (ATIVAN) tablet 0.5-4 mg      0.5-4 mg Oral SEE ADMIN INSTRUCTIONS 01/16/18 2017 01/16/18 1000  fentaNYL (DURAGESIC) 25 mcg/hr 72 hr patch 1 patch      25 mcg Transdermal EVERY 72 HOURS 01/16/18 0955      01/16/18 0800  aspirin EC EC tablet 81 mg      81 mg Oral DAILY 01/15/18 2229      01/15/18 2229  lidocaine (LMX4) kit       Topical EVERY 1 HOUR PRN 01/15/18 2229      01/15/18 1100  bupivacaine liposome (EXPAREL) LONG ACTING injection was administered into the infiltration site to produce postsurgical analgesia. Duration of action is up to 72 hours, and other \"sommer\" medications should not be given for 96 hours with the exception of the lidocaine 5% patch (LIDODERM) and the lidocaine 10mg in " potassium infusions. This entry is for INFORMATION ONLY.       Does not apply CONTINUOUS PRN 01/15/18 2229 01/19/18 1059    01/12/18 1500  acetaminophen (TYLENOL) tablet 650 mg      650 mg Oral EVERY 8 HOURS 01/12/18 1451      01/11/18 0852  diphenhydrAMINE (BENADRYL) capsule 25 mg      25 mg Oral EVERY 6 HOURS PRN 01/11/18 0853      01/11/18 0852  diphenhydrAMINE (BENADRYL) injection 25 mg      25 mg  over 1-2 Minutes Intravenous EVERY 6 HOURS PRN 01/11/18 0853      01/08/18 2100  gabapentin (NEURONTIN) tablet 600 mg      600 mg Oral AT BEDTIME 01/08/18 1525      01/08/18 2000  senna-docusate (SENOKOT-S;PERICOLACE) 8.6-50 MG per tablet 2 tablet      2 tablet Oral 2 TIMES DAILY 01/08/18 1534      01/08/18 1600  gabapentin (NEURONTIN) capsule 300 mg      300 mg Oral 2 TIMES DAILY 01/08/18 1525      01/08/18 1545  polyethylene glycol (MIRALAX/GLYCOLAX) Packet 17 g      17 g Oral DAILY 01/08/18 1534      01/08/18 1530  [Rx hold ]  ketamine (KETALAR) 5%-gabapentin (NEURONTIN) 8%-lidocaine 2.5% topical PLO cream     (Rx hold  since 01/08/18 1552)     Topical HOLD 01/08/18 1524      01/07/18 2130  bisacodyl (DULCOLAX) Suppository 10 mg      10 mg Rectal DAILY PRN 01/07/18 2130      01/07/18 0800  HYDROmorphone (PF) (DILAUDID) injection 0.3-0.5 mg      0.3-0.5 mg Intravenous EVERY 6 HOURS PRN 01/07/18 0754      01/05/18 1933  lidocaine 1 % 1 mL      1 mL Other EVERY 1 HOUR PRN 01/05/18 1933      01/05/18 1933  lidocaine (LMX4) kit       Topical EVERY 1 HOUR PRN 01/05/18 1933      01/03/18 2253  senna-docusate (SENOKOT-S;PERICOLACE) 8.6-50 MG per tablet 1 tablet      1 tablet Oral 2 TIMES DAILY PRN 01/03/18 2253 01/03/18 2253  senna-docusate (SENOKOT-S;PERICOLACE) 8.6-50 MG per tablet 2 tablet      2 tablet Oral 2 TIMES DAILY PRN 01/03/18 2253            LAB DATA:    Recent Labs  Lab 01/18/18  0510 01/17/18  0722 01/16/18  0322  01/13/18  1009 01/12/18  0712   CR 0.62* 0.62* 0.50*  < > 0.56* 0.57*   WBC 9.8 13.0* 11.7*   < > 9.3 8.6   HGB 8.8* 9.8* 9.3*  < > 11.7* 12.0*   AST  --   --   --   --  21 19   ALT  --   --   --   --  28 27   < > = values in this interval not displayed.      ----------------------------------------------------------------------------------  Joann Gant PharmD, MS  Inpatient Pain Service     To reach us:  Mon - Friday 8 AM - 3 PM: Pager [phone] (Text Page)  After hours, weekends and holidays: Primary service should call [phone] for the on-call pain specialist    Helpful Resources:  Getting Rid of Unwanted Medications (printable PDF for patients)   Opioid Overdose Prevention Toolkit (printable PDF for patients)   Prescription Opioids: What You Need To Know (printable PDF for patients)

## 2018-01-18 NOTE — PLAN OF CARE
Problem: Patient Care Overview  Goal: Plan of Care/Patient Progress Review  Outcome: No Change  Neuro: A&Ox4.   Cardiac: Sinus rhythm with frequent PVC's  Vitals: Temp: 99.1  F (37.3  C) Temp src: Oral BP: 130/79   Heart Rate: 80 Resp: 16 SpO2: 96 % O2 Device: None (Room air)    Respiratory: Sating adequately on RA.  GI/: Adequate urine output, voids.  Diet/appetite: Tolerating diet.   Activity:  Assist of 1, up to bathroom  Pain: At acceptable level on current regimen.   Skin: No new deficits noted.  Plan: Continue with POC. Notify primary team with changes.  Anticipate transfer to  today.

## 2018-01-18 NOTE — PLAN OF CARE
Neuro: A&Ox4.   Cardiac: SR. VSS.   Respiratory: Sating 97 on RA.  GI/: Adequate urine output. BM X1  Diet/appetite: Tolerating regular diet. Eating well.  Activity:  Assist of 1, up to chair and in halls.  Pain: At acceptable level on current regimen. PRN oxycodone and dilaudid. Fentanyl patch to right deltoid.   Skin: See flowsheet for wounds.  LDA's:    Plan: Continue with POC. Notify primary team with changes.

## 2018-01-18 NOTE — PLAN OF CARE
Problem: Patient Care Overview  Goal: Plan of Care/Patient Progress Review  Outcome: No Change  Patient is A&Ox4. Vitally stable on RA. Tolerating a regular diet. Pain managed with oxycodone. Up with 1 assist to chair. Voiding per urinal. Loose/soft BM. Right lower leg dressing intact. Will continue current plan of care and update MDs with any changes.

## 2018-01-19 ENCOUNTER — APPOINTMENT (OUTPATIENT)
Dept: PHYSICAL THERAPY | Facility: CLINIC | Age: 68
DRG: 247 | End: 2018-01-19
Attending: CLINICAL NURSE SPECIALIST
Payer: COMMERCIAL

## 2018-01-19 ENCOUNTER — APPOINTMENT (OUTPATIENT)
Dept: OCCUPATIONAL THERAPY | Facility: CLINIC | Age: 68
DRG: 247 | End: 2018-01-19
Attending: CLINICAL NURSE SPECIALIST
Payer: COMMERCIAL

## 2018-01-19 LAB — VANCOMYCIN SERPL-MCNC: 33 MG/L

## 2018-01-19 PROCEDURE — 25000132 ZZH RX MED GY IP 250 OP 250 PS 637: Performed by: NURSE PRACTITIONER

## 2018-01-19 PROCEDURE — 12000006 ZZH R&B IMCU INTERMEDIATE UMMC

## 2018-01-19 PROCEDURE — 25000132 ZZH RX MED GY IP 250 OP 250 PS 637: Performed by: STUDENT IN AN ORGANIZED HEALTH CARE EDUCATION/TRAINING PROGRAM

## 2018-01-19 PROCEDURE — 97530 THERAPEUTIC ACTIVITIES: CPT | Mod: GP | Performed by: PHYSICAL THERAPIST

## 2018-01-19 PROCEDURE — 25000132 ZZH RX MED GY IP 250 OP 250 PS 637: Performed by: PHYSICIAN ASSISTANT

## 2018-01-19 PROCEDURE — 25000128 H RX IP 250 OP 636: Performed by: STUDENT IN AN ORGANIZED HEALTH CARE EDUCATION/TRAINING PROGRAM

## 2018-01-19 PROCEDURE — 25000132 ZZH RX MED GY IP 250 OP 250 PS 637: Performed by: CLINICAL NURSE SPECIALIST

## 2018-01-19 PROCEDURE — 97535 SELF CARE MNGMENT TRAINING: CPT | Mod: GO

## 2018-01-19 PROCEDURE — 25000132 ZZH RX MED GY IP 250 OP 250 PS 637: Performed by: INTERNAL MEDICINE

## 2018-01-19 PROCEDURE — 36592 COLLECT BLOOD FROM PICC: CPT | Performed by: SURGERY

## 2018-01-19 PROCEDURE — 25000125 ZZHC RX 250: Performed by: STUDENT IN AN ORGANIZED HEALTH CARE EDUCATION/TRAINING PROGRAM

## 2018-01-19 PROCEDURE — 40000193 ZZH STATISTIC PT WARD VISIT: Performed by: PHYSICAL THERAPIST

## 2018-01-19 PROCEDURE — 99207 ZZC NO CHARGE SIGN-OFF PS: CPT

## 2018-01-19 PROCEDURE — 97116 GAIT TRAINING THERAPY: CPT | Mod: GP | Performed by: PHYSICAL THERAPIST

## 2018-01-19 PROCEDURE — 40000133 ZZH STATISTIC OT WARD VISIT

## 2018-01-19 PROCEDURE — 25000128 H RX IP 250 OP 636

## 2018-01-19 PROCEDURE — 80202 ASSAY OF VANCOMYCIN: CPT | Performed by: SURGERY

## 2018-01-19 PROCEDURE — 25000132 ZZH RX MED GY IP 250 OP 250 PS 637: Performed by: SURGERY

## 2018-01-19 PROCEDURE — 25000128 H RX IP 250 OP 636: Performed by: SURGERY

## 2018-01-19 RX ADMIN — OXYCODONE HYDROCHLORIDE 5 MG: 5 TABLET ORAL at 13:38

## 2018-01-19 RX ADMIN — MULTIPLE VITAMINS W/ MINERALS TAB 1 TABLET: TAB at 08:40

## 2018-01-19 RX ADMIN — HEPARIN SODIUM 5000 UNITS: 5000 INJECTION, SOLUTION INTRAVENOUS; SUBCUTANEOUS at 03:13

## 2018-01-19 RX ADMIN — ESCITALOPRAM OXALATE 20 MG: 20 TABLET ORAL at 08:40

## 2018-01-19 RX ADMIN — FENTANYL 1 PATCH: 25 PATCH, EXTENDED RELEASE TRANSDERMAL at 10:33

## 2018-01-19 RX ADMIN — EZETIMIBE 10 MG: 10 TABLET ORAL at 20:43

## 2018-01-19 RX ADMIN — HEPARIN SODIUM 5000 UNITS: 5000 INJECTION, SOLUTION INTRAVENOUS; SUBCUTANEOUS at 11:52

## 2018-01-19 RX ADMIN — CARVEDILOL 6.25 MG: 3.12 TABLET, FILM COATED ORAL at 17:28

## 2018-01-19 RX ADMIN — CLOPIDOGREL 75 MG: 75 TABLET, FILM COATED ORAL at 08:40

## 2018-01-19 RX ADMIN — VANCOMYCIN HYDROCHLORIDE 1500 MG: 10 INJECTION, POWDER, LYOPHILIZED, FOR SOLUTION INTRAVENOUS at 15:30

## 2018-01-19 RX ADMIN — ASPIRIN 81 MG: 81 TABLET, COATED ORAL at 08:40

## 2018-01-19 RX ADMIN — OXYCODONE HYDROCHLORIDE 5 MG: 5 TABLET ORAL at 20:34

## 2018-01-19 RX ADMIN — GABAPENTIN 600 MG: 600 TABLET, FILM COATED ORAL at 20:43

## 2018-01-19 RX ADMIN — PIPERACILLIN SODIUM AND TAZOBACTAM SODIUM 3.38 G: 36; 4.5 INJECTION, POWDER, LYOPHILIZED, FOR SOLUTION INTRAVENOUS at 17:28

## 2018-01-19 RX ADMIN — PREDNISONE 10 MG: 10 TABLET ORAL at 08:40

## 2018-01-19 RX ADMIN — HYDROMORPHONE HYDROCHLORIDE 0.5 MG: 1 INJECTION, SOLUTION INTRAMUSCULAR; INTRAVENOUS; SUBCUTANEOUS at 15:34

## 2018-01-19 RX ADMIN — Medication: at 20:49

## 2018-01-19 RX ADMIN — GABAPENTIN 300 MG: 300 CAPSULE ORAL at 13:33

## 2018-01-19 RX ADMIN — NICOTINE 1 PATCH: 7 PATCH, EXTENDED RELEASE TRANSDERMAL at 20:43

## 2018-01-19 RX ADMIN — PIPERACILLIN SODIUM AND TAZOBACTAM SODIUM 3.38 G: 36; 4.5 INJECTION, POWDER, LYOPHILIZED, FOR SOLUTION INTRAVENOUS at 03:13

## 2018-01-19 RX ADMIN — PIPERACILLIN SODIUM AND TAZOBACTAM SODIUM 3.38 G: 36; 4.5 INJECTION, POWDER, LYOPHILIZED, FOR SOLUTION INTRAVENOUS at 21:53

## 2018-01-19 RX ADMIN — HYOSCYAMINE SULFATE: 16 SOLUTION at 21:53

## 2018-01-19 RX ADMIN — Medication: at 08:44

## 2018-01-19 RX ADMIN — PIPERACILLIN SODIUM AND TAZOBACTAM SODIUM 3.38 G: 36; 4.5 INJECTION, POWDER, LYOPHILIZED, FOR SOLUTION INTRAVENOUS at 10:34

## 2018-01-19 RX ADMIN — GABAPENTIN 300 MG: 300 CAPSULE ORAL at 08:41

## 2018-01-19 RX ADMIN — OXYCODONE HYDROCHLORIDE 10 MG: 5 TABLET ORAL at 19:37

## 2018-01-19 RX ADMIN — FOLIC ACID 1 MG: 1 TABLET ORAL at 08:40

## 2018-01-19 RX ADMIN — CARVEDILOL 6.25 MG: 3.12 TABLET, FILM COATED ORAL at 08:40

## 2018-01-19 RX ADMIN — OXYCODONE HYDROCHLORIDE 10 MG: 5 TABLET ORAL at 13:33

## 2018-01-19 RX ADMIN — SENNOSIDES AND DOCUSATE SODIUM 2 TABLET: 8.6; 5 TABLET ORAL at 20:43

## 2018-01-19 RX ADMIN — FUROSEMIDE 20 MG: 20 TABLET ORAL at 15:31

## 2018-01-19 RX ADMIN — ACETAMINOPHEN 650 MG: 325 TABLET ORAL at 10:33

## 2018-01-19 RX ADMIN — Medication: at 13:34

## 2018-01-19 RX ADMIN — SENNOSIDES AND DOCUSATE SODIUM 2 TABLET: 8.6; 5 TABLET ORAL at 08:41

## 2018-01-19 RX ADMIN — OXYCODONE HYDROCHLORIDE 15 MG: 5 TABLET ORAL at 23:59

## 2018-01-19 RX ADMIN — FUROSEMIDE 20 MG: 20 TABLET ORAL at 08:40

## 2018-01-19 RX ADMIN — HEPARIN SODIUM 5000 UNITS: 5000 INJECTION, SOLUTION INTRAVENOUS; SUBCUTANEOUS at 20:42

## 2018-01-19 RX ADMIN — POLYETHYLENE GLYCOL 3350 17 G: 17 POWDER, FOR SOLUTION ORAL at 08:41

## 2018-01-19 RX ADMIN — OLANZAPINE 5 MG: 5 TABLET, ORALLY DISINTEGRATING ORAL at 21:53

## 2018-01-19 RX ADMIN — ACETAMINOPHEN 650 MG: 325 TABLET ORAL at 17:28

## 2018-01-19 RX ADMIN — ROSUVASTATIN CALCIUM 20 MG: 20 TABLET, FILM COATED ORAL at 20:43

## 2018-01-19 RX ADMIN — HYDROMORPHONE HYDROCHLORIDE 0.5 MG: 1 INJECTION, SOLUTION INTRAMUSCULAR; INTRAVENOUS; SUBCUTANEOUS at 20:34

## 2018-01-19 RX ADMIN — ISOSORBIDE MONONITRATE 60 MG: 60 TABLET, EXTENDED RELEASE ORAL at 08:41

## 2018-01-19 RX ADMIN — VANCOMYCIN HYDROCHLORIDE 1500 MG: 10 INJECTION, POWDER, LYOPHILIZED, FOR SOLUTION INTRAVENOUS at 03:13

## 2018-01-19 ASSESSMENT — ACTIVITIES OF DAILY LIVING (ADL)
ADLS_ACUITY_SCORE: 12
ADLS_ACUITY_SCORE: 11
ADLS_ACUITY_SCORE: 11
ADLS_ACUITY_SCORE: 13

## 2018-01-19 ASSESSMENT — PAIN DESCRIPTION - DESCRIPTORS
DESCRIPTORS: CONSTANT;DISCOMFORT
DESCRIPTORS: CONSTANT;DISCOMFORT

## 2018-01-19 NOTE — PROGRESS NOTES
Patient seen on rounds today. Doing quite well. Says pain is better controlled. Feels he can come off of narcotics IV.     Stable vitals. Palpable graft and posterior tibial pulse on the right leg.  Wound much less fibrinous material or divitalized tissue with ongoing half strength dakins. Plan on continuing with this regimen over the weekend in addition to ongoing IV antibiotics. If wound looks better on Sunday or Monday will switch to Wound VAC with plans to discharge to acute rehab on that day.

## 2018-01-19 NOTE — PROGRESS NOTES
"VASCULAR SURGERY PROGRESS NOTE  January 19, 2018      SUBJECTIVE: No acute events overnight, slept well. Still requiring doses of IV Dilaudid for breakthrough pain. No fevers or chills. Appetite good. Having at least one BM daily. No lightheadedness or orthostasis after cessation of Enalapril.     OBJECTIVE:  Vital signs:  /68 (BP Location: Right arm)  Pulse 76  Temp 97.7  F (36.5  C) (Oral)  Resp 14  Ht 1.727 m (5' 8\")  Wt 65.9 kg (145 lb 4.5 oz)  SpO2 98%  BMI 22.09 kg/m2    I/O last 3 completed shifts:  In: 1390 [P.O.:840; I.V.:550]  Out: 2250 [Urine:2250]    Vitals:    01/17/18 0448 01/18/18 0036 01/19/18 0633   Weight: 66.9 kg (147 lb 7.8 oz) 65.3 kg (144 lb) 65.9 kg (145 lb 4.5 oz)     PHYSICAL EXAM:  NEURO/PSYCH: Alert and oriented X 3,   Moves all extremities.    SKIN: warm and dry.  PULMONARY: non-labored breathing, not requiring supplemental oxygen.  EXTREMITIES: right leg incisions C/D/I, right leg wound moderate serous drainage, mildly malodorous with muscle exposed, wet to dry saline dressing placed on right leg wound with Kerlex and ace wrap, right leg more edematous today, graft pulse palpable.     BMP  Recent Labs  Lab 01/18/18  1450 01/18/18  0510 01/17/18  0722 01/16/18  0322 01/15/18  1638 01/15/18  1605  01/14/18  2356 01/13/18  1447 01/13/18  1009   NA  --  143 139 144  --  141  < >  --   --  139   POTASSIUM 4.2 3.2* 3.7 3.4  3.3*  --  4.1  < > 4.0  --  4.0   CHLORIDE  --  111* 106 111*  --   --   --   --   --  107   CO2  --  24 23 23  --   --   --   --   --  26   BUN  --  10 7 8  --   --   --   --   --  7   CR  --  0.62* 0.62* 0.50*  --   --   --  0.55*  --  0.56*   GLC  --  93 88 112*  --  190*  < >  --   --  123*   MAG  --  2.2  --  2.0 2.1  --   --   --  2.4*  --    PHOS  --  2.6  --  2.8  --   --   --   --   --   --    < > = values in this interval not displayed.  CBC    Recent Labs  Lab 01/18/18  0510 01/17/18  0722 01/16/18  0322 01/15/18  1605  01/14/18  2356   WBC 9.8 " 13.0* 11.7*  --   --  9.2   HGB 8.8* 9.8* 9.3* 10.7*  < > 11.2*    325 251  --   --  291   < > = values in this interval not displayed.  INR/PTT    Recent Labs  Lab 01/16/18  0322 01/15/18  0655   INR 1.18* 0.97     LFT    Recent Labs  Lab 01/13/18  1009 01/12/18  0712   AST 21 19   ALT 28 27   ALKPHOS 46 50   BILITOTAL 0.4 0.5   ALBUMIN 2.7* 3.2*     RAVI Doppler 1/18/18:  Right leg: Resting RAVI is 0.97. Borderline (0.91 to 0.99). Substantial improvement from RAVI dated 1/4/2018, at which point RAVI measures 0.29. Decreased toe brachial index of 0.16.     Left leg: Resting RAVI is 0.62. Abnormal, 0.41-0.90 (Increased cardiovascular risk along with mild to moderate PVD). Decreased toe brachial index of 0.2 with severely diminished PPG.    ======================================================================    ASSESSMENT:  67 year old male with PAD, bilateral lower extremity wounds and right lower extremity CLI. Status post POD #4 right femoral endarterectomy with in-situ GSV femoral to posterior tibial artery bypass on 1/15/18. Repeat RAVI assessment reveals drastic improvement in perfusion to RLE post-op (RAVI 0.29 --> 0.97).    PLAN:  -Dakins solution BID to RLE shin wound (Dr. Ag will address this on afternoon rounds)  -Continue Lasix BID, Coreg & Imdur  -Hold Enalapril given symptomatic hypotension even on low-dose; also holding Spironolactone    -Continue Plavix and ASA  -Appreciate WOC evaluation of lower extremity wounds; BID cleanse with Microklenz and pack with Vashe moistened 2x2s  -Right lower extremity wound with [id number] and Staph/Strep species; appreciate ID consultation; for now we would like to continue IV broad spectrum Abx given wound appears to have interval worsening  -Social service consult for discharge planning, anticipate patient will need TCU likely Monday; tentatively accepted to [facility]  -Appreciate pain team consultation, Scheduled Tyelnol, PRN PO Oxycodone and IV Dilaudid PRN  q6h for breakthrough only (need to be off this 24hrs prior to discharge). Bowel regimen scheduled to avoid opioid-induced constipation.   -Smoking cessation strongly encouraged, currently using nicotine patches    Patient discussed with Dr. Ag.     Jenny Dailey MD  Cardiology Fellow  Vascular Surgery Service

## 2018-01-19 NOTE — PLAN OF CARE
Problem: Patient Care Overview  Goal: Plan of Care/Patient Progress Review  Outcome: No Change  A/O x4. Afebrile, VSS, SR with frequent PVCs/PACs with HR in 70-80s. On room air. Tolerating regular diet. + bowel sounds in all quadrants, no BM overnight. Voiding adequately. Groin and leg incisions CDI. RLE dressing CDI. Dopplering pulses. Up with SBA. Continue with POC.

## 2018-01-19 NOTE — PROGRESS NOTES
Progress Note:    Hospital Day: 16    Data: Boom Kahn is a 66 yo male admitted to [facility] 01/03/2018.    Intervention: Met w/pt and pt's son Dallas Kahn (P: [phone]). Pt was sleeping but Dalals will relay the info.    Assessment: Pt lives in [place] but came to [place] for his medical care. Pt's son Dallas lives in [place] and is very involved with pt's care. Pt is planning to go to TCU in [place] then rent an apartment or move in with Dallas. Pt/son reviewed list of Medicare certified TCUs and requested referrals to the following facilities:     Referrals sent to:  1.  TCU (P: [phone]): Accepted pending bed availability and pt would need to be off IV pain meds.  2. [facility] (P: [phone], F: [phone]): left f/u VM.  3. [facility] (P: [phone], F: [phone]): left f/u VM.  4. [facility] (P: [phone], F: [phone]): No beds through the weekend, will f/u again Mon to send updated weekend notes.    Plan:  -Discharge plans in progress: Per MD note, possible dc early next week, maybe Monday 1/22.   -Barriers to discharge plan: Medical clearance- pt needs to be off IV pain meds prior to dc to TCU.  -Follow up plan: SW will continue to follow and assist as needed.    SID Castillo, [facility]  6B Intermediate Care Unit   Phone: [phone]  Pager: [phone]

## 2018-01-19 NOTE — PLAN OF CARE
Problem: Patient Care Overview  Goal: Plan of Care/Patient Progress Review  Discharge Planner OT   Patient plan for discharge: TCU   Current status: Pt min A for bed mobility and sit <> stands. Pt ambulated in room with CGA and FWW. Pt tolerated 15 min of g/h tasks standing at sink with CGA. Pt on RA, VSS BP 90s/70s and asymptomatic.  Barriers to return to prior living situation: weakness, balance deficits  Recommendations for discharge: TCU   Rationale for recommendations: Pt would benefit from continued rehab as pt lives in a multilevel home alone and below baseline in ADLs and mobility.        Entered by: Nisa Priest 01/19/2018 11:38 AM

## 2018-01-19 NOTE — PLAN OF CARE
Problem: Patient Care Overview  Goal: Plan of Care/Patient Progress Review  PT 6B    Discharge Planner PT   Patient plan for discharge: TCU  Current status: requires min assist for sit > stand from lower height surfaces.  Ambulated 225' with FWW, SBA and standing rest breaks.   Barriers to return to prior living situation: assistance needed for safe mobility  Recommendations for discharge: TCU  Rationale for recommendations: limited tolerance to activity and dependence with functional mobility.        Entered by: Oleg Wei 01/19/2018 5:17 PM

## 2018-01-19 NOTE — PLAN OF CARE
Problem: Patient Care Overview  Goal: Plan of Care/Patient Progress Review  Outcome: No Change  Patient is A&Ox4. Vitally stable on RA. Tolerating a regular diet. Pain managed with oxycodone, also gave IV dilaudid for breakthrough x1. Up with 1 assist to chair. Voiding per urinal. Loose/soft BM. Right lower leg dressing changed per orders. Will continue current plan of care and update MDs with any changes.

## 2018-01-20 LAB
VANCOMYCIN SERPL-MCNC: 14.2 MG/L
VANCOMYCIN SERPL-MCNC: 31.4 MG/L

## 2018-01-20 PROCEDURE — 12000001 ZZH R&B MED SURG/OB UMMC

## 2018-01-20 PROCEDURE — 25000125 ZZHC RX 250: Performed by: STUDENT IN AN ORGANIZED HEALTH CARE EDUCATION/TRAINING PROGRAM

## 2018-01-20 PROCEDURE — 25000132 ZZH RX MED GY IP 250 OP 250 PS 637: Performed by: INTERNAL MEDICINE

## 2018-01-20 PROCEDURE — 25000132 ZZH RX MED GY IP 250 OP 250 PS 637: Performed by: CLINICAL NURSE SPECIALIST

## 2018-01-20 PROCEDURE — 36592 COLLECT BLOOD FROM PICC: CPT | Performed by: SURGERY

## 2018-01-20 PROCEDURE — 25000132 ZZH RX MED GY IP 250 OP 250 PS 637: Performed by: NURSE PRACTITIONER

## 2018-01-20 PROCEDURE — 25000128 H RX IP 250 OP 636: Performed by: SURGERY

## 2018-01-20 PROCEDURE — 40000802 ZZH SITE CHECK

## 2018-01-20 PROCEDURE — 25000132 ZZH RX MED GY IP 250 OP 250 PS 637: Performed by: SURGERY

## 2018-01-20 PROCEDURE — 25000128 H RX IP 250 OP 636: Performed by: STUDENT IN AN ORGANIZED HEALTH CARE EDUCATION/TRAINING PROGRAM

## 2018-01-20 PROCEDURE — 40000141 ZZH STATISTIC PERIPHERAL IV START W/O US GUIDANCE

## 2018-01-20 PROCEDURE — 25000132 ZZH RX MED GY IP 250 OP 250 PS 637: Performed by: STUDENT IN AN ORGANIZED HEALTH CARE EDUCATION/TRAINING PROGRAM

## 2018-01-20 PROCEDURE — 25000128 H RX IP 250 OP 636

## 2018-01-20 PROCEDURE — 36415 COLL VENOUS BLD VENIPUNCTURE: CPT | Performed by: SURGERY

## 2018-01-20 PROCEDURE — 80202 ASSAY OF VANCOMYCIN: CPT | Performed by: SURGERY

## 2018-01-20 PROCEDURE — 25000132 ZZH RX MED GY IP 250 OP 250 PS 637: Performed by: PHYSICIAN ASSISTANT

## 2018-01-20 RX ADMIN — MULTIPLE VITAMINS W/ MINERALS TAB 1 TABLET: TAB at 08:32

## 2018-01-20 RX ADMIN — FOLIC ACID 1 MG: 1 TABLET ORAL at 08:32

## 2018-01-20 RX ADMIN — OXYCODONE HYDROCHLORIDE 15 MG: 5 TABLET ORAL at 12:24

## 2018-01-20 RX ADMIN — POLYETHYLENE GLYCOL 3350 17 G: 17 POWDER, FOR SOLUTION ORAL at 08:32

## 2018-01-20 RX ADMIN — Medication: at 12:22

## 2018-01-20 RX ADMIN — PIPERACILLIN SODIUM AND TAZOBACTAM SODIUM 3.38 G: 36; 4.5 INJECTION, POWDER, LYOPHILIZED, FOR SOLUTION INTRAVENOUS at 03:00

## 2018-01-20 RX ADMIN — PIPERACILLIN SODIUM AND TAZOBACTAM SODIUM 3.38 G: 36; 4.5 INJECTION, POWDER, LYOPHILIZED, FOR SOLUTION INTRAVENOUS at 16:59

## 2018-01-20 RX ADMIN — ROSUVASTATIN CALCIUM 20 MG: 20 TABLET, FILM COATED ORAL at 19:42

## 2018-01-20 RX ADMIN — ACETAMINOPHEN 650 MG: 325 TABLET ORAL at 17:06

## 2018-01-20 RX ADMIN — ISOSORBIDE MONONITRATE 60 MG: 60 TABLET, EXTENDED RELEASE ORAL at 08:32

## 2018-01-20 RX ADMIN — EZETIMIBE 10 MG: 10 TABLET ORAL at 19:42

## 2018-01-20 RX ADMIN — OXYCODONE HYDROCHLORIDE 15 MG: 5 TABLET ORAL at 17:06

## 2018-01-20 RX ADMIN — OLANZAPINE 5 MG: 5 TABLET, ORALLY DISINTEGRATING ORAL at 21:38

## 2018-01-20 RX ADMIN — PIPERACILLIN SODIUM AND TAZOBACTAM SODIUM 3.38 G: 36; 4.5 INJECTION, POWDER, LYOPHILIZED, FOR SOLUTION INTRAVENOUS at 21:11

## 2018-01-20 RX ADMIN — FUROSEMIDE 20 MG: 20 TABLET ORAL at 16:51

## 2018-01-20 RX ADMIN — Medication: at 19:42

## 2018-01-20 RX ADMIN — HEPARIN SODIUM 5000 UNITS: 5000 INJECTION, SOLUTION INTRAVENOUS; SUBCUTANEOUS at 12:25

## 2018-01-20 RX ADMIN — CARVEDILOL 6.25 MG: 3.12 TABLET, FILM COATED ORAL at 17:06

## 2018-01-20 RX ADMIN — FUROSEMIDE 20 MG: 20 TABLET ORAL at 08:33

## 2018-01-20 RX ADMIN — ASPIRIN 81 MG: 81 TABLET, COATED ORAL at 08:32

## 2018-01-20 RX ADMIN — SENNOSIDES AND DOCUSATE SODIUM 2 TABLET: 8.6; 5 TABLET ORAL at 08:32

## 2018-01-20 RX ADMIN — PREDNISONE 10 MG: 10 TABLET ORAL at 08:32

## 2018-01-20 RX ADMIN — HEPARIN SODIUM 5000 UNITS: 5000 INJECTION, SOLUTION INTRAVENOUS; SUBCUTANEOUS at 03:00

## 2018-01-20 RX ADMIN — GABAPENTIN 300 MG: 300 CAPSULE ORAL at 15:00

## 2018-01-20 RX ADMIN — OXYCODONE HYDROCHLORIDE 15 MG: 5 TABLET ORAL at 21:11

## 2018-01-20 RX ADMIN — HEPARIN SODIUM 5000 UNITS: 5000 INJECTION, SOLUTION INTRAVENOUS; SUBCUTANEOUS at 19:42

## 2018-01-20 RX ADMIN — PIPERACILLIN SODIUM AND TAZOBACTAM SODIUM 3.38 G: 36; 4.5 INJECTION, POWDER, LYOPHILIZED, FOR SOLUTION INTRAVENOUS at 09:48

## 2018-01-20 RX ADMIN — ESCITALOPRAM OXALATE 20 MG: 20 TABLET ORAL at 08:32

## 2018-01-20 RX ADMIN — GABAPENTIN 600 MG: 600 TABLET, FILM COATED ORAL at 20:55

## 2018-01-20 RX ADMIN — SENNOSIDES AND DOCUSATE SODIUM 2 TABLET: 8.6; 5 TABLET ORAL at 19:42

## 2018-01-20 RX ADMIN — NICOTINE 1 PATCH: 7 PATCH, EXTENDED RELEASE TRANSDERMAL at 19:42

## 2018-01-20 RX ADMIN — ACETAMINOPHEN 650 MG: 325 TABLET ORAL at 09:47

## 2018-01-20 RX ADMIN — VANCOMYCIN HYDROCHLORIDE 1500 MG: 10 INJECTION, POWDER, LYOPHILIZED, FOR SOLUTION INTRAVENOUS at 03:00

## 2018-01-20 RX ADMIN — HYOSCYAMINE SULFATE: 16 SOLUTION at 12:22

## 2018-01-20 RX ADMIN — OXYCODONE HYDROCHLORIDE 15 MG: 5 TABLET ORAL at 08:41

## 2018-01-20 RX ADMIN — HYOSCYAMINE SULFATE: 16 SOLUTION at 19:42

## 2018-01-20 RX ADMIN — VANCOMYCIN HYDROCHLORIDE 1500 MG: 10 INJECTION, POWDER, LYOPHILIZED, FOR SOLUTION INTRAVENOUS at 16:58

## 2018-01-20 RX ADMIN — CARVEDILOL 6.25 MG: 3.12 TABLET, FILM COATED ORAL at 08:32

## 2018-01-20 RX ADMIN — GABAPENTIN 300 MG: 300 CAPSULE ORAL at 08:32

## 2018-01-20 RX ADMIN — CLOPIDOGREL 75 MG: 75 TABLET, FILM COATED ORAL at 08:32

## 2018-01-20 RX ADMIN — OXYCODONE HYDROCHLORIDE 10 MG: 5 TABLET ORAL at 04:24

## 2018-01-20 RX ADMIN — ACETAMINOPHEN 650 MG: 325 TABLET ORAL at 02:57

## 2018-01-20 ASSESSMENT — ACTIVITIES OF DAILY LIVING (ADL)
ADLS_ACUITY_SCORE: 13

## 2018-01-20 ASSESSMENT — PAIN DESCRIPTION - DESCRIPTORS
DESCRIPTORS: DISCOMFORT
DESCRIPTORS: CONSTANT;DISCOMFORT
DESCRIPTORS: SHARP;DISCOMFORT

## 2018-01-20 NOTE — PROGRESS NOTES
No acute events, overall pain slightly better controlled, continued edema in right leg    B/P: 140/75, T: 98.7, P: 76, R: 18  Alert oriented no acute distress  Palpable RIght PT and along bypass  Moderate right foot edema, erythema around open shin wound slightly improved but persists  Mild serous drainage, wet to dry dakins dressing changed on right shin, re wrapped with kerlix and ACE  Remaining leg incisions c/d/i with skin glue.    WBC   Date Value Ref Range Status   01/18/2018 9.8 4.0 - 11.0 10e9/L Final   ]  Hemoglobin   Date Value Ref Range Status   01/18/2018 8.8 (L) 13.3 - 17.7 g/dL Final   ]  INR/Prothrombin Time  Creatinine   Date Value Ref Range Status   01/18/2018 0.62 (L) 0.66 - 1.25 mg/dL Final   ]      Intake/Output Summary (Last 24 hours) at 01/20/18 1052  Last data filed at 01/20/18 0900   Gross per 24 hour   Intake              530 ml   Output             1340 ml   Net             -810 ml       Assessment/Plan:  66 yo male with severe PAD, bilateral lower extremity wounds and critical limb ischemia, POD#5 s/p right femoral endarterectomy with in-situ GSV femoral to posterior tibial artery bypass    Local wound care including BID Dakins wet to dry on right shin, leg needs to remain elevated and in ACE to reduce edema; Left anterior shin with xeroform and kerlix daily.  Lamb's wool between right toes, no betadine due to allergy.  Continue Aspirin and statin, SQ heparin for DVT prophylaxis  PT, up with assistance, increase activity  When out of bed needs to be in recliner with leg elevated  Plan for wound vac placement on right leg tomorrow  Once peripheral IV placed ok to D/c RIJ central line  Pain management - po medications, IV prn discontinued  Continue IV antibiotics for now, will decide if ok to d/c vs change to po prior to d/c  Dispo to rehab within the next few days.    Leandra Marques MD  Vascular Surgery Fellow  Pager [phone]

## 2018-01-20 NOTE — PLAN OF CARE
Problem: Patient Care Overview  Goal: Plan of Care/Patient Progress Review  Outcome: Improving  Neuro: A&O x 4; scoring 0 consistently on MSSA scale.   Cardiac: SR with HR in 60s-80s. ST depression and T wave inversion.   Respiratory: Sating > 94 on RA. No complaints of SOB or cough.   GI/: Voiding adequately with urinal. No BM since 1/18. +BS and passing gas.   Diet/Appetite: Regular diet, good appetite.   Activity: SBA up to bathroom and in horta ways.   Pain: Complains of pain in lower extremities around wounds. Well controlled on oral oxycodone PRN 10-15mg q4hour.   Skin: RLE dressing changed per plan of care.  Non-blanchable redness on coccyx covered with mepilex. No other changes noted. See flowsheets.  LDAs: R triple lumen CVC, all lumens saline locked. Lower extremities pulses weak but palpable, using doppler for dorsalis pedis pulses.     Continue with POC. Notify primary team with changes.   Kat Cornelius RN

## 2018-01-20 NOTE — PLAN OF CARE
Problem: Pain, Chronic (Adult)  Goal: Identify Related Risk Factors and Signs and Symptoms  Related risk factors and signs and symptoms are identified upon initiation of [employer] Clinical Practice Guideline (CPG).   Outcome: No Change  VSS and continues to complain pain to LLE wound. Refer to emar for prn analgesics given. Is up with assist of 1 with walker. Will continue to encourage activity as tolerated. Tolerating oral diet with no problems. Refer to flowsheets for documentation.

## 2018-01-20 NOTE — PLAN OF CARE
"Problem: Patient Care Overview  Goal: Plan of Care/Patient Progress Review  Outcome: No Change  /54 (BP Location: Right arm)  Pulse 76  Temp 97.9  F (36.6  C) (Oral)  Resp 18  Ht 1.727 m (5' 8\")  Wt 65.5 kg (144 lb 8 oz)  SpO2 100%  BMI 21.97 kg/m2     A&Ox4.  Lungs clear throughout and on room air.  Denies SOB with activity.  Walked X1.  Chair X2.  Voiding adequate amounts of urine.  Standby assist of 1.  On regular diet.  MD changed dressing orders, see new wound care orders.  Dressings changed X1 per MD.  IJ removed.  PIV placed.  Oxycodone given for pain.  Continue to monitor.        "

## 2018-01-20 NOTE — PHARMACY-VANCOMYCIN DOSING SERVICE
Pharmacy Vancomycin Note  Date of Service 2018  Patient's  1950   67 year old, male    Indication: Skin and Soft Tissue Infection  Goal Trough Level: 10-15 mg/L  Day of Therapy: 6  Current Vancomycin regimen: 1500 mg IV q12h    Current estimated CrCl = Estimated Creatinine Clearance: 108.6 mL/min (based on Cr of 0.62).    Creatinine for last 3 days  2018:  5:10 AM Creatinine 0.62 mg/dL    Recent Vancomycin Levels (past 3 days)  2018:  4:00 PM Vancomycin Level 33.0 mg/L  2018:  4:56 AM Vancomycin Level 31.4 mg/L;  2:59 PM Vancomycin Level 14.2 mg/L    Vancomycin IV Administrations (past 72 hours)                   vancomycin (VANCOCIN) 1,500 mg in NaCl 0.9 % 250 mL intermittent infusion (mg) 1,500 mg New Bag 18 0300     1,500 mg New Bag 18 1530     1,500 mg New Bag  0313     1,500 mg New Bag 18 1622     1,500 mg New Bag  0451                Nephrotoxins and other renal medications (Future)    Start     Dose/Rate Route Frequency Ordered Stop    18 0915  furosemide (LASIX) tablet 20 mg      20 mg Oral 2 TIMES DAILY 18 0907      18 0400  vancomycin (VANCOCIN) 1,500 mg in NaCl 0.9 % 250 mL intermittent infusion      1,500 mg  over 90 Minutes Intravenous EVERY 12 HOURS 18 1518      18 0330  piperacillin-tazobactam (ZOSYN) 3.375g in 15 mL NS Premix Syringe      3.375 g  over 3 Minutes Intravenous EVERY 6 HOURS 01/15/18 2304               Contrast Orders - past 72 hours     None          Interpretation of levels and current regimen:  Trough level is Therapeutic    Has serum creatinine changed > 50% in last 72 hours: No    Urine output:  unable to determine    Renal Function: Stable    Plan:  1. The vanco dose was hung before the level was drawn on  and therefore is an invalid trough.  The vanco trough obtained earlier this morning (05:00) was obtained right after the vanco dose was infused and thus is an invalid trough.  The vanco trough  obtained at 1500 represents a 12 hr trough.  Based on the 12 hour trough, would continue the current dose.  2.  Pharmacy will check trough levels as appropriate in 1-3 Days.    3. Serum creatinine levels will be ordered daily for the first week of therapy and at least twice weekly for subsequent weeks.      Brenda Cabezas, PharmD, Pharmacy Resident        .

## 2018-01-20 NOTE — PROVIDER NOTIFICATION
-------------------CRITICAL LAB VALUE-------------------    Lab Value: Vanco 31.4  Time of notification: 6:50 AM  MD notified: Shanice (Surg cross cover)   Patient status:   Temp:  [97.6  F (36.4  C)-99.1  F (37.3  C)] 98.1  F (36.7  C)  Heart Rate:  [73-82] 80  Resp:  [14-18] 18  BP: ()/(54-75) 126/75  SpO2:  [97 %-99 %] 97 %  Orders received: None

## 2018-01-21 LAB
ANION GAP SERPL CALCULATED.3IONS-SCNC: 8 MMOL/L (ref 3–14)
BUN SERPL-MCNC: 7 MG/DL (ref 7–30)
CALCIUM SERPL-MCNC: 8.6 MG/DL (ref 8.5–10.1)
CHLORIDE SERPL-SCNC: 113 MMOL/L (ref 94–109)
CO2 SERPL-SCNC: 22 MMOL/L (ref 20–32)
CREAT SERPL-MCNC: 0.6 MG/DL (ref 0.66–1.25)
ERYTHROCYTE [DISTWIDTH] IN BLOOD BY AUTOMATED COUNT: 13.7 % (ref 10–15)
GFR SERPL CREATININE-BSD FRML MDRD: >90 ML/MIN/1.7M2
GLUCOSE SERPL-MCNC: 96 MG/DL (ref 70–99)
HCT VFR BLD AUTO: 30.2 % (ref 40–53)
HGB BLD-MCNC: 9.6 G/DL (ref 13.3–17.7)
MCH RBC QN AUTO: 32.2 PG (ref 26.5–33)
MCHC RBC AUTO-ENTMCNC: 31.8 G/DL (ref 31.5–36.5)
MCV RBC AUTO: 101 FL (ref 78–100)
PLATELET # BLD AUTO: 422 10E9/L (ref 150–450)
POTASSIUM SERPL-SCNC: 3.5 MMOL/L (ref 3.4–5.3)
RBC # BLD AUTO: 2.98 10E12/L (ref 4.4–5.9)
SODIUM SERPL-SCNC: 143 MMOL/L (ref 133–144)
WBC # BLD AUTO: 10.1 10E9/L (ref 4–11)

## 2018-01-21 PROCEDURE — 25000128 H RX IP 250 OP 636: Performed by: SURGERY

## 2018-01-21 PROCEDURE — 25000125 ZZHC RX 250: Performed by: STUDENT IN AN ORGANIZED HEALTH CARE EDUCATION/TRAINING PROGRAM

## 2018-01-21 PROCEDURE — 25000132 ZZH RX MED GY IP 250 OP 250 PS 637: Performed by: INTERNAL MEDICINE

## 2018-01-21 PROCEDURE — 12000001 ZZH R&B MED SURG/OB UMMC

## 2018-01-21 PROCEDURE — 25000132 ZZH RX MED GY IP 250 OP 250 PS 637: Performed by: SURGERY

## 2018-01-21 PROCEDURE — 40000141 ZZH STATISTIC PERIPHERAL IV START W/O US GUIDANCE

## 2018-01-21 PROCEDURE — 25000128 H RX IP 250 OP 636

## 2018-01-21 PROCEDURE — 25000128 H RX IP 250 OP 636: Performed by: STUDENT IN AN ORGANIZED HEALTH CARE EDUCATION/TRAINING PROGRAM

## 2018-01-21 PROCEDURE — 40000802 ZZH SITE CHECK

## 2018-01-21 PROCEDURE — 25000132 ZZH RX MED GY IP 250 OP 250 PS 637: Performed by: NURSE PRACTITIONER

## 2018-01-21 PROCEDURE — E2402 NEG PRESS WOUND THERAPY PUMP: HCPCS

## 2018-01-21 PROCEDURE — 85027 COMPLETE CBC AUTOMATED: CPT | Performed by: SURGERY

## 2018-01-21 PROCEDURE — 36415 COLL VENOUS BLD VENIPUNCTURE: CPT | Performed by: SURGERY

## 2018-01-21 PROCEDURE — 25000132 ZZH RX MED GY IP 250 OP 250 PS 637: Performed by: PHYSICIAN ASSISTANT

## 2018-01-21 PROCEDURE — 80048 BASIC METABOLIC PNL TOTAL CA: CPT | Performed by: SURGERY

## 2018-01-21 PROCEDURE — 25000132 ZZH RX MED GY IP 250 OP 250 PS 637: Performed by: STUDENT IN AN ORGANIZED HEALTH CARE EDUCATION/TRAINING PROGRAM

## 2018-01-21 PROCEDURE — 25000132 ZZH RX MED GY IP 250 OP 250 PS 637: Performed by: CLINICAL NURSE SPECIALIST

## 2018-01-21 RX ORDER — ACETAMINOPHEN 325 MG/1
975 TABLET ORAL EVERY 8 HOURS PRN
Status: DISCONTINUED | OUTPATIENT
Start: 2018-01-21 | End: 2018-01-23 | Stop reason: HOSPADM

## 2018-01-21 RX ORDER — HYDROMORPHONE HCL/0.9% NACL/PF 0.2MG/0.2
0.2 SYRINGE (ML) INTRAVENOUS ONCE
Status: COMPLETED | OUTPATIENT
Start: 2018-01-21 | End: 2018-01-21

## 2018-01-21 RX ADMIN — OXYCODONE HYDROCHLORIDE 15 MG: 5 TABLET ORAL at 11:58

## 2018-01-21 RX ADMIN — HEPARIN SODIUM 5000 UNITS: 5000 INJECTION, SOLUTION INTRAVENOUS; SUBCUTANEOUS at 03:10

## 2018-01-21 RX ADMIN — CARVEDILOL 6.25 MG: 3.12 TABLET, FILM COATED ORAL at 17:46

## 2018-01-21 RX ADMIN — PREDNISONE 10 MG: 10 TABLET ORAL at 08:17

## 2018-01-21 RX ADMIN — ASPIRIN 81 MG: 81 TABLET, COATED ORAL at 08:16

## 2018-01-21 RX ADMIN — Medication: at 08:26

## 2018-01-21 RX ADMIN — ACETAMINOPHEN 650 MG: 325 TABLET ORAL at 10:25

## 2018-01-21 RX ADMIN — OXYCODONE HYDROCHLORIDE 15 MG: 5 TABLET ORAL at 16:09

## 2018-01-21 RX ADMIN — Medication: at 19:34

## 2018-01-21 RX ADMIN — OLANZAPINE 5 MG: 5 TABLET, ORALLY DISINTEGRATING ORAL at 21:18

## 2018-01-21 RX ADMIN — VANCOMYCIN HYDROCHLORIDE 1500 MG: 10 INJECTION, POWDER, LYOPHILIZED, FOR SOLUTION INTRAVENOUS at 16:08

## 2018-01-21 RX ADMIN — PIPERACILLIN SODIUM AND TAZOBACTAM SODIUM 3.38 G: 36; 4.5 INJECTION, POWDER, LYOPHILIZED, FOR SOLUTION INTRAVENOUS at 21:18

## 2018-01-21 RX ADMIN — MULTIPLE VITAMINS W/ MINERALS TAB 1 TABLET: TAB at 08:17

## 2018-01-21 RX ADMIN — OXYCODONE HYDROCHLORIDE 15 MG: 5 TABLET ORAL at 03:19

## 2018-01-21 RX ADMIN — PIPERACILLIN SODIUM AND TAZOBACTAM SODIUM 3.38 G: 36; 4.5 INJECTION, POWDER, LYOPHILIZED, FOR SOLUTION INTRAVENOUS at 10:24

## 2018-01-21 RX ADMIN — HEPARIN SODIUM 5000 UNITS: 5000 INJECTION, SOLUTION INTRAVENOUS; SUBCUTANEOUS at 12:01

## 2018-01-21 RX ADMIN — Medication: at 16:10

## 2018-01-21 RX ADMIN — FUROSEMIDE 20 MG: 20 TABLET ORAL at 16:09

## 2018-01-21 RX ADMIN — PIPERACILLIN SODIUM AND TAZOBACTAM SODIUM 3.38 G: 36; 4.5 INJECTION, POWDER, LYOPHILIZED, FOR SOLUTION INTRAVENOUS at 16:08

## 2018-01-21 RX ADMIN — GABAPENTIN 300 MG: 300 CAPSULE ORAL at 16:09

## 2018-01-21 RX ADMIN — SENNOSIDES AND DOCUSATE SODIUM 2 TABLET: 8.6; 5 TABLET ORAL at 19:20

## 2018-01-21 RX ADMIN — Medication 0.2 MG: at 11:19

## 2018-01-21 RX ADMIN — HYOSCYAMINE SULFATE: 16 SOLUTION at 19:34

## 2018-01-21 RX ADMIN — NICOTINE 1 PATCH: 7 PATCH, EXTENDED RELEASE TRANSDERMAL at 19:21

## 2018-01-21 RX ADMIN — VANCOMYCIN HYDROCHLORIDE 1500 MG: 10 INJECTION, POWDER, LYOPHILIZED, FOR SOLUTION INTRAVENOUS at 03:10

## 2018-01-21 RX ADMIN — ISOSORBIDE MONONITRATE 60 MG: 60 TABLET, EXTENDED RELEASE ORAL at 08:14

## 2018-01-21 RX ADMIN — FOLIC ACID 1 MG: 1 TABLET ORAL at 08:16

## 2018-01-21 RX ADMIN — EZETIMIBE 10 MG: 10 TABLET ORAL at 19:21

## 2018-01-21 RX ADMIN — OXYCODONE HYDROCHLORIDE 15 MG: 5 TABLET ORAL at 20:11

## 2018-01-21 RX ADMIN — ROSUVASTATIN CALCIUM 20 MG: 20 TABLET, FILM COATED ORAL at 19:20

## 2018-01-21 RX ADMIN — FUROSEMIDE 20 MG: 20 TABLET ORAL at 08:17

## 2018-01-21 RX ADMIN — CARVEDILOL 6.25 MG: 3.12 TABLET, FILM COATED ORAL at 08:16

## 2018-01-21 RX ADMIN — ACETAMINOPHEN 650 MG: 325 TABLET ORAL at 03:10

## 2018-01-21 RX ADMIN — HEPARIN SODIUM 5000 UNITS: 5000 INJECTION, SOLUTION INTRAVENOUS; SUBCUTANEOUS at 19:20

## 2018-01-21 RX ADMIN — OXYCODONE HYDROCHLORIDE 15 MG: 5 TABLET ORAL at 08:14

## 2018-01-21 RX ADMIN — ESCITALOPRAM OXALATE 20 MG: 20 TABLET ORAL at 08:17

## 2018-01-21 RX ADMIN — CLOPIDOGREL 75 MG: 75 TABLET, FILM COATED ORAL at 08:16

## 2018-01-21 RX ADMIN — GABAPENTIN 300 MG: 300 CAPSULE ORAL at 08:16

## 2018-01-21 RX ADMIN — PIPERACILLIN SODIUM AND TAZOBACTAM SODIUM 3.38 G: 36; 4.5 INJECTION, POWDER, LYOPHILIZED, FOR SOLUTION INTRAVENOUS at 03:10

## 2018-01-21 ASSESSMENT — PAIN DESCRIPTION - DESCRIPTORS
DESCRIPTORS: DISCOMFORT
DESCRIPTORS: DISCOMFORT

## 2018-01-21 ASSESSMENT — ACTIVITIES OF DAILY LIVING (ADL)
ADLS_ACUITY_SCORE: 13

## 2018-01-21 NOTE — CONSULTS
See Isaura Lainez's note from 1/19/18. SW addressed need for rehab placement.    Mayte PIMENTEL LSW  Weekend SW  Pager: [phone]  On Call Pager: [phone] 4pm - Midnight

## 2018-01-21 NOTE — PROGRESS NOTES
No acute events, RLE edema slightly improved with elevation  2 BM    B/P: 138/79, T: 98.9, P: 76, R: 18  Alert oriented no acute distress  Palpable pulse on Right bypass and PT  Wound vac placed to right open shin wound, 125 mmHg continuous suction  Erythema and edema slightly improved    WBC   Date Value Ref Range Status   01/21/2018 10.1 4.0 - 11.0 10e9/L Final   ]  Hemoglobin   Date Value Ref Range Status   01/21/2018 9.6 (L) 13.3 - 17.7 g/dL Final   ]  INR/Prothrombin Time  Creatinine   Date Value Ref Range Status   01/21/2018 0.60 (L) 0.66 - 1.25 mg/dL Final   ]      Intake/Output Summary (Last 24 hours) at 01/21/18 0754  Last data filed at 01/20/18 2300   Gross per 24 hour   Intake              960 ml   Output              975 ml   Net              -15 ml       Assessment/plan:  68 yo male with severe PAD, bilateral lower extremity wounds and critical limb ischemia, POD#6 s/p right femoral endarterectomy with in-situ GSV femoral to posterior tibial artery bypass     Left anterior shin with adaptic and mepilex. Lamb's wool between right toes, no betadine due to allergy.  Wound vac change 3 x per week  Continue Aspirin and statin, SQ heparin for DVT prophylaxis  PT, up with assistance, increase activity  When out of bed needs to be in recliner with leg elevated  Pain management - po medications  Continue IV antibiotics for now, will decide if ok to d/c vs change to po prior to d/c  Dispo to rehab within the next few days.    Leandra Marques MD  Vascular Surgery Fellow  Pager [phone]

## 2018-01-21 NOTE — PLAN OF CARE
Problem: Patient Care Overview  Goal: Plan of Care/Patient Progress Review  Outcome: No Change  [id number]:    Neuro: A&O x 4; CIWA 0  Cardiac: SR with HR in 60s-80s. ST depression and T wave inversion.   Respiratory: O2 97% RA, LS clear  GI/: Voiding adequately with urinal. BS+, flatus +.   Diet/Appetite: Regular diet, good appetite.   Activity: SBA up to bathroom and in horta ways.   Pain: Complains of pain in lower extremities around wounds, oxy x 1 with some relief  Skin: RLE dressing in place, APOLONIA. Pulses bilat weak, palpable

## 2018-01-21 NOTE — PLAN OF CARE
"Problem: Patient Care Overview  Goal: Plan of Care/Patient Progress Review  Outcome: Improving  Neuro: A&O x 4   Cardiac: SR with depressed ST segment and inverted T waves. VSS. Afebrile.   Respiratory: Sating > 94 on RA. No complaints of cough or SOB.   GI/: Voiding adequately into urinal. Last BM 1/20, per pt. +BS and passing gas.   Diet/Appetite: Regular diet, good appetite.   Activity: SBA/AST of 1 up to chair and to bathroom.   Pain: Complains of pain at wounds to lower extremities and with dressing changes. Well controlled with PO Oxycodone 10-15mg q4hour.   Skin: Completed dressing changes as ordered under \"nursing orders\" with wet-to-dry and ace wraps on R shin wound. Patient refuses to have left shin wound dressed. Was unable to find any documentation stating nursing should not be completing dressing changes as ordered.   LDAs: L PIV, saline locked. Some pain, redness, and swelling with vancomycin infusion. Vascular access reassessed and deemed the PIV is still patent.     Plan to place wound vac on right shin wound Monday 01/22.     Continue with POC. Notify primary team with changes.   Kat Cornelius RN              "

## 2018-01-22 ENCOUNTER — APPOINTMENT (OUTPATIENT)
Dept: PHYSICAL THERAPY | Facility: CLINIC | Age: 68
DRG: 247 | End: 2018-01-22
Attending: CLINICAL NURSE SPECIALIST
Payer: COMMERCIAL

## 2018-01-22 ENCOUNTER — APPOINTMENT (OUTPATIENT)
Dept: OCCUPATIONAL THERAPY | Facility: CLINIC | Age: 68
DRG: 247 | End: 2018-01-22
Attending: CLINICAL NURSE SPECIALIST
Payer: COMMERCIAL

## 2018-01-22 LAB
BACTERIA SPEC CULT: ABNORMAL
Lab: ABNORMAL
SPECIMEN SOURCE: ABNORMAL

## 2018-01-22 PROCEDURE — 25000132 ZZH RX MED GY IP 250 OP 250 PS 637: Performed by: SURGERY

## 2018-01-22 PROCEDURE — 25000125 ZZHC RX 250: Performed by: STUDENT IN AN ORGANIZED HEALTH CARE EDUCATION/TRAINING PROGRAM

## 2018-01-22 PROCEDURE — 25000132 ZZH RX MED GY IP 250 OP 250 PS 637: Performed by: NURSE PRACTITIONER

## 2018-01-22 PROCEDURE — 97116 GAIT TRAINING THERAPY: CPT | Mod: GP | Performed by: PHYSICAL THERAPIST

## 2018-01-22 PROCEDURE — 25000132 ZZH RX MED GY IP 250 OP 250 PS 637: Performed by: INTERNAL MEDICINE

## 2018-01-22 PROCEDURE — 40000133 ZZH STATISTIC OT WARD VISIT: Performed by: OCCUPATIONAL THERAPIST

## 2018-01-22 PROCEDURE — E2402 NEG PRESS WOUND THERAPY PUMP: HCPCS

## 2018-01-22 PROCEDURE — 12000001 ZZH R&B MED SURG/OB UMMC

## 2018-01-22 PROCEDURE — 97110 THERAPEUTIC EXERCISES: CPT | Mod: GP | Performed by: PHYSICAL THERAPIST

## 2018-01-22 PROCEDURE — 40000193 ZZH STATISTIC PT WARD VISIT: Performed by: PHYSICAL THERAPIST

## 2018-01-22 PROCEDURE — 25000132 ZZH RX MED GY IP 250 OP 250 PS 637: Performed by: CLINICAL NURSE SPECIALIST

## 2018-01-22 PROCEDURE — 25000128 H RX IP 250 OP 636: Performed by: SURGERY

## 2018-01-22 PROCEDURE — 25000128 H RX IP 250 OP 636

## 2018-01-22 PROCEDURE — 25000128 H RX IP 250 OP 636: Performed by: STUDENT IN AN ORGANIZED HEALTH CARE EDUCATION/TRAINING PROGRAM

## 2018-01-22 PROCEDURE — 25000132 ZZH RX MED GY IP 250 OP 250 PS 637: Performed by: PHYSICIAN ASSISTANT

## 2018-01-22 PROCEDURE — 97535 SELF CARE MNGMENT TRAINING: CPT | Mod: GO | Performed by: OCCUPATIONAL THERAPIST

## 2018-01-22 PROCEDURE — 25000132 ZZH RX MED GY IP 250 OP 250 PS 637: Performed by: STUDENT IN AN ORGANIZED HEALTH CARE EDUCATION/TRAINING PROGRAM

## 2018-01-22 RX ORDER — CIPROFLOXACIN 500 MG/1
500 TABLET, FILM COATED ORAL EVERY 12 HOURS SCHEDULED
Status: DISCONTINUED | OUTPATIENT
Start: 2018-01-22 | End: 2018-01-23 | Stop reason: HOSPADM

## 2018-01-22 RX ORDER — CLINDAMYCIN HCL 300 MG
300 CAPSULE ORAL EVERY 6 HOURS SCHEDULED
Status: DISCONTINUED | OUTPATIENT
Start: 2018-01-22 | End: 2018-01-23 | Stop reason: HOSPADM

## 2018-01-22 RX ADMIN — GABAPENTIN 300 MG: 300 CAPSULE ORAL at 08:40

## 2018-01-22 RX ADMIN — PIPERACILLIN SODIUM AND TAZOBACTAM SODIUM 3.38 G: 36; 4.5 INJECTION, POWDER, LYOPHILIZED, FOR SOLUTION INTRAVENOUS at 03:06

## 2018-01-22 RX ADMIN — MULTIPLE VITAMINS W/ MINERALS TAB 1 TABLET: TAB at 08:40

## 2018-01-22 RX ADMIN — SENNOSIDES AND DOCUSATE SODIUM 2 TABLET: 8.6; 5 TABLET ORAL at 08:40

## 2018-01-22 RX ADMIN — HEPARIN SODIUM 5000 UNITS: 5000 INJECTION, SOLUTION INTRAVENOUS; SUBCUTANEOUS at 03:06

## 2018-01-22 RX ADMIN — OXYCODONE HYDROCHLORIDE 15 MG: 5 TABLET ORAL at 00:29

## 2018-01-22 RX ADMIN — CLINDAMYCIN HYDROCHLORIDE 300 MG: 300 CAPSULE ORAL at 23:55

## 2018-01-22 RX ADMIN — CARVEDILOL 6.25 MG: 3.12 TABLET, FILM COATED ORAL at 17:59

## 2018-01-22 RX ADMIN — PIPERACILLIN SODIUM AND TAZOBACTAM SODIUM 3.38 G: 36; 4.5 INJECTION, POWDER, LYOPHILIZED, FOR SOLUTION INTRAVENOUS at 10:34

## 2018-01-22 RX ADMIN — VANCOMYCIN HYDROCHLORIDE 1500 MG: 10 INJECTION, POWDER, LYOPHILIZED, FOR SOLUTION INTRAVENOUS at 16:22

## 2018-01-22 RX ADMIN — FUROSEMIDE 20 MG: 20 TABLET ORAL at 08:41

## 2018-01-22 RX ADMIN — PREDNISONE 10 MG: 10 TABLET ORAL at 08:41

## 2018-01-22 RX ADMIN — FENTANYL 1 PATCH: 25 PATCH, EXTENDED RELEASE TRANSDERMAL at 10:34

## 2018-01-22 RX ADMIN — OXYCODONE HYDROCHLORIDE 15 MG: 5 TABLET ORAL at 05:24

## 2018-01-22 RX ADMIN — GABAPENTIN 300 MG: 300 CAPSULE ORAL at 13:55

## 2018-01-22 RX ADMIN — PIPERACILLIN SODIUM AND TAZOBACTAM SODIUM 3.38 G: 36; 4.5 INJECTION, POWDER, LYOPHILIZED, FOR SOLUTION INTRAVENOUS at 16:22

## 2018-01-22 RX ADMIN — GABAPENTIN 600 MG: 600 TABLET, FILM COATED ORAL at 22:10

## 2018-01-22 RX ADMIN — FUROSEMIDE 20 MG: 20 TABLET ORAL at 16:22

## 2018-01-22 RX ADMIN — ROSUVASTATIN CALCIUM 20 MG: 20 TABLET, FILM COATED ORAL at 21:11

## 2018-01-22 RX ADMIN — EZETIMIBE 10 MG: 10 TABLET ORAL at 21:07

## 2018-01-22 RX ADMIN — OXYCODONE HYDROCHLORIDE 15 MG: 5 TABLET ORAL at 13:55

## 2018-01-22 RX ADMIN — SENNOSIDES AND DOCUSATE SODIUM 1 TABLET: 8.6; 5 TABLET ORAL at 21:11

## 2018-01-22 RX ADMIN — GABAPENTIN 600 MG: 600 TABLET, FILM COATED ORAL at 00:28

## 2018-01-22 RX ADMIN — Medication: at 13:56

## 2018-01-22 RX ADMIN — OXYCODONE HYDROCHLORIDE 15 MG: 5 TABLET ORAL at 17:59

## 2018-01-22 RX ADMIN — Medication: at 08:47

## 2018-01-22 RX ADMIN — OLANZAPINE 5 MG: 5 TABLET, ORALLY DISINTEGRATING ORAL at 21:10

## 2018-01-22 RX ADMIN — ASPIRIN 81 MG: 81 TABLET, COATED ORAL at 08:40

## 2018-01-22 RX ADMIN — VANCOMYCIN HYDROCHLORIDE 1500 MG: 10 INJECTION, POWDER, LYOPHILIZED, FOR SOLUTION INTRAVENOUS at 03:06

## 2018-01-22 RX ADMIN — ESCITALOPRAM OXALATE 20 MG: 20 TABLET ORAL at 08:41

## 2018-01-22 RX ADMIN — OXYCODONE HYDROCHLORIDE 15 MG: 5 TABLET ORAL at 09:45

## 2018-01-22 RX ADMIN — ISOSORBIDE MONONITRATE 60 MG: 60 TABLET, EXTENDED RELEASE ORAL at 08:40

## 2018-01-22 RX ADMIN — CIPROFLOXACIN HYDROCHLORIDE 500 MG: 500 TABLET, FILM COATED ORAL at 21:06

## 2018-01-22 RX ADMIN — CARVEDILOL 6.25 MG: 3.12 TABLET, FILM COATED ORAL at 08:41

## 2018-01-22 RX ADMIN — CLOPIDOGREL 75 MG: 75 TABLET, FILM COATED ORAL at 08:41

## 2018-01-22 RX ADMIN — FOLIC ACID 1 MG: 1 TABLET ORAL at 08:41

## 2018-01-22 RX ADMIN — HEPARIN SODIUM 5000 UNITS: 5000 INJECTION, SOLUTION INTRAVENOUS; SUBCUTANEOUS at 12:55

## 2018-01-22 RX ADMIN — OXYCODONE HYDROCHLORIDE 15 MG: 5 TABLET ORAL at 22:10

## 2018-01-22 ASSESSMENT — ACTIVITIES OF DAILY LIVING (ADL)
ADLS_ACUITY_SCORE: 13

## 2018-01-22 ASSESSMENT — PAIN DESCRIPTION - DESCRIPTORS
DESCRIPTORS: DISCOMFORT
DESCRIPTORS: CONSTANT;DISCOMFORT

## 2018-01-22 NOTE — PLAN OF CARE
Problem: Patient Care Overview  Goal: Plan of Care/Patient Progress Review    Discharge Planner OT   Patient plan for discharge: rehab  Current status: Completed shower with total body bathing in seated position with min A overall. Ambulating in room with CGA/SBA and FWW. Tolerated a few minutes of standing G/H at sink, limited by LE pain and fatigue, but otherwise tolerated well.   Barriers to return to prior living situation: decreased ADL independence and activity tolerance  Recommendations for discharge: TCU  Rationale for recommendations: increase functional endurance and ADL independence       Entered by: Giovanni De Los Santos 01/22/2018 9:56 AM

## 2018-01-22 NOTE — PLAN OF CARE
Problem: Patient Care Overview  Goal: Plan of Care/Patient Progress Review  Outcome: Improving  Neuro: A&O x 4  Cardiac: SR with depressed ST segment and inverted T waves. VSS. Afebrile.   Respiratory: Sating > 94 on RA.   GI/: Adequate output, last BM 1/21.  Diet/Appetite: Regular diet, good appetite.   Activity: SBA.   Pain: PRN oxycodone 10-15mg q 4 hour.   Skin: Wound vac placed on R shin to 100 continuous, intensity 1. Goal continuous 125 with intensity 2.   LDAs: R PIV.     Continue with POC. Notify primary team with changes.   Kat Cornelius RN

## 2018-01-22 NOTE — PLAN OF CARE
Problem: Patient Care Overview  Goal: Plan of Care/Patient Progress Review  Discharge Planner PT   Patient plan for discharge: Pt plans to go to TCU with the eventual plan to return home to [place].  Current status: Pt transfers sit<>stand SBA. Pt ambulates 90'x2 with FWW and CGA with decreased R DF and knee/hip flexion and a decreased janel. Pt completes standing B LE strengthening exercises including hip flexion and hip abduction. Pt ascends/descends 3 stairs x2 with a R rail and CGA and a step to pattern (ascends with R LE first, descends with L LE first), assist of 1 to carry wound vac. Vitals and SaO2 remain stable throughout session.   Barriers to return to prior living situation: impaired gait, LE strength, functional mobility.  Recommendations for discharge: TCU  Rationale for recommendations: impairments in functional mobility, gait, and decreased LE strength.        Entered by: Zohreh Davis 01/22/2018 4:25 PM

## 2018-01-22 NOTE — PROGRESS NOTES
Progress Note:     Hospital Day: 19     Data: Boom Kahn is a 68 yo male admitted to [facility] 01/03/2018.     Intervention: SW following for dc plans.     Assessment: Pt lives in [place] but came to [place] for his medical care. Pt's son Dallas lives in [place] and is very involved with pt's care. Pt is planning to go to TCU in [place] then rent an apartment or move in with Dallas. Pt/son reviewed list of Medicare certified TCUs and requested referrals to the following facilities:      Referrals sent to:  1.  TCU (P: [phone]): Accepted pending bed availability and pt would need to be off IV pain meds.  2. [facility] (P: [phone], F: [phone]): No beds available.  3. [facility] (P: [phone], F: [phone]): Still reviewing; admissions unsure if they can manage pt's wound cares.  4. [facility] (P: [phone], F: [phone]): Unsure about bed availability for tomorrow, also still reviewing the referral.     Plan:  -Discharge plans in progress: Per Vascular, planning for discharge Tues 1/23. No TCU placement secured yet d/t lack of beds.     -Barriers to discharge plan: Medical clearance- pt needs to be off IV pain meds prior to dc to TCU.  -Follow up plan: SW will continue to follow and assist as needed.     SID Castillo, [facility]  6B Intermediate Care Unit   Phone: [phone]  Pager: [phone]

## 2018-01-22 NOTE — PROGRESS NOTES
Severe pain initially with vac placement, improved today  Ambulating, tolerating po intake, pain controlled with po meds.    B/P: 120/104, T: 98.4, P: 70, R: 18  Alert oriented no acute distress  Right leg wound vac intact, no significant leak, minimal serous drainage  Persistent right leg erythema and edema, placed patient in more aggressive elevation  Palp PT and bypass on right medial thigh  Remaining incisions c/d/i with skin glue    WBC   Date Value Ref Range Status   01/21/2018 10.1 4.0 - 11.0 10e9/L Final   ]  Hemoglobin   Date Value Ref Range Status   01/21/2018 9.6 (L) 13.3 - 17.7 g/dL Final   ]  INR/Prothrombin Time  Creatinine   Date Value Ref Range Status   01/21/2018 0.60 (L) 0.66 - 1.25 mg/dL Final   ]      Intake/Output Summary (Last 24 hours) at 01/22/18 0758  Last data filed at 01/22/18 0400   Gross per 24 hour   Intake             1450 ml   Output             1300 ml   Net              150 ml       Assessment/Plan:  66 yo male with severe PAD, bilateral lower extremity wounds and critical limb ischemia, POD#6 s/p right femoral endarterectomy with in-situ GSV femoral to posterior tibial artery bypass      Left anterior shin with adaptic and mepilex. Lamb's wool between right toes, no betadine due to allergy.  Wound vac change 3 x per week, can increase to 125 mm Hg suction if patient tolerates.  Continue Aspirin and statin, SQ heparin for DVT prophylaxis  PT, up with assistance, increase activity  When out of bed needs to be in recliner with leg elevated  Pain management - po medications  Transition to po antibiotics today for 10 day course  Dispo to rehab possibly tomorrow.    Leandra Marques MD  Vascular Surgery Fellow  Pager [phone]

## 2018-01-23 ENCOUNTER — HOSPITAL ENCOUNTER (INPATIENT)
Facility: SKILLED NURSING FACILITY | Age: 68
LOS: 9 days | Discharge: HOME HEALTH | DRG: 300 | End: 2018-02-01
Attending: INTERNAL MEDICINE | Admitting: INTERNAL MEDICINE
Payer: COMMERCIAL

## 2018-01-23 ENCOUNTER — CARE COORDINATION (OUTPATIENT)
Dept: CARE COORDINATION | Facility: CLINIC | Age: 68
End: 2018-01-23

## 2018-01-23 VITALS
HEIGHT: 68 IN | SYSTOLIC BLOOD PRESSURE: 104 MMHG | OXYGEN SATURATION: 100 % | DIASTOLIC BLOOD PRESSURE: 48 MMHG | WEIGHT: 143.5 LBS | HEART RATE: 70 BPM | BODY MASS INDEX: 21.75 KG/M2 | TEMPERATURE: 98 F | RESPIRATION RATE: 18 BRPM

## 2018-01-23 DIAGNOSIS — I73.9 PVD (PERIPHERAL VASCULAR DISEASE) (H): ICD-10-CM

## 2018-01-23 DIAGNOSIS — I25.5 ISCHEMIC CARDIOMYOPATHY: ICD-10-CM

## 2018-01-23 DIAGNOSIS — Z98.890 S/P VASCULAR SURGERY: ICD-10-CM

## 2018-01-23 DIAGNOSIS — Z78.9 TAKES DIETARY SUPPLEMENTS: Primary | ICD-10-CM

## 2018-01-23 LAB
BACTERIA SPEC CULT: ABNORMAL
SPECIMEN SOURCE: ABNORMAL

## 2018-01-23 PROCEDURE — 25000125 ZZHC RX 250: Performed by: STUDENT IN AN ORGANIZED HEALTH CARE EDUCATION/TRAINING PROGRAM

## 2018-01-23 PROCEDURE — 25000132 ZZH RX MED GY IP 250 OP 250 PS 637: Performed by: INTERNAL MEDICINE

## 2018-01-23 PROCEDURE — 25000128 H RX IP 250 OP 636: Performed by: SURGERY

## 2018-01-23 PROCEDURE — 25000132 ZZH RX MED GY IP 250 OP 250 PS 637: Performed by: SURGERY

## 2018-01-23 PROCEDURE — 25000132 ZZH RX MED GY IP 250 OP 250 PS 637: Performed by: STUDENT IN AN ORGANIZED HEALTH CARE EDUCATION/TRAINING PROGRAM

## 2018-01-23 PROCEDURE — E2402 NEG PRESS WOUND THERAPY PUMP: HCPCS

## 2018-01-23 PROCEDURE — 25000132 ZZH RX MED GY IP 250 OP 250 PS 637: Performed by: CLINICAL NURSE SPECIALIST

## 2018-01-23 PROCEDURE — 25000132 ZZH RX MED GY IP 250 OP 250 PS 637: Performed by: PHYSICIAN ASSISTANT

## 2018-01-23 PROCEDURE — 12000022 ZZH R&B SNF

## 2018-01-23 PROCEDURE — 25000132 ZZH RX MED GY IP 250 OP 250 PS 637: Performed by: NURSE PRACTITIONER

## 2018-01-23 RX ORDER — ENALAPRIL MALEATE 2.5 MG/1
2.5 TABLET ORAL 2 TIMES DAILY
Status: DISCONTINUED | OUTPATIENT
Start: 2018-01-23 | End: 2018-02-01 | Stop reason: HOSPADM

## 2018-01-23 RX ORDER — CLOPIDOGREL BISULFATE 75 MG/1
75 TABLET ORAL DAILY
Qty: 30 TABLET | Refills: 3 | Status: ON HOLD
Start: 2018-01-24 | End: 2018-01-31

## 2018-01-23 RX ORDER — FENTANYL 25 UG/1
25 PATCH TRANSDERMAL
Status: DISCONTINUED | OUTPATIENT
Start: 2018-01-25 | End: 2018-02-01 | Stop reason: HOSPADM

## 2018-01-23 RX ORDER — ASPIRIN 81 MG/1
81 TABLET ORAL DAILY
Status: DISCONTINUED | OUTPATIENT
Start: 2018-01-24 | End: 2018-02-01 | Stop reason: HOSPADM

## 2018-01-23 RX ORDER — ACETAMINOPHEN 325 MG/1
975 TABLET ORAL EVERY 8 HOURS PRN
Status: DISCONTINUED | OUTPATIENT
Start: 2018-01-23 | End: 2018-02-01 | Stop reason: HOSPADM

## 2018-01-23 RX ORDER — OXYCODONE HYDROCHLORIDE 5 MG/1
5-10 TABLET ORAL
Status: DISCONTINUED | OUTPATIENT
Start: 2018-01-23 | End: 2018-02-01 | Stop reason: HOSPADM

## 2018-01-23 RX ORDER — GABAPENTIN 600 MG/1
600 TABLET ORAL AT BEDTIME
Qty: 90 TABLET | Refills: 3 | Status: ON HOLD
Start: 2018-01-23 | End: 2018-01-31

## 2018-01-23 RX ORDER — SPIRONOLACTONE 25 MG/1
25 TABLET ORAL
Status: DISCONTINUED | OUTPATIENT
Start: 2018-01-23 | End: 2018-02-01 | Stop reason: HOSPADM

## 2018-01-23 RX ORDER — ACETAMINOPHEN 650 MG/1
650 SUPPOSITORY RECTAL EVERY 4 HOURS PRN
Status: DISCONTINUED | OUTPATIENT
Start: 2018-01-23 | End: 2018-02-01 | Stop reason: HOSPADM

## 2018-01-23 RX ORDER — CIPROFLOXACIN 500 MG/1
500 TABLET, FILM COATED ORAL EVERY 12 HOURS
Qty: 14 TABLET | Refills: 0 | Status: ON HOLD
Start: 2018-01-23 | End: 2018-01-31

## 2018-01-23 RX ORDER — CLINDAMYCIN HCL 300 MG
300 CAPSULE ORAL EVERY 6 HOURS
Qty: 28 CAPSULE | Refills: 0 | Status: ON HOLD
Start: 2018-01-23 | End: 2018-01-31

## 2018-01-23 RX ORDER — GABAPENTIN 100 MG/1
300 CAPSULE ORAL 2 TIMES DAILY
Status: DISCONTINUED | OUTPATIENT
Start: 2018-01-24 | End: 2018-02-01 | Stop reason: HOSPADM

## 2018-01-23 RX ORDER — ACETAMINOPHEN 325 MG/1
650 TABLET ORAL EVERY 4 HOURS PRN
Status: DISCONTINUED | OUTPATIENT
Start: 2018-01-23 | End: 2018-01-23

## 2018-01-23 RX ORDER — ISOSORBIDE MONONITRATE 60 MG/1
60 TABLET, EXTENDED RELEASE ORAL DAILY
Status: DISCONTINUED | OUTPATIENT
Start: 2018-01-24 | End: 2018-02-01 | Stop reason: HOSPADM

## 2018-01-23 RX ORDER — ESCITALOPRAM OXALATE 20 MG/1
20 TABLET ORAL DAILY
Status: DISCONTINUED | OUTPATIENT
Start: 2018-01-24 | End: 2018-02-01 | Stop reason: HOSPADM

## 2018-01-23 RX ORDER — HEPARIN SODIUM 5000 [USP'U]/.5ML
5000 INJECTION, SOLUTION INTRAVENOUS; SUBCUTANEOUS EVERY 8 HOURS
Status: DISCONTINUED | OUTPATIENT
Start: 2018-01-23 | End: 2018-01-27

## 2018-01-23 RX ORDER — CARVEDILOL 3.12 MG/1
6.25 TABLET ORAL 2 TIMES DAILY WITH MEALS
Status: DISCONTINUED | OUTPATIENT
Start: 2018-01-23 | End: 2018-02-01 | Stop reason: HOSPADM

## 2018-01-23 RX ORDER — ROSUVASTATIN CALCIUM 20 MG/1
20 TABLET, COATED ORAL DAILY
Status: DISCONTINUED | OUTPATIENT
Start: 2018-01-23 | End: 2018-02-01 | Stop reason: HOSPADM

## 2018-01-23 RX ORDER — DOCUSATE SODIUM 100 MG/1
100 CAPSULE, LIQUID FILLED ORAL DAILY
Status: DISCONTINUED | OUTPATIENT
Start: 2018-01-23 | End: 2018-02-01 | Stop reason: HOSPADM

## 2018-01-23 RX ORDER — PREDNISONE 5 MG/1
10 TABLET ORAL DAILY
Status: DISCONTINUED | OUTPATIENT
Start: 2018-01-24 | End: 2018-02-01 | Stop reason: HOSPADM

## 2018-01-23 RX ORDER — CLOPIDOGREL BISULFATE 75 MG/1
75 TABLET ORAL DAILY
Status: DISCONTINUED | OUTPATIENT
Start: 2018-01-24 | End: 2018-02-01 | Stop reason: HOSPADM

## 2018-01-23 RX ORDER — GABAPENTIN 600 MG/1
600 TABLET ORAL AT BEDTIME
Status: DISCONTINUED | OUTPATIENT
Start: 2018-01-23 | End: 2018-02-01 | Stop reason: HOSPADM

## 2018-01-23 RX ORDER — CLINDAMYCIN HCL 300 MG
300 CAPSULE ORAL EVERY 6 HOURS
Status: COMPLETED | OUTPATIENT
Start: 2018-01-23 | End: 2018-01-30

## 2018-01-23 RX ORDER — POLYETHYLENE GLYCOL 3350 17 G/17G
17 POWDER, FOR SOLUTION ORAL DAILY
Status: DISCONTINUED | OUTPATIENT
Start: 2018-01-24 | End: 2018-02-01 | Stop reason: HOSPADM

## 2018-01-23 RX ORDER — ONDANSETRON 2 MG/ML
4 INJECTION INTRAMUSCULAR; INTRAVENOUS EVERY 6 HOURS PRN
Status: DISCONTINUED | OUTPATIENT
Start: 2018-01-23 | End: 2018-02-01 | Stop reason: HOSPADM

## 2018-01-23 RX ORDER — CIPROFLOXACIN 250 MG/1
500 TABLET, FILM COATED ORAL EVERY 12 HOURS
Status: COMPLETED | OUTPATIENT
Start: 2018-01-23 | End: 2018-01-30

## 2018-01-23 RX ORDER — GABAPENTIN 300 MG/1
300 CAPSULE ORAL 2 TIMES DAILY
Qty: 90 CAPSULE | Refills: 3 | Status: ON HOLD
Start: 2018-01-24 | End: 2018-01-31

## 2018-01-23 RX ORDER — ONDANSETRON 4 MG/1
4 TABLET, ORALLY DISINTEGRATING ORAL EVERY 6 HOURS PRN
Status: DISCONTINUED | OUTPATIENT
Start: 2018-01-23 | End: 2018-02-01 | Stop reason: HOSPADM

## 2018-01-23 RX ORDER — ACETAMINOPHEN 325 MG/1
975 TABLET ORAL EVERY 8 HOURS PRN
Qty: 100 TABLET | Refills: 3 | Status: ON HOLD
Start: 2018-01-23 | End: 2018-01-31

## 2018-01-23 RX ORDER — EZETIMIBE 10 MG/1
10 TABLET ORAL DAILY
Status: DISCONTINUED | OUTPATIENT
Start: 2018-01-23 | End: 2018-02-01 | Stop reason: HOSPADM

## 2018-01-23 RX ORDER — NALOXONE HYDROCHLORIDE 0.4 MG/ML
.1-.4 INJECTION, SOLUTION INTRAMUSCULAR; INTRAVENOUS; SUBCUTANEOUS
Status: DISCONTINUED | OUTPATIENT
Start: 2018-01-23 | End: 2018-02-01 | Stop reason: HOSPADM

## 2018-01-23 RX ORDER — POLYETHYLENE GLYCOL 3350 17 G/17G
17 POWDER, FOR SOLUTION ORAL DAILY
Qty: 30 PACKET | Refills: 1 | Status: ON HOLD
Start: 2018-01-24 | End: 2018-01-31

## 2018-01-23 RX ADMIN — EZETIMIBE 10 MG: 10 TABLET ORAL at 20:31

## 2018-01-23 RX ADMIN — ISOSORBIDE MONONITRATE 60 MG: 60 TABLET, EXTENDED RELEASE ORAL at 07:59

## 2018-01-23 RX ADMIN — SENNOSIDES AND DOCUSATE SODIUM 1 TABLET: 8.6; 5 TABLET ORAL at 07:59

## 2018-01-23 RX ADMIN — OXYCODONE HYDROCHLORIDE 10 MG: 5 TABLET ORAL at 18:43

## 2018-01-23 RX ADMIN — CLINDAMYCIN HYDROCHLORIDE 300 MG: 300 CAPSULE ORAL at 07:00

## 2018-01-23 RX ADMIN — ACETAMINOPHEN 975 MG: 325 TABLET, FILM COATED ORAL at 17:53

## 2018-01-23 RX ADMIN — MULTIPLE VITAMINS W/ MINERALS TAB 1 TABLET: TAB at 08:00

## 2018-01-23 RX ADMIN — ASPIRIN 81 MG: 81 TABLET, COATED ORAL at 07:59

## 2018-01-23 RX ADMIN — FOLIC ACID 1 MG: 1 TABLET ORAL at 07:59

## 2018-01-23 RX ADMIN — CARVEDILOL 6.25 MG: 3.12 TABLET, FILM COATED ORAL at 07:58

## 2018-01-23 RX ADMIN — CLINDAMYCIN HYDROCHLORIDE 300 MG: 300 CAPSULE ORAL at 12:14

## 2018-01-23 RX ADMIN — CLINDAMYCIN HYDROCHLORIDE 300 MG: 300 CAPSULE ORAL at 17:57

## 2018-01-23 RX ADMIN — OXYCODONE HYDROCHLORIDE 15 MG: 5 TABLET ORAL at 07:03

## 2018-01-23 RX ADMIN — ESCITALOPRAM OXALATE 20 MG: 20 TABLET ORAL at 07:59

## 2018-01-23 RX ADMIN — OXYCODONE HYDROCHLORIDE 10 MG: 5 TABLET ORAL at 21:50

## 2018-01-23 RX ADMIN — GABAPENTIN 300 MG: 300 CAPSULE ORAL at 14:11

## 2018-01-23 RX ADMIN — ROSUVASTATIN CALCIUM 20 MG: 20 TABLET ORAL at 20:31

## 2018-01-23 RX ADMIN — FUROSEMIDE 20 MG: 20 TABLET ORAL at 08:00

## 2018-01-23 RX ADMIN — OXYCODONE HYDROCHLORIDE 15 MG: 5 TABLET ORAL at 11:06

## 2018-01-23 RX ADMIN — CIPROFLOXACIN HYDROCHLORIDE 500 MG: 500 TABLET, FILM COATED ORAL at 08:00

## 2018-01-23 RX ADMIN — PREDNISONE 10 MG: 10 TABLET ORAL at 07:58

## 2018-01-23 RX ADMIN — CIPROFLOXACIN HYDROCHLORIDE 500 MG: 250 TABLET, FILM COATED ORAL at 20:31

## 2018-01-23 RX ADMIN — Medication: at 08:03

## 2018-01-23 RX ADMIN — GABAPENTIN 300 MG: 300 CAPSULE ORAL at 07:59

## 2018-01-23 RX ADMIN — OXYCODONE HYDROCHLORIDE 15 MG: 5 TABLET ORAL at 14:36

## 2018-01-23 RX ADMIN — CLOPIDOGREL 75 MG: 75 TABLET, FILM COATED ORAL at 07:58

## 2018-01-23 RX ADMIN — OXYCODONE HYDROCHLORIDE 15 MG: 5 TABLET ORAL at 01:54

## 2018-01-23 RX ADMIN — CARVEDILOL 6.25 MG: 3.12 TABLET, FILM COATED ORAL at 17:57

## 2018-01-23 RX ADMIN — GABAPENTIN 600 MG: 600 TABLET, FILM COATED ORAL at 20:31

## 2018-01-23 ASSESSMENT — PAIN DESCRIPTION - DESCRIPTORS
DESCRIPTORS: CONSTANT;DISCOMFORT

## 2018-01-23 ASSESSMENT — ACTIVITIES OF DAILY LIVING (ADL)
WHICH_OF_THE_ABOVE_FUNCTIONAL_RISKS_HAD_A_RECENT_ONSET_OR_CHANGE?: FALL HISTORY
TRANSFERRING: 0-->INDEPENDENT
DRESS: 0-->INDEPENDENT
RETIRED_COMMUNICATION: 0-->UNDERSTANDS/COMMUNICATES WITHOUT DIFFICULTY
SWALLOWING: 0-->SWALLOWS FOODS/LIQUIDS WITHOUT DIFFICULTY
FALL_HISTORY_WITHIN_LAST_SIX_MONTHS: YES
ADLS_ACUITY_SCORE: 13
ADLS_ACUITY_SCORE: 13
TOILETING: 0-->INDEPENDENT
AMBULATION: 0-->INDEPENDENT
RETIRED_EATING: 0-->INDEPENDENT
BATHING: 1-->ASSISTIVE EQUIPMENT
ADLS_ACUITY_SCORE: 13
NUMBER_OF_TIMES_PATIENT_HAS_FALLEN_WITHIN_LAST_SIX_MONTHS: 1
COGNITION: 0 - NO COGNITION ISSUES REPORTED
ADLS_ACUITY_SCORE: 13

## 2018-01-23 NOTE — PROGRESS NOTES
No acute events, edema in leg improved with elevation  Pain controlled, patient ready for transfer to TCU once bed available    B/P: 124/65, T: 97.8, P: 70, R: 16  Alert oriented no acute distress  Palpable right leg bypass and PT  Erythema significantly improved, edema improved and limited to lower leg and foot  Wound vac intact, minimal serous drainage    WBC   Date Value Ref Range Status   01/21/2018 10.1 4.0 - 11.0 10e9/L Final   ]  Hemoglobin   Date Value Ref Range Status   01/21/2018 9.6 (L) 13.3 - 17.7 g/dL Final   ]  INR/Prothrombin Time  Creatinine   Date Value Ref Range Status   01/21/2018 0.60 (L) 0.66 - 1.25 mg/dL Final   ]      Intake/Output Summary (Last 24 hours) at 01/23/18 0808  Last data filed at 01/23/18 0745   Gross per 24 hour   Intake              960 ml   Output             3775 ml   Net            -2815 ml       Assessment/Plan:  68 yo male with severe PAD, bilateral lower extremity wounds and critical limb ischemia, POD#6 s/p right femoral endarterectomy with in-situ GSV femoral to posterior tibial artery bypass      Left anterior shin with adaptic and mepilex. Lamb's wool between right toes, no betadine due to allergy.  Wound vac change 3 x per week, will change today by surgery team  Continue Aspirin and statin, SQ heparin for DVT prophylaxis  PT, up with assistance, increase activity  When out of bed needs to be in recliner with leg elevated, keep leg elevated above heart when in bed.  Pain management - po medications  Po abx day 2/10  Dispo to TCU once bed available.    Leandra Marques MD  Vascular Surgery Fellow  Pager [phone]

## 2018-01-23 NOTE — DISCHARGE SUMMARY
Physician Discharge Summary     Patient ID:  Boom Kahn  [id number]  67 year old  1950    Admit date: 1/3/2018    Discharge date and time: 1/23/2018     Admitting Physician: Lexis Ag MD     Discharge Physician: Dr. Lexis Ag    Admission Diagnoses: Stenosis of femoral artery (H) [I70.209]  Pain of right lower leg [M79.661]    Discharge Diagnoses: critical limb ischemia, PAD    Admission Condition: fair    Discharged Condition: good    Indication for Admission: Bilateral leg wounds, right critical limb ischemia    Hospital Course: Mr Casey was initially admitted for bilateral lower extremity wounds which were concerning for acute limb ischemia.  He previous received care down [facility] but recently relocated to [place] to be closer to his son.  Patient underwent workup including RAVI, and angiogram revealing a right femoral high grade stenosis and significant femoral and popliteal disease.  Vascular surgery recommended a right lower extremity bypass and upon cardiac workup patient required a cardiac catheterization and placement of stent.  Patient was continued on aspirin, plavix and statin.  Pre-operatively there was concern about possible alcohol withdrawal due to increased confusion and agitation that was managed with medications.  Patient underwent right femoral endarterectomy with right femoral to posterior tibial artery bypass with in situ greater saphenous vein.  The procedure went as planned and there was a palpable PT pulse at completion of the case.  Patient was initially monitored in ICU and transferred to the step down unit.  Pain management was a concern for majority of the stay due to patient's significant use of po narcotic medications prior to admission.  Wound vac was placed on the right leg wound on POD#6 and patient was continued to antibiotics due to positive cultures from debridement at the time of surgery.  Prior to discharge patient's right leg edema and erythema had significantly  improved, pain was controlled with po medication and he was tolerating diet with good bowel function.    Consults: IR, cardiology, medicine, pain service.    Significant Diagnostic Studies: angiography: Right lower extremity, cardiac catheterization    Treatments: surgery: Right femoral endarterectomy with fem-PT bypass with insitu GSV    Disposition: subacute rehab    Patient Instructions:   Current Discharge Medication List      START taking these medications    Details   acetaminophen (TYLENOL) 325 MG tablet Take 3 tablets (975 mg) by mouth every 8 hours as needed for mild pain or fever  Qty: 100 tablet, Refills: 3    Associated Diagnoses: Pain of right lower leg      ciprofloxacin (CIPRO) 500 MG tablet Take 1 tablet (500 mg) by mouth every 12 hours for 7 days  Qty: 14 tablet, Refills: 0    Associated Diagnoses: PVD (peripheral vascular disease) (H)      clindamycin (CLEOCIN) 300 MG capsule Take 1 capsule (300 mg) by mouth every 6 hours for 7 days  Qty: 28 capsule, Refills: 0    Associated Diagnoses: Pain of right lower leg      !! clopidogrel (PLAVIX) 75 MG tablet Take 1 tablet (75 mg) by mouth daily  Qty: 30 tablet, Refills: 3    Associated Diagnoses: PVD (peripheral vascular disease) (H)      gabapentin (NEURONTIN) 300 MG capsule Take 1 capsule (300 mg) by mouth 2 times daily  Qty: 90 capsule, Refills: 3    Associated Diagnoses: Pain of right lower leg      gabapentin (NEURONTIN) 600 MG tablet Take 1 tablet (600 mg) by mouth At Bedtime  Qty: 90 tablet, Refills: 3    Associated Diagnoses: Pain of right lower leg      polyethylene glycol (MIRALAX/GLYCOLAX) Packet Take 17 g by mouth daily  Qty: 30 packet, Refills: 1    Associated Diagnoses: Pain of right lower leg       !! - Potential duplicate medications found. Please discuss with provider.      CONTINUE these medications which have NOT CHANGED    Details   isosorbide mononitrate (IMDUR) 60 MG 24 hr tablet Take 60 mg by mouth daily      DOCUSATE SODIUM PO Take  100 mg by mouth daily      OXYCODONE HCL PO Take 10 mg by mouth every 3 hours as needed      fentaNYL (DURAGESIC) 25 mcg/hr 72 hr patch Place 1 patch onto the skin every 72 hours      PREDNISONE PO Take 5 mg by mouth 2 times daily       ENALAPRIL MALEATE PO Take 2.5 mg by mouth 2 times daily      ASPIRIN EC PO Take 81 mg by mouth daily      CARVEDILOL PO Take 6.25 mg by mouth 2 times daily (with meals)      Rosuvastatin Calcium (CRESTOR PO) Take 20 mg by mouth daily      Ezetimibe (ZETIA PO) Take 10 mg by mouth daily      !! CLOPIDOGREL BISULFATE PO Take 75 mg by mouth daily      ESCITALOPRAM OXALATE PO Take 20 mg by mouth daily      SPIRONOLACTONE PO Take 25 mg by mouth daily      FUROSEMIDE PO Take 20 mg by mouth 2 times daily       !! - Potential duplicate medications found. Please discuss with provider.      STOP taking these medications       multivitamin, therapeutic with minerals (MULTI-VITAMIN) TABS tablet Comments:   Reason for Stopping:             Activity: activity as tolerated, weight bearing as tolerated, no heavy lifting for 2-4 weeks  Diet: cardiac diet  Wound Care: keep incisions clean and dry, right leg wound with wound vac to be kept to 125 mm Hg continuous suction and 3 x per week change.    Follow-up with Dr. Ag in 1 week.    Signed:  Stacey LeJeune  1/23/2018  2:27 PM

## 2018-01-23 NOTE — PROGRESS NOTES
Patient was discharged to [facility] so they will handle post discharge follow up cares and coordination  Discharge Disposition:    -SNF:   [facility]  [address] [place] [address]  (Main: [phone], Admissions: [phone])  -Following MD:   Dr. Snowden with [facility] team  -Rn to RN report: Please call [phone]

## 2018-01-23 NOTE — IP AVS SNAPSHOT
[address] [address]    Phone:  [phone]                                       After Visit Summary   1/23/2018    Boom Kahn    MRN: [id number]           After Visit Summary Signature Page     I have received my discharge instructions, and my questions have been answered. I have discussed any challenges I see with this plan with the nurse or doctor.    ..........................................................................................................................................  Patient/Patient Representative Signature      ..........................................................................................................................................  Patient Representative Print Name and Relationship to Patient    ..................................................               ................................................  Date                                            Time    ..........................................................................................................................................  Reviewed by Signature/Title    ...................................................              ..............................................  Date                                                            Time

## 2018-01-23 NOTE — PLAN OF CARE
"Problem: Patient Care Overview  Goal: Plan of Care/Patient Progress Review  /83 (BP Location: Right arm)  Pulse 70  Temp 98.1  F (36.7  C) (Oral)  Resp 16  Ht 1.727 m (5' 8\")  Wt 65.1 kg (143 lb 8 oz)  SpO2 92%  BMI 21.82 kg/m2  Neuro: A&Ox4. Sensation intact to BLE.   Cardiac: Off tele, HR 70's-90's. VSS. Circulation intact BLE. Pulses palpable on LLE.    Respiratory: Sating 92% on RA.  GI/: Adequate urine output.  Diet/appetite: Tolerating regular diet. Eating well.  Activity:  Assist of 1, up to bedside.   Pain: Tolerating pain on current regimen.   Skin: Rt toes scabbed, wound vac to -100 mmHg without alarms, , Lt shin ulcerated, coccyx blanchable.   LDA's: PIV SL.     Plan: Continue with POC. Notify primary team with changes.        "

## 2018-01-23 NOTE — PROGRESS NOTES
Social Work Services Discharge Note      Patient Name:  Boom Kahn     Anticipated Discharge Date: Tuesday 01/23/18 at 1500 [email or web address]     Discharge Disposition:    -SNF:   [facility]  [facility] [address] [address]  (Main: [phone], Admissions: [phone])  -Following MD:   Dr. Snowden with [facility] 2 team  -Rn to RN report: Please call [phone]     Pre-Admission Screening (PAS) online form has been completed.  The Level of Care (LOC) is: Determined  Confirmation Code is:  [id number]  Patient/caregiver informed of referral to [facility] for Pre-Admission Screening for skilled nursing facility (SNF) placement and to expect a phone call post discharge from SNF.     Additional Services/Equipment Arranged: [employer] (Ph: [phone]) via wheelchair arranged for  today 1/23 at 1500.     Patient / Family response to discharge plan:  Pt and pt's son Dallas are in agreement w/plan.     Persons notified of above discharge plan: Pt, pt's son Erica Haynes in admissions at [facility], paged Dr. Marques w/Vascular Surgery, Lynne 6B RN, Marlee 6B charge RN.    SID Castillo, [facility]  6B Intermediate Care Unit  Phone: [phone]  Pager: [phone]

## 2018-01-23 NOTE — PLAN OF CARE
Problem: Patient Care Overview  Goal: Plan of Care/Patient Progress Review  Outcome: No Change  Neuro: A&Ox4.   Cardiac: Telemetry discontinued. VSS.   Respiratory: Sating 95-99% on RA.  GI/: Adequate urine output.   Diet/appetite: Tolerating a regular diet. Eating well.  Activity:  Assist of SBA, up to chair and in halls.  Pain: At acceptable level on current regimen.   Skin: Intact, no new deficits noted.  LDA's:  Right Leg wound vac.   Right hand PIV     Plan: Continue with POC. Notify primary team with changes.

## 2018-01-23 NOTE — PLAN OF CARE
Problem: Patient Care Overview  Goal: Plan of Care/Patient Progress Review  Physical Therapy Discharge Summary    Reason for therapy discharge:    Discharged to transitional care facility.    Progress towards therapy goal(s). See goals on Care Plan in [facility] electronic health record for goal details.  Goals partially met.  Barriers to achieving goals:   discharge from facility.    Therapy recommendation(s):    Continued therapy is recommended.  Rationale/Recommendations:  TCU.

## 2018-01-23 NOTE — PLAN OF CARE
Problem: Patient Care Overview  Goal: Plan of Care/Patient Progress Review  Outcome: Adequate for Discharge Date Met: 01/23/18  DISCHARGE                        1/23/2018  ----------------------------------------------------------------------------  [facility]  Via: [employer]   Accompanied by:   Discharge Instructions: Regular diet, Activity as tolerated, medications, follow up appointments, when to call the MD, aftercare instructions sent to rehab.  Prescriptions: Medications reviewed and all orders sent to TCU  Follow Up Appointments: arranged; information given  Belongings: All sent with pt  IV: d/c'd  Telemetry: d/c'd  Pt exhibits understanding of above discharge instructions; all questions answered.    Discharge Paperwork: Sent with [employer].

## 2018-01-23 NOTE — PROGRESS NOTES
Progress Note:      ADDENDUM 1000- Per Erica in admissions, there is now a bed available for today on [facility]; just waiting on TCU MD to give final approval for admission. Admissions asked for transport to be set up for 1500 today. [facility] (Ph: [phone]) via wheelchair arranged for  today 1/23 at 1530. SW paged Vascular with an update, called pt's son Dallas, and updated pt, Team 6B RN, and Marlee SHARIF charge RN. Pt's son is still interested in possibly moving from [facility] to a facility closer to his home ([facility] or [facility]) but these facilities did not have any anticipated beds opening. Writer will hand off to TCU SW.    Hospital Day: 19      Data: Boom Kahn is a 66 yo male admitted to [facility] 01/03/2018.      Intervention: Met w/pt and spoke to pt's son Dallas Kahn (P: [phone]).      Assessment: Pt is medically stable to dc. No TCU bed available today though.      Referrals sent to:  1. [facility] (P: [phone]): Accepted but no bed availability today.  2. [facility] (P: [phone], F: [phone]): No beds available.  3. [facility] (P: [phone], F: [phone]): Declined d/t wound vac  4. [facility] (P: [phone], F: [phone]): No beds available.      Plan:  -Discharge plans in progress: Pt is medically stable for dc. Pt accepted to [facility] but no beds available today.    -Barriers to discharge plan: TCU bed availability  -Follow up plan: SW will continue to follow and assist as needed.      SID Castillo, [facility]  6B Intermediate Care Unit   Phone: [phone]  Pager: [phone]

## 2018-01-23 NOTE — PLAN OF CARE
Problem: Patient Care Overview  Goal: Plan of Care/Patient Progress Review  Occupational Therapy Discharge Summary    Reason for therapy discharge:    Discharged to transitional care facility.    Progress towards therapy goal(s). See goals on Care Plan in [facility] electronic health record for goal details.  Goals partially met.  Barriers to achieving goals:   discharge from facility.    Therapy recommendation(s):    Continued therapy is recommended.  Rationale/Recommendations:  Rec TCU to progress independence with ADLs and mobility. .

## 2018-01-23 NOTE — PLAN OF CARE
Patient is a 67 year old male admitted to room 432 via wheelchair.  Patient is alert and oriented X 3. See Epic for VS and assessment.  Patient is able to transfer SBA using walker. Patient was settled into their room, shown call light, tv, mealtimes etc. Oriented to unit. Will continue monitoring pain level and VS. Notifying MD with any concerns.  Follow MD orders for cares and medications.    Level of Schooling:college  Ethnicity:  Marital Status:single  Dentures: No  Hearing Aid: No  Smoker:  Yes  Glasses: Yes  Occupation: retired  Falls 0-1 mo: 0 2-6 mo: 0   Advanced Care Directive Referral to Social Work?Yes

## 2018-01-24 ENCOUNTER — APPOINTMENT (OUTPATIENT)
Dept: LAB | Facility: CLINIC | Age: 68
End: 2018-01-24
Attending: INTERNAL MEDICINE
Payer: COMMERCIAL

## 2018-01-24 PROCEDURE — 97162 PT EVAL MOD COMPLEX 30 MIN: CPT | Mod: GP | Performed by: PHYSICAL THERAPIST

## 2018-01-24 PROCEDURE — 25000125 ZZHC RX 250: Performed by: INTERNAL MEDICINE

## 2018-01-24 PROCEDURE — G0463 HOSPITAL OUTPT CLINIC VISIT: HCPCS

## 2018-01-24 PROCEDURE — 97535 SELF CARE MNGMENT TRAINING: CPT | Mod: GO | Performed by: OCCUPATIONAL THERAPIST

## 2018-01-24 PROCEDURE — 40000133 ZZH STATISTIC OT WARD VISIT: Performed by: OCCUPATIONAL THERAPIST

## 2018-01-24 PROCEDURE — 97110 THERAPEUTIC EXERCISES: CPT | Mod: GP | Performed by: PHYSICAL THERAPIST

## 2018-01-24 PROCEDURE — 12000022 ZZH R&B SNF

## 2018-01-24 PROCEDURE — 99305 1ST NF CARE MODERATE MDM 35: CPT | Performed by: INTERNAL MEDICINE

## 2018-01-24 PROCEDURE — 97116 GAIT TRAINING THERAPY: CPT | Mod: GP | Performed by: PHYSICAL THERAPIST

## 2018-01-24 PROCEDURE — 99207 ZZC CDG-MDM COMPONENT: MEETS LOW - DOWN CODED: CPT | Performed by: INTERNAL MEDICINE

## 2018-01-24 PROCEDURE — 40000193 ZZH STATISTIC PT WARD VISIT: Performed by: PHYSICAL THERAPIST

## 2018-01-24 PROCEDURE — 25000132 ZZH RX MED GY IP 250 OP 250 PS 637: Performed by: INTERNAL MEDICINE

## 2018-01-24 PROCEDURE — 97165 OT EVAL LOW COMPLEX 30 MIN: CPT | Mod: GO | Performed by: OCCUPATIONAL THERAPIST

## 2018-01-24 RX ORDER — LANOLIN ALCOHOL/MO/W.PET/CERES
3 CREAM (GRAM) TOPICAL
Status: DISCONTINUED | OUTPATIENT
Start: 2018-01-24 | End: 2018-02-01 | Stop reason: HOSPADM

## 2018-01-24 RX ORDER — FUROSEMIDE 20 MG
20 TABLET ORAL 2 TIMES DAILY WITH MEALS
Status: DISCONTINUED | OUTPATIENT
Start: 2018-01-24 | End: 2018-02-01 | Stop reason: HOSPADM

## 2018-01-24 RX ORDER — FOLIC ACID 1 MG/1
1 TABLET ORAL DAILY
Status: DISCONTINUED | OUTPATIENT
Start: 2018-01-24 | End: 2018-02-01 | Stop reason: HOSPADM

## 2018-01-24 RX ORDER — MULTIPLE VITAMINS W/ MINERALS TAB 9MG-400MCG
1 TAB ORAL DAILY
Status: DISCONTINUED | OUTPATIENT
Start: 2018-01-24 | End: 2018-02-01 | Stop reason: HOSPADM

## 2018-01-24 RX ORDER — OLANZAPINE 5 MG/1
5 TABLET, ORALLY DISINTEGRATING ORAL AT BEDTIME
Status: DISCONTINUED | OUTPATIENT
Start: 2018-01-24 | End: 2018-01-24

## 2018-01-24 RX ADMIN — OXYCODONE HYDROCHLORIDE 10 MG: 5 TABLET ORAL at 05:20

## 2018-01-24 RX ADMIN — ROSUVASTATIN CALCIUM 20 MG: 20 TABLET ORAL at 20:25

## 2018-01-24 RX ADMIN — OXYCODONE HYDROCHLORIDE 10 MG: 5 TABLET ORAL at 18:14

## 2018-01-24 RX ADMIN — OXYCODONE HYDROCHLORIDE 10 MG: 5 TABLET ORAL at 14:24

## 2018-01-24 RX ADMIN — CIPROFLOXACIN HYDROCHLORIDE 500 MG: 250 TABLET, FILM COATED ORAL at 20:25

## 2018-01-24 RX ADMIN — OXYCODONE HYDROCHLORIDE 10 MG: 5 TABLET ORAL at 11:19

## 2018-01-24 RX ADMIN — CLINDAMYCIN HYDROCHLORIDE 300 MG: 300 CAPSULE ORAL at 18:14

## 2018-01-24 RX ADMIN — MELATONIN 3 MG: 3 TAB ORAL at 21:21

## 2018-01-24 RX ADMIN — FUROSEMIDE 20 MG: 20 TABLET ORAL at 18:14

## 2018-01-24 RX ADMIN — GABAPENTIN 300 MG: 100 CAPSULE ORAL at 08:16

## 2018-01-24 RX ADMIN — Medication 2.5 MG: at 21:21

## 2018-01-24 RX ADMIN — OXYCODONE HYDROCHLORIDE 10 MG: 5 TABLET ORAL at 08:21

## 2018-01-24 RX ADMIN — FUROSEMIDE 20 MG: 20 TABLET ORAL at 13:35

## 2018-01-24 RX ADMIN — GABAPENTIN 300 MG: 100 CAPSULE ORAL at 13:33

## 2018-01-24 RX ADMIN — ASPIRIN 81 MG: 81 TABLET, COATED ORAL at 08:17

## 2018-01-24 RX ADMIN — CARVEDILOL 6.25 MG: 3.12 TABLET, FILM COATED ORAL at 18:14

## 2018-01-24 RX ADMIN — CIPROFLOXACIN HYDROCHLORIDE 500 MG: 250 TABLET, FILM COATED ORAL at 08:17

## 2018-01-24 RX ADMIN — CLINDAMYCIN HYDROCHLORIDE 300 MG: 300 CAPSULE ORAL at 05:20

## 2018-01-24 RX ADMIN — THIAMINE HCL (VITAMIN B1) 50 MG TABLET 100 MG: at 13:33

## 2018-01-24 RX ADMIN — EZETIMIBE 10 MG: 10 TABLET ORAL at 20:25

## 2018-01-24 RX ADMIN — CARVEDILOL 6.25 MG: 3.12 TABLET, FILM COATED ORAL at 08:17

## 2018-01-24 RX ADMIN — Medication 5 UNITS: at 08:22

## 2018-01-24 RX ADMIN — OXYCODONE HYDROCHLORIDE 10 MG: 5 TABLET ORAL at 01:00

## 2018-01-24 RX ADMIN — CLINDAMYCIN HYDROCHLORIDE 300 MG: 300 CAPSULE ORAL at 01:00

## 2018-01-24 RX ADMIN — ESCITALOPRAM OXALATE 20 MG: 20 TABLET ORAL at 08:17

## 2018-01-24 RX ADMIN — CLINDAMYCIN HYDROCHLORIDE 300 MG: 300 CAPSULE ORAL at 11:19

## 2018-01-24 RX ADMIN — NICOTINE 1 PATCH: 7 PATCH TRANSDERMAL at 14:25

## 2018-01-24 RX ADMIN — MULTIPLE VITAMINS W/ MINERALS TAB 1 TABLET: TAB at 13:32

## 2018-01-24 RX ADMIN — FOLIC ACID 1 MG: 1 TABLET ORAL at 13:32

## 2018-01-24 RX ADMIN — GABAPENTIN 600 MG: 600 TABLET, FILM COATED ORAL at 21:21

## 2018-01-24 RX ADMIN — PREDNISONE 10 MG: 5 TABLET ORAL at 08:16

## 2018-01-24 RX ADMIN — OXYCODONE HYDROCHLORIDE 10 MG: 5 TABLET ORAL at 21:21

## 2018-01-24 RX ADMIN — CLOPIDOGREL BISULFATE 75 MG: 75 TABLET, FILM COATED ORAL at 08:16

## 2018-01-24 ASSESSMENT — ACTIVITIES OF DAILY LIVING (ADL): PREVIOUS_RESPONSIBILITIES: MEAL PREP;LAUNDRY;SHOPPING;MEDICATION MANAGEMENT;FINANCES;DRIVING

## 2018-01-24 NOTE — PROGRESS NOTES
" 01/24/18 0844   Quick Adds   Quick Adds Certification   Type of Visit Initial PT Evaluation   Living Environment   Lives With alone   Living Arrangements house   Home Accessibility stairs to enter home   Number of Stairs to Enter Home 13  (13 from garage, 10 from front)   Number of Stairs Within Home 0   Stair Railings at Home outside, present on right side   Transportation Available car;family or friend will provide   Living Environment Comment pt lives in [place] but plans to stay in this area temporarily, son in area but no able to stay with him so unknown housing situation at immediate discharge   Self-Care   Dominant Hand right   Usual Activity Tolerance fair   Current Activity Tolerance fair   Regular Exercise no   Equipment Currently Used at Home shower chair  (has cane, not using)   Activity/Exercise/Self-Care Comment only walking ~1 block prior. Has homemaker for cleaning,   Functional Level Prior   Ambulation 0-->independent   Transferring 0-->independent   Toileting 0-->independent   Bathing 1-->assistive equipment   Dressing 0-->independent   Eating 0-->independent   Communication 0-->understands/communicates without difficulty   Swallowing 0-->swallows foods/liquids without difficulty   Cognition 0 - no cognition issues reported   Fall history within last six months yes   Number of times patient has fallen within last six months 1   Which of the above functional risks had a recent onset or change? ambulation;transferring   Prior Functional Level Comment states wasn't actual fall--lifted buttock up from tub ~6\" then arms gave and sat back down   General Information   Onset of Illness/Injury or Date of Surgery - Date 01/03/18   Referring Physician Rodriguez Snowden MD; Dr Td Brown   Patient/Family Goals Statement get back to moving how I was before; walking   Pertinent History of Current Problem (include personal factors and/or comorbidities that impact the POC) 67 year old male with a past medical " "history significant for HTN, anxiety/depression, alcohol abuse (8-10 beers/day), tobacco abuse (1/2-1 PPD, 50 years), ischemic cardiomyopathy (MI 2008), CAD s/p 3v CABG (1999) and 3-4 stents, HFrEF (last EF 25-30%, approximately 6 months ago) s/p ICD, , R lung cancer s/p radiation therapy,  peripheral vascular disease s/p stenting of R iliac, R common femoral, right SFA, and \"3-4 stents in the left leg\" in [place], and chronic pain who presented to the ED complaining of worsening pain in his lower extremities and right shin wound. 1/15 s/p right femorotibial bypass graft with insitu saphenous vein, wound debridement of right lower leg.    Heart Disease Risk Factors Smoking;High blood pressure;Lack of physical activity;Medical history;Gender;Age   General Observations pt sitting on EOB; agreeable to PT   Cognitive Status Examination   Orientation person  (did not test date/place)   Level of Consciousness alert   Follows Commands and Answers Questions 75% of the time   Personal Safety and Judgment intact   Memory intact   Pain Assessment   Patient Currently in Pain (c/o sponge of wound vac pulling)   Integumentary/Edema   Integumentary/Edema Comments wound vac on R lower leg--swelling; 1+ edema on dorsums of feet. Healing open area L shin   Posture    Posture Forward head position;Protracted shoulders   Posture Comments hangs head with walking   Range of Motion (ROM)   ROM Comment WNL LE   Strength   Strength Comments LE grossly 4+/5 throughout, symmetrical   Bed Mobility   Bed Mobility Comments IND sit<>Supine   Transfer Skills   Transfer Comments SBA sit<>stand   Gait   Gait Comments amb with  ft; SBA; cue to look up, hangs head; decreased pace; step thru pattern. PT managed set up of wound vac   Balance   Balance Comments tandem stand 30 sec; single leg stand 15 sec   Sensory Examination   Sensory Perception Comments not tested.   Coordination   Coordination no deficits were identified   Muscle Tone "   Muscle Tone no deficits were identified   General Therapy Interventions   Planned Therapy Interventions gait training;groups;manual therapy;neuromuscular re-education;strengthening;transfer training;home program guidelines;progressive activity/exercise   Clinical Impression   Criteria for Skilled Therapeutic Intervention yes, treatment indicated   PT Diagnosis impaired gait stability; deconditioning   Influenced by the following impairments decreased strength and activity tolerance, compromised skin integrity with wound vac   Functional limitations due to impairments gait below community level; stair negotiation; safe transfers;    Clinical Presentation Evolving/Changing   Clinical Presentation Rationale functional mobility assessment; comorbidities; participation limitations; impaired skin integrity   Clinical Decision Making (Complexity) Moderate complexity   Therapy Frequency` (6 days/wk)   Predicted Duration of Therapy Intervention (days/wks) 1/30/18   Anticipated Equipment Needs at Discharge front wheeled walker   Anticipated Discharge Disposition Home with Home Therapy;Home with Assist   Risk & Benefits of therapy have been explained Yes   Patient, Family & other staff in agreement with plan of care Yes   Clinical Impression Comments pt s/p s/p right femorotibial bypass graft with insitu saphenous vein, wound debridement of right lower leg. Wound vac in place. Previously mobility limited by claudication, amb without AD limited distances of at most 1 block. Will benefit from skilled PT to maximize functional mobility . Discharge destination uncertain as pt was looking for temporary housing in this area but had not obtained yet; son in area. Pt lives in [place]   Therapy Certification   Start of care date 01/24/18   Certification date from 01/24/18   Certification date to 02/07/18   Medical Diagnosis s/p right femorotibial bypass graft with insitu saphenous vein, wound debridement of right lower leg.     Certification I certify the need for these services furnished under this plan of treatment and while under my care.  (Physician co-signature of this document indicates review and certification of the therapy plan).    Total Evaluation Time   Total Evaluation Time (Minutes) 15

## 2018-01-24 NOTE — PROGRESS NOTES
01/24/18 0651   Quick Adds   Quick Adds Certification   Type of Visit Initial Occupational Therapy Evaluation   Living Environment   Lives With alone   Living Arrangements house   Home Accessibility stairs to enter home;tub/shower is not walk in   Number of Stairs to Enter Home 13  (from garage or  about 10 to main door)   Number of Stairs Within Home 0   Stair Railings at Home inside, present on right side   Transportation Available car;family or friend will provide   Living Environment Comment patient lives in [place], but has family in [place], plans to get his own place short-term in [place] after rehab son and daughtr in law live in [facility]/[place] and can aasist patient with groceries,etc.   Self-Care   Dominant Hand right   Usual Activity Tolerance moderate   Current Activity Tolerance poor   Regular Exercise no   Equipment Currently Used at Home shower chair;other (see comments)  (has a SEC but was not using)   Activity/Exercise/Self-Care Comment poor activity tolerance dur to LE pain, with limited short distance ambulation    Functional Level Prior   Ambulation 0-->independent   Transferring 0-->independent   Toileting 0-->independent   Bathing 1-->assistive equipment   Dressing 0-->independent   Eating 0-->independent   Communication 0-->understands/communicates without difficulty   Swallowing 0-->swallows foods/liquids without difficulty   Cognition 0 - no cognition issues reported   Fall history within last six months yes   Number of times patient has fallen within last six months 1  (in the shower fell into tub from shower chair)   Which of the above functional risks had a recent onset or change? ambulation;transferring;toileting;bathing;dressing;fall history   Prior Functional Level Comment patient normally I in most BADL/IADL's , but has assist for most cleaning and yardwork. His  usually does the grocery shopping. 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 Chris Snowden   Patient/Family Goals Statement candido able to ambulate enough to travel and see family / visit   Additional Occupational Profile Info/Pertinent History of Current Problem patient is a 67 y.o. male with PAD, bilateral lower extremity wounds and right lower extremity critical limb ischemia. s/p right femoral endarterectomy wtih in-situ GSV femoral to posterior tibial artery bypass   Precautions/Limitations fall precautions  (wound vac)   Weight-Bearing Status - LUE full weight-bearing   Weight-Bearing Status - RUE full weight-bearing   Weight-Bearing Status - LLE full weight-bearing   Weight-Bearing Status - RLE full weight-bearing   Heart Disease Risk Factors Lack of physical activity;Medical history;Gender;Age   General Observations patient sitting up EOB and pleasant   General Info Comments activity up as tolerated    Cognitive Status Examination   Orientation orientation to person, place and time   Level of Consciousness alert   Able to Follow Commands WNL/WFL   Personal Safety (Cognitive) at risk behaviors demonstrated   Memory impaired   Attention Distractible during evaluation;Quiet environment required;Sustained attention impaired   Organization/Problem Solving Sequencing impaired;Problem solving impaired   Executive Function Impulsive;Planning ability impaired   Cognitive Comment patient reports difficiutly with sustaineed attention, but is not impacting his life in a major way per his report will further assess   Visual Perception   Visual Perception Wears glasses   Visual Perception Comments wears trifocals all the time - repors no change in viison since hosptial stay - will further assess   Sensory Examination   Sensory Comments reports no tingling or numbness noted   Pain Assessment   Patient Currently in Pain Yes, see Vital Sign  flowsheet  (R LE andwound area and toes)   Integumentary/Edema   Integumentary/Edema Comments swelling in feet B Le's   Posture   Posture forward head position;protracted shoulders   Range of Motion (ROM)   ROM Comment B UE's WNL's   Strength   Strength Comments B UE's 4+/5 throughout   Hand Strength   Hand Strength Comments goodgraspfor holding items on meal tray and in BR area    Muscle Tone Assessment   Muscle Tone Quick Adds No deficits were identified   Coordination   Coordination Comments B UE slow reaction with finger/thumb opposition   Mobility   Bed Mobility Bed mobility skill: Sit to supine;Bed mobility skill: Supine to sit   Bed Mobility Skill: Sit to Supine   Level of [place]: Sit/Supine stand-by assist   Physical Assist/Nonphysical Assist: Sit/Supine set-up required   Assistive Device: Sit/Supine bedrail   Bed Mobility Skill: Supine to Sit   Level of [place]: Supine/Sit stand-by assist   Physical Assist/Nonphysical Assist: Supine/Sit set-up required   Assistive Device: Supine/Sit bedrail   Transfer Skill: Bed to Chair/Chair to Bed   Level of [place]: Bed to Chair stand-by assist   Physical Assist/Nonphysical Assist: Bed to Chair set-up required   Weight-Bearing Restrictions full weight-bearing   Assistive Device - Transfer Skill Bed to Chair Chair to Bed Rehab Eval rolling walker   Transfer Skill: Sit to Stand   Level of [place]: Sit/Stand stand-by assist   Physical Assist/Nonphysical Assist: Sit/Stand set-up required   Transfer Skill: Sit to Stand full weight-bearing   Assistive Device for Transfer: Sit/Stand rolling walker   Toilet Transfer   Toilet Transfer Comments has a standard toeitl at [facility] with one side support    Transfer Skill: Toilet Transfer   Level of [place]: Toilet stand-by assist   Physical Assist/Nonphysical Assist: Toilet set-up required   Weight-Bearing Restrictions: Toilet full weight-bearing   Assistive Device rolling walker   Tub/Shower Transfer    Tub/Shower Transfer Comments NT- has a tub/shwoer combo at home with no grabbars no chair with a HHS will further assess   Balance   Balance Comments appears steady with staic standing with monitoring drains/wound vac with mild LOB noted iwth dynamic mvoements unless with use of walekr will further assess - defer to PT eval for further assessment of balance   Bathing   Level of [place] stand-by assist   Physical Assist/Nonphysical Assist set-up required   Upper Body Dressing   Level of [place]: Dress Upper Body independent   Physical Assist/Nonphysical Assist: Dress Upper Body set-up required   Lower Body Dressing   Level of [place]: Dress Lower Body contact guard   Physical Assist/Nonphysical Assist: Dress Lower Body set-up required;supervision;verbal cues;1 person assist   Toileting   Level of [place]: Toilet stand-by assist   Physical Assist/Nonphysical Assist: Toilet set-up required   Grooming   Level of [place]: Grooming independent   Physical Assist/Nonphysical Assist: Grooming set-up required   Eating/Self Feeding   Level of [place]: Eating independent   Instrumental Activities of Daily Living (IADL)   Previous Responsibilities meal prep;laundry;shopping;medication management;finances;driving   Activities of Daily Living Analysis   Impairments Contributing to Impaired Activities of Daily Living balance impaired;cognition impaired;fear and anxiety;flexibility decreased;pain;strength decreased   General Therapy Interventions   Planned Therapy Interventions ADL retraining;IADL retraining;balance training;bed mobility training;cognition;strengthening;stretching;transfer training;visual perception;home program guidelines;progressive activity/exercise;risk factor education   Clinical Impression   Criteria for Skilled Therapeutic Interventions Met yes, treatment indicated   OT Diagnosis impaired ADL/self cares and IADL's   Influenced by the following impairments decreased mobiltiy ,  decreased strength, deconditioning, imapired funcitonal transfers, cognition   Assessment of Occupational Performance 1-3 Performance Deficits   Identified Performance Deficits dressing, bathing , toielting, funcitoanl endurance   Clinical Decision Making (Complexity) Low complexity   Therapy Frequency other (see comments)  (6 days per week )   Predicted Duration of Therapy Intervention (days/wks) oneweek    Anticipated Equipment Needs at Discharge bathing equipment   Anticipated Discharge Disposition Home;Home with Assist;Home with Home Therapy   Risks and Benefits of Treatment have been explained. Yes   Patient, Family & other staff in agreement with plan of care Yes   Clinical Impression Comments patient would benefit firom skilled Ot to address asic ADL/self cares for a safe home discharge alone   Therapy Certification   Start of Care Date 01/03/18   Certification date from 01/24/18   Certification date to 02/24/18   Medical Diagnosis see above for PMHX   Certification I certify the need for these services furnished under this plan of treatment and while under my care.  (Physician co-signature of this document indicates review and certification of the therapy plan).   Total Evaluation Time   Total Evaluation Time (Minutes) 30

## 2018-01-24 NOTE — PLAN OF CARE
Problem: Goal Outcome Summary  Goal: Goal Outcome Summary  Outcome: No Change  Pt.new admit yesterday. A&Ox4 - briefly disoriented to place / time upon awakening. RLE with wound vac intact @ 100mmHg continuous suction. Medicated with Oxycodone 10mg po @ 0100 for c/o pain @ vac site. Continent using urinal bedside - staff empties.

## 2018-01-24 NOTE — PROGRESS NOTES
Clinical Nutrition Services Brief Note - (+) admission nutrition risk screen for weight loss    Boom Kahn is a/an 67 year old male assessed by the dietitian for Admission Nutrition Risk Screen for unintentional loss of 10# or more in the past two months    Per documented weight history, patient has lost 3.5 lbs (2%) over the last 3.5 months. This is not nutritionally significant. Wt has trended up 2 lbs over the last 3 weeks. No other mention of wt loss was noted in pts chart.    Wt Readings from Last 10 Encounters:   01/23/18 65.5 kg (144 lb 8 oz)   01/22/18  01/03/18  10/12/17  08/09/17 65.1 kg (143 lb 8 oz)  64.6 kg (142 lb 6.7 oz)  67.1 kg (148 lb) -- OSH  68.2 kg (150 lb 4.8 oz) -- OSH     Implementations:  - Per last admission on [facility], pt requested chocolate Ensure Plus TID with meals. Will order during this admission as well.    Due to inappropriate (+) admission nutrition risk screen for weight loss when no weight loss indicated per chart, RD to sign off at this time. Will assess pt at MDS/LIUDMILA date if still admitted.       Kavita Askew RD, LD  Unit Pager: [phone]

## 2018-01-24 NOTE — PLAN OF CARE
Problem: Goal Outcome Summary  Goal: Goal Outcome Summary  Pt is alert and oriented x4. Transfers with standby assist and walker. Pt steady, reminded him to call us for transfers until cleared by therapy to help manage wound vac. Denies any shortness of breath or trouble breathing. Denies any concerns with bowel/bladder. Reports last BM was earlier today. Full skin assessment done, has multiple bruises to BUE. Skin tear to left hand, new dressing applied. Coccyx has small open area, WOC consult placed. Also has incision to right groin, approximated and open to air. Wound vac to right leg CDI. On -100mmHg suction. Scabs to right foot, lambs wool in between toes. Also has wound to left leg, orders for adaptic and mepilex, pt prefers to be open to air. No drainage noted. Requests PRN oxycodone for pain when due, would like to be woken up during the night. Has fentanyl patch to right shoulder, nicotine patch to right rib. Continue POC.

## 2018-01-24 NOTE — PLAN OF CARE
Problem: Goal Outcome Summary  Goal: Goal Outcome Summary  Pt amb 140 ft with FWW, SBA. Need to work on posture and stairs. Pt does not have his housing in area arranged yet--lives in [place] but plan to stay here while recuperating; son in area but no room for pt there.

## 2018-01-24 NOTE — CONSULTS
[facility], [place]  Psychiatry consult      Patient name: Boom Kahn   MRN: [id number]    Age: 67 year old    YOB: 1950    Identifying information:   The patient is a 67 year old  male who resides out of state however recently came to [place] to be closer to his children.    Reason for consultation: Evaluate need for continuing Zyprexa    History of present illness: The patient was recently admitted to the transitional care unit after undergoing an endarterectomy to address ischemia of his lower extremities.  During his postsurgical recovery, he had experienced an agitated delirium for which Zyprexa had been initiated with good response.  After maintaining several days of symptom improvement, the need for continuing Zyprexa is being questioned and psychiatry has been consulted for that purpose.  I met with the patient today who seems pleasant and well calmed the meeting.  He expressed awareness that he is being prescribed Zyprexa and could clearly explained to me why the medication had been utilized.  He laughed as he recalled being told he recently had bouts of confusion.  He reflected on his long hospital course however is optimistic about gaining discharge over the next 1-2 weeks.  He discussed some anxiety related to finding an apartment so that he may transition from the hospital to a local place of residence since he previously resided out of [facility].  He plans to stay in [place] for some time so that he may be closer to his children who will be helping him during the recovery phase.  He is happy and relieved to have finally have the surgery noting that he has lived his life over the past few years in chronic pain.  Previously, in the setting of chronic pain due to his medical condition, his mood had become depressed and he had started Lexapro with good response.  He is happy to continue taking the medication.  He does mention that insomnia has been an  ongoing issue for which Zyprexa has helped with very much.  He explains that he will be trying melatonin as a replacement.  We reviewed various sleep hygiene techniques in addition to which he was quite receptive.  He had no other mental health related questions or concerns.    Psychiatric Review of Systems:    -- Depressive episode: Denied symptoms corresponding to a depressive episode including denial of suicidal thoughts, denial of anhedonia, denied feeling helpless or hopeless, denied sustained periods of depressed mood.  In the past he has experienced this however not at the moment.  -- Jodee:  denies symptoms  -- Psychosis:  denies symptoms  -- Anxiety: denies symptoms corresponding to RACHELE or panic disorder  -- PTSD: denies symptoms  -- OCD: denies  symptoms  -- Eating disorder: denies symptoms    Psychiatric History:    He denied prior inpatient hospitalizations.  He denied prior suicide attempts.  A few years ago, he was prescribed Lexapro for management of mild to moderate depressive symptoms in the setting of chronic pain related to his chronic medical condition along with a divorce that was occurring around that time.  He currently does not have any outpatient mental health providers in [place].    Substance Use History:    No recent usage of alcohol or illicit substances reported.  He had resorted to using alcohol in the past to help alleviate the intensity of chronic pain.  No reported dependence or admissions to detox or rehabilitation reported.    Medical History: Refer to the hospitalist note by Dr. Snowden which I was able to review.      No current facility-administered medications on file prior to encounter.   Current Outpatient Prescriptions on File Prior to Encounter:  acetaminophen (TYLENOL) 325 MG tablet Take 3 tablets (975 mg) by mouth every 8 hours as needed for mild pain or fever   ciprofloxacin (CIPRO) 500 MG tablet Take 1 tablet (500 mg) by mouth every 12 hours for 7 days   clindamycin  (CLEOCIN) 300 MG capsule Take 1 capsule (300 mg) by mouth every 6 hours for 7 days   clopidogrel (PLAVIX) 75 MG tablet Take 1 tablet (75 mg) by mouth daily   gabapentin (NEURONTIN) 300 MG capsule Take 1 capsule (300 mg) by mouth 2 times daily   gabapentin (NEURONTIN) 600 MG tablet Take 1 tablet (600 mg) by mouth At Bedtime   polyethylene glycol (MIRALAX/GLYCOLAX) Packet Take 17 g by mouth daily   isosorbide mononitrate (IMDUR) 60 MG 24 hr tablet Take 60 mg by mouth daily   DOCUSATE SODIUM PO Take 100 mg by mouth daily   OXYCODONE HCL PO Take 10 mg by mouth every 3 hours as needed   fentaNYL (DURAGESIC) 25 mcg/hr 72 hr patch Place 1 patch onto the skin every 72 hours   PREDNISONE PO Take 5 mg by mouth 2 times daily    ENALAPRIL MALEATE PO Take 2.5 mg by mouth 2 times daily   ASPIRIN EC PO Take 81 mg by mouth daily   CARVEDILOL PO Take 6.25 mg by mouth 2 times daily (with meals)   Rosuvastatin Calcium (CRESTOR PO) Take 20 mg by mouth daily   Ezetimibe (ZETIA PO) Take 10 mg by mouth daily   CLOPIDOGREL BISULFATE PO Take 75 mg by mouth daily   ESCITALOPRAM OXALATE PO Take 20 mg by mouth daily   SPIRONOLACTONE PO Take 25 mg by mouth daily   FUROSEMIDE PO Take 20 mg by mouth 2 times daily        Social History:  The patient was previously residing in [place] and is currently retired.  He had worked as a  in the past and enjoyed his career.  He recently moved to [place] to be closer to family as he pursued expected medical treatment.  He referenced 1 divorce in the past.  He mentioned having children, specifically one son Dallas who lives in the [facility] who will be helping him find his own apartment soon.     Family History:    The patient denied a family history of mental illnesses or suicide attempts    Medical review of systems: 10 systems were reviewed and positive for psychiatric symptoms as noted above otherwise negative    Physical Exam:    B/P: 93/48, T: 96.9, P: 67, R: 16  Estimated body mass index is  "21.97 kg/(m^2) as calculated from the following:    Height as of this encounter: 1.727 m (5' 8\").    Weight as of this encounter: 65.5 kg (144 lb 8 oz).         Mental status examination:  Appearance:  Alert, fair hygiene, no acute distress  Attitude:  Attempts to be cooperative  Eye Contact: Fair  Mood:  \"okay\"  Affect: Mood congruent, full, appropriate  Speech:  Normal rates, tone, latency, volume. Not pushed or pressured.  Psychomotor Behavior:  No psychomotor abnormalities noted  Thought Process: Linear and logical; not tangential or circumstantial or disorganized  Associations:  Logical; no loose associations Noted  Thought Content:  No obvious paranoia, delusions, ideas of reference, or grandiosity noted. Denies auditory or visual hallucinations. Denies suicidal Ideations. Denies homicidal ideations.  Insight:  Fair  Judgment:  Fair  Oriented to:  time, person, and place  Attention Span and Concentration:  Intact  Recent and Remote Memory: Intact based on interviewing and details provided  Language: Appropriate based on interviewing  Fund of Knowledge: Appropriate based on interviewing  Muscle Strength and Tone: Normal upon visual inspection  Gait and Station: Normal upon visual inspection            Diagnoses:    Delirium, due to multiple ideologies, resolving  Major depressive disorder, recurrent, mild     Recommendations:  1.  I would agree with tapering off of Zyprexa noting that the dose was recently reduced from 5 mg to 2.5 mg at bedtime.  Continue the lower dose for at least 4 more days and if symptoms of a delirium remained in remission, then discontinue Zyprexa.    2. Continue Lexapro at the current dose for management of his depressive symptoms.  Mood symptoms currently appear stable and well controlled without significant depressive symptoms.  He is experiencing mild anxiety which is currently tolerable and likely time limited.  Given his recent episode of delirium, it would be best to avoid " additional pharmacological interventions to address his anxiety at this time unless symptoms significantly worsen.  Continue to monitor and intervene as necessary.    Reconsult with psychiatry as needed.  I may be contacted at [phone] with any follow-up questions or concerns.

## 2018-01-24 NOTE — PLAN OF CARE
Problem: Goal Outcome Summary  Goal: Goal Outcome Summary  Outcome: No Change  Pt alert and oriented x 4. Intermittent confusion noted with time and place. New admit from yesterday. Acclimating well. Hx of lung cancer. Denies SOB. Reported pain all the time, but current pain medication helps. Medicated with oxycodone every three hours per request. pleasant and cooperative. Good appetite. No nausea reported. No problem with B/B. Using BR and urinal. Skin assessment completed. No new concern noted. Due to peripheral vascular disease, pt has some cuts, scabs and bruises on upper and lower extremities. SBA for mobility and transfer.   1- Right groin incision intact and VIANEY. No S/S of infection noted.  2- Right leg wound vac intact and working at 100 mmHg continues suction.   3- Right toe has scab on it with Lamb's wool between toes.  4- Left shin with old wound scabbing. Had Mepilex on it, but pt took it off.   5- Left wrist old wound scabbing. Had Mepilex on it, but pt took it off.   6- coccyx foam dressing dry and intact.

## 2018-01-24 NOTE — PROGRESS NOTES
Writer met with the patient and his son Dallas. The patient has asked to be transferred to another SNF. The patient would like to be closer to his son. Writer has sent out the following referrals.     [facility]: Referral has been sent.    [facility]: Referral has been sent.    1/25/2018:Follow up voicemail left for Richa with admission and asked for a call back with bed availability.     [facility]: Referral has been sent. Declined due to no bed availability for TCU.     [facility]: Referral has been sent.   Writer received a call from [facility] ([phone]) with admissions and she stated that they have a bed available Friday but there are three people on the wait list. Writer asked that the patient be added to the wait list, so [facility] did that and we will talk in a few days.    [facility]: Referral sent.    [facility]: Referral sent       SW will continue to follow and assist as needed.    CARLOS Chapa  U   P: [phone]  Pgr: [phone]

## 2018-01-24 NOTE — PROGRESS NOTES
WO Nurse Inpatient Adult Pressure InjuryAssessment    Initial Assessment  Reason for consultation: Evaluate and treat coccyx    Assessment:   Coccyx wound due to Pressure Injury    Pressure Injury is Stage 2 Present on admission Yes    Contributing factor of the pressure injury: pressure and moisture  Status: initial assessment      TREATMENT  PLAN    Coccyx wound: wash every other day with wound cleanser and gauze. Paint kayleigh-wound skin with no-sting and allow to dry. Cover with Mepilex.   Orders Written  WOC Nurse follow-up plan:weekly  Nursing to notify the Provider(s) and re-consult the WOC Nurse if wound(s) deteriorates or new skin concern.    Patient History  According to provider note(s):  66 yo male with severe PAD, bilateral lower extremity wounds and critical limb ischemia, s/p right femoral endarterectomy with in-situ GSV femoral to posterior tibial artery bypass on 1/15/2018.    In addition to coccyx wound, patient has bilateral lower extremity arterial wounds. There is currently a Wound VAC on the right anterior russell. Spoke with Dr Snowden regarding specific orders for WO to follow this wound if needed.    Objective Data   Containment of urine/stool: Continent of bowel and Continent of bladder    Current Diet/ Nutrition:    Active Diet Order      Combination Diet 2 gm NA Diet    Output:   I/O last 3 completed shifts:  In: -   Out: 825 [Urine:825]    Skin Assessment: Chance Chance Score  Av.8  Min: 17  Max: 20                                Chance Q No Data Recorded                     NSRAS No Data Recorded       Labs:   Recent Labs  Lab 18  0629   HGB 9.6*   WBC 10.1         No lab results found in last 7 days.    Physical Exam    Skin assessment:   Focused skin inspection: coccyx, buttocks    Wound Location:  Coccyx  Wound History: Noted on intake skin inspection to [facility]U  Measurements (length x width x depth, in cm) 0.8 cm x 0.4 cm  x  0.01 cm and 0.3 cm x 0.2 cm x 0.01 cm  Wound Base:  Pale  pink dermis  Palpation of the wound bed: firm over bone  Periwound skin: indurated  Color: pink  Temperature: normal   Drainage:, none  Description of drainage: none  Odor: none  Pain: denies     Interventions  Current support surface: Standard  Atmos Air mattress    Current off-loading measures: Foam padding  Visual inspection of wound(s) completed  Wound Care:was done per plan of care    Cleansing with wound cleanser solution  Supplies: at bedside  Education provided today: Pressure Injury Prevention    Discussed plan of care with Patient and Physician    Face to face time: 15 minutes

## 2018-01-24 NOTE — PROVIDER NOTIFICATION
Per RN, pt cont to refuse SQ Heparin re DVT prophylaxis. Dr. Snowden informed and aware. Will document pt preferences.

## 2018-01-24 NOTE — H&P
History and Physical      Boom Kahn MRN# [id number]   YOB: 1950 Age: 67 year old      Date of Admission:  1/23/2018    Primary care provider: Willian Arrington          Assessment and Plan:   # Severe Peripheral Artery Disease -   Critical limb ischemia R>L with bilateral LE wounds; Acute on Chronic pain  S/p right femoral endarterectomy with in-situ GSV femoral to posterior tibial artery bypass 1/15/18.   - Wound care per WOCN, Vascular.   - wound vac  - Continue antiplatelet agents. Statin.   - continue current prednisone 10mg daily  - Continue aspirin and plavix  * s/p IV Abx: complete course of Cipro + Clinda (for 7 days)  >>Pain control: acute on Chronic pain.   Gabapentin  300 mg Q AM and Q PM, 600 mg Q HS  Continue fentanyl patch 25 mcg/hr     # HFpEF (EF reported 25-30%)  # CAD s/p CABGx3 and PCIs. Coronary Angio and AYDE stenting of proximal left circumflex 01/05  # ICM hx of MI (2008)  # HTN  - Continue ASA and clopidogrel for 12 months  - Continue Carvedilol, Rosuvastatin, Ezetimibe, Isosorbide dinitrate, enalapril  - 2L fluid/2gm Na restriction   - strict I/O and daily weights    # EtOH Dependence-Patient reports 8-10 beers per day  - s/p MSSA protocol w/ativan : no e/o withdrawal at current.  - MVI/thiamine/folate      # Tobacco Dependence - nicotine patch 7mg      # Restrictive Airway Disease   ? COPD, given smoking hx. Patient denies any hx of COPD.   - DuoNebs x4 hr prn  - outpatient PFT  - smoking cessation      # Malignancy of Lung, Right Upper Lobe    Known malignancy, patient unable to tell specific diagnosis. States that cancer responded with radiation.   F/u PET scan due in February   - f/u outpatient       # Depression   Patient reports a hx of depression and reports depression and axiety  - holding home alprazolam 0.5 PRN  - home ecitalopram 20mg opd  * Had Delirium and was started on Zyprexa: Will decrease dose to 2.5 mg and stop in 4 days if pt tolerates. Pscyh consult  "awaited.      ADR:  Full code      Prophylaxis: Peptic ulcer disease: On PPI and oral diet    Deep venous thrombosis prophylaxis: Heparin     PT/OT: Involved    Antipscyhotics- Zyprexa-being tapered  Pneumovac- ordered     Disposition: Several days    Rodriguez Snowden MD (Pager- [phone])   Internal Medicine/ Hospitalist                  Chief Complaint:   PVD    History obtained from                     \"History is obtained from the patient\"         History of Present Illness:   Boom Kahn is a 67 year old male with a past medical history significant for HTN, anxiety/depression, alcohol abuse, tobacco abuse, ICM/MI, CAD s/p 3v CABG and PCI, HFrEF (last EF 25-30%, approximately 6 months ago) s/p ICD, , R lung cancer s/p radiation therapy,  peripheral vascular disease s/p multiple stents, and chronic pain who presented c/o of worsening pain in his lower extremities and right shin wound.  He was admitted initially to medicine service and was evaluated by cardiology and had Under Coronary Angio and AYDE stenting of proximal left circumflex, than for PVD had right femoral endarterectomy with in-situ GSV femoral to posterior tibial artery bypass 1/15/18. Post operative course was unremarkable-had Abx, DSG and wound vac and now transferred to TCU for continued cares.    Currently Denies Chest pain/ SOB/ cough, Denies any N&V/ bowel problems  Denies urinary problems , Denies fever/chills/rigors   Says is recovering and pain is under control. Wou               Past Medical History:     Past Medical History:   Diagnosis Date     Anxiety      CAD (coronary artery disease)     s/p 3v CABG     CHF (congestive heart failure) (H)     EF 10-15%     Chronic pain      COPD (chronic obstructive pulmonary disease) (H)      Depression      Hyperlipidemia      Hypertension      Lung cancer (H)     s/p radiation therapy     PAD (peripheral artery disease) (H)     s/p lower extremity stents     Tobacco abuse              Past Surgical History: "     Past Surgical History:   Procedure Laterality Date     ANGIOPLASTY Right 10/2016     BYPASS GRAFT ARTERY CORONARY  1999    [facility]     BYPASS GRAFT FEMOROTIBIAL Right 1/15/2018    Procedure: BYPASS GRAFT FEMOROTIBIAL;  Right Femorotibial Bypass Graft with insitu saphenous vein, wound debriedment of right lower leg;  Surgeon: Lexis Ag MD;  Location: [facility] OR     cardiac catheterization       Cardiac defibrillator placement       CHEST TUBE INSERTION       COLON SURGERY  2008    colon resection for diverticulitis     FINE NEEDLE ASPIRATION Right 12/2016     IMPLANT PACEMAKER       previous radiation               Social History:     Social History     Social History     Marital status: Single     Spouse name: N/A     Number of children: N/A     Years of education: N/A     Social History Main Topics     Smoking status: Current Every Day Smoker     Packs/day: 1.00     Types: Cigarettes     Smokeless tobacco: Never Used      Comment: 0.5-1 ppd.     Alcohol use Not on file     Drug use: Not on file     Sexual activity: Not on file     Other Topics Concern     Not on file     Social History Narrative             Family History:   No family history on file.          Immunizations:     Immunization History   Administered Date(s) Administered     Influenza (High Dose) 3 valent vaccine 10/01/2017     Mantoux Tuberculin Skin Test 01/24/2018            Allergies:     Allergies   Allergen Reactions     Betadine [Povidone Iodine] Blisters     Pt reports erythema, increased pain, and blistering when using betadine on R big toe in past     Collagenase Clostridium Histolyticum      Flagyl [Metronidazole] Other (See Comments)     Flu symptoms  Flu like symptoms     Iodine Unknown     Santyl [Collagenase]              Medications:     Prescriptions Prior to Admission   Medication Sig Dispense Refill Last Dose     acetaminophen (TYLENOL) 325 MG tablet Take 3 tablets (975 mg) by mouth every 8 hours as needed for mild pain  or fever 100 tablet 3      ciprofloxacin (CIPRO) 500 MG tablet Take 1 tablet (500 mg) by mouth every 12 hours for 7 days 14 tablet 0      clindamycin (CLEOCIN) 300 MG capsule Take 1 capsule (300 mg) by mouth every 6 hours for 7 days 28 capsule 0      clopidogrel (PLAVIX) 75 MG tablet Take 1 tablet (75 mg) by mouth daily 30 tablet 3      gabapentin (NEURONTIN) 300 MG capsule Take 1 capsule (300 mg) by mouth 2 times daily 90 capsule 3      gabapentin (NEURONTIN) 600 MG tablet Take 1 tablet (600 mg) by mouth At Bedtime 90 tablet 3      polyethylene glycol (MIRALAX/GLYCOLAX) Packet Take 17 g by mouth daily 30 packet 1      isosorbide mononitrate (IMDUR) 60 MG 24 hr tablet Take 60 mg by mouth daily   1/3/2018 at Unknown time     DOCUSATE SODIUM PO Take 100 mg by mouth daily   1/3/2018 at Unknown time     OXYCODONE HCL PO Take 10 mg by mouth every 3 hours as needed   1/3/2018 at Unknown time     fentaNYL (DURAGESIC) 25 mcg/hr 72 hr patch Place 1 patch onto the skin every 72 hours   1/3/2018 at Unknown time     PREDNISONE PO Take 5 mg by mouth 2 times daily    1/3/2018 at Unknown time     ENALAPRIL MALEATE PO Take 2.5 mg by mouth 2 times daily   1/3/2018 at Unknown time     ASPIRIN EC PO Take 81 mg by mouth daily   1/3/2018 at Unknown time     CARVEDILOL PO Take 6.25 mg by mouth 2 times daily (with meals)   1/3/2018 at Unknown time     Rosuvastatin Calcium (CRESTOR PO) Take 20 mg by mouth daily   1/3/2018 at Unknown time     Ezetimibe (ZETIA PO) Take 10 mg by mouth daily   1/3/2018 at Unknown time     CLOPIDOGREL BISULFATE PO Take 75 mg by mouth daily   1/3/2018 at Unknown time     ESCITALOPRAM OXALATE PO Take 20 mg by mouth daily   1/3/2018 at Unknown time     SPIRONOLACTONE PO Take 25 mg by mouth daily   1/3/2018 at Unknown time     FUROSEMIDE PO Take 20 mg by mouth 2 times daily   1/3/2018 at Unknown time             Review of Systems:   The 10 point Review of Systems is negative other than noted in the HPI      Vitals:  "/43 (BP Location: Right arm)  Pulse 67  Temp 96.8  F (36  C) (Oral)  Resp 16  Ht 1.727 m (5' 8\")  Wt 65.5 kg (144 lb 8 oz)  SpO2 97%  BMI 21.97 kg/m2  BMI= Body mass index is 21.97 kg/(m^2).    GEN:    No acute distress  HEENT: Anicteric, extraocular muscles are intact with no nystagmus    Moist mucous membranes with no oropharyngeal erythema or discharge  NECK:  Supple with no jugular venous distention  CVS:  s1s2 show a regular rate and rhythm with no murmurs, rubs or gallops,    RES:             Bilaterally clear to auscultation with no rales, rhonchi or wheezes  ABDOMEN: Non-tender and non-distended     No rebound or gaurding  EXT:  Edema+, RLE dsg and wound vac in place   Skin- healing wound LLE  NEURO: Alert and oriented to person, place and time    Cranial nerves II-XII are intact  PSCYH: Normal affect           Data:     Latest Labs:     BMP  Recent Labs  Lab 01/21/18  0629 01/18/18  1450 01/18/18  0510     --  143   POTASSIUM 3.5 4.2 3.2*   CHLORIDE 113*  --  111*   SUMAN 8.6  --  8.2*   CO2 22  --  24   BUN 7  --  10   CR 0.60*  --  0.62*   GLC 96  --  93     CBC  Recent Labs  Lab 01/21/18  0629 01/18/18  0510   WBC 10.1 9.8   RBC 2.98* 2.71*   HGB 9.6* 8.8*   HCT 30.2* 27.2*   * 100   MCH 32.2 32.5   MCHC 31.8 32.4   RDW 13.7 14.3    278           "

## 2018-01-25 PROCEDURE — 12000022 ZZH R&B SNF

## 2018-01-25 PROCEDURE — 97110 THERAPEUTIC EXERCISES: CPT | Mod: GP | Performed by: PHYSICAL THERAPIST

## 2018-01-25 PROCEDURE — 99222 1ST HOSP IP/OBS MODERATE 55: CPT | Performed by: PSYCHIATRY & NEUROLOGY

## 2018-01-25 PROCEDURE — 25000132 ZZH RX MED GY IP 250 OP 250 PS 637: Performed by: INTERNAL MEDICINE

## 2018-01-25 PROCEDURE — 97116 GAIT TRAINING THERAPY: CPT | Mod: GP | Performed by: PHYSICAL THERAPIST

## 2018-01-25 PROCEDURE — 25000125 ZZHC RX 250: Performed by: INTERNAL MEDICINE

## 2018-01-25 PROCEDURE — 25000128 H RX IP 250 OP 636: Performed by: INTERNAL MEDICINE

## 2018-01-25 PROCEDURE — 97535 SELF CARE MNGMENT TRAINING: CPT | Mod: GO | Performed by: OCCUPATIONAL THERAPIST

## 2018-01-25 PROCEDURE — 40000193 ZZH STATISTIC PT WARD VISIT: Performed by: PHYSICAL THERAPIST

## 2018-01-25 PROCEDURE — 25000132 ZZH RX MED GY IP 250 OP 250 PS 637: Performed by: PHYSICIAN ASSISTANT

## 2018-01-25 PROCEDURE — 40000133 ZZH STATISTIC OT WARD VISIT: Performed by: OCCUPATIONAL THERAPIST

## 2018-01-25 RX ORDER — CALCIUM CARBONATE 500 MG/1
500 TABLET, CHEWABLE ORAL 2 TIMES DAILY PRN
Status: DISCONTINUED | OUTPATIENT
Start: 2018-01-25 | End: 2018-02-01 | Stop reason: HOSPADM

## 2018-01-25 RX ADMIN — ONDANSETRON 4 MG: 4 TABLET, ORALLY DISINTEGRATING ORAL at 06:39

## 2018-01-25 RX ADMIN — OXYCODONE HYDROCHLORIDE 10 MG: 5 TABLET ORAL at 23:47

## 2018-01-25 RX ADMIN — CLINDAMYCIN HYDROCHLORIDE 300 MG: 300 CAPSULE ORAL at 00:22

## 2018-01-25 RX ADMIN — CARVEDILOL 6.25 MG: 3.12 TABLET, FILM COATED ORAL at 08:28

## 2018-01-25 RX ADMIN — CLOPIDOGREL BISULFATE 75 MG: 75 TABLET, FILM COATED ORAL at 08:29

## 2018-01-25 RX ADMIN — Medication 2.5 MG: at 21:25

## 2018-01-25 RX ADMIN — OXYCODONE HYDROCHLORIDE 10 MG: 5 TABLET ORAL at 03:55

## 2018-01-25 RX ADMIN — FENTANYL 1 PATCH: 25 PATCH, EXTENDED RELEASE TRANSDERMAL at 10:50

## 2018-01-25 RX ADMIN — ISOSORBIDE MONONITRATE 60 MG: 60 TABLET, EXTENDED RELEASE ORAL at 08:27

## 2018-01-25 RX ADMIN — GABAPENTIN 300 MG: 100 CAPSULE ORAL at 13:31

## 2018-01-25 RX ADMIN — CARVEDILOL 6.25 MG: 3.12 TABLET, FILM COATED ORAL at 17:25

## 2018-01-25 RX ADMIN — CLINDAMYCIN HYDROCHLORIDE 300 MG: 300 CAPSULE ORAL at 23:48

## 2018-01-25 RX ADMIN — ENALAPRIL MALEATE 2.5 MG: 2.5 TABLET ORAL at 08:29

## 2018-01-25 RX ADMIN — POLYETHYLENE GLYCOL 3350 17 G: 17 POWDER, FOR SOLUTION ORAL at 08:27

## 2018-01-25 RX ADMIN — OXYCODONE HYDROCHLORIDE 10 MG: 5 TABLET ORAL at 17:30

## 2018-01-25 RX ADMIN — FOLIC ACID 1 MG: 1 TABLET ORAL at 08:29

## 2018-01-25 RX ADMIN — MULTIPLE VITAMINS W/ MINERALS TAB 1 TABLET: TAB at 08:29

## 2018-01-25 RX ADMIN — OXYCODONE HYDROCHLORIDE 10 MG: 5 TABLET ORAL at 20:33

## 2018-01-25 RX ADMIN — OXYCODONE HYDROCHLORIDE 10 MG: 5 TABLET ORAL at 00:22

## 2018-01-25 RX ADMIN — OXYCODONE HYDROCHLORIDE 10 MG: 5 TABLET ORAL at 14:36

## 2018-01-25 RX ADMIN — DOCUSATE SODIUM 100 MG: 100 CAPSULE, LIQUID FILLED ORAL at 08:31

## 2018-01-25 RX ADMIN — THIAMINE HCL (VITAMIN B1) 50 MG TABLET 100 MG: at 08:29

## 2018-01-25 RX ADMIN — MELATONIN 3 MG: 3 TAB ORAL at 23:50

## 2018-01-25 RX ADMIN — ESCITALOPRAM OXALATE 20 MG: 20 TABLET ORAL at 08:29

## 2018-01-25 RX ADMIN — NICOTINE 1 PATCH: 7 PATCH TRANSDERMAL at 08:27

## 2018-01-25 RX ADMIN — GABAPENTIN 600 MG: 600 TABLET, FILM COATED ORAL at 21:25

## 2018-01-25 RX ADMIN — FUROSEMIDE 20 MG: 20 TABLET ORAL at 17:25

## 2018-01-25 RX ADMIN — CLINDAMYCIN HYDROCHLORIDE 300 MG: 300 CAPSULE ORAL at 06:37

## 2018-01-25 RX ADMIN — CALCIUM CARBONATE (ANTACID) CHEW TAB 500 MG 500 MG: 500 CHEW TAB at 13:32

## 2018-01-25 RX ADMIN — OXYCODONE HYDROCHLORIDE 10 MG: 5 TABLET ORAL at 11:19

## 2018-01-25 RX ADMIN — ROSUVASTATIN CALCIUM 20 MG: 20 TABLET ORAL at 20:33

## 2018-01-25 RX ADMIN — PREDNISONE 10 MG: 5 TABLET ORAL at 08:27

## 2018-01-25 RX ADMIN — CLINDAMYCIN HYDROCHLORIDE 300 MG: 300 CAPSULE ORAL at 11:19

## 2018-01-25 RX ADMIN — CLINDAMYCIN HYDROCHLORIDE 300 MG: 300 CAPSULE ORAL at 17:25

## 2018-01-25 RX ADMIN — CIPROFLOXACIN HYDROCHLORIDE 500 MG: 250 TABLET, FILM COATED ORAL at 08:28

## 2018-01-25 RX ADMIN — OXYCODONE HYDROCHLORIDE 10 MG: 5 TABLET ORAL at 08:25

## 2018-01-25 RX ADMIN — CIPROFLOXACIN HYDROCHLORIDE 500 MG: 250 TABLET, FILM COATED ORAL at 20:32

## 2018-01-25 RX ADMIN — EZETIMIBE 10 MG: 10 TABLET ORAL at 20:33

## 2018-01-25 RX ADMIN — GABAPENTIN 300 MG: 100 CAPSULE ORAL at 08:27

## 2018-01-25 RX ADMIN — ASPIRIN 81 MG: 81 TABLET, COATED ORAL at 08:29

## 2018-01-25 RX ADMIN — FUROSEMIDE 20 MG: 20 TABLET ORAL at 08:30

## 2018-01-25 NOTE — PLAN OF CARE
Problem: Goal Outcome Summary  Goal: Goal Outcome Summary  Outcome: No Change  Alert and oriented x4. Pleasant during encounters. Requested and received Oxycodone q 3 hours for right leg/toes pain, requests pain medication be offered when available. Appeared to be sleeping in between cares. Patient is declining the scheduled Heparin, cites its due to pain from the injections. RLE dressings/acewrpas are c/d/i. No problems noted with the Wound-vac. Uses the urinal at bedside, staff empties. Calls appropriately for assistance. Continue with POC.    Addendum: Medicated with Zofran for nausea, patient indicated that he thought the Oxycodone might be causing the nausea.

## 2018-01-25 NOTE — PLAN OF CARE
Problem: Goal Outcome Summary  Goal: Goal Outcome Summary  Pt alert and oriented. Denies shortness of breath. Continues to complain of pain, requests PRN oxycodone q 3 hours, would like to be woken up during the night. Wound vac intact, small amount drainage this shift. Incision to right groin CDI. Left shin wound CDI, open to air. Lambs wool between right toes. Mepilex dressing to coccyx changed. Mepilex dressing to skin tear to left hand changed.  Denies any new concerns.

## 2018-01-25 NOTE — PLAN OF CARE
Problem: Goal Outcome Summary  Goal: Goal Outcome Summary  Outcome: No Change  Patient alert and oriented x 4,urinal @ bedside.Wound vac.intact,small drainage noted,no alarms,dressing will be changed tomorrow per wound nurse.Up in his room with a walker,participating in therapies.PRN pain medication every 3 hours per patient request.Tums given x 1 for heart burn.Pleasant.

## 2018-01-25 NOTE — PLAN OF CARE
Problem: Goal Outcome Summary  Goal: Goal Outcome Summary  OT-Pt limited in full ability to participate today due to feeling nauseated and fatigued.

## 2018-01-25 NOTE — PLAN OF CARE
Problem: Goal Outcome Summary  Goal: Goal Outcome Summary  Pt amb without  ft x 2; SBA; stiff R knee but after Nustep more natural heel/toe pattern with knee flexion. No LOB with step thru pattern. 12 stairs with rail, sBA. On track for goals. Pt still looking for possible other nursing facility near son but also has a house lined up to rent after 2/5

## 2018-01-25 NOTE — PROGRESS NOTES
Social Work: Initial Assessment with Discharge Plan    Patient Name: Boom Kahn  : 1950  Age: 67 year old  MRN: [id number]  Completed assessment with: Pt  Admitted to TCU: 2018    Presenting Information   Date of SW assessment: 2018  Health Care Directive: Patient considering completing  Primary Health Care Agent: default to next of kin   Secondary Health Care Agent: NA  Living Situation: Pt is originally from [place] and has put an offer in on a rental property here in [place].   Previous Functional Status: Pt was independent prior to this hospitalization.   DME available: See therapy notes.   Patient and family understanding of hospitalization: Pt stated that he is here for therapy and wound care.   Cultural/Language/Spiritual Considerations: English speaking.   Abuse concerns: No concerns   BIMS: Pt scored 15 on BIMS indicating cognitively intact.   PHQ-9: Pt scored 00 on PHQ-9 indicating no depressive symptoms.   PAS: confirmation number- [id number]  Has there been a level II screen?  No  Were there any recommendations in the screen? N/A  If yes, will the recommendations we incorporated into the Plan of Care?  N/A  Physical Health  Reason for admission:   1. Takes dietary supplements    2. S/P vascular surgery        Provider Information   Primary Care Physician:Willian Arrington   : MIGUEL    Mental Health:   Diagnosis: Depression and Anxiety   Current Support/Services: Medication/ seen by psych   Previous Services: Medication/ alcohol use   Services Needed/Recommended: Will continue to take medications as prescribed     Substance Use:  Diagnosis: Alcohol use  Current Support/Services: None   Previous Services: None   Services Needed/Recommended: Patient declined treatment     Support System  Marital Status: Single   Family support: Pt has the support of his son's but his main support is his son Dallas and DTR-in-law   Other support available: Other family- but out of  state.  Gaps in support system: Dallas is the only son in [facility]  Current in home services: None   Previous services: None     Financial/Employment/Education  Employment Status: retired   Income Source: SSI  Education: College   Financial Concerns:  No concerns   Insurance: Medicare-BCBS out of state       Discharge Plan   Patient and family discharge goal: Pt's goal is to go to a rental property he has put a offer on. If that falls through he will stay with his son,DTR-in-law and family friend for a few nights until a rental property comes through.   Provided Education on discharge plan: YES  Patient agreeable to discharge plan:  YES    Barriers to discharge: Medical clearance, safe DC plan, complete therapy goals.     Discharge Recommendations   Disposition: Pt's rental home or sons' home for 2-3 nights.   Transportation Needs: Family will provide.   Name of Transportation Company and Phone: NA    Additional comments   Pt was given his care plan goals and orders, there are no questions or concerns at this time. Writer encouraged the pt if he had questions to ask his nursing staff. SW will continue to follow and assist as needed.    CARLOS Chapa  [facility]   P: [phone]  Pgr: [phone]         01/25/18 1400   Living Arrangements   Lives With alone   Living Arrangements other (see comments)  (Lives in [place] )   Able to Return to Prior Living Arrangements (At some point yes, but not for a few months )   Discharge Needs Assessment   Patient/family verbalizes understanding of discharge plan recommendations? Yes

## 2018-01-26 ENCOUNTER — APPOINTMENT (OUTPATIENT)
Dept: LAB | Facility: CLINIC | Age: 68
End: 2018-01-26
Attending: INTERNAL MEDICINE
Payer: COMMERCIAL

## 2018-01-26 LAB — PLATELET # BLD AUTO: 450 10E9/L (ref 150–450)

## 2018-01-26 PROCEDURE — 97116 GAIT TRAINING THERAPY: CPT | Mod: GP | Performed by: PHYSICAL THERAPIST

## 2018-01-26 PROCEDURE — 40000193 ZZH STATISTIC PT WARD VISIT: Performed by: PHYSICAL THERAPIST

## 2018-01-26 PROCEDURE — G0463 HOSPITAL OUTPT CLINIC VISIT: HCPCS

## 2018-01-26 PROCEDURE — 97606 NEG PRS WND THER DME>50 SQCM: CPT

## 2018-01-26 PROCEDURE — 25000125 ZZHC RX 250: Performed by: INTERNAL MEDICINE

## 2018-01-26 PROCEDURE — 25000132 ZZH RX MED GY IP 250 OP 250 PS 637: Performed by: INTERNAL MEDICINE

## 2018-01-26 PROCEDURE — 12000022 ZZH R&B SNF

## 2018-01-26 PROCEDURE — 85049 AUTOMATED PLATELET COUNT: CPT | Performed by: INTERNAL MEDICINE

## 2018-01-26 PROCEDURE — 40000133 ZZH STATISTIC OT WARD VISIT

## 2018-01-26 PROCEDURE — 97535 SELF CARE MNGMENT TRAINING: CPT | Mod: GO

## 2018-01-26 PROCEDURE — 36415 COLL VENOUS BLD VENIPUNCTURE: CPT | Performed by: INTERNAL MEDICINE

## 2018-01-26 PROCEDURE — 25000128 H RX IP 250 OP 636: Performed by: INTERNAL MEDICINE

## 2018-01-26 PROCEDURE — 97110 THERAPEUTIC EXERCISES: CPT | Mod: GP | Performed by: PHYSICAL THERAPIST

## 2018-01-26 RX ORDER — LIDOCAINE HYDROCHLORIDE 40 MG/ML
SOLUTION TOPICAL
Status: DISCONTINUED | OUTPATIENT
Start: 2018-01-26 | End: 2018-02-01 | Stop reason: HOSPADM

## 2018-01-26 RX ADMIN — OXYCODONE HYDROCHLORIDE 10 MG: 5 TABLET ORAL at 16:31

## 2018-01-26 RX ADMIN — ESCITALOPRAM OXALATE 20 MG: 20 TABLET ORAL at 07:57

## 2018-01-26 RX ADMIN — FOLIC ACID 1 MG: 1 TABLET ORAL at 08:01

## 2018-01-26 RX ADMIN — MULTIPLE VITAMINS W/ MINERALS TAB 1 TABLET: TAB at 07:56

## 2018-01-26 RX ADMIN — GABAPENTIN 300 MG: 100 CAPSULE ORAL at 13:33

## 2018-01-26 RX ADMIN — LIDOCAINE HYDROCHLORIDE: 40 SOLUTION TOPICAL at 10:18

## 2018-01-26 RX ADMIN — CIPROFLOXACIN HYDROCHLORIDE 500 MG: 250 TABLET, FILM COATED ORAL at 07:56

## 2018-01-26 RX ADMIN — CIPROFLOXACIN HYDROCHLORIDE 500 MG: 250 TABLET, FILM COATED ORAL at 20:48

## 2018-01-26 RX ADMIN — ASPIRIN 81 MG: 81 TABLET, COATED ORAL at 08:02

## 2018-01-26 RX ADMIN — OXYCODONE HYDROCHLORIDE 10 MG: 5 TABLET ORAL at 13:33

## 2018-01-26 RX ADMIN — CLINDAMYCIN HYDROCHLORIDE 300 MG: 300 CAPSULE ORAL at 11:46

## 2018-01-26 RX ADMIN — CARVEDILOL 6.25 MG: 3.12 TABLET, FILM COATED ORAL at 08:00

## 2018-01-26 RX ADMIN — OXYCODONE HYDROCHLORIDE 10 MG: 5 TABLET ORAL at 10:18

## 2018-01-26 RX ADMIN — EZETIMIBE 10 MG: 10 TABLET ORAL at 20:52

## 2018-01-26 RX ADMIN — CLINDAMYCIN HYDROCHLORIDE 300 MG: 300 CAPSULE ORAL at 05:28

## 2018-01-26 RX ADMIN — OXYCODONE HYDROCHLORIDE 10 MG: 5 TABLET ORAL at 20:53

## 2018-01-26 RX ADMIN — NICOTINE 1 PATCH: 7 PATCH TRANSDERMAL at 07:59

## 2018-01-26 RX ADMIN — CARVEDILOL 6.25 MG: 3.12 TABLET, FILM COATED ORAL at 17:40

## 2018-01-26 RX ADMIN — GABAPENTIN 300 MG: 100 CAPSULE ORAL at 08:08

## 2018-01-26 RX ADMIN — THIAMINE HCL (VITAMIN B1) 50 MG TABLET 100 MG: at 07:57

## 2018-01-26 RX ADMIN — DOCUSATE SODIUM 100 MG: 100 CAPSULE, LIQUID FILLED ORAL at 07:56

## 2018-01-26 RX ADMIN — CLOPIDOGREL BISULFATE 75 MG: 75 TABLET, FILM COATED ORAL at 08:01

## 2018-01-26 RX ADMIN — CLINDAMYCIN HYDROCHLORIDE 300 MG: 300 CAPSULE ORAL at 17:42

## 2018-01-26 RX ADMIN — ROSUVASTATIN CALCIUM 20 MG: 20 TABLET ORAL at 20:53

## 2018-01-26 RX ADMIN — PREDNISONE 10 MG: 5 TABLET ORAL at 08:00

## 2018-01-26 RX ADMIN — GABAPENTIN 600 MG: 600 TABLET, FILM COATED ORAL at 22:12

## 2018-01-26 RX ADMIN — HEPARIN SODIUM 5000 UNITS: 5000 INJECTION, SOLUTION INTRAVENOUS; SUBCUTANEOUS at 16:30

## 2018-01-26 RX ADMIN — Medication 2.5 MG: at 22:12

## 2018-01-26 RX ADMIN — ACETAMINOPHEN 975 MG: 325 TABLET, FILM COATED ORAL at 11:48

## 2018-01-26 RX ADMIN — OXYCODONE HYDROCHLORIDE 10 MG: 5 TABLET ORAL at 05:28

## 2018-01-26 NOTE — PLAN OF CARE
Problem: OT General Care Plan  Goal: Lower Body Dressing (OT)  Lower Body Dressing (OT)   Outcome: Improving  OT: demo'd Ind with gathering boxers and sitting EOB while donning and able to thread wound vac through leg hole Ind'ly - did not see donning pants though

## 2018-01-26 NOTE — PLAN OF CARE
Problem: OT General Care Plan  Goal: Upper Body Dressing (OT)  Upper Body Dressing (OT)   Outcome: Completed Date Met: 01/26/18  OT: Ind with donning overhead tshirt while standing unsupported - able to gather clothing without AD

## 2018-01-26 NOTE — PLAN OF CARE
Problem: Individualization  Goal: Patient Preferences  PT: pt just now with change of wound vac, pt having increased pain.   Cx PT session.

## 2018-01-26 NOTE — PLAN OF CARE
Problem: OT General Care Plan  Goal: Toilet Transfer/Toileting (OT)  Toilet Transfer/Toileting (OT)   Outcome: Improving  OT: able to transfer while monitoring wound vac using grab bar with Mod I - able to complete kayleigh cares Ind'ly

## 2018-01-26 NOTE — PLAN OF CARE
Problem: Goal Outcome Summary  Goal: Goal Outcome Summary  Outcome: No Change  Alert and oriented x4. Requested and received Oxycodone x2 for right foot pain. Melatonin also given to patient at the beginning of the shift per request for sleep, stated he slept well during morning cares. Patient is declining the Heparin injection  at this time. No noted problems with the wound-vac, continuous suction at 100 mmHg. Urinal at bedside.call light in reach. Continue with POC.

## 2018-01-26 NOTE — PLAN OF CARE
Problem: Goal Outcome Summary  Goal: Goal Outcome Summary  Outcome: No Change  Patient alert and oriented x 4,up in the halls with PT participating in therapies.Wound vac intact,cont. Setting of 100 mm,no alarms,wound nurse changed dressing.Patient has a pass to go out for dinner with his son for 4 hours.PRN pain medication every 3 hours per patient request,he calls when due.Patient has been refusing heparin injections,Dr Snowden talked to patient and according to MD patient will do heparin next is due.Pleasant.

## 2018-01-26 NOTE — PLAN OF CARE
Problem: OT General Care Plan  Goal: Toilet Transfer/Toileting (OT)  Toilet Transfer/Toileting (OT)   Outcome: Completed Date Met: 01/26/18  OT: Ind with toileting transfer and kayleigh cares while managing wound vac w/o any AD

## 2018-01-26 NOTE — PROGRESS NOTES
[facility]  WO Nurse Inpatient Wound Assessment-KCI VAC Dressing changes     Initial Assessment of wound(s) on pt's:  Right Leg        Data:   Patient History:      per MD note(s):  Severe Peripheral Artery Disease -   Critical limb ischemia R>L with bilateral LE wounds; Acute on Chronic pain  S/p right femoral endarterectomy with in-situ GSV femoral to posterior tibial artery bypass 1/15/18. Surgeons had been managing VAC dressing replacements while in acute care setting , WOC service to manage now that in TCU setting.  Per discussions with aly yesterday, he is wondering if there is a better way to control pain during dressing changes as last dressing change was quite painful for him.      Chance Assessment and sub scores:   Chance Score  Av.8  Min: 17  Max: 20      Mattress:  Standard , Atmos Air mattress    Moisture Management:  Bowel Program    Catheter secured? Not applicable    Current Diet / Nutrition:           Active Diet Order      Combination Diet 2 gm NA Diet    Other     Labs:   Recent Labs   Lab Test  18   0629   18   0322   01/15/18   0728   18   1009   ALBUMIN   --    --    --    --    --    --   2.7*   HGB  9.6*   < >  9.3*   < >   --    < >  11.7*   INR   --    --   1.18*   --    --    < >   --    WBC  10.1   < >  11.7*   --    --    < >  9.3   CRP   --    --    --    --   20.0*   --    --     < > = values in this interval not displayed.       Wound Assessment (location #1):   Right lower leg  Wound History:  PVD,  s/p right femoral endarterectomy with in-situ GSV femoral to posterior tibial artery bypass          Age of wound/ surgical date: 1/15/18    Date KCI VAC placed: 19      KCI Wound VAC initiated by:  [facility] Nurse: No  MD: Yes    Any other wound therapies tried prior to KCI VAC placed? Yes - Location: [facility]    Specific Dimensions (length x width x depth, in cm) :   6.8 x 6.2 x 0.3cm    Tunneling:  N/A   "    Undermining: N/A    Wound Base: moist, granulation, non-granulation, slough and nonviable tendon that is sloughing    Palpation of the wound bed:  normal    Slough appearance:  stringy and yellow         Eschar appearance:  none    Periwound Skin: intact,  Slightly macerated at one edge    Color: normal and consistent with surrounding tissue    Temperature  normal     Drainage:  . Amount: small . Color: serous     Odor: none    Pain:  moderate , stabbing at one point when dressing removed form right lateral edge of wound    Was patient premedicated prior to dressing change? Yes    Medication(s) used:  Lidocaine topical solution    Other:  It was noted during visit that he also has a very desiccated wound  on the left anterior shin that he currently has orders for , but has been declining care /dressings to this area .  He also  has several dark dry scabs to tips of Right foot toes        Intervention:     Patient's chart evaluated, order received for topical Lidocaine for dressing changes     Wound was assessed.    Wound Care: was done:  Cleansed with Microklenz , applied new granufoam dressing , negative pressure achieved     Orders  Reviewed( notes from vascular service say to \"replace VAC 3 x week and to achieve negative pressure at -125 mmhg if tolerates\")    Supplies  at bedside    Discussed plan of care with Patient, Nurse and care coordinator    Call placed to vascular NP to confirm VAC orders and to inquire if they are doing a visit next week           Assessment:       Right lower leg wound secondary to PVD recieving NWPT        Plan:     Nursing to notify the Provider(s) and re-consult the [facility] Nurse if wound(s) deteriorate(s) or if necessary to reevaluate the plan.    Plan of care for wound located on Right leg :  ? NPWT dressing change by the Lisa SALCEDO ( per verbal order today from vascular NP)  ? NPWT using  black foam and pressure set to -125mmHg continuous suction ( see vascular surgery " notes).  ? Need to premedicate pt prior to dressing change: oral meds and  4% Lidocaine solution    Per vascular NP(Shannan:  [phone]):  They are trying to arrange for him to be seen in [facility] clinic on Wednesday next week

## 2018-01-26 NOTE — PLAN OF CARE
Problem: OT General Care Plan  Goal: Hygiene/Grooming (OT)  Hygiene/Grooming (OT)   Outcome: Completed Date Met: 01/26/18  OT: standing at sink for ~15 mins without any AD or support while completing UE g/h with good safety awareness

## 2018-01-26 NOTE — PROGRESS NOTES
TCU Care Coordinator Progress Note    Admission date: 1/23/2018    Data: Boom Kahn is a 68 yo male on TCU for rehab and wound care following hospitalization for RLE vascular surgery and wound VAC placement.    Intervention: Met with patient and son Dallas to introduce the role of Care Coordinator and to begin discussion of anticipated DC planning needs. Initiated referral with [facility] for home care services of , per patient request.    Assessment: Patient will have above home care needs. Has wound VAC on RLE, will need 3x/week VAC dressing changes by home care RN. Currently on Heparin SQ, can learn to self-inject if needs to be on med at home. Patient has a rental housing arrangement in [place] set for 2/4/2018. He will likely DC from TCU around 1/31 so will either stay with son in [place] temporarily or will find other temporary lodging for those few days. Patient also has simple wound care to the coccyx and L shin.    Plan: Will continue to follow DC planning needs throughout TCU stay. Will order wound VAC for home use. Potential DC around 1/31/2018 if he meets TCU therapy goals and remains medically stable.    Chandana Spears RN, BSN, Patient Care Management Coordinator  [facility]  [address], [address] [place] [address]  [email or web address]  [email or web address]   Desk: [phone] TCU Main [phone] Fax [phone] Pager [phone]

## 2018-01-27 PROCEDURE — 40000193 ZZH STATISTIC PT WARD VISIT: Performed by: PHYSICAL THERAPIST

## 2018-01-27 PROCEDURE — 12000022 ZZH R&B SNF

## 2018-01-27 PROCEDURE — 97110 THERAPEUTIC EXERCISES: CPT | Mod: GP

## 2018-01-27 PROCEDURE — 40000133 ZZH STATISTIC OT WARD VISIT: Performed by: OCCUPATIONAL THERAPIST

## 2018-01-27 PROCEDURE — 97110 THERAPEUTIC EXERCISES: CPT | Mod: GP | Performed by: PHYSICAL THERAPIST

## 2018-01-27 PROCEDURE — 97530 THERAPEUTIC ACTIVITIES: CPT | Mod: GP | Performed by: PHYSICAL THERAPIST

## 2018-01-27 PROCEDURE — 97530 THERAPEUTIC ACTIVITIES: CPT | Mod: GP

## 2018-01-27 PROCEDURE — 40000193 ZZH STATISTIC PT WARD VISIT

## 2018-01-27 PROCEDURE — 25000125 ZZHC RX 250: Performed by: INTERNAL MEDICINE

## 2018-01-27 PROCEDURE — 97535 SELF CARE MNGMENT TRAINING: CPT | Mod: GO | Performed by: OCCUPATIONAL THERAPIST

## 2018-01-27 PROCEDURE — 25000132 ZZH RX MED GY IP 250 OP 250 PS 637: Performed by: INTERNAL MEDICINE

## 2018-01-27 PROCEDURE — 25000128 H RX IP 250 OP 636: Performed by: INTERNAL MEDICINE

## 2018-01-27 RX ORDER — HEPARIN SODIUM 5000 [USP'U]/.5ML
5000 INJECTION, SOLUTION INTRAVENOUS; SUBCUTANEOUS EVERY 12 HOURS
Status: DISCONTINUED | OUTPATIENT
Start: 2018-01-27 | End: 2018-02-01 | Stop reason: HOSPADM

## 2018-01-27 RX ADMIN — CLINDAMYCIN HYDROCHLORIDE 300 MG: 300 CAPSULE ORAL at 18:26

## 2018-01-27 RX ADMIN — OXYCODONE HYDROCHLORIDE 10 MG: 5 TABLET ORAL at 03:31

## 2018-01-27 RX ADMIN — OXYCODONE HYDROCHLORIDE 10 MG: 5 TABLET ORAL at 13:48

## 2018-01-27 RX ADMIN — OXYCODONE HYDROCHLORIDE 10 MG: 5 TABLET ORAL at 16:58

## 2018-01-27 RX ADMIN — PREDNISONE 10 MG: 5 TABLET ORAL at 08:51

## 2018-01-27 RX ADMIN — ASPIRIN 81 MG: 81 TABLET, COATED ORAL at 08:57

## 2018-01-27 RX ADMIN — CLOPIDOGREL BISULFATE 75 MG: 75 TABLET, FILM COATED ORAL at 09:00

## 2018-01-27 RX ADMIN — CLINDAMYCIN HYDROCHLORIDE 300 MG: 300 CAPSULE ORAL at 13:44

## 2018-01-27 RX ADMIN — CLINDAMYCIN HYDROCHLORIDE 300 MG: 300 CAPSULE ORAL at 00:26

## 2018-01-27 RX ADMIN — THIAMINE HCL (VITAMIN B1) 50 MG TABLET 100 MG: at 08:51

## 2018-01-27 RX ADMIN — OXYCODONE HYDROCHLORIDE 10 MG: 5 TABLET ORAL at 10:11

## 2018-01-27 RX ADMIN — CIPROFLOXACIN HYDROCHLORIDE 500 MG: 250 TABLET, FILM COATED ORAL at 08:57

## 2018-01-27 RX ADMIN — NICOTINE 1 PATCH: 7 PATCH TRANSDERMAL at 08:58

## 2018-01-27 RX ADMIN — ACETAMINOPHEN 975 MG: 325 TABLET, FILM COATED ORAL at 02:41

## 2018-01-27 RX ADMIN — Medication 2.5 MG: at 21:59

## 2018-01-27 RX ADMIN — CARVEDILOL 6.25 MG: 3.12 TABLET, FILM COATED ORAL at 18:26

## 2018-01-27 RX ADMIN — CARVEDILOL 6.25 MG: 3.12 TABLET, FILM COATED ORAL at 08:55

## 2018-01-27 RX ADMIN — OXYCODONE HYDROCHLORIDE 10 MG: 5 TABLET ORAL at 06:38

## 2018-01-27 RX ADMIN — MULTIPLE VITAMINS W/ MINERALS TAB 1 TABLET: TAB at 08:51

## 2018-01-27 RX ADMIN — FUROSEMIDE 20 MG: 20 TABLET ORAL at 08:57

## 2018-01-27 RX ADMIN — POLYETHYLENE GLYCOL 3350 17 G: 17 POWDER, FOR SOLUTION ORAL at 08:58

## 2018-01-27 RX ADMIN — CLINDAMYCIN HYDROCHLORIDE 300 MG: 300 CAPSULE ORAL at 06:38

## 2018-01-27 RX ADMIN — HEPARIN SODIUM 5000 UNITS: 5000 INJECTION, SOLUTION INTRAVENOUS; SUBCUTANEOUS at 09:00

## 2018-01-27 RX ADMIN — FOLIC ACID 1 MG: 1 TABLET ORAL at 08:59

## 2018-01-27 RX ADMIN — GABAPENTIN 600 MG: 600 TABLET, FILM COATED ORAL at 21:59

## 2018-01-27 RX ADMIN — ISOSORBIDE MONONITRATE 60 MG: 60 TABLET, EXTENDED RELEASE ORAL at 08:51

## 2018-01-27 RX ADMIN — GABAPENTIN 300 MG: 100 CAPSULE ORAL at 13:48

## 2018-01-27 RX ADMIN — OXYCODONE HYDROCHLORIDE 10 MG: 5 TABLET ORAL at 20:11

## 2018-01-27 RX ADMIN — ACETAMINOPHEN 975 MG: 325 TABLET, FILM COATED ORAL at 10:11

## 2018-01-27 RX ADMIN — GABAPENTIN 300 MG: 100 CAPSULE ORAL at 08:50

## 2018-01-27 RX ADMIN — EZETIMIBE 10 MG: 10 TABLET ORAL at 20:11

## 2018-01-27 RX ADMIN — ESCITALOPRAM OXALATE 20 MG: 20 TABLET ORAL at 08:51

## 2018-01-27 RX ADMIN — CLINDAMYCIN HYDROCHLORIDE 300 MG: 300 CAPSULE ORAL at 23:20

## 2018-01-27 RX ADMIN — HEPARIN SODIUM 5000 UNITS: 5000 INJECTION, SOLUTION INTRAVENOUS; SUBCUTANEOUS at 02:41

## 2018-01-27 RX ADMIN — OXYCODONE HYDROCHLORIDE 10 MG: 5 TABLET ORAL at 23:12

## 2018-01-27 RX ADMIN — CIPROFLOXACIN HYDROCHLORIDE 500 MG: 250 TABLET, FILM COATED ORAL at 20:11

## 2018-01-27 RX ADMIN — ENALAPRIL MALEATE 2.5 MG: 2.5 TABLET ORAL at 08:58

## 2018-01-27 RX ADMIN — DOCUSATE SODIUM 100 MG: 100 CAPSULE, LIQUID FILLED ORAL at 08:57

## 2018-01-27 RX ADMIN — ROSUVASTATIN CALCIUM 20 MG: 20 TABLET ORAL at 20:13

## 2018-01-27 RX ADMIN — OXYCODONE HYDROCHLORIDE 10 MG: 5 TABLET ORAL at 00:26

## 2018-01-27 NOTE — PHARMACY-MEDICATION REGIMEN REVIEW
Pharmacy Medication Regimen Review  Boom Kahn is a 67 year old male who is currently in the Transitional Care Unit.    Assessment: All medications have an appropriate indications, durations and no unnecessary use was found  Antipsychotic medications: Escitalopram is for depression; Olanzapine is for psychosis with delirium and it is be to stopped on 1/27/18    Plan:   Continue current medications.  Attending provider will be sent this note for review.  If there are any emergent issues noted above, pharmacist will contact provider directly by phone.      Pharmacy will periodically review the resident's medication regimen for any PRN medications not administered in > 72 hours and discontinue them. The pharmacist will discuss gradual dose reductions of psychopharmacologic medications with interdisciplinary team on a regular basis.    Please contact pharmacy if the above does not answer specific medication questions/concerns.    Background:  A pharmacist has reviewed all medications and pertinent medical history today.  Medications were reviewed for appropriate use and any irregularities found are listed with recommendations.      Current Facility-Administered Medications:      [START ON 1/28/2018] nicotine (NICODERM CQ) 7 MG/24HR 24 hr patch 1 patch, 1 patch, Transdermal, Daily, Rodriguez Snowden MD     lidocaine (XYLOCAINE) 4 % solution, , Topical, Q Mon Wed Fri AM, Rodriguez Snowden MD     calcium carbonate (TUMS) chewable tablet 500 mg, 500 mg, Oral, BID PRN, Malia Baldwin PA, 500 mg at 01/25/18 1332     furosemide (LASIX) tablet 20 mg, 20 mg, Oral, BID w/meals, Rodriguez Snowden MD, 20 mg at 01/27/18 0857     multivitamin, therapeutic with minerals (THERA-VIT-M) tablet 1 tablet, 1 tablet, Oral, Daily, Rodriguez Snowden MD, 1 tablet at 01/27/18 0851     thiamine tablet 100 mg, 100 mg, Oral, Daily, Rodriguez Snowden MD, 100 mg at 01/27/18 0851     folic acid (FOLVITE) tablet 1 mg, 1 mg, Oral, Daily, Rodriguez Snowden  MD, 1 mg at 01/27/18 0859     nicotine Patch in Place, , Transdermal, Q8H, Rodriguez Snowden MD     nicotine patch REMOVAL, , Transdermal, Daily, Rodriguez Snowden MD     OLANZapine zydis (zyPREXA) ODT half-tab 2.5 mg, 2.5 mg, Oral, At Bedtime, Rodriguez Snowden MD, 2.5 mg at 01/26/18 2212     melatonin tablet 3 mg, 3 mg, Oral, At Bedtime PRN, Rodriguez Snowden MD, 3 mg at 01/25/18 2350     acetaminophen (TYLENOL) tablet 975 mg, 975 mg, Oral, Q8H PRN, Rodriguez Snowden MD, 975 mg at 01/27/18 0241     aspirin EC EC tablet 81 mg, 81 mg, Oral, Daily, Rodriguez Snowden MD, 81 mg at 01/27/18 0857     carvedilol (COREG) tablet 6.25 mg, 6.25 mg, Oral, BID w/meals, Rodriguez Snowden MD, 6.25 mg at 01/27/18 0855     ciprofloxacin (CIPRO) tablet 500 mg, 500 mg, Oral, Q12H, Rodriguez Snowden MD, 500 mg at 01/27/18 0857     clindamycin (CLEOCIN) capsule 300 mg, 300 mg, Oral, Q6H, Rodriguez Snowden MD, 300 mg at 01/27/18 0638     clopidogrel (PLAVIX) tablet 75 mg, 75 mg, Oral, Daily, Rodriguez Snowden MD, 75 mg at 01/27/18 0900     docusate sodium (COLACE) capsule 100 mg, 100 mg, Oral, Daily, Rodriguez Snowden MD, 100 mg at 01/27/18 0857     enalapril (VASOTEC) tablet 2.5 mg, 2.5 mg, Oral, BID, Rodriguez Snowden MD, 2.5 mg at 01/27/18 0858     escitalopram (LEXAPRO) tablet 20 mg, 20 mg, Oral, Daily, Rodriguez Snowden MD, 20 mg at 01/27/18 0851     ezetimibe (ZETIA) tablet 10 mg, 10 mg, Oral, Daily, Rodriguez Snowden MD, 10 mg at 01/26/18 2052     fentaNYL (DURAGESIC) 25 mcg/hr 72 hr patch 1 patch, 25 mcg, Transdermal, Q72H, Rodriguez Snowden MD, 1 patch at 01/25/18 1050     gabapentin (NEURONTIN) capsule 300 mg, 300 mg, Oral, BID, Rodriguez Snowden MD, 300 mg at 01/27/18 0850     gabapentin (NEURONTIN) tablet 600 mg, 600 mg, Oral, At Bedtime, Rodriguez Snowden MD, 600 mg at 01/26/18 2212     isosorbide mononitrate (IMDUR) 24 hr tablet 60 mg, 60 mg, Oral, Daily, Rodriguez Snowden MD, 60 mg at 01/27/18 0851     oxyCODONE IR (ROXICODONE) tablet 5-10 mg, 5-10 mg,  Oral, Q3H PRN, Rodriguez Snowden MD, 10 mg at 01/27/18 0638     polyethylene glycol (MIRALAX/GLYCOLAX) Packet 17 g, 17 g, Oral, Daily, Rodriguez Snowden MD, 17 g at 01/27/18 0858     predniSONE (DELTASONE) tablet 10 mg, 10 mg, Oral, Daily, Rodriguez Snowden MD, 10 mg at 01/27/18 0851     rosuvastatin (CRESTOR) tablet 20 mg, 20 mg, Oral, Daily, Rodriguez Snowden MD, 20 mg at 01/26/18 2053     spironolactone (ALDACTONE) tablet 25 mg, 25 mg, Oral, HOLD, Rodriguez Snowden MD     medication instructions, , Does not apply, Continuous PRN, Rodriguez Snowden MD     acetaminophen (TYLENOL) Suppository 650 mg, 650 mg, Rectal, Q4H PRN, Rodriguez Snowden MD     heparin sodium PF injection 5,000 Units, 5,000 Units, Subcutaneous, Q8H, Rodriguez Snowden MD, 5,000 Units at 01/27/18 0900     tuberculin injection 5 Units, 5 Units, Intradermal, Q21 Days, Rodriguez Snowden MD, 5 Units at 01/24/18 0822     - Skin Test Reading -, , Does not apply, Q21 Days, Rodriguez Snowden MD     pneumococcal (PREVNAR 13) injection 0.5 mL, 0.5 mL, Intramuscular, Once, Rodriguez Snowden MD     ondansetron (ZOFRAN-ODT) ODT tab 4 mg, 4 mg, Oral, Q6H PRN, 4 mg at 01/25/18 0639 **OR** ondansetron (ZOFRAN) injection 4 mg, 4 mg, Intravenous, Q6H PRN, Rodriguez Snowden MD     naloxone (NARCAN) injection 0.1-0.4 mg, 0.1-0.4 mg, Intravenous, Q2 Min PRN, Rodriguez Snowden MD     fentaNYL (DURAGESIC) patch REMOVAL, , Transdermal, Q72H, Rodriguez Snowden MD     fentaNYL (DURAGESIC) Patch in Place, , Transdermal, Q8H, Rodriguez Snowden MD  No current outpatient prescriptions on file.   PMH:  Severe Peripheral Artery Disease - Critical limb ischemia R>L with bilateral LE wounds, Acute on Chronic pain, S/p right femoral endarterectomy with in-situ GSV femoral to posterior tibial artery bypass 1/15/18; HFpEF (EF reported 25-30%); CAD s/p CABGx3 and PCIs, Coronary Angio and AYDE stenting of proximal left circumflex 01/05, ICM hx of MI (2008); HTN; EtOH and tobacco Dependence; Restrictive Airway  Disease; Malignancy of Lung-Right Upper Lobe; and Depression.

## 2018-01-27 NOTE — PLAN OF CARE
Problem: Goal Outcome Summary  Goal: Goal Outcome Summary  RN: Pt A/O x4, VSS. Pt went out with family for dinner and is very happy about it. Wound VAC dressing CDI, VAC functioning well without issues. Pt gets a skin tear in right wrist, cleansed area, dressing applied. PRN med given for pain with some relief. Pt denies SOB and has no complaints. Continue with POC.

## 2018-01-27 NOTE — PLAN OF CARE
Problem: Goal Outcome Summary  Goal: Goal Outcome Summary  PT:  Assessed pt's safety in his room if ambulating without an assistive device and carrying the wound vac.  Pt. managed bed to bathroom and simulated activities in bathroom, going to window sill to get something out of his suitcase and adjusted thee blinds.  Was very aware of the wound vac and its tubing.  Had no LOB or deviations of movement evident throughout.  Indicated to pt. that he is approved by PT to ambulate independently in his room. Suggested he still call for help if he awakens at night and needs to get out of bed for the next few nights.  He agreed to this.

## 2018-01-27 NOTE — PLAN OF CARE
Problem: Goal Outcome Summary  Goal: Goal Outcome Summary  Outcome: No Change  Pt.A&Ox4. Forgetful. Requests pain meds when due - Oxycodone x2 and Tyl.975mg x1 for c/o pain (R) shin wound vac site. Stg.II PU coccyx - reapplied Mepilex @ 0330. Pt.verbalized understanding and need to stay off area as much as possible. Heparin injection administered this shift but pt.needed re-education of importance of med and also needed encouragement as he initially declined. Pt.asked if Heparin could be given once or twice a day instead of every shift - sticky noted MD of pt.request.

## 2018-01-27 NOTE — PLAN OF CARE
Problem: Goal Outcome Summary  Goal: Goal Outcome Summary  Outcome: Improving  Patient alert and oriented x 4,up in his room with a walker,independent with bathroom,urinal @ bed side.Coccyx dressing C/D/I.Wound vac.intact,no alarms,small drainage noted.Patient eating and drinking okay.Pain medication when due per patient and he will call when it is time.Debbie saw patient to discuss with patient his discharge concerns.According to patient his home is not available yet,it has been rented out.

## 2018-01-28 PROCEDURE — 12000022 ZZH R&B SNF

## 2018-01-28 PROCEDURE — 25000125 ZZHC RX 250: Performed by: INTERNAL MEDICINE

## 2018-01-28 PROCEDURE — 25000128 H RX IP 250 OP 636: Performed by: INTERNAL MEDICINE

## 2018-01-28 PROCEDURE — 25000132 ZZH RX MED GY IP 250 OP 250 PS 637: Performed by: INTERNAL MEDICINE

## 2018-01-28 RX ADMIN — HEPARIN SODIUM 5000 UNITS: 5000 INJECTION, SOLUTION INTRAVENOUS; SUBCUTANEOUS at 20:37

## 2018-01-28 RX ADMIN — FUROSEMIDE 20 MG: 20 TABLET ORAL at 18:04

## 2018-01-28 RX ADMIN — OXYCODONE HYDROCHLORIDE 10 MG: 5 TABLET ORAL at 22:01

## 2018-01-28 RX ADMIN — OXYCODONE HYDROCHLORIDE 10 MG: 5 TABLET ORAL at 12:18

## 2018-01-28 RX ADMIN — PREDNISONE 10 MG: 5 TABLET ORAL at 08:12

## 2018-01-28 RX ADMIN — ESCITALOPRAM OXALATE 20 MG: 20 TABLET ORAL at 08:12

## 2018-01-28 RX ADMIN — GABAPENTIN 300 MG: 100 CAPSULE ORAL at 08:08

## 2018-01-28 RX ADMIN — CIPROFLOXACIN HYDROCHLORIDE 500 MG: 250 TABLET, FILM COATED ORAL at 08:12

## 2018-01-28 RX ADMIN — OXYCODONE HYDROCHLORIDE 10 MG: 5 TABLET ORAL at 04:36

## 2018-01-28 RX ADMIN — CARVEDILOL 6.25 MG: 3.12 TABLET, FILM COATED ORAL at 18:04

## 2018-01-28 RX ADMIN — ENALAPRIL MALEATE 2.5 MG: 2.5 TABLET ORAL at 20:37

## 2018-01-28 RX ADMIN — CLINDAMYCIN HYDROCHLORIDE 300 MG: 300 CAPSULE ORAL at 12:18

## 2018-01-28 RX ADMIN — CLOPIDOGREL BISULFATE 75 MG: 75 TABLET, FILM COATED ORAL at 08:13

## 2018-01-28 RX ADMIN — CIPROFLOXACIN HYDROCHLORIDE 500 MG: 250 TABLET, FILM COATED ORAL at 20:37

## 2018-01-28 RX ADMIN — GABAPENTIN 600 MG: 600 TABLET, FILM COATED ORAL at 22:01

## 2018-01-28 RX ADMIN — ACETAMINOPHEN 975 MG: 325 TABLET, FILM COATED ORAL at 08:07

## 2018-01-28 RX ADMIN — CLINDAMYCIN HYDROCHLORIDE 300 MG: 300 CAPSULE ORAL at 18:05

## 2018-01-28 RX ADMIN — OXYCODONE HYDROCHLORIDE 10 MG: 5 TABLET ORAL at 19:12

## 2018-01-28 RX ADMIN — FOLIC ACID 1 MG: 1 TABLET ORAL at 08:12

## 2018-01-28 RX ADMIN — GABAPENTIN 300 MG: 100 CAPSULE ORAL at 13:11

## 2018-01-28 RX ADMIN — ROSUVASTATIN CALCIUM 20 MG: 20 TABLET ORAL at 20:37

## 2018-01-28 RX ADMIN — HEPARIN SODIUM 5000 UNITS: 5000 INJECTION, SOLUTION INTRAVENOUS; SUBCUTANEOUS at 08:09

## 2018-01-28 RX ADMIN — OXYCODONE HYDROCHLORIDE 10 MG: 5 TABLET ORAL at 15:48

## 2018-01-28 RX ADMIN — POLYETHYLENE GLYCOL 3350 17 G: 17 POWDER, FOR SOLUTION ORAL at 08:08

## 2018-01-28 RX ADMIN — DOCUSATE SODIUM 100 MG: 100 CAPSULE, LIQUID FILLED ORAL at 08:13

## 2018-01-28 RX ADMIN — ACETAMINOPHEN 975 MG: 325 TABLET, FILM COATED ORAL at 18:03

## 2018-01-28 RX ADMIN — ASPIRIN 81 MG: 81 TABLET, COATED ORAL at 08:11

## 2018-01-28 RX ADMIN — EZETIMIBE 10 MG: 10 TABLET ORAL at 20:37

## 2018-01-28 RX ADMIN — FENTANYL 1 PATCH: 25 PATCH, EXTENDED RELEASE TRANSDERMAL at 10:05

## 2018-01-28 RX ADMIN — CLINDAMYCIN HYDROCHLORIDE 300 MG: 300 CAPSULE ORAL at 05:46

## 2018-01-28 RX ADMIN — OXYCODONE HYDROCHLORIDE 10 MG: 5 TABLET ORAL at 07:36

## 2018-01-28 RX ADMIN — NICOTINE 1 PATCH: 7 PATCH TRANSDERMAL at 08:14

## 2018-01-28 RX ADMIN — THIAMINE HCL (VITAMIN B1) 50 MG TABLET 100 MG: at 08:11

## 2018-01-28 RX ADMIN — MULTIPLE VITAMINS W/ MINERALS TAB 1 TABLET: TAB at 08:12

## 2018-01-28 NOTE — PLAN OF CARE
Problem: Goal Outcome Summary  Goal: Goal Outcome Summary  RN: Pt A/O x4, see VS in the system. Some medications held per order parameters. Wound VAC dressing CDI, no issues. Pt requested PRN med for pain every 3hrs with good effects. Dressing to coccyx CDI, blanchable redness around wound noticed, encouraged turn/reposition. Pt denies SOB this shift. Continue with POC.

## 2018-01-28 NOTE — PLAN OF CARE
Problem: Goal Outcome Summary  Goal: Goal Outcome Summary  OT: patient reports having a meeting with social work today regarding DC planning.

## 2018-01-28 NOTE — PLAN OF CARE
Problem: Goal Outcome Summary  Goal: Goal Outcome Summary  Outcome: No Change  Pt.A&Ox4. Beginning of shift pt.ambulating in hallway without AD and carrying wound vac, steady gait. Requested and rec'd Oxycodone 10mg po x2 for c/o pain (R) shin wound vac site. Vac intact/patent @ 100mmHg continuous suction - scant > small amt.serosanguinous drng. Nicotine patch intact (R) deltoid. Fentanyl patch apparently has fallen off but pt.unsure when, scheduled for new patch this AM. Mepilex intact to coccyx, pt.compliant with side-lying and verbalized understanding of PUP.    0550 - Pt.reported small, soft BM.

## 2018-01-28 NOTE — PLAN OF CARE
Problem: Goal Outcome Summary  Goal: Goal Outcome Summary  Outcome: Improving  Patient alert and oriented x 4,independent in the room.Right shin wound vac.continous,no alarms.PRN pain medication when due.Patient has good appetite,he would Ensure drink discontinued,he complains it is too sweet.Coccyx Dressing C/D/I.Pleasant.

## 2018-01-29 LAB
ANION GAP SERPL CALCULATED.3IONS-SCNC: 5 MMOL/L (ref 3–14)
BUN SERPL-MCNC: 14 MG/DL (ref 7–30)
CALCIUM SERPL-MCNC: 9 MG/DL (ref 8.5–10.1)
CHLORIDE SERPL-SCNC: 107 MMOL/L (ref 94–109)
CO2 SERPL-SCNC: 29 MMOL/L (ref 20–32)
CREAT SERPL-MCNC: 0.6 MG/DL (ref 0.66–1.25)
ERYTHROCYTE [DISTWIDTH] IN BLOOD BY AUTOMATED COUNT: 14.9 % (ref 10–15)
GFR SERPL CREATININE-BSD FRML MDRD: >90 ML/MIN/1.7M2
GLUCOSE SERPL-MCNC: 85 MG/DL (ref 70–99)
HCT VFR BLD AUTO: 33.3 % (ref 40–53)
HGB BLD-MCNC: 10.7 G/DL (ref 13.3–17.7)
MCH RBC QN AUTO: 32.8 PG (ref 26.5–33)
MCHC RBC AUTO-ENTMCNC: 32.1 G/DL (ref 31.5–36.5)
MCV RBC AUTO: 102 FL (ref 78–100)
PLATELET # BLD AUTO: 404 10E9/L (ref 150–450)
POTASSIUM SERPL-SCNC: 3.8 MMOL/L (ref 3.4–5.3)
RBC # BLD AUTO: 3.26 10E12/L (ref 4.4–5.9)
SODIUM SERPL-SCNC: 141 MMOL/L (ref 133–144)
WBC # BLD AUTO: 8.7 10E9/L (ref 4–11)

## 2018-01-29 PROCEDURE — 25000132 ZZH RX MED GY IP 250 OP 250 PS 637: Performed by: INTERNAL MEDICINE

## 2018-01-29 PROCEDURE — 25000125 ZZHC RX 250: Performed by: INTERNAL MEDICINE

## 2018-01-29 PROCEDURE — 36415 COLL VENOUS BLD VENIPUNCTURE: CPT | Performed by: INTERNAL MEDICINE

## 2018-01-29 PROCEDURE — 25000128 H RX IP 250 OP 636: Performed by: INTERNAL MEDICINE

## 2018-01-29 PROCEDURE — 97116 GAIT TRAINING THERAPY: CPT | Mod: GP

## 2018-01-29 PROCEDURE — 97530 THERAPEUTIC ACTIVITIES: CPT | Mod: GP

## 2018-01-29 PROCEDURE — 85027 COMPLETE CBC AUTOMATED: CPT | Performed by: INTERNAL MEDICINE

## 2018-01-29 PROCEDURE — 97605 NEG PRS WND THER DME<=50SQCM: CPT

## 2018-01-29 PROCEDURE — 12000022 ZZH R&B SNF

## 2018-01-29 PROCEDURE — 97110 THERAPEUTIC EXERCISES: CPT | Mod: GP

## 2018-01-29 PROCEDURE — 97535 SELF CARE MNGMENT TRAINING: CPT | Mod: GO | Performed by: OCCUPATIONAL THERAPIST

## 2018-01-29 PROCEDURE — 40000193 ZZH STATISTIC PT WARD VISIT

## 2018-01-29 PROCEDURE — 80048 BASIC METABOLIC PNL TOTAL CA: CPT | Performed by: INTERNAL MEDICINE

## 2018-01-29 PROCEDURE — 99308 SBSQ NF CARE LOW MDM 20: CPT | Performed by: INTERNAL MEDICINE

## 2018-01-29 PROCEDURE — 40000133 ZZH STATISTIC OT WARD VISIT: Performed by: OCCUPATIONAL THERAPIST

## 2018-01-29 RX ORDER — ZINC SULFATE 50(220)MG
220 CAPSULE ORAL DAILY
Status: DISCONTINUED | OUTPATIENT
Start: 2018-01-29 | End: 2018-02-01 | Stop reason: HOSPADM

## 2018-01-29 RX ORDER — ASCORBIC ACID 500 MG
500 TABLET ORAL DAILY
Status: DISCONTINUED | OUTPATIENT
Start: 2018-01-29 | End: 2018-02-01 | Stop reason: HOSPADM

## 2018-01-29 RX ADMIN — OXYCODONE HYDROCHLORIDE 10 MG: 5 TABLET ORAL at 09:13

## 2018-01-29 RX ADMIN — OXYCODONE HYDROCHLORIDE 10 MG: 5 TABLET ORAL at 22:14

## 2018-01-29 RX ADMIN — ACETAMINOPHEN 975 MG: 325 TABLET, FILM COATED ORAL at 18:49

## 2018-01-29 RX ADMIN — THIAMINE HCL (VITAMIN B1) 50 MG TABLET 100 MG: at 09:16

## 2018-01-29 RX ADMIN — FOLIC ACID 1 MG: 1 TABLET ORAL at 09:14

## 2018-01-29 RX ADMIN — EZETIMIBE 10 MG: 10 TABLET ORAL at 19:19

## 2018-01-29 RX ADMIN — ESCITALOPRAM OXALATE 20 MG: 20 TABLET ORAL at 09:14

## 2018-01-29 RX ADMIN — CIPROFLOXACIN HYDROCHLORIDE 500 MG: 250 TABLET, FILM COATED ORAL at 09:15

## 2018-01-29 RX ADMIN — CARVEDILOL 6.25 MG: 3.12 TABLET, FILM COATED ORAL at 18:51

## 2018-01-29 RX ADMIN — CLINDAMYCIN HYDROCHLORIDE 300 MG: 300 CAPSULE ORAL at 06:13

## 2018-01-29 RX ADMIN — OXYCODONE HYDROCHLORIDE 10 MG: 5 TABLET ORAL at 06:13

## 2018-01-29 RX ADMIN — DOCUSATE SODIUM 100 MG: 100 CAPSULE, LIQUID FILLED ORAL at 09:15

## 2018-01-29 RX ADMIN — MULTIPLE VITAMINS W/ MINERALS TAB 1 TABLET: TAB at 09:16

## 2018-01-29 RX ADMIN — CARVEDILOL 6.25 MG: 3.12 TABLET, FILM COATED ORAL at 09:15

## 2018-01-29 RX ADMIN — HEPARIN SODIUM 5000 UNITS: 5000 INJECTION, SOLUTION INTRAVENOUS; SUBCUTANEOUS at 22:14

## 2018-01-29 RX ADMIN — PREDNISONE 10 MG: 5 TABLET ORAL at 09:14

## 2018-01-29 RX ADMIN — Medication 20000 UNITS: at 18:50

## 2018-01-29 RX ADMIN — NICOTINE 1 PATCH: 7 PATCH TRANSDERMAL at 09:21

## 2018-01-29 RX ADMIN — OXYCODONE HYDROCHLORIDE 10 MG: 5 TABLET ORAL at 12:43

## 2018-01-29 RX ADMIN — CLINDAMYCIN HYDROCHLORIDE 300 MG: 300 CAPSULE ORAL at 12:43

## 2018-01-29 RX ADMIN — OXYCODONE HYDROCHLORIDE 10 MG: 5 TABLET ORAL at 16:06

## 2018-01-29 RX ADMIN — OXYCODONE HYDROCHLORIDE 10 MG: 5 TABLET ORAL at 01:10

## 2018-01-29 RX ADMIN — CLINDAMYCIN HYDROCHLORIDE 300 MG: 300 CAPSULE ORAL at 01:10

## 2018-01-29 RX ADMIN — OXYCODONE HYDROCHLORIDE AND ACETAMINOPHEN 500 MG: 500 TABLET ORAL at 18:49

## 2018-01-29 RX ADMIN — ASPIRIN 81 MG: 81 TABLET, COATED ORAL at 09:14

## 2018-01-29 RX ADMIN — CLOPIDOGREL BISULFATE 75 MG: 75 TABLET, FILM COATED ORAL at 09:15

## 2018-01-29 RX ADMIN — GABAPENTIN 300 MG: 100 CAPSULE ORAL at 12:48

## 2018-01-29 RX ADMIN — ROSUVASTATIN CALCIUM 20 MG: 20 TABLET ORAL at 19:19

## 2018-01-29 RX ADMIN — GABAPENTIN 300 MG: 100 CAPSULE ORAL at 09:14

## 2018-01-29 RX ADMIN — CIPROFLOXACIN HYDROCHLORIDE 500 MG: 250 TABLET, FILM COATED ORAL at 19:19

## 2018-01-29 RX ADMIN — HEPARIN SODIUM 5000 UNITS: 5000 INJECTION, SOLUTION INTRAVENOUS; SUBCUTANEOUS at 09:14

## 2018-01-29 RX ADMIN — OXYCODONE HYDROCHLORIDE 10 MG: 5 TABLET ORAL at 19:19

## 2018-01-29 RX ADMIN — GABAPENTIN 600 MG: 600 TABLET, FILM COATED ORAL at 22:14

## 2018-01-29 RX ADMIN — LIDOCAINE HYDROCHLORIDE: 40 SOLUTION TOPICAL at 10:36

## 2018-01-29 RX ADMIN — ZINC SULFATE CAP 220 MG (50 MG ELEMENTAL ZN) 220 MG: 220 (50 ZN) CAP at 18:49

## 2018-01-29 RX ADMIN — CLINDAMYCIN HYDROCHLORIDE 300 MG: 300 CAPSULE ORAL at 18:51

## 2018-01-29 NOTE — PROGRESS NOTES
[facility]  WO Nurse Inpatient Wound Assessment-KCI VAC Dressing changes     Follow up Assessment of wound(s) on pt's:  Right Leg        Data:   Patient History:      per MD note(s):  Severe Peripheral Artery Disease    Critical limb ischemia R>L with bilateral LE wounds; Acute on Chronic pain  S/p right femoral endarterectomy with in-situ GSV femoral to posterior tibial artery bypass 1/15/18. Surgeons had been managing VAC dressing replacements while in acute care setting , WOC service to manage now that in TCU setting.        Chance Assessment and sub scores:   Chance Score  Av.8  Min: 17  Max: 20      Mattress:  Standard , Atmos Air mattress    Moisture Management:  Bowel Program    Catheter secured? Not applicable    Current Diet / Nutrition:       Active Diet Order      Combination Diet 2 gm NA Diet    Other     Recent Labs   Lab Test  18   0722   18   0322   01/15/18   0728   18   1009   ALBUMIN   --    --    --    --    --    --   2.7*   HGB  10.7*   < >  9.3*   < >   --    < >  11.7*   INR   --    --   1.18*   --    --    < >   --    WBC  8.7   < >  11.7*   --    --    < >  9.3   CRP   --    --    --    --   20.0*   --    --     < > = values in this interval not displayed.         Wound Assessment (location #1):   Right lower leg  Wound History:  PVD,  s/p right femoral endarterectomy with in-situ GSV femoral to posterior tibial artery bypass          Age of wound/ surgical date: 1/15/18    Date KCI VAC placed: 19      KCI Wound VAC initiated by:  [facility] Nurse: No  MD: Yes    Any other wound therapies tried prior to KCI VAC placed? Yes - [facility]    Specific Dimensions (length x width x depth, in cm) :   7.5 x 6.0 x 0.3cm    Tunneling:  N/A      Undermining: N/A    Wound Base: moist, granulation, non-granulation, fibrin, slough and nonviable tendon that is sloughing    Palpation of the wound bed:  normal    Slough appearance:   "stringy and yellow         Eschar appearance:  none    Periwound Skin: intact    Color: normal and consistent with surrounding tissue    Temperature  normal     Drainage:   Amount: small . Color: serous     Odor: none    Pain:  moderate , stabbing at one point when dressing removed form right lateral edge of wound    Was patient premedicated prior to dressing change? Yes    Medication(s) used:  Lidocaine topical solution    Other:   desiccated wound  on the left anterior shin that he currently has orders for , but has been declining care /dressings to this area .  He also  has several dark, dry scabs to tips of Right foot toes        Intervention:     Patient's chart evaluated, order in place for topical Lidocaine for dressing changes     Wound was assessed,4% Lidocaine 20cc injected into sponge 5 minutes  prior to dressing removal    Wound Care: was done:  Cleansed with Microklenz ,  No sting applied to surrouding skin, applied new granufoam dressing , negative pressure achieved     Orders  Reviewed( notes from vascular service say to \"replace VAC 3 x week and to achieve negative pressure at -125 mmhg if tolerates\")    Supplies  at bedside    Discussed plan of care with Patient, Nurse             Assessment:       Right lower leg wound secondary to PVD recieving NWPT (stable)        Plan:     Nursing to notify the Provider(s) and re-consult the [facility] Nurse if wound(s) deteriorate(s) or if necessary to reevaluate the plan.    Plan of care for wound located on Right leg :  ? NPWT dressing change by the Lisa SALCEDO ( per verbal order today from vascular NP)  ? NPWT using  black foam and pressure set to -125mmHg continuous suction ( see vascular surgery notes).  ? Need to premedicate pt prior to dressing change: oral meds and  4% Lidocaine solution    Per vascular NP(Shannan:  [phone]):  They are trying to arrange for him to be seen in [facility] clinic on Wednesday      "

## 2018-01-29 NOTE — PROGRESS NOTES
CLINICAL NUTRITION SERVICES - ASSESSMENT NOTE     Nutrition Prescription    RECOMMENDATIONS FOR MDs/PROVIDERS TO ORDER:  None at this time    Malnutrition Status:    Non-severe malnutrition in the context of chronic illness    Recommendations already ordered by Registered Dietitian (RD):  1. Modified supplement order to allow for Glucerna vs Ensure Plus  2. Per wound care protocol for stage II wounds, ordered 500 mg Vit C daily x 10 days, 25,000 international units Vit A daily x 10 days, and 50 mg Zinc daily x 10 days    Future/Additional Recommendations:  None at this time     REASON FOR ASSESSMENT  Boom Kahn is a/an 67 year old male assessed by the dietitian for [facility]    NUTRITION HISTORY  Information obtained from chart:  - S/p vascular surgery w/ hx of CHF, COPD, MI, PVD s/p multiple stents, R lung cancer (w/ radiation), CAD s/p 3v CABG, peripheral arterly disease, HTN  - Stage II pressure injury: coccyx wound  - Per RN: d/t peripheral vascular disease, pt has some cuts, scabs, and bruises on upper and lower extremities as follows:   1- Right groin incision intact and VIANEY. No S/S of infection noted.  2- Right leg wound vac intact and working at 100 mmHg continues suction.   3- Right toe has scab on it with Lamb's wool between toes.  4- Left shin with old wound scabbing. Had Mepilex on it, but pt took it off.   5- Left wrist old wound scabbing. Had Mepilex on it, but pt took it off.   6- coccyx foam dressing dry and intact.     Information obtained from patient:   - Follows a regular diet at home. Pt reports that for the last two years, a typical day consists of eating 1 large meal a day, with 1-2 snacks for the past 2 years d/t leg wounds and other comorbidities. Example: Breakfast- cranberry juice, Lunch- small sandwich, Dinner: chicken jaswinder and a vegetable side. Pt would drink between 1-3 Ensure/d in addition. During this time, pt had noticed significant weight loss (see wt history below).  - In the past  "month/since hospitilization, pt claims to be feeling much better and now eats 2 large meals/d and 1-2 snacks/d. See below.     CURRENT NUTRITION ORDERS  Diet: 2 g Sodium and Ensure Plus with meals  - Pt concerned with amount of sugar in Ensure, is switching between Glucerna and Ensure Plus    Intake/Tolerance: Per chart and discussion, pt has a good appetite. Yesterday pt had the following: Breakfast- omelet, hash browns, juice, Lunch- yogurt, candy bar, Dinner- out to a restaurant with family. Pt continues to drink 2-3 Ensure/d.   - Per nursing note: pt c/o of loose stools - nursing to cut down on Laxatives (plan to check C.Diff tomorrow)    LABS  Labs reviewed    MEDICATIONS  Medications reviewed  Colace for constipation, daily  Folic acid, daily  TheraVit-M, daily  Thiamine, daily    ANTHROPOMETRICS  Height: 172.7 cm (5' 8\")  Most Recent Weight: 67.4 kg (148 lb 9.6 oz)    IBW: 70 kg (96%)  BMI: Normal BMI  Weight History: Wts during this admit appear stable. Pt reports UBW of 183# from 2015. Pt has noticed unintentional wt loss during over these 2 years, but reports no significant wt loss recently. Per Care Everywhere, wt on 1/17/2017 = 69.9 kg (154#) showing a 7% wt loss over 1 year w/ current dry wt of 65.1 kg (143#).  Wt Readings from Last 10 Encounters:   01/29/18 67.4 kg (148 lb 9.6 oz)   01/22/18 65.1 kg (143 lb 8 oz)     Dosing Weight: 65 kg (based on lowest wt this admit of 65.1 kg on 1/22)    ASSESSED NUTRITION NEEDS  Estimated Energy Needs: 7678-9691 kcals/day (30 - 35 kcals/kg )  Justification: Increased needs  Estimated Protein Needs:  grams protein/day (1.5 - 2 grams of pro/kg)  Justification: Increased needs and Wound healing  Estimated Fluid Needs: 5239-1260 mL/day (1 mL/kcal)   Justification: Maintenance    PHYSICAL FINDINGS  See malnutrition section below.    MALNUTRITION  % Intake: No decreased intake noted  % Weight Loss: Weight loss does not meet criteria, see above.   Subcutaneous Fat " Loss: Facial region and Lower arm: mild-moderate  Muscle Loss: Temporal, Thoracic region (clavicle, acromium bone, deltoid, trapezius, pectoral) and Lower arm  (forearm):  Mild-moderate  Fluid Accumulation/Edema: Trace BLE Mild edema r/t lower extremity wounds  Malnutrition Diagnosis: Non-severe malnutrition in the context of chronic illness    NUTRITION DIAGNOSIS  Predicted inadequate nutrient intake (protein-calorie) related to recent appetite increase with previous decreased PO intake.    INTERVENTIONS  Implementation  1. Nutrition Education: Provided education on protein and calorie intake for wound healing and discussed the importance of oral supplements at this time.    2. Medical food supplement therapy: Modified supplement order to allow for Glucerna vs Ensure Plus  3. Multivitamin/mineral supplement therapy: Per wound care protocol for stage II wounds, ordered 500 mg Vit C daily x 10 days, 25,000 international units Vit A daily x 10 days, and 50 mg Zinc daily x 10 days.     Goals  Patient to consume % of nutritionally adequate meal trays TID, or the equivalent with supplements/snacks.     Monitoring/Evaluation  Progress toward goals will be monitored and evaluated per protocol.    Claudia Stallworth, Dietetic Intern

## 2018-01-29 NOTE — PROGRESS NOTES
"Patient seen and examined     S- doing well. No fever.  C/O Loose stools so cutting down on laxatives    4 point ROS done and neg unless mentioned     O-   /43 (BP Location: Right arm)  Pulse 61  Temp 96  F (35.6  C) (Oral)  Resp 18  Ht 1.727 m (5' 8\")  Wt 67.4 kg (148 lb 9.6 oz)  SpO2 100%  BMI 22.59 kg/m2   Gen- pleasant   HEENT- NAD, Anicteric, MMM  Neck- supple  CVS- I+II+ no m/r/g  RS- CTAB  Abdo- soft, no tenderness . No g/r/r   Ext- edema +  Wound vac in place      LABS:   BMP  Recent Labs  Lab 01/29/18  0722      POTASSIUM 3.8   CHLORIDE 107   SUMAN 9.0   CO2 29   BUN 14   CR 0.60*   GLC 85     CBC  Recent Labs  Lab 01/29/18  0722 01/26/18  0754   WBC 8.7  --    RBC 3.26*  --    HGB 10.7*  --    HCT 33.3*  --    *  --    MCH 32.8  --    MCHC 32.1  --    RDW 14.9  --     450     A/P:  # Loose stools- likely from laxatives. May need to check C.Diff if persists tomorrow    # Severe Peripheral Artery Disease -   Critical limb ischemia R>L with bilateral LE wounds; Acute on Chronic pain  S/p right femoral endarterectomy with in-situ GSV femoral to posterior tibial artery bypass 1/15/18.   - Wound care per WOCN, Vascular.   - wound vac  - Continue antiplatelet agents. Statin.   - continue current prednisone 10mg daily  - Continue aspirin and plavix  * s/p IV Abx: complete course of Cipro + Clinda (for 7 days)  >>Pain control: acute on Chronic pain.   Gabapentin  300 mg Q AM and Q PM, 600 mg Q HS  Continue fentanyl patch 25 mcg/hr      # HFpEF (EF reported 25-30%)  # CAD s/p CABGx3 and PCIs. Coronary Angio and AYDE stenting of proximal left circumflex 01/05  # ICM hx of MI (2008)  # HTN  - Continue ASA and clopidogrel for 12 months  - Continue Carvedilol, Rosuvastatin, Ezetimibe, Isosorbide dinitrate, enalapril  - 2L fluid/2gm Na restriction   - strict I/O and daily weights     # EtOH Dependence-Patient reports 8-10 beers per day  - s/p MSSA protocol w/ativan : no e/o withdrawal at " current.  - MVI/thiamine/folate       # Tobacco Dependence - nicotine patch 7mg      # Restrictive Airway Disease   ? COPD, given smoking hx. Patient denies any hx of COPD.   - DuoNebs x4 hr prn  - outpatient PFT  - smoking cessation      # Malignancy of Lung, Right Upper Lobe    Known malignancy, patient unable to tell specific diagnosis. States that cancer responded with radiation.   F/u PET scan due in February   - f/u outpatient       # Depression   Patient reports a hx of depression and reports depression and axiety  - holding home alprazolam 0.5 PRN  - home ecitalopram 20mg opd  * Had Delirium and was started on Zyprexa: Decreased dose to 2.5 mg and stopped     ADR:                                    Full code      Prophylaxis:              Peptic ulcer disease: On PPI and oral diet                                              Deep venous thrombosis prophylaxis: Heparin      PT/OT:           Involved     Antipscyhotics- Zyprexa- tapered  Pneumovac- ordered      Disposition:               Wednesday vs 4th    Rodriguez Snowden MD (Pager- [phone])   Internal Medicine/ Hospitalist

## 2018-01-29 NOTE — UTILIZATION REVIEW
Pain Interview  Admission    1. Have you had pain or hurting at any time in the last 5 days?  Yes  2. How much of the time have you experienced pain or hurting over the last 5 days? Frequently  3. Over the past 5 days, has pain made it hard for you to sleep at night?  Yes  4. Over the past 5 days, have you limited your day-to-day activities because of pain?Yes  5. Rate pain intensity: Numeric 7       Where is your pain? At the top of the wound on my leg  How would you describe it? Burning, stabbing  What makes it better? Pain meds, elevation  What makes it worse? Dressing changes, lack of exercise using my leg

## 2018-01-29 NOTE — PROGRESS NOTES
Met with patient, along with TCU CHRIS Osullivan, to update the DC planning discussion. TCU therapies last day is 1/20/2018, likely no further skilled need beyond that date. Patient currently weighing options for disposition until he can move into rental property ([address], [place] [address]) on 2/4/2018. He tentatively has a hotel reservation in [place], [place] for 2/1 to 2/3 but cannot be seen by [facility] in [place]. He will see if he can meet with [facility] RN at son's home in [place] on Friday 2/2 for wound VAC dressing change. He was given self-pay estimate of $283/day room and board on TCU plus $942 for the wound VAC and WOCN services through 2/2, meds would be additional cost. TCU [facility] RN also looking into if TCU stay can extend with insurance coverage beyond 1/30 due to challenging disposition, especially with the [place] in town this weekend (no local hotels available). Spoke to Shannan URIOSTEGUI with Vascular team, who will see patient in clinic and do VAC dressing change 1/31 at 1pm. Home wound VAC unit order has been placed with [facility] online and clinical info faxed.

## 2018-01-29 NOTE — PLAN OF CARE
Problem: Goal Outcome Summary  Goal: Goal Outcome Summary  Outcome: No Change  Pt.A&Ox4. Up ad thony indep.- ambulates without AD and holds wound vac. Steady gait. Vac dsg C/D/I (R) shin wound @ 100mmHg continuous suction. Oxycodone 10mg po x2 (last 0615). Has Nicotine patch (L) deltoid and Fentanyl patch (L) chest.

## 2018-01-29 NOTE — PLAN OF CARE
Problem: Goal Outcome Summary  Goal: Goal Outcome Summary  Occupational Therapy Discharge Summary    Reason for therapy discharge:    All goals and outcomes met, no further needs identified.    Progress towards therapy goal(s). See goals on Care Plan in [facility] electronic health record for goal details.  Goals met    Therapy recommendation(s):    Continue home exercise program.Patient discharging to home ([facility]) about one mile form his sons home for one month, per his report. His son has a list of supplies he will need including a shower chair which he will purchase at [facility] for patient. Patient has supportive son and his family to assist. Recommend a home safety eval by either PT or OT at discharge. No other needs identified at this time. Patient ambulating without AD and able to manage/carry his wound vac safely in all environments at this time

## 2018-01-29 NOTE — PLAN OF CARE
Problem: Goal Outcome Summary  Goal: Goal Outcome Summary  Outcome: No Change  Pt alert and oriented. Able to communicate needs. Wound vac dressing changed by ET nurse. No new skin concern noted today. See flow sheet. Medicated with oxycodone per request. Denies SOB. No problem with appetite and B/B. Declined Miralax. Some of BP medication were hold per parameters for JT=365/43 and pulse=66. See MAR for details. Pt asymptomatic.  Independent with mobility and transfer. Continue to monitor.

## 2018-01-29 NOTE — PROGRESS NOTES
Pt requesting to privately pay Wednesday through Friday since he has an appointment later in the week with vascular team. Writer updated Nicci Tena  and talked with Nicole in [facility] regarding this situation. SW will continue to follow and assist as needed.    CARLOS Chapa  [facility]   P: [phone]  Pgr: [phone]

## 2018-01-29 NOTE — PROGRESS NOTES
01/29/18 1300   General Information   Patient Profile Review See Profile for full history and prior level of function   Daily Contact with Relatives or Friends Phone call;Visit   Media   TV / Movies TV;Movie list   Impression   Open to Socializing with Others Reluctant   Patient, family and / or staff in agreement with Plan of Care Yes   Treatment Plan   Assessment Pt. reports leaving in next day or two not interested in recreational service intervention at this time.

## 2018-01-30 PROCEDURE — 99309 SBSQ NF CARE MODERATE MDM 30: CPT | Performed by: INTERNAL MEDICINE

## 2018-01-30 PROCEDURE — 25000125 ZZHC RX 250: Performed by: INTERNAL MEDICINE

## 2018-01-30 PROCEDURE — 97110 THERAPEUTIC EXERCISES: CPT | Mod: GP

## 2018-01-30 PROCEDURE — 25000132 ZZH RX MED GY IP 250 OP 250 PS 637: Performed by: INTERNAL MEDICINE

## 2018-01-30 PROCEDURE — 99207 ZZC CDG-MDM COMPONENT: MEETS MODERATE - UP CODED: CPT | Performed by: INTERNAL MEDICINE

## 2018-01-30 PROCEDURE — 40000901 ZZH STATISTIC WOC PT EDUCATION, 0-15 MIN

## 2018-01-30 PROCEDURE — 40000193 ZZH STATISTIC PT WARD VISIT

## 2018-01-30 PROCEDURE — 12000022 ZZH R&B SNF

## 2018-01-30 RX ORDER — OXYCODONE HYDROCHLORIDE 5 MG/1
10 TABLET ORAL ONCE
Status: COMPLETED | OUTPATIENT
Start: 2018-01-30 | End: 2018-01-30

## 2018-01-30 RX ADMIN — FOLIC ACID 1 MG: 1 TABLET ORAL at 08:32

## 2018-01-30 RX ADMIN — ENALAPRIL MALEATE 2.5 MG: 2.5 TABLET ORAL at 08:36

## 2018-01-30 RX ADMIN — THIAMINE HCL (VITAMIN B1) 50 MG TABLET 100 MG: at 08:31

## 2018-01-30 RX ADMIN — GABAPENTIN 600 MG: 600 TABLET, FILM COATED ORAL at 22:21

## 2018-01-30 RX ADMIN — MELATONIN 3 MG: 3 TAB ORAL at 00:18

## 2018-01-30 RX ADMIN — ZINC SULFATE CAP 220 MG (50 MG ELEMENTAL ZN) 220 MG: 220 (50 ZN) CAP at 08:34

## 2018-01-30 RX ADMIN — CLINDAMYCIN HYDROCHLORIDE 300 MG: 300 CAPSULE ORAL at 05:44

## 2018-01-30 RX ADMIN — OXYCODONE HYDROCHLORIDE 10 MG: 5 TABLET ORAL at 11:52

## 2018-01-30 RX ADMIN — GABAPENTIN 300 MG: 100 CAPSULE ORAL at 08:33

## 2018-01-30 RX ADMIN — OXYCODONE HYDROCHLORIDE 10 MG: 5 TABLET ORAL at 16:34

## 2018-01-30 RX ADMIN — CIPROFLOXACIN HYDROCHLORIDE 500 MG: 250 TABLET, FILM COATED ORAL at 08:43

## 2018-01-30 RX ADMIN — Medication 20000 UNITS: at 08:31

## 2018-01-30 RX ADMIN — OXYCODONE HYDROCHLORIDE AND ACETAMINOPHEN 500 MG: 500 TABLET ORAL at 08:34

## 2018-01-30 RX ADMIN — CARVEDILOL 6.25 MG: 3.12 TABLET, FILM COATED ORAL at 08:36

## 2018-01-30 RX ADMIN — CLOPIDOGREL BISULFATE 75 MG: 75 TABLET, FILM COATED ORAL at 08:31

## 2018-01-30 RX ADMIN — OXYCODONE HYDROCHLORIDE 10 MG: 5 TABLET ORAL at 10:29

## 2018-01-30 RX ADMIN — EZETIMIBE 10 MG: 10 TABLET ORAL at 20:44

## 2018-01-30 RX ADMIN — ISOSORBIDE MONONITRATE 60 MG: 60 TABLET, EXTENDED RELEASE ORAL at 08:36

## 2018-01-30 RX ADMIN — DOCUSATE SODIUM 100 MG: 100 CAPSULE, LIQUID FILLED ORAL at 08:33

## 2018-01-30 RX ADMIN — ASPIRIN 81 MG: 81 TABLET, COATED ORAL at 08:32

## 2018-01-30 RX ADMIN — NICOTINE 1 PATCH: 7 PATCH TRANSDERMAL at 08:36

## 2018-01-30 RX ADMIN — OXYCODONE HYDROCHLORIDE 10 MG: 5 TABLET ORAL at 08:29

## 2018-01-30 RX ADMIN — OXYCODONE HYDROCHLORIDE 10 MG: 5 TABLET ORAL at 23:55

## 2018-01-30 RX ADMIN — OXYCODONE HYDROCHLORIDE 10 MG: 5 TABLET ORAL at 03:43

## 2018-01-30 RX ADMIN — CLINDAMYCIN HYDROCHLORIDE 300 MG: 300 CAPSULE ORAL at 11:52

## 2018-01-30 RX ADMIN — ROSUVASTATIN CALCIUM 20 MG: 20 TABLET ORAL at 20:44

## 2018-01-30 RX ADMIN — PREDNISONE 10 MG: 5 TABLET ORAL at 08:32

## 2018-01-30 RX ADMIN — FUROSEMIDE 20 MG: 20 TABLET ORAL at 08:32

## 2018-01-30 RX ADMIN — CLINDAMYCIN HYDROCHLORIDE 300 MG: 300 CAPSULE ORAL at 00:14

## 2018-01-30 RX ADMIN — OXYCODONE HYDROCHLORIDE 10 MG: 5 TABLET ORAL at 20:42

## 2018-01-30 RX ADMIN — ESCITALOPRAM OXALATE 20 MG: 20 TABLET ORAL at 08:31

## 2018-01-30 RX ADMIN — MULTIPLE VITAMINS W/ MINERALS TAB 1 TABLET: TAB at 08:31

## 2018-01-30 NOTE — PROGRESS NOTES
"Complains of pain at site of wound vac   No cp or sob  No fever or chills  Requesting increase in narcotics  On exam ; alert and oriented   Vital signs:  Temp: 97.4  F (36.3  C) Temp src: Oral BP: 113/56   Heart Rate: 69 Resp: 18 SpO2: 95 % O2 Device: None (Room air)   Height: 172.7 cm (5' 8\") Weight: 67.4 kg (148 lb 9.6 oz)  Estimated body mass index is 22.59 kg/(m^2) as calculated from the following:    Height as of this encounter: 1.727 m (5' 8\").    Weight as of this encounter: 67.4 kg (148 lb 9.6 oz).      PERR, EOMI.  Neck supple . No JVD  Chest ; clear b/l  Cvs; RRR, no m/r/g  Gi ; soft , non tender   Ext ; right lower ext wound ; granulating well . No sign of infection /cellulitis . Dependent edema  Labs; none today   A/P   Severe Peripheral Artery Disease -   Critical limb ischemia R>L with bilateral LE wounds; Acute on Chronic pain  S/p right femoral endarterectomy with in-situ GSV femoral to posterior tibial artery bypass 1/15/18.   - Wound care per WOCN,   - Continue antiplatelet agents. Statin.   - continue current prednisone 10mg daily  - Continue aspirin and plavix  * s/p IV Abx: complete course of Cipro + Clinda (for 7 days). Stop date 1/30  >>Pain control: acute on Chronic pain.   Gabapentin  300 mg Q AM and Q PM, 600 mg Q HS  Continue fentanyl patch 25 mcg/hr     Plan 1/30 ; will give on time additional oxycodone, wound nurse to re-apply wound vac and continue to monitor    # HFpEF (EF reported 25-30%)  # CAD s/p CABGx3 and PCIs. Coronary Angio and AYDE stenting of proximal left circumflex 01/05  # ICM hx of MI (2008)  # HTN  - Continue ASA and clopidogrel   - Continue Carvedilol, Rosuvastatin, Ezetimibe, Isosorbide dinitrate, enalapril and also on lasix 20 mg BID . Aldactone on hold   - 2L fluid/2gm Na restriction         # EtOH Dependence-Patient reports 8-10 beers per day  - s/p MSSA protocol w/ativan :   - MVI/thiamine/folate       # Tobacco Dependence - nicotine patch 7mg      # Restrictive " Airway Disease   ? COPD, given smoking hx. Patient denies any hx of COPD.   - DuoNebs x4 hr prn  - outpatient PFT  - smoking cessation      # Malignancy of Lung, Right Upper Lobe    Known malignancy, patient unable to tell specific diagnosis. States that cancer responded with radiation.   F/u PET scan due in February   - f/u outpatient       # Depression   Patient reports a hx of depression and reports depression and axiety  - holding home alprazolam 0.5 PRN  - home ecitalopram 20mg opd  * Had Delirium and was started on Zyprexa: Decreased dose to 2.5 mg and stopped      ADR:                                    Full code      Prophylaxis:              Peptic ulcer disease: On PPI and oral diet                                              Deep venous thrombosis prophylaxis: Heparin       PT/OT:           Involved      Antipscyhotics- Zyprexa- tapered  Pneumovac- ordered by previous provider

## 2018-01-30 NOTE — PLAN OF CARE
Problem: Goal Outcome Summary  Goal: Goal Outcome Summary  Outcome: No Change  Around 0745 this  was notified that pt has excruciating pain on right shin. Upon entering room, was noticed that pt is holding right leg and crying. Pt said that pain started since 3 am this morning. Pt did not report the pain to night nurse and thought pain will go away. Upon assessment redness & edema noted around the wound vac dressing and leg. Wound vac dressing was removed and new wet to dry dressing was applied. Charge nurse, Dr Pagan & ET was updated. Extra 10 mg of oxycodone in addition to 10 mg of oxycodone every three hours was given. Later around 1300 upon rounds was noted pt is sleeping and did not wake up with calling his name and touch. 1400 Neurontin was not given. No problem with appetite and B/B. Earlier around 1000 pt ambulated in hallway x 2 and declined his Heparin injection which Dr Snowden is aware. Foam dressing dry and intact at coccyx. Continue to monitor.

## 2018-01-30 NOTE — PROGRESS NOTES
Focus:  Wound management  D:  Nurse notified me that patient could not tolerate keeping VAC on today  due to pain that had started at 3 AM.  Nurse removed VAC dressing and placed wet to dry dressing and then asked if I would be replacing VAC dressing today.  Patient is now requesting that VAC dressing be delayed due to fact he is being seen in vascular clinic tomorrow for wound assessment.    He also is asking if VAC dressings can be re started on Thursday and if they can be done only twice this next week ( Thursday and Monday) due to his innability to get to clinic on Friday.  He also has questions about how often wet to dry dressing should be done until he sees MD tomorrow and if there would be anything that can be done so they do not dry out between dressing changes.  I:  Instructed pateint to discuss VAC dressing frequency with MD when at clinic tomorrow.  Discussed above requests with charge nurse   P:  Will recheck needs on Thursday AM for next VAC dressing replacment

## 2018-01-30 NOTE — PLAN OF CARE
Problem: Goal Outcome Summary  Goal: Goal Outcome Summary  Pt alert and oriented x 4, pleasant and cooperative. Using PRN oxycodone for pain management. Wound to R shin running at 125 mmHg. Continent of B & B. Call within reach.

## 2018-01-30 NOTE — PLAN OF CARE
Problem: Goal Outcome Summary  Goal: Goal Outcome Summary  Outcome: Therapy, unable to show any progress toward functional goals  PTA: AM PT session cx'd d/t increased pain at wound site.

## 2018-01-30 NOTE — PLAN OF CARE
Problem: Goal Outcome Summary  Goal: Goal Outcome Summary  Patient instructed in LE strengthening exercise program and provided handout; demonstrates independence. Engaged in aerobic exercise on Nustep tolerating 6 minutes. Patient has met PT goals, will complete orders.     Physical Therapy Discharge Summary    Reason for therapy discharge:    All goals and outcomes met, no further needs identified.    Progress towards therapy goal(s). See goals on Care Plan in [facility] electronic health record for goal details.  Goals met    Therapy recommendation(s):    Continue home exercise program.

## 2018-01-30 NOTE — PLAN OF CARE
Problem: Goal Outcome Summary  Goal: Goal Outcome Summary  Outcome: No Change  Alert and oriented x4, very pleasant. Requested and received Oxycodone 10 mg x1 for right foot pain with relief. Also medicated with Melatonin at midnight per request for sleep. Patient is also requesting for the vascular clinic appointment scheduled for tomorrow(Wed 1/31) be switched to Thursday. Stated  that he is scheduled to discharge to a hotel on Thursday and will not be in his new apartment until Monday. Patient noted that if the wound-vac dressing is changed on Wednesday, it will be too long to wait until Monday for the next change. Will update the AM charge nurse about the request. Wound-vac at continuous suction at 100 mmHg, order is for 125 mmHg. Vac does not have any noted leakage-will also update the charge regarding this. Independent in the room. Continue to monitor.

## 2018-01-31 ENCOUNTER — OFFICE VISIT (OUTPATIENT)
Dept: VASCULAR SURGERY | Facility: CLINIC | Age: 68
End: 2018-01-31
Payer: COMMERCIAL

## 2018-01-31 ENCOUNTER — DOCUMENTATION ONLY (OUTPATIENT)
Dept: PHARMACY | Facility: CLINIC | Age: 68
End: 2018-01-31

## 2018-01-31 VITALS
OXYGEN SATURATION: 97 % | HEART RATE: 73 BPM | RESPIRATION RATE: 16 BRPM | DIASTOLIC BLOOD PRESSURE: 54 MMHG | SYSTOLIC BLOOD PRESSURE: 94 MMHG

## 2018-01-31 DIAGNOSIS — I73.9 PAD (PERIPHERAL ARTERY DISEASE) (H): Primary | ICD-10-CM

## 2018-01-31 DIAGNOSIS — I73.9 PVD (PERIPHERAL VASCULAR DISEASE) (H): Primary | ICD-10-CM

## 2018-01-31 PROCEDURE — 25000132 ZZH RX MED GY IP 250 OP 250 PS 637: Performed by: INTERNAL MEDICINE

## 2018-01-31 PROCEDURE — 12000022 ZZH R&B SNF

## 2018-01-31 PROCEDURE — 99316 NF DSCHRG MGMT 30 MIN+: CPT | Performed by: INTERNAL MEDICINE

## 2018-01-31 PROCEDURE — 25000125 ZZHC RX 250: Performed by: INTERNAL MEDICINE

## 2018-01-31 RX ORDER — POLYETHYLENE GLYCOL 3350 17 G/17G
17 POWDER, FOR SOLUTION ORAL DAILY
Qty: 7 PACKET | Refills: 0 | Status: SHIPPED | OUTPATIENT
Start: 2018-02-01 | End: 2018-08-09

## 2018-01-31 RX ORDER — GABAPENTIN 300 MG/1
300 CAPSULE ORAL 2 TIMES DAILY
Qty: 60 CAPSULE | Refills: 0 | Status: SHIPPED | OUTPATIENT
Start: 2018-01-31 | End: 2018-03-06

## 2018-01-31 RX ORDER — FOLIC ACID 1 MG/1
1 TABLET ORAL DAILY
Qty: 30 TABLET | Refills: 0 | Status: SHIPPED | OUTPATIENT
Start: 2018-02-01 | End: 2018-08-09

## 2018-01-31 RX ORDER — ROSUVASTATIN CALCIUM 20 MG/1
20 TABLET, COATED ORAL DAILY
Qty: 30 TABLET | Refills: 0 | Status: SHIPPED | OUTPATIENT
Start: 2018-01-31 | End: 2018-02-22

## 2018-01-31 RX ORDER — ISOSORBIDE MONONITRATE 60 MG/1
60 TABLET, EXTENDED RELEASE ORAL DAILY
Qty: 30 TABLET | Refills: 0 | Status: SHIPPED | OUTPATIENT
Start: 2018-02-01 | End: 2018-02-22 | Stop reason: DRUGHIGH

## 2018-01-31 RX ORDER — EZETIMIBE 10 MG/1
10 TABLET ORAL DAILY
Qty: 30 TABLET | Refills: 0 | Status: SHIPPED | OUTPATIENT
Start: 2018-01-31

## 2018-01-31 RX ORDER — ZINC SULFATE 50(220)MG
220 CAPSULE ORAL DAILY
Qty: 7 CAPSULE | Refills: 0 | Status: SHIPPED | OUTPATIENT
Start: 2018-02-01 | End: 2018-08-09

## 2018-01-31 RX ORDER — OXYCODONE HYDROCHLORIDE 5 MG/1
5-10 TABLET ORAL
Qty: 45 TABLET | Refills: 0 | Status: ON HOLD | OUTPATIENT
Start: 2018-01-31 | End: 2018-02-10

## 2018-01-31 RX ORDER — CLOPIDOGREL BISULFATE 75 MG/1
75 TABLET ORAL DAILY
Qty: 30 TABLET | Refills: 0 | Status: SHIPPED | OUTPATIENT
Start: 2018-02-01

## 2018-01-31 RX ORDER — CARVEDILOL 6.25 MG/1
6.25 TABLET ORAL 2 TIMES DAILY WITH MEALS
Qty: 60 TABLET | Refills: 0 | Status: SHIPPED | OUTPATIENT
Start: 2018-01-31 | End: 2018-02-22

## 2018-01-31 RX ORDER — FUROSEMIDE 20 MG
20 TABLET ORAL 2 TIMES DAILY WITH MEALS
Qty: 60 TABLET | Refills: 0 | Status: SHIPPED | OUTPATIENT
Start: 2018-01-31 | End: 2018-02-22

## 2018-01-31 RX ORDER — ESCITALOPRAM OXALATE 20 MG/1
20 TABLET ORAL DAILY
Qty: 15 TABLET | Refills: 0 | Status: SHIPPED | OUTPATIENT
Start: 2018-02-01

## 2018-01-31 RX ORDER — MULTIPLE VITAMINS W/ MINERALS TAB 9MG-400MCG
1 TAB ORAL DAILY
Qty: 30 EACH | Refills: 0 | Status: SHIPPED | OUTPATIENT
Start: 2018-02-01 | End: 2018-08-09

## 2018-01-31 RX ORDER — ASCORBIC ACID 500 MG
500 TABLET ORAL DAILY
Qty: 7 TABLET | Refills: 0 | Status: SHIPPED | OUTPATIENT
Start: 2018-02-01 | End: 2018-08-09

## 2018-01-31 RX ORDER — DOCUSATE SODIUM 100 MG/1
100 CAPSULE, LIQUID FILLED ORAL DAILY
Qty: 60 CAPSULE | Refills: 0 | Status: SHIPPED | OUTPATIENT
Start: 2018-02-01

## 2018-01-31 RX ORDER — FENTANYL 25 UG/1
1 PATCH TRANSDERMAL
Qty: 4 PATCH | Refills: 0 | Status: SHIPPED | OUTPATIENT
Start: 2018-02-03 | End: 2018-02-17

## 2018-01-31 RX ORDER — PREDNISONE 10 MG/1
10 TABLET ORAL DAILY
Qty: 30 TABLET | Refills: 0 | Status: SHIPPED | OUTPATIENT
Start: 2018-02-01

## 2018-01-31 RX ORDER — ENALAPRIL MALEATE 2.5 MG/1
2.5 TABLET ORAL 2 TIMES DAILY
Qty: 60 TABLET | Refills: 0 | Status: SHIPPED | OUTPATIENT
Start: 2018-01-31 | End: 2018-02-22

## 2018-01-31 RX ORDER — GABAPENTIN 600 MG/1
600 TABLET ORAL AT BEDTIME
Qty: 30 TABLET | Refills: 0 | Status: SHIPPED | OUTPATIENT
Start: 2018-01-31 | End: 2018-03-06

## 2018-01-31 RX ADMIN — ASPIRIN 81 MG: 81 TABLET, COATED ORAL at 09:30

## 2018-01-31 RX ADMIN — GABAPENTIN 600 MG: 600 TABLET, FILM COATED ORAL at 22:15

## 2018-01-31 RX ADMIN — OXYCODONE HYDROCHLORIDE 10 MG: 5 TABLET ORAL at 09:28

## 2018-01-31 RX ADMIN — FENTANYL 1 PATCH: 25 PATCH, EXTENDED RELEASE TRANSDERMAL at 09:38

## 2018-01-31 RX ADMIN — CARVEDILOL 6.25 MG: 3.12 TABLET, FILM COATED ORAL at 09:31

## 2018-01-31 RX ADMIN — PREDNISONE 10 MG: 5 TABLET ORAL at 09:28

## 2018-01-31 RX ADMIN — GABAPENTIN 300 MG: 100 CAPSULE ORAL at 09:29

## 2018-01-31 RX ADMIN — NICOTINE 1 PATCH: 7 PATCH TRANSDERMAL at 09:37

## 2018-01-31 RX ADMIN — OXYCODONE HYDROCHLORIDE 10 MG: 5 TABLET ORAL at 16:19

## 2018-01-31 RX ADMIN — EZETIMIBE 10 MG: 10 TABLET ORAL at 19:27

## 2018-01-31 RX ADMIN — MULTIPLE VITAMINS W/ MINERALS TAB 1 TABLET: TAB at 09:31

## 2018-01-31 RX ADMIN — DOCUSATE SODIUM 100 MG: 100 CAPSULE, LIQUID FILLED ORAL at 09:31

## 2018-01-31 RX ADMIN — ISOSORBIDE MONONITRATE 60 MG: 60 TABLET, EXTENDED RELEASE ORAL at 09:32

## 2018-01-31 RX ADMIN — FUROSEMIDE 20 MG: 20 TABLET ORAL at 09:35

## 2018-01-31 RX ADMIN — GABAPENTIN 300 MG: 100 CAPSULE ORAL at 16:18

## 2018-01-31 RX ADMIN — OXYCODONE HYDROCHLORIDE 10 MG: 5 TABLET ORAL at 05:20

## 2018-01-31 RX ADMIN — ZINC SULFATE CAP 220 MG (50 MG ELEMENTAL ZN) 220 MG: 220 (50 ZN) CAP at 09:30

## 2018-01-31 RX ADMIN — OXYCODONE HYDROCHLORIDE 10 MG: 5 TABLET ORAL at 12:02

## 2018-01-31 RX ADMIN — CLOPIDOGREL BISULFATE 75 MG: 75 TABLET, FILM COATED ORAL at 09:33

## 2018-01-31 RX ADMIN — ENALAPRIL MALEATE 2.5 MG: 2.5 TABLET ORAL at 09:34

## 2018-01-31 RX ADMIN — ROSUVASTATIN CALCIUM 20 MG: 20 TABLET ORAL at 19:27

## 2018-01-31 RX ADMIN — OXYCODONE HYDROCHLORIDE 10 MG: 5 TABLET ORAL at 19:27

## 2018-01-31 RX ADMIN — ESCITALOPRAM OXALATE 20 MG: 20 TABLET ORAL at 09:30

## 2018-01-31 RX ADMIN — OXYCODONE HYDROCHLORIDE 10 MG: 5 TABLET ORAL at 22:23

## 2018-01-31 ASSESSMENT — PAIN SCALES - GENERAL: PAINLEVEL: MILD PAIN (3)

## 2018-01-31 NOTE — PROGRESS NOTES
Met with patient to update the DC planning discussion. He will DC from TCU tomorrow 2/1/18 around 3pm and go to hospitals in [place] until he moves into rental home in [place] on 2/4/18. He had silver VAC sponge placed on RLE wound today by Vascular Surgery, OK'd to stay on until first dressing change by home care on 2/5/2018. Patient aware that he can call [facility] if he has issues before 2/5 but would need to come into [facility] to be seen by home care RN. He states he will be unable to go to his son's home in [place]. Home VAC unit and supplies delivered to TCU this afternoon, will be switched by TCU nursing prior to DC tomorrow.

## 2018-01-31 NOTE — PLAN OF CARE
Problem: Goal Outcome Summary  Goal: Goal Outcome Summary  Outcome: No Change  Alert and oriented x 4, c/o pain gave oxycodone x 2, good appetite and fluid intake, changed coccyx area dressing blanchable redness noted encouraged to turn and reposition, B/P was 92/40 at bed time it was 108/ 53, changed R shin area dressing, copious amount serosanguinous drainage noted, pt refused to apply dressing on L shin.

## 2018-01-31 NOTE — PROGRESS NOTES
VASCULAR SURGERY CLINIC ESTABLISHED PATIENT NOTE    HPI:    Boom Kahn is a 67 year old male with severe  PAD, bilateral lower extremity wounds and critical limb ischemia who is status post right femoral endarterectomy with in-situ GSV femoral to posterior tibial artery bypass on 1/15/2018 with Dr. Ag.  He underwent right shin wound vac application 1/21/2018.  He returns to clinic today for right leg wound check and post-op visit.    SUBJECTIVE:  Patient reports not tolerating right leg wound vac and removed it last evening due to increased pain. Complains of increased right leg swelling, not being given lasix at TCU due to low blood pressures.  Asking if he can use e-cigarettes.     OBJECTIVE:  Vital signs:  BP 94/54 (BP Location: Right arm)  Pulse 73  Resp 16  SpO2 97%      Prior to Admission medications    Medication Sig Start Date End Date Taking? Authorizing Provider   acetaminophen (TYLENOL) 325 MG tablet Take 3 tablets (975 mg) by mouth every 8 hours as needed for mild pain or fever 1/23/18   Lejeune, Stacey, MD   ciprofloxacin (CIPRO) 500 MG tablet Take 1 tablet (500 mg) by mouth every 12 hours for 7 days 1/23/18 1/30/18  Lejeune, Stacey, MD   clindamycin (CLEOCIN) 300 MG capsule Take 1 capsule (300 mg) by mouth every 6 hours for 7 days 1/23/18 1/30/18  Lejeune, Stacey, MD   clopidogrel (PLAVIX) 75 MG tablet Take 1 tablet (75 mg) by mouth daily 1/24/18   Lejeune, Stacey, MD   gabapentin (NEURONTIN) 300 MG capsule Take 1 capsule (300 mg) by mouth 2 times daily 1/24/18   Lejeune, Stacey, MD   gabapentin (NEURONTIN) 600 MG tablet Take 1 tablet (600 mg) by mouth At Bedtime 1/23/18   Lejeune, Stacey, MD   polyethylene glycol (MIRALAX/GLYCOLAX) Packet Take 17 g by mouth daily 1/24/18   Lejeune, Stacey, MD   isosorbide mononitrate (IMDUR) 60 MG 24 hr tablet Take 60 mg by mouth daily    Unknown, Entered By History   DOCUSATE SODIUM PO Take 100 mg by mouth daily    Unknown, Entered By History   OXYCODONE  HCL PO Take 10 mg by mouth every 3 hours as needed    Reported, Patient   fentaNYL (DURAGESIC) 25 mcg/hr 72 hr patch Place 1 patch onto the skin every 72 hours    Reported, Patient   PREDNISONE PO Take 5 mg by mouth 2 times daily     Reported, Patient   ENALAPRIL MALEATE PO Take 2.5 mg by mouth 2 times daily    Reported, Patient   ASPIRIN EC PO Take 81 mg by mouth daily    Reported, Patient   CARVEDILOL PO Take 6.25 mg by mouth 2 times daily (with meals)    Reported, Patient   Rosuvastatin Calcium (CRESTOR PO) Take 20 mg by mouth daily    Reported, Patient   Ezetimibe (ZETIA PO) Take 10 mg by mouth daily    Reported, Patient   CLOPIDOGREL BISULFATE PO Take 75 mg by mouth daily    Reported, Patient   ESCITALOPRAM OXALATE PO Take 20 mg by mouth daily    Reported, Patient   SPIRONOLACTONE PO Take 25 mg by mouth daily    Reported, Patient   FUROSEMIDE PO Take 20 mg by mouth 2 times daily    Reported, Patient       PHYSICAL EXAM:  NEURO/PSYCH: The patient is alert and oriented.  Appropriate.  Moves all extremities.   SKIN: warm and dry.  PULMONARY: non-labored breathing  EXTREMITIES: right groin incision healing nicely, right lower leg incision healing nicely, right leg shin wound healthy appearing muscle and tissue, scant serous non-malodorous drainage, no active infection noted, right shin wound measures 7 cm long, 6.5 cm wide, 0.5 cm deep, silver granofoam sponge placed in wound bed, obtained good seal at 50 mm HG, right leg very edematous. Doppler right PT and DP pulses, right foot plantar and dorsiflex intact              ASSESSMENT:  Boom Kahn is a 67 year old male with severe  PAD, bilateral lower extremity wounds and critical limb ischemia who is status post right femoral endarterectomy with in-situ GSV femoral to posterior tibial artery bypass on 1/15/2018 with Dr. Ag.    Patient Active Problem List   Diagnosis     Ischemic cardiomyopathy     CHF (congestive heart failure) (H)     PVD (peripheral  vascular disease) (H)     COPD (chronic obstructive pulmonary disease) (H)     Anxiety     Atherosclerosis of native arteries of extremities with intermittent claudication, bilateral legs (H)     Automatic implantable cardioverter-defibrillator in situ     Depression     Cardiomyopathy (H)     Hyperlipidemia     HTN (hypertension)     Lung mass     Dyspnea on exertion     Malignant neoplasm of lung (H)     MI (myocardial infarction)     Critical lower limb ischemia     Atherosclerotic PVD with ulceration (H)     S/P vascular surgery           PLAN:  - right leg very edematous, resume Lasix 20 mg po BID   - wrap right leg with ace wrap from toes to knee  - elevate right leg  - right leg wound vac re-applied with silver sponge, increase suction as tolerated with goal of 125 mm HG  - home health nurse to change wound vac three times per week  - follow up with Dr. Ag in 2 weeks with RAVI and ultrasound  - continue lambs wool between right toes  - continue Plavix, ASA and Crestor  - instructed patient to contact Dr. Ag's vascular surgery team if unable to tolerate wound vac or wound vac worsens  - smoking cessation strongly encouraged, including e-cigarettes     Shannan COLMENARES, CNS  Division of Vascular Surgery  [facility]  Pager [phone]

## 2018-01-31 NOTE — PLAN OF CARE
Problem: Goal Outcome Summary  Goal: Goal Outcome Summary  Outcome: Improving  Alert and oriented x4. Requested pain medications x2 overnight, medicated with Oxycodone per prn orders with relief. Asleep intermittently overnight. Rewrapped right shin dressing per patient request due to discomfort. Vascular clinic appointment scheduled for today p/u at 1215. Patient also thinks that he is getting too many medications and would like to discuss if some can be discontinued, sticky note left for the providers to further look into this. Independent in the room. Plan in place for patient to discharge tomorrow. Call light in reach. Continue to monitor.

## 2018-01-31 NOTE — PATIENT INSTRUCTIONS
Return to clinic with Dr. Ag in 2 weeks with ultrasound and ABIs prior to appointment    Wound vac changes to right shin wound three times weekly with home care, increase wound vac suction as tolerated,  Start at 50 mm HG and increase to 75 mm HG Monday Feb 5th, goal suction 125 mm HG     With questions, concerns, or to request an appointment, please call either:    Gretel Acosta, Care Coordinator RN, Vascular Surgery  [phone]    Vascular Call Center  [phone]    To contact someone after 5 pm, on a weekend, or on a Holiday, please call:  [facility]  [phone], option 4 to have a member of the Vascular Surgery Service paged.

## 2018-01-31 NOTE — NURSING NOTE
Chief Complaint   Patient presents with     RECHECK     bypass graft femoratibial to right leg/wound check        Vitals:    01/31/18 1243   BP: 94/54   BP Location: Right arm   Pulse: 73   Resp: 16   SpO2: 97%       There is no height or weight on file to calculate BMI.            Doris Baumann LPN

## 2018-01-31 NOTE — MR AVS SNAPSHOT
After Visit Summary   1/31/2018    Boom Kahn    MRN: [id number]           Patient Information     Date Of Birth          1950        Visit Information        Provider Department      1/31/2018 1:00 PM Shannan Dobbs APRN [facility]        Care Instructions    Return to clinic with Dr. Ag in 2 weeks with ultrasound and ABIs prior to appointment    Wound vac changes to right shin wound three times weekly with home care, increase wound vac suction as tolerated,  Start at 50 mm HG and increase to 75 mm HG Monday Feb 5th, goal suction 125 mm HG     With questions, concerns, or to request an appointment, please call either:    Gretel Acosta, Care Coordinator RN, Vascular Surgery  [phone]    Vascular Call Center  [phone]    To contact someone after 5 pm, on a weekend, or on a Holiday, please call:  [facility]  [phone], option 4 to have a member of the Vascular Surgery Service paged.          Follow-ups after your visit        Follow-up notes from your care team     Return in about 2 weeks (around 2/14/2018).      Your next 10 appointments already scheduled     Feb 15, 2018  3:00 PM CST   US LOWER EXTREMITY ARTERIAL DUPLEX RIGHT with [address] [facility]  ([facility])    [address] [place] [id number] [phone]           Please bring a list of your medicines (including vitamins, minerals and over-the-counter drugs). Also, tell your doctor about any allergies you may have. Wear comfortable clothes and leave your valuables at home.  You do not need to do anything special to prepare for your exam.  Please call the Imaging Department at your exam site with any questions.            Feb 15, 2018  3:30 PM CST   US RAVI DOPPLER NO EXERCISE 1-2 LVLS BILAT with [id number] [facility]  ([facility])    [address] [place] [address]  Floor  [facility] [address] [phone]           Please bring a list of your medicines (including vitamins, minerals and over-the-counter drugs). Also, tell your doctor about any allergies you may have. Wear comfortable clothes and leave your valuables at home.  No caffeine or tobacco for 1 hour prior to exam.  Please call the Imaging Department at your exam site with any questions.            Feb 15, 2018  4:30 PM CST   (Arrive by 4:15 PM)   Return Vascular Visit with Lexis Ag MD   [facility] ([facility])    [address]  [address] [facility] [id number] [phone]              Future tests that were ordered for you today     Open Future Orders        Priority Expected Expires Ordered    US RAVI Doppler No Exercise Routine 1/31/2018 1/31/2019 1/31/2018    US Lower Extremity Arterial Duplex Right Routine 1/31/2018 1/31/2019 1/31/2018            Who to contact     Please call your clinic at [phone] to:    Ask questions about your health    Make or cancel appointments    Discuss your medicines    Learn about your test results    Speak to your doctor   If you have compliments or concerns about an experience at your clinic, or if you wish to file a complaint, please contact [facility] Patient Relations at [phone] or email us at [email or web address]         Additional Information About Your Visit        MyChart Information     [employer] is an electronic gateway that provides easy, online access to your medical records. With [employer], you can request a clinic appointment, read your test results, renew a prescription or communicate with your care team.     To sign up for [employer] visit the website at [email or web address]   You will be asked to enter the access code listed below, as well as some personal information. Please follow the directions to create your username and password.     Your access code is:  [email or web address]  Expires: 2018  6:31 AM     Your access code will  in 90 days. If you need help or a new code, please contact your [facility] or call [phone] for assistance.        Care EveryWhere ID     This is your Care EveryWhere ID. This could be used by other organizations to access your [facility] medical records  [id number]        Your Vitals Were     Pulse Respirations Pulse Oximetry             73 16 97%          Blood Pressure from Last 3 Encounters:   18 138/66   18 94/54   18 104/48    Weight from Last 3 Encounters:   18 67.4 kg (148 lb 9.6 oz)   18 65.1 kg (143 lb 8 oz)              Today, you had the following     No orders found for display         Today's Medication Changes      Notice     This visit is during an admission. Changes to the med list made in this visit will be reflected in the After Visit Summary of the admission.             Primary Care Provider Office Phone # Fax #    Willian Arrington [phone]       [facility] [address]  [place] [id number]        Equal Access to Services     [facility]: Hadii aad ku hadasho Soomaali, waaxda luqadaha, qaybta kaalmada adeegyada, vijay zavala hayquentinn winston castillo . So [facility] [phone].    ATENCIÓN: Si habla español, tiene a bhatia disposición servicios gratuitos de asistencia lingüística. Llame al [phone].    We comply with applicable federal civil rights laws and [place] laws. We do not discriminate on the basis of race, color, national origin, age, disability, sex, sexual orientation, or gender identity.            Thank you!     Thank you for choosing [facility]  for your care. Our goal is always to provide you with excellent care. Hearing back from our patients is one way we can continue to improve our services. Please take a few minutes to complete the written survey that you may receive in the mail after your  visit with us. Thank you!             Your Updated Medication List - Protect others around you: Learn how to safely use, store and throw away your medicines at www.disposemymeds.org.      Notice     This visit is during an admission. Changes to the med list made in this visit will be reflected in the After Visit Summary of the admission.

## 2018-01-31 NOTE — DISCHARGE INSTRUCTIONS
Patient will be staying locally beginning 2/4/2018- [address] [place] [address]    Silver VAC sponge was placed by Vascular Service on 1/31/2018, OK to leave this dressing on until 2/5/2018. VAC suction set at -50mmHg upon application, goal remains to increase to -125mmHg as patient tolerates. Home care to change VAC dressing 3x/week beginning 2/5/18.    You need to establish care with a Primary Care provider locally

## 2018-01-31 NOTE — DISCHARGE SUMMARY
Internal Medicine Discharge Summary       Boom Kahn MRN# [id number]   YOB: 1950 Age: 67 year old     Date of Admission:  1/23/2018  Date of Discharge:  2/1  Admitting Physician:  Rodriguez Snowden MD  Discharge Physician:  Mariluz Pagan  Discharging Service:  Internal Medicine     Primary Provider: Willian Arrington              Discharge Diagnosis:     Deconditioning   Right leg wound   Severe PAD       Procedures & Significant Findings:     Wound vac        Consultations:   Wound nurse   PT and OT         Hospital Course by Problem:    Severe Peripheral Artery Disease -   Critical limb ischemia R>L with bilateral LE wounds; Acute on Chronic pain  S/p right femoral endarterectomy with in-situ GSV femoral to posterior tibial artery bypass 1/15/18.   - Wound cares provided  - Continue antiplatelet agents. Statin.   - continue current prednisone 10mg daily  - Continue aspirin and plavix  * s/p IV Abx: completed course of Cipro + Clinda (for 7 days).    acute on Chronic pain.   Gabapentin  300 mg Q AM and Q PM, 600 mg Q HS  Continue fentanyl patch 25 mcg/hr   Oxycodone 10 mg every 3 as needed        # HFpEF (EF reported 25-30%)  # CAD s/p CABGx3 and PCIs. Coronary Angio and AYDE stenting of proximal left circumflex 01/05  # ICM hx of MI (2008)  # HTN  - Continued ASA and clopidogrel   - Continued Carvedilol, Rosuvastatin, Ezetimibe, Isosorbide dinitrate, enalapril and also on lasix 20 mg BID . Aldactone on hold   - 2L fluid/2gm Na restriction          # EtOH Dependence-Patient reports 8-10 beers per day  No withdrawal in TCU     # Tobacco Dependence - nicotine patch 7mg while in patient      # Restrictive Airway Disease   ? COPD, given smoking hx. Patient denies any hx of COPD.   - DuoNebs x4 hr prn        # Malignancy of Lung, Right Upper Lobe    Known malignancy, patient unable to tell specific diagnosis. States that cancer responded with radiation.   F/u PET scan due in February   - f/u outpatient  "      # Depression   Patient reported a hx of depression and reported depression and axiety  - home ecitalopram 20mg opd    On Exam ;   Alert and oriented . No acute distress  Vital signs:  Temp: 97  F (36.1  C) Temp src: Oral BP: 138/66 Pulse: 92 Heart Rate: 63 Resp: 18 SpO2: 97 % O2 Device: None (Room air)   Height: 172.7 cm (5' 8\") Weight: 67.4 kg (148 lb 9.6 oz)  Estimated body mass index is 22.59 kg/(m^2) as calculated from the following:    Height as of this encounter: 1.727 m (5' 8\").    Weight as of this encounter: 67.4 kg (148 lb 9.6 oz).  Neck ; supple , no JVD  Chest ; equal BS bilaterally , no rales or rhonchi   CVS; RRR, no murmur /rubs or gallops  GI ; soft abdomen, non tender, BS positive  Ext ;  edema , no cynosis  Neuro ; CN 2 to 12 grossly intact , No Facial asymmetry noticed  .  Psych ; appropriate mood and effect      ROUTINE IP LABS (Last four results)  BMP  Recent Labs  Lab 01/29/18  0722      POTASSIUM 3.8   CHLORIDE 107   SUMAN 9.0   CO2 29   BUN 14   CR 0.60*   GLC 85     CBC  Recent Labs  Lab 01/29/18  0722 01/26/18  0754   WBC 8.7  --    RBC 3.26*  --    HGB 10.7*  --    HCT 33.3*  --    *  --    MCH 32.8  --    MCHC 32.1  --    RDW 14.9  --     450     INRNo lab results found in last 7 days.    Results for orders placed or performed during the hospital encounter of 01/03/18   US Lower Extremity Arterial Duplex Bilateral    Narrative    Exam: Duplex ultrasound of bilateral lower extremity arteries dated  1/3/2018 7:47 PM     Clinical information: PVD, nonhealing lower extremity wounds with a  new wounds and increasing pain     Comparison: None available    Technique: Includes Gray Scale images, color Doppler, spectral Doppler  waveforms and velocities with appropriate angles of 60 degrees or  less.    Findings:     Right lower extremity:     Common femoral artery: 291 cm/sec. Waveforms: Monophasic  Deep femoral artery: 200 cm/sec. Waveforms: Monophasic  SFA:  *  Mid " thigh: 88 cm/sec. Waveforms: Monophasic  *  Distal thigh: Not clearly visualized    Popliteal artery: 23 cm/sec. Waveforms: Monophasic  PTA ankle: 28 cm/sec. Waveforms: Monophasic  TEDDY ankle: 9 cm/sec. Waveforms: Monophasic    Left lower extremity:    Common femoral artery: 184 cm/sec. Waveforms: Monophasic  Deep femoral artery: 290 cm/sec. Waveforms: Monophasic  SFA:  *  Proximal thigh: 173 cm/sec. Waveforms: Monophasic  *  Mid thigh: 10 cm/sec. Waveforms: Monophasic  *  Distal thigh: 6 cm/sec. Waveforms: Monophasic  Popliteal artery: 72 cm/sec. Waveforms: Monophasic  PTA ankle: 40 cm/sec. Waveforms: Monophasic  TEDDY ankle: 40 cm/sec. Waveforms: Monophasic      Impression    Impression:     1. Right leg: Significant atherosclerotic calcification with minimal  flow within the SFA in the distal thigh, consistent with  hemodynamically significant stenosis. Post obstructive waveforms in  the popliteal, PTA and TEDDY are noted.  2. Left leg: Significant atherosclerotic calcification with minimal  flow within the SFA in the mid and distal thigh, consistent with  hemodynamically significant stenosis. Post obstructive waveforms in  the popliteal, PTA and TEDDY are noted.    Guidelines:  [facility] duplex criteria for lower limb arterial  occlusive disease  -Percent stenosis- Normal (1-19%): Peak systolic velocity (cm/s):  <150, End-diastolic velocity (cm/s): <40, Velocity ration (Vr): <1.5,  Distal arterial waveform: Triphasic  -Percent stenosis- 20-49%: Peak systolic velocity (cm/s): 150-200,  End-diastolic velocity (cm/s): <40, Velocity ration (Vr): 1.5-2.0,  Distal arterial waveform: Triphasic  -Percent stenosis- 50-75%: Peak systolic velocity (cm/s): 200-300,  End-diastolic velocity (cm/s): <90, Velocity ration (Vr): 2.0-3.9,  Distal arterial waveform: Poststenotic turbulence distal to stenosis,  monophasic distal waveform  -Percent stenosis- >75%: Peak systolic velocity (cm/s): >300,  End-diastolic  velocity (cm/s): <90, Velocity ration (Vr): >4.0, Distal  arterial waveform: Dampened distal waveform and low PSV/EDV* in the  stenosis  -Percent stenosis- Occlusion: Absent flow by color Doppler/pulsed  Doppler spectral analysis; length of occlusion estimated from distance  between exit and reentry collateral arteries  *PSV = peak systolic velocity, EDV = end-diastolic velocity  http://link.valverde.com/chapter/10.1007/[email or web address]_23/fulltext  html    I have personally reviewed the examination and initial interpretation  and I agree with the findings.    FRANCESCO HORTON MD   XR Chest 2 Views    Narrative    Exam: XR CHEST 2 VW, 1/3/2018 10:20 PM    Indication: Eval pacemaker placement    Comparison: None available. Outside radiology PET CT report 8/3/2017  ([facility])    Findings:   PA and lateral views of the chest were obtained. Cardiac silhouette is  mildly prominent. Left chest pacemaker/ICD with leads projecting over  the right atrium and right ventricle. No pneumothorax or pleural  effusion. Irregular opacity in the right midlung.      Impression    Impression:   1. Left chest pacemaker/ICD with leads projecting over the right  atrium and right ventricle.  2. Irregular opacity in the right midlung, likely corresponding to  hypermetabolic mass mentioned in outside radiology report 8/3/2017.  Recommend correlation with outside imaging to determine stability.    I have personally reviewed the examination and initial interpretation  and I agree with the findings.    FRANCESCO HORTON MD   US RAVI Doppler No Exercise    Narrative    Exam: Bilateral lower extremity resting ankle brachial indices dated  1/4/2018 10:38 AM    Comparison study: Lower extremity arterial duplex ultrasound 1/3/2018    Clinical history: bilateral PVD;      Ordering provider: Davey Carcamo    Technique: Bilateral lower extremity resting ankle brachial indices  obtained.    Findings:    Right:      Arm: 123 mmHg   PT at  ankle: 36 mmHg   DP at foot: No signal detected.   RAVI: 0.29   TBI: Unable to obtain due to presence of nonhealing wound and bandage  over the right great toe.    Digital PPG: Unable to obtain waveform     Left:     Arm: 87 mmHg   PT at ankle: 37 mmHg   DP at foot: 65 mmHg   First digit: 34 mmHg   RAVI: 0.53   TBI: 0.28    Digital PPG moderately diminished      Impression    Impression:     Right leg: Resting RAVI is 0.29, Abnormal, 0.00-0.40 (Increased  cardiovascular risk with severe PAD). Unable to obtain right great toe  pressure for calculation of right TBI.    Left leg: Resting RAVI is 0.53, Abnormal, 0.41-0.90 (Increased  cardiovascular risk along with mild to moderate PVD). Abnormal left  TBI of 0.28.      Guidelines:    RAVI Diagnostic Criteria (Based on criteria published in Circulation  2011; 124: 9542-5015):    > 1.4: Non compressible    1.00 - 1.40: Normal    0.91 - 0.99: Borderline    At or below 0.90: Abnormal    RAVI Diagnostic Criteria (Based on ACC/AHA guideline 2008):    >/=1.3 - non compressible vessels    1.00  -1.29 - Normal    0.91 - 0.99 - Borderline    0.41 - 0.90 - Mild to moderate PAD    0.00 - 0.40 - Severe PAD    I have personally reviewed the examination and initial interpretation  and I agree with the findings.    BLAKE KRUSE MD   IR Lower Extremity Angiogram Right    Narrative    Procedures:  1. Ultrasound-guided left common femoral arteriotomy.  2. Aortobiiliac pelvic angiogram.  3. Right lower extremity angiogram.  4. Left lower extremity angiogram.    History: Right lower extremity nonhealing wounds and right greater  than left claudication.    Comparison: 1/3/2018 ultrasound    Staff: Tyra ANAND I, TYRA HAYNES MD, attest that I was present in the procedure room  for the entire procedure.    Fellow: Nehemiah Hernandez M.D.    Medications:   1. 200 mcg Fentanyl  2. 4 mg Versed    Moderate sedation administered by the IR nurse at the supervision of  the attending. Vital  signs and oxygenation continuously monitored. The  patient remained stable throughout the procedure.    Sedation time: 80 minutes    Fluoroscopy time: 18.4 minutes    Contrast: 110 mL Visi 320    Findings/procedure:    Prior to the procedure, both verbal and written informed consent  obtained from the patient. Timeout performed.     Ultrasound demonstrated a patent left CFA.  The patient was prepped  and draped in the usual sterile fashion. The dermatotomy site was  anesthetized with1% lidocaine. A 22g micropuncture needle was advanced  under ultrasound visualization and an image saved for the medical  record. A 0.018 soft tipped wire was advanced under fluoroscopic  visualization and appeared in appropriate position, but there was some  resistance in the external iliac artery. A 3 Niuean dilator was  inserted a short distance into the CFA. A 0.018 gold tip Glidewire was  used to advance into the abdominal aorta. The 3 Niuean dilator was  removed and 3-5 Niuean dilator inserted into the iliac. Inner dilator  and wire removed. Bentson wire inserted into the aorta. 6 Niuean 10 cm  sheath inserted. 5 Niuean Omni Flush pigtail catheter inserted into  the distal aorta and pelvic angiogram performed:    Patent bilateral common iliac artery-external iliac artery stents.  Both hypogastrics are patent. There is a short segment of at least  moderate left external iliac stenosis, however flow appears brisk  around the sheath. There is a severe short segment stenosis at the  right common femoral artery just proximal to its bifurcation.    Omni Flush catheter used to direct a 0.035 angled Glidewire down the  right common iliac artery. A 0.05 angled Glidewire was used to gain  purchase down the SFA, and the catheter advanced to the external iliac  artery. Angiogram of the right lower extremity was performed:    Proximal SFA stent is widely patent. Origin of the PFA is patent. This  there is multifocal moderate narrowing in the  calcified mid SFA. A  distal SFA-above-the-knee popliteal stent is completely occluded with  extensive collateralization. There is a 6 mm short segment severe  stenosis of the below the knee popliteal artery shortly after its  reconstitution. There is complete occlusion of the proximal TEDDY.  Two-vessel runoff provided to the right foot. A quick cross catheter  was used to advance an angled Glidewire into the SFA and higher  quality angiogram performed of the thigh. Due to severe stenosis of  the CFA, additional intervention was not undertaken.    Left lower extremity angiogram was performed through the sheath. This  showed expected location of the arteriotomy at the mid femoral head.  There is total occlusion of the SFA in the upper thigh. There is  narrowing at the profunda origin but extensive collateralization with  revascularization of the distal SFA just above the adductor canal.  There is a short segment of moderate narrowing in the distal SFA.  Again 2 vessel runoff is identified to the left foot with completely  occluded TEDDY. The plantar arch is widely patent with some backflow  into the DP.    Sheaths and wires removed and a 6-7 Gambian minx closure device  successfully deployed at the left CFA arteriotomy.      Impression    Impression:    Right:  1. Right lower extremity angiogram shows totally occluded mid SFA to  above-the-knee popliteal stents, short segment below the knee  popliteal occlusion, and an occluded anterior tibial artery.  2. Angiogram also demonstrates a severe (80-90%) short segment  shelflike right CFA stenosis. Therefore, recanalization of the chronic  total occlusion was not performed.  3. Patent upper SFA and iliac stents.    Left:  1. 13 cm chronic total occlusion of the mid SFA.  2. Two-vessel runoff with total occlusion of the TEDDY.  3. Short segment narrowing of the TP trunk.  4. Patent iliac stents with less severe short segment common femoral  disease than on the right.    Plan:      1. 3 hour bed rest with left leg straight.  2. Consult with vascular surgery for CFA disease. Recanalization of  both the left and right SFA could be performed after addressing his  common femoral disease.    I have personally reviewed the examination and initial interpretation  and I agree with the findings.    TYRA HAYNES MD   US Lower Extremity Venous Mapping Bilateral    Narrative    Exam: Ultrasound Doppler vein mapping of bilateral lower extremities  dated  1/5/2018 10:51 AM    Comparison study: None available    Clinical history: Possible LE bypass    Ordering clinician: Stacey Lejeune    Technique: Grayscale (B-mode) with duplex Doppler ultrasound, along  with compression and augmentation, of the lower extremity veins.    RIGHT LEG:    GSV:  Proximal thigh: Thrombus: No, Wall thickness: Normal, 3.5 mm  Mid thigh: Thrombus: No, Wall thickness: Normal, 3.6 mm  Distal thigh: Thrombus: No, Wall thickness: Normal, 3.1 mm  Knee: Thrombus: No, Wall thickness: Normal, 2.6 mm  Superior calf: Thrombus: No, Wall thickness: Normal, 2. mm  Mid calf: Thrombus: No, Wall thickness: Normal, 2.6 mm  Distal calf: Thrombus: No, Wall thickness: Normal, 3.0 mm        LEFT LEG:      GSV:  Proximal thigh: Surgically absent, previously harvested.  Mid thigh: Surgically absent, previously harvested.  Distal thigh: Surgically absent, previously harvested.  Knee: Surgically absent, previously harvested.  Superior calf: Thrombus: No, Wall thickness: Normal, 2.0 mm  Mid calf: Thrombus: No, Wall thickness: Normal, 2.0 mm  Distal calf: Thrombus: No, Wall thickness: Normal, 1.8 mm        Impression    Impression:   1. Great saphenous vein diameters as above.  2. Left great saphenous vein is surgically absent from the proximal  thigh down to the knee from previous harvest.    I have personally reviewed the examination and initial interpretation  and I agree with the findings.    JOSE FELIX MD   US Carotid Bilateral     Narrative    Exam: Bilateral carotid duplex Doppler ultrasound dated 1/5/2018 3:37  PM    Clinical history: carotid bruit.;     Comparison Study: None available    Ordering provider: [place]    Technique: Grayscale (B-mode) and duplex and spectral Doppler  ultrasound of the extracranial internal carotid, external carotid,  vertebral artery origins, right brachiocephalic/subclavian and left  subclavian arteries. Velocity measurements obtained with angle  correction at or less than 60 degrees.    Findings:    Right side:     Plaque Morphology: Predominantly echogenic, Irregular       Proximal CCA: 145 cm/sec     Mid CCA: 80 cm/sec     Distal CCA: 55 cm/sec       External CA: 69 cm/sec       Proximal ICA: 92 cm/sec     Mid ICA: 120 cm/sec     Distal ICA: 69 cm/sec       Vertebral artery: Antegrade, 26 cm/sec     ICA/CCA ratio: 2.32     Diminished upstroke in the waveform in the mid and distal common  carotid artery as compared to the proximal common carotid artery.    Left side:     Plaque Morphology: Predominantly echogenic, Irregular       Proximal CCA: 90 cm/sec     Mid CCA: 53 cm/sec     Distal CCA: 100 cm/sec       External CA: 123 cm/sec       Proximal ICA: 138 cm/sec     Mid ICA: 161 cm/sec     Distal ICA: 74 cm/sec      ICA/CCA ratio: 1.61       Impression    Impression:    1. Right side: Substantial plaque in the right internal carotid artery  without significant elevation in velocities is compatible with less  than 50% stenosis of the right internal carotid artery. Abnormal  waveform in the mid and distal common carotid artery as compared to  the proximal common carotid artery with slow systolic upstroke  suggests potential unseen stenosis in the proximal common carotid  artery. Consider CTA for further evaluation if clinically indicated.    2. Left side: Substantial plaque in the left internal carotid artery  with peak systolic velocity measuring up to 161 cm/sec is compatible  with a 50-69% stenosis  of the left internal carotid artery.    3. Antegrade flow in both vertebral arteries.    Consensus Panel Gray-Scale and Doppler US Criteria for Diagnosis of  ICA Stenosis (Radiology 11/2003) additionally modified by Cabrera et  al. in Journal of Vascular Surgery 1/2011, [id number])16-44.       Normal         ICA PSV < 140 cm/sec       Plaque Estimate None       ICA/CCA  PSV Ratio < 2.0       ICA EDV < 40 cm/sec       < 50%          ICA PSV < 140 cm/sec       Plaque Estimate < 50%       ICA/CCA  PSV Ratio < 2.0       ICA EDV < 40 cm/sec       50- 69%       ICA -230 cm/sec       Plaque Estimate > or = 50%       ICA/CCA PSV Ratio 2.0-4.0       ICA EDV  cm/sec         > or = 70%, less than near occlusion       ICA PSV > 230 cm/sec       Plaque Estimate > or = 50%       ICA/CCA Ratio > 4.0       ICA EDV > 100 cm/sec                                            Additional criteria from vascular surgery     > 80%       EDV > 120 cm/sec     I have personally reviewed the examination and initial interpretation  and I agree with the findings.    JOSE FELIX MD   US Lower Extremity Venous Duplex Bilateral    Narrative    EXAMINATION: EXTREMITY VENOUS DUPLEX BILATERAL, 1/6/2018 1:25 PM     COMPARISON: None    HISTORY: Rule out DVT, wounds    TECHNIQUE:  Gray-scale evaluation with compression, spectral flow and  color Doppler assessment of the deep venous system of both legs from  groin to knee, and then at the ankles.    FINDINGS:  In the both lower extremities, the common femoral, femoral, popliteal  and posterior tibial veins demonstrate normal compressibility and  blood flow.      Impression    IMPRESSION:  No evidence of deep venous thrombosis in either lower extremity.    I have personally reviewed the examination and initial interpretation  and I agree with the findings.    MINA WOO MD ([place])   XR Chest Port 1 View    Narrative    Exam: XR CHEST PORT 1 VW, 1/15/2018 7:32 PM    Indication: Check right IJ  central line placement     Comparison: 1/3/2018    Findings:   Right internal jugular central venous catheter tip projects at the mid  thoracic trachea. Surgical changes of CABG with median sternotomy  wires and mediastinal surgical clips. Left chest wall dual-lead ICD  appears intact and in stable position (however, the distal tip of the  right ventricular lead is collimated off the image). Stable prominent  cardiomediastinal silhouette. The pulmonary vasculature is indistinct.  No pleural effusion or pneumothorax. Stable to slightly decreased  patchy right midlung opacity with associated pleural tenting. Known  lung nodular density in the right lung is described on outside report  dated 8/3/2017, although images are unavailable at the time of this  dictation.  No new focal airspace opacity.      Impression    Impression: Right internal jugular central venous catheter tip  projects at the mid SVC.  No pneumothorax.    I have personally reviewed the examination and initial interpretation  and I agree with the findings.    NAYELI TUBBS MD   US RAVI Doppler No Exercise    Narrative    Exam: Bilateral lower extremity resting ankle brachial indices dated  1/18/2018 10:04 AM    Comparison study: None    Clinical history: PAD. Recent right common femoral artery  endarterectomy and femoral-posterior tibial great saphenous vein  bypass graft.    Ordering provider: Shannan Dobbs    Technique: Bilateral lower extremity resting ankle brachial indices  obtained.     Findings:    Right:      Arm: 115 mmHg   PT at ankle: 111 mmHg   DP at foot: 104 mmHg   RAVI: 0.97 (previously 0.29)   TBI: 0.16, moderately diminished PPG    Left:     Arm: 98 mmHg   PT at ankle: 71 mmHg   DP at foot: 54 mmHg   RAVI: 0.62   TBI: 0.2, severely diminished PPG               Impression    Impression:     Right leg: Resting RAVI is 0.97Borderline (0.91 to 0.99). Substantial  improvement from RAVI dated 1/4/2018, at which point RAVI measures 0.29.  Decreased  toe brachial index of 0.16.    Left leg: Resting RAVI is 0.62. Abnormal, 0.41-0.90 (Increased  cardiovascular risk along with mild to moderate PVD). Decreased toe  brachial index of 0.2 with severely diminished PPG    Guidelines:    RAVI Diagnostic Criteria (Based on criteria published in Circulation  2011; 124: 7831-5219):    > 1.4: Non compressible    1.00 - 1.40: Normal    0.91 - 0.99: Borderline    At or below 0.90: Abnormal    RAVI Diagnostic Criteria (Based on ACC/AHA guideline 2008):    >/=1.3 - non compressible vessels    1.00  -1.29 - Normal    0.91 - 0.99 - Borderline    0.41 - 0.90 - Mild to moderate PAD    0.00 - 0.40 - Severe PAD    I have personally reviewed the examination and initial interpretation  and I agree with the findings.    AUDELIA SAMPSON MD            Discharge Medications:     Current Discharge Medication List      START taking these medications    Details   folic acid (FOLVITE) 1 MG tablet Take 1 tablet (1 mg) by mouth daily  Qty: 30 tablet, Refills: 0    Associated Diagnoses: Takes dietary supplements      zinc sulfate (ZINCATE) 220 (50 ZN) MG capsule Take 1 capsule (220 mg) by mouth daily  Qty: 7 capsule, Refills: 0    Associated Diagnoses: Takes dietary supplements      multivitamin, therapeutic with minerals (THERA-VIT-M) TABS tablet Take 1 tablet by mouth daily  Qty: 30 each, Refills: 0    Associated Diagnoses: Takes dietary supplements      ascorbic acid 500 MG TABS Take 1 tablet (500 mg) by mouth daily  Qty: 7 tablet, Refills: 0    Associated Diagnoses: Takes dietary supplements      vitamin A 38520 UNIT capsule Take 2 capsules (20,000 Units) by mouth daily  Qty: 7 capsule, Refills: 0    Associated Diagnoses: Takes dietary supplements         CONTINUE these medications which have CHANGED    Details   fentaNYL (DURAGESIC) 25 mcg/hr 72 hr patch Place 1 patch onto the skin every 72 hours for 14 days  Qty: 4 patch, Refills: 0    Associated Diagnoses: S/P vascular surgery      oxyCODONE  IR (ROXICODONE) 5 MG tablet Take 1-2 tablets (5-10 mg) by mouth every 3 hours as needed (pain)  Qty: 45 tablet, Refills: 0    Associated Diagnoses: S/P vascular surgery      isosorbide mononitrate (IMDUR) 60 MG 24 hr tablet Take 1 tablet (60 mg) by mouth daily  Qty: 30 tablet, Refills: 0    Associated Diagnoses: Ischemic cardiomyopathy      gabapentin (NEURONTIN) 300 MG capsule Take 1 capsule (300 mg) by mouth 2 times daily  Qty: 60 capsule, Refills: 0    Comments: Take at 8 am and 2 pm  Associated Diagnoses: S/P vascular surgery      gabapentin (NEURONTIN) 600 MG tablet Take 1 tablet (600 mg) by mouth At Bedtime  Qty: 30 tablet, Refills: 0    Associated Diagnoses: PVD (peripheral vascular disease) (H)      escitalopram (LEXAPRO) 20 MG tablet Take 1 tablet (20 mg) by mouth daily  Qty: 15 tablet, Refills: 0    Associated Diagnoses: S/P vascular surgery      ezetimibe (ZETIA) 10 MG tablet Take 1 tablet (10 mg) by mouth daily  Qty: 30 tablet, Refills: 0    Associated Diagnoses: Ischemic cardiomyopathy      rosuvastatin (CRESTOR) 20 MG tablet Take 1 tablet (20 mg) by mouth daily  Qty: 30 tablet, Refills: 0    Associated Diagnoses: Ischemic cardiomyopathy      enalapril (VASOTEC) 2.5 MG tablet Take 1 tablet (2.5 mg) by mouth 2 times daily  Qty: 60 tablet, Refills: 0    Associated Diagnoses: Ischemic cardiomyopathy      carvedilol (COREG) 6.25 MG tablet Take 1 tablet (6.25 mg) by mouth 2 times daily (with meals)  Qty: 60 tablet, Refills: 0    Associated Diagnoses: Ischemic cardiomyopathy      predniSONE (DELTASONE) 10 MG tablet Take 1 tablet (10 mg) by mouth daily  Qty: 30 tablet, Refills: 0    Associated Diagnoses: S/P vascular surgery      furosemide (LASIX) 20 MG tablet Take 1 tablet (20 mg) by mouth 2 times daily (with meals)  Qty: 60 tablet, Refills: 0    Associated Diagnoses: S/P vascular surgery      docusate sodium (COLACE) 100 MG capsule Take 1 capsule (100 mg) by mouth daily  Qty: 60 capsule, Refills: 0     Associated Diagnoses: Takes dietary supplements      polyethylene glycol (MIRALAX/GLYCOLAX) Packet Take 17 g by mouth daily  Qty: 7 packet, Refills: 0    Associated Diagnoses: Takes dietary supplements      clopidogrel (PLAVIX) 75 MG tablet Take 1 tablet (75 mg) by mouth daily  Qty: 30 tablet, Refills: 0    Associated Diagnoses: Ischemic cardiomyopathy         CONTINUE these medications which have NOT CHANGED    Details   ASPIRIN EC PO Take 81 mg by mouth daily         STOP taking these medications       acetaminophen (TYLENOL) 325 MG tablet Comments:   Reason for Stopping:         ciprofloxacin (CIPRO) 500 MG tablet Comments:   Reason for Stopping:         clindamycin (CLEOCIN) 300 MG capsule Comments:   Reason for Stopping:         SPIRONOLACTONE PO Comments:   Reason for Stopping:                    Discharge Instructions and Follow-Up:     Discharge Procedure Orders  Home care nursing referral   Referral Type: Home Health Therapies & Aides     Reason for your hospital stay   Order Comments: You were admitted for wound cares and therapy after vascular surgery     Adult [facility] Follow-up and recommended labs and tests   Order Comments: PCP in one week for post rehab follow up   Follow up with vascular surgery as scheduled  Appointments on [place] and/or [facility] (with [facility] or [facility] provider or service). Call [phone] if you haven't heard regarding these appointments within 7 days of discharge.     Activity   Order Comments: As tolerated   Order Specific Question Answer Comments   Is discharge order? Yes      Wound care and dressings   Order Comments: Instructions to care for your wound at home: wound vac as instructed     MD face to face encounter   Order Comments: Documentation of Face to Face and Certification for Home Health Services    I certify that patient: Boom Kahn is under my care and that I, or a nurse practitioner or physician's assistant working with me, had a face-to-face encounter  that meets the physician face-to-face encounter requirements with this patient on: 1/31/2018.    This encounter with the patient was in whole, or in part, for the following medical condition, which is the primary reason for home health care: deconditioning , wound vac.    I certify that, based on my findings, the following services are medically necessary home health services: wound nurse     My clinical findings support the need for the above services because: Nurse is needed: For complex aftercare of surgical procedures because the patient needs instruction and cannot perform care on their own due to: wound vac , multiple medications ..    Further, I certify that my clinical findings support that this patient is homebound (i.e. absences from home require considerable and taxing effort and are for medical reasons or [employer] services or infrequently or of short duration when for other reasons) because: Patient is bedbound due to: deconditioning ..    Based on the above findings. I certify that this patient is confined to the home and needs intermittent skilled nursing care, physical therapy and/or speech therapy.  The patient is under my care, and I have initiated the establishment of the plan of care.  This patient will be followed by a physician who will periodically review the plan of care.  Physician/Provider to provide follow up care: Willian Arrington    Attending hospital physician (the Medicare certified [place] provider): Mariluz Pagan  Physician Signature: See electronic signature associated with these discharge orders.  Date: 1/31/2018     Full Code     Diet   Order Comments: 2 gram sodium diet   Order Specific Question Answer Comments   Is discharge order? Yes                  Discharge Disposition:   home   32 minutes spent in discharge, including >50% in counseling and coordination of care, medication review and plan of care recommended on follow up. Questions were answered to satisfaction       Mariluz  Jordyn Pagan  Internal Medicine Hospitalist & Staff Physician  [facility]

## 2018-01-31 NOTE — PLAN OF CARE
"Problem: Goal Outcome Summary  Goal: Goal Outcome Summary  Outcome: No Change  RN: Pt awake, alert, and oriented x3, sitting EOB. Pt reviewed all meds before taking, refusing some. Would like to review meds with dr and pharmacist. Pt is able to make needs known and uses call light appropriately. Pt has 1300 vascular appt and will return after that; pt took wound vac, cannister, and foam package to appt in case they want to reapply. Possible DC 2/1/18. Fentanyl and nicotine patches applied; pt requested pain meds x2. Right shin wound covered, dsg CDI; coccyx with small open area; barrier cream applied and Mepilex. Con't POC.     /66 (BP Location: Right arm)  Pulse 92  Temp 97  F (36.1  C) (Oral)  Resp 18  Ht 1.727 m (5' 8\")  Wt 67.4 kg (148 lb 9.6 oz)  SpO2 97%  BMI 22.59 kg/m2        "

## 2018-01-31 NOTE — LETTER
1/31/2018       RE: Boom Kahn  [address] [place] [id number]     Dear Colleague,    Thank you for referring your patient, Boom Kahn, to the [facility] VASCULAR [facility]IC at [facility]. Please see a copy of my visit note below.    VASCULAR SURGERY CLINIC ESTABLISHED PATIENT NOTE    HPI:    Boom Kahn is a 67 year old male with severe  PAD, bilateral lower extremity wounds and critical limb ischemia who is status post right femoral endarterectomy with in-situ GSV femoral to posterior tibial artery bypass on 1/15/2018 with Dr. Ag.  He underwent right shin wound vac application 1/21/2018.  He returns to clinic today for right leg wound check and post-op visit.    SUBJECTIVE:  Patient reports not tolerating right leg wound vac and removed it last evening due to increased pain. Complains of increased right leg swelling, not being given lasix at TCU due to low blood pressures.  Asking if he can use e-cigarettes.     OBJECTIVE:  Vital signs:  BP 94/54 (BP Location: Right arm)  Pulse 73  Resp 16  SpO2 97%      Prior to Admission medications    Medication Sig Start Date End Date Taking? Authorizing Provider   acetaminophen (TYLENOL) 325 MG tablet Take 3 tablets (975 mg) by mouth every 8 hours as needed for mild pain or fever 1/23/18   Lejeune, Stacey, MD   ciprofloxacin (CIPRO) 500 MG tablet Take 1 tablet (500 mg) by mouth every 12 hours for 7 days 1/23/18 1/30/18  Lejeune, Stacey, MD   clindamycin (CLEOCIN) 300 MG capsule Take 1 capsule (300 mg) by mouth every 6 hours for 7 days 1/23/18 1/30/18  Lejeune, Stacey, MD   clopidogrel (PLAVIX) 75 MG tablet Take 1 tablet (75 mg) by mouth daily 1/24/18   Lejeune, Stacey, MD   gabapentin (NEURONTIN) 300 MG capsule Take 1 capsule (300 mg) by mouth 2 times daily 1/24/18   Lejeune, Stacey, MD   gabapentin (NEURONTIN) 600 MG tablet Take 1 tablet (600 mg) by mouth At Bedtime 1/23/18   Lejeune, Stacey, MD   polyethylene glycol  (MIRALAX/GLYCOLAX) Packet Take 17 g by mouth daily 1/24/18   Lejeune, Stacey, MD   isosorbide mononitrate (IMDUR) 60 MG 24 hr tablet Take 60 mg by mouth daily    Unknown, Entered By History   DOCUSATE SODIUM PO Take 100 mg by mouth daily    Unknown, Entered By History   OXYCODONE HCL PO Take 10 mg by mouth every 3 hours as needed    Reported, Patient   fentaNYL (DURAGESIC) 25 mcg/hr 72 hr patch Place 1 patch onto the skin every 72 hours    Reported, Patient   PREDNISONE PO Take 5 mg by mouth 2 times daily     Reported, Patient   ENALAPRIL MALEATE PO Take 2.5 mg by mouth 2 times daily    Reported, Patient   ASPIRIN EC PO Take 81 mg by mouth daily    Reported, Patient   CARVEDILOL PO Take 6.25 mg by mouth 2 times daily (with meals)    Reported, Patient   Rosuvastatin Calcium (CRESTOR PO) Take 20 mg by mouth daily    Reported, Patient   Ezetimibe (ZETIA PO) Take 10 mg by mouth daily    Reported, Patient   CLOPIDOGREL BISULFATE PO Take 75 mg by mouth daily    Reported, Patient   ESCITALOPRAM OXALATE PO Take 20 mg by mouth daily    Reported, Patient   SPIRONOLACTONE PO Take 25 mg by mouth daily    Reported, Patient   FUROSEMIDE PO Take 20 mg by mouth 2 times daily    Reported, Patient       PHYSICAL EXAM:  NEURO/PSYCH: The patient is alert and oriented.  Appropriate.  Moves all extremities.   SKIN: warm and dry.  PULMONARY: non-labored breathing  EXTREMITIES: right groin incision healing nicely, right lower leg incision healing nicely, right leg shin wound healthy appearing muscle and tissue, scant serous non-malodorous drainage, no active infection noted, right shin wound measures 7 cm long, 6.5 cm wide, 0.5 cm deep, silver granofoam sponge placed in wound bed, obtained good seal at 50 mm HG, right leg very edematous. Doppler right PT and DP pulses, right foot plantar and dorsiflex intact              ASSESSMENT:  Boom Kahn is a 67 year old male with severe  PAD, bilateral lower extremity wounds and critical limb  ischemia who is status post right femoral endarterectomy with in-situ GSV femoral to posterior tibial artery bypass on 1/15/2018 with Dr. Ag.    Patient Active Problem List   Diagnosis     Ischemic cardiomyopathy     CHF (congestive heart failure) (H)     PVD (peripheral vascular disease) (H)     COPD (chronic obstructive pulmonary disease) (H)     Anxiety     Atherosclerosis of native arteries of extremities with intermittent claudication, bilateral legs (H)     Automatic implantable cardioverter-defibrillator in situ     Depression     Cardiomyopathy (H)     Hyperlipidemia     HTN (hypertension)     Lung mass     Dyspnea on exertion     Malignant neoplasm of lung (H)     MI (myocardial infarction)     Critical lower limb ischemia     Atherosclerotic PVD with ulceration (H)     S/P vascular surgery           PLAN:  - right leg very edematous, resume Lasix 20 mg po BID   - wrap right leg with ace wrap from toes to knee  - elevate right leg  - right leg wound vac re-applied with silver sponge, increase suction as tolerated with goal of 125 mm HG  - home health nurse to change wound vac three times per week  - follow up with Dr. Ag in 2 weeks with RVAI and ultrasound  - continue lambs wool between right toes  - continue Plavix, ASA and Crestor  - instructed patient to contact Dr. Ag's vascular surgery team if unable to tolerate wound vac or wound vac worsens  - smoking cessation strongly encouraged, including e-cigarettes     Shannan COLMENARES, CNS  Division of Vascular Surgery  [facility]  Pager [phone]

## 2018-02-01 VITALS
SYSTOLIC BLOOD PRESSURE: 134 MMHG | DIASTOLIC BLOOD PRESSURE: 67 MMHG | HEIGHT: 68 IN | TEMPERATURE: 95.8 F | RESPIRATION RATE: 16 BRPM | WEIGHT: 148.6 LBS | OXYGEN SATURATION: 98 % | HEART RATE: 72 BPM | BODY MASS INDEX: 22.52 KG/M2

## 2018-02-01 LAB — PLATELET # BLD AUTO: 321 10E9/L (ref 150–450)

## 2018-02-01 PROCEDURE — 85049 AUTOMATED PLATELET COUNT: CPT | Performed by: INTERNAL MEDICINE

## 2018-02-01 PROCEDURE — 36415 COLL VENOUS BLD VENIPUNCTURE: CPT | Performed by: INTERNAL MEDICINE

## 2018-02-01 PROCEDURE — 25000132 ZZH RX MED GY IP 250 OP 250 PS 637: Performed by: INTERNAL MEDICINE

## 2018-02-01 PROCEDURE — 25000125 ZZHC RX 250: Performed by: INTERNAL MEDICINE

## 2018-02-01 RX ADMIN — PREDNISONE 10 MG: 5 TABLET ORAL at 08:39

## 2018-02-01 RX ADMIN — OXYCODONE HYDROCHLORIDE 10 MG: 5 TABLET ORAL at 03:32

## 2018-02-01 RX ADMIN — OXYCODONE HYDROCHLORIDE 10 MG: 5 TABLET ORAL at 12:38

## 2018-02-01 RX ADMIN — NICOTINE 1 PATCH: 7 PATCH TRANSDERMAL at 08:44

## 2018-02-01 RX ADMIN — OXYCODONE HYDROCHLORIDE 10 MG: 5 TABLET ORAL at 08:34

## 2018-02-01 RX ADMIN — ESCITALOPRAM OXALATE 20 MG: 20 TABLET ORAL at 08:39

## 2018-02-01 RX ADMIN — FUROSEMIDE 20 MG: 20 TABLET ORAL at 08:39

## 2018-02-01 RX ADMIN — ISOSORBIDE MONONITRATE 60 MG: 60 TABLET, EXTENDED RELEASE ORAL at 08:39

## 2018-02-01 RX ADMIN — CARVEDILOL 6.25 MG: 3.12 TABLET, FILM COATED ORAL at 08:40

## 2018-02-01 RX ADMIN — MULTIPLE VITAMINS W/ MINERALS TAB 1 TABLET: TAB at 08:38

## 2018-02-01 RX ADMIN — ENALAPRIL MALEATE 2.5 MG: 2.5 TABLET ORAL at 08:40

## 2018-02-01 RX ADMIN — CLOPIDOGREL BISULFATE 75 MG: 75 TABLET, FILM COATED ORAL at 08:38

## 2018-02-01 RX ADMIN — GABAPENTIN 300 MG: 100 CAPSULE ORAL at 08:37

## 2018-02-01 RX ADMIN — ASPIRIN 81 MG: 81 TABLET, COATED ORAL at 08:39

## 2018-02-01 NOTE — PLAN OF CARE
Problem: Goal Outcome Summary  Goal: Goal Outcome Summary  Outcome: No Change  Alert and oriented x 4, c/o pain gave oxycodone q3hrs, wound vac dressing intact at 50mmhg per pt, the home care nurse will increase it up to 125mmhg per doctor order. Surgical incision on R groin are intact and VIANEY, change coccyx area dressing blanchable redness noted, B/P was 94/54 encouraged fluids at bed time it was 102/52.

## 2018-02-01 NOTE — PLAN OF CARE
Problem: Goal Outcome Summary  Goal: Goal Outcome Summary  Outcome: No Change  No new issues or concerns. Sleeps off and on. Awake @ 0330 - requested and rec'd Oxycodone 10mg po for c/o pain (R) shin wound. Up ad thony indep.without AD. Planning discharge today.

## 2018-02-01 NOTE — PLAN OF CARE
"Problem: Goal Outcome Summary  Goal: Goal Outcome Summary  Outcome: Adequate for Discharge Date Met: 02/01/18  RN: Pt awake, alert, and oriented, sitting EOB. Pt showered and removed all dsgs; skin assessment completed, barrier cream and Mepilex applied to coccyx which was scabbed over and intact. Wound vac system switched from TCU to home health care unit; pt educated on how to use and set settings; pt returned understanding of applying new cannister, on/off, settings, charging, and attaching to tubing. Pt's son will be here about 1400 to p/u pt. Pt is ready for DC.     /67 (BP Location: Right arm)  Pulse 72  Temp 95.8  F (35.4  C) (Oral)  Resp 16  Ht 1.727 m (5' 8\")  Wt 67.4 kg (148 lb 9.6 oz)  SpO2 98%  BMI 22.59 kg/m2     Pt appt set up at [address] on Thurs 2/8/18 at 1300 for f/u since his primary dr is in [place].   Pt refused to take many meds, stating he has them at home. Extra meds put in RN med cupboard to be returned to Pharmacy.         "

## 2018-02-01 NOTE — PROGRESS NOTES
[facility]   Met with pt and family to discuss plans for HC.  Pt to be discharged 02 01 18  and has agreed to have [facility] follow with services of .  [facility] Int[facility] processing referral.  Pt and family verbalized understanding that initial visit is scheduled for    Monday Feb 5th. Pt has 24 hour phone number for [facility] for any questions or concerns.

## 2018-02-07 ENCOUNTER — HOSPITAL ENCOUNTER (OUTPATIENT)
Facility: CLINIC | Age: 68
Setting detail: OBSERVATION
Discharge: HOME | End: 2018-02-10
Attending: EMERGENCY MEDICINE | Admitting: EMERGENCY MEDICINE
Payer: COMMERCIAL

## 2018-02-07 ENCOUNTER — CARE COORDINATION (OUTPATIENT)
Dept: VASCULAR SURGERY | Facility: CLINIC | Age: 68
End: 2018-02-07

## 2018-02-07 DIAGNOSIS — I73.9 PERIPHERAL VASCULAR DISORDER (H): ICD-10-CM

## 2018-02-07 DIAGNOSIS — D72.829 LEUKOCYTOSIS, UNSPECIFIED TYPE: ICD-10-CM

## 2018-02-07 DIAGNOSIS — Z95.810 AUTOMATIC IMPLANTABLE CARDIOVERTER-DEFIBRILLATOR IN SITU: ICD-10-CM

## 2018-02-07 DIAGNOSIS — I73.9 PVD (PERIPHERAL VASCULAR DISEASE) (H): ICD-10-CM

## 2018-02-07 DIAGNOSIS — I25.5 ISCHEMIC CARDIOMYOPATHY: ICD-10-CM

## 2018-02-07 DIAGNOSIS — F41.9 ANXIETY: ICD-10-CM

## 2018-02-07 DIAGNOSIS — R91.8 LUNG MASS: ICD-10-CM

## 2018-02-07 DIAGNOSIS — M25.552 LEFT HIP PAIN: ICD-10-CM

## 2018-02-07 DIAGNOSIS — I70.213 ATHEROSCLEROSIS OF NATIVE ARTERIES OF EXTREMITIES WITH INTERMITTENT CLAUDICATION, BILATERAL LEGS (H): ICD-10-CM

## 2018-02-07 DIAGNOSIS — R06.09 DYSPNEA ON EXERTION: ICD-10-CM

## 2018-02-07 DIAGNOSIS — I70.229 CRITICAL LOWER LIMB ISCHEMIA (H): ICD-10-CM

## 2018-02-07 DIAGNOSIS — Z98.890 S/P VASCULAR SURGERY: Primary | ICD-10-CM

## 2018-02-07 DIAGNOSIS — M79.605 PAIN OF LEFT LOWER EXTREMITY: ICD-10-CM

## 2018-02-07 DIAGNOSIS — M54.5 LEFT LOW BACK PAIN, UNSPECIFIED CHRONICITY, WITH SCIATICA PRESENCE UNSPECIFIED: ICD-10-CM

## 2018-02-07 PROBLEM — M54.9 BACK PAIN: Status: ACTIVE | Noted: 2018-02-07

## 2018-02-07 LAB
ABO + RH BLD: NORMAL
ABO + RH BLD: NORMAL
ALBUMIN UR-MCNC: NEGATIVE MG/DL
ANION GAP SERPL CALCULATED.3IONS-SCNC: 6 MMOL/L (ref 3–14)
APPEARANCE UR: CLEAR
APTT PPP: 33 SEC (ref 22–37)
BASOPHILS # BLD AUTO: 0 10E9/L (ref 0–0.2)
BASOPHILS NFR BLD AUTO: 0.2 %
BILIRUB UR QL STRIP: NEGATIVE
BLD GP AB SCN SERPL QL: NORMAL
BLOOD BANK CMNT PATIENT-IMP: NORMAL
BUN SERPL-MCNC: 10 MG/DL (ref 7–30)
CALCIUM SERPL-MCNC: 9.4 MG/DL (ref 8.5–10.1)
CHLORIDE SERPL-SCNC: 106 MMOL/L (ref 94–109)
CO2 SERPL-SCNC: 28 MMOL/L (ref 20–32)
COLOR UR AUTO: NORMAL
CREAT SERPL-MCNC: 0.61 MG/DL (ref 0.66–1.25)
DIFFERENTIAL METHOD BLD: ABNORMAL
EOSINOPHIL # BLD AUTO: 0 10E9/L (ref 0–0.7)
EOSINOPHIL NFR BLD AUTO: 0 %
ERYTHROCYTE [DISTWIDTH] IN BLOOD BY AUTOMATED COUNT: 14.4 % (ref 10–15)
GFR SERPL CREATININE-BSD FRML MDRD: >90 ML/MIN/1.7M2
GLUCOSE SERPL-MCNC: 98 MG/DL (ref 70–99)
GLUCOSE UR STRIP-MCNC: NEGATIVE MG/DL
HCT VFR BLD AUTO: 36.7 % (ref 40–53)
HGB BLD-MCNC: 11.7 G/DL (ref 13.3–17.7)
HGB UR QL STRIP: NEGATIVE
IMM GRANULOCYTES # BLD: 0 10E9/L (ref 0–0.4)
IMM GRANULOCYTES NFR BLD: 0.3 %
INR PPP: 0.93 (ref 0.86–1.14)
KETONES UR STRIP-MCNC: NEGATIVE MG/DL
LEUKOCYTE ESTERASE UR QL STRIP: NEGATIVE
LYMPHOCYTES # BLD AUTO: 0.7 10E9/L (ref 0.8–5.3)
LYMPHOCYTES NFR BLD AUTO: 5.3 %
MCH RBC QN AUTO: 32.1 PG (ref 26.5–33)
MCHC RBC AUTO-ENTMCNC: 31.9 G/DL (ref 31.5–36.5)
MCV RBC AUTO: 101 FL (ref 78–100)
MONOCYTES # BLD AUTO: 0.5 10E9/L (ref 0–1.3)
MONOCYTES NFR BLD AUTO: 4 %
NEUTROPHILS # BLD AUTO: 11.7 10E9/L (ref 1.6–8.3)
NEUTROPHILS NFR BLD AUTO: 90.2 %
NITRATE UR QL: NEGATIVE
NRBC # BLD AUTO: 0 10*3/UL
NRBC BLD AUTO-RTO: 0 /100
PH UR STRIP: 7 PH (ref 5–7)
PLATELET # BLD AUTO: 344 10E9/L (ref 150–450)
POTASSIUM SERPL-SCNC: 5 MMOL/L (ref 3.4–5.3)
RBC # BLD AUTO: 3.65 10E12/L (ref 4.4–5.9)
RBC #/AREA URNS AUTO: 0 /HPF (ref 0–2)
SODIUM SERPL-SCNC: 140 MMOL/L (ref 133–144)
SOURCE: NORMAL
SP GR UR STRIP: 1 (ref 1–1.03)
SPECIMEN EXP DATE BLD: NORMAL
UROBILINOGEN UR STRIP-MCNC: NORMAL MG/DL (ref 0–2)
WBC # BLD AUTO: 13 10E9/L (ref 4–11)
WBC #/AREA URNS AUTO: 0 /HPF (ref 0–2)

## 2018-02-07 PROCEDURE — 85025 COMPLETE CBC W/AUTO DIFF WBC: CPT | Performed by: EMERGENCY MEDICINE

## 2018-02-07 PROCEDURE — 99220 ZZC INITIAL OBSERVATION CARE,LEVL III: CPT | Mod: Z6 | Performed by: EMERGENCY MEDICINE

## 2018-02-07 PROCEDURE — 86900 BLOOD TYPING SEROLOGIC ABO: CPT | Performed by: EMERGENCY MEDICINE

## 2018-02-07 PROCEDURE — 81001 URINALYSIS AUTO W/SCOPE: CPT | Performed by: EMERGENCY MEDICINE

## 2018-02-07 PROCEDURE — 85610 PROTHROMBIN TIME: CPT | Performed by: EMERGENCY MEDICINE

## 2018-02-07 PROCEDURE — 25000132 ZZH RX MED GY IP 250 OP 250 PS 637: Performed by: EMERGENCY MEDICINE

## 2018-02-07 PROCEDURE — G0378 HOSPITAL OBSERVATION PER HR: HCPCS

## 2018-02-07 PROCEDURE — 80048 BASIC METABOLIC PNL TOTAL CA: CPT | Performed by: EMERGENCY MEDICINE

## 2018-02-07 PROCEDURE — 96372 THER/PROPH/DIAG INJ SC/IM: CPT | Performed by: EMERGENCY MEDICINE

## 2018-02-07 PROCEDURE — 99285 EMERGENCY DEPT VISIT HI MDM: CPT | Mod: 25 | Performed by: EMERGENCY MEDICINE

## 2018-02-07 PROCEDURE — 25000128 H RX IP 250 OP 636: Performed by: EMERGENCY MEDICINE

## 2018-02-07 PROCEDURE — 87086 URINE CULTURE/COLONY COUNT: CPT | Performed by: EMERGENCY MEDICINE

## 2018-02-07 PROCEDURE — 85730 THROMBOPLASTIN TIME PARTIAL: CPT | Performed by: EMERGENCY MEDICINE

## 2018-02-07 PROCEDURE — 86850 RBC ANTIBODY SCREEN: CPT | Performed by: EMERGENCY MEDICINE

## 2018-02-07 PROCEDURE — 40000556 ZZH STATISTIC PERIPHERAL IV START W US GUIDANCE

## 2018-02-07 PROCEDURE — 86901 BLOOD TYPING SEROLOGIC RH(D): CPT | Performed by: EMERGENCY MEDICINE

## 2018-02-07 PROCEDURE — 25000132 ZZH RX MED GY IP 250 OP 250 PS 637: Performed by: NURSE PRACTITIONER

## 2018-02-07 RX ORDER — ASCORBIC ACID 500 MG
500 TABLET ORAL DAILY
Status: DISCONTINUED | OUTPATIENT
Start: 2018-02-08 | End: 2018-02-10 | Stop reason: HOSPADM

## 2018-02-07 RX ORDER — ESCITALOPRAM OXALATE 20 MG/1
20 TABLET ORAL DAILY
Status: DISCONTINUED | OUTPATIENT
Start: 2018-02-08 | End: 2018-02-10 | Stop reason: HOSPADM

## 2018-02-07 RX ORDER — GABAPENTIN 300 MG/1
300 CAPSULE ORAL ONCE
Status: COMPLETED | OUTPATIENT
Start: 2018-02-07 | End: 2018-02-07

## 2018-02-07 RX ORDER — GABAPENTIN 300 MG/1
300 CAPSULE ORAL 2 TIMES DAILY
Status: DISCONTINUED | OUTPATIENT
Start: 2018-02-07 | End: 2018-02-07

## 2018-02-07 RX ORDER — IPRATROPIUM BROMIDE AND ALBUTEROL SULFATE 2.5; .5 MG/3ML; MG/3ML
3 SOLUTION RESPIRATORY (INHALATION) EVERY 4 HOURS PRN
Status: DISCONTINUED | OUTPATIENT
Start: 2018-02-07 | End: 2018-02-10 | Stop reason: HOSPADM

## 2018-02-07 RX ORDER — CARVEDILOL 6.25 MG/1
6.25 TABLET ORAL 2 TIMES DAILY WITH MEALS
Status: DISCONTINUED | OUTPATIENT
Start: 2018-02-07 | End: 2018-02-10 | Stop reason: HOSPADM

## 2018-02-07 RX ORDER — HYDROMORPHONE HYDROCHLORIDE 1 MG/ML
0.5 INJECTION, SOLUTION INTRAMUSCULAR; INTRAVENOUS; SUBCUTANEOUS ONCE
Status: DISCONTINUED | OUTPATIENT
Start: 2018-02-07 | End: 2018-02-07

## 2018-02-07 RX ORDER — ONDANSETRON 4 MG/1
4 TABLET, ORALLY DISINTEGRATING ORAL EVERY 6 HOURS PRN
Status: DISCONTINUED | OUTPATIENT
Start: 2018-02-07 | End: 2018-02-10 | Stop reason: HOSPADM

## 2018-02-07 RX ORDER — ROSUVASTATIN CALCIUM 20 MG/1
20 TABLET, COATED ORAL DAILY
Status: DISCONTINUED | OUTPATIENT
Start: 2018-02-08 | End: 2018-02-10 | Stop reason: HOSPADM

## 2018-02-07 RX ORDER — GABAPENTIN 600 MG/1
600 TABLET ORAL AT BEDTIME
Status: DISCONTINUED | OUTPATIENT
Start: 2018-02-07 | End: 2018-02-10 | Stop reason: HOSPADM

## 2018-02-07 RX ORDER — GABAPENTIN 300 MG/1
300 CAPSULE ORAL 2 TIMES DAILY
Status: DISCONTINUED | OUTPATIENT
Start: 2018-02-08 | End: 2018-02-10 | Stop reason: HOSPADM

## 2018-02-07 RX ORDER — CLOPIDOGREL BISULFATE 75 MG/1
75 TABLET ORAL DAILY
Status: DISCONTINUED | OUTPATIENT
Start: 2018-02-07 | End: 2018-02-10 | Stop reason: HOSPADM

## 2018-02-07 RX ORDER — ENALAPRIL MALEATE 2.5 MG/1
2.5 TABLET ORAL 2 TIMES DAILY
Status: DISCONTINUED | OUTPATIENT
Start: 2018-02-07 | End: 2018-02-10 | Stop reason: HOSPADM

## 2018-02-07 RX ORDER — ZINC SULFATE 50(220)MG
220 CAPSULE ORAL DAILY
Status: DISCONTINUED | OUTPATIENT
Start: 2018-02-08 | End: 2018-02-10 | Stop reason: HOSPADM

## 2018-02-07 RX ORDER — FUROSEMIDE 20 MG
20 TABLET ORAL 2 TIMES DAILY WITH MEALS
Status: DISCONTINUED | OUTPATIENT
Start: 2018-02-07 | End: 2018-02-10 | Stop reason: HOSPADM

## 2018-02-07 RX ORDER — PREDNISONE 5 MG/1
10 TABLET ORAL DAILY
Status: DISCONTINUED | OUTPATIENT
Start: 2018-02-07 | End: 2018-02-10 | Stop reason: HOSPADM

## 2018-02-07 RX ORDER — IPRATROPIUM BROMIDE AND ALBUTEROL SULFATE 2.5; .5 MG/3ML; MG/3ML
3 SOLUTION RESPIRATORY (INHALATION)
Status: DISCONTINUED | OUTPATIENT
Start: 2018-02-07 | End: 2018-02-07

## 2018-02-07 RX ORDER — LANOLIN ALCOHOL/MO/W.PET/CERES
100 CREAM (GRAM) TOPICAL DAILY
Status: DISCONTINUED | OUTPATIENT
Start: 2018-02-08 | End: 2018-02-10 | Stop reason: HOSPADM

## 2018-02-07 RX ORDER — OXYCODONE HYDROCHLORIDE 5 MG/1
10 TABLET ORAL ONCE
Status: COMPLETED | OUTPATIENT
Start: 2018-02-07 | End: 2018-02-07

## 2018-02-07 RX ORDER — FENTANYL 25 UG/1
25 PATCH TRANSDERMAL
Status: DISCONTINUED | OUTPATIENT
Start: 2018-02-09 | End: 2018-02-10 | Stop reason: HOSPADM

## 2018-02-07 RX ORDER — EZETIMIBE 10 MG/1
10 TABLET ORAL DAILY
Status: DISCONTINUED | OUTPATIENT
Start: 2018-02-08 | End: 2018-02-10 | Stop reason: HOSPADM

## 2018-02-07 RX ORDER — LORAZEPAM 0.5 MG/1
1-4 TABLET ORAL EVERY 30 MIN PRN
Status: DISCONTINUED | OUTPATIENT
Start: 2018-02-07 | End: 2018-02-10 | Stop reason: HOSPADM

## 2018-02-07 RX ORDER — ISOSORBIDE MONONITRATE 60 MG/1
60 TABLET, EXTENDED RELEASE ORAL DAILY
Status: DISCONTINUED | OUTPATIENT
Start: 2018-02-07 | End: 2018-02-10 | Stop reason: HOSPADM

## 2018-02-07 RX ORDER — NALOXONE HYDROCHLORIDE 0.4 MG/ML
.1-.4 INJECTION, SOLUTION INTRAMUSCULAR; INTRAVENOUS; SUBCUTANEOUS
Status: DISCONTINUED | OUTPATIENT
Start: 2018-02-07 | End: 2018-02-10 | Stop reason: HOSPADM

## 2018-02-07 RX ORDER — OXYCODONE HYDROCHLORIDE 5 MG/1
5-10 TABLET ORAL
Status: DISCONTINUED | OUTPATIENT
Start: 2018-02-07 | End: 2018-02-08

## 2018-02-07 RX ORDER — POLYETHYLENE GLYCOL 3350 17 G/17G
17 POWDER, FOR SOLUTION ORAL DAILY PRN
Status: DISCONTINUED | OUTPATIENT
Start: 2018-02-07 | End: 2018-02-10 | Stop reason: HOSPADM

## 2018-02-07 RX ORDER — ACETAMINOPHEN 325 MG/1
650 TABLET ORAL EVERY 4 HOURS PRN
Status: DISCONTINUED | OUTPATIENT
Start: 2018-02-07 | End: 2018-02-08

## 2018-02-07 RX ORDER — DOCUSATE SODIUM 100 MG/1
100 CAPSULE, LIQUID FILLED ORAL DAILY
Status: DISCONTINUED | OUTPATIENT
Start: 2018-02-08 | End: 2018-02-10 | Stop reason: HOSPADM

## 2018-02-07 RX ORDER — ONDANSETRON 2 MG/ML
4 INJECTION INTRAMUSCULAR; INTRAVENOUS EVERY 6 HOURS PRN
Status: DISCONTINUED | OUTPATIENT
Start: 2018-02-07 | End: 2018-02-10 | Stop reason: HOSPADM

## 2018-02-07 RX ORDER — CYCLOBENZAPRINE HCL 5 MG
10 TABLET ORAL 3 TIMES DAILY PRN
Status: DISCONTINUED | OUTPATIENT
Start: 2018-02-07 | End: 2018-02-10 | Stop reason: HOSPADM

## 2018-02-07 RX ORDER — ASPIRIN 81 MG/1
81 TABLET ORAL DAILY
Status: DISCONTINUED | OUTPATIENT
Start: 2018-02-07 | End: 2018-02-10 | Stop reason: HOSPADM

## 2018-02-07 RX ADMIN — ENALAPRIL MALEATE 2.5 MG: 2.5 TABLET ORAL at 22:23

## 2018-02-07 RX ADMIN — HYDROMORPHONE HYDROCHLORIDE 1 MG: 1 INJECTION, SOLUTION INTRAMUSCULAR; INTRAVENOUS; SUBCUTANEOUS at 19:55

## 2018-02-07 RX ADMIN — OXYCODONE HYDROCHLORIDE 10 MG: 5 TABLET ORAL at 19:12

## 2018-02-07 RX ADMIN — CLOPIDOGREL 75 MG: 75 TABLET, FILM COATED ORAL at 22:22

## 2018-02-07 RX ADMIN — CARVEDILOL 6.25 MG: 6.25 TABLET, FILM COATED ORAL at 22:23

## 2018-02-07 RX ADMIN — GABAPENTIN 300 MG: 300 CAPSULE ORAL at 19:12

## 2018-02-07 RX ADMIN — CYCLOBENZAPRINE HYDROCHLORIDE 10 MG: 5 TABLET, FILM COATED ORAL at 20:59

## 2018-02-07 RX ADMIN — FUROSEMIDE 20 MG: 20 TABLET ORAL at 22:23

## 2018-02-07 ASSESSMENT — ENCOUNTER SYMPTOMS: BACK PAIN: 1

## 2018-02-07 NOTE — ED PROVIDER NOTES
[facility] EMERGENCY DEPARTMENT ([facility])  2/07/18   History     Chief Complaint   Patient presents with     Back Pain     Gait Problem     HPI  Boom Kahn is a 67 year old male with a medical history significant for hypertension, anxiety/depression, alcohol abuse, tobacco abuse, ICM/MI, CAD s/p 3x CABG and PCI, HFrEF (EF 25-30%, approximately 6 months ago) s/p ICD, right lung cancer s/p radiation therapy and peripheral air vascular disease s/p multiple stents.  Per review of patient's chart, patient underwent a right femoral endarterectomy with in situ GSV femoral to posterior tibial artery bypass on 1/15/2018.  After undergoing the surgery he was initially monitored in ICU and transferred to a stepdown unit.  Patient presents to the Emergency Department today for evaluation of lower left back pain, left hip pain with radiation down the left leg, causing difficulty with walking.  Patient reports that on 2/4/2018 he began sleeping in a familiar bed once again, and when he woke up the morning of 2/5/2018 he started having some lower left back pain.  He states throughout the day the pain then started in his left hip and he began having shooting pain down the left leg.  Patient reports the pain continued to worsen to the point that he was unable to walk or bear weight on the left leg.  He states that whenever he puts weight on the leg he has shooting pain down the leg.  Patient notes that he has always had a chronic mild left hip pain throughout most of his life; however, the pain is now severely exacerbated.  Patient notes that he is now having pain with sitting, but notes having some relief of pain with laying on the right hip.  Patient here also reports having some swelling in his left groin.  He reports that prior to his hospitalization he was having swelling in the right groin, but this has since resolved.  Patient notes that he had an angiogram done of his lower extremities prior to having his  "surgery, and the son reports that this showed an occlusion in the left side.  Patient here also reports that he has been dealing with PAD for the past 2 years and has chronic wounds on his legs.    IR Lower Extremity Angiogram Right (1/3/2018)  Impression:     Right:  1. Right lower extremity angiogram shows totally occluded mid SFA to  above-the-knee popliteal stents, short segment below the knee  popliteal occlusion, and an occluded anterior tibial artery.  2. Angiogram also demonstrates a severe (80-90%) short segment  shelflike right CFA stenosis. Therefore, recanalization of the chronic  total occlusion was not performed.  3. Patent upper SFA and iliac stents.     Left:  1. 13 cm chronic total occlusion of the mid SFA.  2. Two-vessel runoff with total occlusion of the TEDDY.  3. Short segment narrowing of the TP trunk.  4. Patent iliac stents with less severe short segment common femoral  disease than on the right.    I have reviewed the Medications, Allergies, Past Medical and Surgical History, and Social History in the Epic system.    Review of Systems   Musculoskeletal: Positive for back pain (left lower) and gait problem (due to pain).        Positive for left hip pain  Positive for shooting pain down left leg   All other systems reviewed and are negative.      Physical Exam   BP: 105/43  Pulse: 83  Temp: 98.2  F (36.8  C)  Resp: 18  Height: 172.7 cm (5' 8\")  Weight: 67.4 kg (148 lb 8 oz)  SpO2: 97 %      Physical Exam   Constitutional: He is oriented to person, place, and time. No distress.   HENT:   Head: Atraumatic.   Mouth/Throat: Oropharynx is clear and moist. No oropharyngeal exudate.   Eyes: Pupils are equal, round, and reactive to light. No scleral icterus.   Cardiovascular: Normal rate, regular rhythm, normal heart sounds and intact distal pulses.    Pulmonary/Chest: Breath sounds normal. No respiratory distress.   Abdominal: Soft. Bowel sounds are normal. There is no tenderness.   Musculoskeletal: " Normal range of motion. He exhibits no edema or tenderness.   Neurological: He is alert and oriented to person, place, and time.   Skin: Skin is warm. No rash noted. He is not diaphoretic.   B/l LE wounds, poorly healing    Nursing note and vitals reviewed.      ED Course   4:30 PM  The patient was seen and examined by Ana Esposito MD in Room ED03.     ED Course     Procedures             Critical Care time:  none             Labs Ordered and Resulted from Time of ED Arrival Up to the Time of Departure from the ED   CBC WITH PLATELETS DIFFERENTIAL   BASIC METABOLIC PANEL            Assessments & Plan (with Medical Decision Making)     67 year old male with a medical history significant for hypertension, anxiety/depression, alcohol abuse, tobacco abuse, ICM/MI, CAD s/p 3x CABG and PCI, HFrEF (EF 25-30%, approximately 6 months ago) s/p ICD, right lung cancer s/p radiation therapy and peripheral air vascular disease s/p multiple stents, who underwent a right femoral endarterectomy with in situ GSV femoral to posterior tibial artery bypass on 1/15/2018 and presents to the Emergency Department today for evaluation of lower left back pain, left hip pain with radiation down the left leg, causing difficulty with walking. IV established and labs sent which were unremarkable. Case discussed with Vascular team who know patient well and evaluated patient in ED in lieu of any further imaging. Shared assessment that patient's poor wound healing likely due to pscychosocial constraints. Patient medicates with oxycodone and dilaudid with adequate relief of pain ,and arrangements made for ED OBS stay will likley placement into an TCU.     I have reviewed the nursing notes.    I have reviewed the findings, diagnosis, plan and need for follow up with the patient.    New Prescriptions    No medications on file       Final diagnoses:   Pain of left lower extremity     IBarrera, am serving as a trained medical scribe to document  services personally performed by Ana Esposito MD, based on the provider's statements to me.   I, Ana Esposito MD, was physically present and have reviewed and verified the accuracy of this note documented by Barrera Patel.    2/7/2018   [facility], [place], EMERGENCY DEPARTMENT     Ana Esposito MD  02/15/18 1698

## 2018-02-07 NOTE — ED NOTES
Pt was d/c'd yesterday after having fem-pop bypass surgery, today he returns by ambulance d/t low back pain and inability to ambulate today.

## 2018-02-07 NOTE — IP AVS SNAPSHOT
Unit 6D Observation [address] [address]    Phone:  [phone]    Fax:  [phone]                                       After Visit Summary   2/7/2018    Boom Kahn    MRN: [id number]           After Visit Summary Signature Page     I have received my discharge instructions, and my questions have been answered. I have discussed any challenges I see with this plan with the nurse or doctor.    ..........................................................................................................................................  Patient/Patient Representative Signature      ..........................................................................................................................................  Patient Representative Print Name and Relationship to Patient    ..................................................               ................................................  Date                                            Time    ..........................................................................................................................................  Reviewed by Signature/Title    ...................................................              ..............................................  Date                                                            Time

## 2018-02-07 NOTE — PROGRESS NOTES
Received phone call from [facility] home care nurse for Mr Kahn.  Patient was discharged with home care on 2/2/2018.  Patient has not been allowing home care nurse to visit, has removed wound vac and has not been taking medications as ordered.      Patient told home care  that he will be coming to the emergency room since his pain medication is not working.  Unable to reach pt.

## 2018-02-07 NOTE — PROGRESS NOTES
Prior Authorization Approval    Fentanyl 25mcg  Date Initiated: 01/31/2018  Date Completed: 01/31/2018  Prior Auth Type: Step Therapy     Status: Approved    Effective Date: Start Date:01/31/2018;Coverage End Date:07/30/2018;  Copay: $23.97  Anita Filled: Yes    Insurance: [facility] Part D  Ph: [phone]    ID: [id number]  Case Number: [id number]  Submitted Via: [facility] Discharge Pharmacy Liaison, pager [phone]

## 2018-02-07 NOTE — LETTER
Health Information Management Services               Recipient:    Srini Cervantes  [phone]      Sender:    Kandis Aguilar, [facility]  [phone]  RN Station: [phone]      Date: February 8, 2018  Patient Name:  Boom Kahn  Routing Message:      Looking for TCU placement for Friday. Has wound vac      The documents accompanying this notice contain confidential information belonging to the sender.  This information is intended only for the use of the individual or entity named above.  The authorized recipient of this information is prohibited from disclosing this information to any other party and is required to destroy the information after its stated need has been fulfilled, unless otherwise required by state law.      If you are not the intended recipient, you are hereby notified that any disclosure, copying, distribution or action taken in reliance on the contents of these documents is strictly prohibited. If you have received this document in error, please notify [place] immediately at [phone].  You may return the document via fax ([phone]) or return mail  (Health Information Management, , [address] [place] [address]).

## 2018-02-07 NOTE — IP AVS SNAPSHOT
MRN:[id number]                      After Visit Summary   2/7/2018    Boom Kahn    MRN: [id number]           Thank you!     Thank you for choosing [facility] for your care. Our goal is always to provide you with excellent care. Hearing back from our patients is one way we can continue to improve our services. Please take a few minutes to complete the written survey that you may receive in the mail after you visit with us. Thank you!        Patient Information     Date Of Birth          1950        About your hospital stay     You were admitted on:  February 7, 2018 You last received care in the:  Unit 6D Observation [facility]    You were discharged on:  February 10, 2018        Reason for your hospital stay       You were admitted for pain management. Your pain improved. We recommended you discharge to TCU for safety. You preferred to discharge to home. Physical Therapy saw you today and cleared you for home with home care. We will increased your oxycodone dose. Please be sure not to mix this with alcohol as this could be life threatening. Do not drink alcohol while taking narcotics     CT of your Back showed    1. No acute fracture or dislocation. Mild dextroscoliosis centering at  L3 with multilevel disc degenerative disease.  2. Disc bulge with superimposed left central disc protrusion at L3-L4  and likely left foraminal disc protrusion at this level causing mild  left foraminal narrowing. L5-S1 right foraminal disc protrusion with  likely right L5 nerve root impingement. An MRI of the lumbar spine can  be obtained to confirm these findings.    Recommend follow-up with your primary care and possible Neurosurgery for this                  Who to Call     For medical emergencies, please call 911.  For non-urgent questions about your medical care, please call your primary care provider or clinic, [phone]          Attending Provider     Provider Specialty    Ana Esposito MD  Emergency Medicine    Rhonda Sheridan MD Emergency Medicine       Primary Care Provider Office Phone # Fax #    Willian Arrington [phone]       When to contact your care team       Return to the ED with fever, uncontrolled nausea, vomiting, unrelieved pain, bleeding not relieved with pressure, dizziness, chest pain, shortness of breath, loss of consciousness, and any new or concerning symptoms.                  After Care Instructions     Activity       Your activity upon discharge: activity as tolerated, ambulate with walker  Elevated legs as much as possible            Diet       Follow this diet upon discharge:       Fluid restriction 2000 ML FLUID      Combination Diet 2 gm NA Diet; No Caffeine Diet            Wound care and dressings       Instructions to care for your wound at home: as directed.    Right lower extremity wound: twice daily wet-to-dry dressing changes with acetic acid.   Left Lower Extremity wound: Cleanse with MicroKlenz moistened gauze. Pat dry. Apply vaseline to the wound base. Cover with 4x4 Mepilex dressing. Change dressing every day and as needed.                  Follow-up Appointments     Adult [facility] Follow-up and recommended labs and tests       Primary care provider, Willian Arrington, within 2 days for hospital follow- up.    Follow up with Vascular Surgery within 7 days of discharge      Appointments on [employer] and/or [facility] (with [facility] or [facility] provider or service). Call [phone] if you haven't heard regarding these appointments within 7 days of discharge.                  Your next 10 appointments already scheduled     Feb 15, 2018  3:00 PM CST   US LOWER EXTREMITY ARTERIAL DUPLEX RIGHT with UCUSV1   [facility]  ([facility])    [address]  [facility] [address] [phone]           Please bring a list of your medicines (including vitamins, minerals and over-the-counter  drugs). Also, tell your doctor about any allergies you may have. Wear comfortable clothes and leave your valuables at home.  You do not need to do anything special to prepare for your exam.  Please call the Imaging Department at your exam site with any questions.            Feb 15, 2018  3:30 PM CST   US RAVI DOPPLER NO EXERCISE 1-2 LVLS BILAT with UCUSV1   [facility] ([facility])    [address] [place] [address]  [address] [facility] [address] [phone]           Please bring a list of your medicines (including vitamins, minerals and over-the-counter drugs). Also, tell your doctor about any allergies you may have. Wear comfortable clothes and leave your valuables at home.  No caffeine or tobacco for 1 hour prior to exam.  Please call the Imaging Department at your exam site with any questions.            Feb 15, 2018  4:30 PM CST   (Arrive by 4:15 PM)   Return Vascular Visit with Lexis Ag MD   [facility] ([facility])    [address] [place] [address] [phone]              Additional Services     Home Care PT Referral for Hospital Discharge       [facility] [phone], Fax [phone]    PT to eval and treat    Your provider has ordered home care - physical therapy. If you have not been contacted within 2 days of your discharge please call the department phone number listed on the top of this document.            Home care nursing referral       [facility] [phone], Fax [phone]    RN skilled nursing visit. RN to assess vital signs and weight, respiratory and cardiac status, pain level and activity tolerance, incision for signs/symptoms of infection, hydration, nutrition and bowel status and home safety.  RN to teach medication management wound care management  RN to provide lab draws.    *Patient will be discharge to: [address] [facility] cellphone  "[phone]  Home care RN please f/u with patient regarding request for w/c.      Your provider has ordered home care nursing services. If you have not been contacted within 2 days of your discharge please call the inpatient department phone number at [phone] .                  Pending Results     Date and Time Order Name Status Description    2/10/2018 0656 Basic metabolic panel In process     2/9/2018 1355 US Lower Extremity Arterial Duplex Left Preliminary             Statement of Approval     Ordered          02/10/18 1436  I have reviewed and agree with all the recommendations and orders detailed in this document.  EFFECTIVE NOW     Approved and electronically signed by:  Modesta Ruiz APRN CNP             Admission Information     Date & Time Department Dept. Phone    2/7/2018 Unit 6D Observation [facility] [phone]      Your Vitals Were     Blood Pressure Pulse Temperature Respirations Height Weight    119/72 (BP Location: Left arm) 69 98  F (36.7  C) (Oral) 16 1.727 m (5' 8\") 66.8 kg (147 lb 3.2 oz)    Pulse Oximetry BMI (Body Mass Index)                96% 22.38 kg/m2          [employer] Information     [employer] gives you secure access to your electronic health record. If you see a primary care provider, you can also send messages to your care team and make appointments. If you have questions, please call your primary care clinic.  If you do not have a primary care provider, please call [phone] and they will assist you.        Care EveryWhere ID     This is your Care EveryWhere ID. This could be used by other organizations to access your [facility] medical records  [id number]        Equal Access to Services     TANJA DORADO : Hadeliseo Ugalde, waaxda luqadaha, qaybta kaalmada alfonso, vijay evans. So [facility] [phone].    ATENCIÓN: Si habla español, tiene a bhatia disposición servicios gratuitos de asistencia lingüística. Llame al " [phone].    We comply with applicable federal civil rights laws and [place] laws. We do not discriminate on the basis of race, color, national origin, age, disability, sex, sexual orientation, or gender identity.               Review of your medicines      START taking        Dose / Directions    acetic acid 0.25 % irrigation   Used for:  S/P vascular surgery        Irrigate with as directed 2 times daily   Quantity:  10 mL   Refills:  0       diclofenac 1 % Gel topical gel   Commonly known as:  VOLTAREN   Used for:  Pain of left lower extremity        Dose:  4 g   Place 4 g onto the skin 4 times daily   Quantity:  1 Tube   Refills:  0       order for DME   Used for:  Pain of left lower extremity        Equipment being ordered: Walker Wheels () and Walker () Treatment Diagnosis: impaired mobility   Quantity:  1 each   Refills:  0         CONTINUE these medicines which may have CHANGED, or have new prescriptions. If we are uncertain of the size of tablets/capsules you have at home, strength may be listed as something that might have changed.        Dose / Directions    oxyCODONE IR 10 MG tablet   Commonly known as:  ROXICODONE   This may have changed:    - medication strength  - how much to take  - reasons to take this   Used for:  Pain of left lower extremity        Dose:  10-15 mg   Take 1-1.5 tablets (10-15 mg) by mouth every 3 hours as needed for moderate to severe pain   Quantity:  12 tablet   Refills:  0         CONTINUE these medicines which have NOT CHANGED        Dose / Directions    ascorbic acid 500 MG Tabs   Used for:  Takes dietary supplements        Dose:  500 mg   Take 1 tablet (500 mg) by mouth daily   Quantity:  7 tablet   Refills:  0       ASPIRIN EC PO        Dose:  81 mg   Take 81 mg by mouth daily   Refills:  0       carvedilol 6.25 MG tablet   Commonly known as:  COREG   Indication:  Heart Failure, CAD   Used for:  Ischemic cardiomyopathy        Dose:  6.25 mg   Take 1 tablet  (6.25 mg) by mouth 2 times daily (with meals)   Quantity:  60 tablet   Refills:  0       clopidogrel 75 MG tablet   Commonly known as:  PLAVIX   Indication:  Disease of the Peripheral Arteries   Used for:  Ischemic cardiomyopathy        Dose:  75 mg   Take 1 tablet (75 mg) by mouth daily   Quantity:  30 tablet   Refills:  0       docusate sodium 100 MG capsule   Commonly known as:  COLACE   Indication:  Constipation   Used for:  Takes dietary supplements        Dose:  100 mg   Take 1 capsule (100 mg) by mouth daily   Quantity:  60 capsule   Refills:  0       enalapril 2.5 MG tablet   Commonly known as:  VASOTEC   Indication:  Congestive Heart Failure, High Blood Pressure Disorder   Used for:  Ischemic cardiomyopathy        Dose:  2.5 mg   Take 1 tablet (2.5 mg) by mouth 2 times daily   Quantity:  60 tablet   Refills:  0       escitalopram 20 MG tablet   Commonly known as:  LEXAPRO   Indication:  Depression   Used for:  S/P vascular surgery        Dose:  20 mg   Take 1 tablet (20 mg) by mouth daily   Quantity:  15 tablet   Refills:  0       ezetimibe 10 MG tablet   Commonly known as:  ZETIA   Indication:  Elevation of Both Cholesterol and Triglycerides in Blood   Used for:  Ischemic cardiomyopathy        Dose:  10 mg   Take 1 tablet (10 mg) by mouth daily   Quantity:  30 tablet   Refills:  0       fentaNYL 25 mcg/hr 72 hr patch   Commonly known as:  DURAGESIC   Indication:  Chronic Pain   Used for:  S/P vascular surgery        Dose:  1 patch   Place 1 patch onto the skin every 72 hours for 14 days   Quantity:  4 patch   Refills:  0       folic acid 1 MG tablet   Commonly known as:  FOLVITE   Used for:  Takes dietary supplements        Dose:  1 mg   Take 1 tablet (1 mg) by mouth daily   Quantity:  30 tablet   Refills:  0       furosemide 20 MG tablet   Commonly known as:  LASIX   Used for:  S/P vascular surgery        Dose:  20 mg   Take 1 tablet (20 mg) by mouth 2 times daily (with meals)   Quantity:  60 tablet    Refills:  0       * gabapentin 300 MG capsule   Commonly known as:  NEURONTIN   Indication:  Neurogenic Pain   Used for:  S/P vascular surgery        Dose:  300 mg   Take 1 capsule (300 mg) by mouth 2 times daily   Quantity:  60 capsule   Refills:  0       * gabapentin 600 MG tablet   Commonly known as:  NEURONTIN   Indication:  Neurogenic Pain   Used for:  PVD (peripheral vascular disease) (H)        Dose:  600 mg   Take 1 tablet (600 mg) by mouth At Bedtime   Quantity:  30 tablet   Refills:  0       isosorbide mononitrate 60 MG 24 hr tablet   Commonly known as:  IMDUR   Indication:  CAD   Used for:  Ischemic cardiomyopathy        Dose:  60 mg   Take 1 tablet (60 mg) by mouth daily   Quantity:  30 tablet   Refills:  0       multivitamin, therapeutic with minerals Tabs tablet   Used for:  Takes dietary supplements        Dose:  1 tablet   Take 1 tablet by mouth daily   Quantity:  30 each   Refills:  0       polyethylene glycol Packet   Commonly known as:  MIRALAX/GLYCOLAX   Indication:  Constipation   Used for:  Takes dietary supplements        Dose:  17 g   Take 17 g by mouth daily   Quantity:  7 packet   Refills:  0       predniSONE 10 MG tablet   Commonly known as:  DELTASONE   Indication:  Chronic Obstructive Lung Disease   Used for:  S/P vascular surgery        Dose:  10 mg   Take 1 tablet (10 mg) by mouth daily   Quantity:  30 tablet   Refills:  0       rosuvastatin 20 MG tablet   Commonly known as:  CRESTOR   Indication:  High Amount of Fats in the Blood   Used for:  Ischemic cardiomyopathy        Dose:  20 mg   Take 1 tablet (20 mg) by mouth daily   Quantity:  30 tablet   Refills:  0       vitamin A 65295 UNIT capsule   Used for:  Takes dietary supplements        Dose:  62913 Units   Take 2 capsules (20,000 Units) by mouth daily   Quantity:  7 capsule   Refills:  0       zinc sulfate 220 (50 ZN) MG capsule   Commonly known as:  ZINCATE   Used for:  Takes dietary supplements        Dose:  220 mg   Take 1  capsule (220 mg) by mouth daily   Quantity:  7 capsule   Refills:  0       * Notice:  This list has 2 medication(s) that are the same as other medications prescribed for you. Read the directions carefully, and ask your doctor or other care provider to review them with you.         Where to get your medicines      These medications were sent to [facility] - [place], [place] - [address]  [address], [facility] [address]     Phone:  [phone]     diclofenac 1 % Gel topical gel         Some of these will need a paper prescription and others can be bought over the counter. Ask your nurse if you have questions.     Bring a paper prescription for each of these medications     acetic acid 0.25 % irrigation    order for DME    oxyCODONE IR 10 MG tablet                Protect others around you: Learn how to safely use, store and throw away your medicines at www.disposemymeds.org.        Information about OPIOIDS     PRESCRIPTION OPIOIDS: WHAT YOU NEED TO KNOW    Prescription opioids can be used to help relieve moderate to severe pain and are often prescribed following a surgery or injury, or for certain health conditions. These medications can be an important part of treatment but also come with serious risks. It is important to work with your health care provider to make sure you are getting the safest, most effective care.    WHAT ARE THE RISKS AND SIDE EFFECTS OF OPIOID USE?  Prescription opioids carry serious risks of addiction and overdose, especially with prolonged use. An opioid overdose, often marked by slowed breathing can cause sudden death. The use of prescription opioids can have a number of side effects as well, even when taken as directed:      Tolerance - meaning you might need to take more of a medication for the same pain relief    Physical dependence - meaning you have symptoms of withdrawal when a medication is stopped    Increased sensitivity to  pain    Constipation    Nausea, vomiting, and dry mouth    Sleepiness and dizziness    Confusion    Depression    Low levels of testosterone that can result in lower sex drive, energy, and strength    Itching and sweating    RISKS ARE GREATER WITH:    History of drug misuse, substance use disorder, or overdose    Mental health conditions (such as depression or anxiety)    Sleep apnea    Older age (65 years or older)    Pregnancy    Avoid alcohol while taking prescription opioids.   Also, unless specifically advised by your health care provider, medications to avoid include:    Benzodiazepines (such as Xanax or Valium)    Muscle relaxants (such as Soma or Flexeril)    Hypnotics (such as Ambien or Lunesta)    Other prescription opioids    KNOW YOUR OPTIONS:  Talk to your health care provider about ways to manage your pain that do not involve prescription opioids. Some of these options may actually work better and have fewer risks and side effects:    Pain relievers such as acetaminophen, ibuprofen, and naproxen    Some medications that are also used for depression or seizures    Physical therapy and exercise    Cognitive behavioral therapy, a psychological, goal-directed approach, in which patients learn how to modify physical, behavioral, and emotional triggers of pain and stress    IF YOU ARE PRESCRIBED OPIOIDS FOR PAIN:    Never take opioids in greater amounts or more often than prescribed    Follow up with your primary health care provider and work together to create a plan on how to manage your pain.    Talk about ways to help manage your pain that do not involve prescription opioids    Talk about all concerns and side effects    Help prevent misuse and abuse    Never sell or share prescription opioids    Never use another person's prescription opioids    Store prescription opioids in a secure place and out of reach of others (this may include visitors, children, friends, and family)    Visit  www.cdc.gov/drugoverdose to learn about risks of opioid abuse and overdose    If you believe you may be struggling with addiction, tell your health care provider and ask for guidance or call [facility]'s National Helpline at 8-611-649-HELP    LEARN MORE / www.cdc.gov/drugoverdose/prescribing/guideline.html    Safely dispose of unused prescription opioids: Find your local drug take-back programs and more information about the importance of safe disposal at www.doseofreality.[place].gov             Medication List: This is a list of all your medications and when to take them. Check marks below indicate your daily home schedule. Keep this list as a reference.      Medications           Morning Afternoon Evening Bedtime As Needed    acetic acid 0.25 % irrigation   Irrigate with as directed 2 times daily   Last time this was given:  2/10/2018 12:53 AM                                ascorbic acid 500 MG Tabs   Take 1 tablet (500 mg) by mouth daily   Last time this was given:  500 mg on 2/9/2018  9:13 AM                                ASPIRIN EC PO   Take 81 mg by mouth daily   Last time this was given:  81 mg on 2/10/2018  7:56 AM                                carvedilol 6.25 MG tablet   Commonly known as:  COREG   Take 1 tablet (6.25 mg) by mouth 2 times daily (with meals)   Last time this was given:  6.25 mg on 2/10/2018  7:56 AM                                clopidogrel 75 MG tablet   Commonly known as:  PLAVIX   Take 1 tablet (75 mg) by mouth daily   Last time this was given:  75 mg on 2/10/2018  7:56 AM                                diclofenac 1 % Gel topical gel   Commonly known as:  VOLTAREN   Place 4 g onto the skin 4 times daily   Last time this was given:  4 g on 2/10/2018 12:17 PM                                docusate sodium 100 MG capsule   Commonly known as:  COLACE   Take 1 capsule (100 mg) by mouth daily   Last time this was given:  100 mg on 2/10/2018  7:56 AM                                enalapril 2.5 MG  tablet   Commonly known as:  VASOTEC   Take 1 tablet (2.5 mg) by mouth 2 times daily   Last time this was given:  2.5 mg on 2/9/2018  9:13 AM                                escitalopram 20 MG tablet   Commonly known as:  LEXAPRO   Take 1 tablet (20 mg) by mouth daily   Last time this was given:  20 mg on 2/9/2018  9:13 AM                                ezetimibe 10 MG tablet   Commonly known as:  ZETIA   Take 1 tablet (10 mg) by mouth daily   Last time this was given:  10 mg on 2/9/2018  9:14 AM                                fentaNYL 25 mcg/hr 72 hr patch   Commonly known as:  DURAGESIC   Place 1 patch onto the skin every 72 hours for 14 days   Last time this was given:  1 patch on 2/9/2018 11:12 AM                                folic acid 1 MG tablet   Commonly known as:  FOLVITE   Take 1 tablet (1 mg) by mouth daily                                furosemide 20 MG tablet   Commonly known as:  LASIX   Take 1 tablet (20 mg) by mouth 2 times daily (with meals)   Last time this was given:  20 mg on 2/10/2018  7:56 AM                                * gabapentin 300 MG capsule   Commonly known as:  NEURONTIN   Take 1 capsule (300 mg) by mouth 2 times daily   Last time this was given:  300 mg on 2/10/2018  2:00 PM                                * gabapentin 600 MG tablet   Commonly known as:  NEURONTIN   Take 1 tablet (600 mg) by mouth At Bedtime   Last time this was given:  600 mg on 2/9/2018  9:12 PM                                isosorbide mononitrate 60 MG 24 hr tablet   Commonly known as:  IMDUR   Take 1 tablet (60 mg) by mouth daily   Last time this was given:  60 mg on 2/10/2018  7:56 AM                                multivitamin, therapeutic with minerals Tabs tablet   Take 1 tablet by mouth daily                                order for DME   Equipment being ordered: Walker Wheels () and Walker () Treatment Diagnosis: impaired mobility                                oxyCODONE IR 10 MG tablet    Commonly known as:  ROXICODONE   Take 1-1.5 tablets (10-15 mg) by mouth every 3 hours as needed for moderate to severe pain   Last time this was given:  15 mg on 2/10/2018  2:01 PM                                polyethylene glycol Packet   Commonly known as:  MIRALAX/GLYCOLAX   Take 17 g by mouth daily                                predniSONE 10 MG tablet   Commonly known as:  DELTASONE   Take 1 tablet (10 mg) by mouth daily   Last time this was given:  10 mg on 2/10/2018  7:57 AM                                rosuvastatin 20 MG tablet   Commonly known as:  CRESTOR   Take 1 tablet (20 mg) by mouth daily   Last time this was given:  20 mg on 2/9/2018  9:14 AM                                vitamin A 54297 UNIT capsule   Take 2 capsules (20,000 Units) by mouth daily   Last time this was given:  20,000 Units on 2/9/2018  9:13 AM                                zinc sulfate 220 (50 ZN) MG capsule   Commonly known as:  ZINCATE   Take 1 capsule (220 mg) by mouth daily   Last time this was given:  220 mg on 2/9/2018  9:13 AM                                * Notice:  This list has 2 medication(s) that are the same as other medications prescribed for you. Read the directions carefully, and ask your doctor or other care provider to review them with you.

## 2018-02-08 ENCOUNTER — APPOINTMENT (OUTPATIENT)
Dept: CT IMAGING | Facility: CLINIC | Age: 68
End: 2018-02-08
Attending: PHYSICIAN ASSISTANT
Payer: COMMERCIAL

## 2018-02-08 ENCOUNTER — DOCUMENTATION ONLY (OUTPATIENT)
Dept: CARE COORDINATION | Facility: CLINIC | Age: 68
End: 2018-02-08

## 2018-02-08 ENCOUNTER — APPOINTMENT (OUTPATIENT)
Dept: PHYSICAL THERAPY | Facility: CLINIC | Age: 68
End: 2018-02-08
Attending: NURSE PRACTITIONER
Payer: COMMERCIAL

## 2018-02-08 LAB
ALBUMIN SERPL-MCNC: 3 G/DL (ref 3.4–5)
ALBUMIN SERPL-MCNC: 3 G/DL (ref 3.4–5)
ALP SERPL-CCNC: 46 U/L (ref 40–150)
ALT SERPL W P-5'-P-CCNC: 32 U/L (ref 0–70)
ANION GAP SERPL CALCULATED.3IONS-SCNC: 9 MMOL/L (ref 3–14)
AST SERPL W P-5'-P-CCNC: 19 U/L (ref 0–45)
BACTERIA SPEC CULT: NORMAL
BASOPHILS # BLD AUTO: 0 10E9/L (ref 0–0.2)
BASOPHILS NFR BLD AUTO: 0.2 %
BILIRUB SERPL-MCNC: 0.5 MG/DL (ref 0.2–1.3)
BUN SERPL-MCNC: 10 MG/DL (ref 7–30)
CALCIUM SERPL-MCNC: 9 MG/DL (ref 8.5–10.1)
CHLORIDE SERPL-SCNC: 107 MMOL/L (ref 94–109)
CO2 SERPL-SCNC: 25 MMOL/L (ref 20–32)
CREAT SERPL-MCNC: 0.58 MG/DL (ref 0.66–1.25)
DIFFERENTIAL METHOD BLD: ABNORMAL
EOSINOPHIL # BLD AUTO: 0 10E9/L (ref 0–0.7)
EOSINOPHIL NFR BLD AUTO: 0.1 %
ERYTHROCYTE [DISTWIDTH] IN BLOOD BY AUTOMATED COUNT: 14.7 % (ref 10–15)
GFR SERPL CREATININE-BSD FRML MDRD: >90 ML/MIN/1.7M2
GLUCOSE SERPL-MCNC: 107 MG/DL (ref 70–99)
HCT VFR BLD AUTO: 34.7 % (ref 40–53)
HGB BLD-MCNC: 11.1 G/DL (ref 13.3–17.7)
IMM GRANULOCYTES # BLD: 0 10E9/L (ref 0–0.4)
IMM GRANULOCYTES NFR BLD: 0.2 %
LYMPHOCYTES # BLD AUTO: 0.9 10E9/L (ref 0.8–5.3)
LYMPHOCYTES NFR BLD AUTO: 10.1 %
Lab: NORMAL
MCH RBC QN AUTO: 32.1 PG (ref 26.5–33)
MCHC RBC AUTO-ENTMCNC: 32 G/DL (ref 31.5–36.5)
MCV RBC AUTO: 100 FL (ref 78–100)
MONOCYTES # BLD AUTO: 0.5 10E9/L (ref 0–1.3)
MONOCYTES NFR BLD AUTO: 5.8 %
NEUTROPHILS # BLD AUTO: 7.4 10E9/L (ref 1.6–8.3)
NEUTROPHILS NFR BLD AUTO: 83.6 %
NRBC # BLD AUTO: 0 10*3/UL
NRBC BLD AUTO-RTO: 0 /100
PLATELET # BLD AUTO: 305 10E9/L (ref 150–450)
POTASSIUM SERPL-SCNC: 3.9 MMOL/L (ref 3.4–5.3)
PROT SERPL-MCNC: 6.2 G/DL (ref 6.8–8.8)
RBC # BLD AUTO: 3.46 10E12/L (ref 4.4–5.9)
SODIUM SERPL-SCNC: 141 MMOL/L (ref 133–144)
SPECIMEN SOURCE: NORMAL
WBC # BLD AUTO: 8.8 10E9/L (ref 4–11)

## 2018-02-08 PROCEDURE — 97162 PT EVAL MOD COMPLEX 30 MIN: CPT | Mod: GP

## 2018-02-08 PROCEDURE — 97110 THERAPEUTIC EXERCISES: CPT | Mod: GP

## 2018-02-08 PROCEDURE — 82040 ASSAY OF SERUM ALBUMIN: CPT | Performed by: CLINICAL NURSE SPECIALIST

## 2018-02-08 PROCEDURE — 25000132 ZZH RX MED GY IP 250 OP 250 PS 637: Performed by: PHYSICIAN ASSISTANT

## 2018-02-08 PROCEDURE — 97530 THERAPEUTIC ACTIVITIES: CPT | Mod: GP

## 2018-02-08 PROCEDURE — 85025 COMPLETE CBC W/AUTO DIFF WBC: CPT | Performed by: NURSE PRACTITIONER

## 2018-02-08 PROCEDURE — 40000193 ZZH STATISTIC PT WARD VISIT

## 2018-02-08 PROCEDURE — G0378 HOSPITAL OBSERVATION PER HR: HCPCS

## 2018-02-08 PROCEDURE — 99225 ZZC SUBSEQUENT OBSERVATION CARE,LEVEL II: CPT | Mod: Z6 | Performed by: PHYSICIAN ASSISTANT

## 2018-02-08 PROCEDURE — 84134 ASSAY OF PREALBUMIN: CPT | Performed by: CLINICAL NURSE SPECIALIST

## 2018-02-08 PROCEDURE — 25000125 ZZHC RX 250: Performed by: NURSE PRACTITIONER

## 2018-02-08 PROCEDURE — 72131 CT LUMBAR SPINE W/O DYE: CPT

## 2018-02-08 PROCEDURE — 25000132 ZZH RX MED GY IP 250 OP 250 PS 637: Performed by: NURSE PRACTITIONER

## 2018-02-08 PROCEDURE — G0463 HOSPITAL OUTPT CLINIC VISIT: HCPCS | Mod: 25

## 2018-02-08 PROCEDURE — 36415 COLL VENOUS BLD VENIPUNCTURE: CPT | Performed by: NURSE PRACTITIONER

## 2018-02-08 PROCEDURE — 80053 COMPREHEN METABOLIC PANEL: CPT | Performed by: NURSE PRACTITIONER

## 2018-02-08 PROCEDURE — 36415 COLL VENOUS BLD VENIPUNCTURE: CPT | Performed by: CLINICAL NURSE SPECIALIST

## 2018-02-08 RX ORDER — OXYCODONE HYDROCHLORIDE 10 MG/1
10 TABLET ORAL EVERY 4 HOURS PRN
Status: DISCONTINUED | OUTPATIENT
Start: 2018-02-08 | End: 2018-02-08

## 2018-02-08 RX ORDER — OXYCODONE HYDROCHLORIDE 10 MG/1
10 TABLET ORAL
Status: DISCONTINUED | OUTPATIENT
Start: 2018-02-08 | End: 2018-02-09

## 2018-02-08 RX ORDER — LIDOCAINE 4 G/G
1-2 PATCH TOPICAL
Status: DISCONTINUED | OUTPATIENT
Start: 2018-02-08 | End: 2018-02-09

## 2018-02-08 RX ORDER — HYDROMORPHONE HYDROCHLORIDE 2 MG/1
2 TABLET ORAL
Status: DISCONTINUED | OUTPATIENT
Start: 2018-02-08 | End: 2018-02-08

## 2018-02-08 RX ORDER — ACETIC ACID 0.25 G/100ML
IRRIGANT IRRIGATION DAILY
Status: DISCONTINUED | OUTPATIENT
Start: 2018-02-08 | End: 2018-02-10 | Stop reason: HOSPADM

## 2018-02-08 RX ORDER — ACETAMINOPHEN 500 MG
1000 TABLET ORAL EVERY 8 HOURS
Status: DISCONTINUED | OUTPATIENT
Start: 2018-02-08 | End: 2018-02-10 | Stop reason: HOSPADM

## 2018-02-08 RX ADMIN — DOCUSATE SODIUM 100 MG: 100 CAPSULE, LIQUID FILLED ORAL at 08:14

## 2018-02-08 RX ADMIN — ENALAPRIL MALEATE 2.5 MG: 2.5 TABLET ORAL at 21:18

## 2018-02-08 RX ADMIN — ASPIRIN 81 MG: 81 TABLET, COATED ORAL at 08:13

## 2018-02-08 RX ADMIN — LIDOCAINE 1 PATCH: 560 PATCH PERCUTANEOUS; TOPICAL; TRANSDERMAL at 10:15

## 2018-02-08 RX ADMIN — CARVEDILOL 6.25 MG: 6.25 TABLET, FILM COATED ORAL at 17:35

## 2018-02-08 RX ADMIN — OXYCODONE HYDROCHLORIDE 10 MG: 5 TABLET ORAL at 08:07

## 2018-02-08 RX ADMIN — CARVEDILOL 6.25 MG: 6.25 TABLET, FILM COATED ORAL at 08:14

## 2018-02-08 RX ADMIN — ROSUVASTATIN CALCIUM 20 MG: 20 TABLET, FILM COATED ORAL at 08:12

## 2018-02-08 RX ADMIN — EZETIMIBE 10 MG: 10 TABLET ORAL at 08:12

## 2018-02-08 RX ADMIN — ISOSORBIDE MONONITRATE 60 MG: 60 TABLET, EXTENDED RELEASE ORAL at 08:14

## 2018-02-08 RX ADMIN — GABAPENTIN 600 MG: 600 TABLET, FILM COATED ORAL at 21:18

## 2018-02-08 RX ADMIN — ESCITALOPRAM OXALATE 20 MG: 20 TABLET ORAL at 08:13

## 2018-02-08 RX ADMIN — OXYCODONE HYDROCHLORIDE 10 MG: 5 TABLET ORAL at 00:20

## 2018-02-08 RX ADMIN — OXYCODONE HYDROCHLORIDE 15 MG: 5 TABLET ORAL at 15:28

## 2018-02-08 RX ADMIN — FUROSEMIDE 20 MG: 20 TABLET ORAL at 17:35

## 2018-02-08 RX ADMIN — ASPIRIN 81 MG: 81 TABLET, COATED ORAL at 00:21

## 2018-02-08 RX ADMIN — PREDNISONE 10 MG: 5 TABLET ORAL at 08:13

## 2018-02-08 RX ADMIN — ACETAMINOPHEN 1000 MG: 500 TABLET, FILM COATED ORAL at 16:56

## 2018-02-08 RX ADMIN — PREDNISONE 10 MG: 5 TABLET ORAL at 00:21

## 2018-02-08 RX ADMIN — OXYCODONE HYDROCHLORIDE 15 MG: 5 TABLET ORAL at 19:32

## 2018-02-08 RX ADMIN — CLOPIDOGREL 75 MG: 75 TABLET, FILM COATED ORAL at 08:14

## 2018-02-08 RX ADMIN — CYCLOBENZAPRINE HYDROCHLORIDE 10 MG: 5 TABLET, FILM COATED ORAL at 21:18

## 2018-02-08 RX ADMIN — ACETAMINOPHEN 1000 MG: 500 TABLET, FILM COATED ORAL at 10:44

## 2018-02-08 RX ADMIN — FUROSEMIDE 20 MG: 20 TABLET ORAL at 08:15

## 2018-02-08 RX ADMIN — NICOTINE 1 PATCH: 7 PATCH TRANSDERMAL at 08:14

## 2018-02-08 RX ADMIN — GABAPENTIN 600 MG: 600 TABLET, FILM COATED ORAL at 00:20

## 2018-02-08 RX ADMIN — GABAPENTIN 300 MG: 300 CAPSULE ORAL at 08:13

## 2018-02-08 RX ADMIN — CYCLOBENZAPRINE HYDROCHLORIDE 10 MG: 5 TABLET, FILM COATED ORAL at 13:54

## 2018-02-08 RX ADMIN — GABAPENTIN 300 MG: 300 CAPSULE ORAL at 13:54

## 2018-02-08 RX ADMIN — OXYCODONE HYDROCHLORIDE 15 MG: 5 TABLET ORAL at 23:42

## 2018-02-08 RX ADMIN — ENALAPRIL MALEATE 2.5 MG: 2.5 TABLET ORAL at 08:12

## 2018-02-08 ASSESSMENT — PAIN DESCRIPTION - DESCRIPTORS: DESCRIPTORS: SHOOTING;ACHING;CONSTANT

## 2018-02-08 NOTE — PROGRESS NOTES
"WO Nurse Inpatient Wound Assessment     Initial  Assessment  Reason for consultation: Evaluate and treat wound     Assessment  Left shin non healing dry wound   Status: initial assessment    Treatment Plan  Plan of care for wound located on the left shin wound  Cleanse with MicroKlenz moisten gauze   Pat dry. Apply Vaseline to the wound base Cover with  4 x4 Mepilex dressing  Change dressing every day and as needed    Orders Written  WOC Nurse follow-up plan:weekly  Nursing to notify the Provider(s) and re-consult the WOC Nurse if wound(s) deteriorates or new skin concern.    Patient History  According to provider note(s):  Per ED MD, \"Boom Kahn is a 67 year old male with a medical history significant for hypertension, anxiety/depression, alcohol abuse, tobacco abuse, ICM/MI, CAD s/p 3x CABG and PCI, HFrEF (EF 25-30%, approximately 6 months ago) s/p ICD, right lung cancer s/p radiation therapy and peripheral air vascular disease s/p multiple stents.  Per review of patient's chart, patient underwent a right femoral endarterectomy with in situ GSV femoral to posterior tibial artery bypass on 1/15/2018.  After undergoing the surgery he was initially monitored in ICU and transferred to a stepdown unit.  Patient presents to the Emergency Department today for evaluation of lower left back pain, left hip pain with radiation down the left leg, causing difficulty with walking.  Patient reports that on 2/4/2018 he began sleeping in a familiar bed once again, and when he woke up the morning of 2/5/2018 he started having some lower left back pain.  He states throughout the day the pain then started in his left hip and he began having shooting pain down the left leg.  Patient reports the pain continued to worsen to the point that he was unable to walk or bear weight on the left leg.  He states that whenever he puts weight on the leg he has shooting pain down the leg.  Patient notes that he has always had a chronic mild left " "hip pain throughout most of his life; however, the pain is now severely exacerbated.  Patient notes that he is now having pain with sitting, but notes having some relief of pain with laying on the right hip.  Patient here also reports having some swelling in his left groin.  He reports that prior to his hospitalization he was having swelling in the right groin, but this has since resolved.  Patient notes that he had an angiogram done of his lower extremities prior to having his surgery, and the son reports that this showed an occlusion in the left side.  Patient here also reports that he has been dealing with PAD for the past 2 years and has chronic wounds on his legs.\"     Objective Data  Active Diet Order    Active Diet Order      Combination Diet 2 gm NA Diet; No Caffeine Diet    Output:   I/O last 3 completed shifts:  In: -   Out: 660 [Urine:660]    Risk Assessment:    Chance Chance Score  Av.7  Min: 17  Max: 20                            Labs:   Recent Labs  Lab 18  0628 18  1616   HGB 11.1* 11.7*   INR  --  0.93   WBC 8.8 13.0*           Recent Labs  Lab 18  1822   CULT Culture negative < 24 hours, reincubate       Physical Exam  Skin assessment: Left Shin  Wound History: trauma wound   Measurements (length x width x depth, in cm) 3 cm x 2.5 cm  x  0.5 cm   Wound Base:  100% eschar  Tunneling N/A  Undermining N/A  Palpation of the wound bed: firm   Periwound skin: intact  Color: dusky  Temperature: normal   Drainage:, none  Odor: none  Pain: absent,    Interventions  Current support surface: Standard  Atmos Air mattress    Visual inspection of wound(s) completed  Wound Care: done per plan of care    Supplies: ordered: Vaseline and mepilex  Education provided today: wound care    Discussed plan of care with Family    Face to face time: 20 minutes     Balaji MILANN, BSN, RN, BA     "

## 2018-02-08 NOTE — H&P
"ED OBSERVATION HISTORY & PHYSICAL    Admission Date: 02/07/2018  Attending Physician: Dr. Arvin MD  NP/PA: Modesta Ruiz CNP    REASON FOR ADMISSION:   Chief Complaint   Patient presents with     Back Pain     Gait Problem       HPI:    Per ED MD, \"Boom Kahn is a 67 year old male with a medical history significant for hypertension, anxiety/depression, alcohol abuse, tobacco abuse, ICM/MI, CAD s/p 3x CABG and PCI, HFrEF (EF 25-30%, approximately 6 months ago) s/p ICD, right lung cancer s/p radiation therapy and peripheral air vascular disease s/p multiple stents.  Per review of patient's chart, patient underwent a right femoral endarterectomy with in situ GSV femoral to posterior tibial artery bypass on 1/15/2018.  After undergoing the surgery he was initially monitored in ICU and transferred to a stepdown unit.  Patient presents to the Emergency Department today for evaluation of lower left back pain, left hip pain with radiation down the left leg, causing difficulty with walking.  Patient reports that on 2/4/2018 he began sleeping in a familiar bed once again, and when he woke up the morning of 2/5/2018 he started having some lower left back pain.  He states throughout the day the pain then started in his left hip and he began having shooting pain down the left leg.  Patient reports the pain continued to worsen to the point that he was unable to walk or bear weight on the left leg.  He states that whenever he puts weight on the leg he has shooting pain down the leg.  Patient notes that he has always had a chronic mild left hip pain throughout most of his life; however, the pain is now severely exacerbated.  Patient notes that he is now having pain with sitting, but notes having some relief of pain with laying on the right hip.  Patient here also reports having some swelling in his left groin.  He reports that prior to his hospitalization he was having swelling in the right groin, but this has since resolved. " " Patient notes that he had an angiogram done of his lower extremities prior to having his surgery, and the son reports that this showed an occlusion in the left side.  Patient here also reports that he has been dealing with PAD for the past 2 years and has chronic wounds on his legs.\"    In the ED his work-up was remarkable for WBC 13.0. Vascular surgery was consulted who felt pain was MSK in nature. He was given IV Dilaudid, oxycodone. He is admitted to observation for pain control and likely TCU placement.     On admission to the observation unit the patient was stable. He notes ongoing left sided lower back pain shooting down left leg. Denies every having this pain in the past. Has been unable to walk due to pan. He thinks this might be due to sleeping on a different beds over the past several days. He denies trauma.     He typically has a wound vac to his left lower extremity wound. However, this had an air leak and he took it off this am. He states his wounds are otherwise stable.     He has not yet take his mediations today.     ROS:    CONSTITUTIONAL: Denies fever  SKIN: Denies new wounds.   EYES: Denies visual changespain  RESPIRATORY: Denies dyspnea at rest or with activity, cough, or hemoptysis.  CARDIOVASCULAR: Denies palpitations, chest pain/pressure. + stable edema to lower extremities. No acute changes.    GASTROINTESTINAL: Good appetite and PO intake. Denies dysphagia, heartburn, nausea, vomiting, abdominal pain, constipation, or diarrhea.  GENITOURINARY: Denies complaints.   NEUROLOGIC: Denies headaches,  confusion, memory changes, lightheadedness/dizziness or difficulties with balance.  PSYCHIATRIC: Denies change in mood.  HEME/LYMPH: Denies active bleeding    ROS negative other than the symptoms noted above.    History:    Past Medical History:   Diagnosis Date     Anxiety      CAD (coronary artery disease)     s/p 3v CABG     CHF (congestive heart failure) (H)     EF 10-15%     Chronic pain      " COPD (chronic obstructive pulmonary disease) (H)      Depression      Hyperlipidemia      Hypertension      Lung cancer (H)     s/p radiation therapy     PAD (peripheral artery disease) (H)     s/p lower extremity stents     Tobacco abuse        Past Surgical History:   Procedure Laterality Date     ANGIOPLASTY Right 10/2016     BYPASS GRAFT ARTERY CORONARY  1999    [place]     BYPASS GRAFT FEMOROTIBIAL Right 1/15/2018    Procedure: BYPASS GRAFT FEMOROTIBIAL;  Right Femorotibial Bypass Graft with insitu saphenous vein, wound debriedment of right lower leg;  Surgeon: Lexis Ag MD;  Location: [facility] OR     cardiac catheterization       Cardiac defibrillator placement       CHEST TUBE INSERTION       COLON SURGERY  2008    colon resection for diverticulitis     FINE NEEDLE ASPIRATION Right 12/2016     IMPLANT PACEMAKER       previous radiation         No family history on file.    Social History     Social History     Marital status: Single     Spouse name: N/A     Number of children: N/A     Years of education: N/A     Occupational History     Not on file.     Social History Main Topics     Smoking status: Current Every Day Smoker     Packs/day: 1.00     Types: Cigarettes     Smokeless tobacco: Never Used      Comment: 0.5-1 ppd.     Alcohol use Yes      Comment: 50 beers/wk     Drug use: No     Sexual activity: Not on file     Other Topics Concern     Not on file     Social History Narrative         No current facility-administered medications on file prior to encounter.   Current Outpatient Prescriptions on File Prior to Encounter:  oxyCODONE IR (ROXICODONE) 5 MG tablet Take 1-2 tablets (5-10 mg) by mouth every 3 hours as needed (pain)   gabapentin (NEURONTIN) 300 MG capsule Take 1 capsule (300 mg) by mouth 2 times daily   fentaNYL (DURAGESIC) 25 mcg/hr 72 hr patch Place 1 patch onto the skin every 72 hours for 14 days   isosorbide mononitrate (IMDUR) 60 MG 24 hr tablet Take 1 tablet (60 mg) by mouth daily  "  gabapentin (NEURONTIN) 600 MG tablet Take 1 tablet (600 mg) by mouth At Bedtime   escitalopram (LEXAPRO) 20 MG tablet Take 1 tablet (20 mg) by mouth daily   ezetimibe (ZETIA) 10 MG tablet Take 1 tablet (10 mg) by mouth daily   rosuvastatin (CRESTOR) 20 MG tablet Take 1 tablet (20 mg) by mouth daily   enalapril (VASOTEC) 2.5 MG tablet Take 1 tablet (2.5 mg) by mouth 2 times daily   carvedilol (COREG) 6.25 MG tablet Take 1 tablet (6.25 mg) by mouth 2 times daily (with meals)   predniSONE (DELTASONE) 10 MG tablet Take 1 tablet (10 mg) by mouth daily   furosemide (LASIX) 20 MG tablet Take 1 tablet (20 mg) by mouth 2 times daily (with meals)   folic acid (FOLVITE) 1 MG tablet Take 1 tablet (1 mg) by mouth daily   docusate sodium (COLACE) 100 MG capsule Take 1 capsule (100 mg) by mouth daily   polyethylene glycol (MIRALAX/GLYCOLAX) Packet Take 17 g by mouth daily   zinc sulfate (ZINCATE) 220 (50 ZN) MG capsule Take 1 capsule (220 mg) by mouth daily   clopidogrel (PLAVIX) 75 MG tablet Take 1 tablet (75 mg) by mouth daily   multivitamin, therapeutic with minerals (THERA-VIT-M) TABS tablet Take 1 tablet by mouth daily   ascorbic acid 500 MG TABS Take 1 tablet (500 mg) by mouth daily   vitamin A 54206 UNIT capsule Take 2 capsules (20,000 Units) by mouth daily   ASPIRIN EC PO Take 81 mg by mouth daily       Exam:  Vital signs:  Temp: 98.2  F (36.8  C) Temp src: Oral BP: 136/81 Pulse: 83   Resp: 18 SpO2: 100 % O2 Device: None (Room air)   Height: 172.7 cm (5' 8\") Weight: 67.4 kg (148 lb 8 oz)  Estimated body mass index is 22.58 kg/(m^2) as calculated from the following:    Height as of this encounter: 1.727 m (5' 8\").    Weight as of this encounter: 67.4 kg (148 lb 8 oz).    All vital signs were reviewed.  GENERAL APPEARENCE: A/O x4. NAD.  SKIN: Large open wound to right shin. Left shin with scabbed wound. No active drainage or warmth.   HEENT: NCAT w/out masses, lesions, or abnormalities. Sclera anicteric, PERRLA, EOMI.  " Oral mucosa pink and moist w  NECK: Supple, no masses. No jugular venous distention.   CARDIOVASCULAR: S1, S2 RRR. No murmurs, rubs, or gallops.   RESPIRATORY: Respiratory effort WNL. CTA  bilaterally without crackles/rales/wheeze   GI: Active BS in all 4 quadrants. Abdomen soft and non-tender. No masses or hepatosplenomegaly.  : Deferred  MUSCULOSKELETAL: Moves all extremities.   PV: 2+ bilateral radial and pedal pulses. + pitting right lower extremity edema from foot to calf.    NEURO: CN II-XII grossly intact. Speech normal. Appropriate throughout interview.   Sensation grossly WNL. Finger to nose and rapid alternating movements WDL.  HEME/LYMPH: No visible bleeding.  PSYCHIATRIC: Mentation and affect appear normal  VASCULAR ACCESS: CDI without erythema or discharge. Non-tender.    Data:    Results for orders placed or performed during the hospital encounter of 02/07/18   CBC with platelets differential   Result Value Ref Range    WBC 13.0 (H) 4.0 - 11.0 10e9/L    RBC Count 3.65 (L) 4.4 - 5.9 10e12/L    Hemoglobin 11.7 (L) 13.3 - 17.7 g/dL    Hematocrit 36.7 (L) 40.0 - 53.0 %     (H) 78 - 100 fl    MCH 32.1 26.5 - 33.0 pg    MCHC 31.9 31.5 - 36.5 g/dL    RDW 14.4 10.0 - 15.0 %    Platelet Count 344 150 - 450 10e9/L    Diff Method Automated Method     % Neutrophils 90.2 %    % Lymphocytes 5.3 %    % Monocytes 4.0 %    % Eosinophils 0.0 %    % Basophils 0.2 %    % Immature Granulocytes 0.3 %    Nucleated RBCs 0 0 /100    Absolute Neutrophil 11.7 (H) 1.6 - 8.3 10e9/L    Absolute Lymphocytes 0.7 (L) 0.8 - 5.3 10e9/L    Absolute Monocytes 0.5 0.0 - 1.3 10e9/L    Absolute Eosinophils 0.0 0.0 - 0.7 10e9/L    Absolute Basophils 0.0 0.0 - 0.2 10e9/L    Abs Immature Granulocytes 0.0 0 - 0.4 10e9/L    Absolute Nucleated RBC 0.0    Basic metabolic panel   Result Value Ref Range    Sodium 140 133 - 144 mmol/L    Potassium 5.0 3.4 - 5.3 mmol/L    Chloride 106 94 - 109 mmol/L    Carbon Dioxide 28 20 - 32 mmol/L     Anion Gap 6 3 - 14 mmol/L    Glucose 98 70 - 99 mg/dL    Urea Nitrogen 10 7 - 30 mg/dL    Creatinine 0.61 (L) 0.66 - 1.25 mg/dL    GFR Estimate >90 >60 mL/min/1.7m2    GFR Estimate If Black >90 >60 mL/min/1.7m2    Calcium 9.4 8.5 - 10.1 mg/dL   UA with Microscopic   Result Value Ref Range    Color Urine Light Yellow     Appearance Urine Clear     Glucose Urine Negative NEG^Negative mg/dL    Bilirubin Urine Negative NEG^Negative    Ketones Urine Negative NEG^Negative mg/dL    Specific Gravity Urine 1.003 1.003 - 1.035    Blood Urine Negative NEG^Negative    pH Urine 7.0 5.0 - 7.0 pH    Protein Albumin Urine Negative NEG^Negative mg/dL    Urobilinogen mg/dL Normal 0.0 - 2.0 mg/dL    Nitrite Urine Negative NEG^Negative    Leukocyte Esterase Urine Negative NEG^Negative    Source Midstream Urine     WBC Urine 0 0 - 2 /HPF    RBC Urine 0 0 - 2 /HPF   INR   Result Value Ref Range    INR 0.93 0.86 - 1.14   Partial thromboplastin time   Result Value Ref Range    PTT 33 22 - 37 sec   ABO/Rh type and screen   Result Value Ref Range    ABO O     RH(D) Pos     Antibody Screen Neg     Test Valid Only At          [facility]    Specimen Expires 02/10/2018    Urine Culture   Result Value Ref Range    Specimen Description Midstream Urine     Special Requests Specimen received in preservative     Culture Micro PENDING         Assessment/Plan:  Boom Kahn is a 67 year old male with a medical history significant for hypertension, anxiety/depression, alcohol abuse, tobacco abuse, ICM/MI, CAD s/p 3x CABG and PCI, HFrEF (EF 25-30%, approximately 6 months ago) s/p ICD, right lung cancer s/p radiation therapy and peripheral vascular disease s/p multiple stents who presents to the Emergency Department today for evaluation of lower left back pain, left hip pain with radiation down the left leg, causing difficulty with walking. He underwent a right femoral endarterectomy with in situ GSV femoral  to posterior tibial artery bypass on 1/15/2018. He was discharged to TCU and was discharged from TCU 2/1.    1. Left-sided Lower Back, Left Hip, and Left Leg Pain: Presented to the ED today for evaluation of lower left back pain, left hip pain with radiation down the left leg, causing difficulty with walking. Woke up the morning of 2/5/2018 with lower left back pain. Then developed pain that started in his left hip with shooting pain down the left leg. Pain continued to worsen to the point that he has been unable to walk or bear weight on the left leg.  Whenever he puts weight on the leg he has shooting pain down the leg. He has always had a chronic mild left hip pain, but the pain is now severely exacerbated. The initial concern in the ED was that pain was related underlying PAD. However, Vascular Surgery was consulted and thought pain was MSK in nature. He is admitted to ED Observation for pain control and likely TCU placement.   -[place] to observation  -PT consult  -SW Consult for discharge planning   -Vascular Surgery consult, saw in the ED   -Pain management with home Gabapentin  300 mg 8 am and 2 PM, 600 mg Q HS, fentanyl patch 25 mcg/hr (placed new patch 2/6 at 1000 am), and Oxycodone 10 mg every 3 as needed  -Trial Flexeril 10 mg's TID PRN    2. Leukocytosis: WBC 13.0. No clear signs of acute infection. BLE wounds are stable/improving, per patient. UA negative. Afebrile. HD stable.  Could be reactive from pain. Will not hydrate given HF. Missed a dose of Lasix this am.  -Recheck in the am  -Monitor for signs and symptoms of infection     3. Severe Peripheral Artery Disease, Critical limb ischemia R>L with bilateral LE wounds: S/p right femoral endarterectomy with in-situ GSV femoral to posterior tibial artery bypass 1/15/18. Wounds stable, per patient. Has been instructed to apply wound vac to right lower extremity wound, removed today by patient at home due to air leak versus pain.   - WOCN   - Continue  Statin  - continue current prednisone 10mg daily  - Continue aspirin and plavix  - Of note, he s/p IV Abx: completed course of Cipro + Clinda (for 7 days).   -Continue Gabapentin, fentanyl patch 25 mcg/hr, and Oxycodone 10 mg every 3 as needed  vascular Surgery consult     4. ETOH Dependence: Patient reports 5-6 beers/day. last drink was yesterday at 10 PM. Previous admission noted 8-10 beers per day. Per patient, did have withdrawals with confusion during previus hospitalization. Per chart review did have delerium post-op during previous hospitalization and was treated with zyprexa. However, was ETOH free from 1/4-2/1.   -MSSA protocol.   -Thiamine/Folate, allergy to MVI?    5. Disposition: SW consult in the ED, see note. Suspect need for TCU placement for ongoing medical needs, including wound cares.     Chronic Medical Problems:         #HFpEF (EF reported 25-30%), CAD s/p CABGx3 and PCIs, ICM hx of MI (2008), HTN: Coronary Angio and AYDE stenting of proximal left circumflex 01/05  - Continued ASA and clopidogrel   - Continued Carvedilol, Rosuvastatin, Ezetimibe, Isosorbide dinitrate, enalapril and also on lasix 20 mg BID. Previously on  Aldactone, which is on hold   -Continue PTA 2L fluid/2gm Na restriction           #Tobacco Dependence: nicotine patch 7mg while in observation      # Restrictive Airway Disease: ? COPD, given smoking hx. Patient denies any hx of COPD.   - DuoNebs  prn      # Malignancy of Lung, Right Upper Lobe:    -F/u PET scan due in February       # Depression:   - home ecitalopram 20mg opd      FEN:  - 2L fluid/2gm Na restriction   -Monitor BMP     Prophy:  -No VTE prophy as patient is up ad thony and anticipate short observation stay   -Encourage ambulation as tolerated   -Colace and PRN Miralax    Consults: Vascular Surgery, PT, OT    CODE STATUS:  FULL CODE      DISPOSITION: Pending pain control and PT consult, anticipate TCU placement       Modesta Ruiz APRN, CNP  Nurse Practitioner    Emergency Department Observation Unit

## 2018-02-08 NOTE — PLAN OF CARE
Problem: Patient Care Overview  Goal: Discharge Needs Assessment  Outpatient/Observation goals to be met before discharge home:     diagnostic tests and consults completed and resulted-no   -vital signs normal or at patient baseline -yes  -tolerating oral intake to maintain hydration -yes  -adequate pain control on oral analgesics -yes  -returns to baseline functional status -no  -safe disposition plan has been identified -no  Nurse to notify provider when observation goals have been met and patient is ready for discharge.          Pt complains of lower left leg pain. PRN oxycodone given. Pt states he can't weight on both legs. Has yet to ambulate for nurse. Right leg wrapped with bandage in the ED.

## 2018-02-08 NOTE — PLAN OF CARE
Problem: Patient Care Overview  Goal: Plan of Care/Patient Progress Review  Outpatient/Observation goals to be met before discharge home:  diagnostic tests and consults completed and resulted-no   -vital signs normal or at patient baseline -yes  -tolerating oral intake to maintain hydration -yes  -adequate pain control on oral analgesics -yes  -returns to baseline functional status -no  -safe disposition plan has been identified -no  Nurse to notify provider when observation goals have been met and patient is ready for discharge.

## 2018-02-08 NOTE — PROGRESS NOTES
ED OBSERVATION PROGRESS NOTE:  S: Boom Kahn is a 67 year old male with a medical history significant for hypertension, anxiety/depression, alcohol abuse, tobacco abuse, ICM/MI, CAD s/p 3x CABG and PCI, HFrEF (EF 25-30%, approximately 6 months ago) s/p ICD, right lung cancer s/p radiation therapy and peripheral vascular disease s/p multiple stents who presents to the Emergency Department 2/7 for evaluation of lower left back pain, left hip pain with radiation down the left leg, causing difficulty with walking.    Today patient reported low back still bothering him. Was agreeable to CT imaging of back, lidoderm patches, tylenol scheduled. Reported back pain as main concern for hospital stay this time, and hopes to see improvement soon. Agreeable to going to TCU facility at D/C.    Chief Complaint   Patient presents with     Back Pain     Gait Problem     1. Pain of left lower extremity        Problem List:  Patient Active Problem List   Diagnosis     Ischemic cardiomyopathy     CHF (congestive heart failure) (H)     PVD (peripheral vascular disease) (H)     COPD (chronic obstructive pulmonary disease) (H)     Anxiety     Atherosclerosis of native arteries of extremities with intermittent claudication, bilateral legs (H)     Automatic implantable cardioverter-defibrillator in situ     Depression     Cardiomyopathy (H)     Hyperlipidemia     HTN (hypertension)     Lung mass     Dyspnea on exertion     Malignant neoplasm of lung (H)     MI (myocardial infarction)     Critical lower limb ischemia     Atherosclerotic PVD with ulceration (H)     S/P vascular surgery     Back pain       MEDS:   No current outpatient prescriptions on file.       ALLERGIES:    Allergies   Allergen Reactions     Betadine [Povidone Iodine] Blisters     Pt reports erythema, increased pain, and blistering when using betadine on R big toe in past     Collagenase Clostridium Histolyticum      Flagyl [Metronidazole] Other (See Comments)     Flu  "symptoms  Flu like symptoms     Iodine Unknown     Santtom [Collagenase]        O:/64  Pulse 68  Temp 98.1  F (36.7  C) (Axillary)  Resp 16  Ht 1.727 m (5' 8\")  Wt 66.8 kg (147 lb 3.2 oz)  SpO2 98%  BMI 22.38 kg/m2    Exam:  Constitutional: healthy, alert and no distress  Head: Normocephalic.   Neck: Neck supple.    Cardiovascular: No lifts, heaves, or thrills. RRR. No murmurs, clicks gallops or rub  Respiratory: Good diaphragmatic excursion. Lungs clear  Gastrointestinal: Abdomen soft, non-tender. BS normal. No masses, organomegaly  Musculoskeletal: extremities normal- no gross deformities noted, normal muscle tone  Back: tender to paraspinous muscles of left lumbar spine. No sacroiliac tenderness  Skin: open wounds to bilateral anterior lower legs noted. Edema in BLE R>L  Neurologic: Alert and oriented.   Psychiatric: mentation appears normal and affect normal/bright      A/P:  1. Left-sided Lower Back, Left Hip, and Left Leg Pain: Presented to the ED 2/7 with left sided back pain and left sided sciatic symptoms causing difficulty ambulating that began 2/5.  He has always had a chronic mild left hip pain, but the pain is now severely exacerbated. The initial concern in the ED was that pain was related underlying PAD. However, Vascular Surgery was consulted and thought pain was MSK in nature. CT lumbar spine obtained today, showed disc bulging at L3-L4 with left foraminal disc protrusion. L5-S1 right foraminal disc protrusion. No findings to suggest acute surgical intervention today.  - [place] to observation  - PT consult  - SW Consult for discharge planning    - Pain management consulted, advised: Gabapentin  300 mg 8 am and 2 PM, 600 mg Q HS, fentanyl patch 25 mcg/hr, scheduled tylenol, and Oxycodone 10-15 mg q3h prn, flexeril TID prn, lidoderm patches     2. Leukocytosis: WBC 13.0 on day of admission without evidence of infection. Repeat 2/8 WBC 8.8. Likely stress demargination.  - Repeat CBC if " clinical signs of infection develop    3. Severe Peripheral Artery Disease, Critical limb ischemia R>L with bilateral LE wounds: S/p right femoral endarterectomy with in-situ GSV femoral to posterior tibial artery bypass 1/15/18. Wounds stable, per patient. Has been instructed to apply wound vac to right lower extremity wound, removed today by patient at home due to air leak versus pain.   - Continue Statin  - Continue current prednisone 10mg daily  - Continue aspirin and plavix  - Vascular Surgery and wound cares consulted      4. ETOH Dependence: Patient reports 5-6 beers/day. last drink was yesterday at 10 PM. Previous admission noted 8-10 beers per day. Per patient, did have withdrawals with confusion during previus hospitalization. Per chart review did have delerium post-op during previous hospitalization and was treated with zyprexa. However, was ETOH free from 1/4-2/1.   - MSSA protocol.   - Thiamine/Folate, allergy to MVI?     5. Disposition: SW consult in the ED. No placement found today (2/8). Pending potentially tomorrow.      Signed:  Kami Childress PA-C   February 8, 2018 at 4:11 PM

## 2018-02-08 NOTE — CONSULTS
Inpatient Pain Management Service: Consultation      DATE OF CONSULT: 2/8/18      REASON FOR PAIN CONSULTATION:  Boom Kahn is a 67 year old male I am seeing in consultation at the request of Vascular Surgery for evaluation and recommendations for his chronic back pain.       CHIEF PAIN COMPLAINT: Back pain and left lower extremity pain      ASSESSMENT:     1. Lumbar Spondylosis  2. Sacroiliitis  3. Lumbar Degenerative Disc Disease  4. Peripheral Vascular Disease  5. Substance Abuse Disorder  6. Chronic Pain Syndrome      TREATMENT RECOMMENDATIONS/PLAN:     1. Continue Fentanyl patch 25mcg Q72H  2. Increase oxycodone PO sliding scale to 10/15 Q3H PRN  3. D/C PO Dilaudid  4. Continue Neurontin 300/300/600  5. Continue Lidocaine patches  6. Continue Tylenol 1g Q8H  7. Continue Flexeril 10mg TID PRN        Thank you for consulting the Inpatient Pain Management Service.   The above recommendations are to be acted upon at the primary team s discretion.      To reach us:  Mon - Friday 8 AM - 3 PM: Pager [phone]   After hours, weekends and holidays: Primary service should call [phone] for the on-call pain specialist      HISTORY OF PRESENT ILLNESS: Boom Kahn is a 67 year old male with a medical history significant for hypertension, anxiety/depression, alcohol abuse, tobacco abuse, ICM/MI, CAD s/p 3x CABG and PCI, HFrEF (EF 25-30%, approximately 6 months ago) s/p ICD, right lung cancer s/p radiation therapy and peripheral air vascular disease s/p multiple stents.  Per review of patient's chart, patient underwent a right femoral endarterectomy with in situ GSV femoral to posterior tibial artery bypass on 1/15/2018.  After undergoing the surgery he was initially monitored in ICU and transferred to a stepdown unit.  Patient presents to the Emergency Department today for evaluation of lower left back pain, left hip pain with radiation down the left leg, causing difficulty with walking.  Patient reports that on  2/4/2018 he began sleeping in a familiar bed once again, and when he woke up the morning of 2/5/2018 he started having some lower left back pain.  He states throughout the day the pain then started in his left hip and he began having shooting pain down the left leg.  Patient reports the pain continued to worsen to the point that he was unable to walk or bear weight on the left leg.  He states that whenever he puts weight on the leg he has shooting pain down the leg.  Patient notes that he has always had a chronic mild left hip pain throughout most of his life; however, the pain is now severely exacerbated.  Patient notes that he is now having pain with sitting, but notes having some relief of pain with laying on the right hip.  Patient here also reports having some swelling in his left groin.  He reports that prior to his hospitalization he was having swelling in the right groin, but this has since resolved.  Patient notes that he had an angiogram done of his lower extremities prior to having his surgery, and the son reports that this showed an occlusion in the left side.  Patient here also reports that he has been dealing with PAD for the past 2 years and has chronic wounds on his legs    PAIN HISTORY:   Location: low back and left lower extremity  Pain intensity: 7/10  Quality of the pain: stabbing and sharp  Aggravating factors: movement  Relieving factors : medication        REVIEW OF 10 BODY SYSTEMS: 14 point ROS negative except per HPI      INPATIENT MEDICATIONS PERTINENT TO PAIN CONSULT:   Medications related to Pain Management (Future)    Start     Dose/Rate Route Frequency Ordered Stop    02/09/18 1000  fentaNYL (DURAGESIC) 25 mcg/hr 72 hr patch 1 patch      25 mcg Transdermal EVERY 72 HOURS 02/07/18 2048 02/08/18 0915  Lidocaine (LIDOCARE) 4 % Patch 1-2 patch      1-2 patch  over 12 Hours Transdermal EVERY 24 HOURS 0800 02/08/18 0904      02/08/18 0915  lidocaine patch in PLACE       Transdermal EVERY  8 HOURS 02/08/18 0904      02/08/18 0915  acetaminophen (TYLENOL) tablet 1,000 mg      1,000 mg Oral EVERY 8 HOURS 02/08/18 0904      02/08/18 0904  HYDROmorphone (DILAUDID) tablet 2 mg      2 mg Oral EVERY 3 HOURS PRN 02/08/18 0904      02/08/18 0800  docusate sodium (COLACE) capsule 100 mg      100 mg Oral DAILY 02/07/18 2048 02/08/18 0800  gabapentin (NEURONTIN) capsule 300 mg      300 mg Oral 2 TIMES DAILY 02/07/18 2048 02/07/18 2330  gabapentin (NEURONTIN) tablet 600 mg      600 mg Oral AT BEDTIME 02/07/18 2048 02/07/18 2216  LORazepam (ATIVAN) tablet 1-4 mg      1-4 mg Oral EVERY 30 MIN PRN 02/07/18 2217      02/07/18 2049  aspirin EC EC tablet 81 mg      81 mg Oral DAILY 02/07/18 2048 02/07/18 2048  cyclobenzaprine (FLEXERIL) tablet 10 mg      10 mg Oral 3 TIMES DAILY PRN 02/07/18 2048 02/07/18 2048  polyethylene glycol (MIRALAX/GLYCOLAX) Packet 17 g      17 g Oral DAILY PRN 02/07/18 2048              CURRENT MEDICATIONS:   Current Facility-Administered Medications Ordered in Epic   Medication Dose Route Frequency Provider Last Rate Last Dose     acetic acid 0.25 % irrigation   Irrigation Daily Shannan Dobbs, DEDRA CNS         Lidocaine (LIDOCARE) 4 % Patch 1-2 patch  1-2 patch Transdermal Q24H Kami Childress PA-C   1 patch at 02/08/18 1015     lidocaine patch REMOVAL   Transdermal Q24h Kami Childress PA-C         lidocaine patch in PLACE   Transdermal Q8H Kami Childress PA-C         acetaminophen (TYLENOL) tablet 1,000 mg  1,000 mg Oral Q8H Kami Childress PA-C   1,000 mg at 02/08/18 1044     HYDROmorphone (DILAUDID) tablet 2 mg  2 mg Oral Q3H PRN Kami Childress PA-C         aspirin EC EC tablet 81 mg  81 mg Oral Daily Modesta Ruiz APRN CNP   81 mg at 02/08/18 0813     carvedilol (COREG) tablet 6.25 mg  6.25 mg Oral BID w/meals Modesta Ruiz APRN CNP   6.25 mg at 02/08/18 0814     clopidogrel (PLAVIX) tablet 75 mg  75 mg  Oral Daily Modesta Ruiz APRN CNP   75 mg at 02/08/18 0814     docusate sodium (COLACE) capsule 100 mg  100 mg Oral Daily Modesta Ruiz APRN CNP   100 mg at 02/08/18 0814     enalapril (VASOTEC) tablet 2.5 mg  2.5 mg Oral BID Modesta Ruiz APRN CNP   2.5 mg at 02/08/18 0812     escitalopram (LEXAPRO) tablet 20 mg  20 mg Oral Daily Modesta Ruiz APRN CNP   20 mg at 02/08/18 0813     ezetimibe (ZETIA) tablet 10 mg  10 mg Oral Daily Modesta Ruiz APRN CNP   10 mg at 02/08/18 0812     furosemide (LASIX) tablet 20 mg  20 mg Oral BID w/meals Modesta Ruiz APRN CNP   20 mg at 02/08/18 0815     gabapentin (NEURONTIN) tablet 600 mg  600 mg Oral At Bedtime Modesta Ruiz APRN CNP   600 mg at 02/08/18 0020     isosorbide mononitrate (IMDUR) 24 hr tablet 60 mg  60 mg Oral Daily Modesta Ruiz APRN CNP   60 mg at 02/08/18 0814     polyethylene glycol (MIRALAX/GLYCOLAX) Packet 17 g  17 g Oral Daily PRN Modesta Ruiz APRN CNP         predniSONE (DELTASONE) tablet 10 mg  10 mg Oral Daily Modesta Ruiz APRN CNP   10 mg at 02/08/18 0813     rosuvastatin (CRESTOR) tablet 20 mg  20 mg Oral Daily Modesta Ruiz APRN CNP   20 mg at 02/08/18 0812     vitamin A capsule 20,000 Units  20,000 Units Oral Daily Modesta Ruiz APRN CNP         zinc sulfate (ZINCATE) capsule 220 mg  220 mg Oral Daily Modesta Ruiz APRN CNP         ascorbic acid (VITAMIN C) tablet 500 mg  500 mg Oral Daily Modesta Ruiz APRN CNP         fentaNYL (DURAGESIC) Patch in Place   Transdermal Q8H Modesta Ruiz APRN CNP         [START ON 2/9/2018] fentaNYL (DURAGESIC) patch REMOVAL   Transdermal Q72H Modesta Ruiz APRN CNP         [START ON 2/9/2018] fentaNYL (DURAGESIC) 25 mcg/hr 72 hr patch 1 patch  25 mcg Transdermal Q72H Modesta Ruiz APRN CNP         cyclobenzaprine (FLEXERIL) tablet 10 mg  10 mg Oral TID PRN Modesta Ruiz APRN CNP   10 mg at  02/08/18 1354     gabapentin (NEURONTIN) capsule 300 mg  300 mg Oral BID Modesta Ruiz APRN CNP   300 mg at 02/08/18 1354     naloxone (NARCAN) injection 0.1-0.4 mg  0.1-0.4 mg Intravenous Q2 Min PRN Modesta Ruiz APRN CNP         ondansetron (ZOFRAN-ODT) ODT tab 4 mg  4 mg Oral Q6H PRN Modesta Ruiz APRN CNP        Or     ondansetron (ZOFRAN) injection 4 mg  4 mg Intravenous Q6H PRN Modesta Ruiz APRN CNP         ipratropium - albuterol 0.5 mg/2.5 mg/3 mL (DUONEB) neb solution 3 mL  3 mL Nebulization Q4H PRN Modesta Ruiz APRN CNP         nicotine Patch in Place   Transdermal Q8H Modesta Ruiz APRN CNP         nicotine patch REMOVAL   Transdermal Daily Modesta Ruiz APRN CNP   Stopped at 02/08/18 1042     nicotine (NICODERM CQ) 7 MG/24HR 24 hr patch 1 patch  1 patch Transdermal Daily Modesta Ruiz APRN CNP   1 patch at 02/08/18 0814     LORazepam (ATIVAN) tablet 1-4 mg  1-4 mg Oral Q30 Min PRN Modesta Ruiz APRN CNP         thiamine tablet 100 mg  100 mg Oral Daily Modesta Ruiz APRN CNP         No current [facility]-ordered outpatient prescriptions on file.           PREADMISSION PAIN MEDICATIONS:         OUTPATIENT OPIOIDS PRESCRIBED BY:      PRIMARY CARE PROVIDER: Willian Arrington  PAIN SPECIALIST:     [facility] database reviewed:       HOME/PREVIOUS MEDICATIONS:   Prior to Admission medications    Medication Sig Start Date End Date Taking? Authorizing Provider   oxyCODONE IR (ROXICODONE) 5 MG tablet Take 1-2 tablets (5-10 mg) by mouth every 3 hours as needed (pain) 1/31/18  Yes Mariluz Pagan MD   gabapentin (NEURONTIN) 300 MG capsule Take 1 capsule (300 mg) by mouth 2 times daily 1/31/18  Yes Mariluz Pagan MD   fentaNYL (DURAGESIC) 25 mcg/hr 72 hr patch Place 1 patch onto the skin every 72 hours for 14 days 2/3/18 2/17/18  Mariluz Pagan MD   isosorbide mononitrate (IMDUR) 60 MG 24 hr tablet Take 1 tablet (60 mg) by mouth daily 2/1/18    Mariluz Pagan MD   gabapentin (NEURONTIN) 600 MG tablet Take 1 tablet (600 mg) by mouth At Bedtime 1/31/18   Mariluz Pagan MD   escitalopram (LEXAPRO) 20 MG tablet Take 1 tablet (20 mg) by mouth daily 2/1/18   Mariluz Pagan MD   ezetimibe (ZETIA) 10 MG tablet Take 1 tablet (10 mg) by mouth daily 1/31/18   Mariluz Pagan MD   rosuvastatin (CRESTOR) 20 MG tablet Take 1 tablet (20 mg) by mouth daily 1/31/18   Mariluz Pagan MD   enalapril (VASOTEC) 2.5 MG tablet Take 1 tablet (2.5 mg) by mouth 2 times daily 1/31/18   Mariluz Pagan MD   carvedilol (COREG) 6.25 MG tablet Take 1 tablet (6.25 mg) by mouth 2 times daily (with meals) 1/31/18   Mariluz Pagan MD   predniSONE (DELTASONE) 10 MG tablet Take 1 tablet (10 mg) by mouth daily 2/1/18   Mariluz Pagan MD   furosemide (LASIX) 20 MG tablet Take 1 tablet (20 mg) by mouth 2 times daily (with meals) 1/31/18   Mariluz Pagan MD   folic acid (FOLVITE) 1 MG tablet Take 1 tablet (1 mg) by mouth daily 2/1/18   Mariluz Pagan MD   docusate sodium (COLACE) 100 MG capsule Take 1 capsule (100 mg) by mouth daily 2/1/18   Mariluz Pagan MD   polyethylene glycol (MIRALAX/GLYCOLAX) Packet Take 17 g by mouth daily 2/1/18   Mariluz Pagan MD   zinc sulfate (ZINCATE) 220 (50 ZN) MG capsule Take 1 capsule (220 mg) by mouth daily 2/1/18   Mariluz Pagan MD   clopidogrel (PLAVIX) 75 MG tablet Take 1 tablet (75 mg) by mouth daily 2/1/18   Mariluz Pagan MD   multivitamin, therapeutic with minerals (THERA-VIT-M) TABS tablet Take 1 tablet by mouth daily 2/1/18   Mariluz Pagan MD   ascorbic acid 500 MG TABS Take 1 tablet (500 mg) by mouth daily 2/1/18   Mariluz Pagan MD   vitamin A 80210 UNIT capsule Take 2 capsules (20,000 Units) by mouth daily 2/1/18   Mariluz Pagan MD   ASPIRIN EC PO Take 81 mg by mouth daily    Reported, Patient           PAST PAIN TREATMENT:         ALLERGIES:    Allergies    Allergen Reactions     Betadine [Povidone Iodine] Blisters     Pt reports erythema, increased pain, and blistering when using betadine on R big toe in past     Collagenase Clostridium Histolyticum      Flagyl [Metronidazole] Other (See Comments)     Flu symptoms  Flu like symptoms     Iodine Unknown     Santyl [Collagenase]             PAST MEDICAL AND PSYCHIATRIC HISTORY:    Past Medical History:   Diagnosis Date     Anxiety      CAD (coronary artery disease)     s/p 3v CABG     CHF (congestive heart failure) (H)     EF 10-15%     Chronic pain      COPD (chronic obstructive pulmonary disease) (H)      Depression      Hyperlipidemia      Hypertension      Lung cancer (H)     s/p radiation therapy     PAD (peripheral artery disease) (H)     s/p lower extremity stents     Tobacco abuse            PAST SURGICAL HISTORY:   Past Surgical History:   Procedure Laterality Date     ANGIOPLASTY Right 10/2016     BYPASS GRAFT ARTERY CORONARY  1999    [place]     BYPASS GRAFT FEMOROTIBIAL Right 1/15/2018    Procedure: BYPASS GRAFT FEMOROTIBIAL;  Right Femorotibial Bypass Graft with insitu saphenous vein, wound debriedment of right lower leg;  Surgeon: Lexis Ag MD;  Location: [facility] OR     cardiac catheterization       Cardiac defibrillator placement       CHEST TUBE INSERTION       COLON SURGERY  2008    colon resection for diverticulitis     FINE NEEDLE ASPIRATION Right 12/2016     IMPLANT PACEMAKER       previous radiation             FAMILY HISTORY: family history is not on file.      HEALTH & LIFESTYLE PRACTICES:   Tobacco:  reports that he has been smoking Cigarettes.  He has been smoking about 1.00 pack per day. He has never used smokeless tobacco.  Alcohol:  reports that he drinks alcohol.  Illicit drugs:  reports that he does not use illicit drugs.      SOCIAL HISTORY:       LABORATORY VALUES:   Last Basic Metabolic Panel:  Lab Results   Component Value Date     02/08/2018      Lab Results   Component  Value Date    POTASSIUM 3.9 02/08/2018     Lab Results   Component Value Date    CHLORIDE 107 02/08/2018     Lab Results   Component Value Date    SUMAN 9.0 02/08/2018     Lab Results   Component Value Date    CO2 25 02/08/2018     Lab Results   Component Value Date    BUN 10 02/08/2018     Lab Results   Component Value Date    CR 0.58 02/08/2018     Lab Results   Component Value Date     02/08/2018       CBC results:  Lab Results   Component Value Date    WBC 8.8 02/08/2018     Lab Results   Component Value Date    HGB 11.1 02/08/2018     Lab Results   Component Value Date    HCT 34.7 02/08/2018     Lab Results   Component Value Date     02/08/2018       DIAGNOSTIC TESTS:       Labs above reviewed as well as additional relevant diagnostic studies from the EPIC record.       PHYSICAL EXAMINATION:  VITAL SIGNS:  B/P: 112/66, T: 97.4, P: 83, R: 18    CONSTITUTIONAL/GENERAL APPEARANCE: AAO x3, NAD  EYES: EOMI  RESPIRATORY: diffuse rhonchi  CARDIOVASCULAR: RRR  MUSCULOSKELETAL/BACK/SPINE/EXTREMITIES: tenderness to palpation over the lumbar paraspinous muscles         TIME SPENT: 40 minutes including 25 minutes of face-to-face time counseling him  about his pain management treatment options, and coordinating care with the primary team.    Forest Sainz MD  February 8, 2018, 2:10 PM  Inpatient Pain Management Service

## 2018-02-08 NOTE — PLAN OF CARE
Problem: Patient Care Overview  Goal: Plan of Care/Patient Progress Review  PT 6D: PT orders received. Per chart review, pt currently unable to ambulate due to pain, PT will hold at this time until pt has pain under control and initiate if needed to facilitate a safe discharge plan.

## 2018-02-08 NOTE — PROGRESS NOTES
02/08/18 1600   Quick Adds   Type of Visit Initial PT Evaluation   Living Environment   Lives With alone   Living Arrangements house   Home Accessibility stairs to enter home;bed and bath on same level;tub/shower is not walk in   Number of Stairs to Enter Home 2   Number of Stairs Within Home 0   Living Environment Comment Pt lives in rental house alone, has 2 stairs to enter, shower is not walk in, toilet is low.   Self-Care   Dominant Hand right   Usual Activity Tolerance moderate   Current Activity Tolerance poor   Regular Exercise no   Equipment Currently Used at Home cane, straight   Activity/Exercise/Self-Care Comment Pt was IND prior to admission with ADL's, was d/c from TCU on 2/1 and had been doing fine at home since, pains tarted building up for last several days and now is too debiltating.   Functional Level Prior   Ambulation 1-->assistive equipment   Transferring 1-->assistive equipment   Toileting 0-->independent   Bathing 0-->independent   Dressing 0-->independent   Eating 0-->independent   Communication 0-->understands/communicates without difficulty   Swallowing 0-->swallows foods/liquids without difficulty   Cognition 0 - no cognition issues reported   Fall history within last six months no   Which of the above functional risks had a recent onset or change? transferring;ambulation   Prior Functional Level Comment Pt was IND with use of cane.   General Information   Onset of Illness/Injury or Date of Surgery - Date 02/07/18   Referring Physician Modesta Ruiz APRN CNP   Patient/Family Goals Statement Pt wants to go home.   Pertinent History of Current Problem (include personal factors and/or comorbidities that impact the POC) 67 year old male with a medical history significant for hypertension, anxiety/depression, alcohol abuse, tobacco abuse, ICM/MI, CAD s/p 3x CABG and PCI, HFrEF (EF 25-30%, approximately 6 months ago) s/p ICD, right lung cancer s/p radiation therapy and peripheral  vascular disease s/p multiple stents who presents to the Emergency Department 2/7 for evaluation of lower left back pain, left hip pain with radiation down the left leg, causing difficulty with walking   Precautions/Limitations fall precautions   Weight-Bearing Status - LUE full weight-bearing   Weight-Bearing Status - RUE full weight-bearing   Weight-Bearing Status - LLE full weight-bearing   Weight-Bearing Status - RLE full weight-bearing   General Observations OBS status   General Info Comments Activity: ambulate with assist   Cognitive Status Examination   Orientation orientation to person, place and time   Level of Consciousness alert   Follows Commands and Answers Questions 100% of the time   Personal Safety and Judgment intact   Memory intact   Pain Assessment   Patient Currently in Pain Yes, see Vital Sign flowsheet  (L glute and down posterior/lateral leg)   Integumentary/Edema   Integumentary/Edema Comments LE wound, dressings on; tenderness upon palpation to L glute/piriformis   Posture    Posture Not impaired   Range of Motion (ROM)   ROM Comment ROM WFL, tightness noted in L hamstring and glutes   Strength   Strength Comments R LE grossly 4+/5, L LE grossly 4/5, limited by pain   Bed Mobility   Bed Mobility Comments IND   Transfer Skills   Transfer Comments SBA with use of walker, weight shifting off L LE   Gait   Gait Comments Able to take 2 steps forward/backward with use of walker and SBA, painful, unable to weight shift fully onto L LE   Balance   Balance Comments Impaired due to pain   Sensory Examination   Sensory Perception no deficits were identified   Coordination   Coordination no deficits were identified   Muscle Tone   Muscle Tone no deficits were identified   General Therapy Interventions   Planned Therapy Interventions gait training;strengthening;stretching;ROM;transfer training;risk factor education;home program guidelines;progressive activity/exercise   Clinical Impression   Criteria for  "Skilled Therapeutic Intervention yes, treatment indicated   PT Diagnosis Impaired functional mobility   Influenced by the following impairments Increased L glute pain, L glute/hamstring tightness, decreased L side strength   Functional limitations due to impairments Inability to complete functional mobility at baseline level of functioning   Clinical Presentation Evolving/Changing   Clinical Presentation Rationale Changes in pain noted over last several days   Clinical Decision Making (Complexity) Moderate complexity   Therapy Frequency` daily   Predicted Duration of Therapy Intervention (days/wks) 2 days   Anticipated Discharge Disposition Transitional Care Facility   Risk & Benefits of therapy have been explained Yes   Patient, Family & other staff in agreement with plan of care Yes   Clinical Impression Comments Pt would benefit from in-patient PT in order to provide stretching HEP and assist with decreasing pain to increase ease with functional mobility.   [facility] [employer] \"6 Clicks\"   2016, Trustees of [facility], under license to [employer].  All rights reserved.   6 Clicks Short Forms Basic Mobility Inpatient Short Form   [facility] [employer]  \"6 Clicks\" V.2 Basic Mobility Inpatient Short Form   1. Turning from your back to your side while in a flat bed without using bedrails? 4 - None   2. Moving from lying on your back to sitting on the side of a flat bed without using bedrails? 4 - None   3. Moving to and from a bed to a chair (including a wheelchair)? 3 - A Little   4. Standing up from a chair using your arms (e.g., wheelchair, or bedside chair)? 4 - None   5. To walk in hospital room? 3 - A Little   6. Climbing 3-5 steps with a railing? 2 - A Lot   Basic Mobility Raw Score (Score out of 24.Lower scores equate to lower levels of function) 20   Total Evaluation Time   Total Evaluation Time (Minutes) 10     "

## 2018-02-08 NOTE — PROGRESS NOTES
Emergency Social Work Services Note    Date of  Intervention: 02/07/18  Last Emergency Department Visit:  1/3/18  Care Plan:  None identified  Collaborated with:  Pt, pt's children, ED MD, and chart review    Data:  PtBoom, is a 67 year old male that presents to the ED with pain. Pt was recently admitted from 1/3/18 - 1/23/18 for bilateral lower extremity wounds which were concerning for acute limb ischemia. During admission, vascular surgery recommended a right lower extremity bypass and underwent a right femoral endarterectomy with in situ GSV femoral to posterior tibial artery bypass on 1/15/2018. Initially monitored in ICU then transferred to stepdown unit. He then went to TCU and discharged on 2/1/18 with home care. Pt is now residing in an [facility] with his children. Per home nursing note, pt has not been compliant with his prescribed medications, is removing the wound vac, and has uncontrollable pain. Pt has a significant history of anxiety/depression, alcohol abuse, and tobacco use and their was concern for alcohol withdrawal during the last admission. Vascular surgeon now advises pt needs the procedure done on the other leg, but are concerned because of his lack of compliance with the recovery of this procedure. The surgeon advised wound care would be more effective in a facility/supervised setting. Pt's report of the situation is that his pain is new and he needs more pain medication. He removes the wound vac when the pain becomes untolerable and states he has let nursing in except for today when nursing was late and pt was already in the ED at the rescheduled time. He cannot agree to keep the wound vac on at home, because of the pain. Pt would prefer to return home, but son and daughter do not feel he is adequately cared for at home and would like to see him return to a TCU.     Intervention:  CHRIS spoke with family, ED MD, and pt and reviewed chart.    Assessment:  Patient is at high risk for  decompensating further, worsening his wounds, and misusing his pain medication to manage the pain. He needs to improve so that surgery feels comfortable going through with his next needed surgery. He has several concerns within the first week of being home and would benefit greatly from 24/7 supervision to monitor pain and wound care. At the current time, his children do not even feel they could transport him home because of his inability to ambulate due to his pain. Pt feels this pain is unrelated to his wound. Pt would qualify for TCU based on his recent admission. After conversation with his children, he is agreeable to TCU.    Plan:    Anticipated Disposition:  Facility:  [facility]      1. [facility]   [address] [place] [place], [place] [id number] [phone]    2. [facility]   [address] [place], [place] [id number] [phone]    3. [facility]   [address] [place], [place] [id number] [phone]  4. [facility]   [address] [place], [place] [id number] [phone]      Barriers to d/c plan:  Pending placement and compliance with care, substance use concerns    Follow Up:  Unit SW will follow up in the morning.    Maricel LAU, [facility]  ED   ED Pager: [phone]  On-call/After hours Pager: [phone]

## 2018-02-08 NOTE — CONSULTS
VASCULAR SURGERY HOSPITAL PATIENT CONSULTATION NOTE    HPI:  Boom Kahn is a 67 year old male with past medical history remarkable for HTN, anxiety/depression, alcohol abuse, tobacco abuse, ICM, MI, CAD, status post 3 vessel CABG and PCI, HR, status post ICD, right lung cancer, status post radiation therapy, and peripheral vascular disease, status post multiple stent, most recently status post right femoral endarterectomy with in-situ GSV femoral to posterior tibial artery bypass on 1/15/2018 with Dr. Ag who was admitted on 2/7/2018 for acute left lower back pain.  Vascular surgery was asked to evaluate the patient for severe PAD with new left leg pain.     REFERRAL SOURCE: Modesta Ruiz CNP     PAST MEDICAL HISTORY:  Past Medical History:   Diagnosis Date     Anxiety      CAD (coronary artery disease)     s/p 3v CABG     CHF (congestive heart failure) (H)     EF 10-15%     Chronic pain      COPD (chronic obstructive pulmonary disease) (H)      Depression      Hyperlipidemia      Hypertension      Lung cancer (H)     s/p radiation therapy     PAD (peripheral artery disease) (H)     s/p lower extremity stents     Tobacco abuse        PAST SURGICAL HISTORY:  Past Surgical History:   Procedure Laterality Date     ANGIOPLASTY Right 10/2016     BYPASS GRAFT ARTERY CORONARY  1999    [facility]     BYPASS GRAFT FEMOROTIBIAL Right 1/15/2018    Procedure: BYPASS GRAFT FEMOROTIBIAL;  Right Femorotibial Bypass Graft with insitu saphenous vein, wound debriedment of right lower leg;  Surgeon: Lexis Ag MD;  Location: [facility] OR     cardiac catheterization       Cardiac defibrillator placement       CHEST TUBE INSERTION       COLON SURGERY  2008    colon resection for diverticulitis     FINE NEEDLE ASPIRATION Right 12/2016     IMPLANT PACEMAKER       previous radiation         FAMILY HISTORY:  No family history on file.    SOCIAL HISTORY:   Social History   Substance Use Topics     Smoking status: Current Every Day  Smoker     Packs/day: 1.00     Types: Cigarettes     Smokeless tobacco: Never Used      Comment: 0.5-1 ppd.     Alcohol use Yes      Comment: 50 beers/wk       TOBACCO USE:  Current everyday smoker       HOME MEDICATIONS:  Prior to Admission medications    Medication Sig Start Date End Date Taking? Authorizing Provider   oxyCODONE IR (ROXICODONE) 5 MG tablet Take 1-2 tablets (5-10 mg) by mouth every 3 hours as needed (pain) 1/31/18  Yes Mariluz Pagan MD   gabapentin (NEURONTIN) 300 MG capsule Take 1 capsule (300 mg) by mouth 2 times daily 1/31/18  Yes Mariluz Pagan MD   fentaNYL (DURAGESIC) 25 mcg/hr 72 hr patch Place 1 patch onto the skin every 72 hours for 14 days 2/3/18 2/17/18  Mariluz Pagan MD   isosorbide mononitrate (IMDUR) 60 MG 24 hr tablet Take 1 tablet (60 mg) by mouth daily 2/1/18   Mariluz Pagan MD   gabapentin (NEURONTIN) 600 MG tablet Take 1 tablet (600 mg) by mouth At Bedtime 1/31/18   Mariluz Pagan MD   escitalopram (LEXAPRO) 20 MG tablet Take 1 tablet (20 mg) by mouth daily 2/1/18   Mariluz Pagan MD   ezetimibe (ZETIA) 10 MG tablet Take 1 tablet (10 mg) by mouth daily 1/31/18   Mariluz Pagan MD   rosuvastatin (CRESTOR) 20 MG tablet Take 1 tablet (20 mg) by mouth daily 1/31/18   Mariluz Pagan MD   enalapril (VASOTEC) 2.5 MG tablet Take 1 tablet (2.5 mg) by mouth 2 times daily 1/31/18   Mariluz Pagan MD   carvedilol (COREG) 6.25 MG tablet Take 1 tablet (6.25 mg) by mouth 2 times daily (with meals) 1/31/18   Mariluz Pagan MD   predniSONE (DELTASONE) 10 MG tablet Take 1 tablet (10 mg) by mouth daily 2/1/18   Mariluz Pagan MD   furosemide (LASIX) 20 MG tablet Take 1 tablet (20 mg) by mouth 2 times daily (with meals) 1/31/18   Mariluz Pagan MD   folic acid (FOLVITE) 1 MG tablet Take 1 tablet (1 mg) by mouth daily 2/1/18   Mariluz Pagan MD   docusate sodium (COLACE) 100 MG capsule Take 1 capsule (100 mg) by mouth daily 2/1/18    Mariluz Pagan MD   polyethylene glycol (MIRALAX/GLYCOLAX) Packet Take 17 g by mouth daily 2/1/18   Mariluz Pagan MD   zinc sulfate (ZINCATE) 220 (50 ZN) MG capsule Take 1 capsule (220 mg) by mouth daily 2/1/18   Mariluz Pagan MD   clopidogrel (PLAVIX) 75 MG tablet Take 1 tablet (75 mg) by mouth daily 2/1/18   Mariluz Pagan MD   multivitamin, therapeutic with minerals (THERA-VIT-M) TABS tablet Take 1 tablet by mouth daily 2/1/18   Mariluz Pagan MD   ascorbic acid 500 MG TABS Take 1 tablet (500 mg) by mouth daily 2/1/18   Mariluz Pagan MD   vitamin A 24321 UNIT capsule Take 2 capsules (20,000 Units) by mouth daily 2/1/18   Mariluz Pagan MD   ASPIRIN EC PO Take 81 mg by mouth daily    Reported, Patient       VITAL SIGNS:  Temp: 97.4  F (36.3  C) Temp src: Oral BP: 112/66 Pulse: 83 Heart Rate: 69 Resp: 18 SpO2: 97 % O2 Device: None (Room air)      147 lbs 3.2 oz    PHYSICAL EXAM:  NEURO/PSYCH:  The patient is alert and oriented.  Appropriate.  Moves all extremities.    SKIN: warm and dry.  PULMONARY: non-labored breathing  EXTREMITIES: bilateral lower extremity edema, right worse than left, right shin wound dressing removed- small amount of green non-malodorous drainage, exposed tendon and muscle, wet-dry acetic acid dressing applied and wrapped with Kerlix, right below knee incision healing nicely,  left shin wound dry and scabbed, no active drainage noted      CT:  CT LUMBAR SPINE W/O CONTRAST 2/8/2018 9:50 AM      Impression:  1. No acute fracture or dislocation. Mild dextroscoliosis centering at  L3 with multilevel disc degenerative disease.  2. Disc bulge with superimposed left central disc protrusion at L3-L4  and likely left foraminal disc protrusion at this level causing mild  left foraminal narrowing. L5-S1 right foraminal disc protrusion with  likely right L5 nerve root impingement. An MRI of the lumbar spine can  be obtained to confirm these  findings.          ASSESSMENT:  67 year old male with severe PAD, bilateral lower extremity wounds and critical limb ischemia who is status post right femoral endarterectomy with in-situ GSV femoral to posterior tibial artery bypass on 1/15/2018 who was admitted to the hospital on 2/7/2018 with acute left lower back radicular pain.    Patient Active Problem List   Diagnosis     Ischemic cardiomyopathy     CHF (congestive heart failure) (H)     PVD (peripheral vascular disease) (H)     COPD (chronic obstructive pulmonary disease) (H)     Anxiety     Atherosclerosis of native arteries of extremities with intermittent claudication, bilateral legs (H)     Automatic implantable cardioverter-defibrillator in situ     Depression     Cardiomyopathy (H)     Hyperlipidemia     HTN (hypertension)     Lung mass     Dyspnea on exertion     Malignant neoplasm of lung (H)     MI (myocardial infarction)     Critical lower limb ischemia     Atherosclerotic PVD with ulceration (H)     S/P vascular surgery     Back pain               PLAN:  - left leg pain is more than likely musculoskeletal in nature, related to sciatica   - right lower extremity wound BID wet-to-dry dressing changes with acetic acid  - assess nutritional status, prolonged wound healing, pre-albumin and albumin ordered   - pain service consulted to assist in pain management and determine if patient candidate for injection   - continue Plavix, ASA, Crestor and Zetia   - MSSA protocol as patient developed withdrawals with confusion and delirium during previous admission   - smoking cessation strongly encouraged   - patient will likely need TCU placement at discharge  - vascular surgery will follow along while patient in hospital    Please call if any questions.       Shannan COLMENARES, CNS  Division of Vascular Surgery  [facility]  Pager [phone]

## 2018-02-08 NOTE — PROGRESS NOTES
Social Work Services Progress Note    Hospital Day: 1 (Observation)  Date of Initial Social Work Evaluation:  During previous hospitalization   Collaborated with:  Pt, pt's son, at bedside and PT and primary team    Data:  SW following for d/c planning and TCU placement. Pt seen by ED SW last evening. Pt was d/c'ed from  TCU, on 2/1, to home w/ [facility]. Pt also has a wound vac that was off upon this admission.     Intervention:  D/C Planning    Assessment:  Pt is agreeable to TCU placement and appears below functional level as pt reports he cannot walk. Pt reports that he was independent and ambulatory at time of d/c from  TCU, on 2/1. Pt expresses frustration w/ pain management, but appears receptive to options that have been presented.     Plan:    Anticipated Disposition:  Facility:  The following referrals have been made:   [facility] ([phone])-assessing  [facility] ([phone]): Msg left inquiring about bed availability  -TCU: declined as pt requires longer rehab  [facility]  [facility]: Do not do wound vacs  [facility]: Room and board and not a skilled facility     Barriers to d/c plan:  Pain management    Follow Up:  SW will continue to follow.

## 2018-02-08 NOTE — PLAN OF CARE
Problem: Patient Care Overview  Goal: Discharge Needs Assessment  Outpatient/Observation goals to be met before discharge home:    diagnostic tests and consults completed and resulted-no   -vital signs normal or at patient baseline -yes  -tolerating oral intake to maintain hydration -yes  -adequate pain control on oral analgesics -yes  -returns to baseline functional status -no  -safe disposition plan has been identified -no  Nurse to notify provider when observation goals have been met and patient is ready for discharge.

## 2018-02-08 NOTE — PROGRESS NOTES
[facility]  Patient is currently open to home care services with [facility].  The patient is currently receiving RN services.  [facility]  and team have been notified of patient admission.  [facility] liaison will continue to follow patient during stay.  If appropriate provide orders to resume home care at time of discharge.    Thank you  Patricia Sanchez RN, BSN  [facility] Liaison  [phone]

## 2018-02-08 NOTE — PLAN OF CARE
Problem: Patient Care Overview  Goal: Plan of Care/Patient Progress Review  Discharge Planner PT   Patient plan for discharge: Rehab  Current status: PT evaluation completed and treatment initiated.  Per pt he has been having increased pain in L glutes and down posterior leg over past several days which has limited his functional mobility.  Pt is IND with bed mobility and SBA for transfers, only able to take 2-3 steps forwards/backwards with use of walker due to pain, not placing most of weight through L LE.  Pt demostrating point tenderness at L glute/piriformis muscles, given stretches to assist with tightness and pain, educated on performance.  Barriers to return to prior living situation: Pain, inability to ambulate and complete stairs  Recommendations for discharge: TCU  Rationale for recommendations: To increase strength/ROM and IND with functional mobility.  Depending on LOS and improvements in pain pt may be able to d/c home with walker and home PT.       Entered by: Maritza Galvan 02/08/2018 5:02 PM

## 2018-02-08 NOTE — PROGRESS NOTES
Dear Dr. Lexis Ag  Med[employer] Home Health regulations requires [facility] to notify the Physician when the plan for visits has been altered.  We have provided fewer visits than ordered.  We are notifying you of a Missed Visit.  Boom Kahn; MRN [id number]  Missed Visit  Is SN  Dates of missed services  2/7/18  Reason: Patient to ER  Sincerely [facility]  Maeve Baumann  [phone]

## 2018-02-09 ENCOUNTER — APPOINTMENT (OUTPATIENT)
Dept: ULTRASOUND IMAGING | Facility: CLINIC | Age: 68
End: 2018-02-09
Attending: NURSE PRACTITIONER
Payer: COMMERCIAL

## 2018-02-09 ENCOUNTER — APPOINTMENT (OUTPATIENT)
Dept: CT IMAGING | Facility: CLINIC | Age: 68
End: 2018-02-09
Attending: NURSE PRACTITIONER
Payer: COMMERCIAL

## 2018-02-09 LAB
PREALB SERPL IA-MCNC: 24 MG/DL (ref 15–45)
RADIOLOGIST FLAGS: NORMAL

## 2018-02-09 PROCEDURE — 93926 LOWER EXTREMITY STUDY: CPT | Mod: LT

## 2018-02-09 PROCEDURE — 25000128 H RX IP 250 OP 636: Performed by: NURSE PRACTITIONER

## 2018-02-09 PROCEDURE — 96374 THER/PROPH/DIAG INJ IV PUSH: CPT

## 2018-02-09 PROCEDURE — G0378 HOSPITAL OBSERVATION PER HR: HCPCS

## 2018-02-09 PROCEDURE — 25000132 ZZH RX MED GY IP 250 OP 250 PS 637: Performed by: PHYSICIAN ASSISTANT

## 2018-02-09 PROCEDURE — 96376 TX/PRO/DX INJ SAME DRUG ADON: CPT

## 2018-02-09 PROCEDURE — 73700 CT LOWER EXTREMITY W/O DYE: CPT | Mod: LT

## 2018-02-09 PROCEDURE — 25000125 ZZHC RX 250: Performed by: NURSE PRACTITIONER

## 2018-02-09 PROCEDURE — 25000132 ZZH RX MED GY IP 250 OP 250 PS 637: Performed by: NURSE PRACTITIONER

## 2018-02-09 PROCEDURE — 99207 ZZC NO CHARGE FOLLOW UP PS: CPT

## 2018-02-09 PROCEDURE — 25000128 H RX IP 250 OP 636: Performed by: PHYSICIAN ASSISTANT

## 2018-02-09 PROCEDURE — 99225 ZZC SUBSEQUENT OBSERVATION CARE,LEVEL II: CPT | Mod: Z6 | Performed by: NURSE PRACTITIONER

## 2018-02-09 RX ORDER — HYDROMORPHONE HYDROCHLORIDE 2 MG/1
6-8 TABLET ORAL
Status: DISCONTINUED | OUTPATIENT
Start: 2018-02-09 | End: 2018-02-10

## 2018-02-09 RX ORDER — OXYCODONE HYDROCHLORIDE 5 MG/1
15-20 TABLET ORAL
Status: DISCONTINUED | OUTPATIENT
Start: 2018-02-09 | End: 2018-02-09

## 2018-02-09 RX ADMIN — GABAPENTIN 300 MG: 300 CAPSULE ORAL at 09:13

## 2018-02-09 RX ADMIN — HYDROMORPHONE HYDROCHLORIDE 1 MG: 1 INJECTION, SOLUTION INTRAMUSCULAR; INTRAVENOUS; SUBCUTANEOUS at 14:33

## 2018-02-09 RX ADMIN — Medication 20000 UNITS: at 09:13

## 2018-02-09 RX ADMIN — CARVEDILOL 6.25 MG: 6.25 TABLET, FILM COATED ORAL at 09:13

## 2018-02-09 RX ADMIN — FUROSEMIDE 20 MG: 20 TABLET ORAL at 18:07

## 2018-02-09 RX ADMIN — DICLOFENAC 4 G: 10 GEL TOPICAL at 11:22

## 2018-02-09 RX ADMIN — HYDROMORPHONE HYDROCHLORIDE 1 MG: 1 INJECTION, SOLUTION INTRAMUSCULAR; INTRAVENOUS; SUBCUTANEOUS at 14:52

## 2018-02-09 RX ADMIN — ACETAMINOPHEN 1000 MG: 500 TABLET, FILM COATED ORAL at 00:23

## 2018-02-09 RX ADMIN — DICLOFENAC 4 G: 10 GEL TOPICAL at 21:11

## 2018-02-09 RX ADMIN — EZETIMIBE 10 MG: 10 TABLET ORAL at 09:14

## 2018-02-09 RX ADMIN — ZINC SULFATE CAP 220 MG (50 MG ELEMENTAL ZN) 220 MG: 220 (50 ZN) CAP at 09:13

## 2018-02-09 RX ADMIN — FUROSEMIDE 20 MG: 20 TABLET ORAL at 09:13

## 2018-02-09 RX ADMIN — OXYCODONE HYDROCHLORIDE AND ACETAMINOPHEN 500 MG: 500 TABLET ORAL at 09:13

## 2018-02-09 RX ADMIN — OXYCODONE HYDROCHLORIDE 15 MG: 5 TABLET ORAL at 06:41

## 2018-02-09 RX ADMIN — OXYCODONE HYDROCHLORIDE 20 MG: 5 TABLET ORAL at 09:57

## 2018-02-09 RX ADMIN — HYDROMORPHONE HYDROCHLORIDE 8 MG: 2 TABLET ORAL at 21:11

## 2018-02-09 RX ADMIN — HYDROMORPHONE HYDROCHLORIDE 6 MG: 2 TABLET ORAL at 18:28

## 2018-02-09 RX ADMIN — ESCITALOPRAM OXALATE 20 MG: 20 TABLET ORAL at 09:13

## 2018-02-09 RX ADMIN — CYCLOBENZAPRINE HYDROCHLORIDE 10 MG: 5 TABLET, FILM COATED ORAL at 21:18

## 2018-02-09 RX ADMIN — GABAPENTIN 300 MG: 300 CAPSULE ORAL at 14:23

## 2018-02-09 RX ADMIN — HYDROMORPHONE HYDROCHLORIDE 1 MG: 1 INJECTION, SOLUTION INTRAMUSCULAR; INTRAVENOUS; SUBCUTANEOUS at 01:03

## 2018-02-09 RX ADMIN — ROSUVASTATIN CALCIUM 20 MG: 20 TABLET, FILM COATED ORAL at 09:14

## 2018-02-09 RX ADMIN — FENTANYL 1 PATCH: 25 PATCH, EXTENDED RELEASE TRANSDERMAL at 11:12

## 2018-02-09 RX ADMIN — ACETAMINOPHEN 1000 MG: 500 TABLET, FILM COATED ORAL at 18:07

## 2018-02-09 RX ADMIN — NICOTINE 1 PATCH: 7 PATCH TRANSDERMAL at 09:19

## 2018-02-09 RX ADMIN — OXYCODONE HYDROCHLORIDE 15 MG: 5 TABLET ORAL at 02:57

## 2018-02-09 RX ADMIN — GABAPENTIN 600 MG: 600 TABLET, FILM COATED ORAL at 21:12

## 2018-02-09 RX ADMIN — CYCLOBENZAPRINE HYDROCHLORIDE 10 MG: 5 TABLET, FILM COATED ORAL at 05:22

## 2018-02-09 RX ADMIN — Medication 100 MG: at 09:14

## 2018-02-09 RX ADMIN — ASPIRIN 81 MG: 81 TABLET, COATED ORAL at 09:13

## 2018-02-09 RX ADMIN — ENALAPRIL MALEATE 2.5 MG: 2.5 TABLET ORAL at 09:13

## 2018-02-09 RX ADMIN — ISOSORBIDE MONONITRATE 60 MG: 60 TABLET, EXTENDED RELEASE ORAL at 09:13

## 2018-02-09 RX ADMIN — CLOPIDOGREL 75 MG: 75 TABLET, FILM COATED ORAL at 09:13

## 2018-02-09 RX ADMIN — PREDNISONE 10 MG: 5 TABLET ORAL at 09:13

## 2018-02-09 RX ADMIN — DICLOFENAC 4 G: 10 GEL TOPICAL at 18:08

## 2018-02-09 RX ADMIN — ACETAMINOPHEN 1000 MG: 500 TABLET, FILM COATED ORAL at 09:13

## 2018-02-09 RX ADMIN — OXYCODONE HYDROCHLORIDE 20 MG: 5 TABLET ORAL at 13:20

## 2018-02-09 RX ADMIN — DOCUSATE SODIUM 100 MG: 100 CAPSULE, LIQUID FILLED ORAL at 09:13

## 2018-02-09 ASSESSMENT — PAIN DESCRIPTION - DESCRIPTORS
DESCRIPTORS: ACHING
DESCRIPTORS: ACHING

## 2018-02-09 NOTE — PROGRESS NOTES
"ED OBSERVATION PROGRESS NOTE:  February 9, 2018    S: Boom Kahn is a 67 year old man with a medical history significant for hypertension, anxiety/depression, alcohol abuse, tobacco abuse, ICM/MI, CAD s/p 3x CABG and PCI, HFrEF (EF 25-30%, approximately 6 months ago) s/p ICD, right lung cancer s/p radiation therapy, and peripheral vascular disease s/p multiple stents. He presented to the Emergency Department 2/7 for evaluation of left lower back pain, left hip pain with radiation down the left leg, causing difficulty with walking. Pain has been present for at least a year but seems to be worse at this time.     This AM Boom reports that pain is still bothering him. Pain is most notable in what he calls \"hot spots\" in left hip and buttock area but also still has some radiating pain as well. He just had L-spine imaging but has not had hip/pelvis imaging. Pain was so severe last night that he required 1mg IV dilaudid which he states really helped. We discussed trying to avoid IV pain meds in effort to get on regimen that he can use outside the hospital. Was agreeable to increasing oxycodone range and adding voltaren gel. D/c'ed lidoderm patches as he did not find those to be effective. Continue rest of pain regimen as per Pain Service recommendations. Goal is adequate pain control and discharge to TCU. Encouraged him to take some pain med prior to PT today.     Chief Complaint   Patient presents with     Back Pain     Gait Problem     1. Pain of left lower extremity      Problem List:  Patient Active Problem List   Diagnosis     Ischemic cardiomyopathy     CHF (congestive heart failure) (H)     PVD (peripheral vascular disease) (H)     COPD (chronic obstructive pulmonary disease) (H)     Anxiety     Atherosclerosis of native arteries of extremities with intermittent claudication, bilateral legs (H)     Automatic implantable cardioverter-defibrillator in situ     Depression     Cardiomyopathy (H)     Hyperlipidemia "     HTN (hypertension)     Lung mass     Dyspnea on exertion     Malignant neoplasm of lung (H)     MI (myocardial infarction)     Critical lower limb ischemia     Atherosclerotic PVD with ulceration (H)     S/P vascular surgery     Back pain       MEDS:   No current outpatient prescriptions on file.     Inpatient medications:  Current Facility-Administered Medications   Medication     oxyCODONE IR (ROXICODONE) tablet 15-20 mg     diclofenac (VOLTAREN) 1 % topical gel 4 g     acetic acid 0.25 % irrigation     acetaminophen (TYLENOL) tablet 1,000 mg     aspirin EC EC tablet 81 mg     carvedilol (COREG) tablet 6.25 mg     clopidogrel (PLAVIX) tablet 75 mg     docusate sodium (COLACE) capsule 100 mg     enalapril (VASOTEC) tablet 2.5 mg     escitalopram (LEXAPRO) tablet 20 mg     ezetimibe (ZETIA) tablet 10 mg     furosemide (LASIX) tablet 20 mg     gabapentin (NEURONTIN) tablet 600 mg     isosorbide mononitrate (IMDUR) 24 hr tablet 60 mg     polyethylene glycol (MIRALAX/GLYCOLAX) Packet 17 g     predniSONE (DELTASONE) tablet 10 mg     rosuvastatin (CRESTOR) tablet 20 mg     vitamin A capsule 20,000 Units     zinc sulfate (ZINCATE) capsule 220 mg     ascorbic acid (VITAMIN C) tablet 500 mg     fentaNYL (DURAGESIC) Patch in Place     fentaNYL (DURAGESIC) patch REMOVAL     fentaNYL (DURAGESIC) 25 mcg/hr 72 hr patch 1 patch     cyclobenzaprine (FLEXERIL) tablet 10 mg     gabapentin (NEURONTIN) capsule 300 mg     naloxone (NARCAN) injection 0.1-0.4 mg     ondansetron (ZOFRAN-ODT) ODT tab 4 mg    Or     ondansetron (ZOFRAN) injection 4 mg     ipratropium - albuterol 0.5 mg/2.5 mg/3 mL (DUONEB) neb solution 3 mL     nicotine Patch in Place     nicotine patch REMOVAL     nicotine (NICODERM CQ) 7 MG/24HR 24 hr patch 1 patch     LORazepam (ATIVAN) tablet 1-4 mg     thiamine tablet 100 mg     ALLERGIES:    Allergies   Allergen Reactions     Betadine [Povidone Iodine] Blisters     Pt reports erythema, increased pain, and  "blistering when using betadine on R big toe in past     Collagenase Clostridium Histolyticum      Flagyl [Metronidazole] Other (See Comments)     Flu symptoms  Flu like symptoms     Iodine Unknown     Santyl [Collagenase]        O: BP (!) 89/59 (BP Location: Left arm)  Pulse 71  Temp 98.1  F (36.7  C) (Oral)  Resp 18  Ht 1.727 m (5' 8\")  Wt 66.8 kg (147 lb 3.2 oz)  SpO2 99%  BMI 22.38 kg/m2     Exam:  Constitutional: pleasant man seen sitting up at EOB, appears chronically ill, alert, in some pain but no acute distress  Head: NCAT   Neck: neck supple    Cardiovascular: RRR, no murmur or rub  Respiratory: non labored breathing on room air. Lungs with soft crackles b/l bases. No cough or wheeze.  Gastrointestinal: abdomen soft, non-tender. BS normal.   Musculoskeletal: BLE edema R>L with leg wounds as noted below  Back: tender to palpation over paraspinous muscles of left lumbar spine  Skin: right shin wound currently dressed (see vasc surg note for description). Left shin wound open to air, dry and scabbed, no surrounding swelling or erythema, no active drainage or odor.  Neurologic: alert, oriented, speech normal, no focal deficits  Psychiatric: mentation appears normal and affect normal/bright      A/P:  1. Left-sided lower back, left hip, and left leg pain:   Patient presented to the ED 2/7 with left sided back pain and left sided sciatic symptoms causing difficulty ambulating that began 2/5. He has always had a chronic mild left hip pain, but the pain is now severely exacerbated. The initial concern in the ED was that pain was related to underlying PAD. However, Vascular Surgery was consulted and thought pain was MSK in nature. CT lumbar spine 2/8 showed disc bulging at L3-L4 with left foraminal disc protrusion; L5-S1 right foraminal disc protrusion; multilevel DDD. No findings to suggest acute process or need for acute surgical intervention.  - PT consulted - continue exercises and stretches per their " recs  - Pain Service consulted, advised: gabapentin 300/300/600, fentanyl patch 25 mcg, scheduled tylenol, oxycodone 10-15 mg q3h prn, flexeril TID prn, and lidoderm patches. Today I increased the oxycodone range to 15-20mg q3h prn in effort to get better pain control especially prior to PT. D/c'ed lidoderm patches as patient did not find them effective. Added diclofenac gel QID. Per Pain Service, patient can follow up outpatient to discuss injection or block options for additional pain management.   - Obtain CT of left hip/pelvis today     2. Leukocytosis:   WBC 13.0 on day of admission without evidence of infection. Repeat 2/8 WBC 8.8. Likely stress demargination.  - Repeat CBC if clinical signs of infection develop    3. Severe peripheral artery disease, critical limb ischemia R>L with bilateral LE wounds:   S/p right femoral endarterectomy with in-situ GSV femoral to posterior tibial artery bypass 1/15/18. Wounds stable, per patient. Has been instructed to apply wound vac to right lower extremity wound, removed by patient at home due to air leak versus pain.   - Continue statin, zetia, ASA, plavix  - Continue current prednisone 10mg daily  - Appreciate Vascular Surgery and WOCN consults. Continue wound care per their recs:   -RLE wound: BID wet-to-dry dressing changes with acetic acid. No plan to apply wound vac again, as patient does not tolerate for more than 1-2 days before taking it off. Continue to monitor healing. May need OR cleanout if no significant improvement.    -LLE wound: Cleanse with MicroKlenz moistened gauze. Pat dry. Apply vaseline to the wound base. Cover with 4x4 Mepilex dressing. Change dressing every day and as needed.      4. CAD, HTN:  - Continue coreg, enalapril, lasix, imdur    5. ETOH dependence:   Patient reports 5-6 beers/day. Last drink was 2/7 at 10 PM. Previous admission noted 8-10 beers per day. Per patient, did have withdrawals with confusion during previus hospitalization. Per  chart review, did have delerium post-op during previous hospitalization and was treated with zyprexa. However, was ETOH free from 1/4-2/1.   - MSSA protocol - no overt signs of withdrawal and has not required benzos thus far  - Thiamine, folate, zinc, vitamin A, vitamin C supplements     6. Disposition:   SW consulted, looking for TCU placement.       Signed:  Yue Hawk DNP, APRN, CNP  ED Observation         Afternoon addendum:  Obtained CT of left hip that demonstrates:   1. No acute osseous abnormality.   2. Mild to moderate degenerative changes of the left hip.  3. Tubular structure abutting/continuous with common femoral artery in  left groin with mild surrounding stranding, may be from prior vascular  access related changes. Dedicated ultrasound of this area may be  helpful to confirm no underlying communication to the artery.    Spoke with radiologist about findings and he suggested left groin arterial ultrasound to eval for pseudoaneurysm.   Patient reports that he developed right groin pseudoaneurysm ~20+ years ago after a procedure during which the R femoral artery was accessed. He thinks the pain was similar to the pain he is having now. Of note, he had recent LLE angiogram with US-guided L common femoral arteriotomy in IR on 1/4/18, which could be cause of possible pseudoaneurysm.     L groin arterial US ordered and pending. Discussed with Vascular Surgery; they are aware and will follow up on US as well.     Yue Hawk DNP, APRN, CNP

## 2018-02-09 NOTE — PROGRESS NOTES
"VASCULAR SURGERY PROGRESS NOTE    Subjective:  Pt found sleeping soundly in bed. Pt seems more awake today. Pt still having a lot of left back/hip pain preventing him from doing much activity. Pt is still having some mild incisional pain on the open right wound, but the closed incisions in the medial lower leg and groin are healing well. Pt is voiding well and eating ok. Pt acknowledges that he will likely need a TCU. Pt reports he gets blisters just distal to his right open wound that prevent him from tolerating the wound vac well for more than a day.     Objective:  Intake/Output Summary (Last 24 hours) at 02/09/18 0802  Last data filed at 02/08/18 2100   Gross per 24 hour   Intake             1000 ml   Output             1125 ml   Net             -125 ml     Labs:  ROUTINE IP LABS (Last four results)  BMP  Recent Labs  Lab 02/08/18  0628 02/07/18  1616    140   POTASSIUM 3.9 5.0   CHLORIDE 107 106   SUMAN 9.0 9.4   CO2 25 28   BUN 10 10   CR 0.58* 0.61*   * 98     CBC  Recent Labs  Lab 02/08/18  0628 02/07/18  1616   WBC 8.8 13.0*   RBC 3.46* 3.65*   HGB 11.1* 11.7*   HCT 34.7* 36.7*    101*   MCH 32.1 32.1   MCHC 32.0 31.9   RDW 14.7 14.4    344     INR  Recent Labs  Lab 02/07/18  1616   INR 0.93     PHYSICAL EXAM:  /68 (BP Location: Left arm)  Pulse 61  Temp 97.8  F (36.6  C) (Oral)  Resp 18  Ht 1.727 m (5' 8\")  Wt 66.8 kg (147 lb 3.2 oz)  SpO2 98%  BMI 22.38 kg/m2  General: The patient is alert and oriented. Appropriate. No acute disctress  Psych: cooperative affect, answers questions appropriately  Skin: Color appropriate for race, warm, dry.  Respiratory: The patient does not require supplemental oxygen. Breathing unlabored  Groin: right incision healing well and is CDI  Extremities: bilateral lower extremity edema, right worse than left, right shin wound dressing removed- small amount of green non-malodorous drainage, exposed tendon and muscle, wet-dry acetic acid " dressing applied and wrapped with Kerlix, right below knee incision healing nicely,  left shin wound dry and scabbed, no active drainage or odor    ASSESSMENT:  67 year old male with severe PAD, bilateral lower extremity wounds and critical limb ischemia who is status post right femoral endarterectomy with in-situ GSV femoral to posterior tibial artery bypass on 1/15/2018 who was admitted to the hospital on 2/7/2018 with acute left lower back radicular pain.    PLAN:  -Left leg pain is more than likely musculoskeletal in nature, related to sciatica   -Right lower extremity wound BID wet-to-dry dressing changes with acetic acid   -No plan to apply wound vac again, as pt does not tolerate for more than 1-2 days before taking it off   -May need to take pt to OR to clean out the RLE wound if no significant improvement   - Assess nutritional status, prolonged wound healing, pre-albumin and albumin ordered   - Pain service consulted to assist in pain management and determine if patient candidate for injection   - Continue Plavix, ASA, Crestor and Zetia   - Continue MSSA protocol as patient developed withdrawals with confusion and delirium during previous admission   - Smoking cessation strongly encouraged   - Patient will likely need TCU placement at discharge  - Vascular surgery will follow along while patient in hospital    Taylor Mancera PA-C   Division of Vascular Surgery   [facility]

## 2018-02-09 NOTE — PLAN OF CARE
Problem: Patient Care Overview  Goal: Plan of Care/Patient Progress Review  Outcome: Improving   Observation goals PRIOR TO DISCHARGE     -diagnostic tests and consults completed and resulted: CT completed, awaiting ultrasound result  -vital signs normal or at patient baseline: YES  -tolerating oral intake to maintain hydration: YES  -adequate pain control on oral analgesics: YES  -returns to baseline functional status: NO  -safe disposition plan has been identified: NO, yet to be determined  Nurse to notify provider when observation goals have been met and patient is ready for discharge        AVSS, afebrile, BP slightly soft. Good oral intake, voiding. Up with assist x 1. Right leg wound dressing changed by the doctor and left leg wound open to air. Given oxycodone 20mg x 2 with post relief, and applied voltaren gel at affected area. Nicotine and fentanyl patch in place. IV dilaudid given during US test. Tolerated procedure well. MSSA score 1. PIV SL. Continue with care and update MD with any changes.

## 2018-02-09 NOTE — PLAN OF CARE
Problem: Patient Care Overview  Goal: Plan of Care/Patient Progress Review  Outpatient/Observation goals to be met before discharge home:    PRIMARY DIAGNOSIS: LLE pain; Gait problem  OUTPATIENT/OBSERVATION GOALS TO BE MET BEFORE DISCHARGE:  Diagnostic tests and consults completed and resulted: YES-none at this time.  Vital signs normal or at patient baseline: YES-vitals WNL.  Tolerating oral intake to maintain hydration: YES  Adequate pain control on oral analgesics: YES-pt required a one time dose of IV pain medication last night but pain is much better controlled at this time with PO medications; per pt he may have gotten off commode last night funny and aggravated his hip.   Returns to baseline functional status: NO  Safe disposition plan has been identified: NO-yet to be determined.  *Nurse to notify MD when observation goals have been met and patient is ready for discharge.

## 2018-02-09 NOTE — PLAN OF CARE
Problem: Patient Care Overview  Goal: Plan of Care/Patient Progress Review  Outcome: No Change     Comments: -diagnostic tests and consults completed and resulted   -vital signs normal or at patient baseline ;Yes  -tolerating oral intake to maintain hydration :Yes    -adequate pain control on oral analgesics :No on oxycodone and flexeril  -returns to baseline functional status :No  -safe disposition plan has been identified : No working on plan  Nurse to notify provider when observation goals have been met and patient is ready for discharge.

## 2018-02-09 NOTE — PLAN OF CARE
Problem: Patient Care Overview  Goal: Plan of Care/Patient Progress Review  Outpatient/Observation goals to be met before discharge home:    PRIMARY DIAGNOSIS: LLE pain; Gait problem  OUTPATIENT/OBSERVATION GOALS TO BE MET BEFORE DISCHARGE:  Diagnostic tests and consults completed and resulted: YES-none at this time.  Vital signs normal or at patient baseline: YES-vitals WNL.  Tolerating oral intake to maintain hydration: YES  Adequate pain control on oral analgesics: NO-pt still reporting lots of pain with PO regimen.   Returns to baseline functional status: NO  Safe disposition plan has been identified: NO-yet to be determined.  *Nurse to notify MD when observation goals have been met and patient is ready for discharge.

## 2018-02-09 NOTE — PLAN OF CARE
Problem: Patient Care Overview  Goal: Plan of Care/Patient Progress Review  Outcome: No Change   02/08/18 1924   OTHER   Plan Of Care Reviewed With patient;son   Plan of Care Review   Progress no change     A&Ox4 ex withdrawn. A/VSS on RA. Pt moves with Assist of 1 +walker and gait belt. Pt using bedside commode. Large BM and adequate UOP today. Pt c/o of constant pain in L back and hip, sciatica pain. Received oxy x1 and flexeril x1. PT worked with pt and gave stretches that are encouraged for pt to perform independently. Continue with POC.

## 2018-02-09 NOTE — PROGRESS NOTES
Inpatient Pain Management Service:  Follow Up    Discussed patient with DEDRA Waters, primary team increased oxycodone to 15-20mg po q3h prn moderate to severe pain for patient to be able to participate in therapy, diclofenac gel also started, lidoderm patches not effective so they were dc'd.  Patient can be evaluated as an outpatient for any possible injections for pain.    The Inpatient Pain Service will sign off at this time.  Please feel free to contact us if any other questions should arise.    Joann Gant PharmD, MS  Inpatient Pain Service  Pager: [phone]

## 2018-02-09 NOTE — BRIEF OP NOTE
"BP 95/58 (BP Location: Left arm)  Pulse 68  Temp 97.7  F (36.5  C)  Resp 18  Ht 1.727 m (5' 8\")  Wt 66.8 kg (147 lb 3.2 oz)  SpO2 94%  BMI 22.38 kg/m2RA.Pt had drsg on left leg off and refused to let me rewrap it.Drsg done on right calf wet to dry and wrapped with kerlix.Pt has fentanyl patch and nicotine patch on but lidoderm patch off.Medicated for pain this evening with oxycodone 15 mg and flexeril.Will continue to monitor.  "

## 2018-02-09 NOTE — PROGRESS NOTES
Social Work Services Progress Note    Hospital Day: 2-Observation  Date of Initial Social Work Evaluation:  During Previous Admission   Collaborated with:  TCU admission facilities, Pt and son, whom presented to bedside late in day, Provider    Data:  SW following for TCU placement. The following referrals have been made:     [facility] ([phone] f: [phone]): assessing  [facility]: declined   [place]: declined  [facility] ([phone][facility]declined   [facility] ([phone]): declined  [facility]: declined as pt requires longer rehab  [facility]: No beds  [facility]: Do not do wound vacs  [facility]: No beds  [facility]: No beds    Intervention:  D/C Planning    Assessment:  SW met pt and then son, when he arrived on unit. Writer discussed difficulty in finding placement in his preferred area of [place] and needing to expand to [facility] area. Pt is not keen on this idea which he then stated option of going home. Writer asked pt if he has walked and he said he has not, but feels that he would be able to. Also expressed concern of wound care if he did go home. At time of writer's last visit, there was new medical finding and w/u in progress w/ vascular as to plan of care.     Plan:    Anticipated Disposition:  Facility:  [facility] is only facility that is assessing. If this facility does not accept then would need to look in the [facility] area.    Barriers to d/c plan:  Finding appropriate bed, medical complexity, medical stability    Follow Up:  SW waiting to hear back from [facility] regarding referral.

## 2018-02-09 NOTE — PLAN OF CARE
Problem: Patient Care Overview  Goal: Plan of Care/Patient Progress Review  Outcome: Improving   Observation goals PRIOR TO DISCHARGE     -diagnostic tests and consults completed and resulted: CT hip (L) completed  -vital signs normal or at patient baseline: YES  -tolerating oral intake to maintain hydration: YES  -adequate pain control on oral analgesics: YES  -returns to baseline functional status: NO  -safe disposition plan has been identified: NO, yet to be determined  Nurse to notify provider when observation goals have been met and patient is ready for discharge.

## 2018-02-10 ENCOUNTER — APPOINTMENT (OUTPATIENT)
Dept: PHYSICAL THERAPY | Facility: CLINIC | Age: 68
End: 2018-02-10
Payer: COMMERCIAL

## 2018-02-10 VITALS
SYSTOLIC BLOOD PRESSURE: 119 MMHG | RESPIRATION RATE: 16 BRPM | TEMPERATURE: 98 F | OXYGEN SATURATION: 96 % | WEIGHT: 147.2 LBS | BODY MASS INDEX: 22.31 KG/M2 | HEIGHT: 68 IN | HEART RATE: 69 BPM | DIASTOLIC BLOOD PRESSURE: 72 MMHG

## 2018-02-10 LAB
ALBUMIN SERPL-MCNC: 3.1 G/DL (ref 3.4–5)
ALP SERPL-CCNC: 53 U/L (ref 40–150)
ALT SERPL W P-5'-P-CCNC: 35 U/L (ref 0–70)
ANION GAP SERPL CALCULATED.3IONS-SCNC: 7 MMOL/L (ref 3–14)
AST SERPL W P-5'-P-CCNC: 14 U/L (ref 0–45)
BASOPHILS # BLD AUTO: 0 10E9/L (ref 0–0.2)
BASOPHILS NFR BLD AUTO: 0.2 %
BILIRUB SERPL-MCNC: 0.4 MG/DL (ref 0.2–1.3)
BUN SERPL-MCNC: 13 MG/DL (ref 7–30)
CALCIUM SERPL-MCNC: 9 MG/DL (ref 8.5–10.1)
CHLORIDE SERPL-SCNC: 103 MMOL/L (ref 94–109)
CO2 SERPL-SCNC: 29 MMOL/L (ref 20–32)
CREAT SERPL-MCNC: 0.63 MG/DL (ref 0.66–1.25)
DIFFERENTIAL METHOD BLD: ABNORMAL
EOSINOPHIL # BLD AUTO: 0.2 10E9/L (ref 0–0.7)
EOSINOPHIL NFR BLD AUTO: 2.6 %
ERYTHROCYTE [DISTWIDTH] IN BLOOD BY AUTOMATED COUNT: 14.6 % (ref 10–15)
GFR SERPL CREATININE-BSD FRML MDRD: >90 ML/MIN/1.7M2
GLUCOSE SERPL-MCNC: 85 MG/DL (ref 70–99)
HCT VFR BLD AUTO: 37.2 % (ref 40–53)
HGB BLD-MCNC: 11.9 G/DL (ref 13.3–17.7)
IMM GRANULOCYTES # BLD: 0 10E9/L (ref 0–0.4)
IMM GRANULOCYTES NFR BLD: 0.5 %
LYMPHOCYTES # BLD AUTO: 1.7 10E9/L (ref 0.8–5.3)
LYMPHOCYTES NFR BLD AUTO: 20.4 %
MCH RBC QN AUTO: 32.3 PG (ref 26.5–33)
MCHC RBC AUTO-ENTMCNC: 32 G/DL (ref 31.5–36.5)
MCV RBC AUTO: 101 FL (ref 78–100)
MONOCYTES # BLD AUTO: 0.6 10E9/L (ref 0–1.3)
MONOCYTES NFR BLD AUTO: 7.3 %
NEUTROPHILS # BLD AUTO: 5.7 10E9/L (ref 1.6–8.3)
NEUTROPHILS NFR BLD AUTO: 69 %
NRBC # BLD AUTO: 0 10*3/UL
NRBC BLD AUTO-RTO: 0 /100
PLATELET # BLD AUTO: 311 10E9/L (ref 150–450)
POTASSIUM SERPL-SCNC: 4 MMOL/L (ref 3.4–5.3)
PROT SERPL-MCNC: 6.5 G/DL (ref 6.8–8.8)
RBC # BLD AUTO: 3.68 10E12/L (ref 4.4–5.9)
SODIUM SERPL-SCNC: 140 MMOL/L (ref 133–144)
WBC # BLD AUTO: 8.2 10E9/L (ref 4–11)

## 2018-02-10 PROCEDURE — 85025 COMPLETE CBC W/AUTO DIFF WBC: CPT | Performed by: PHYSICIAN ASSISTANT

## 2018-02-10 PROCEDURE — G0378 HOSPITAL OBSERVATION PER HR: HCPCS

## 2018-02-10 PROCEDURE — 25000132 ZZH RX MED GY IP 250 OP 250 PS 637: Performed by: NURSE PRACTITIONER

## 2018-02-10 PROCEDURE — 97530 THERAPEUTIC ACTIVITIES: CPT | Mod: GP | Performed by: PHYSICAL THERAPIST

## 2018-02-10 PROCEDURE — 40000193 ZZH STATISTIC PT WARD VISIT: Performed by: PHYSICAL THERAPIST

## 2018-02-10 PROCEDURE — 80053 COMPREHEN METABOLIC PANEL: CPT | Performed by: PHYSICIAN ASSISTANT

## 2018-02-10 PROCEDURE — 36415 COLL VENOUS BLD VENIPUNCTURE: CPT | Performed by: PHYSICIAN ASSISTANT

## 2018-02-10 PROCEDURE — 27210995 ZZH RX 272: Performed by: CLINICAL NURSE SPECIALIST

## 2018-02-10 PROCEDURE — 97116 GAIT TRAINING THERAPY: CPT | Mod: GP | Performed by: PHYSICAL THERAPIST

## 2018-02-10 PROCEDURE — 25000132 ZZH RX MED GY IP 250 OP 250 PS 637: Performed by: PHYSICIAN ASSISTANT

## 2018-02-10 PROCEDURE — 25000125 ZZHC RX 250: Performed by: NURSE PRACTITIONER

## 2018-02-10 PROCEDURE — 99217 ZZC OBSERVATION CARE DISCHARGE: CPT | Mod: Z6 | Performed by: EMERGENCY MEDICINE

## 2018-02-10 RX ORDER — HYDROMORPHONE HYDROCHLORIDE 2 MG/1
4-6 TABLET ORAL
Status: DISCONTINUED | OUTPATIENT
Start: 2018-02-10 | End: 2018-02-10

## 2018-02-10 RX ORDER — OXYCODONE HYDROCHLORIDE 10 MG/1
10-15 TABLET ORAL
Qty: 12 TABLET | Refills: 0 | Status: SHIPPED | OUTPATIENT
Start: 2018-02-10 | End: 2018-02-22

## 2018-02-10 RX ORDER — HYDROMORPHONE HYDROCHLORIDE 4 MG/1
4 TABLET ORAL
Qty: 12 TABLET | Refills: 0 | Status: SHIPPED | OUTPATIENT
Start: 2018-02-10 | End: 2018-02-10

## 2018-02-10 RX ORDER — ACETIC ACID 0.25 G/100ML
IRRIGANT IRRIGATION DAILY
Qty: 10 ML | Refills: 0 | Status: SHIPPED | OUTPATIENT
Start: 2018-02-11 | End: 2018-02-10

## 2018-02-10 RX ORDER — HYDROMORPHONE HYDROCHLORIDE 4 MG/1
4-6 TABLET ORAL
Qty: 12 TABLET | Refills: 0 | Status: SHIPPED | DISCHARGE
Start: 2018-02-10 | End: 2018-02-10

## 2018-02-10 RX ORDER — OXYCODONE HYDROCHLORIDE 10 MG/1
10 TABLET ORAL
Status: DISCONTINUED | OUTPATIENT
Start: 2018-02-10 | End: 2018-02-10 | Stop reason: HOSPADM

## 2018-02-10 RX ORDER — ACETIC ACID 0.25 G/100ML
IRRIGANT IRRIGATION 2 TIMES DAILY
Qty: 10 ML | Refills: 0 | Status: SHIPPED | OUTPATIENT
Start: 2018-02-10

## 2018-02-10 RX ADMIN — HYDROMORPHONE HYDROCHLORIDE 6 MG: 2 TABLET ORAL at 12:10

## 2018-02-10 RX ADMIN — NICOTINE 1 PATCH: 7 PATCH TRANSDERMAL at 09:28

## 2018-02-10 RX ADMIN — CLOPIDOGREL 75 MG: 75 TABLET, FILM COATED ORAL at 07:56

## 2018-02-10 RX ADMIN — PREDNISONE 10 MG: 5 TABLET ORAL at 07:57

## 2018-02-10 RX ADMIN — DICLOFENAC 4 G: 10 GEL TOPICAL at 12:17

## 2018-02-10 RX ADMIN — GABAPENTIN 300 MG: 300 CAPSULE ORAL at 14:00

## 2018-02-10 RX ADMIN — DOCUSATE SODIUM 100 MG: 100 CAPSULE, LIQUID FILLED ORAL at 07:56

## 2018-02-10 RX ADMIN — ISOSORBIDE MONONITRATE 60 MG: 60 TABLET, EXTENDED RELEASE ORAL at 07:56

## 2018-02-10 RX ADMIN — HYDROMORPHONE HYDROCHLORIDE 8 MG: 2 TABLET ORAL at 04:48

## 2018-02-10 RX ADMIN — ACETIC ACID: 250 IRRIGANT IRRIGATION at 00:53

## 2018-02-10 RX ADMIN — DICLOFENAC 4 G: 10 GEL TOPICAL at 15:02

## 2018-02-10 RX ADMIN — ACETAMINOPHEN 1000 MG: 500 TABLET, FILM COATED ORAL at 00:35

## 2018-02-10 RX ADMIN — ACETAMINOPHEN 1000 MG: 500 TABLET, FILM COATED ORAL at 09:21

## 2018-02-10 RX ADMIN — CYCLOBENZAPRINE HYDROCHLORIDE 10 MG: 5 TABLET, FILM COATED ORAL at 06:24

## 2018-02-10 RX ADMIN — HYDROMORPHONE HYDROCHLORIDE 8 MG: 2 TABLET ORAL at 07:56

## 2018-02-10 RX ADMIN — DICLOFENAC 4 G: 10 GEL TOPICAL at 06:26

## 2018-02-10 RX ADMIN — Medication 100 MG: at 07:56

## 2018-02-10 RX ADMIN — OXYCODONE HYDROCHLORIDE 15 MG: 5 TABLET ORAL at 14:01

## 2018-02-10 RX ADMIN — FUROSEMIDE 20 MG: 20 TABLET ORAL at 07:56

## 2018-02-10 RX ADMIN — CARVEDILOL 6.25 MG: 6.25 TABLET, FILM COATED ORAL at 07:56

## 2018-02-10 RX ADMIN — ASPIRIN 81 MG: 81 TABLET, COATED ORAL at 07:56

## 2018-02-10 RX ADMIN — CYCLOBENZAPRINE HYDROCHLORIDE 10 MG: 5 TABLET, FILM COATED ORAL at 12:17

## 2018-02-10 RX ADMIN — GABAPENTIN 300 MG: 300 CAPSULE ORAL at 07:55

## 2018-02-10 RX ADMIN — HYDROMORPHONE HYDROCHLORIDE 8 MG: 2 TABLET ORAL at 00:35

## 2018-02-10 ASSESSMENT — PAIN DESCRIPTION - DESCRIPTORS
DESCRIPTORS: ACHING

## 2018-02-10 NOTE — PROVIDER NOTIFICATION
Writer called provider to clarified Carvedilol order d/t soft BP. Per provider hold this med at this time.

## 2018-02-10 NOTE — PROGRESS NOTES
Social Work Services Discharge Note      Patient Name:  Boom Kahn     Anticipated Discharge Date:  2/10/2018    Discharge Disposition:   Home Care:  [facility]       Pre-Admission Screening (PAS) online form has been completed.  The Level of Care (LOC) is:  Determined  Confirmation Code is:  Not needed as pt is going home  Patient/caregiver informed of referral to [facility] for Pre-Admission Screening for skilled nursing facility (SNF) placement and to expect a phone call post discharge from SNF.     Additional Services/Equipment Arranged:  [facility] of [facility] services-RNCC to put in orders.      Patient / Family response to discharge plan:  Pt is eager to d/c home. Pt aware of the challenges of finding TCU placement in [place] and was reluctant to looking at placement in [facility]. PT evaluated pt again and pt did much better and likely will do ok at home. Writer did address pt's ETOH use and he reports that his consumption has decreased to 2-3 beers/day curently from 8-10 beers/day previous to surgery. Pt reports that his goal is not to abstain from ETOH and feels he has good support from family at this time. Pt reports that family has expressed concerns about his drinking, but he is not interested in treatment at this time.      Persons notified of above discharge plan:  RNCC, Primary Team    CTS Handoff completed:  NO-from out of state    Medicare Notice of Rights provided to the patient/family:  NO

## 2018-02-10 NOTE — PLAN OF CARE
Problem: Patient Care Overview  Goal: Plan of Care/Patient Progress Review  Physical Therapy Discharge Summary    Reason for therapy discharge:    Discharged to home with home therapy.    Progress towards therapy goal(s). See goals on Care Plan in [facility] electronic health record for goal details.  Goals partially met.  Barriers to achieving goals:   discharge from facility.    Therapy recommendation(s):    Continued therapy is recommended.  Rationale/Recommendations:  pt will benfit from HHPT to improve sciatic pain, strength, endurance, and independence with functional mobility.

## 2018-02-10 NOTE — PLAN OF CARE
Problem: Patient Care Overview  Goal: Plan of Care/Patient Progress Review  Patient is discharged to home and family to  meds, discharge instructions given and supplies given to patient.  Patient will be getting home care PT/RN.

## 2018-02-10 NOTE — PLAN OF CARE
"Problem: Patient Care Overview  Goal: Discharge Needs Assessment   Observation goals PRIOR TO DISCHARGE     -diagnostic tests and consults completed and resulted: CT completed  -vital signs normal or at patient baseline: YES  -tolerating oral intake to maintain hydration: YES  -adequate pain control on oral analgesics: YES  -returns to baseline functional status: NO  -safe disposition plan has been identified: NO, yet to be determined  Nurse to notify provider when observation goals have been met and patient is ready for discharge        Pt on oral dilaudid 6-8mg Q3hrs, gel and flexirel  All utilizing as available.  Pt ate well, voiding well. AVSS. Provider updated on Soft BP. BP meds held per provider. Pt stable will continue to monitor. BP 95/68 (BP Location: Left arm)  Pulse 68  Temp 97.9  F (36.6  C) (Oral)  Resp 18  Ht 1.727 m (5' 8\")  Wt 66.8 kg (147 lb 3.2 oz)  SpO2 99%  BMI 22.38 kg/m2        "

## 2018-02-10 NOTE — PLAN OF CARE
Problem: Patient Care Overview  Goal: Plan of Care/Patient Progress Review  PT 6D:     Discharge Planner PT   Patient plan for discharge: home  Current status: pt completes transfers IND, ambulates short distances with FWW and SBA, improved gait distance when NWB through LLE. Pt also able to complete 2 stairs x 2 trails with single rail and cues. Pt demonstrates good strength and balance, limited by pain at this time.   Barriers to return to prior living situation: 2 stairs at home, pt lives alone  Recommendations for discharge: TCU vs home with HHPT  Rationale for recommendations: pt would benefit from TCU stay however is able to complete stairs needed to get into his home and ambulate short distances with walker, would also be feasible for pt to return home with FWW, HHPT, and assist from son as needed.       Entered by: Ivet Becerra 02/10/2018 11:44 AM

## 2018-02-10 NOTE — PLAN OF CARE
Problem: Patient Care Overview  Goal: Discharge Needs Assessment    Observation goals PRIOR TO DISCHARGE     -diagnostic tests and consults completed and resulted: CT completed  -vital signs normal or at patient baseline: YES  -tolerating oral intake to maintain hydration: YES  -adequate pain control on oral analgesics: YES  -returns to baseline functional status: NO  -safe disposition plan has been identified: NO, yet to be determined  Nurse to notify provider when observation goals have been met and patient is ready for discharge

## 2018-02-10 NOTE — PLAN OF CARE
Problem: Patient Care Overview  Goal: Plan of Care/Patient Progress Review  Outpatient/Observation goals to be met before discharge home:    -diagnostic tests and consults completed and resulted - YES  -vital signs normal or at patient baseline - YES  -tolerating oral intake to maintain hydration - YES  -adequate pain control on oral analgesics - YES  -returns to baseline functional status - NO  -safe disposition plan has been identified - NO    Nurse to notify provider when observation goals have been met and patient is ready for discharge.

## 2018-02-10 NOTE — PLAN OF CARE
PT DC today at 1550. Pt left by wheelchair with his son. They went to the [facility] before leaving. No issues.

## 2018-02-10 NOTE — DISCHARGE SUMMARY
"ED Observation Discharge Summary    Boom Kahn   MRN# [id number]  Age: 67 year old   YOB: 1950            Date of Admission:  2/7/2018    Date of Discharge:  2/10/2018  Admitting Physician:  Rhonda Sheridan MD  Discharge Physician: Dr. Carlos MD  NP/PA: Modesta Ruiz CNP        DISCHARGE DIAGNOSIS:     Back pain    * No resolved hospital problems. *      INTERVAL HISTORY: VSS, afebrile. Up ad thony. Eating and drinking well. Pain well controled. Feels comfortable with wound cares. Would like to discharge to home. Will contact clinic or return to the ED if he isn't doing well and feels he needs TCU.     PHYSICAL EXAM:   Blood pressure 119/72, pulse 69, temperature 98  F (36.7  C), temperature source Oral, resp. rate 16, height 1.727 m (5' 8\"), weight 66.8 kg (147 lb 3.2 oz), SpO2 96 %.     GENERAL APPEARENCE: A/O x4. NAD.  SKIN: Right shin wound currently dressed (see vascular surgery note), left shin out to air, dry and scabbed, no signs of infection.   HEENT/NECK: NCAT w/out masses, lesions, or abnormalities. Sclera anicteric, PERRLA, EOMI.  Oral mucosa pink and moist without erythema, exudate, lesions, ulcerations, or thrush. Teeth and gums normal. Neck supple, no masses. No jugular venous distention.   CARDIOVASCULAR: S1, S2 RRR. No murmurs, rubs, or gallops.   RESPIRATORY: Respiratory effort WNL. CTA  bilaterally without crackles/rales/wheeze   GI: Active BS in all 4 quadrants. Abdomen soft and non-tender. No masses or hepatosplenomegaly.  : Deferred  MUSCULOSKELETAL: Moves all extremities    PV: 2+ bilateral radial and pedal pulses. BLE R > L   NEURO: CN II-XII grossly intact. Speech normal. Appropriate throughout interview.   Sensation grossly WNL. Finger to nose and rapid alternating movements WDL  HEME/LYMPH: No visible bleeding.   PSYCHIATRIC: Mentation and affect appear normal  VASCULAR ACCESS: CDI without erythema or discharge. Non-tender.    PROCEDURES AND IMAGING:   Results for " orders placed or performed during the hospital encounter of 02/07/18 (from the past 24 hour(s))   US Lower Extremity Arterial Duplex Left    Narrative    Exam: US LOWER EXTREMITY ARTERIAL DUPLEX LEFT, 2/9/2018 3:30 PM    Indication: concern for pseudoaneurysm on CT hip, radiologist  recommended US follow-up;     Comparison: CT to 9/20/2018    TECHNIQUE: Grayscale, color Doppler, and spectral Doppler evaluation  of the left groin.    Findings:   In the left groin adjacent to the common femoral artery and vein,  there is a 2.2 x 1.8 x 0.7 cm anechoic avascular fluid collection. On  grayscale imaging, the fluid collection approximates the common  femoral artery, although there is no evidence of active extravasation  or pseudoaneurysm.    Common femoral artery has a peak systolic velocity of 141 cm/s. Common  femoral vein is patent.      Impression    Impression: Left groin hematoma measuring 2.2 x 1.8 x 0.7 cm. The  fluid collection is immediately adjacent to the common femoral artery,  although no evidence of active extravasation or pseudoaneurysm is  identified.   CBC with platelets differential   Result Value Ref Range    WBC 8.2 4.0 - 11.0 10e9/L    RBC Count 3.68 (L) 4.4 - 5.9 10e12/L    Hemoglobin 11.9 (L) 13.3 - 17.7 g/dL    Hematocrit 37.2 (L) 40.0 - 53.0 %     (H) 78 - 100 fl    MCH 32.3 26.5 - 33.0 pg    MCHC 32.0 31.5 - 36.5 g/dL    RDW 14.6 10.0 - 15.0 %    Platelet Count 311 150 - 450 10e9/L    Diff Method Automated Method     % Neutrophils 69.0 %    % Lymphocytes 20.4 %    % Monocytes 7.3 %    % Eosinophils 2.6 %    % Basophils 0.2 %    % Immature Granulocytes 0.5 %    Nucleated RBCs 0 0 /100    Absolute Neutrophil 5.7 1.6 - 8.3 10e9/L    Absolute Lymphocytes 1.7 0.8 - 5.3 10e9/L    Absolute Monocytes 0.6 0.0 - 1.3 10e9/L    Absolute Eosinophils 0.2 0.0 - 0.7 10e9/L    Absolute Basophils 0.0 0.0 - 0.2 10e9/L    Abs Immature Granulocytes 0.0 0 - 0.4 10e9/L    Absolute Nucleated RBC 0.0     Comprehensive metabolic panel   Result Value Ref Range    Sodium 140 133 - 144 mmol/L    Potassium 4.0 3.4 - 5.3 mmol/L    Chloride 103 94 - 109 mmol/L    Carbon Dioxide 29 20 - 32 mmol/L    Anion Gap 7 3 - 14 mmol/L    Glucose 85 70 - 99 mg/dL    Urea Nitrogen 13 7 - 30 mg/dL    Creatinine 0.63 (L) 0.66 - 1.25 mg/dL    GFR Estimate >90 >60 mL/min/1.7m2    GFR Estimate If Black >90 >60 mL/min/1.7m2    Calcium 9.0 8.5 - 10.1 mg/dL    Bilirubin Total 0.4 0.2 - 1.3 mg/dL    Albumin 3.1 (L) 3.4 - 5.0 g/dL    Protein Total 6.5 (L) 6.8 - 8.8 g/dL    Alkaline Phosphatase 53 40 - 150 U/L    ALT 35 0 - 70 U/L    AST 14 0 - 45 U/L     DISCHARGE MEDICATIONS:   Current Discharge Medication List      START taking these medications    Details   diclofenac (VOLTAREN) 1 % GEL topical gel Place 4 g onto the skin 4 times daily  Qty: 1 Tube, Refills: 0    Associated Diagnoses: Pain of left lower extremity      acetic acid 0.25 % irrigation Irrigate with as directed daily  Qty: 10 mL, Refills: 0    Associated Diagnoses: S/P vascular surgery      order for DME Equipment being ordered: Walker Wheels () and Walker ()  Treatment Diagnosis: impaired mobility  Qty: 1 each, Refills: 0    Associated Diagnoses: Pain of left lower extremity         CONTINUE these medications which have CHANGED    Details   oxyCODONE IR (ROXICODONE) 10 MG tablet Take 1-1.5 tablets (10-15 mg) by mouth every 3 hours as needed for moderate to severe pain  Qty: 12 tablet, Refills: 0    Associated Diagnoses: Pain of left lower extremity         CONTINUE these medications which have NOT CHANGED    Details   gabapentin (NEURONTIN) 300 MG capsule Take 1 capsule (300 mg) by mouth 2 times daily  Qty: 60 capsule, Refills: 0    Comments: Take at 8 am and 2 pm  Associated Diagnoses: S/P vascular surgery      fentaNYL (DURAGESIC) 25 mcg/hr 72 hr patch Place 1 patch onto the skin every 72 hours for 14 days  Qty: 4 patch, Refills: 0    Associated Diagnoses: S/P  vascular surgery      isosorbide mononitrate (IMDUR) 60 MG 24 hr tablet Take 1 tablet (60 mg) by mouth daily  Qty: 30 tablet, Refills: 0    Associated Diagnoses: Ischemic cardiomyopathy      gabapentin (NEURONTIN) 600 MG tablet Take 1 tablet (600 mg) by mouth At Bedtime  Qty: 30 tablet, Refills: 0    Associated Diagnoses: PVD (peripheral vascular disease) (H)      escitalopram (LEXAPRO) 20 MG tablet Take 1 tablet (20 mg) by mouth daily  Qty: 15 tablet, Refills: 0    Associated Diagnoses: S/P vascular surgery      ezetimibe (ZETIA) 10 MG tablet Take 1 tablet (10 mg) by mouth daily  Qty: 30 tablet, Refills: 0    Associated Diagnoses: Ischemic cardiomyopathy      rosuvastatin (CRESTOR) 20 MG tablet Take 1 tablet (20 mg) by mouth daily  Qty: 30 tablet, Refills: 0    Associated Diagnoses: Ischemic cardiomyopathy      enalapril (VASOTEC) 2.5 MG tablet Take 1 tablet (2.5 mg) by mouth 2 times daily  Qty: 60 tablet, Refills: 0    Associated Diagnoses: Ischemic cardiomyopathy      carvedilol (COREG) 6.25 MG tablet Take 1 tablet (6.25 mg) by mouth 2 times daily (with meals)  Qty: 60 tablet, Refills: 0    Associated Diagnoses: Ischemic cardiomyopathy      predniSONE (DELTASONE) 10 MG tablet Take 1 tablet (10 mg) by mouth daily  Qty: 30 tablet, Refills: 0    Associated Diagnoses: S/P vascular surgery      furosemide (LASIX) 20 MG tablet Take 1 tablet (20 mg) by mouth 2 times daily (with meals)  Qty: 60 tablet, Refills: 0    Associated Diagnoses: S/P vascular surgery      folic acid (FOLVITE) 1 MG tablet Take 1 tablet (1 mg) by mouth daily  Qty: 30 tablet, Refills: 0    Associated Diagnoses: Takes dietary supplements      docusate sodium (COLACE) 100 MG capsule Take 1 capsule (100 mg) by mouth daily  Qty: 60 capsule, Refills: 0    Associated Diagnoses: Takes dietary supplements      polyethylene glycol (MIRALAX/GLYCOLAX) Packet Take 17 g by mouth daily  Qty: 7 packet, Refills: 0    Associated Diagnoses: Takes dietary supplements  "     zinc sulfate (ZINCATE) 220 (50 ZN) MG capsule Take 1 capsule (220 mg) by mouth daily  Qty: 7 capsule, Refills: 0    Associated Diagnoses: Takes dietary supplements      clopidogrel (PLAVIX) 75 MG tablet Take 1 tablet (75 mg) by mouth daily  Qty: 30 tablet, Refills: 0    Associated Diagnoses: Ischemic cardiomyopathy      multivitamin, therapeutic with minerals (THERA-VIT-M) TABS tablet Take 1 tablet by mouth daily  Qty: 30 each, Refills: 0    Associated Diagnoses: Takes dietary supplements      ascorbic acid 500 MG TABS Take 1 tablet (500 mg) by mouth daily  Qty: 7 tablet, Refills: 0    Associated Diagnoses: Takes dietary supplements      vitamin A 72710 UNIT capsule Take 2 capsules (20,000 Units) by mouth daily  Qty: 7 capsule, Refills: 0    Associated Diagnoses: Takes dietary supplements      ASPIRIN EC PO Take 81 mg by mouth daily               CONSULTATIONS:   Consultation during this admission received from:  1. Vascular Surgery   2. PT  3. SW     BRIEF HISTORY OF PRESENT ILLNESS:   (Adopted from admission H&P).    \"Boom Kahn is a 67 year old male with a medical history significant for hypertension, anxiety/depression, alcohol abuse, tobacco abuse, ICM/MI, CAD s/p 3x CABG and PCI, HFrEF (EF 25-30%, approximately 6 months ago) s/p ICD, right lung cancer s/p radiation therapy and peripheral air vascular disease s/p multiple stents.  Per review of patient's chart, patient underwent a right femoral endarterectomy with in situ GSV femoral to posterior tibial artery bypass on 1/15/2018.  After undergoing the surgery he was initially monitored in ICU and transferred to a stepdown unit.  Patient presents to the Emergency Department today for evaluation of lower left back pain, left hip pain with radiation down the left leg, causing difficulty with walking.  Patient reports that on 2/4/2018 he began sleeping in a familiar bed once again, and when he woke up the morning of 2/5/2018 he started having some lower " "left back pain.  He states throughout the day the pain then started in his left hip and he began having shooting pain down the left leg.  Patient reports the pain continued to worsen to the point that he was unable to walk or bear weight on the left leg.  He states that whenever he puts weight on the leg he has shooting pain down the leg.  Patient notes that he has always had a chronic mild left hip pain throughout most of his life; however, the pain is now severely exacerbated.  Patient notes that he is now having pain with sitting, but notes having some relief of pain with laying on the right hip.  Patient here also reports having some swelling in his left groin.  He reports that prior to his hospitalization he was having swelling in the right groin, but this has since resolved.  Patient notes that he had an angiogram done of his lower extremities prior to having his surgery, and the son reports that this showed an occlusion in the left side.  Patient here also reports that he has been dealing with PAD for the past 2 years and has chronic wounds on his legs.\"     In the ED his work-up was remarkable for WBC 13.0. Vascular surgery was consulted who felt pain was MSK in nature. He was given IV Dilaudid, oxycodone. He is admitted to observation for pain control and likely TCU placement.      On admission to the observation unit the patient was stable. He notes ongoing left sided lower back pain shooting down left leg. Denies every having this pain in the past. Has been unable to walk due to pan. He thinks this might be due to sleeping on a different beds over the past several days. He denies trauma.      He typically has a wound vac to his left lower extremity wound. However, this had an air leak and he took it off this am. He states his wounds are otherwise stable.      He has not yet take his mediations today.\"    ED OBSERVATION COURSE:    1. Left-sided lower back, left hip, and left leg pain:  Patient presented to " "the ED 2/7 with left sided back pain and left sided sciatic symptoms causing difficulty ambulating that began 2/5. He has always had a chronic mild left hip pain, but the pain is now severely exacerbated. The initial concern in the ED was that pain was related to underlying PAD. However, Vascular Surgery was consulted and thought pain was MSK in nature. CT lumbar spine 2/8 showed disc bulging at L3-L4 with left foraminal disc protrusion; L5-S1 right foraminal disc protrusion; multilevel DDD. No findings to suggest acute process or need for acute surgical intervention. He was informed of this finding and did recommend he follow-up with his PCP and possibly NSG as an out-patient.  PT consulted and recommended TCU placement. He prefers to discharge to home.    - Pain Service consulted, advised: gabapentin 300/300/600, fentanyl patch 25 mcg, scheduled tylenol, oxycodone 10-15 mg q3h prn, flexeril TID prn, and lidoderm patches. Did trial Dilaudid while here. The patient would prefer to discharge with oxycodone. Will not discharge with Flexeril given polypharmacy.  Discharge with diclofenac gel QID. Per Pain Service, patient can follow up outpatient to discuss injection or block options for additional pain management.   -CT of left hip/pelvis showed \"Tubular structure abutting/continuous with common femoral artery in  left groin with mild surrounding stranding, may be from prior vascular access related changes. Dedicated ultrasound of this area may be helpful to confirm no underlying communication to the artery.\" F/U US showed \"Left groin hematoma measuring 2.2 x 1.8 x 0.7 cm. The fluid collection is immediately adjacent to the common femoral artery, although no evidence of active extravasation or pseudoaneurysm is identified.\" Vascular surgery cleared the patient for discharge.         2. Leukocytosis:  Resolved. WBC 13.0 on day of admission without evidence of infection. Repeat 2/8 WBC 8.8. Likely stress demargination.  " Repeat CBC as an out-patient.      3. Severe peripheral artery disease, critical limb ischemia R>L with bilateral LE wounds:   S/p right femoral endarterectomy with in-situ GSV femoral to posterior tibial artery bypass 1/15/18. Wounds stable, per patient. Has been instructed to apply wound vac to right lower extremity wound, removed by patient at home due to air leak versus pain.   - Continue statin, zetia, ASA, plavix  - Continue current prednisone 10mg daily  - Appreciate Vascular Surgery and WOCN consults. Continue wound care per their recs:                        -RLE wound: BID wet-to-dry dressing changes with acetic acid. No plan to apply wound vac again, as patient does not tolerate for more than 1-2 days before taking it off. Continue to monitor healing. May need OR cleanout if no significant improvement.                         -LLE wound: Cleanse with MicroKlenz moistened gauze. Pat dry. Apply vaseline to the wound base. Cover with 4x4 Mepilex dressing. Change dressing every day and as needed.  -Home care nursing and wound care to continue to follow        4. CAD, HTN:  - Continue coreg, enalapril, lasix, imdur     5. ETOH dependence:   Patient reports 5-6 beers/day. Last drink was 2/7 at 10 PM. Previous admission noted 8-10 beers per day. Per patient, did have withdrawals with confusion during previus hospitalization. Per chart review, did have delerium post-op during previous hospitalization and was treated with zyprexa. However, was ETOH free from 1/4-2/1.   - MSSA protocol - no overt signs of withdrawal and has not required benzos thus far  - Discharge on folate, zinc, vitamin A, vitamin C supplements  - Close out-patient follow-up and out-patient Chem Dep       6. Disposition:  SW consulted, looking for TCU placement. However, the patient prefers to discharge to home. PT cleared for home. Discussed that we recommend discharge to facility, but he opted to discharge to home.  Discussed risks for further  decline, life threatening fall, etc.     DISCHARGE DISPOSITION:   Discharged to home. The patient was discharged in a stable condition.     DISCHARGE INSTRUCTIONS AND FOLLOW-UP:    Discharge Procedure Orders  Home care nursing referral   Referral Type: Home Health Therapies & Aides     Home Care PT Referral for Hospital Discharge   Referral Type: Home Health Therapies & Aides     MD face to face encounter   Order Comments: Documentation of Face to Face and Certification for Home Health Services    I certify that patient: Boom Kahn is under my care and that I, or a nurse practitioner or physician's assistant working with me, had a face-to-face encounter that meets the physician face-to-face encounter requirements with this patient on: 2/10/2018.    This encounter with the patient was in whole, or in part, for the following medical condition, which is the primary reason for home health care: right lower leg wound decreased mobility d/t pain..    I certify that, based on my findings, the following services are medically necessary home health services: Nursing and Physical Therapy.    My clinical findings support the need for the above services because: Nurse is needed: To assess wound, pain management, vital signs after changes in medications or other medical regimen. and To provide caregiver training to assist with: wound care.. and Physical Therapy Services are needed to assess and treat the following functional impairments: decreased mobility, endurance related to right leg wound.    Further, I certify that my clinical findings support that this patient is homebound (i.e. absences from home require considerable and taxing effort and are for medical reasons or [employer] services or infrequently or of short duration when for other reasons) because: Requires assistance of another person or specialized equipment to access medical services because patient: Range of motion limitations prevents ability to exit home  safely. and Requires supervision of another for safe transfer...    Based on the above findings. I certify that this patient is confined to the home and needs intermittent skilled nursing care, physical therapy and/or speech therapy.  The patient is under my care, and I have initiated the establishment of the plan of care.  This patient will be followed by a physician who will periodically review the plan of care.  Physician/Provider to provide follow up care: Willian Arrington    Attending hospital physician (the Medicare certified [facility] provider): Sara Byers NP  Physician Signature: See electronic signature associated with these discharge orders.  Date: 2/10/2018     When to contact your care team   Order Comments: Return to the ED with fever, uncontrolled nausea, vomiting, unrelieved pain, bleeding not relieved with pressure, dizziness, chest pain, shortness of breath, loss of consciousness, and any new or concerning symptoms.     Wound care and dressings   Order Comments: Instructions to care for your wound at home: as directed.    Right lower extremity wound: twice daily wet-to-dry dressing changes with acetic acid.   Left Lower Extremity wound: Cleanse with MicroKlenz moistened gauze. Pat dry. Apply vaseline to the wound base. Cover with 4x4 Mepilex dressing. Change dressing every day and as needed.     Activity   Order Comments: Your activity upon discharge: activity as tolerated, ambulate with walker  Elevated legs as much as possible   Order Specific Question Answer Comments   Is discharge order? Yes      [facility] Follow-up and recommended labs and tests   Order Comments: Primary care provider, Willian Arrington, within 2 days for hospital follow- up.    Follow up with Vascular Surgery within 7 days of discharge      Appointments on [facility] and/or [facility] (with [facility] or [facility] provider or service). Call [phone] if you haven't heard regarding these appointments within 7 days of discharge.      Reason for your hospital stay   Order Comments: You were admitted for pain management. Your pain improved. We recommended you discharge to TCU for safety. You preferred to discharge to home. Physical Therapy saw you today and cleared you for home with home care. We will increased your oxycodone dose. Please be sure not to mix this with alcohol as this could be life threatening. Do not drink alcohol while taking narcotics     CT of your Back showed    1. No acute fracture or dislocation. Mild dextroscoliosis centering at  L3 with multilevel disc degenerative disease.  2. Disc bulge with superimposed left central disc protrusion at L3-L4  and likely left foraminal disc protrusion at this level causing mild  left foraminal narrowing. L5-S1 right foraminal disc protrusion with  likely right L5 nerve root impingement. An MRI of the lumbar spine can  be obtained to confirm these findings.    Recommend follow-up with your primary care and possible Neurosurgery for this     Diet   Order Comments: Follow this diet upon discharge:      Fluid restriction 2000 ML FLUID     Combination Diet 2 gm NA Diet; No Caffeine Diet   Order Specific Question Answer Comments   Is discharge order? Yes         Attestation:   I have reviewed today's vital signs, notes, medications, labs and imaging.      DEDRA Lucero, CNP  Nurse Practitioner   Emergency Department Observation Unit

## 2018-02-10 NOTE — PROGRESS NOTES
Care Coordinator- Discharge Planning     Admission Date/Time:  2/7/2018  Attending MD:  Farzaneh att. providers found     Data  Date of initial CC assessment:    Chart reviewed, discussed with interdisciplinary team.   Patient was admitted for:   1. Pain of left lower extremity         Assessment  Notified by  that pt will be discharging today and will need resumption of [facility] orders.  Pt also requesting a w/c for home use.    Spoke with patient who confirmed that he is staying locally at: [address] [facility], cellphone # [phone].  Pt states he has been working with home care already.  Pt would like a w/c ordered.  Pt would like it processed thorough his insurance - if possible.       Coordination of Care and Referrals: Provided patient/family with options for DME and Home Care.  Confirmed with [facility] that pt was previously opened to them for home RN and PT services.  D/c orders updated.  Spoke with Jazmyne Wright [facility] regarding pt situation.  Per Jazmyne Wright can process the request on Monday at the earliest.   Jazmyne faxed CC RN information for provider to review and ensure is documented in provider note.      Plan  Anticipated Discharge Date:  2/10/18  Anticipated Discharge Plan:  Community with home care.    CTS Handoff completed:  NO    Addendum 1245:  Spoke with MOODY Byers regarding home care and w/c status.  At this time will plan to defer w/c request for home care RN to f/u with on Monday.  Discussed wound care management as pt came in with a wound vac and will not be discharged on the wound vac.  Pt and or his caregivers are willing to learn/manage wound care needs with home RN support.  Email update sent to [facility].   D/c orders updated.       Celina Robles, RN   Weekend RN Care Coordinator

## 2018-02-10 NOTE — PLAN OF CARE
Problem: Patient Care Overview  Goal: Plan of Care/Patient Progress Review  -diagnostic tests and consults completed and resulted - YES  -vital signs normal or at patient baseline - YES  -tolerating oral intake to maintain hydration - YES  -adequate pain control on oral analgesics - YES  -returns to baseline functional status - YES  -safe disposition plan has been identified - YES    Patient has been doing well, up in chair, had therapy, will be discharging to home today, leg dressing change was done this morning by MD, pain med given with some relief, was on oral dilaudid but now changed to oxycodone, patient was given flexiril and tylenol  too, left thigh applied volatren cream on it with good relief, good appetite, able to order his own meals, good urine output, is on fluid restriction, continue to monitor.

## 2018-02-10 NOTE — PLAN OF CARE
Problem: Patient Care Overview  Goal: Plan of Care/Patient Progress Review  Outpatient/Observation goals to be met before discharge home:    -diagnostic tests and consults completed and resulted - YES  -vital signs normal or at patient baseline - YES  -tolerating oral intake to maintain hydration - YES  -adequate pain control on oral analgesics - YES  -returns to baseline functional status - NO  -safe disposition plan has been identified - PENDING     Nurse to notify provider when observation goals have been met and patient is ready for discharge.

## 2018-02-10 NOTE — PROGRESS NOTES
Leg edema improved, pain controlled    B/P: 119/72, T: 98, P: 69, R: 16  Alert oriented no acute distress  Palpable right leg bypass and PT  Right leg wound dressing changed with wet to dry acetic acid, stable with small amount serous drainage.    WBC   Date Value Ref Range Status   02/08/2018 8.8 4.0 - 11.0 10e9/L Final   ]  Hemoglobin   Date Value Ref Range Status   02/08/2018 11.1 (L) 13.3 - 17.7 g/dL Final   ]  INR/Prothrombin Time  Creatinine   Date Value Ref Range Status   02/08/2018 0.58 (L) 0.66 - 1.25 mg/dL Final   ]      Intake/Output Summary (Last 24 hours) at 02/10/18 0808  Last data filed at 02/10/18 0458   Gross per 24 hour   Intake              736 ml   Output             1460 ml   Net             -724 ml       Assessment/Plan:  68 yo male s/p Right fem-posterior tibial bypass, presented with leg edema and left leg pain - improved    US left groin small hematoma, negative for pseudoaneurysm  Continue BID dressing to right leg wound with acetic acid  Patient has follow up with vascular surgery in clinic this week  Encouraged leg elevation  Dispo pending home vs TCU.    Leandra Marques MD  Vascular Surgery Fellow  Pager [phone]

## 2018-02-12 ENCOUNTER — DOCUMENTATION ONLY (OUTPATIENT)
Dept: VASCULAR SURGERY | Facility: CLINIC | Age: 68
End: 2018-02-12

## 2018-02-12 DIAGNOSIS — Z13.6 ENCOUNTER FOR ABDOMINAL AORTIC ANEURYSM (AAA) SCREENING: Primary | ICD-10-CM

## 2018-02-13 ENCOUNTER — DOCUMENTATION ONLY (OUTPATIENT)
Dept: CARE COORDINATION | Facility: CLINIC | Age: 68
End: 2018-02-13

## 2018-02-13 NOTE — PROGRESS NOTES
[facility] now requests orders and shares plan of care/discharge summaries for some patients through [employer].  Please REPLY TO THIS MESSAGE in order to give authorization for orders when needed.  This is considered a verbal order, you will still receive a faxed copy of orders for signature.  Thank you for your assistance in improving collaboration for our patients.    ORDER    MD SUMMARY/PLAN OF CARE    PT or ambulation, exercises and vital monitoring with activity.  1x/week x 1 week then 2x/week x 1 week.    Char Osborne MPT

## 2018-02-15 ENCOUNTER — RADIANT APPOINTMENT (OUTPATIENT)
Dept: ULTRASOUND IMAGING | Facility: CLINIC | Age: 68
End: 2018-02-15
Attending: CLINICAL NURSE SPECIALIST
Payer: COMMERCIAL

## 2018-02-15 ENCOUNTER — OFFICE VISIT (OUTPATIENT)
Dept: VASCULAR SURGERY | Facility: CLINIC | Age: 68
End: 2018-02-15
Payer: COMMERCIAL

## 2018-02-15 VITALS
HEART RATE: 82 BPM | DIASTOLIC BLOOD PRESSURE: 58 MMHG | RESPIRATION RATE: 16 BRPM | SYSTOLIC BLOOD PRESSURE: 104 MMHG | OXYGEN SATURATION: 96 %

## 2018-02-15 DIAGNOSIS — F11.20 NARCOTIC DEPENDENCE (H): Primary | ICD-10-CM

## 2018-02-15 DIAGNOSIS — I73.9 PAD (PERIPHERAL ARTERY DISEASE) (H): ICD-10-CM

## 2018-02-15 RX ORDER — OXYCODONE HYDROCHLORIDE 10 MG/1
10 TABLET ORAL EVERY 6 HOURS PRN
Qty: 25 TABLET | Refills: 0 | Status: SHIPPED | OUTPATIENT
Start: 2018-02-15 | End: 2018-06-12

## 2018-02-15 ASSESSMENT — PAIN SCALES - GENERAL: PAINLEVEL: NO PAIN (0)

## 2018-02-15 NOTE — NURSING NOTE
Chief Complaint   Patient presents with     RECHECK     Follow up bypass graft.        Vitals:    02/15/18 1618   BP: 104/58   BP Location: Right arm   Pulse: 82   Resp: 16   SpO2: 96%       There is no height or weight on file to calculate BMI.                 Doris Baumann LPN

## 2018-02-15 NOTE — MR AVS SNAPSHOT
After Visit Summary   2/15/2018    Boom Kahn    MRN: [id number]           Patient Information     Date Of Birth          1950        Visit Information        Provider Department      2/15/2018 4:30 PM Lexis Ag MD [facility]        Today's Diagnoses     Narcotic dependence (H)    -  1      Care Instructions    Continue BID right shin dressing changes with Acetic Acid     Follow up with Dr. Ag in 2 weeks    With questions, concerns, or to request an appointment, please call either:    Gretel Acosta, Care Coordinator RN, Vascular Surgery  [phone]    Vascular Call Center  [phone]    To contact someone after 5 pm, on a weekend, or on a Holiday, please call:  [facility]  [phone], option 4 to have a member of the Vascular Surgery Service paged.          Follow-ups after your visit        Your next 10 appointments already scheduled     Feb 22, 2018  8:55 AM CST   (Arrive by 8:40 AM)   New Patient Visit with Giovanni Rodrigez MD   [facility] ([facility])    [address] [place] [address]  [address] [facility] [address] [phone]            Mar 01, 2018 12:15 PM CST   (Arrive by 12:00 PM)   Return Vascular Visit with Lexis Ag MD   [facility] ([facility])    [address] [place] [address] [phone]              Who to contact     Please call your clinic at [phone] to:    Ask questions about your health    Make or cancel appointments    Discuss your medicines    Learn about your test results    Speak to your doctor            Additional Information About Your Visit        MyChart Information     [email or web address] gives you secure access to your electronic health record. If you see a primary care provider, you can also send messages to your care team and make appointments. If you have questions, please call your  primary care clinic.  If you do not have a primary care provider, please call [phone] and they will assist you.      [employer] is an electronic gateway that provides easy, online access to your medical records. With [employer], you can request a clinic appointment, read your test results, renew a prescription or communicate with your care team.     To access your existing account, please contact your [facility] or call [phone] for assistance.        Care EveryWhere ID     This is your Care EveryWhere ID. This could be used by other organizations to access your [place] medical records  [id number]        Your Vitals Were     Pulse Respirations Pulse Oximetry             82 16 96%          Blood Pressure from Last 3 Encounters:   02/15/18 104/58   02/10/18 119/72   02/01/18 134/67    Weight from Last 3 Encounters:   02/07/18 147 lb 3.2 oz   01/29/18 148 lb 9.6 oz   01/22/18 143 lb 8 oz              Today, you had the following     No orders found for display       Primary Care Provider Office Phone # Fax #    Willian Arrington [phone]       [facility] [address]  [place] [id number]        Equal Access to Services     [facility] campusTRICIA AH: Hadii aad ku hadasho Soomaali, waaxda luqadaha, qaybta kaalmada adeegyada, waxay sally hayquentinn winston barragan la'quentinn ah. So [facility] [phone].    ATENCIÓN: Si habla español, tiene a bhatia disposición servicios gratuitos de asistencia lingüística. Ll[facility] [phone].    We comply with applicable federal civil rights laws and [place] laws. We do not discriminate on the basis of race, color, national origin, age, disability, sex, sexual orientation, or gender identity.            Thank you!     Thank you for choosing [facility]  for your care. Our goal is always to provide you with excellent care. Hearing back from our patients is one way we can continue to improve our services. Please take a few minutes  to complete the written survey that you may receive in the mail after your visit with us. Thank you!             Your Updated Medication List - Protect others around you: Learn how to safely use, store and throw away your medicines at www.disposemymeds.org.          This list is accurate as of 2/15/18  4:48 PM.  Always use your most recent med list.                   Brand Name Dispense Instructions for use Diagnosis    acetic acid 0.25 % irrigation     10 mL    Irrigate with as directed 2 times daily    S/P vascular surgery       ascorbic acid 500 MG Tabs     7 tablet    Take 1 tablet (500 mg) by mouth daily    Takes dietary supplements       ASPIRIN EC PO      Take 81 mg by mouth daily        carvedilol 6.25 MG tablet    COREG    60 tablet    Take 1 tablet (6.25 mg) by mouth 2 times daily (with meals)    Ischemic cardiomyopathy       clopidogrel 75 MG tablet    PLAVIX    30 tablet    Take 1 tablet (75 mg) by mouth daily    Ischemic cardiomyopathy       diclofenac 1 % Gel topical gel    VOLTAREN    1 Tube    Place 4 g onto the skin 4 times daily    Pain of left lower extremity       docusate sodium 100 MG capsule    COLACE    60 capsule    Take 1 capsule (100 mg) by mouth daily    Takes dietary supplements       enalapril 2.5 MG tablet    VASOTEC    60 tablet    Take 1 tablet (2.5 mg) by mouth 2 times daily    Ischemic cardiomyopathy       escitalopram 20 MG tablet    LEXAPRO    15 tablet    Take 1 tablet (20 mg) by mouth daily    S/P vascular surgery       ezetimibe 10 MG tablet    ZETIA    30 tablet    Take 1 tablet (10 mg) by mouth daily    Ischemic cardiomyopathy       fentaNYL 25 mcg/hr 72 hr patch    DURAGESIC    4 patch    Place 1 patch onto the skin every 72 hours for 14 days    S/P vascular surgery       folic acid 1 MG tablet    FOLVITE    30 tablet    Take 1 tablet (1 mg) by mouth daily    Takes dietary supplements       furosemide 20 MG tablet    LASIX    60 tablet    Take 1 tablet (20 mg) by mouth 2  times daily (with meals)    S/P vascular surgery       * gabapentin 300 MG capsule    NEURONTIN    60 capsule    Take 1 capsule (300 mg) by mouth 2 times daily    S/P vascular surgery       * gabapentin 600 MG tablet    NEURONTIN    30 tablet    Take 1 tablet (600 mg) by mouth At Bedtime    PVD (peripheral vascular disease) (H)       isosorbide mononitrate 60 MG 24 hr tablet    IMDUR    30 tablet    Take 1 tablet (60 mg) by mouth daily    Ischemic cardiomyopathy       multivitamin, therapeutic with minerals Tabs tablet     30 each    Take 1 tablet by mouth daily    Takes dietary supplements       order for DME     1 each    Equipment being ordered: Walker Wheels () and Walker () Treatment Diagnosis: impaired mobility    Pain of left lower extremity       oxyCODONE IR 10 MG tablet    ROXICODONE    12 tablet    Take 1-1.5 tablets (10-15 mg) by mouth every 3 hours as needed for moderate to severe pain    Pain of left lower extremity       polyethylene glycol Packet    MIRALAX/GLYCOLAX    7 packet    Take 17 g by mouth daily    Takes dietary supplements       predniSONE 10 MG tablet    DELTASONE    30 tablet    Take 1 tablet (10 mg) by mouth daily    S/P vascular surgery       rosuvastatin 20 MG tablet    CRESTOR    30 tablet    Take 1 tablet (20 mg) by mouth daily    Ischemic cardiomyopathy       vitamin A 44818 UNIT capsule     7 capsule    Take 2 capsules (20,000 Units) by mouth daily    Takes dietary supplements       zinc sulfate 220 (50 ZN) MG capsule    ZINCATE    7 capsule    Take 1 capsule (220 mg) by mouth daily    Takes dietary supplements       * Notice:  This list has 2 medication(s) that are the same as other medications prescribed for you. Read the directions carefully, and ask your doctor or other care provider to review them with you.

## 2018-02-15 NOTE — LETTER
2/15/2018       RE: Boom Kahn  [address] [place] [address]     Dear Colleague,    Thank you for referring your patient, Boom Kahn, to the [facility] at [facility]. Please see a copy of my visit note below.    VASCULAR SURGERY CLINIC ESTABLISHED PATIENT NOTE    HPI:    Boom Kahn is a 67 year old male with severe  PAD, bilateral lower extremity wounds and critical limb ischemia who is status post right femoral endarterectomy with in-situ GSV femoral to posterior tibial artery bypass on 1/15/2018 with Dr. Ag.   He returns to clinic today for right leg wound check and post-op visit.     SUBJECTIVE:  Patient reports resolution of bilateral leg pain.  Denies any recent fever or chills.  Reports reduced leg swelling.  Needs to go back to [place] in one month to get license renewed and taxes completed.     OBJECTIVE:  Vital signs:  /58 (BP Location: Right arm)  Pulse 82  Resp 16  SpO2 96%      Prior to Admission medications    Medication Sig Start Date End Date Taking? Authorizing Provider   diclofenac (VOLTAREN) 1 % GEL topical gel Place 4 g onto the skin 4 times daily 2/10/18   Modesta Ruiz APRN CNP   order for DME Equipment being ordered: Walker Wheels () and Walker ()  Treatment Diagnosis: impaired mobility 2/10/18   Modesta Ruiz APRN CNP   oxyCODONE IR (ROXICODONE) 10 MG tablet Take 1-1.5 tablets (10-15 mg) by mouth every 3 hours as needed for moderate to severe pain 2/10/18   Modesta Ruiz APRN CNP   acetic acid 0.25 % irrigation Irrigate with as directed 2 times daily 2/10/18   Modesta Ruiz APRN CNP   fentaNYL (DURAGESIC) 25 mcg/hr 72 hr patch Place 1 patch onto the skin every 72 hours for 14 days 2/3/18 2/17/18  Mariluz Pagan MD   isosorbide mononitrate (IMDUR) 60 MG 24 hr tablet Take 1 tablet (60 mg) by mouth daily 2/1/18   Mariluz Pagan MD   gabapentin (NEURONTIN) 300 MG  capsule Take 1 capsule (300 mg) by mouth 2 times daily 1/31/18   Mariluz Pagan MD   gabapentin (NEURONTIN) 600 MG tablet Take 1 tablet (600 mg) by mouth At Bedtime 1/31/18   Mariluz Pagan MD   escitalopram (LEXAPRO) 20 MG tablet Take 1 tablet (20 mg) by mouth daily 2/1/18   Mariluz Pagan MD   ezetimibe (ZETIA) 10 MG tablet Take 1 tablet (10 mg) by mouth daily 1/31/18   Mariulz Pagan MD   rosuvastatin (CRESTOR) 20 MG tablet Take 1 tablet (20 mg) by mouth daily 1/31/18   Mariluz Pagan MD   enalapril (VASOTEC) 2.5 MG tablet Take 1 tablet (2.5 mg) by mouth 2 times daily 1/31/18   Mariluz Pagan MD   carvedilol (COREG) 6.25 MG tablet Take 1 tablet (6.25 mg) by mouth 2 times daily (with meals) 1/31/18   Mariluz Pagan MD   predniSONE (DELTASONE) 10 MG tablet Take 1 tablet (10 mg) by mouth daily 2/1/18   Mariluz Pagan MD   furosemide (LASIX) 20 MG tablet Take 1 tablet (20 mg) by mouth 2 times daily (with meals) 1/31/18   Mariluz Pagan MD   folic acid (FOLVITE) 1 MG tablet Take 1 tablet (1 mg) by mouth daily 2/1/18   Mariluz Pagan MD   docusate sodium (COLACE) 100 MG capsule Take 1 capsule (100 mg) by mouth daily 2/1/18   Mariluz Pagan MD   polyethylene glycol (MIRALAX/GLYCOLAX) Packet Take 17 g by mouth daily 2/1/18   Mariluz Pagan MD   zinc sulfate (ZINCATE) 220 (50 ZN) MG capsule Take 1 capsule (220 mg) by mouth daily 2/1/18   Mariluz Pagan MD   clopidogrel (PLAVIX) 75 MG tablet Take 1 tablet (75 mg) by mouth daily 2/1/18   Mariluz Pagan MD   multivitamin, therapeutic with minerals (THERA-VIT-M) TABS tablet Take 1 tablet by mouth daily 2/1/18   Mariluz Pagan MD   ascorbic acid 500 MG TABS Take 1 tablet (500 mg) by mouth daily 2/1/18   Mariluz Pagan MD   vitamin A 52966 UNIT capsule Take 2 capsules (20,000 Units) by mouth daily 2/1/18   Mariluz Pagan MD   ASPIRIN EC PO Take 81 mg by mouth daily    Reported, Patient        PHYSICAL EXAM:  NEURO/PSYCH: The patient is alert and oriented.  Appropriate.  Moves all extremities.   SKIN:  Warm and dry.  PULMONARY: non-labored breathing  EXTREMITIES: palpable right DP, right shin wound with some granulation tissue, no signs of infection.     RAVI RESULTS:  Right leg 1.04, previously 0.97 on 1/18/2018  Left leg 0.65, previously 0.62 on 1/18/2018        ASSESSMENT:  Boom Kahn is a 67 year old male with severe  PAD, bilateral lower extremity wounds and critical limb ischemia who is status post right femoral endarterectomy with in-situ GSV femoral to posterior tibial artery bypass on 1/15/2018 with Dr. Ag.    Patient Active Problem List   Diagnosis     Ischemic cardiomyopathy     CHF (congestive heart failure) (H)     PVD (peripheral vascular disease) (H)     COPD (chronic obstructive pulmonary disease) (H)     Anxiety     Atherosclerosis of native arteries of extremities with intermittent claudication, bilateral legs (H)     Automatic implantable cardioverter-defibrillator in situ     Depression     Cardiomyopathy (H)     Hyperlipidemia     HTN (hypertension)     Lung mass     Dyspnea on exertion     Malignant neoplasm of lung (H)     MI (myocardial infarction)     Critical lower limb ischemia     Atherosclerotic PVD with ulceration (H)     S/P vascular surgery     Back pain       PLAN:  - continue to monitor ASA, Plavix and Crestor   - patient needs to establish care with primary care physician- will help get arranged  - continue BID dressing changes to right shin wound with Acetic Acid  - Dr. Ag gave patient Oxycodone RX #25, further narcotic management per primary MD  - follow up with Dr. Ag in 2 weeks for wound assessment     Shannan COLMENARES, CNS  Division of Vascular Surgery  [facility]  Pager [phone]    I have seen and assessed this patient with the above provider and agree with her above documentation, impression and plan. Very complicated  psychosocial situation.  I am not sure that this is solvable.  I disuaded him from having his physician in [place] mail him narcotics - I think this is a very very bad idea.  I spoke with a family MD who I recruited to try to help with his addition.  I prescribed him a very small number of pills to bridge him to that visit.  Nutrition seems to be going better, although the wound has not shrunk.  Left hip pain spontaneously resolved.  Alcoholism under control.  Follow up in two weeks to gauge wound as well as how the dependence is going.    I spent>50% of this 25 min visit in counseling.    Again, thank you for allowing me to participate in the care of your patient.      Sincerely,    Lexis Ag MD

## 2018-02-15 NOTE — PROGRESS NOTES
VASCULAR SURGERY CLINIC ESTABLISHED PATIENT NOTE    HPI:    Boom Kahn is a 67 year old male with severe  PAD, bilateral lower extremity wounds and critical limb ischemia who is status post right femoral endarterectomy with in-situ GSV femoral to posterior tibial artery bypass on 1/15/2018 with Dr. Ag.   He returns to clinic today for right leg wound check and post-op visit.     SUBJECTIVE:  Patient reports resolution of bilateral leg pain.  Denies any recent fever or chills.  Reports reduced leg swelling.  Needs to go back to [place] in one month to get license renewed and taxes completed.     OBJECTIVE:  Vital signs:  /58 (BP Location: Right arm)  Pulse 82  Resp 16  SpO2 96%      Prior to Admission medications    Medication Sig Start Date End Date Taking? Authorizing Provider   diclofenac (VOLTAREN) 1 % GEL topical gel Place 4 g onto the skin 4 times daily 2/10/18   Modesta Ruiz APRN CNP   order for DME Equipment being ordered: Walker Wheels () and Walker ()  Treatment Diagnosis: impaired mobility 2/10/18   Modesta Ruiz APRN CNP   oxyCODONE IR (ROXICODONE) 10 MG tablet Take 1-1.5 tablets (10-15 mg) by mouth every 3 hours as needed for moderate to severe pain 2/10/18   Modesta Ruiz APRN CNP   acetic acid 0.25 % irrigation Irrigate with as directed 2 times daily 2/10/18   Modesta Ruiz APRN CNP   fentaNYL (DURAGESIC) 25 mcg/hr 72 hr patch Place 1 patch onto the skin every 72 hours for 14 days 2/3/18 2/17/18  Mariluz Pagan MD   isosorbide mononitrate (IMDUR) 60 MG 24 hr tablet Take 1 tablet (60 mg) by mouth daily 2/1/18   Mariluz Pagan MD   gabapentin (NEURONTIN) 300 MG capsule Take 1 capsule (300 mg) by mouth 2 times daily 1/31/18   Mariluz Pagan MD   gabapentin (NEURONTIN) 600 MG tablet Take 1 tablet (600 mg) by mouth At Bedtime 1/31/18   Mariluz Pagan MD   escitalopram (LEXAPRO) 20 MG tablet Take 1 tablet (20 mg) by mouth daily  2/1/18   Mariluz Pagan MD   ezetimibe (ZETIA) 10 MG tablet Take 1 tablet (10 mg) by mouth daily 1/31/18   Mariluz Pagan MD   rosuvastatin (CRESTOR) 20 MG tablet Take 1 tablet (20 mg) by mouth daily 1/31/18   Mariluz Pagan MD   enalapril (VASOTEC) 2.5 MG tablet Take 1 tablet (2.5 mg) by mouth 2 times daily 1/31/18   Mariluz Pagan MD   carvedilol (COREG) 6.25 MG tablet Take 1 tablet (6.25 mg) by mouth 2 times daily (with meals) 1/31/18   Mariluz Pagan MD   predniSONE (DELTASONE) 10 MG tablet Take 1 tablet (10 mg) by mouth daily 2/1/18   Mariluz Pagan MD   furosemide (LASIX) 20 MG tablet Take 1 tablet (20 mg) by mouth 2 times daily (with meals) 1/31/18   Mariluz Pagan MD   folic acid (FOLVITE) 1 MG tablet Take 1 tablet (1 mg) by mouth daily 2/1/18   Mariluz Pagan MD   docusate sodium (COLACE) 100 MG capsule Take 1 capsule (100 mg) by mouth daily 2/1/18   Mariluz Pagan MD   polyethylene glycol (MIRALAX/GLYCOLAX) Packet Take 17 g by mouth daily 2/1/18   Mariluz Pagan MD   zinc sulfate (ZINCATE) 220 (50 ZN) MG capsule Take 1 capsule (220 mg) by mouth daily 2/1/18   Mariluz Pagan MD   clopidogrel (PLAVIX) 75 MG tablet Take 1 tablet (75 mg) by mouth daily 2/1/18   Mariluz Pagan MD   multivitamin, therapeutic with minerals (THERA-VIT-M) TABS tablet Take 1 tablet by mouth daily 2/1/18   Mariluz Pagan MD   ascorbic acid 500 MG TABS Take 1 tablet (500 mg) by mouth daily 2/1/18   Mariluz Pagan MD   vitamin A 91283 UNIT capsule Take 2 capsules (20,000 Units) by mouth daily 2/1/18   Mariluz Pagan MD   ASPIRIN EC PO Take 81 mg by mouth daily    Reported, Patient       PHYSICAL EXAM:  NEURO/PSYCH: The patient is alert and oriented.  Appropriate.  Moves all extremities.   SKIN:  Warm and dry.  PULMONARY: non-labored breathing  EXTREMITIES: palpable right DP, right shin wound with some granulation tissue, no signs of infection.     RAVI  RESULTS:  Right leg 1.04, previously 0.97 on 1/18/2018  Left leg 0.65, previously 0.62 on 1/18/2018        ASSESSMENT:  Boom Kahn is a 67 year old male with severe  PAD, bilateral lower extremity wounds and critical limb ischemia who is status post right femoral endarterectomy with in-situ GSV femoral to posterior tibial artery bypass on 1/15/2018 with Dr. Ag.    Patient Active Problem List   Diagnosis     Ischemic cardiomyopathy     CHF (congestive heart failure) (H)     PVD (peripheral vascular disease) (H)     COPD (chronic obstructive pulmonary disease) (H)     Anxiety     Atherosclerosis of native arteries of extremities with intermittent claudication, bilateral legs (H)     Automatic implantable cardioverter-defibrillator in situ     Depression     Cardiomyopathy (H)     Hyperlipidemia     HTN (hypertension)     Lung mass     Dyspnea on exertion     Malignant neoplasm of lung (H)     MI (myocardial infarction)     Critical lower limb ischemia     Atherosclerotic PVD with ulceration (H)     S/P vascular surgery     Back pain       PLAN:  - continue to monitor ASA, Plavix and Crestor   - patient needs to establish care with primary care physician- will help get arranged  - continue BID dressing changes to right shin wound with Acetic Acid  - Dr. Ag gave patient Oxycodone RX #25, further narcotic management per primary MD  - follow up with Dr. Ag in 2 weeks for wound assessment     Shannan COLMENARES, CNS  Division of Vascular Surgery  [facility]  Pager [phone]

## 2018-02-15 NOTE — PATIENT INSTRUCTIONS
Continue BID right shin dressing changes with Acetic Acid     Follow up with Dr. Ag in 2 weeks    With questions, concerns, or to request an appointment, please call either:    Gretel Acosta, Care Coordinator RN, Vascular Surgery  [phone]    Vascular Call Center  [phone]    To contact someone after 5 pm, on a weekend, or on a Holiday, please call:  [facility]  [phone], option 4 to have a member of the Vascular Surgery Service paged.

## 2018-02-20 ENCOUNTER — DOCUMENTATION ONLY (OUTPATIENT)
Dept: CARE COORDINATION | Facility: CLINIC | Age: 68
End: 2018-02-20

## 2018-02-20 NOTE — PROGRESS NOTES
Dear Dr. Lexis Ag  Medicare Home Health regulations requires [facility] to notify the Physician when the plan for visits has been altered.  We have provided fewer visits than ordered.  We are notifying you of a Missed Visit.  Boom Kahn; MRN [id number]  Missed Visit  Is SN  Dates of missed services 2/21/18  Reason: Patient cancelled   Sincerely [facility]  Maeve Baumann  [phone]

## 2018-02-20 NOTE — PROGRESS NOTES
I have seen and assessed this patient with the above provider and agree with her above documentation, impression and plan. Very complicated psychosocial situation.  I am not sure that this is solvable.  I disuaded him from having his physician in [place] mail him narcotics - I think this is a very very bad idea.  I spoke with a family MD who I recruited to try to help with his addition.  I prescribed him a very small number of pills to bridge him to that visit.  Nutrition seems to be going better, although the wound has not shrunk.  Left hip pain spontaneously resolved.  Alcoholism under control.  Follow up in two weeks to gauge wound as well as how the dependence is going.    I spent>50% of this 25 min visit in counseling.

## 2018-02-22 ENCOUNTER — OFFICE VISIT (OUTPATIENT)
Dept: INTERNAL MEDICINE | Facility: CLINIC | Age: 68
End: 2018-02-22
Payer: COMMERCIAL

## 2018-02-22 VITALS — HEART RATE: 85 BPM | RESPIRATION RATE: 16 BRPM | DIASTOLIC BLOOD PRESSURE: 64 MMHG | SYSTOLIC BLOOD PRESSURE: 93 MMHG

## 2018-02-22 DIAGNOSIS — F11.90 CHRONIC NARCOTIC USE: ICD-10-CM

## 2018-02-22 DIAGNOSIS — Z76.89 ESTABLISHING CARE WITH NEW DOCTOR, ENCOUNTER FOR: Primary | ICD-10-CM

## 2018-02-22 DIAGNOSIS — I50.22 CHRONIC SYSTOLIC CONGESTIVE HEART FAILURE (H): ICD-10-CM

## 2018-02-22 DIAGNOSIS — R53.81 PHYSICAL DECONDITIONING: ICD-10-CM

## 2018-02-22 DIAGNOSIS — I25.5 ISCHEMIC CARDIOMYOPATHY: ICD-10-CM

## 2018-02-22 DIAGNOSIS — Z71.6 ENCOUNTER FOR TOBACCO USE CESSATION COUNSELING: ICD-10-CM

## 2018-02-22 DIAGNOSIS — Z98.890 S/P VASCULAR SURGERY: ICD-10-CM

## 2018-02-22 DIAGNOSIS — I95.1 ORTHOSTATIC HYPOTENSION: ICD-10-CM

## 2018-02-22 RX ORDER — ISOSORBIDE MONONITRATE 30 MG/1
30 TABLET, EXTENDED RELEASE ORAL DAILY
Qty: 90 TABLET | Refills: 3 | Status: SHIPPED | OUTPATIENT
Start: 2018-02-22

## 2018-02-22 RX ORDER — CARVEDILOL 6.25 MG/1
6.25 TABLET ORAL 2 TIMES DAILY WITH MEALS
Qty: 60 TABLET | Refills: 3 | Status: SHIPPED | OUTPATIENT
Start: 2018-02-22 | End: 2018-07-31

## 2018-02-22 RX ORDER — ENALAPRIL MALEATE 2.5 MG/1
2.5 TABLET ORAL 2 TIMES DAILY
Qty: 60 TABLET | Refills: 11 | Status: SHIPPED | OUTPATIENT
Start: 2018-02-22

## 2018-02-22 RX ORDER — FUROSEMIDE 20 MG
20 TABLET ORAL 2 TIMES DAILY WITH MEALS
Qty: 60 TABLET | Refills: 3 | Status: SHIPPED | OUTPATIENT
Start: 2018-02-22

## 2018-02-22 RX ORDER — ROSUVASTATIN CALCIUM 20 MG/1
20 TABLET, COATED ORAL DAILY
Qty: 30 TABLET | Refills: 11 | Status: SHIPPED | OUTPATIENT
Start: 2018-02-22

## 2018-02-22 ASSESSMENT — ACTIVITIES OF DAILY LIVING (ADL)
IN_THE_PAST_7_DAYS,_DID_YOU_NEED_HELP_FROM_OTHERS_TO_PERFORM_EVERYDAY_ACTIVITIES_SUCH_AS_EATING,_GETTING_DRESSED,_GROOMING,_BATHING,_WALKING,_OR_USING_THE_TOILET: Y
IN_THE_PAST_7_DAYS,_DID_YOU_NEED_HELP_FROM_OTHERS_TO_TAKE_CARE_OF_THINGS_SUCH_AS_LAUNDRY_AND_HOUSEKEEPING,_BANKING,_SHOPPING,_USING_THE_TELEPHONE,_FOOD_PREPARATION,_TRANSPORTATION,_OR_TAKING_YOUR_OWN_MEDICATIONS?: Y

## 2018-02-22 ASSESSMENT — ENCOUNTER SYMPTOMS
FATIGUE: 1
CHILLS: 0
LIGHT-HEADEDNESS: 1
DECREASED APPETITE: 0
STIFFNESS: 0
SLEEP DISTURBANCES DUE TO BREATHING: 0
PALPITATIONS: 0
HYPERTENSION: 0
POLYDIPSIA: 0
NECK PAIN: 1
PARALYSIS: 0
NIGHT SWEATS: 1
ALTERED TEMPERATURE REGULATION: 0
ORTHOPNEA: 0
TREMORS: 0
MYALGIAS: 0
HYPOTENSION: 0
HALLUCINATIONS: 0
FEVER: 0
MEMORY LOSS: 0
JOINT SWELLING: 0
DISTURBANCES IN COORDINATION: 0
SEIZURES: 0
WEIGHT GAIN: 0
MUSCLE CRAMPS: 0
LOSS OF CONSCIOUSNESS: 0
ARTHRALGIAS: 0
SYNCOPE: 0
EXERCISE INTOLERANCE: 1
NUMBNESS: 0
BACK PAIN: 1
POLYPHAGIA: 0
WEIGHT LOSS: 0
LEG PAIN: 1
WEAKNESS: 0
MUSCLE WEAKNESS: 0
SPEECH CHANGE: 0
DIZZINESS: 0
HEADACHES: 0
TINGLING: 0
INCREASED ENERGY: 0

## 2018-02-22 ASSESSMENT — PAIN SCALES - GENERAL: PAINLEVEL: MILD PAIN (3)

## 2018-02-22 NOTE — NURSING NOTE
Chief Complaint   Patient presents with     Establish Care     pt is here to establish care with new PCP       Romina rGoss CMA at 8:56 AM on 2/22/2018

## 2018-02-22 NOTE — PROGRESS NOTES
PRIMARY CARE CENTER         HPI:       Boom Kahn is a 68 year old male with past medical history of ischemic cardiomyopathy systolic congestive heart failure (last EF25%) status post ICD placement, peripheral vascular disease status post right femoral endarterectomy with fem-posterior tibial bypass January 2018, hypertension, lung cancer status post radiation treatment reportedly in remission, who presents for the following to establish care and for medication refills.  Patient presents with: Establish Care (pt is here to establish care with new PCP)    Since his last visit he denies chest pain and states shortness of breath is at baseline.  He does still endorse a chronic cough in the a.m. and states his fatigue has been stable.  He does note his fatigue has not improved since his last hospital discharge.  He states he has not mainly due to not sleeping well at night due to chronic leg pain.  He endorses non-drenching night sweats intermittently.  He states he is not following a fluid or a sodium restriction on his dietary habits.     He states he has been weaning his oxycodone per his primary physician in [place]'s plan.  This physician, Dr. Arrington, has been prescribing his narcotics and he has been slowly decreasing his doses.  He is not interested in narcotic refills today and states he has enough for now, and would prefer to continue with Dr. Arrington for his narcotic refills.  He is currently taking 1-2 tablets of oxycodone every 6 hours.  He does note that if he tries to wean more than that he does have withdrawal symptoms including sweats, anxiety.      He does note a fall about a week ago which he states was due to tripping on a rug or loose floorboard in the house he is renting.  He denies loss of consciousness during this period and he denies head strike.  He does note that he has been more lightheaded in the past few months, and his blood pressures are lower than usual.  He states that he is  lightheaded after sitting or standing after doing his piriformis exercises which are done lying down.  He denies other medication changes, other falls, or head strikes.  He is interested in getting a PT referral today to continue to work on his strength as he believes he is not as strong as he should be.  He states that he has stopped taking his prednisone several days ago, because he did not like the side effects.  His [place] who I believe [place] is here with him at his appointment today.      He does state that he needs several of his cardiac medications refilled today, but again denies need for narcotic refills today.  He is interested in strategies to help him quit smoking today, and was agreeable to a quit plan for referral.  He is not interested in assistance with reducing his drinking today after further discussion.    Problem, Medication and Allergy Lists were   reviewed and are current.     Patient Active Problem List    Diagnosis Date Noted     Back pain 02/07/2018     Priority: Medium     S/P vascular surgery 01/23/2018     Priority: Medium     Atherosclerotic PVD with ulceration (H) 01/15/2018     Priority: Medium     Critical lower limb ischemia 01/14/2018     Priority: Medium     Ischemic cardiomyopathy 01/02/2018     Priority: Medium     CHF (congestive heart failure) (H) 01/02/2018     Priority: Medium     PVD (peripheral vascular disease) (H) 01/02/2018     Priority: Medium     COPD (chronic obstructive pulmonary disease) (H) 01/02/2018     Priority: Medium     Anxiety 01/02/2018     Priority: Medium     Atherosclerosis of native arteries of extremities with intermittent claudication, bilateral legs (H) 01/02/2018     Priority: Medium     Automatic implantable cardioverter-defibrillator in situ 01/02/2018     Priority: Medium     2009 in [place] at [facility] 01/02/2018     Priority: Medium     Cardiomyopathy (H) 01/02/2018     Priority: Medium     Hyperlipidemia  01/02/2018     Priority: Medium     HTN (hypertension) 01/02/2018     Priority: Medium     Lung mass 01/02/2018     Priority: Medium     Dyspnea on exertion 01/02/2018     Priority: Medium     Malignant neoplasm of lung (H) 01/02/2018     Priority: Medium     MI (myocardial infarction) 01/02/2018     Priority: Medium   ,     Current Outpatient Prescriptions   Medication Sig Dispense Refill     isosorbide mononitrate (IMDUR) 30 MG 24 hr tablet Take 1 tablet (30 mg) by mouth daily 90 tablet 3     carvedilol (COREG) 6.25 MG tablet Take 1 tablet (6.25 mg) by mouth 2 times daily (with meals) 60 tablet 3     rosuvastatin (CRESTOR) 20 MG tablet Take 1 tablet (20 mg) by mouth daily 30 tablet 11     furosemide (LASIX) 20 MG tablet Take 1 tablet (20 mg) by mouth 2 times daily (with meals) 60 tablet 3     enalapril (VASOTEC) 2.5 MG tablet Take 1 tablet (2.5 mg) by mouth 2 times daily 60 tablet 11     oxyCODONE IR (ROXICODONE) 10 MG tablet Take 1 tablet (10 mg) by mouth every 6 hours as needed for moderate to severe pain 25 tablet 0     diclofenac (VOLTAREN) 1 % GEL topical gel Place 4 g onto the skin 4 times daily 1 Tube 0     order for DME Equipment being ordered: Walker Wheels () and Walker ()  Treatment Diagnosis: impaired mobility 1 each 0     acetic acid 0.25 % irrigation Irrigate with as directed 2 times daily 10 mL 0     gabapentin (NEURONTIN) 300 MG capsule Take 1 capsule (300 mg) by mouth 2 times daily 60 capsule 0     gabapentin (NEURONTIN) 600 MG tablet Take 1 tablet (600 mg) by mouth At Bedtime 30 tablet 0     escitalopram (LEXAPRO) 20 MG tablet Take 1 tablet (20 mg) by mouth daily 15 tablet 0     ezetimibe (ZETIA) 10 MG tablet Take 1 tablet (10 mg) by mouth daily 30 tablet 0     predniSONE (DELTASONE) 10 MG tablet Take 1 tablet (10 mg) by mouth daily 30 tablet 0     folic acid (FOLVITE) 1 MG tablet Take 1 tablet (1 mg) by mouth daily 30 tablet 0     docusate sodium (COLACE) 100 MG capsule Take 1 capsule  (100 mg) by mouth daily 60 capsule 0     polyethylene glycol (MIRALAX/GLYCOLAX) Packet Take 17 g by mouth daily 7 packet 0     zinc sulfate (ZINCATE) 220 (50 ZN) MG capsule Take 1 capsule (220 mg) by mouth daily 7 capsule 0     clopidogrel (PLAVIX) 75 MG tablet Take 1 tablet (75 mg) by mouth daily 30 tablet 0     multivitamin, therapeutic with minerals (THERA-VIT-M) TABS tablet Take 1 tablet by mouth daily 30 each 0     ascorbic acid 500 MG TABS Take 1 tablet (500 mg) by mouth daily 7 tablet 0     vitamin A 67950 UNIT capsule Take 2 capsules (20,000 Units) by mouth daily 7 capsule 0     ASPIRIN EC PO Take 81 mg by mouth daily     ,     Allergies   Allergen Reactions     Betadine [Povidone Iodine] Blisters     Pt reports erythema, increased pain, and blistering when using betadine on R big toe in past     Collagenase Clostridium Histolyticum      Flagyl [Metronidazole] Other (See Comments)     Flu symptoms  Flu like symptoms     Iodine Unknown     Santyl [Collagenase]      Patient is   a new patient to this clinic and so  I reviewed/updated the Past Medical History, the Family History and the Social History. ,   Past Medical History:   Diagnosis Date     Anxiety      CAD (coronary artery disease)     s/p 3v CABG     CHF (congestive heart failure) (H)     EF 10-15%     Chronic pain      COPD (chronic obstructive pulmonary disease) (H)      Depression      Hyperlipidemia      Hypertension      Lung cancer (H)     s/p radiation therapy     PAD (peripheral artery disease) (H)     s/p lower extremity stents     Tobacco abuse    ,   PSH: R sided femoral endarterectomy with fem/posterior tibial bypass 1/2018, 3 vessel CABG unknown date     Social History     Social History     Marital status: Single     Spouse name: N/A     Number of children: N/A     Years of education: N/A     Social History Main Topics     Smoking status: Current Every Day Smoker     Packs/day: 1.00     Types: Cigarettes     Smokeless tobacco: Never Used       Comment: 0.5-1 ppd.     Alcohol use Yes      Comment: 50 beers/wk     Drug use: No     Sexual activity: Not Asked     Other Topics Concern     None     Social History Narrative            Review of Systems:   ROS  I have personally reviewed and updated the complete ROS on the day of the visit.           Physical Exam:   BP 93/64  Pulse 85  Resp 16  There is no height or weight on file to calculate BMI.  Vitals were reviewed       GENERAL APPEARANCE: Chronically ill appearing male, NAD, well groomed     EYES: EOMI,  PERRL, anicteric      HENT:  nose and mouth without ulcers or lesions, MMM     NECK: no adenopathy, no asymmetry, masses, or scars      RESP: Poor air movement bilaterally, normal WOB on RA, no rhonchi or wheezing.       CV: regular rates and rhythm, normal S1 S2, no S3 or S4 and no murmur, click or rub     ABDOMEN:  soft, nontender, no masses and bowel sounds normal, liver edge 2cm below sternal border     MS: reduced bulk, eroded lesions bilateral anterior shins with granulation tissue and no purulence at borders.  Dressings clean and dry on arrival. No LE edema      SKIN: no suspicious lesions or rashes, except as above.      NEURO: CN II-XII intact, normal speech and mentation, antalgic gait walking with cane, moving all 4 extremities symmetrically      PSYCH: mentation appears normal. and affect normal/bright     LYMPHATICS: No cervical adenopathy      Results:      Results from the last 24 hoursNo results found for this or any previous visit (from the past 24 hour(s)).  Assessment and Plan     Boom was seen today for establish care.    Diagnoses and all orders for this visit:    Establishing care with new doctor, encounter for.  Pt previously followed in [place] and will be back and forth between [place] at unclear times.  Will remain in [place] for the time being he stated during today's encounter.  Provided with ADDY to send records to [facility].  No further HM today pending receipt of prior records.       Orthostatic hypotension  Chronic systolic congestive heart failure (H).  Notes lightheadedness/dizziness with standing from a lying position.  BP is lower than usual and is on multiple appropriate medications for systolic CHF.  HR is in 80s today, so will not decrease carvedilol, but will decrease IMDUR to 30mg from 60mg qday as pt denies angina.  Pt agreeable to notify clinic if he does develop angina or continues to be lightheaded after reduction in imdur dose.    -     isosorbide mononitrate (IMDUR) 30 MG 24 hr tablet; Take 1 tablet (30 mg) by mouth daily    Ischemic cardiomyopathy.  HIstory of chornic systolic HF, last EF 25% within past year.  On appropriate therapy with beta blocker, ACEi, statin, ASA 81mg and diuretic.  Needs refills of below medications.  Medications below refilled without dose changes.    -     carvedilol (COREG) 6.25 MG tablet; Take 1 tablet (6.25 mg) by mouth 2 times daily (with meals)  -     rosuvastatin (CRESTOR) 20 MG tablet; Take 1 tablet (20 mg) by mouth daily  -     enalapril (VASOTEC) 2.5 MG tablet; Take 1 tablet (2.5 mg) by mouth 2 times daily  -     PHYSICAL THERAPY REFERRAL    S/P vascular surgery.  Refill of prior lasix.  Renal function appears to be stable, lungs without crackles in bases, no LE edema.   -     furosemide (LASIX) 20 MG tablet; Take 1 tablet (20 mg) by mouth 2 times daily (with meals)    Physical deconditioning.  Remains diffusely weak.  Requesting PT referral today for deconditioning.    -     PHYSICAL THERAPY REFERRAL    Encounter for tobacco use cessation counseling.  Still smoking 1PPD, interested in QuitPlan.  Information for Quitplan provided in discharge paperwork.      Chronic narcotic use.  Complicated history with chronic low back pain and bilateral LE wound pain.  Previously seen by pain team as inpatient with 10mg oxycodone Q3H while inpatient in addition to fentanyl patch recommended.  Currently using fentanyl patch and 5-10 of oxycodone Q6H.     Queried with no additional prescribers other than N and [place] PCP (note without overlap of Rx, so appears to be appropriate).  Pt stated several times during encounter he was working with [place] PCP regarding taper plan and did not wish for refills or to establish opiate agreement with the PCC.  He has tapered and appear to be motivated to get off nartcotics as he does not like the way they make him feel.      Options for treatment and follow-up care were reviewed with the patient. Boom Kahn engaged in the decision making process and verbalized understanding of the options discussed and agreed with the final plan.    Giovanni Rodrigez MD  PGY-3, IM  Feb 22, 2018    Pt was seen and plan of care discussed with Dr. Brown.         Answers for HPI/ROS submitted by the patient on 2/22/2018   General Symptoms: Yes  Skin Symptoms: No  HENT Symptoms: No  EYE SYMPTOMS: No  HEART SYMPTOMS: Yes  LUNG SYMPTOMS: No  INTESTINAL SYMPTOMS: No  URINARY SYMPTOMS: No  REPRODUCTIVE SYMPTOMS: No  SKELETAL SYMPTOMS: Yes  BLOOD SYMPTOMS: No  NERVOUS SYSTEM SYMPTOMS: Yes  MENTAL HEALTH SYMPTOMS: No  Fever: No  Loss of appetite: No  Weight loss: No  Weight gain: No  Fatigue: Yes  Night sweats: Yes  Chills: No  Increased stress: No  Excessive hunger: No  Excessive thirst: No  Feeling hot or cold when others believe the temperature is normal: No  Loss of height: No  Post-operative complications: No  Surgical site pain: No  Hallucinations: No  Change in or Loss of Energy: No  Hyperactivity: No  Confusion: No  Chest pain or pressure: No  Fast or irregular heartbeat: No  Pain in legs with walking: Yes  Trouble breathing while lying down: No  Fingers or toes appear blue: No  High blood pressure: No  Low blood pressure: No  Fainting: No  Murmurs: No  Pacemaker: Yes  Varicose veins: No  Edema or swelling: Yes  Wake up at night with shortness of breath: No  Light-headedness: Yes  Exercise intolerance: Yes  Back pain: Yes  Muscle  aches: No  Neck pain: Yes  Swollen joints: No  Joint pain: No  Bone pain: No  Muscle cramps: No  Muscle weakness: No  Joint stiffness: No  Bone fracture: No  Trouble with coordination: No  Dizziness or trouble with balance: No  Fainting or black-out spells: No  Memory loss: No  Headache: No  Seizures: No  Speech problems: No  Tingling: No  Tremor: No  Weakness: No  Difficulty walking: Yes  Paralysis: No  Numbness: No      Pt was seen and examined with Dr. Rodrigez; I agree with the A/P as documented above    Jimena Brown MD

## 2018-02-22 NOTE — MR AVS SNAPSHOT
After Visit Summary   2/22/2018    Boom Kahn    MRN: [id number]           Patient Information     Date Of Birth          1950        Visit Information        Provider Department      2/22/2018 8:55 AM Giovanni Rodrigez MD [facility]        Today's Diagnoses     Chronic systolic congestive heart failure (H)    -  1    Ischemic cardiomyopathy        S/P vascular surgery        Physical deconditioning          Care Instructions    Please decrease imdur to 30mg daily due to lightheadedness.  Let us know right away if you develop chest pain or angina if you have other problems on the new dose.      I have refilled several of your heart medications including elanapril, rosuvastatin, carvedilol, and lasix.      Please follow-up with physical therapy to get stronger.      Return to see me at the end of April if you are in town.  If problems arise when I do not have appointments, you can see another resident in clinic.      Primary Care Center Phone Number [phone]  Primary Care Center Medication Refill Request Information:  * Please contact your pharmacy regarding ANY request for medication refills.  ** [facility] Prescription Fax = [phone]  * Please allow 3 business days for routine medication refills.  * Please allow 5 business days for controlled substance medication refills.     Primary Care Center Test Result notification information:  *You will be notified with in 7-10 days of your appointment day regarding the results of your test.  If you are on MyChart you will be notified as soon as the provider has reviewed the results and signed off on them.      HOW TO QUIT SMOKING  Smoking is one of the hardest habits to break. About half of all those who have ever smoked have been able to quit, and most of those (about 70%) who still smoke want to quit. Here are some of the best ways to stop smoking.     KEEP TRYING:  It takes most smokers about 8 tries before they are  finally able to fully quit. So, the more often you try and fail, the better your chance of quitting the next time! So, don't give up!    GO COLD TURKEY:  Most ex-smokers quit cold turkey. Trying to cut back gradually doesn't seem to work as well, perhaps because it continues the smoking habit. Also, it is possible to fool yourself by inhaling more while smoking fewer cigarettes. This results in the same amount of nicotine in your body!    GET SUPPORT:  Support programs can make an important difference, especially for the heavy smoker. These groups offer lectures, methods to change your behavior and peer support. Call the free national Quitline for more information. 800-QUIT-NOW (395-308-9926). Low-cost or free programs are offered by many hospitals, local chapters of the American Lung Association (060-020-1607) and the American Cancer Society (610-774-4054). Support at home is important too. Non-smokers can help by offering praise and encouragement. If the smoker fails to quit, encourage them to try again!  OVER-THE-COUNTER MEDICINES:  For those who can't quit on their own, Nicotine Replacement Therapy (NRT) may make quitting much easier. Certain aids such as the nicotine patch, gum and lozenge are available without a prescription. However, it is best to use these under the guidance of your doctor. The skin patch provides a steady supply of nicotine to the body. Nicotine gum and lozenge gives temporary bursts of low levels of nicotine. Both methods take the edge off the craving for cigarettes. WARNING: If you feel symptoms of nicotine overdose, such as nausea, vomiting, dizziness, weakness, or fast heartbeat, stop using these and see your doctor.    PRESCRIPTION MEDICINES:  After evaluating your smoking patterns and prior attempts at quitting, your doctor may offer a prescription medicine such as bupropion (Zyban, Wellbutrin), varenicline (Chantix, Champix), a niocotine inhaler or nasal spray. Each has its unique  advantage and side effects which your doctor can review with you.    HEALTH BENEFITS OF QUITTING:  The benefits of quitting start right away and keep improving the longer you go without smokin minutes: blood pressure and pulse return to normal    8 hours: oxygen levels return to normal    2 days: ability to smell and taste begins to improve as damaged nerves start to regrow    2-3 weeks: circulation and lung function improves    1-9 months: decreased cough, congestion and shortness of breath; less tired    1 year: risk of heart attack decreases by half    5 years: risk of lung cancer decreases by half; risk of stroke becomes the same as a non-smoker  For information about how to quit smoking, visit the following links:    National Cancer [place] ,   Clearing the Air, Quit Smoking Today   - an online booklet. http://www.smokefree.gov/pubs/clearing_the_air.pdf    Smokefree.gov http://smokefree.gov/    QuitNet http://www.quitnet.com/    5982-4249 [place] [address]. All rights reserved. This information is not intended as a substitute for professional medical care. Always follow your healthcare professional's instructions.               Follow-ups after your visit        Additional Services     PHYSICAL THERAPY REFERRAL       *This therapy referral will be filtered to a centralized scheduling office at [facility] and the patient will receive a call to schedule an appointment at a [facility] location most convenient for them. *     [facility] provides Physical Therapy evaluation and treatment and many specialty services across the [place] [facility] system.  If requesting a specialty program, please choose from the list below.    If you have not heard from the scheduling office within 2 business days, please call [phone] for all locations, with the exception of [place], please call [phone] and [place], please call  "[phone]  Treatment: Evaluation & Treatment  Special Instructions/Modalities: Cardiac rehab   Special Programs: None    Please be aware that coverage of these services is subject to the terms and limitations of your health insurance plan.  Call member services at your health plan with any benefit or coverage questions.      **Note to Provider:  If you are referring outside of [place] for the therapy appointment, please list the name of the location in the \"special instructions\" above, print the referral and give to the patient to schedule the appointment.                  Your next 10 appointments already scheduled     Mar 01, 2018 12:15 PM CST   (Arrive by 12:00 PM)   Return Vascular Visit with Lexis Ag MD   [facility] ([facility])    [address]  [address] [facility] [address] [phone]              Who to contact     Please call your clinic at [phone] to:    Ask questions about your health    Make or cancel appointments    Discuss your medicines    Learn about your test results    Speak to your doctor            Additional Information About Your Visit        Datav[employer] Information     [employer] gives you secure access to your electronic health record. If you see a primary care provider, you can also send messages to your care team and make appointments. If you have questions, please call your primary care clinic.  If you do not have a primary care provider, please call [phone] and they will assist you.      [employer] is an electronic gateway that provides easy, online access to your medical records. With [employer], you can request a clinic appointment, read your test results, renew a prescription or communicate with your care team.     To access your existing account, please contact your [facility] or call [phone] for assistance.        Care EveryWhere ID     This is your Care EveryWhere ID. This could be used " by other organizations to access your [facility] medical records  [id number]        Your Vitals Were     Pulse Respirations                85 16           Blood Pressure from Last 3 Encounters:   02/22/18 93/64   02/15/18 104/58   02/10/18 119/72    Weight from Last 3 Encounters:   02/07/18 66.8 kg (147 lb 3.2 oz)   01/29/18 67.4 kg (148 lb 9.6 oz)   01/22/18 65.1 kg (143 lb 8 oz)              We Performed the Following     PHYSICAL THERAPY REFERRAL          Today's Medication Changes          These changes are accurate as of 2/22/18  9:57 AM.  If you have any questions, ask your nurse or doctor.               These medicines have changed or have updated prescriptions.        Dose/Directions    isosorbide mononitrate 30 MG 24 hr tablet   Commonly known as:  IMDUR   This may have changed:    - medication strength  - how much to take   Used for:  Chronic systolic congestive heart failure (H)   Changed by:  Giovanni Rodrigez MD        Dose:  30 mg   Take 1 tablet (30 mg) by mouth daily   Quantity:  90 tablet   Refills:  3            Where to get your medicines      These medications were sent to [id number] - [place], [place] - [address] [facility] [address]  [address], [place] [address]    Hours:  24-hours Phone:  [phone]     carvedilol 6.25 MG tablet    enalapril 2.5 MG tablet    furosemide 20 MG tablet    isosorbide mononitrate 30 MG 24 hr tablet    rosuvastatin 20 MG tablet                Primary Care Provider Office Phone # Fax #    Willian Arrington [phone]       [facility] [address]  [facility] [address]        Equal Access to Services     TANJA DORADO AH: Hadii aad ku hadasho Soomaali, waaxda luqadaha, qaybta kaalmada adeegyada, vijay zavala hayquentinn winston evans. So [facility] [phone].    ATENCIÓN: Si habla español, tiene a bhatia disposición servicios gratuitos de asistencia lingüística. Llame al  [phone].    We comply with applicable federal civil rights laws and [place] laws. We do not discriminate on the basis of race, color, national origin, age, disability, sex, sexual orientation, or gender identity.            Thank you!     Thank you for choosing [facility]  for your care. Our goal is always to provide you with excellent care. Hearing back from our patients is one way we can continue to improve our services. Please take a few minutes to complete the written survey that you may receive in the mail after your visit with us. Thank you!             Your Updated Medication List - Protect others around you: Learn how to safely use, store and throw away your medicines at www.disposemymeds.org.          This list is accurate as of 2/22/18  9:57 AM.  Always use your most recent med list.                   Brand Name Dispense Instructions for use Diagnosis    acetic acid 0.25 % irrigation     10 mL    Irrigate with as directed 2 times daily    S/P vascular surgery       ascorbic acid 500 MG Tabs     7 tablet    Take 1 tablet (500 mg) by mouth daily    Takes dietary supplements       ASPIRIN EC PO      Take 81 mg by mouth daily        carvedilol 6.25 MG tablet    COREG    60 tablet    Take 1 tablet (6.25 mg) by mouth 2 times daily (with meals)    Ischemic cardiomyopathy       clopidogrel 75 MG tablet    PLAVIX    30 tablet    Take 1 tablet (75 mg) by mouth daily    Ischemic cardiomyopathy       diclofenac 1 % Gel topical gel    VOLTAREN    1 Tube    Place 4 g onto the skin 4 times daily    Pain of left lower extremity       docusate sodium 100 MG capsule    COLACE    60 capsule    Take 1 capsule (100 mg) by mouth daily    Takes dietary supplements       enalapril 2.5 MG tablet    VASOTEC    60 tablet    Take 1 tablet (2.5 mg) by mouth 2 times daily    Ischemic cardiomyopathy       escitalopram 20 MG tablet    LEXAPRO    15 tablet    Take 1 tablet (20 mg) by mouth daily    S/P vascular  surgery       ezetimibe 10 MG tablet    ZETIA    30 tablet    Take 1 tablet (10 mg) by mouth daily    Ischemic cardiomyopathy       folic acid 1 MG tablet    FOLVITE    30 tablet    Take 1 tablet (1 mg) by mouth daily    Takes dietary supplements       furosemide 20 MG tablet    LASIX    60 tablet    Take 1 tablet (20 mg) by mouth 2 times daily (with meals)    S/P vascular surgery       * gabapentin 300 MG capsule    NEURONTIN    60 capsule    Take 1 capsule (300 mg) by mouth 2 times daily    S/P vascular surgery       * gabapentin 600 MG tablet    NEURONTIN    30 tablet    Take 1 tablet (600 mg) by mouth At Bedtime    PVD (peripheral vascular disease) (H)       isosorbide mononitrate 30 MG 24 hr tablet    IMDUR    90 tablet    Take 1 tablet (30 mg) by mouth daily    Chronic systolic congestive heart failure (H)       multivitamin, therapeutic with minerals Tabs tablet     30 each    Take 1 tablet by mouth daily    Takes dietary supplements       order for DME     1 each    Equipment being ordered: Walker Wheels () and Walker () Treatment Diagnosis: impaired mobility    Pain of left lower extremity       oxyCODONE IR 10 MG tablet    ROXICODONE    25 tablet    Take 1 tablet (10 mg) by mouth every 6 hours as needed for moderate to severe pain    Narcotic dependence (H)       polyethylene glycol Packet    MIRALAX/GLYCOLAX    7 packet    Take 17 g by mouth daily    Takes dietary supplements       predniSONE 10 MG tablet    DELTASONE    30 tablet    Take 1 tablet (10 mg) by mouth daily    S/P vascular surgery       rosuvastatin 20 MG tablet    CRESTOR    30 tablet    Take 1 tablet (20 mg) by mouth daily    Ischemic cardiomyopathy       vitamin A 03702 UNIT capsule     7 capsule    Take 2 capsules (20,000 Units) by mouth daily    Takes dietary supplements       zinc sulfate 220 (50 ZN) MG capsule    ZINCATE    7 capsule    Take 1 capsule (220 mg) by mouth daily    Takes dietary supplements       * Notice:  This  list has 2 medication(s) that are the same as other medications prescribed for you. Read the directions carefully, and ask your doctor or other care provider to review them with you.

## 2018-02-22 NOTE — PATIENT INSTRUCTIONS
Please decrease imdur to 30mg daily due to lightheadedness.  Let us know right away if you develop chest pain or angina if you have other problems on the new dose.      I have refilled several of your heart medications including elanapril, rosuvastatin, carvedilol, and lasix.      Please follow-up with physical therapy to get stronger.      Return to see me at the end of April if you are in town.  If problems arise when I do not have appointments, you can see another resident in clinic.      Primary Care Center Phone Number [phone]  Primary Care Center Medication Refill Request Information:  * Please contact your pharmacy regarding ANY request for medication refills.  ** [facility] Prescription Fax = [phone]  * Please allow 3 business days for routine medication refills.  * Please allow 5 business days for controlled substance medication refills.     Primary Care Center Test Result notification information:  *You will be notified with in 7-10 days of your appointment day regarding the results of your test.  If you are on MyChart you will be notified as soon as the provider has reviewed the results and signed off on them.      HOW TO QUIT SMOKING  Smoking is one of the hardest habits to break. About half of all those who have ever smoked have been able to quit, and most of those (about 70%) who still smoke want to quit. Here are some of the best ways to stop smoking.     KEEP TRYING:  It takes most smokers about 8 tries before they are finally able to fully quit. So, the more often you try and fail, the better your chance of quitting the next time! So, don't give up!    GO COLD TURKEY:  Most ex-smokers quit cold turkey. Trying to cut back gradually doesn't seem to work as well, perhaps because it continues the smoking habit. Also, it is possible to fool yourself by inhaling more while smoking fewer cigarettes. This results in the same amount of nicotine in your body!    GET SUPPORT:  Support programs can make an  important difference, especially for the heavy smoker. These groups offer lectures, methods to change your behavior and peer support. Call the free national Quitline for more information. 800-QUIT-NOW (096-678-3380). Low-cost or free programs are offered by many hospitals, local chapters of the American Lung Association (533-491-4974) and the American Cancer Society (521-205-9571). Support at home is important too. Non-smokers can help by offering praise and encouragement. If the smoker fails to quit, encourage them to try again!  OVER-THE-COUNTER MEDICINES:  For those who can't quit on their own, Nicotine Replacement Therapy (NRT) may make quitting much easier. Certain aids such as the nicotine patch, gum and lozenge are available without a prescription. However, it is best to use these under the guidance of your doctor. The skin patch provides a steady supply of nicotine to the body. Nicotine gum and lozenge gives temporary bursts of low levels of nicotine. Both methods take the edge off the craving for cigarettes. WARNING: If you feel symptoms of nicotine overdose, such as nausea, vomiting, dizziness, weakness, or fast heartbeat, stop using these and see your doctor.    PRESCRIPTION MEDICINES:  After evaluating your smoking patterns and prior attempts at quitting, your doctor may offer a prescription medicine such as bupropion (Zyban, Wellbutrin), varenicline (Chantix, Champix), a niocotine inhaler or nasal spray. Each has its unique advantage and side effects which your doctor can review with you.    HEALTH BENEFITS OF QUITTING:  The benefits of quitting start right away and keep improving the longer you go without smokin minutes: blood pressure and pulse return to normal    8 hours: oxygen levels return to normal    2 days: ability to smell and taste begins to improve as damaged nerves start to regrow    2-3 weeks: circulation and lung function improves    1-9 months: decreased cough, congestion and  shortness of breath; less tired    1 year: risk of heart attack decreases by half    5 years: risk of lung cancer decreases by half; risk of stroke becomes the same as a non-smoker  For information about how to quit smoking, visit the following links:    National Cancer [place] ,   Clearing the Air, Quit Smoking Today   - an online booklet. http://www.smokefree.gov/pubs/clearing_the_air.pdf    Smokefree.gov http://smokefree.gov/    QuitNet http://www.quitnet.com/    9619-4011 [place], [address] [place] [address]. All rights reserved. This information is not intended as a substitute for professional medical care. Always follow your healthcare professional's instructions.

## 2018-02-26 ENCOUNTER — DOCUMENTATION ONLY (OUTPATIENT)
Dept: CARE COORDINATION | Facility: CLINIC | Age: 68
End: 2018-02-26

## 2018-02-26 NOTE — PROGRESS NOTES
Dear Dr. Lexis Ag     Medicare Home Health regulations requires [facility] to notify the Physician when the plan for visits has been altered.  We have provided fewer visits than ordered.  We are notifying you of a Missed Visit.  Boom Kahn; MRN [id number]  Missed Visit  Is SN   Dates of missed services  2/28/18  Reason: Patient cancelled   Sincerely [facility]  Maeve Baumann  [phone]

## 2018-03-01 ENCOUNTER — OFFICE VISIT (OUTPATIENT)
Dept: VASCULAR SURGERY | Facility: CLINIC | Age: 68
End: 2018-03-01
Payer: COMMERCIAL

## 2018-03-01 VITALS
SYSTOLIC BLOOD PRESSURE: 111 MMHG | RESPIRATION RATE: 16 BRPM | OXYGEN SATURATION: 98 % | DIASTOLIC BLOOD PRESSURE: 64 MMHG | HEART RATE: 79 BPM

## 2018-03-01 DIAGNOSIS — I73.9 PAD (PERIPHERAL ARTERY DISEASE) (H): Primary | ICD-10-CM

## 2018-03-01 ASSESSMENT — PAIN SCALES - GENERAL: PAINLEVEL: NO PAIN (0)

## 2018-03-01 NOTE — MR AVS SNAPSHOT
After Visit Summary   3/1/2018    Boom Kahn    MRN: [id number]           Patient Information     Date Of Birth          1950        Visit Information        Provider Department      3/1/2018 12:15 PM Lexis Ag MD [facility]         Follow-ups after your visit        Your next 10 appointments already scheduled     Apr 12, 2018  3:30 PM CDT   (Arrive by 3:15 PM)   Return Vascular Visit with Lexis Ag MD   [facility] ([facility])    [address] [place] [id number] [phone]              Who to contact     Please call your clinic at [phone] to:    Ask questions about your health    Make or cancel appointments    Discuss your medicines    Learn about your test results    Speak to your doctor            Additional Information About Your Visit        MyChart Information     [employer] gives you secure access to your electronic health record. If you see a primary care provider, you can also send messages to your care team and make appointments. If you have questions, please call your primary care clinic.  If you do not have a primary care provider, please call [phone] and they will assist you.      [employer] is an electronic gateway that provides easy, online access to your medical records. With [employer], you can request a clinic appointment, read your test results, renew a prescription or communicate with your care team.     To access your existing account, please contact your [facility] or call [phone] for assistance.        Care EveryWhere ID     This is your Care EveryWhere ID. This could be used by other organizations to access your [place] medical records  [id number]        Your Vitals Were     Pulse Respirations Pulse Oximetry             79 16 98%          Blood Pressure from Last 3 Encounters:   03/01/18 111/64   02/22/18 93/64   02/15/18 104/58    Weight from  Last 3 Encounters:   02/07/18 147 lb 3.2 oz   01/29/18 148 lb 9.6 oz   01/22/18 143 lb 8 oz              Today, you had the following     No orders found for display       Primary Care Provider Office Phone # Fax #    Willian Arrington [phone]       [facility] [address]  [facility] [address]        Equal Access to Services     [facility] Center[facility] : Hadii aad ku hadasho Soomaali, waaxda luqadaha, qaybta kaalmada adeegyada, waxay idiin hayaan adeeg kharash la'aan ah. So [facility] [phone].    ATENCIÓN: Si habla español, tiene a bhatia disposición servicios gratuitos de asistencia lingüística. Allaname al [phone].    We comply with applicable federal civil rights laws and [place] laws. We do not discriminate on the basis of race, color, national origin, age, disability, sex, sexual orientation, or gender identity.            Thank you!     Thank you for choosing [facility]  for your care. Our goal is always to provide you with excellent care. Hearing back from our patients is one way we can continue to improve our services. Please take a few minutes to complete the written survey that you may receive in the mail after your visit with us. Thank you!             Your Updated Medication List - Protect others around you: Learn how to safely use, store and throw away your medicines at www.disposemymeds.org.          This list is accurate as of 3/1/18  1:02 PM.  Always use your most recent med list.                   Brand Name Dispense Instructions for use Diagnosis    acetic acid 0.25 % irrigation     10 mL    Irrigate with as directed 2 times daily    S/P vascular surgery       ascorbic acid 500 MG Tabs     7 tablet    Take 1 tablet (500 mg) by mouth daily    Takes dietary supplements       ASPIRIN EC PO      Take 81 mg by mouth daily        carvedilol 6.25 MG tablet    COREG    60 tablet    Take 1 tablet (6.25 mg) by mouth 2 times daily (with meals)    Ischemic cardiomyopathy        clopidogrel 75 MG tablet    PLAVIX    30 tablet    Take 1 tablet (75 mg) by mouth daily    Ischemic cardiomyopathy       diclofenac 1 % Gel topical gel    VOLTAREN    1 Tube    Place 4 g onto the skin 4 times daily    Pain of left lower extremity       docusate sodium 100 MG capsule    COLACE    60 capsule    Take 1 capsule (100 mg) by mouth daily    Takes dietary supplements       enalapril 2.5 MG tablet    VASOTEC    60 tablet    Take 1 tablet (2.5 mg) by mouth 2 times daily    Ischemic cardiomyopathy       escitalopram 20 MG tablet    LEXAPRO    15 tablet    Take 1 tablet (20 mg) by mouth daily    S/P vascular surgery       ezetimibe 10 MG tablet    ZETIA    30 tablet    Take 1 tablet (10 mg) by mouth daily    Ischemic cardiomyopathy       folic acid 1 MG tablet    FOLVITE    30 tablet    Take 1 tablet (1 mg) by mouth daily    Takes dietary supplements       furosemide 20 MG tablet    LASIX    60 tablet    Take 1 tablet (20 mg) by mouth 2 times daily (with meals)    S/P vascular surgery       * gabapentin 300 MG capsule    NEURONTIN    60 capsule    Take 1 capsule (300 mg) by mouth 2 times daily    S/P vascular surgery       * gabapentin 600 MG tablet    NEURONTIN    30 tablet    Take 1 tablet (600 mg) by mouth At Bedtime    PVD (peripheral vascular disease) (H)       isosorbide mononitrate 30 MG 24 hr tablet    IMDUR    90 tablet    Take 1 tablet (30 mg) by mouth daily    Chronic systolic congestive heart failure (H)       multivitamin, therapeutic with minerals Tabs tablet     30 each    Take 1 tablet by mouth daily    Takes dietary supplements       order for DME     1 each    Equipment being ordered: Walker Wheels () and Walker () Treatment Diagnosis: impaired mobility    Pain of left lower extremity       oxyCODONE IR 10 MG tablet    ROXICODONE    25 tablet    Take 1 tablet (10 mg) by mouth every 6 hours as needed for moderate to severe pain    Narcotic dependence (H)       polyethylene  glycol Packet    MIRALAX/GLYCOLAX    7 packet    Take 17 g by mouth daily    Takes dietary supplements       predniSONE 10 MG tablet    DELTASONE    30 tablet    Take 1 tablet (10 mg) by mouth daily    S/P vascular surgery       rosuvastatin 20 MG tablet    CRESTOR    30 tablet    Take 1 tablet (20 mg) by mouth daily    Ischemic cardiomyopathy       vitamin A 49293 UNIT capsule     7 capsule    Take 2 capsules (20,000 Units) by mouth daily    Takes dietary supplements       zinc sulfate 220 (50 ZN) MG capsule    ZINCATE    7 capsule    Take 1 capsule (220 mg) by mouth daily    Takes dietary supplements       * Notice:  This list has 2 medication(s) that are the same as other medications prescribed for you. Read the directions carefully, and ask your doctor or other care provider to review them with you.

## 2018-03-01 NOTE — NURSING NOTE
Chief Complaint   Patient presents with     RECHECK     Follow up bypass femorotibial       Vitals:    03/01/18 1230   BP: 111/64   BP Location: Right arm   Pulse: 79   Resp: 16   SpO2: 98%       There is no height or weight on file to calculate BMI.               Doris Baumann LPN

## 2018-03-01 NOTE — LETTER
3/1/2018       RE: Boom Kahn  [address] [place] [id number]     Dear Colleague,    Thank you for referring your patient, Boom Kahn, to the [facility] at [facility]. Please see a copy of my visit note below.    VASCULAR SURGERY CLINIC NOTE    HPI:    Boom Kahn is a 67 year old male with severe  PAD, bilateral lower extremity wounds and critical limb ischemia who is status post right femoral endarterectomy with in-situ GSV femoral to posterior tibial artery bypass on 1/15/2018 with Dr. Ag.   He returns to clinic today for right leg wound check and post-op visit.    Subjective:   Pt reports he's doing well since the last visit. Pt reports his walking continues to improve- he's quite mobile and moves independently. He's doing the RLE wound dressings for himself. He does wet to dry acetic acid in the morning and wet to dry with saline in the evening. Pt reports he's back to drinking 4-5 beers per day, which he doesn't think he can lessen. He is smoking about 0.5 packs per day of cigarettes and he's trying to lessen this, though pt reports his son doesn't think he's doing well with the smoking. Pt reports his PCP from [place] is prescribing him narcotics for the pain control. Pt is going back to his home in [place] by the end of the month and is unsure when he will return to [place].     Review Of Systems:  General: Denies F/C  Respiratory: Denies SOB  Cardio: Denies CP  Gastrointestinal: Denies N/V  Neurologic: Denies HA  Psychiatric: Denies confusion    Patient Active Problem List   Diagnosis     Ischemic cardiomyopathy     CHF (congestive heart failure) (H)     PVD (peripheral vascular disease) (H)     COPD (chronic obstructive pulmonary disease) (H)     Anxiety     Atherosclerosis of native arteries of extremities with intermittent claudication, bilateral legs (H)     Automatic implantable cardioverter-defibrillator in situ     Depression      Cardiomyopathy (H)     Hyperlipidemia     HTN (hypertension)     Lung mass     Dyspnea on exertion     Malignant neoplasm of lung (H)     MI (myocardial infarction)     Critical lower limb ischemia     Atherosclerotic PVD with ulceration (H)     S/P vascular surgery     Back pain     Objective:  Vital Signs:  /64 (BP Location: Right arm)  Pulse 79  Resp 16  SpO2 98%    Prior to Admission medications    Medication Sig Start Date End Date Taking? Authorizing Provider   isosorbide mononitrate (IMDUR) 30 MG 24 hr tablet Take 1 tablet (30 mg) by mouth daily 2/22/18   Jimena Rodriguez MD   carvedilol (COREG) 6.25 MG tablet Take 1 tablet (6.25 mg) by mouth 2 times daily (with meals) 2/22/18   Jimena Rodriguez MD   rosuvastatin (CRESTOR) 20 MG tablet Take 1 tablet (20 mg) by mouth daily 2/22/18   Jimena Rodriguez MD   furosemide (LASIX) 20 MG tablet Take 1 tablet (20 mg) by mouth 2 times daily (with meals) 2/22/18   Jimena Rodriguez MD   enalapril (VASOTEC) 2.5 MG tablet Take 1 tablet (2.5 mg) by mouth 2 times daily 2/22/18   Jimena Rodriguez MD   oxyCODONE IR (ROXICODONE) 10 MG tablet Take 1 tablet (10 mg) by mouth every 6 hours as needed for moderate to severe pain 2/15/18   Lexis Ag MD   diclofenac (VOLTAREN) 1 % GEL topical gel Place 4 g onto the skin 4 times daily 2/10/18   Modesta uRiz APRN CNP   order for DME Equipment being ordered: Walker Wheels () and Walker ()  Treatment Diagnosis: impaired mobility 2/10/18   Modesta Ruiz APRN CNP   acetic acid 0.25 % irrigation Irrigate with as directed 2 times daily 2/10/18   Modesta Ruiz APRN CNP   gabapentin (NEURONTIN) 300 MG capsule Take 1 capsule (300 mg) by mouth 2 times daily 1/31/18   Mariluz Pagan MD   gabapentin (NEURONTIN) 600 MG tablet Take 1 tablet (600 mg) by mouth At Bedtime 1/31/18   Mariluz Pagan MD   escitalopram (LEXAPRO) 20 MG tablet Take 1  tablet (20 mg) by mouth daily 2/1/18   Mariluz Pagan MD   ezetimibe (ZETIA) 10 MG tablet Take 1 tablet (10 mg) by mouth daily 1/31/18   Mariluz Pagan MD   predniSONE (DELTASONE) 10 MG tablet Take 1 tablet (10 mg) by mouth daily 2/1/18   Mariluz Pagan MD   folic acid (FOLVITE) 1 MG tablet Take 1 tablet (1 mg) by mouth daily 2/1/18   Mariluz Pagan MD   docusate sodium (COLACE) 100 MG capsule Take 1 capsule (100 mg) by mouth daily 2/1/18   Mariluz Pagan MD   polyethylene glycol (MIRALAX/GLYCOLAX) Packet Take 17 g by mouth daily 2/1/18   Mariluz Pagan MD   zinc sulfate (ZINCATE) 220 (50 ZN) MG capsule Take 1 capsule (220 mg) by mouth daily 2/1/18   Mariluz Pagan MD   clopidogrel (PLAVIX) 75 MG tablet Take 1 tablet (75 mg) by mouth daily 2/1/18   Mariluz Pagan MD   multivitamin, therapeutic with minerals (THERA-VIT-M) TABS tablet Take 1 tablet by mouth daily 2/1/18   Mariluz Pagan MD   ascorbic acid 500 MG TABS Take 1 tablet (500 mg) by mouth daily 2/1/18   Mariluz Pagan MD   vitamin A 36797 UNIT capsule Take 2 capsules (20,000 Units) by mouth daily 2/1/18   Mariluz Pagan MD   ASPIRIN EC PO Take 81 mg by mouth daily    Reported, Patient       PHYSICAL EXAM:  General: The patient is alert and oriented.  Moves all extremities.   HEENT: Color appropriate for race, warm, dry  Lungs: Breathing unlabored    EXTREMITIES: Skin over the RLE open in an area of 6cm by 4cm on anterior lower leg. Muscle and tendon visible without discharge, some green tinge to small area between muscle bellies, tissue on the edge of the wound with granulation. +1 edema of the RLE. Graft incision is healing well      Assessment:  Boom Kahn is a 67 year old male with severe  PAD, bilateral lower extremity wounds and critical limb ischemia who is status post right femoral endarterectomy with in-situ GSV femoral to posterior tibial artery bypass on 1/15/2018 with Dr. Ag.   He  returns to clinic today for right leg wound check and post-op visit.    Plan:  -Continue Aspirin, rosuvastatin, plavix   -Continue to try to lower alcohol use and tobacco use  -Continue wet to dry dressings BID on the RLE with acetic acid in the morning and saline in the evening  -F/U in 6 weeks if still in the area  -Consider plastic surgery referral for skin graft of RLE wound if pt will be in the [place] for an extended period. Otherwise, pt advised to have PCP in [place] refer him to a vascular surgeon and a plastic surgeon there to establish care      Taylor Mancera PA-C   Vascular Surgery Service  [facility]       I have seen and assessed this patient with the above provider and agree with her above documentation, impression and plan. Bypass open but right lower leg shin wound not decreasing in size.  Some good granulation, but also has exposed tendon.  Lack of healing may be related to nutritional state, but may need a skin graft after tendon debridement anyway.  I was planning to send him to a plastic or acute care surgeon for skin grafting, but he is leaving to [place] this month permanently.  In this context, he has agreed to establish care with a vascular surgeon and then a surgeon who does skin grafting.  We will still set up an appointment to see him in 6 weeks just in case his travel plans don't work out.  He will continue on his dual antiplatelet therapy.      I spent>50% of this 15 min visit in counseling.    Again, thank you for allowing me to participate in the care of your patient.      Sincerely,    Lexis Ag MD

## 2018-03-01 NOTE — PROGRESS NOTES
VASCULAR SURGERY CLINIC NOTE    HPI:    Boom Kahn is a 67 year old male with severe  PAD, bilateral lower extremity wounds and critical limb ischemia who is status post right femoral endarterectomy with in-situ GSV femoral to posterior tibial artery bypass on 1/15/2018 with Dr. Ag.   He returns to clinic today for right leg wound check and post-op visit.    Subjective:   Pt reports he's doing well since the last visit. Pt reports his walking continues to improve- he's quite mobile and moves independently. He's doing the RLE wound dressings for himself. He does wet to dry acetic acid in the morning and wet to dry with saline in the evening. Pt reports he's back to drinking 4-5 beers per day, which he doesn't think he can lessen. He is smoking about 0.5 packs per day of cigarettes and he's trying to lessen this, though pt reports his son doesn't think he's doing well with the smoking. Pt reports his PCP from [place] is prescribing him narcotics for the pain control. Pt is going back to his home in [place] by the end of the month and is unsure when he will return to [place].     Review Of Systems:  General: Denies F/C  Respiratory: Denies SOB  Cardio: Denies CP  Gastrointestinal: Denies N/V  Neurologic: Denies HA  Psychiatric: Denies confusion    Patient Active Problem List   Diagnosis     Ischemic cardiomyopathy     CHF (congestive heart failure) (H)     PVD (peripheral vascular disease) (H)     COPD (chronic obstructive pulmonary disease) (H)     Anxiety     Atherosclerosis of native arteries of extremities with intermittent claudication, bilateral legs (H)     Automatic implantable cardioverter-defibrillator in situ     Depression     Cardiomyopathy (H)     Hyperlipidemia     HTN (hypertension)     Lung mass     Dyspnea on exertion     Malignant neoplasm of lung (H)     MI (myocardial infarction)     Critical lower limb ischemia     Atherosclerotic PVD with ulceration (H)     S/P vascular surgery     Back pain      Objective:  Vital Signs:  /64 (BP Location: Right arm)  Pulse 79  Resp 16  SpO2 98%    Prior to Admission medications    Medication Sig Start Date End Date Taking? Authorizing Provider   isosorbide mononitrate (IMDUR) 30 MG 24 hr tablet Take 1 tablet (30 mg) by mouth daily 2/22/18   Jimena Rodriguez MD   carvedilol (COREG) 6.25 MG tablet Take 1 tablet (6.25 mg) by mouth 2 times daily (with meals) 2/22/18   Jimena Rodriguez MD   rosuvastatin (CRESTOR) 20 MG tablet Take 1 tablet (20 mg) by mouth daily 2/22/18   Jimena Rodriguez MD   furosemide (LASIX) 20 MG tablet Take 1 tablet (20 mg) by mouth 2 times daily (with meals) 2/22/18   Jimena Rodriguez MD   enalapril (VASOTEC) 2.5 MG tablet Take 1 tablet (2.5 mg) by mouth 2 times daily 2/22/18   Jimena Rodriguez MD   oxyCODONE IR (ROXICODONE) 10 MG tablet Take 1 tablet (10 mg) by mouth every 6 hours as needed for moderate to severe pain 2/15/18   Lexis Ag MD   diclofenac (VOLTAREN) 1 % GEL topical gel Place 4 g onto the skin 4 times daily 2/10/18   Modesta Ruiz APRN CNP   order for DME Equipment being ordered: Walker Wheels () and Walker ()  Treatment Diagnosis: impaired mobility 2/10/18   Modesta Ruiz APRN CNP   acetic acid 0.25 % irrigation Irrigate with as directed 2 times daily 2/10/18   Modesta Ruiz APRN CNP   gabapentin (NEURONTIN) 300 MG capsule Take 1 capsule (300 mg) by mouth 2 times daily 1/31/18   Mariluz Pagan MD   gabapentin (NEURONTIN) 600 MG tablet Take 1 tablet (600 mg) by mouth At Bedtime 1/31/18   Mariluz Pagan MD   escitalopram (LEXAPRO) 20 MG tablet Take 1 tablet (20 mg) by mouth daily 2/1/18   Mariluz Pagan MD   ezetimibe (ZETIA) 10 MG tablet Take 1 tablet (10 mg) by mouth daily 1/31/18   Mariluz Pagan MD   predniSONE (DELTASONE) 10 MG tablet Take 1 tablet (10 mg) by mouth daily 2/1/18   Mariluz Pagan MD   folic  acid (FOLVITE) 1 MG tablet Take 1 tablet (1 mg) by mouth daily 2/1/18   Mariluz Pagan MD   docusate sodium (COLACE) 100 MG capsule Take 1 capsule (100 mg) by mouth daily 2/1/18   Mariluz Pagan MD   polyethylene glycol (MIRALAX/GLYCOLAX) Packet Take 17 g by mouth daily 2/1/18   Mariluz Pagan MD   zinc sulfate (ZINCATE) 220 (50 ZN) MG capsule Take 1 capsule (220 mg) by mouth daily 2/1/18   Mariluz Pagan MD   clopidogrel (PLAVIX) 75 MG tablet Take 1 tablet (75 mg) by mouth daily 2/1/18   Mariluz Pagan MD   multivitamin, therapeutic with minerals (THERA-VIT-M) TABS tablet Take 1 tablet by mouth daily 2/1/18   Mariluz Pagan MD   ascorbic acid 500 MG TABS Take 1 tablet (500 mg) by mouth daily 2/1/18   Mariluz Pagan MD   vitamin A 76516 UNIT capsule Take 2 capsules (20,000 Units) by mouth daily 2/1/18   Mariluz Pagan MD   ASPIRIN EC PO Take 81 mg by mouth daily    Reported, Patient       PHYSICAL EXAM:  General: The patient is alert and oriented.  Moves all extremities.   HEENT: Color appropriate for race, warm, dry  Lungs: Breathing unlabored    EXTREMITIES: Skin over the RLE open in an area of 6cm by 4cm on anterior lower leg. Muscle and tendon visible without discharge, some green tinge to small area between muscle bellies, tissue on the edge of the wound with granulation. +1 edema of the RLE. Graft incision is healing well      Assessment:  Boom Kahn is a 67 year old male with severe  PAD, bilateral lower extremity wounds and critical limb ischemia who is status post right femoral endarterectomy with in-situ GSV femoral to posterior tibial artery bypass on 1/15/2018 with Dr. Ag.   He returns to clinic today for right leg wound check and post-op visit.    Plan:  -Continue Aspirin, rosuvastatin, plavix   -Continue to try to lower alcohol use and tobacco use  -Continue wet to dry dressings BID on the RLE with acetic acid in the morning and saline in the evening  -F/U  in 6 weeks if still in the area  -Consider plastic surgery referral for skin graft of RLE wound if pt will be in the [place] for an extended period. Otherwise, pt advised to have PCP in [place] refer him to a vascular surgeon and a plastic surgeon there to establish care      Taylor Mancera PA-C   Vascular Surgery Service  [facility]

## 2018-03-01 NOTE — PROGRESS NOTES
I have seen and assessed this patient with the above provider and agree with her above documentation, impression and plan. Bypass open but right lower leg shin wound not decreasing in size.  Some good granulation, but also has exposed tendon.  Lack of healing may be related to nutritional state, but may need a skin graft after tendon debridement anyway.  I was planning to send him to a plastic or acute care surgeon for skin grafting, but he is leaving to [place] this month permanently.  In this context, he has agreed to establish care with a vascular surgeon and then a surgeon who does skin grafting.  We will still set up an appointment to see him in 6 weeks just in case his travel plans don't work out.  He will continue on his dual antiplatelet therapy.      I spent>50% of this 15 min visit in counseling.

## 2018-03-02 ENCOUNTER — CARE COORDINATION (OUTPATIENT)
Dept: VASCULAR SURGERY | Facility: CLINIC | Age: 68
End: 2018-03-02

## 2018-03-02 ENCOUNTER — MYC MEDICAL ADVICE (OUTPATIENT)
Dept: INTERNAL MEDICINE | Facility: CLINIC | Age: 68
End: 2018-03-02

## 2018-03-02 NOTE — PROGRESS NOTES
Spoke with [facility] for Mr Kahn.  Home care orders for visits 2 x weekly for 2 weeks with 2 PRN visits given.  Patient will be returning to [place] within the next month.    Kassandra also noted that Mr Kahn was using Volteran topical gel, small amount around wound with dressing changes.  Volteran was perscribed by his PCP in [place] for his chronic skin inflammation.  Mr Kahn states that is the only medication that works for his inflammation, and he will continue to use small amount, once daily.

## 2018-03-06 DIAGNOSIS — I73.9 PVD (PERIPHERAL VASCULAR DISEASE) (H): ICD-10-CM

## 2018-03-06 DIAGNOSIS — Z98.890 S/P VASCULAR SURGERY: ICD-10-CM

## 2018-03-06 RX ORDER — GABAPENTIN 300 MG/1
300 CAPSULE ORAL 2 TIMES DAILY
Qty: 60 CAPSULE | Refills: 1 | Status: SHIPPED | OUTPATIENT
Start: 2018-03-06 | End: 2018-05-09

## 2018-03-06 RX ORDER — GABAPENTIN 600 MG/1
600 TABLET ORAL AT BEDTIME
Qty: 30 TABLET | Refills: 1 | Status: SHIPPED | OUTPATIENT
Start: 2018-03-06 | End: 2018-05-09

## 2018-03-06 NOTE — TELEPHONE ENCOUNTER
need refills of some meds from my hospital stay  gabapentin 300 mg (days) 2-3 per day  gabapentin 600 mg (bedtime) 1 at bedtime  robaxin for back pain or flexeril    [facility]acy is [facility] [address]  my # [phone]     Refilled the gabapentins, but there is no prescription for flexeril on his med list and no indication of a need for it on his last admission.  Since I do not know him, and cannot just give him flexeril.  If he is having muscle pains, he needs to be seen.  Please let him know.  Thanks   Peri carrera for pt about his medication. Clinic numbers given for questions or concerns.  Kelly Crawford RN 9:00 AM on 3/6/2018.

## 2018-03-07 ENCOUNTER — DOCUMENTATION ONLY (OUTPATIENT)
Dept: CARE COORDINATION | Facility: CLINIC | Age: 68
End: 2018-03-07

## 2018-03-07 NOTE — PROGRESS NOTES
Dear Dr. Lexis Ag  Medicare Home Health regulations requires [facility] to notify the Physician when the plan for visits has been altered.  We have provided fewer visits than ordered.  We are notifying you of a Missed Visit.  Boom Kahn; MRN [id number]  Missed Visit  Is SN   Dates of missed services  3/6/18  Reason: Patient cancelled   Sincerely [facility]  Maeve Baumann  [phone]

## 2018-03-14 ENCOUNTER — CARE COORDINATION (OUTPATIENT)
Dept: VASCULAR SURGERY | Facility: CLINIC | Age: 68
End: 2018-03-14

## 2018-03-14 NOTE — PROGRESS NOTES
Received request for extension of Home Care orders for Mr Kahn.  Verbal orders given for 2 visits for 2 week, 1 visit for 1 week and 1 PRN visit.

## 2018-03-22 ENCOUNTER — OFFICE VISIT (OUTPATIENT)
Dept: INTERNAL MEDICINE | Facility: CLINIC | Age: 68
End: 2018-03-22
Payer: COMMERCIAL

## 2018-03-22 VITALS
SYSTOLIC BLOOD PRESSURE: 86 MMHG | BODY MASS INDEX: 23.26 KG/M2 | HEART RATE: 91 BPM | WEIGHT: 153 LBS | DIASTOLIC BLOOD PRESSURE: 56 MMHG

## 2018-03-22 DIAGNOSIS — L03.119 CELLULITIS AND ABSCESS OF LEG: Primary | ICD-10-CM

## 2018-03-22 DIAGNOSIS — L02.419 CELLULITIS AND ABSCESS OF LEG: Primary | ICD-10-CM

## 2018-03-22 RX ORDER — CIPROFLOXACIN 500 MG/1
500 TABLET, FILM COATED ORAL 2 TIMES DAILY
Qty: 14 TABLET | Refills: 0 | Status: SHIPPED | OUTPATIENT
Start: 2018-03-22 | End: 2018-08-09

## 2018-03-22 RX ORDER — CLINDAMYCIN HCL 300 MG
300 CAPSULE ORAL 4 TIMES DAILY
Qty: 28 CAPSULE | Refills: 0 | Status: SHIPPED | OUTPATIENT
Start: 2018-03-22 | End: 2018-08-09

## 2018-03-22 ASSESSMENT — PAIN SCALES - GENERAL: PAINLEVEL: EXTREME PAIN (8)

## 2018-03-22 NOTE — MR AVS SNAPSHOT
After Visit Summary   3/22/2018    Boom Kahn    MRN: [id number]           Patient Information     Date Of Birth          1950        Visit Information        Provider Department      3/22/2018 4:00 PM Dennise Arita APRN CNP [facility]        Today's Diagnoses     Cellulitis and abscess of leg    -  1      Care Instructions    Primary Care Center: [phone]     Primary Care Center Medication Refill Request Information:  * Please contact your pharmacy regarding ANY request for medication refills.  ** [facility] Prescription Fax = [phone]  * Please allow 3 business days for routine medication refills.  * Please allow 5 business days for controlled substance medication refills.     Primary Care Center Test Result notification information:  *You will be notified with in 7-10 days of your appointment day regarding the results of your test.  If you are on MyChart you will be notified as soon as the provider has reviewed the results and signed off on them.            Follow-ups after your visit        Additional Services     PLASTIC SURGERY REFERRAL       Your provider has referred you to: [facility]: Plastic and Reconstructive Surgery Clinic [facility] [phone]   [email or web address]    Pt of Dr. Ag in dire need of consult with plastic surgeon for probable wound graft.  Dr. Sterling or soonest available.      Please be aware that coverage of these services is subject to the terms and limitations of your health insurance plan.  Call member services at your health plan with any benefit or coverage questions.      Please bring the following with you to your appointment:    (1) Any X-Rays, CTs or MRIs which have been performed.  Contact the facility where they were done to arrange for  prior to your scheduled appointment.    (2) List of current medications  (3) This referral request   (4) Any  documents/labs given to you for this referral            WOUND CARE REFERRAL       Your provider has referred you to: [facility]    Reason for referral: Wound care      1. [facility] Wound Consult appointment is related to what kind of wound: Venous leg/foot ulcer    2. Location of wound: Lower extremity    3. Reason for referral: Assess and treat as indicated    4. Desired treatment if any: will eventually likely need grafting s has no sign of wound healing and has exposed tendon.     Please be aware that coverage of these services is subject to the terms and limitations of your health insurance plan.  Call member services at your health plan with any benefit or coverage questions.      Please bring the following with you to your appointment:    (1) Any X-Rays, CTs or MRIs which have been performed.  Contact the facility where they were done to arrange for  prior to your scheduled appointment.    (2) List of current medications   (3) This referral request   (4) Any documents/labs given to you for this referral                  Your next 10 appointments already scheduled     Apr 12, 2018  3:30 PM CDT   (Arrive by 3:15 PM)   Return Vascular Visit with Lexis Ag MD   [facility] ([facility])    [address] [place] [id number] [phone]              Future tests that were ordered for you today     Open Future Orders        Priority Expected Expires Ordered    PLASTIC SURGERY REFERRAL ASAP 3/23/2018 4/13/2018 3/22/2018            Who to contact     Please call your clinic at [phone] to:    Ask questions about your health    Make or cancel appointments    Discuss your medicines    Learn about your test results    Speak to your doctor            Additional Information About Your Visit        MyChart Information     [employer] gives you secure access to your electronic health record. If you see a primary care provider, you can also send  messages to your care team and make appointments. If you have questions, please call your primary care clinic.  If you do not have a primary care provider, please call [phone] and they will assist you.      [employer] is an electronic gateway that provides easy, online access to your medical records. With [employer], you can request a clinic appointment, read your test results, renew a prescription or communicate with your care team.     To access your existing account, please contact your [facility] or call [phone] for assistance.        Care EveryWhere ID     This is your Care EveryWhere ID. This could be used by other organizations to access your [place] medical records  [id number]        Your Vitals Were     Pulse BMI (Body Mass Index)                91 23.26 kg/m2           Blood Pressure from Last 3 Encounters:   03/22/18 (!) 86/56   03/01/18 111/64   02/22/18 93/64    Weight from Last 3 Encounters:   03/22/18 69.4 kg (153 lb)   02/07/18 66.8 kg (147 lb 3.2 oz)   01/29/18 67.4 kg (148 lb 9.6 oz)              We Performed the Following     WOUND CARE REFERRAL          Today's Medication Changes          These changes are accurate as of 3/22/18 11:59 PM.  If you have any questions, ask your nurse or doctor.               Start taking these medicines.        Dose/Directions    ciprofloxacin 500 MG tablet   Commonly known as:  CIPRO   Used for:  Cellulitis and abscess of leg   Started by:  Dennise Arita APRN CNP        Dose:  500 mg   Take 1 tablet (500 mg) by mouth 2 times daily   Quantity:  14 tablet   Refills:  0       clindamycin 300 MG capsule   Commonly known as:  CLEOCIN   Used for:  Cellulitis and abscess of leg   Started by:  Dennise Arita APRN CNP        Dose:  300 mg   Take 1 capsule (300 mg) by mouth 4 times daily   Quantity:  28 capsule   Refills:  0       Lidocaine 4 % Gel   Used for:  Cellulitis and abscess of leg   Started by:  Dennise Arita  APRN CNP        Externally apply topically 2 times daily   Quantity:  30 g   Refills:  1            Where to get your medicines      These medications were sent to [id number] - [place], [place] - [address] AT [facility] [address]  [address], [place] [address]    Hours:  24-hours Phone:  [phone]     ciprofloxacin 500 MG tablet    clindamycin 300 MG capsule    Lidocaine 4 % Gel                Primary Care Provider Office Phone # Fax #    Willian Arrington [phone]       [facility] [address]  [facility] [address]        Equal Access to Services     [facility] HospitalTRICIA AH: Hadii aad ku hadasho Soomaali, waaxda luqadaha, qaybta kaalmada adeegyada, waxay idiin hayaan adeeg yung evans. So [facility] [phone].    ATENCIÓN: Si habla español, tiene a bhatia disposición servicios gratuitos de asistencia lingüística. [facility] [phone].    We comply with applicable federal civil rights laws and [place] laws. We do not discriminate on the basis of race, color, national origin, age, disability, sex, sexual orientation, or gender identity.            Thank you!     Thank you for choosing [facility]  for your care. Our goal is always to provide you with excellent care. Hearing back from our patients is one way we can continue to improve our services. Please take a few minutes to complete the written survey that you may receive in the mail after your visit with us. Thank you!             Your Updated Medication List - Protect others around you: Learn how to safely use, store and throw away your medicines at www.disposemymeds.org.          This list is accurate as of 3/22/18 11:59 PM.  Always use your most recent med list.                   Brand Name Dispense Instructions for use Diagnosis    acetic acid 0.25 % irrigation     10 mL    Irrigate with as directed 2 times daily    S/P vascular surgery       ascorbic acid 500 MG Tabs     7  tablet    Take 1 tablet (500 mg) by mouth daily    Takes dietary supplements       ASPIRIN EC PO      Take 81 mg by mouth daily        carvedilol 6.25 MG tablet    COREG    60 tablet    Take 1 tablet (6.25 mg) by mouth 2 times daily (with meals)    Ischemic cardiomyopathy       ciprofloxacin 500 MG tablet    CIPRO    14 tablet    Take 1 tablet (500 mg) by mouth 2 times daily    Cellulitis and abscess of leg       clindamycin 300 MG capsule    CLEOCIN    28 capsule    Take 1 capsule (300 mg) by mouth 4 times daily    Cellulitis and abscess of leg       clopidogrel 75 MG tablet    PLAVIX    30 tablet    Take 1 tablet (75 mg) by mouth daily    Ischemic cardiomyopathy       diclofenac 1 % Gel topical gel    VOLTAREN    1 Tube    Place 4 g onto the skin 4 times daily    Pain of left lower extremity       docusate sodium 100 MG capsule    COLACE    60 capsule    Take 1 capsule (100 mg) by mouth daily    Takes dietary supplements       enalapril 2.5 MG tablet    VASOTEC    60 tablet    Take 1 tablet (2.5 mg) by mouth 2 times daily    Ischemic cardiomyopathy       escitalopram 20 MG tablet    LEXAPRO    15 tablet    Take 1 tablet (20 mg) by mouth daily    S/P vascular surgery       ezetimibe 10 MG tablet    ZETIA    30 tablet    Take 1 tablet (10 mg) by mouth daily    Ischemic cardiomyopathy       folic acid 1 MG tablet    FOLVITE    30 tablet    Take 1 tablet (1 mg) by mouth daily    Takes dietary supplements       furosemide 20 MG tablet    LASIX    60 tablet    Take 1 tablet (20 mg) by mouth 2 times daily (with meals)    S/P vascular surgery       * gabapentin 300 MG capsule    NEURONTIN    60 capsule    Take 1 capsule (300 mg) by mouth 2 times daily    S/P vascular surgery       * gabapentin 600 MG tablet    NEURONTIN    30 tablet    Take 1 tablet (600 mg) by mouth At Bedtime    PVD (peripheral vascular disease) (H)       isosorbide mononitrate 30 MG 24 hr tablet    IMDUR    90 tablet    Take 1 tablet (30 mg) by mouth  daily    Chronic systolic congestive heart failure (H)       Lidocaine 4 % Gel     30 g    Externally apply topically 2 times daily    Cellulitis and abscess of leg       multivitamin, therapeutic with minerals Tabs tablet     30 each    Take 1 tablet by mouth daily    Takes dietary supplements       order for DME     1 each    Equipment being ordered: Walker Wheels () and Walker () Treatment Diagnosis: impaired mobility    Pain of left lower extremity       oxyCODONE IR 10 MG tablet    ROXICODONE    25 tablet    Take 1 tablet (10 mg) by mouth every 6 hours as needed for moderate to severe pain    Narcotic dependence (H)       polyethylene glycol Packet    MIRALAX/GLYCOLAX    7 packet    Take 17 g by mouth daily    Takes dietary supplements       predniSONE 10 MG tablet    DELTASONE    30 tablet    Take 1 tablet (10 mg) by mouth daily    S/P vascular surgery       rosuvastatin 20 MG tablet    CRESTOR    30 tablet    Take 1 tablet (20 mg) by mouth daily    Ischemic cardiomyopathy       vitamin A 60762 UNIT capsule     7 capsule    Take 2 capsules (20,000 Units) by mouth daily    Takes dietary supplements       zinc sulfate 220 (50 ZN) MG capsule    ZINCATE    7 capsule    Take 1 capsule (220 mg) by mouth daily    Takes dietary supplements       * Notice:  This list has 2 medication(s) that are the same as other medications prescribed for you. Read the directions carefully, and ask your doctor or other care provider to review them with you.

## 2018-03-22 NOTE — PATIENT INSTRUCTIONS
[facility]: [phone]     [facility] Medication Refill Request Information:  * Please contact your pharmacy regarding ANY request for medication refills.  ** [facility] Prescription Fax = [phone]  * Please allow 3 business days for routine medication refills.  * Please allow 5 business days for controlled substance medication refills.     [facility] Test Result notification information:  *You will be notified with in 7-10 days of your appointment day regarding the results of your test.  If you are on MyChart you will be notified as soon as the provider has reviewed the results and signed off on them.

## 2018-03-22 NOTE — NURSING NOTE
Chief Complaint   Patient presents with     Ulcer     Patient here for infected ulcer.       Mg Yung CMA at 4:11 PM on 3/22/2018.

## 2018-03-22 NOTE — PROGRESS NOTES
[facility]   Dennise Arita, DEDRA CNP  03/22/2018      Chief Complaint: Right lower extremity wound       History of Present Illness:   Boom Kahn is a 68 year old male with a history of ischemic cardiomyopathy systolic congestive heart failure (last EF25%) status post ICD placement, peripheral vascular disease status post right femoral endarterectomy with fem-posterior tibial bypass January 2018, hypertension, lung cancer status post radiation treatment reportedly in remission, who presents for evaluation of a right leg wound.     He was referred to clinic today by his home care nurse who was worried about the status of the right leg wound.  The wound began last year, sometime in July of 2017, and was relatively small in size.  However, over the course the past several months, the wound has grown in size as his PAD progressively worsened.  He recently underwent a right femoral endarterectomy and was told that he needed to follow-up with a vascular surgeon in [place], his home state. He was expected to leave [place] and go back to [place] at the end of the month.  However, he is reconsidering this given the wound.  He is managing his wound at home, changing the dressing two to three times daily, with acetic acid and normal saline.  Subjectively, he feels the wound is stable and says it might be better.  Another concern he is having is pain.  His PCP in [place] has been managing his pain and continues to provide prescriptions for opioid medications while he is in [place].  The patient is not asking for opioid medications today, however, he doesn't feel his pain is under proper control.  He explain that he is dependent on opioids and has been for over a year. He is currently being managed with Fentanyl 25 mcg and Oxycodone 40 mg/daily total.      Other concerns discussed:  Ongoing pain management.      Review of Systems:   Pertinent items are noted in HPI, remainder of complete ROS is  negative.      Active Medications:     gabapentin (NEURONTIN) 300 MG capsule, Take 1 capsule (300 mg) by mouth 2 times daily, Disp: 60 capsule, Rfl: 1     gabapentin (NEURONTIN) 600 MG tablet, Take 1 tablet (600 mg) by mouth At Bedtime, Disp: 30 tablet, Rfl: 1     isosorbide mononitrate (IMDUR) 30 MG 24 hr tablet, Take 1 tablet (30 mg) by mouth daily, Disp: 90 tablet, Rfl: 3     carvedilol (COREG) 6.25 MG tablet, Take 1 tablet (6.25 mg) by mouth 2 times daily (with meals), Disp: 60 tablet, Rfl: 3     rosuvastatin (CRESTOR) 20 MG tablet, Take 1 tablet (20 mg) by mouth daily, Disp: 30 tablet, Rfl: 11     furosemide (LASIX) 20 MG tablet, Take 1 tablet (20 mg) by mouth 2 times daily (with meals), Disp: 60 tablet, Rfl: 3     enalapril (VASOTEC) 2.5 MG tablet, Take 1 tablet (2.5 mg) by mouth 2 times daily, Disp: 60 tablet, Rfl: 11     oxyCODONE IR (ROXICODONE) 10 MG tablet, Take 1 tablet (10 mg) by mouth every 6 hours as needed for moderate to severe pain, Disp: 25 tablet, Rfl: 0     diclofenac (VOLTAREN) 1 % GEL topical gel, Place 4 g onto the skin 4 times daily, Disp: 1 Tube, Rfl: 0     escitalopram (LEXAPRO) 20 MG tablet, Take 1 tablet (20 mg) by mouth daily, Disp: 15 tablet, Rfl: 0     ezetimibe (ZETIA) 10 MG tablet, Take 1 tablet (10 mg) by mouth daily, Disp: 30 tablet, Rfl: 0     predniSONE (DELTASONE) 10 MG tablet, Take 1 tablet (10 mg) by mouth daily, Disp: 30 tablet, Rfl: 0     folic acid (FOLVITE) 1 MG tablet, Take 1 tablet (1 mg) by mouth daily, Disp: 30 tablet, Rfl: 0     docusate sodium (COLACE) 100 MG capsule, Take 1 capsule (100 mg) by mouth daily, Disp: 60 capsule, Rfl: 0     polyethylene glycol (MIRALAX/GLYCOLAX) Packet, Take 17 g by mouth daily, Disp: 7 packet, Rfl: 0     zinc sulfate (ZINCATE) 220 (50 ZN) MG capsule, Take 1 capsule (220 mg) by mouth daily, Disp: 7 capsule, Rfl: 0     clopidogrel (PLAVIX) 75 MG tablet, Take 1 tablet (75 mg) by mouth daily, Disp: 30 tablet, Rfl: 0     multivitamin,  therapeutic with minerals (THERA-VIT-M) TABS tablet, Take 1 tablet by mouth daily, Disp: 30 each, Rfl: 0     ascorbic acid 500 MG TABS, Take 1 tablet (500 mg) by mouth daily, Disp: 7 tablet, Rfl: 0     vitamin A 16993 UNIT capsule, Take 2 capsules (20,000 Units) by mouth daily, Disp: 7 capsule, Rfl: 0     ASPIRIN EC PO, Take 81 mg by mouth daily, Disp: , Rfl:       Allergies:   Betadine [povidone iodine]; Collagenase clostridium histolyticum; Flagyl [metronidazole]; Iodine; and Santyl [collagenase]      Past Medical History:  Anxiety  Coronary artery disease   Congestive heart failure  Chronic pain  COPD  Depression  Hyperlipidemia   Hypertension  Lung cancer  Peripheral artery disease   Tobacco abuse   Ischemic cardiomyopathy  Atherosclerosis of native arteries of extremities with intermittent claudication, bilateral legs   Automatic implantable cardioverter-defibrillator in situ  Lung mass  Dyspnea on exertion  Back pain   Arthrosclerotic PVD with ulceration      Past Surgical History:  Angioplasty  Bypass graft artery coronary   Bypass graft femorotibial   Cardiac catheterization  Chest tube insertion  Colon surgery  Fine need aspiration  Implant pacemaker  Previous Radiation     Family History:    History reviewed. No pertinent family history.       Social History:   Smoking Status: Current every day smoker, 1.00 PPD of cigarettes    Smokeless Tobacco: Never   Alcohol Use: Yes; 50 beers/week    Home: [place], staying in [place] temporarily while getting medical care here at the [facility].     Immunizations:  Immunization History   Administered Date(s) Administered     Influenza (High Dose) 3 valent vaccine 10/01/2017     Mantoux Tuberculin Skin Test 01/24/2018        Physical Exam:   BP (!) 86/56  Pulse 91  Wt 69.4 kg (153 lb)  BMI 23.26 kg/m2   Physical Exam   Constitutional: He is oriented to person, place, and time and well-developed, well-nourished, and in no distress.   HENT:   Head:  "Normocephalic and atraumatic.   Eyes: EOM are normal.   Cardiovascular: Normal rate and regular rhythm.    Pulmonary/Chest: Effort normal and breath sounds normal.   Neurological: He is alert and oriented to person, place, and time.   Skin:        Psychiatric: Mood and affect normal.       Wound Measurements:          Wound location RLE  Thickness: Stage IV-Unstageable    Size: 11.5 cm x 9 cm   Sinus tract: No  Undermining: No  Wound Base: Largely granulation  Eschar  Slough  Surrounding Tissue: wound edges are macerated, the surrounding tissue is erythematous and tender to touch.   Exudate: Moderate  Exudate Color: Serous  Odor: No  Pain:  Yes - describe 8/10 pain  Dressing Change: Yes - describe saline dressing/wet dressing applied      Assessment and Plan:  Boom was seen today for a chronic right lower extremity ulcer.  The wound is a full-thickness ulcer and is quite extensive involving the deeper layers of tissues of the anterior tibialis. Vascular Surgeon Dr. Ag was consulted via telephone.  Recommendation is to have the patient see the Wound Clinic/Plastic Surgery ASAP for evaluation and management. May need surgical debridement and skin grafting.  With regards to his pain management, he was instructed to call his PCP office tomorrow to discuss his pain management and possibly moving his pain management here.  Will secure a Wound Care/Plastic Surgery appointment for the patient and notify him of the appointment details. In the meantime, he will start cipro and clindamycin given the extensive erythema, warm, and pain of the surrounding tissues of the wound.       Diagnoses and all orders for this visit:    Right lower extremity wound with cellulitis and of leg  Although he has no purulent drainage the distal leg is red and he describes it as a constant burning pain \"untouched\" by pain meds.  He takes them because he starts having withdrawal symptoms if he does not. Will treat for possible infection.  -     " ciprofloxacin (CIPRO) 500 MG tablet; Take 1 tablet (500 mg) by mouth 2 times daily  -     clindamycin (CLEOCIN) 300 MG capsule; Take 1 capsule (300 mg) by mouth 4 times daily  -     Lidocaine 4 % GEL; Externally apply topically 2 times daily  -     PLASTIC SURGERY REFERRAL; Future    I spoke with Dr. Tyler who advised Plastic referral and to keep his scheduled f/u appt with him in April.     Follow-up: Close follow-up needed.  He will need f/u with Dr. Rodrigez or colleague in the near future for close follow-up.  He may need to transfer his pain medication management locally.     Elijah Sherwood RN NP Student       I saw the patient with the student  Elijah COLMENARES Student  who acted as a scribe.The PMH,SOCHX, FAMHX, were taken by me / My exam and recommendations were performed and described in this note.  Total time spent 40 minutes.  More than 50% of the time spent with Mr. Kahn on counseling / coordinating his care      XAVI Perez CNP

## 2018-03-23 DIAGNOSIS — R53.81 PHYSICAL DECONDITIONING: Primary | ICD-10-CM

## 2018-03-29 ENCOUNTER — OFFICE VISIT (OUTPATIENT)
Dept: WOUND CARE | Facility: CLINIC | Age: 68
End: 2018-03-29
Payer: COMMERCIAL

## 2018-03-29 ENCOUNTER — DOCUMENTATION ONLY (OUTPATIENT)
Dept: CARE COORDINATION | Facility: CLINIC | Age: 68
End: 2018-03-29

## 2018-03-29 VITALS
TEMPERATURE: 97.9 F | OXYGEN SATURATION: 98 % | DIASTOLIC BLOOD PRESSURE: 58 MMHG | HEIGHT: 68 IN | HEART RATE: 91 BPM | SYSTOLIC BLOOD PRESSURE: 99 MMHG | BODY MASS INDEX: 23.39 KG/M2 | WEIGHT: 154.3 LBS

## 2018-03-29 DIAGNOSIS — I73.9 PVD (PERIPHERAL VASCULAR DISEASE) (H): ICD-10-CM

## 2018-03-29 DIAGNOSIS — L97.909 ARTERIAL LEG ULCER (H): Primary | ICD-10-CM

## 2018-03-29 ASSESSMENT — PAIN SCALES - GENERAL: PAINLEVEL: SEVERE PAIN (7)

## 2018-03-29 NOTE — MR AVS SNAPSHOT
After Visit Summary   3/29/2018    Boom Kahn    MRN: [id number]           Patient Information     Date Of Birth          1950        Visit Information        Provider Department      3/29/2018 11:00 AM Tamiko Mcduffie PA-C [facility]        Today's Diagnoses     Arterial leg ulcer (H)    -  1    PVD (peripheral vascular disease) (H)           Follow-ups after your visit        Your next 10 appointments already scheduled     Apr 12, 2018  3:30 PM CDT   (Arrive by 3:15 PM)   Return Vascular Visit with Lexis Ag MD   [facility] ([facility])    [address] [place] [address]  3rd [facility] [id number] [phone]            Apr 18, 2018  9:00 AM CDT   (Arrive by 8:45 AM)   New Plastic Surgery with LOREN Haynes MD   [facility] Plastic and Reconstructive Surgery ([facility])    [address] [place] [address]  4th [facility] [id number] [phone]           Do not wear perfume.            Apr 19, 2018 12:00 PM CDT   (Arrive by 11:45 AM)   Return Visit with Tamiko Mcduffie PA-C   [facility] ([facility])    [address] [place] [id number] [phone]              Who to contact     Please call your clinic at [phone] to:    Ask questions about your health    Make or cancel appointments    Discuss your medicines    Learn about your test results    Speak to your doctor            Additional Information About Your Visit        [employer] Information     [email or web address] gives you secure access to your electronic health record. If you see a primary care provider, you can also send messages to your care team and make appointments. If you have questions, please call your primary care clinic.  If you do not have a primary care provider, please call [phone] and they will assist you.      [email or web address] is an electronic gateway that provides easy, online access to your  "medical records. With [employer], you can request a clinic appointment, read your test results, renew a prescription or communicate with your care team.     To access your existing account, please contact your [facility] or call [phone] for assistance.        Care EveryWhere ID     This is your Care EveryWhere ID. This could be used by other organizations to access your [facility] medical records  [id number]        Your Vitals Were     Pulse Temperature Height Pulse Oximetry BMI (Body Mass Index)       91 97.9  F (36.6  C) 5' 8\" 98% 23.46 kg/m2        Blood Pressure from Last 3 Encounters:   03/29/18 99/58   03/22/18 (!) 86/56   03/01/18 111/64    Weight from Last 3 Encounters:   03/29/18 154 lb 4.8 oz   03/22/18 153 lb   02/07/18 147 lb 3.2 oz              We Performed the Following     DEBRIDEMENT WOUND UP TO 20 SQ CM        Primary Care Provider Office Phone # Fax #    Willian Arrington [phone]       [facility] [address]  [place] [address]        Equal Access to Services     [facility]: Hadii aad ku hadasho Soomaali, waaxda luqadaha, qaybta kaalmada adeludwigyada, vijay castillo . So [facility] [phone].    ATENCIÓN: Si habla español, tiene a bhatia disposición servicios gratuitos de asistencia lingüística. [facility] [phone].    We comply with applicable federal civil rights laws and [place] laws. We do not discriminate on the basis of race, color, national origin, age, disability, sex, sexual orientation, or gender identity.            Thank you!     Thank you for choosing [facility] W[facility]  for your care. Our goal is always to provide you with excellent care. Hearing back from our patients is one way we can continue to improve our services. Please take a few minutes to complete the written survey that you may receive in the mail after your visit with us. Thank you!             Your Updated Medication List - " Protect others around you: Learn how to safely use, store and throw away your medicines at www.disposemymeds.org.          This list is accurate as of 3/29/18  1:36 PM.  Always use your most recent med list.                   Brand Name Dispense Instructions for use Diagnosis    acetic acid 0.25 % irrigation     10 mL    Irrigate with as directed 2 times daily    S/P vascular surgery       ascorbic acid 500 MG Tabs     7 tablet    Take 1 tablet (500 mg) by mouth daily    Takes dietary supplements       ASPIRIN EC PO      Take 81 mg by mouth daily        carvedilol 6.25 MG tablet    COREG    60 tablet    Take 1 tablet (6.25 mg) by mouth 2 times daily (with meals)    Ischemic cardiomyopathy       ciprofloxacin 500 MG tablet    CIPRO    14 tablet    Take 1 tablet (500 mg) by mouth 2 times daily    Cellulitis and abscess of leg       clindamycin 300 MG capsule    CLEOCIN    28 capsule    Take 1 capsule (300 mg) by mouth 4 times daily    Cellulitis and abscess of leg       clopidogrel 75 MG tablet    PLAVIX    30 tablet    Take 1 tablet (75 mg) by mouth daily    Ischemic cardiomyopathy       diclofenac 1 % Gel topical gel    VOLTAREN    1 Tube    Place 4 g onto the skin 4 times daily    Pain of left lower extremity       docusate sodium 100 MG capsule    COLACE    60 capsule    Take 1 capsule (100 mg) by mouth daily    Takes dietary supplements       enalapril 2.5 MG tablet    VASOTEC    60 tablet    Take 1 tablet (2.5 mg) by mouth 2 times daily    Ischemic cardiomyopathy       escitalopram 20 MG tablet    LEXAPRO    15 tablet    Take 1 tablet (20 mg) by mouth daily    S/P vascular surgery       ezetimibe 10 MG tablet    ZETIA    30 tablet    Take 1 tablet (10 mg) by mouth daily    Ischemic cardiomyopathy       folic acid 1 MG tablet    FOLVITE    30 tablet    Take 1 tablet (1 mg) by mouth daily    Takes dietary supplements       furosemide 20 MG tablet    LASIX    60 tablet    Take 1 tablet (20 mg) by mouth 2 times  daily (with meals)    S/P vascular surgery       * gabapentin 300 MG capsule    NEURONTIN    60 capsule    Take 1 capsule (300 mg) by mouth 2 times daily    S/P vascular surgery       * gabapentin 600 MG tablet    NEURONTIN    30 tablet    Take 1 tablet (600 mg) by mouth At Bedtime    PVD (peripheral vascular disease) (H)       isosorbide mononitrate 30 MG 24 hr tablet    IMDUR    90 tablet    Take 1 tablet (30 mg) by mouth daily    Chronic systolic congestive heart failure (H)       Lidocaine 4 % Gel     30 g    Externally apply topically 2 times daily    Cellulitis and abscess of leg       multivitamin, therapeutic with minerals Tabs tablet     30 each    Take 1 tablet by mouth daily    Takes dietary supplements       order for DME     1 each    Equipment being ordered: Walker Wheels () and Walker () Treatment Diagnosis: impaired mobility    Pain of left lower extremity       oxyCODONE IR 10 MG tablet    ROXICODONE    25 tablet    Take 1 tablet (10 mg) by mouth every 6 hours as needed for moderate to severe pain    Narcotic dependence (H)       polyethylene glycol Packet    MIRALAX/GLYCOLAX    7 packet    Take 17 g by mouth daily    Takes dietary supplements       predniSONE 10 MG tablet    DELTASONE    30 tablet    Take 1 tablet (10 mg) by mouth daily    S/P vascular surgery       rosuvastatin 20 MG tablet    CRESTOR    30 tablet    Take 1 tablet (20 mg) by mouth daily    Ischemic cardiomyopathy       vitamin A 71330 UNIT capsule     7 capsule    Take 2 capsules (20,000 Units) by mouth daily    Takes dietary supplements       zinc sulfate 220 (50 ZN) MG capsule    ZINCATE    7 capsule    Take 1 capsule (220 mg) by mouth daily    Takes dietary supplements       * Notice:  This list has 2 medication(s) that are the same as other medications prescribed for you. Read the directions carefully, and ask your doctor or other care provider to review them with you.

## 2018-03-29 NOTE — NURSING NOTE
"Chief Complaint   Patient presents with     Consult     New Wound Consultation       Vitals:    03/29/18 1121   BP: 99/58   BP Location: Left arm   Patient Position: Chair   Cuff Size: Adult Regular   Pulse: 91   Temp: 97.9  F (36.6  C)   SpO2: 98%   Weight: 154 lb 4.8 oz   Height: 5' 8\"       Body mass index is 23.46 kg/(m^2).      Jazzy Willingham                       "

## 2018-03-29 NOTE — PROGRESS NOTES
[facility] now requests orders and shares plan of care/discharge summaries for some patients through [employer].  Please REPLY TO THIS MESSAGE in order to give authorization for orders when needed.  This is considered a verbal order, you will still receive a faxed copy of orders for signature.  Thank you for your assistance in improving collaboration for our patients.    ORDER    I am Boom Kahn's home care nurse. I know he came into a visit today for his LE wounds. DId he get new wound care orders? I would like to update them in our system. Thank you,     Michaela Lord,  [phone]

## 2018-03-29 NOTE — PROGRESS NOTES
Chief Complaint:   Chief Complaint   Patient presents with     Consult     New Wound Consultation       Subjective: Boom is a 68 year old male who presents to the clinic today for evaluation of chronic arterial ulcers bilateral shins. Referred from Dr. Ag, relevant encounters and imaging reviewed. Patient has history of PAD s/p R femorotibial bypass graft performed 1/15/18. Recent images show graft is patent and RAVI to right leg is normal at 1.04. Left leg RAVI is .65. He last saw Dr. Ag on 3/1 who believes grafting to be necessary to cover exposed tendon of R anterior shin ulcer. Patient was referred to Dr. Haynes of plastic surgery for discussion of grafting procedures. Appointment is 4/18. Boom says that he is going to wait until that appointment and depending upon their recommendations, will either return to [place] or stay in [place]. His son lives here in [place].     Boom says R ulcer has been present for about 1 year. Started approximately golf ball size and has slowly been getting larger, especially lengthwise. Mild pain with dressing changes. He is performing dressing changes twice daily with wet to dry dressings. He is unsure when the L anterior shin ulcer started. This has been healing slowly since applying gauze dressings. This wound has moderate drainage, whereas the RLE wound is fairly dry. He just finished a course of clindamycin and ciprofloxacin.    ROS: Denies purulent drainage, odor, pain, redness or significant increase in swelling to LEs.     Past Medical History:   Diagnosis Date     Anxiety      CAD (coronary artery disease)     s/p 3v CABG     CHF (congestive heart failure) (H)     EF 10-15%     Chronic pain      COPD (chronic obstructive pulmonary disease) (H)      Depression      Hyperlipidemia      Hypertension      Lung cancer (H)     s/p radiation therapy     PAD (peripheral artery disease) (H)     s/p lower extremity stents     Tobacco abuse      Past Surgical History:    Procedure Laterality Date     ANGIOPLASTY Right 10/2016     BYPASS GRAFT ARTERY CORONARY  1999    [facility]     BYPASS GRAFT FEMOROTIBIAL Right 1/15/2018    Procedure: BYPASS GRAFT FEMOROTIBIAL;  Right Femorotibial Bypass Graft with insitu saphenous vein, wound debriedment of right lower leg;  Surgeon: Lexis Ag MD;  Location: [facility] OR     cardiac catheterization       Cardiac defibrillator placement       CHEST TUBE INSERTION       COLON SURGERY  2008    colon resection for diverticulitis     FINE NEEDLE ASPIRATION Right 12/2016     IMPLANT PACEMAKER       previous radiation       No family history on file.  Social History   Substance Use Topics     Smoking status: Current Every Day Smoker     Packs/day: 1.00     Types: Cigarettes     Smokeless tobacco: Never Used      Comment: 0.5-1 ppd.     Alcohol use Yes      Comment: 50 beers/wk     Allergies   Allergen Reactions     Betadine [Povidone Iodine] Blisters     Pt reports erythema, increased pain, and blistering when using betadine on R big toe in past     Collagenase Clostridium Histolyticum      Flagyl [Metronidazole] Other (See Comments)     Flu symptoms  Flu like symptoms     Iodine Unknown     Santyl [Collagenase]      Current Outpatient Rx   Medication Sig Dispense Refill     ciprofloxacin (CIPRO) 500 MG tablet Take 1 tablet (500 mg) by mouth 2 times daily 14 tablet 0     clindamycin (CLEOCIN) 300 MG capsule Take 1 capsule (300 mg) by mouth 4 times daily 28 capsule 0     Lidocaine 4 % GEL Externally apply topically 2 times daily 30 g 1     gabapentin (NEURONTIN) 300 MG capsule Take 1 capsule (300 mg) by mouth 2 times daily 60 capsule 1     gabapentin (NEURONTIN) 600 MG tablet Take 1 tablet (600 mg) by mouth At Bedtime 30 tablet 1     isosorbide mononitrate (IMDUR) 30 MG 24 hr tablet Take 1 tablet (30 mg) by mouth daily 90 tablet 3     carvedilol (COREG) 6.25 MG tablet Take 1 tablet (6.25 mg) by mouth 2 times daily (with meals) 60 tablet 3      "rosuvastatin (CRESTOR) 20 MG tablet Take 1 tablet (20 mg) by mouth daily 30 tablet 11     furosemide (LASIX) 20 MG tablet Take 1 tablet (20 mg) by mouth 2 times daily (with meals) 60 tablet 3     enalapril (VASOTEC) 2.5 MG tablet Take 1 tablet (2.5 mg) by mouth 2 times daily 60 tablet 11     oxyCODONE IR (ROXICODONE) 10 MG tablet Take 1 tablet (10 mg) by mouth every 6 hours as needed for moderate to severe pain 25 tablet 0     diclofenac (VOLTAREN) 1 % GEL topical gel Place 4 g onto the skin 4 times daily 1 Tube 0     order for DME Equipment being ordered: Walker Wheels () and Walker ()  Treatment Diagnosis: impaired mobility 1 each 0     acetic acid 0.25 % irrigation Irrigate with as directed 2 times daily 10 mL 0     escitalopram (LEXAPRO) 20 MG tablet Take 1 tablet (20 mg) by mouth daily 15 tablet 0     ezetimibe (ZETIA) 10 MG tablet Take 1 tablet (10 mg) by mouth daily 30 tablet 0     predniSONE (DELTASONE) 10 MG tablet Take 1 tablet (10 mg) by mouth daily 30 tablet 0     folic acid (FOLVITE) 1 MG tablet Take 1 tablet (1 mg) by mouth daily 30 tablet 0     docusate sodium (COLACE) 100 MG capsule Take 1 capsule (100 mg) by mouth daily 60 capsule 0     polyethylene glycol (MIRALAX/GLYCOLAX) Packet Take 17 g by mouth daily 7 packet 0     zinc sulfate (ZINCATE) 220 (50 ZN) MG capsule Take 1 capsule (220 mg) by mouth daily 7 capsule 0     clopidogrel (PLAVIX) 75 MG tablet Take 1 tablet (75 mg) by mouth daily 30 tablet 0     multivitamin, therapeutic with minerals (THERA-VIT-M) TABS tablet Take 1 tablet by mouth daily 30 each 0     ascorbic acid 500 MG TABS Take 1 tablet (500 mg) by mouth daily 7 tablet 0     vitamin A 03180 UNIT capsule Take 2 capsules (20,000 Units) by mouth daily 7 capsule 0     ASPIRIN EC PO Take 81 mg by mouth daily         Objective:   BP 99/58 (BP Location: Left arm, Patient Position: Chair, Cuff Size: Adult Regular)  Pulse 91  Temp 97.9  F (36.6  C)  Ht 5' 8\"  Wt 154 lb 4.8 oz  " SpO2 98%  BMI 23.46 kg/m2    General: Patient is well groomed, appears stated age, is alert, cooperative and in no acute distress.  Vascular: DP and PT pulses are non-palpable bilaterally. LE capillary refill time is <3 seconds. Absent pedal hair. Moderate LE edema, R>L  MSK: No pain with active or passive ROM of the ankle, MTJ, 1st ray or halluces bilaterally. Equinus present bilaterally.   Neurologic: Gross sensation intact to bilateral LE.   Skin: LE skin texture and turgor are normal. Stasis erythema noted to BL LEs. LLE toes have purple discoloration.    Wound #1  An arterial ulcer is noted at right  anterior shin measuring 12.4 cm x 8 cm x 0.2 cm. Mildly tender to palpation.  Tissue Depth: Tendon  Wound base: Red, Yellow, White/Granulation, Slough, Tendon  Edges: intact, scab  Drainage: light/serous  Odor: No   Undermining: No  Tunneling: No  Bone Exposure: No  Clinical Signs of Infection: No    Wound #2  An arterial ulcer is noted at left  Anterior shin measuring 2.8 cm x 2.8 cm x 0.2 cm. Mildly tender to palpation.  Tissue Depth: subcutaneous  Wound base: Pink, Yellow/Granulation, Slough  Edges: intact  Drainage: small/serous  Odor: No   Undermining: No  Tunneling: No  Bone Exposure: No  Clinical Signs of Infection: No    Previous Laboratory Studies:   Lower Extremity Arterial duplex 2/15/18  Impression:   1. Patent right femoral-PTA bypass graft without hemodynamically  significant stenosis. PTA and TEDDY are patent.  2. Elevated velocity in the right EIA of 321 cm/s, suspicious for  focal stenosis.    US RAVI Doppler 2/15/18  Impression:      Right leg: Resting RAVI is 1.04 (previously 0.97) - Normal (No observed  evidence of increased cardiovascular risk or peripheral arterial  disease.) Mildly abnormal pulse volume recordings in the ankle which  can be seen with tibioperoneal disease.     Left leg: Resting RAVI is 0.65 (previously 0.62) - Abnormal, 0.41-0.90  (Increased cardiovascular risk along with mild to  moderate PVD).  Abnormal pulse volume recordings throughout the lower extremity is  suggestive of inflow disease.    Assessment:   - RLE cellulitis secondary to chronic ulceration  - Chronic right shin arterial ulcer  - Peripheral arterial disease    Plan:   - Pt seen and evaluated. Diagnosis and treatment options were discussed. I agree with Dr. Ag, grafting is likely necessary in this case, especially to cover the exposed tendon. No signs of infection.   - Wound was minimally debrided today. After obtaining patient consent, a #15 blade was used to excisionally debride the devitalized tissue of the wound. The wound edges and base were debrided to healthy, bleeding tissue into subcutaneous tissue at the deepest. No anesthesia was necessary for the procedure.   - Wound care instructions: Wound #1 1. Clean wound with wound cleanser and pat dry. 2. Apply hydrogel to tendon 3. Cover with saline moistened gauze 4. Cover again with 4x4s and kerlix 5. Secure with coban Wound #2 1. Clean with wound cleanser, pat dry. 2. Apply MediHoney 3. Cover with DSD and secure with coban. Perform dressing changes daily to every other day depending on moisture levels.  - Activity: WBAT  - Pt to keep appointments with Dr. Ag and Dr. Haynes for discussion of surgical options and grafting.   - Order supplies to be mailed to son's home   - Follow up with me in 1 month after further decisions have been made by the vascular and plastic surgery teams. RTC sooner if concerns with wound care.

## 2018-03-29 NOTE — LETTER
3/29/2018       RE: Boom Kahn  [address] [place] [id number]     Dear Colleague,    Thank you for referring your patient, Boom Kahn, to the [facility] WOUND CARE at [facility]. Please see a copy of my visit note below.    Chief Complaint:   Chief Complaint   Patient presents with     Consult     New Wound Consultation       Subjective: Boom is a 68 year old male who presents to the clinic today for evaluation of chronic arterial ulcers bilateral shins. Referred from Dr. Ag, relevant encounters and imaging reviewed. Patient has history of PAD s/p R femorotibial bypass graft performed 1/15/18. Recent images show graft is patent and RAVI to right leg is normal at 1.04. Left leg RAVI is .65. He last saw Dr. Ag on 3/1 who believes grafting to be necessary to cover exposed tendon of R anterior shin ulcer. Patient was referred to Dr. Haynes of plastic surgery for discussion of grafting procedures. Appointment is 4/18. Boom says that he is going to wait until that appointment and depending upon their recommendations, will either return to [place] or stay in [place]. His son lives here in [place].     Boom says R ulcer has been present for about 1 year. Started approximately golf ball size and has slowly been getting larger, especially lengthwise. Mild pain with dressing changes. He is performing dressing changes twice daily with wet to dry dressings. He is unsure when the L anterior shin ulcer started. This has been healing slowly since applying gauze dressings. This wound has moderate drainage, whereas the RLE wound is fairly dry. He just finished a course of clindamycin and ciprofloxacin.    ROS: Denies purulent drainage, odor, pain, redness or significant increase in swelling to LEs.     Past Medical History:   Diagnosis Date     Anxiety      CAD (coronary artery disease)     s/p 3v CABG     CHF (congestive heart failure) (H)     EF 10-15%     Chronic pain       COPD (chronic obstructive pulmonary disease) (H)      Depression      Hyperlipidemia      Hypertension      Lung cancer (H)     s/p radiation therapy     PAD (peripheral artery disease) (H)     s/p lower extremity stents     Tobacco abuse      Past Surgical History:   Procedure Laterality Date     ANGIOPLASTY Right 10/2016     BYPASS GRAFT ARTERY CORONARY  1999    [place]     BYPASS GRAFT FEMOROTIBIAL Right 1/15/2018    Procedure: BYPASS GRAFT FEMOROTIBIAL;  Right Femorotibial Bypass Graft with insitu saphenous vein, wound debriedment of right lower leg;  Surgeon: Lexis Ag MD;  Location: [facility] OR     cardiac catheterization       Cardiac defibrillator placement       CHEST TUBE INSERTION       COLON SURGERY  2008    colon resection for diverticulitis     FINE NEEDLE ASPIRATION Right 12/2016     IMPLANT PACEMAKER       previous radiation       No family history on file.  Social History   Substance Use Topics     Smoking status: Current Every Day Smoker     Packs/day: 1.00     Types: Cigarettes     Smokeless tobacco: Never Used      Comment: 0.5-1 ppd.     Alcohol use Yes      Comment: 50 beers/wk     Allergies   Allergen Reactions     Betadine [Povidone Iodine] Blisters     Pt reports erythema, increased pain, and blistering when using betadine on R big toe in past     Collagenase Clostridium Histolyticum      Flagyl [Metronidazole] Other (See Comments)     Flu symptoms  Flu like symptoms     Iodine Unknown     Santyl [Collagenase]      Current Outpatient Rx   Medication Sig Dispense Refill     ciprofloxacin (CIPRO) 500 MG tablet Take 1 tablet (500 mg) by mouth 2 times daily 14 tablet 0     clindamycin (CLEOCIN) 300 MG capsule Take 1 capsule (300 mg) by mouth 4 times daily 28 capsule 0     Lidocaine 4 % GEL Externally apply topically 2 times daily 30 g 1     gabapentin (NEURONTIN) 300 MG capsule Take 1 capsule (300 mg) by mouth 2 times daily 60 capsule 1     gabapentin (NEURONTIN) 600 MG tablet Take 1  tablet (600 mg) by mouth At Bedtime 30 tablet 1     isosorbide mononitrate (IMDUR) 30 MG 24 hr tablet Take 1 tablet (30 mg) by mouth daily 90 tablet 3     carvedilol (COREG) 6.25 MG tablet Take 1 tablet (6.25 mg) by mouth 2 times daily (with meals) 60 tablet 3     rosuvastatin (CRESTOR) 20 MG tablet Take 1 tablet (20 mg) by mouth daily 30 tablet 11     furosemide (LASIX) 20 MG tablet Take 1 tablet (20 mg) by mouth 2 times daily (with meals) 60 tablet 3     enalapril (VASOTEC) 2.5 MG tablet Take 1 tablet (2.5 mg) by mouth 2 times daily 60 tablet 11     oxyCODONE IR (ROXICODONE) 10 MG tablet Take 1 tablet (10 mg) by mouth every 6 hours as needed for moderate to severe pain 25 tablet 0     diclofenac (VOLTAREN) 1 % GEL topical gel Place 4 g onto the skin 4 times daily 1 Tube 0     order for DME Equipment being ordered: Walker Wheels () and Walker ()  Treatment Diagnosis: impaired mobility 1 each 0     acetic acid 0.25 % irrigation Irrigate with as directed 2 times daily 10 mL 0     escitalopram (LEXAPRO) 20 MG tablet Take 1 tablet (20 mg) by mouth daily 15 tablet 0     ezetimibe (ZETIA) 10 MG tablet Take 1 tablet (10 mg) by mouth daily 30 tablet 0     predniSONE (DELTASONE) 10 MG tablet Take 1 tablet (10 mg) by mouth daily 30 tablet 0     folic acid (FOLVITE) 1 MG tablet Take 1 tablet (1 mg) by mouth daily 30 tablet 0     docusate sodium (COLACE) 100 MG capsule Take 1 capsule (100 mg) by mouth daily 60 capsule 0     polyethylene glycol (MIRALAX/GLYCOLAX) Packet Take 17 g by mouth daily 7 packet 0     zinc sulfate (ZINCATE) 220 (50 ZN) MG capsule Take 1 capsule (220 mg) by mouth daily 7 capsule 0     clopidogrel (PLAVIX) 75 MG tablet Take 1 tablet (75 mg) by mouth daily 30 tablet 0     multivitamin, therapeutic with minerals (THERA-VIT-M) TABS tablet Take 1 tablet by mouth daily 30 each 0     ascorbic acid 500 MG TABS Take 1 tablet (500 mg) by mouth daily 7 tablet 0     vitamin A 66713 UNIT capsule Take 2  "capsules (20,000 Units) by mouth daily 7 capsule 0     ASPIRIN EC PO Take 81 mg by mouth daily         Objective:   BP 99/58 (BP Location: Left arm, Patient Position: Chair, Cuff Size: Adult Regular)  Pulse 91  Temp 97.9  F (36.6  C)  Ht 5' 8\"  Wt 154 lb 4.8 oz  SpO2 98%  BMI 23.46 kg/m2    General: Patient is well groomed, appears stated age, is alert, cooperative and in no acute distress.  Vascular: DP and PT pulses are non-palpable bilaterally. LE capillary refill time is <3 seconds. Absent pedal hair. Moderate LE edema, R>L  MSK: No pain with active or passive ROM of the ankle, MTJ, 1st ray or halluces bilaterally. Equinus present bilaterally.   Neurologic: Gross sensation intact to bilateral LE.   Skin: LE skin texture and turgor are normal. Stasis erythema noted to BL LEs. LLE toes have purple discoloration.    Wound #1  An arterial ulcer is noted at right  anterior shin measuring 12.4 cm x 8 cm x 0.2 cm. Mildly tender to palpation.  Tissue Depth: Tendon  Wound base: Red, Yellow, White/Granulation, Slough, Tendon  Edges: intact, scab  Drainage: light/serous  Odor: No   Undermining: No  Tunneling: No  Bone Exposure: No  Clinical Signs of Infection: No    Wound #2  An arterial ulcer is noted at left  Anterior shin measuring 2.8 cm x 2.8 cm x 0.2 cm. Mildly tender to palpation.  Tissue Depth: subcutaneous  Wound base: Pink, Yellow/Granulation, Slough  Edges: intact  Drainage: small/serous  Odor: No   Undermining: No  Tunneling: No  Bone Exposure: No  Clinical Signs of Infection: No    Previous Laboratory Studies:   Lower Extremity Arterial duplex 2/15/18  Impression:   1. Patent right femoral-PTA bypass graft without hemodynamically  significant stenosis. PTA and TEDDY are patent.  2. Elevated velocity in the right EIA of 321 cm/s, suspicious for  focal stenosis.    US RAVI Doppler 2/15/18  Impression:      Right leg: Resting RAVI is 1.04 (previously 0.97) - Normal (No observed  evidence of increased " cardiovascular risk or peripheral arterial  disease.) Mildly abnormal pulse volume recordings in the ankle which  can be seen with tibioperoneal disease.     Left leg: Resting RVAI is 0.65 (previously 0.62) - Abnormal, 0.41-0.90  (Increased cardiovascular risk along with mild to moderate PVD).  Abnormal pulse volume recordings throughout the lower extremity is  suggestive of inflow disease.    Assessment:   - RLE cellulitis secondary to chronic ulceration  - Chronic right shin arterial ulcer  - Peripheral arterial disease    Plan:   - Pt seen and evaluated. Diagnosis and treatment options were discussed. I agree with Dr. Ag, grafting is likely necessary in this case, especially to cover the exposed tendon. No signs of infection.   - Wound was minimally debrided today. After obtaining patient consent, a #15 blade was used to excisionally debride the devitalized tissue of the wound. The wound edges and base were debrided to healthy, bleeding tissue into subcutaneous tissue at the deepest. No anesthesia was necessary for the procedure.   - Wound care instructions: Wound #1 1. Clean wound with wound cleanser and pat dry. 2. Apply hydrogel to tendon 3. Cover with saline moistened gauze 4. Cover again with 4x4s and kerlix 5. Secure with coban Wound #2 1. Clean with wound cleanser, pat dry. 2. Apply MediHoney 3. Cover with DSD and secure with coban. Perform dressing changes daily to every other day depending on moisture levels.  - Activity: WBAT  - Pt to keep appointments with Dr. Ag and Dr. Haynes for discussion of surgical options and grafting.   - Order supplies to be mailed to son's home   - Follow up with me in 1 month after further decisions have been made by the vascular and plastic surgery teams. RTC sooner if concerns with wound care.    Again, thank you for allowing me to participate in the care of your patient.      Sincerely,    Tamiko Mcduffie PA-C

## 2018-03-30 ENCOUNTER — DOCUMENTATION ONLY (OUTPATIENT)
Dept: CARE COORDINATION | Facility: CLINIC | Age: 68
End: 2018-03-30

## 2018-03-30 NOTE — PROGRESS NOTES
[facility] now requests orders and shares plan of care/discharge summaries for some patients through [employer].  Please REPLY TO THIS MESSAGE in order to give authorization for orders when needed.  This is considered a verbal order, you will still receive a faxed copy of orders for signature.  Thank you for your assistance in improving collaboration for our patients.    ORDER       I am Boom Kahn's home care nurse. I know he came into a visit today for his LE wounds. DId he get new wound care orders? I would like to update them in our system. Thank you,      Michaela Lord,  [phone]

## 2018-04-03 ENCOUNTER — TELEPHONE (OUTPATIENT)
Dept: VASCULAR SURGERY | Facility: CLINIC | Age: 68
End: 2018-04-03

## 2018-04-03 NOTE — TELEPHONE ENCOUNTER
[facility] Call Center    Phone Message    May a detailed message be left on voicemail: yes    Reason for Call: Other: need skilled nursing orders from Dr. Ag- 2 times a week for 4 weeks and 2 PRN visits     Action Taken: Message routed to:  [facility] ([facility] Vascular Surgery Clinic

## 2018-04-03 NOTE — PROGRESS NOTES
Home care orders as set in chart currently:     For Michaela FRANCIS,     Wound care instructions:   Wound #1 1. Clean wound with wound cleanser and pat dry. 2. Apply hydrogel to tendon 3. Cover with saline moistened gauze 4. Cover again with 4x4s and kerlix 5. Secure with coban     Wound #2 1. Clean with wound cleanser, pat dry. 2. Apply MediHoney 3. Cover with DSD and secure with coban. Perform dressing changes daily to every other day depending on moisture levels.  - Activity: WBAT    Sent by   Rupali Palmer LPN  Care Coordinator [facility].   [phone]

## 2018-04-04 NOTE — TELEPHONE ENCOUNTER
Verbal orders per Dr Ag  given for continuation of home care: 2 times a week for 4 weeks with 2 PRN visits.

## 2018-04-10 ENCOUNTER — TELEPHONE (OUTPATIENT)
Dept: VASCULAR SURGERY | Facility: CLINIC | Age: 68
End: 2018-04-10

## 2018-04-10 NOTE — TELEPHONE ENCOUNTER
Spoke wit patient's son Dallas who voiced concerns that his Dad continues driving when taking high dose narcotics which are being prescribed to him by a physician in [place].  Dallas also voiced concerns that is Dad is not following up with primary care physician.  Told Dallas that he can discuss these issues further when his Dad has office visit with Dr. Ag on 4/12/2018.  Dallas states he is going to contact his Dad's primary physician, Dr. Rodrigez's office to make an appointment.  Advised Dallas to contact me if any additional questions or concerns develop prior to his appointment with Dr. Ag.      Shannan COLMENARES, CNS  Division of Vascular Surgery  [facility]  Pager [phone]

## 2018-04-11 ENCOUNTER — TELEPHONE (OUTPATIENT)
Dept: WOUND CARE | Facility: CLINIC | Age: 68
End: 2018-04-11

## 2018-04-11 NOTE — TELEPHONE ENCOUNTER
[facility] Call Center    Phone Message    May a detailed message be left on voicemail: yes    Reason for Call: Order(s): Other:   Reason for requested: Medihoney sheets are not covered by insurance.  Order just needs to have that removed.  Otherwise everything else was accurate.   Date needed: asap  Provider name: Tamiko Mcduffie      Action Taken:

## 2018-04-12 ENCOUNTER — OFFICE VISIT (OUTPATIENT)
Dept: VASCULAR SURGERY | Facility: CLINIC | Age: 68
End: 2018-04-12
Payer: COMMERCIAL

## 2018-04-12 ENCOUNTER — ALLIED HEALTH/NURSE VISIT (OUTPATIENT)
Dept: CARE COORDINATION | Facility: CLINIC | Age: 68
End: 2018-04-12

## 2018-04-12 VITALS
SYSTOLIC BLOOD PRESSURE: 83 MMHG | HEART RATE: 70 BPM | DIASTOLIC BLOOD PRESSURE: 55 MMHG | OXYGEN SATURATION: 96 % | RESPIRATION RATE: 17 BRPM

## 2018-04-12 DIAGNOSIS — Z71.9 VISIT FOR COUNSELING: Primary | ICD-10-CM

## 2018-04-12 DIAGNOSIS — I73.9 PAD (PERIPHERAL ARTERY DISEASE) (H): Primary | ICD-10-CM

## 2018-04-12 RX ORDER — FENTANYL 25 UG/1
1 PATCH TRANSDERMAL
COMMUNITY
End: 2018-08-08

## 2018-04-12 ASSESSMENT — PAIN SCALES - GENERAL: PAINLEVEL: SEVERE PAIN (6)

## 2018-04-12 NOTE — MR AVS SNAPSHOT
After Visit Summary   4/12/2018    Boom Kahn    MRN: [id number]           Patient Information     Date Of Birth          1950        Visit Information        Provider Department      4/12/2018 4:45 PM Kathi Foster LICSW [facility]        Today's Diagnoses     Visit for counseling    -  1       Follow-ups after your visit        Your next 10 appointments already scheduled     Apr 18, 2018  9:00 AM CDT   (Arrive by 8:45 AM)   New Plastic Surgery with LOREN Haynes MD   [facility] ([facility])    [address] [place] [id number] [phone]           Do not wear perfume.            Apr 19, 2018 12:00 PM CDT   (Arrive by 11:45 AM)   Return Visit with aTmiko Mcduffie PA-C   [facility] ([facility])    [address] [place] [id number] [phone]              Who to contact     If you have questions or need follow up information about today's clinic visit or your schedule please contact  CASE MANAGEMENT directly at [phone].  Normal or non-critical lab and imaging results will be communicated to you by MyChart, letter or phone within 4 business days after the clinic has received the results. If you do not hear from us within 7 days, please contact the clinic through MyChart or phone. If you have a critical or abnormal lab result, we will notify you by phone as soon as possible.  Submit refill requests through [employer] or call your pharmacy and they will forward the refill request to us. Please allow 3 business days for your refill to be completed.          Additional Information About Your Visit        MyChart Information     [employer] gives you secure access to your electronic health record. If you see a primary care provider, you can also send messages to your care team and make appointments. If you have questions, please call your  primary care clinic.  If you do not have a primary care provider, please call [phone] and they will assist you.        Care EveryWhere ID     This is your Care EveryWhere ID. This could be used by other organizations to access your [facility] medical records  [id number]         Blood Pressure from Last 3 Encounters:   04/12/18 (!) 83/55   03/29/18 99/58   03/22/18 (!) 86/56    Weight from Last 3 Encounters:   03/29/18 70 kg (154 lb 4.8 oz)   03/22/18 69.4 kg (153 lb)   02/07/18 66.8 kg (147 lb 3.2 oz)              Today, you had the following     No orders found for display       Primary Care Provider Office Phone # Fax #    Willian Arrington [phone]       [facility] [address]  [facility] [address]        Equal Access to Services     [facility]: Hadii aad ku hadasho Soomaali, waaxda luqadaha, qaybta kaalmada adeegyada, waxay idiin hayaan winston leonarajovany castillo . So [facility] [phone].    ATENCIÓN: Si habla español, tiene a bhatia disposición servicios gratuitos de asistencia lingüística. Llame al [phone].    We comply with applicable federal civil rights laws and [place] laws. We do not discriminate on the basis of race, color, national origin, age, disability, sex, sexual orientation, or gender identity.            Thank you!     Thank you for choosing [facility]  for your care. Our goal is always to provide you with excellent care. Hearing back from our patients is one way we can continue to improve our services. Please take a few minutes to complete the written survey that you may receive in the mail after your visit with us. Thank you!             Your Updated Medication List - Protect others around you: Learn how to safely use, store and throw away your medicines at www.disposemymeds.org.          This list is accurate as of 4/12/18  4:52 PM.  Always use your most recent med list.                   Brand Name Dispense Instructions for use Diagnosis     acetic acid 0.25 % irrigation     10 mL    Irrigate with as directed 2 times daily    S/P vascular surgery       ascorbic acid 500 MG Tabs     7 tablet    Take 1 tablet (500 mg) by mouth daily    Takes dietary supplements       ASPIRIN EC PO      Take 81 mg by mouth daily        carvedilol 6.25 MG tablet    COREG    60 tablet    Take 1 tablet (6.25 mg) by mouth 2 times daily (with meals)    Ischemic cardiomyopathy       ciprofloxacin 500 MG tablet    CIPRO    14 tablet    Take 1 tablet (500 mg) by mouth 2 times daily    Cellulitis and abscess of leg       clindamycin 300 MG capsule    CLEOCIN    28 capsule    Take 1 capsule (300 mg) by mouth 4 times daily    Cellulitis and abscess of leg       clopidogrel 75 MG tablet    PLAVIX    30 tablet    Take 1 tablet (75 mg) by mouth daily    Ischemic cardiomyopathy       diclofenac 1 % Gel topical gel    VOLTAREN    1 Tube    Place 4 g onto the skin 4 times daily    Pain of left lower extremity       docusate sodium 100 MG capsule    COLACE    60 capsule    Take 1 capsule (100 mg) by mouth daily    Takes dietary supplements       enalapril 2.5 MG tablet    VASOTEC    60 tablet    Take 1 tablet (2.5 mg) by mouth 2 times daily    Ischemic cardiomyopathy       escitalopram 20 MG tablet    LEXAPRO    15 tablet    Take 1 tablet (20 mg) by mouth daily    S/P vascular surgery       ezetimibe 10 MG tablet    ZETIA    30 tablet    Take 1 tablet (10 mg) by mouth daily    Ischemic cardiomyopathy       fentaNYL 25 mcg/hr 72 hr patch    DURAGESIC     Place 1 patch onto the skin every 72 hours remove old patch.        folic acid 1 MG tablet    FOLVITE    30 tablet    Take 1 tablet (1 mg) by mouth daily    Takes dietary supplements       furosemide 20 MG tablet    LASIX    60 tablet    Take 1 tablet (20 mg) by mouth 2 times daily (with meals)    S/P vascular surgery       * gabapentin 300 MG capsule    NEURONTIN    60 capsule    Take 1 capsule (300 mg) by mouth 2 times daily    S/P  vascular surgery       * gabapentin 600 MG tablet    NEURONTIN    30 tablet    Take 1 tablet (600 mg) by mouth At Bedtime    PVD (peripheral vascular disease) (H)       isosorbide mononitrate 30 MG 24 hr tablet    IMDUR    90 tablet    Take 1 tablet (30 mg) by mouth daily    Chronic systolic congestive heart failure (H)       Lidocaine 4 % Gel     30 g    Externally apply topically 2 times daily    Cellulitis and abscess of leg       multivitamin, therapeutic with minerals Tabs tablet     30 each    Take 1 tablet by mouth daily    Takes dietary supplements       order for DME     1 each    Equipment being ordered: Walker Wheels () and Walker () Treatment Diagnosis: impaired mobility    Pain of left lower extremity       oxyCODONE IR 10 MG tablet    ROXICODONE    25 tablet    Take 1 tablet (10 mg) by mouth every 6 hours as needed for moderate to severe pain    Narcotic dependence (H)       polyethylene glycol Packet    MIRALAX/GLYCOLAX    7 packet    Take 17 g by mouth daily    Takes dietary supplements       predniSONE 10 MG tablet    DELTASONE    30 tablet    Take 1 tablet (10 mg) by mouth daily    S/P vascular surgery       rosuvastatin 20 MG tablet    CRESTOR    30 tablet    Take 1 tablet (20 mg) by mouth daily    Ischemic cardiomyopathy       vitamin A 61802 UNIT capsule     7 capsule    Take 2 capsules (20,000 Units) by mouth daily    Takes dietary supplements       zinc sulfate 220 (50 Zn) MG capsule    ZINCATE    7 capsule    Take 1 capsule (220 mg) by mouth daily    Takes dietary supplements       * Notice:  This list has 2 medication(s) that are the same as other medications prescribed for you. Read the directions carefully, and ask your doctor or other care provider to review them with you.

## 2018-04-12 NOTE — NURSING NOTE
Chief Complaint   Patient presents with     RECHECK     6 week follow up femorotinbial graft        Vitals:    04/12/18 1521   BP: (!) 83/55   BP Location: Left arm   Pulse: 70   Resp: 17   SpO2: 96%       There is no height or weight on file to calculate BMI.              Doris Baumann LPN

## 2018-04-12 NOTE — PROGRESS NOTES
Vascular Surgery Clinic Note    Mr. Boom Kahn is a 68-year old man with hypertension, anxiety/depression, alcohol abuse, tobacco abuse, ICM/MI, CAD s/p 3x CABG and PCI, HFrEF (EF 25-30%)s/p ICD, right lung cancer s/p radiation therapy and peripheral air vascular disease. He underwent right femoral endarterectomy with in-situ GSV femoral to posterior tibial artery bypass on 1/15/2018 to heal a right anterior ankle wound. There is tendon exposed in the wound bed and unfortunately it has been getting larger. The patient is still drinking alcohol on a daily basis up to 8 beers a day. He only eats only one meal a day with microwave dinner. He also uses narcotic for chronic pain control and is still smoking. He is changing his own dressings daily.      BP (!) 83/55 (BP Location: Left arm)  Pulse 70  Resp 17  SpO2 96%  General: Awake and looks tired  HEENT: NC, AT  Respiratory: not in distress  Abdomen: soft, non-tender  Ext: Right anterior leg wound with muscle and tendon exposed. There are some granulation tissue in the wound bed. Left anterior leg wound about 3 x 3 cm with granulation tissue in the wound bed.  Pulses: Biphasic dopplerable PT on the right. Monophasic DP. Monophasic DP and PT on the left.    Assessment:  68-year old man is 3 months s/p right fem-PT bypass with GSV. His graft is patent, but his wound is not healing mostly due to malnutrition, alcohol and tobacco abuse.    - He has set up an appointment with plastic surgery next week for possible skin graft to cover his wound. He does have some granulation tissue in the wound bed.  - We have him talk to our  today to figure out a better living situation. He wants to meet with the plastic surgeon first and call the  for more help after.  - Follow up with vascular surgery in 2-4 weeks after seen plastic surgery.

## 2018-04-12 NOTE — LETTER
4/12/2018       RE: Boom Kahn  [address] [place] [id number]     Dear Colleague,    Thank you for referring your patient, Boom Kahn, to the [facility]ASCULAR [facility]INIC at [facility]. Please see a copy of my visit note below.    Vascular Surgery Clinic Note    Mr. Boom Kahn is a 68-year old man with hypertension, anxiety/depression, alcohol abuse, tobacco abuse, ICM/MI, CAD s/p 3x CABG and PCI, HFrEF (EF 25-30%)s/p ICD, right lung cancer s/p radiation therapy and peripheral air vascular disease. He underwent right femoral endarterectomy with in-situ GSV femoral to posterior tibial artery bypass on 1/15/2018 to heal a right anterior ankle wound. There is tendon exposed in the wound bed and unfortunately it has been getting larger. The patient is still drinking alcohol on a daily basis up to 8 beers a day. He only eats only one meal a day with microwave dinner. He also uses narcotic for chronic pain control and is still smoking. He is changing his own dressings daily.      BP (!) 83/55 (BP Location: Left arm)  Pulse 70  Resp 17  SpO2 96%  General: Awake and looks tired  HEENT: NC, AT  Respiratory: not in distress  Abdomen: soft, non-tender  Ext: Right anterior leg wound with muscle and tendon exposed. There are some granulation tissue in the wound bed. Left anterior leg wound about 3 x 3 cm with granulation tissue in the wound bed.  Pulses: Biphasic dopplerable PT on the right. Monophasic DP. Monophasic DP and PT on the left.    Assessment:  68-year old man is 3 months s/p right fem-PT bypass with GSV. His graft is patent, but his wound is not healing mostly due to malnutrition, alcohol and tobacco abuse.    - He has set up an appointment with plastic surgery next week for possible skin graft to cover his wound. He does have some granulation tissue in the wound bed.  - We have him talk to our  today to figure out a better living situation. He  wants to meet with the plastic surgeon first and call the  for more help after.  - Follow up with vascular surgery in 2-4 weeks after seen plastic surgery.    I saw the patient with the resident.  I agree with the resident note and plan of care attached.  Patient is in a lot of trouble with very guarded long term prognosis in opinion.  Not eating well, drinking alcohol.  Wounds increasing in size despite patent bypass.  [place] from [place] of [place] source.  This is a disastrous set-up.  Had him speak to our .  Not sure how we change this trajectory.     Bypass is patent.  Planned to see plastics, but probably will need several changes before he is a good candidate for surgery.  I will plan to see him back in three months to see how things are going.           I spent >50% of this 25 min visit in counseling    Again, thank you for allowing me to participate in the care of your patient.      Sincerely,    Lexis Ag MD

## 2018-04-12 NOTE — MR AVS SNAPSHOT
After Visit Summary   4/12/2018    Boom Kahn    MRN: [id number]           Patient Information     Date Of Birth          1950        Visit Information        Provider Department      4/12/2018 3:30 PM Lexis Ag MD [facility]        Today's Diagnoses     PAD (peripheral artery disease) (H)    -  1       Follow-ups after your visit        Follow-up notes from your care team     Return in about 3 months (around 7/12/2018).      Your next 10 appointments already scheduled     Apr 26, 2018  1:00 PM CDT   (Arrive by 12:45 PM)   [facility] ([facility]    [address] [place] [id number] [phone]            May 03, 2018  1:00 PM CDT   (Arrive by 12:45 PM)   Return Visit with SHAYY [facility] ([facility])    [address] [place] [address] [id number] [phone]            May 10, 2018  1:00 PM CDT   (Arrive by 12:45 PM)   Return Visit with SHAYY Sinclair [facility] ([facility])    [address] [place] [id number] [phone]            May 17, 2018  1:00 PM CDT   (Arrive by 12:45 PM)   Return Visit with SHAYY Sinclair [facility] ([facility])    [address] [place] [id number] [phone]            May 24, 2018  1:00 PM CDT   (Arrive by 12:45 PM)   Return Visit with SHAYY [facility] ([facility])    [address] [place] [address] [id number] [phone]              Who to contact     Please call your clinic at [phone] to:    Ask questions about your health    Make or cancel appointments    Discuss your medicines    Learn about your test results    Speak to your doctor            Additional Information  About Your Visit        Edgar[employer] Information     [employer] gives you secure access to your electronic health record. If you see a primary care provider, you can also send messages to your care team and make appointments. If you have questions, please call your primary care clinic.  If you do not have a primary care provider, please call [phone] and they will assist you.      [employer] is an electronic gateway that provides easy, online access to your medical records. With [employer], you can request a clinic appointment, read your test results, renew a prescription or communicate with your care team.     To access your existing account, please contact your [facility] or call [phone] for assistance.        Care EveryWhere ID     This is your Care EveryWhere ID. This could be used by other organizations to access your [place] medical records  [id number]        Your Vitals Were     Pulse Respirations Pulse Oximetry             70 17 96%          Blood Pressure from Last 3 Encounters:   04/18/18 99/50   04/12/18 (!) 83/55   03/29/18 99/58    Weight from Last 3 Encounters:   04/18/18 154 lb   03/29/18 154 lb 4.8 oz   03/22/18 153 lb              Today, you had the following     No orders found for display      Information about OPIOIDS     PRESCRIPTION OPIOIDS: WHAT YOU NEED TO KNOW   You have a prescription for an opioid (narcotic) pain medicine. Opioids can cause addiction. If you have a history of chemical dependency of any type, you are at a higher risk of becoming addicted to opioids. Only take this medicine after all other options have been tried. Take it for as short a time and as few doses as possible.     Do not:    Drive. If you drive while taking these medicines, you could be arrested for driving under the influence (DUI).    Operate heavy machinery    Do any other dangerous activities while taking these medicines.     Drink any alcohol while taking these medicines.       Take with any other medicines that contain acetaminophen. Read all labels carefully. Look for the word  acetaminophen  or  Tylenol.  Ask your pharmacist if you have questions or are unsure.    Store your pills in a secure place, locked if possible. We will not replace any lost or stolen medicine. If you don t finish your medicine, please throw away (dispose) as directed by your pharmacist. The [place] Pollution Control Agency has more information about safe disposal: https://www.pca.[facility].mn.us/living-green/managing-unwanted-medications    All opioids tend to cause constipation. Drink plenty of water and eat foods that have a lot of fiber, such as fruits, vegetables, prune juice, apple juice and high-fiber cereal. Take a laxative (Miralax, milk of magnesia, Colace, Senna) if you don t move your bowels at least every other day.          Primary Care Provider Office Phone # Fax #    Willian Arrington [phone]       [facility] [address]  [place] [address]        Equal Access to Services     [facility] AH: Hadii aad ku hadasho Soomaali, waaxda luqadaha, qaybta kaalmada adeegyada, waxay idiin hayquentinn winston khlynda castillo . So [facility] [phone].    ATENCIÓN: Si habla español, tiene a bhatia disposición servicios gratuitos de asistencia lingüística. [facility] [phone].    We comply with applicable federal civil rights laws and [place] laws. We do not discriminate on the basis of race, color, national origin, age, disability, sex, sexual orientation, or gender identity.            Thank you!     Thank you for choosing [facility]ASCULAR [facility]INIC  for your care. Our goal is always to provide you with excellent care. Hearing back from our patients is one way we can continue to improve our services. Please take a few minutes to complete the written survey that you may receive in the mail after your visit with us. Thank you!             Your Updated Medication List - Protect others  around you: Learn how to safely use, store and throw away your medicines at www.disposemymeds.org.          This list is accurate as of 4/12/18 11:59 PM.  Always use your most recent med list.                   Brand Name Dispense Instructions for use Diagnosis    acetic acid 0.25 % irrigation     10 mL    Irrigate with as directed 2 times daily    S/P vascular surgery       ascorbic acid 500 MG Tabs     7 tablet    Take 1 tablet (500 mg) by mouth daily    Takes dietary supplements       ASPIRIN EC PO      Take 81 mg by mouth daily        carvedilol 6.25 MG tablet    COREG    60 tablet    Take 1 tablet (6.25 mg) by mouth 2 times daily (with meals)    Ischemic cardiomyopathy       ciprofloxacin 500 MG tablet    CIPRO    14 tablet    Take 1 tablet (500 mg) by mouth 2 times daily    Cellulitis and abscess of leg       clindamycin 300 MG capsule    CLEOCIN    28 capsule    Take 1 capsule (300 mg) by mouth 4 times daily    Cellulitis and abscess of leg       clopidogrel 75 MG tablet    PLAVIX    30 tablet    Take 1 tablet (75 mg) by mouth daily    Ischemic cardiomyopathy       diclofenac 1 % Gel topical gel    VOLTAREN    1 Tube    Place 4 g onto the skin 4 times daily    Pain of left lower extremity       docusate sodium 100 MG capsule    COLACE    60 capsule    Take 1 capsule (100 mg) by mouth daily    Takes dietary supplements       enalapril 2.5 MG tablet    VASOTEC    60 tablet    Take 1 tablet (2.5 mg) by mouth 2 times daily    Ischemic cardiomyopathy       escitalopram 20 MG tablet    LEXAPRO    15 tablet    Take 1 tablet (20 mg) by mouth daily    S/P vascular surgery       ezetimibe 10 MG tablet    ZETIA    30 tablet    Take 1 tablet (10 mg) by mouth daily    Ischemic cardiomyopathy       fentaNYL 25 mcg/hr 72 hr patch    DURAGESIC     Place 1 patch onto the skin every 72 hours remove old patch.        folic acid 1 MG tablet    FOLVITE    30 tablet    Take 1 tablet (1 mg) by mouth daily    Takes dietary  supplements       furosemide 20 MG tablet    LASIX    60 tablet    Take 1 tablet (20 mg) by mouth 2 times daily (with meals)    S/P vascular surgery       * gabapentin 300 MG capsule    NEURONTIN    60 capsule    Take 1 capsule (300 mg) by mouth 2 times daily    S/P vascular surgery       * gabapentin 600 MG tablet    NEURONTIN    30 tablet    Take 1 tablet (600 mg) by mouth At Bedtime    PVD (peripheral vascular disease) (H)       isosorbide mononitrate 30 MG 24 hr tablet    IMDUR    90 tablet    Take 1 tablet (30 mg) by mouth daily    Chronic systolic congestive heart failure (H)       Lidocaine 4 % Gel     30 g    Externally apply topically 2 times daily    Cellulitis and abscess of leg       multivitamin, therapeutic with minerals Tabs tablet     30 each    Take 1 tablet by mouth daily    Takes dietary supplements       order for DME     1 each    Equipment being ordered: Walker Wheels () and Walker () Treatment Diagnosis: impaired mobility    Pain of left lower extremity       oxyCODONE IR 10 MG tablet    ROXICODONE    25 tablet    Take 1 tablet (10 mg) by mouth every 6 hours as needed for moderate to severe pain    Narcotic dependence (H)       polyethylene glycol Packet    MIRALAX/GLYCOLAX    7 packet    Take 17 g by mouth daily    Takes dietary supplements       predniSONE 10 MG tablet    DELTASONE    30 tablet    Take 1 tablet (10 mg) by mouth daily    S/P vascular surgery       rosuvastatin 20 MG tablet    CRESTOR    30 tablet    Take 1 tablet (20 mg) by mouth daily    Ischemic cardiomyopathy       vitamin A 16991 UNIT capsule     7 capsule    Take 2 capsules (20,000 Units) by mouth daily    Takes dietary supplements       zinc sulfate 220 (50 Zn) MG capsule    ZINCATE    7 capsule    Take 1 capsule (220 mg) by mouth daily    Takes dietary supplements       * Notice:  This list has 2 medication(s) that are the same as other medications prescribed for you. Read the directions carefully, and ask  your doctor or other care provider to review them with you.

## 2018-04-18 ENCOUNTER — OFFICE VISIT (OUTPATIENT)
Dept: PLASTIC SURGERY | Facility: CLINIC | Age: 68
End: 2018-04-18
Payer: COMMERCIAL

## 2018-04-18 VITALS
HEIGHT: 68 IN | HEART RATE: 70 BPM | WEIGHT: 154 LBS | BODY MASS INDEX: 23.34 KG/M2 | OXYGEN SATURATION: 100 % | SYSTOLIC BLOOD PRESSURE: 99 MMHG | DIASTOLIC BLOOD PRESSURE: 50 MMHG

## 2018-04-18 DIAGNOSIS — T14.8XXA NONHEALING NONSURGICAL WOUND: Primary | ICD-10-CM

## 2018-04-18 DIAGNOSIS — I73.9 PVD (PERIPHERAL VASCULAR DISEASE) (H): ICD-10-CM

## 2018-04-18 DIAGNOSIS — S81.809A NON-HEALING WOUND OF LOWER EXTREMITY: ICD-10-CM

## 2018-04-18 LAB
ALBUMIN SERPL-MCNC: 3.2 G/DL (ref 3.4–5)
ANION GAP SERPL CALCULATED.3IONS-SCNC: 6 MMOL/L (ref 3–14)
BUN SERPL-MCNC: 12 MG/DL (ref 7–30)
CALCIUM SERPL-MCNC: 9.1 MG/DL (ref 8.5–10.1)
CHLORIDE SERPL-SCNC: 103 MMOL/L (ref 94–109)
CO2 SERPL-SCNC: 28 MMOL/L (ref 20–32)
CREAT SERPL-MCNC: 0.91 MG/DL (ref 0.66–1.25)
ERYTHROCYTE [DISTWIDTH] IN BLOOD BY AUTOMATED COUNT: 12.5 % (ref 10–15)
GFR SERPL CREATININE-BSD FRML MDRD: 83 ML/MIN/1.7M2
GLUCOSE SERPL-MCNC: 100 MG/DL (ref 70–99)
HCT VFR BLD AUTO: 36.4 % (ref 40–53)
HGB BLD-MCNC: 12 G/DL (ref 13.3–17.7)
INR PPP: 1.05 (ref 0.86–1.14)
MCH RBC QN AUTO: 32.2 PG (ref 26.5–33)
MCHC RBC AUTO-ENTMCNC: 33 G/DL (ref 31.5–36.5)
MCV RBC AUTO: 98 FL (ref 78–100)
PLATELET # BLD AUTO: 347 10E9/L (ref 150–450)
POTASSIUM SERPL-SCNC: 4.2 MMOL/L (ref 3.4–5.3)
PREALB SERPL IA-MCNC: 18 MG/DL (ref 15–45)
RBC # BLD AUTO: 3.73 10E12/L (ref 4.4–5.9)
SODIUM SERPL-SCNC: 137 MMOL/L (ref 133–144)
WBC # BLD AUTO: 6.6 10E9/L (ref 4–11)

## 2018-04-18 ASSESSMENT — PAIN SCALES - GENERAL: PAINLEVEL: NO PAIN (0)

## 2018-04-18 NOTE — MR AVS SNAPSHOT
"              After Visit Summary   4/18/2018    Boom Kahn    MRN: [id number]           Patient Information     Date Of Birth          1950        Visit Information        Provider Department      4/18/2018 9:00 AM LOREN Haynes MD [facility]         Follow-ups after your visit        Your next 10 appointments already scheduled     Apr 19, 2018 12:00 PM CDT   (Arrive by 11:45 AM)   Return Visit with SHAYY Sinclair [facility] ([facility])    [address] [place] [address]  [facility] [id number] [phone]              Who to contact     Please call your clinic at [phone] to:    Ask questions about your health    Make or cancel appointments    Discuss your medicines    Learn about your test results    Speak to your doctor            Additional Information About Your Visit        MyChart Information     [employer] gives you secure access to your electronic health record. If you see a primary care provider, you can also send messages to your care team and make appointments. If you have questions, please call your primary care clinic.  If you do not have a primary care provider, please call [phone] and they will assist you.      [employer] is an electronic gateway that provides easy, online access to your medical records. With [employer], you can request a clinic appointment, read your test results, renew a prescription or communicate with your care team.     To access your existing account, please contact your [facility] or call [phone] for assistance.        Care EveryWhere ID     This is your Care EveryWhere ID. This could be used by other organizations to access your [place] medical records  [id number]        Your Vitals Were     Pulse Height Pulse Oximetry BMI (Body Mass Index)          70 5' 8\" 100% 23.42 kg/m2         Blood Pressure from Last 3 Encounters:   04/18/18 99/50 "   04/12/18 (!) 83/55   03/29/18 99/58    Weight from Last 3 Encounters:   04/18/18 154 lb   03/29/18 154 lb 4.8 oz   03/22/18 153 lb              Today, you had the following     No orders found for display       Primary Care Provider Office Phone # Fax #    Willian Arrington [phone]       [facility] [address]  [facility] [address]        Equal Access to Services     TANJA DORADO : Hadii aad ku hadasho Soomaali, waaxda luqadaha, qaybta kaalmada adeegyada, waxay idiin hayaan adeeg kharash la'aan . So [facility] [phone].    ATENCIÓN: Si habla español, tiene a bhatia disposición servicios gratuitos de asistencia lingüística. [facility] [phone].    We comply with applicable federal civil rights laws and [place] laws. We do not discriminate on the basis of race, color, national origin, age, disability, sex, sexual orientation, or gender identity.            Thank you!     Thank you for choosing [facility] PLASTIC AND RECONSTRUCTIVE SURGERY  for your care. Our goal is always to provide you with excellent care. Hearing back from our patients is one way we can continue to improve our services. Please take a few minutes to complete the written survey that you may receive in the mail after your visit with us. Thank you!             Your Updated Medication List - Protect others around you: Learn how to safely use, store and throw away your medicines at www.disposemymeds.org.          This list is accurate as of 4/18/18  9:11 AM.  Always use your most recent med list.                   Brand Name Dispense Instructions for use Diagnosis    acetic acid 0.25 % irrigation     10 mL    Irrigate with as directed 2 times daily    S/P vascular surgery       ascorbic acid 500 MG Tabs     7 tablet    Take 1 tablet (500 mg) by mouth daily    Takes dietary supplements       ASPIRIN EC PO      Take 81 mg by mouth daily        carvedilol 6.25 MG tablet    COREG    60 tablet    Take 1 tablet (6.25 mg) by  mouth 2 times daily (with meals)    Ischemic cardiomyopathy       ciprofloxacin 500 MG tablet    CIPRO    14 tablet    Take 1 tablet (500 mg) by mouth 2 times daily    Cellulitis and abscess of leg       clindamycin 300 MG capsule    CLEOCIN    28 capsule    Take 1 capsule (300 mg) by mouth 4 times daily    Cellulitis and abscess of leg       clopidogrel 75 MG tablet    PLAVIX    30 tablet    Take 1 tablet (75 mg) by mouth daily    Ischemic cardiomyopathy       diclofenac 1 % Gel topical gel    VOLTAREN    1 Tube    Place 4 g onto the skin 4 times daily    Pain of left lower extremity       docusate sodium 100 MG capsule    COLACE    60 capsule    Take 1 capsule (100 mg) by mouth daily    Takes dietary supplements       enalapril 2.5 MG tablet    VASOTEC    60 tablet    Take 1 tablet (2.5 mg) by mouth 2 times daily    Ischemic cardiomyopathy       escitalopram 20 MG tablet    LEXAPRO    15 tablet    Take 1 tablet (20 mg) by mouth daily    S/P vascular surgery       ezetimibe 10 MG tablet    ZETIA    30 tablet    Take 1 tablet (10 mg) by mouth daily    Ischemic cardiomyopathy       fentaNYL 25 mcg/hr 72 hr patch    DURAGESIC     Place 1 patch onto the skin every 72 hours remove old patch.        folic acid 1 MG tablet    FOLVITE    30 tablet    Take 1 tablet (1 mg) by mouth daily    Takes dietary supplements       furosemide 20 MG tablet    LASIX    60 tablet    Take 1 tablet (20 mg) by mouth 2 times daily (with meals)    S/P vascular surgery       * gabapentin 300 MG capsule    NEURONTIN    60 capsule    Take 1 capsule (300 mg) by mouth 2 times daily    S/P vascular surgery       * gabapentin 600 MG tablet    NEURONTIN    30 tablet    Take 1 tablet (600 mg) by mouth At Bedtime    PVD (peripheral vascular disease) (H)       isosorbide mononitrate 30 MG 24 hr tablet    IMDUR    90 tablet    Take 1 tablet (30 mg) by mouth daily    Chronic systolic congestive heart failure (H)       Lidocaine 4 % Gel     30 g     Externally apply topically 2 times daily    Cellulitis and abscess of leg       multivitamin, therapeutic with minerals Tabs tablet     30 each    Take 1 tablet by mouth daily    Takes dietary supplements       order for DME     1 each    Equipment being ordered: Walker Wheels () and Walker () Treatment Diagnosis: impaired mobility    Pain of left lower extremity       oxyCODONE IR 10 MG tablet    ROXICODONE    25 tablet    Take 1 tablet (10 mg) by mouth every 6 hours as needed for moderate to severe pain    Narcotic dependence (H)       polyethylene glycol Packet    MIRALAX/GLYCOLAX    7 packet    Take 17 g by mouth daily    Takes dietary supplements       predniSONE 10 MG tablet    DELTASONE    30 tablet    Take 1 tablet (10 mg) by mouth daily    S/P vascular surgery       rosuvastatin 20 MG tablet    CRESTOR    30 tablet    Take 1 tablet (20 mg) by mouth daily    Ischemic cardiomyopathy       vitamin A 13318 UNIT capsule     7 capsule    Take 2 capsules (20,000 Units) by mouth daily    Takes dietary supplements       zinc sulfate 220 (50 Zn) MG capsule    ZINCATE    7 capsule    Take 1 capsule (220 mg) by mouth daily    Takes dietary supplements       * Notice:  This list has 2 medication(s) that are the same as other medications prescribed for you. Read the directions carefully, and ask your doctor or other care provider to review them with you.

## 2018-04-18 NOTE — PROGRESS NOTES
REFERRING PHYSICIAN:  Dennise Arita NP.      PRESENTING COMPLAINT:  Consultation for a right-sided nonhealing pretibial wound.      HISTORY OF PRESENT ILLNESS:  Mr. Kahn is 68 years old.  His history goes back a few years when he had some mild trauma to the right shin/pretibial area in the distal aspect of his leg that has failed to heal in over the years.  Over the years, it has basically worsened, gotten infected a few times and has been undergoing conservative treatment without success.  He was diagnosed with severe peripheral vascular disease in the right leg and ultimately underwent a fem-pop, fem-distal bypass earlier this year.  That increased the blood flow to the leg and helped with the granulation in the wound but it has failed to heal in.  He has been referred to me for my recommendations.      PAST MEDICAL HISTORY:  Severe peripheral vascular disease, congestive heart failure, hypertension, cardiomyopathy, history of lung cancer treated with radiation, on chronic pain medications, arrhythmias.      PAST SURGICAL HISTORY:  Angioplasties, coronary artery bypass grafting, leg bypass grafting, colon resection, diverticulitis and a cardioverter/defibrillator placement.      MEDICATIONS:  Aspirin, carvedilol, Plavix, enalapril, Lexapro, Zetia, fentanyl patch, furosemide, gabapentin, isosorbide mononitrate.      ALLERGIES:  Nil.      SOCIAL HISTORY:  Smokes half a pack to a pack a day for all his life.  Drinks 6-8 beers and vodka a day.  Lives in [place] with his son temporarily.  He is originally from [place].      REVIEW OF SYSTEMS:  Denies active chest pain, has some shortness of breath.  Has had an MI 10 years ago, no CVA, no DVT or PE.      PHYSICAL EXAMINATION:  Vital signs stable.  He is afebrile, in no obvious distress.  He is 5 feet 8 inches, 150 pounds, BMI of 23 kg/m2.  On examination of his right leg, he has mild edema in the right leg, has significant compensatory vasodilation in the  foot and distal leg.  On elevating the leg above the level of the heart all the redness goes away.  There is no evidence for infection.  He has a 10 x 10 cm pale granulating wound in the center of which there is an exposed tibialis anterior tendon.  No evidence for infection.  He has easily dopplerable anterior pulses as well as posterior tibial pulses.  Grossly, sensation is poor, especially in the superficial and deep peroneal system and in the plantar aspect over the heel.      LABORATORY VALUES:  Show an albumin of 3.1, hemoglobin 11.9.      ASSESSMENT AND PLAN:  Based on above findings, a diagnosis of a chronic nonhealing right pretibial wound was made.  I had a long with discussion with the patient and his son about the concept behind healing.  In his particular case, there are a number of variables that are preventing him from healing.  He has poor blood inflow.  He is an active smoker.  He has a chronic wound.  He has significant medical comorbidities, especially CHF, hypertension and COPD, all of which are probably affecting his healing.  I had a sydni conversation that unless he tries to help himself by quitting nicotine, especially smoking I am reluctant to help do anything surgically and I would advise conservative wound management only.  He is adamant that he will come off of active smoking and switch to nicotine lozenges only.  I think that is at a start and I would be willing to do some kind of surgical therapy if he would to do this.  In the meantime he needs to be cleared from a medical standpoint to undergo any surgeries.  The surgery that I would recommend would be to do a debridement of the wound, debridement of the tendon, and then place Integra, followed by VAC therapy, followed by in about 3-4 weeks if the Integra takes well place a split thickness skin graft.  I do not think he is a candidate for a free flap.  If, however, the Integra does not take, then that means he has failed to improve  some of the variable described above and in my opinion conservative wound management would be my advice.  All of this was discussed with the patient and his son.  They are going to think about his social situation, and once he has quit all nicotine, gotten cleared, he will come back to see me and we will plan the next steps.  All questions were answered.  They were happy with the visit.      Total time spent with patient was 40 minutes, more than half was counseling.      cc:   Dennise Arita NP   [facility]   [address] [place] [address], [facility] [address]   [place]  [address]

## 2018-04-18 NOTE — NURSING NOTE
"Chief Complaint   Patient presents with     Consult     wound graft        Vitals:    04/18/18 0902   BP: 99/50   Pulse: 70   SpO2: 100%   Weight: 154 lb   Height: 5' 8\"       Body mass index is 23.42 kg/(m^2).      WOUND EVALUATION:                        "

## 2018-04-19 ENCOUNTER — OFFICE VISIT (OUTPATIENT)
Dept: WOUND CARE | Facility: CLINIC | Age: 68
End: 2018-04-19
Payer: COMMERCIAL

## 2018-04-19 DIAGNOSIS — L97.909 ARTERIAL LEG ULCER (H): Primary | ICD-10-CM

## 2018-04-19 NOTE — PROGRESS NOTES
Chief Complaint: BL arterial wounds    Subjective: Boom is a 68 year old male who presents to the clinic today for follow up of bilateral arterial shin wounds. Patient saw Dr. Haynes who is willing to perform grafting procedures when the patient quits smoking. Boom says he will be in [place] for at least another 5 weeks so is willing to come to wound clinic more often for regular cares. Home nurse is Kassandra, she is visiting him tomorrow. Has been performing wound cares as instructed. Believes both are the same or slightly larger.     ROS: Denies purulent drainage, odor, pain, redness or significant increase in swelling to LEs.     Allergies   Allergen Reactions     Betadine [Povidone Iodine] Blisters     Pt reports erythema, increased pain, and blistering when using betadine on R big toe in past     Collagenase Clostridium Histolyticum      Flagyl [Metronidazole] Other (See Comments)     Flu symptoms  Flu like symptoms     Iodine Unknown     Santyl [Collagenase]      Current Outpatient Rx   Medication Sig Dispense Refill     acetic acid 0.25 % irrigation Irrigate with as directed 2 times daily 10 mL 0     ascorbic acid 500 MG TABS Take 1 tablet (500 mg) by mouth daily 7 tablet 0     ASPIRIN EC PO Take 81 mg by mouth daily       carvedilol (COREG) 6.25 MG tablet Take 1 tablet (6.25 mg) by mouth 2 times daily (with meals) 60 tablet 3     ciprofloxacin (CIPRO) 500 MG tablet Take 1 tablet (500 mg) by mouth 2 times daily 14 tablet 0     clindamycin (CLEOCIN) 300 MG capsule Take 1 capsule (300 mg) by mouth 4 times daily 28 capsule 0     clopidogrel (PLAVIX) 75 MG tablet Take 1 tablet (75 mg) by mouth daily 30 tablet 0     diclofenac (VOLTAREN) 1 % GEL topical gel Place 4 g onto the skin 4 times daily 1 Tube 0     docusate sodium (COLACE) 100 MG capsule Take 1 capsule (100 mg) by mouth daily 60 capsule 0     enalapril (VASOTEC) 2.5 MG tablet Take 1 tablet (2.5 mg) by mouth 2 times daily 60 tablet 11     escitalopram (LEXAPRO) 20  MG tablet Take 1 tablet (20 mg) by mouth daily 15 tablet 0     ezetimibe (ZETIA) 10 MG tablet Take 1 tablet (10 mg) by mouth daily 30 tablet 0     fentaNYL (DURAGESIC) 25 mcg/hr 72 hr patch Place 1 patch onto the skin every 72 hours remove old patch.       folic acid (FOLVITE) 1 MG tablet Take 1 tablet (1 mg) by mouth daily 30 tablet 0     furosemide (LASIX) 20 MG tablet Take 1 tablet (20 mg) by mouth 2 times daily (with meals) 60 tablet 3     gabapentin (NEURONTIN) 300 MG capsule Take 1 capsule (300 mg) by mouth 2 times daily 60 capsule 1     gabapentin (NEURONTIN) 600 MG tablet Take 1 tablet (600 mg) by mouth At Bedtime 30 tablet 1     isosorbide mononitrate (IMDUR) 30 MG 24 hr tablet Take 1 tablet (30 mg) by mouth daily 90 tablet 3     Lidocaine 4 % GEL Externally apply topically 2 times daily 30 g 1     multivitamin, therapeutic with minerals (THERA-VIT-M) TABS tablet Take 1 tablet by mouth daily 30 each 0     order for DME Equipment being ordered: Walker Wheels () and Walker ()  Treatment Diagnosis: impaired mobility 1 each 0     oxyCODONE IR (ROXICODONE) 10 MG tablet Take 1 tablet (10 mg) by mouth every 6 hours as needed for moderate to severe pain 25 tablet 0     polyethylene glycol (MIRALAX/GLYCOLAX) Packet Take 17 g by mouth daily 7 packet 0     predniSONE (DELTASONE) 10 MG tablet Take 1 tablet (10 mg) by mouth daily 30 tablet 0     rosuvastatin (CRESTOR) 20 MG tablet Take 1 tablet (20 mg) by mouth daily 30 tablet 11     vitamin A 58130 UNIT capsule Take 2 capsules (20,000 Units) by mouth daily 7 capsule 0     zinc sulfate (ZINCATE) 220 (50 ZN) MG capsule Take 1 capsule (220 mg) by mouth daily 7 capsule 0       Objective:   There were no vitals taken for this visit.    General: Patient is well groomed, appears stated age, is alert and cooperative, and is in no acute distress.  Skin: LE skin color, texture and turgor are normal. Stasis erythema noted to BL LEs. LLE toes have purple discoloration.      Wound #1  An arterial ulcer is noted at right  anterior shin measuring 13.6 cm x 7.2 cm x 0.2 cm. Mildly tender to palpation.  Tissue Depth: Tendon  Wound base: Red, Yellow, White/Granulation, Slough, Tendon  Edges: intact, scab  Drainage: light/serous  Odor: No   Undermining: No  Tunneling: No  Bone Exposure: No  Clinical Signs of Infection: No     Wound #2  An arterial ulcer is noted at left  Anterior shin measuring 3.2 cm x 3.4 cm x 0.2 cm. Mildly tender to palpation.  Tissue Depth: subcutaneous  Wound base: Pink, Yellow/Granulation, Slough  Edges: intact  Drainage: small/serous  Odor: No   Undermining: No  Tunneling: No  Bone Exposure: No  Clinical Signs of Infection: No    Assessment:   - Chronic right shin arterial ulcer  - Peripheral arterial disease  - Nicotine dependence    Plan:   - Patient plans to stay in [place] for at least another 5 weeks. He is going to try to quit smoking so that Dr. Haynes can perform debridement and grafting in the OR.   - No debridement needed today.  - Because patient will be here for at least 5 weeks, we will try weekly appointments for Endoform application. Again, discussed that our goal is still to quit smoking and follow with Dr. Haynes.  - Update wound care orders for home health nurse, Kassandra ([phone]) - LM on her voicemail.  - Wound Care Instructions: 1. Clean wound with wound cleanser, dry. 2. Apply endoform to red tissue of both wounds (bumpy side down). Moisten lightly with saline. 3. Cover with Wound Veil 4. Cover with 4x4 gauze 5. Wrap with kerlix 6. Wrap with ACE. Change on Saturday and Tuesday. He verbalized understanding. Hopefully home health care can perform this.   - NWB to RLE  - RTC 1 week

## 2018-04-19 NOTE — LETTER
4/19/2018       RE: Boom Kahn  [address] [place] [id number]     Dear Colleague,    Thank you for referring your patient, Boom Kahn, to the [facility] at [facility]. Please see a copy of my visit note below.    Chief Complaint: BL arterial wounds    Subjective: Boom is a 68 year old male who presents to the clinic today for follow up of bilateral arterial shin wounds. Patient saw Dr. Haynes who is willing to perform grafting procedures when the patient quits smoking. Boom says he will be in [place] for at least another 5 weeks so is willing to come to wound clinic more often for regular cares. Home nurse is Kassandra, she is visiting him tomorrow. Has been performing wound cares as instructed. Believes both are the same or slightly larger.     ROS: Denies purulent drainage, odor, pain, redness or significant increase in swelling to LEs.     Allergies   Allergen Reactions     Betadine [Povidone Iodine] Blisters     Pt reports erythema, increased pain, and blistering when using betadine on R big toe in past     Collagenase Clostridium Histolyticum      Flagyl [Metronidazole] Other (See Comments)     Flu symptoms  Flu like symptoms     Iodine Unknown     Santyl [Collagenase]      Current Outpatient Rx   Medication Sig Dispense Refill     acetic acid 0.25 % irrigation Irrigate with as directed 2 times daily 10 mL 0     ascorbic acid 500 MG TABS Take 1 tablet (500 mg) by mouth daily 7 tablet 0     ASPIRIN EC PO Take 81 mg by mouth daily       carvedilol (COREG) 6.25 MG tablet Take 1 tablet (6.25 mg) by mouth 2 times daily (with meals) 60 tablet 3     ciprofloxacin (CIPRO) 500 MG tablet Take 1 tablet (500 mg) by mouth 2 times daily 14 tablet 0     clindamycin (CLEOCIN) 300 MG capsule Take 1 capsule (300 mg) by mouth 4 times daily 28 capsule 0     clopidogrel (PLAVIX) 75 MG tablet Take 1 tablet (75 mg) by mouth daily 30 tablet 0     diclofenac (VOLTAREN) 1 % GEL  topical gel Place 4 g onto the skin 4 times daily 1 Tube 0     docusate sodium (COLACE) 100 MG capsule Take 1 capsule (100 mg) by mouth daily 60 capsule 0     enalapril (VASOTEC) 2.5 MG tablet Take 1 tablet (2.5 mg) by mouth 2 times daily 60 tablet 11     escitalopram (LEXAPRO) 20 MG tablet Take 1 tablet (20 mg) by mouth daily 15 tablet 0     ezetimibe (ZETIA) 10 MG tablet Take 1 tablet (10 mg) by mouth daily 30 tablet 0     fentaNYL (DURAGESIC) 25 mcg/hr 72 hr patch Place 1 patch onto the skin every 72 hours remove old patch.       folic acid (FOLVITE) 1 MG tablet Take 1 tablet (1 mg) by mouth daily 30 tablet 0     furosemide (LASIX) 20 MG tablet Take 1 tablet (20 mg) by mouth 2 times daily (with meals) 60 tablet 3     gabapentin (NEURONTIN) 300 MG capsule Take 1 capsule (300 mg) by mouth 2 times daily 60 capsule 1     gabapentin (NEURONTIN) 600 MG tablet Take 1 tablet (600 mg) by mouth At Bedtime 30 tablet 1     isosorbide mononitrate (IMDUR) 30 MG 24 hr tablet Take 1 tablet (30 mg) by mouth daily 90 tablet 3     Lidocaine 4 % GEL Externally apply topically 2 times daily 30 g 1     multivitamin, therapeutic with minerals (THERA-VIT-M) TABS tablet Take 1 tablet by mouth daily 30 each 0     order for DME Equipment being ordered: Walker Wheels () and Walker ()  Treatment Diagnosis: impaired mobility 1 each 0     oxyCODONE IR (ROXICODONE) 10 MG tablet Take 1 tablet (10 mg) by mouth every 6 hours as needed for moderate to severe pain 25 tablet 0     polyethylene glycol (MIRALAX/GLYCOLAX) Packet Take 17 g by mouth daily 7 packet 0     predniSONE (DELTASONE) 10 MG tablet Take 1 tablet (10 mg) by mouth daily 30 tablet 0     rosuvastatin (CRESTOR) 20 MG tablet Take 1 tablet (20 mg) by mouth daily 30 tablet 11     vitamin A 13396 UNIT capsule Take 2 capsules (20,000 Units) by mouth daily 7 capsule 0     zinc sulfate (ZINCATE) 220 (50 ZN) MG capsule Take 1 capsule (220 mg) by mouth daily 7 capsule 0        Objective:   There were no vitals taken for this visit.    General: Patient is well groomed, appears stated age, is alert and cooperative, and is in no acute distress.  Skin: LE skin color, texture and turgor are normal. Stasis erythema noted to BL LEs. LLE toes have purple discoloration.     Wound #1  An arterial ulcer is noted at right  anterior shin measuring  13.6 cm x 7.2 cm x 0.2 cm. Mildly tender to palpation.  Tissue Depth: Tendon  Wound base: Red, Yellow, White/Granulation, Slough, Tendon  Edges: intact, scab  Drainage: light/serous  Odor: No   Undermining: No  Tunneling: No  Bone Exposure: No  Clinical Signs of Infection: No     Wound #2  An arterial ulcer is noted at left  Anterior shin measuring  3.2 cm x 3.4 cm x 0.2 cm. Mildly tender to palpation.  Tissue Depth: subcutaneous  Wound base: Pink, Yellow/Granulation, Slough  Edges: intact  Drainage: small/serous  Odor: No   Undermining: No  Tunneling: No  Bone Exposure: No  Clinical Signs of Infection: No    Assessment:   - Chronic right shin arterial ulcer  - Peripheral arterial disease  - Nicotine dependence    Plan:   - Patient plans to stay in [place] for at least another 5 weeks. He is going to try to quit smoking so that Dr. Haynes can perform debridement and grafting in the OR.   - No debridement needed today.  - Because patient will be here for at least 5 weeks, we will try weekly appointments for Endoform application. Again, discussed that our goal is still to quit smoking and follow with Dr. Haynes.  - Update wound care orders for home health nurseKassandra ([phone]) - LM on her voicemail.  - Wound Care Instructions: 1. Clean wound with wound cleanser, dry. 2. Apply endoform to red tissue of both wounds (bumpy side down). Moisten lightly with saline. 3. Cover with Wound Veil 4. Cover with 4x4 gauze 5. Wrap with kerlix 6. Wrap with ACE. Change on Saturday and Tuesday. He verbalized understanding. Hopefully home health care can perform this.    - NWB to RLE  - RTC 1 week     Sincerely,    Tamiko Mcduffie PA-C

## 2018-04-19 NOTE — PATIENT INSTRUCTIONS
Wound Care Instructions:   1. Clean wound with wound cleanser, dry.   2. Apply endoform to red tissue of both wounds (bumpy side down). Moisten lightly with saline.   3. Cover with Wound Veil  4. Cover with 4x4 gauze  5. Wrap with kerlix  6. Wrap with ACE     Change dressing on Saturday and Tuesday. I will change dressing again on Thursday in clinic.

## 2018-04-19 NOTE — MR AVS SNAPSHOT
After Visit Summary   4/19/2018    Boom Kahn    MRN: [id number]           Patient Information     Date Of Birth          1950        Visit Information        Provider Department      4/19/2018 12:00 PM Tamiko Mcduffie PA-C [facility]        Care Instructions    Wound Care Instructions:   1. Clean wound with wound cleanser, dry.   2. Apply endoform to red tissue of both wounds (bumpy side down). Moisten lightly with saline.   3. Cover with Wound Veil  4. Cover with 4x4 gauze  5. Wrap with kerlix  6. Wrap with ACE     Change dressing on Saturday and Tuesday. I will change dressing again on Thursday in clinic.          Follow-ups after your visit        Your next 10 appointments already scheduled     Apr 26, 2018  1:00 PM CDT   (Arrive by 12:45 PM)   [facility] ([facility])    [address] [place] [id number] [phone]            May 03, 2018  1:00 PM CDT   (Arrive by 12:45 PM)   Return Visit with [facility]AYY [facility] ([facility])    [address] [place] [id number] [phone]            May 10, 2018  1:00 PM CDT   (Arrive by 12:45 PM)   [facility] Visit with [facility] ([facility])    [address] [place] [id number] [phone]            May 17, 2018  1:00 PM CDT   (Arrive by 12:45 PM)   [facility] with [facility] ([facility])    [address] [place] [id number] [phone]            May 24, 2018  1:00 PM CDT   (Arrive by 12:45 PM)   Return Visit with [facility] ([facility])    [address] [place] [id number] [phone]              Who to contact     Please  call your clinic at [phone] to:    Ask questions about your health    Make or cancel appointments    Discuss your medicines    Learn about your test results    Speak to your doctor            Additional Information About Your Visit        [employer] Information     [employer] gives you secure access to your electronic health record. If you see a primary care provider, you can also send messages to your care team and make appointments. If you have questions, please call your primary care clinic.  If you do not have a primary care provider, please call [phone] and they will assist you.      [employer] is an electronic gateway that provides easy, online access to your medical records. With [employer], you can request a clinic appointment, read your test results, renew a prescription or communicate with your care team.     To access your existing account, please contact your [facility] or call [phone] for assistance.        Care EveryWhere ID     This is your Care EveryWhere ID. This could be used by other organizations to access your [facility] medical records  [id number]         Blood Pressure from Last 3 Encounters:   04/18/18 99/50   04/12/18 (!) 83/55   03/29/18 99/58    Weight from Last 3 Encounters:   04/18/18 154 lb   03/29/18 154 lb 4.8 oz   03/22/18 153 lb              Today, you had the following     No orders found for display       Primary Care Provider Office Phone # Fax #    Willian Arrington [phone]       [facility] [address]  [place] [id number]        Equal Access to Services     CHANEL DORADO [facility]: Hadii aad ku hadasho Soomaali, waaxda luqadaha, qaybta kaalmada adeegyada, wax sally castillo . So [facility] [phone].    ATENCIÓN: Si habla español, tiene a bhatia disposición servicios gratuitos de asistencia lingüística. Llame al [phone].    We comply with applicable federal civil rights laws and [place] laws. We  do not discriminate on the basis of race, color, national origin, age, disability, sex, sexual orientation, or gender identity.            Thank you!     Thank you for choosing [facility]  for your care. Our goal is always to provide you with excellent care. Hearing back from our patients is one way we can continue to improve our services. Please take a few minutes to complete the written survey that you may receive in the mail after your visit with us. Thank you!             Your Updated Medication List - Protect others around you: Learn how to safely use, store and throw away your medicines at www.disposemymeds.org.          This list is accurate as of 4/19/18  1:22 PM.  Always use your most recent med list.                   Brand Name Dispense Instructions for use Diagnosis    acetic acid 0.25 % irrigation     10 mL    Irrigate with as directed 2 times daily    S/P vascular surgery       ascorbic acid 500 MG Tabs     7 tablet    Take 1 tablet (500 mg) by mouth daily    Takes dietary supplements       ASPIRIN EC PO      Take 81 mg by mouth daily        carvedilol 6.25 MG tablet    COREG    60 tablet    Take 1 tablet (6.25 mg) by mouth 2 times daily (with meals)    Ischemic cardiomyopathy       ciprofloxacin 500 MG tablet    CIPRO    14 tablet    Take 1 tablet (500 mg) by mouth 2 times daily    Cellulitis and abscess of leg       clindamycin 300 MG capsule    CLEOCIN    28 capsule    Take 1 capsule (300 mg) by mouth 4 times daily    Cellulitis and abscess of leg       clopidogrel 75 MG tablet    PLAVIX    30 tablet    Take 1 tablet (75 mg) by mouth daily    Ischemic cardiomyopathy       diclofenac 1 % Gel topical gel    VOLTAREN    1 Tube    Place 4 g onto the skin 4 times daily    Pain of left lower extremity       docusate sodium 100 MG capsule    COLACE    60 capsule    Take 1 capsule (100 mg) by mouth daily    Takes dietary supplements       enalapril 2.5 MG tablet    VASOTEC    60 tablet    Take 1  tablet (2.5 mg) by mouth 2 times daily    Ischemic cardiomyopathy       escitalopram 20 MG tablet    LEXAPRO    15 tablet    Take 1 tablet (20 mg) by mouth daily    S/P vascular surgery       ezetimibe 10 MG tablet    ZETIA    30 tablet    Take 1 tablet (10 mg) by mouth daily    Ischemic cardiomyopathy       fentaNYL 25 mcg/hr 72 hr patch    DURAGESIC     Place 1 patch onto the skin every 72 hours remove old patch.        folic acid 1 MG tablet    FOLVITE    30 tablet    Take 1 tablet (1 mg) by mouth daily    Takes dietary supplements       furosemide 20 MG tablet    LASIX    60 tablet    Take 1 tablet (20 mg) by mouth 2 times daily (with meals)    S/P vascular surgery       * gabapentin 300 MG capsule    NEURONTIN    60 capsule    Take 1 capsule (300 mg) by mouth 2 times daily    S/P vascular surgery       * gabapentin 600 MG tablet    NEURONTIN    30 tablet    Take 1 tablet (600 mg) by mouth At Bedtime    PVD (peripheral vascular disease) (H)       isosorbide mononitrate 30 MG 24 hr tablet    IMDUR    90 tablet    Take 1 tablet (30 mg) by mouth daily    Chronic systolic congestive heart failure (H)       Lidocaine 4 % Gel     30 g    Externally apply topically 2 times daily    Cellulitis and abscess of leg       multivitamin, therapeutic with minerals Tabs tablet     30 each    Take 1 tablet by mouth daily    Takes dietary supplements       order for DME     1 each    Equipment being ordered: Walker Wheels () and Walker () Treatment Diagnosis: impaired mobility    Pain of left lower extremity       oxyCODONE IR 10 MG tablet    ROXICODONE    25 tablet    Take 1 tablet (10 mg) by mouth every 6 hours as needed for moderate to severe pain    Narcotic dependence (H)       polyethylene glycol Packet    MIRALAX/GLYCOLAX    7 packet    Take 17 g by mouth daily    Takes dietary supplements       predniSONE 10 MG tablet    DELTASONE    30 tablet    Take 1 tablet (10 mg) by mouth daily    S/P vascular surgery        rosuvastatin 20 MG tablet    CRESTOR    30 tablet    Take 1 tablet (20 mg) by mouth daily    Ischemic cardiomyopathy       vitamin A 86773 UNIT capsule     7 capsule    Take 2 capsules (20,000 Units) by mouth daily    Takes dietary supplements       zinc sulfate 220 (50 Zn) MG capsule    ZINCATE    7 capsule    Take 1 capsule (220 mg) by mouth daily    Takes dietary supplements       * Notice:  This list has 2 medication(s) that are the same as other medications prescribed for you. Read the directions carefully, and ask your doctor or other care provider to review them with you.

## 2018-04-20 NOTE — PROGRESS NOTES
I saw the patient with the resident.  I agree with the resident note and plan of care attached.  Patient is in a lot of trouble with very guarded long term prognosis in opinion.  Not eating well, drinking alcohol.  Wounds increasing in size despite patent bypass.  [place].  This is a disastrous set-up.  Had him speak to our .  Not sure how we change this trajectory.     Bypass is patent.  Planned to see plastics, but probably will need several changes before he is a good candidate for surgery.  I will plan to see him back in three months to see how things are going.         Lexis Ag MD     I spent >50% of this 25 min visit in counseling

## 2018-04-23 ENCOUNTER — TELEPHONE (OUTPATIENT)
Dept: FAMILY MEDICINE | Facility: CLINIC | Age: 68
End: 2018-04-23

## 2018-04-23 DIAGNOSIS — I73.9 PAD (PERIPHERAL ARTERY DISEASE) (H): Primary | ICD-10-CM

## 2018-04-23 NOTE — TELEPHONE ENCOUNTER
Patient calling to get an order for a CT scan because he states it is overdue.  Would like Dr. Rodrigez to place an order and then call him after it's placed and he will schedule it.  Please call him back

## 2018-04-24 NOTE — TELEPHONE ENCOUNTER
Return call and left message on patient's home phone at #[phone] to call back to primary care clinic. Inocente Fleming LPN at 12:37 PM on 4/24/2018

## 2018-04-26 ENCOUNTER — OFFICE VISIT (OUTPATIENT)
Dept: WOUND CARE | Facility: CLINIC | Age: 68
End: 2018-04-26
Payer: COMMERCIAL

## 2018-04-26 DIAGNOSIS — I73.9 PERIPHERAL ARTERIAL DISEASE (H): ICD-10-CM

## 2018-04-26 DIAGNOSIS — L97.909 ARTERIAL LEG ULCER (H): Primary | ICD-10-CM

## 2018-04-26 NOTE — MR AVS SNAPSHOT
After Visit Summary   4/26/2018    Boom Kahn    MRN: [id number]           Patient Information     Date Of Birth          1950        Visit Information        Provider Department      4/26/2018 1:00 PM Tamiko Mcduffie PA-C [facility]        Today's Diagnoses     Arterial leg ulcer (H)    -  1    Peripheral arterial disease (H)           Follow-ups after your visit        Your next 10 appointments already scheduled     May 03, 2018  1:00 PM CDT   (Arrive by 12:45 PM)   Return Visit with SHAYY [facility] ([facility])    [address] [place] [phone]   [phone]            May 10, 2018  1:00 PM CDT   (Arrive by 12:45 PM)   Return Visit with SHAYY [facility] ([facility])    [address] [place] [id number] [phone]            May 17, 2018  1:00 PM CDT   (Arrive by 12:45 PM)   Return Visit with SHAYY [facility] ([facility])    [address] [place] [id number] [phone]            May 24, 2018  1:00 PM CDT   (Arrive by 12:45 PM)   Return Visit with SHAYY [facility] ([facility])    [address] [place] [id number] [phone]            Jun 14, 2018  2:00 PM CDT   US LOWER EXTREMITY ARTERIAL DUPLEX RIGHT with UCUSV2   [facility] ([facility])    [address] [place] [id number]   [phone]           Please bring a list of your medicines (including vitamins, minerals and over-the-counter drugs). Also, tell your doctor about any allergies you may have. Wear comfortable clothes and leave your valuables at home.  You do not need to do anything special to prepare for your exam.  Please call the Imaging Department at  your exam site with any questions.            Jun 14, 2018  2:30 PM CDT   US RAVI DOPPLER NO EXERCISE 1-2 LVLS BILAT with UCUSV2   [facility] ([facility])    [address] [place] [address] [phone]           Please bring a list of your medicines (including vitamins, minerals and over-the-counter drugs). Also, tell your doctor about any allergies you may have. Wear comfortable clothes and leave your valuables at home.  No caffeine or tobacco for 1 hour prior to exam.  Please call the Imaging Department at your exam site with any questions.            Jun 14, 2018  3:00 PM CDT   (Arrive by 2:45 PM)   Return Vascular Visit with Lexis Ag MD   [facility] ([facility])    [address] [place] [address] [phone]              Who to contact     Please call your clinic at [phone] to:    Ask questions about your health    Make or cancel appointments    Discuss your medicines    Learn about your test results    Speak to your doctor            Additional Information About Your Visit        [employer] Information     [employer] gives you secure access to your electronic health record. If you see a primary care provider, you can also send messages to your care team and make appointments. If you have questions, please call your primary care clinic.  If you do not have a primary care provider, please call [phone] and they will assist you.      [facility] is an electronic gateway that provides easy, online access to your medical records. With [employer], you can request a clinic appointment, read your test results, renew a prescription or communicate with your care team.     To access your existing account, please contact your [facility] or call [phone] for assistance.        Care EveryWhere ID     This is your Care EveryWhere ID. This could be used by  other organizations to access your [facility] medical records  [id number]         Blood Pressure from Last 3 Encounters:   04/18/18 99/50   04/12/18 (!) 83/55   03/29/18 99/58    Weight from Last 3 Encounters:   04/18/18 154 lb   03/29/18 154 lb 4.8 oz   03/22/18 153 lb              We Performed the Following     DEBRIDEMENT WOUND > 20 SQ CM        Primary Care Provider Office Phone # Fax #    Willina Arrington [phone]       [facility] [address]  [facility] [address]        Equal Access to Services     [facility]: Hadii aad ku hadasho Soomaali, waaxda luqadaha, qaybta kaalmada adeegyada, vijay zavala hayaan adeludwig castillo . So [facility] [phone].    ATENCIÓN: Si habla español, tiene a bhatia disposición servicios gratuitos de asistencia lingüística. [facility] [phone].    We comply with applicable federal civil rights laws and [place] laws. We do not discriminate on the basis of race, color, national origin, age, disability, sex, sexual orientation, or gender identity.            Thank you!     Thank you for choosing [facility]  for your care. Our goal is always to provide you with excellent care. Hearing back from our patients is one way we can continue to improve our services. Please take a few minutes to complete the written survey that you may receive in the mail after your visit with us. Thank you!             Your Updated Medication List - Protect others around you: Learn how to safely use, store and throw away your medicines at www.disposemymeds.org.          This list is accurate as of 4/26/18  2:07 PM.  Always use your most recent med list.                   Brand Name Dispense Instructions for use Diagnosis    acetic acid 0.25 % irrigation     10 mL    Irrigate with as directed 2 times daily    S/P vascular surgery       ascorbic acid 500 MG Tabs     7 tablet    Take 1 tablet (500 mg) by mouth daily    Takes dietary supplements       ASPIRIN EC PO       Take 81 mg by mouth daily        carvedilol 6.25 MG tablet    COREG    60 tablet    Take 1 tablet (6.25 mg) by mouth 2 times daily (with meals)    Ischemic cardiomyopathy       ciprofloxacin 500 MG tablet    CIPRO    14 tablet    Take 1 tablet (500 mg) by mouth 2 times daily    Cellulitis and abscess of leg       clindamycin 300 MG capsule    CLEOCIN    28 capsule    Take 1 capsule (300 mg) by mouth 4 times daily    Cellulitis and abscess of leg       clopidogrel 75 MG tablet    PLAVIX    30 tablet    Take 1 tablet (75 mg) by mouth daily    Ischemic cardiomyopathy       diclofenac 1 % Gel topical gel    VOLTAREN    1 Tube    Place 4 g onto the skin 4 times daily    Pain of left lower extremity       docusate sodium 100 MG capsule    COLACE    60 capsule    Take 1 capsule (100 mg) by mouth daily    Takes dietary supplements       enalapril 2.5 MG tablet    VASOTEC    60 tablet    Take 1 tablet (2.5 mg) by mouth 2 times daily    Ischemic cardiomyopathy       escitalopram 20 MG tablet    LEXAPRO    15 tablet    Take 1 tablet (20 mg) by mouth daily    S/P vascular surgery       ezetimibe 10 MG tablet    ZETIA    30 tablet    Take 1 tablet (10 mg) by mouth daily    Ischemic cardiomyopathy       fentaNYL 25 mcg/hr 72 hr patch    DURAGESIC     Place 1 patch onto the skin every 72 hours remove old patch.        folic acid 1 MG tablet    FOLVITE    30 tablet    Take 1 tablet (1 mg) by mouth daily    Takes dietary supplements       furosemide 20 MG tablet    LASIX    60 tablet    Take 1 tablet (20 mg) by mouth 2 times daily (with meals)    S/P vascular surgery       * gabapentin 300 MG capsule    NEURONTIN    60 capsule    Take 1 capsule (300 mg) by mouth 2 times daily    S/P vascular surgery       * gabapentin 600 MG tablet    NEURONTIN    30 tablet    Take 1 tablet (600 mg) by mouth At Bedtime    PVD (peripheral vascular disease) (H)       isosorbide mononitrate 30 MG 24 hr tablet    IMDUR    90 tablet    Take 1 tablet  (30 mg) by mouth daily    Chronic systolic congestive heart failure (H)       Lidocaine 4 % Gel     30 g    Externally apply topically 2 times daily    Cellulitis and abscess of leg       multivitamin, therapeutic with minerals Tabs tablet     30 each    Take 1 tablet by mouth daily    Takes dietary supplements       order for DME     1 each    Equipment being ordered: Walker Wheels () and Walker () Treatment Diagnosis: impaired mobility    Pain of left lower extremity       oxyCODONE IR 10 MG tablet    ROXICODONE    25 tablet    Take 1 tablet (10 mg) by mouth every 6 hours as needed for moderate to severe pain    Narcotic dependence (H)       polyethylene glycol Packet    MIRALAX/GLYCOLAX    7 packet    Take 17 g by mouth daily    Takes dietary supplements       predniSONE 10 MG tablet    DELTASONE    30 tablet    Take 1 tablet (10 mg) by mouth daily    S/P vascular surgery       rosuvastatin 20 MG tablet    CRESTOR    30 tablet    Take 1 tablet (20 mg) by mouth daily    Ischemic cardiomyopathy       vitamin A 40514 UNIT capsule     7 capsule    Take 2 capsules (20,000 Units) by mouth daily    Takes dietary supplements       zinc sulfate 220 (50 Zn) MG capsule    ZINCATE    7 capsule    Take 1 capsule (220 mg) by mouth daily    Takes dietary supplements       * Notice:  This list has 2 medication(s) that are the same as other medications prescribed for you. Read the directions carefully, and ask your doctor or other care provider to review them with you.

## 2018-04-26 NOTE — PROGRESS NOTES
Chief Complaint: Arterial ulcerations BL    Subjective: Boom is a 68 year old male who presents to the clinic today for follow up of bilateral arterial shin wounds. Did not tolerate Endoform, it dried out the wound significantly. He continued with daily saline dressings instead. Down to less than 1/2 pack of cigarettes per day, meeting his goals. Admits pain to wounds with dressing changes.      ROS: Denies purulent drainage, odor, redness or significant increase in swelling to LEs.     Allergies   Allergen Reactions     Betadine [Povidone Iodine] Blisters     Pt reports erythema, increased pain, and blistering when using betadine on R big toe in past     Collagenase Clostridium Histolyticum      Flagyl [Metronidazole] Other (See Comments)     Flu symptoms  Flu like symptoms     Iodine Unknown     Santyl [Collagenase]      Current Outpatient Rx   Medication Sig Dispense Refill     acetic acid 0.25 % irrigation Irrigate with as directed 2 times daily 10 mL 0     ascorbic acid 500 MG TABS Take 1 tablet (500 mg) by mouth daily 7 tablet 0     ASPIRIN EC PO Take 81 mg by mouth daily       carvedilol (COREG) 6.25 MG tablet Take 1 tablet (6.25 mg) by mouth 2 times daily (with meals) 60 tablet 3     ciprofloxacin (CIPRO) 500 MG tablet Take 1 tablet (500 mg) by mouth 2 times daily 14 tablet 0     clindamycin (CLEOCIN) 300 MG capsule Take 1 capsule (300 mg) by mouth 4 times daily 28 capsule 0     clopidogrel (PLAVIX) 75 MG tablet Take 1 tablet (75 mg) by mouth daily 30 tablet 0     diclofenac (VOLTAREN) 1 % GEL topical gel Place 4 g onto the skin 4 times daily 1 Tube 0     docusate sodium (COLACE) 100 MG capsule Take 1 capsule (100 mg) by mouth daily 60 capsule 0     enalapril (VASOTEC) 2.5 MG tablet Take 1 tablet (2.5 mg) by mouth 2 times daily 60 tablet 11     escitalopram (LEXAPRO) 20 MG tablet Take 1 tablet (20 mg) by mouth daily 15 tablet 0     ezetimibe (ZETIA) 10 MG tablet Take 1 tablet (10 mg) by mouth daily 30 tablet  0     fentaNYL (DURAGESIC) 25 mcg/hr 72 hr patch Place 1 patch onto the skin every 72 hours remove old patch.       folic acid (FOLVITE) 1 MG tablet Take 1 tablet (1 mg) by mouth daily 30 tablet 0     furosemide (LASIX) 20 MG tablet Take 1 tablet (20 mg) by mouth 2 times daily (with meals) 60 tablet 3     gabapentin (NEURONTIN) 300 MG capsule Take 1 capsule (300 mg) by mouth 2 times daily 60 capsule 1     gabapentin (NEURONTIN) 600 MG tablet Take 1 tablet (600 mg) by mouth At Bedtime 30 tablet 1     isosorbide mononitrate (IMDUR) 30 MG 24 hr tablet Take 1 tablet (30 mg) by mouth daily 90 tablet 3     Lidocaine 4 % GEL Externally apply topically 2 times daily 30 g 1     multivitamin, therapeutic with minerals (THERA-VIT-M) TABS tablet Take 1 tablet by mouth daily 30 each 0     order for DME Equipment being ordered: Walker Wheels () and Walker ()  Treatment Diagnosis: impaired mobility 1 each 0     oxyCODONE IR (ROXICODONE) 10 MG tablet Take 1 tablet (10 mg) by mouth every 6 hours as needed for moderate to severe pain 25 tablet 0     polyethylene glycol (MIRALAX/GLYCOLAX) Packet Take 17 g by mouth daily 7 packet 0     predniSONE (DELTASONE) 10 MG tablet Take 1 tablet (10 mg) by mouth daily 30 tablet 0     rosuvastatin (CRESTOR) 20 MG tablet Take 1 tablet (20 mg) by mouth daily 30 tablet 11     vitamin A 88863 UNIT capsule Take 2 capsules (20,000 Units) by mouth daily 7 capsule 0     zinc sulfate (ZINCATE) 220 (50 ZN) MG capsule Take 1 capsule (220 mg) by mouth daily 7 capsule 0       Objective:   There were no vitals taken for this visit.    General: Patient is well groomed, appears stated age, is alert and cooperative, and is in no acute distress.  Skin: LE skin color, texture and turgor are normal. Stasis erythema noted to BL LEs.       Wound #1  An arterial ulcer is noted at right  anterior shin measuring 13.6 cm x 7 cm x 0.2 cm. Mildly tender to palpation.  Tissue Depth: Tendon  Wound base: Red, Yellow,  White/Granulation, Slough, Tendon  Edges: intact, scab  Drainage: light/serous  Odor: No   Undermining: No  Tunneling: No  Bone Exposure: No  Clinical Signs of Infection: No      Wound #2  An arterial ulcer is noted at left  Anterior shin measuring 3 cm x 3.4 cm x 0.2 cm. Mildly tender to palpation. Small peripheral wound at 4 o'clock measuring 0.4 cm x 0.2 cm x 0.2 cm  Tissue Depth: subcutaneous  Wound base: Pink, Yellow/Granulation, Slough  Edges: intact  Drainage: small/serous  Odor: No   Undermining: No  Tunneling: No  Bone Exposure: No  Clinical Signs of Infection: No    Assessment:   - Chronic bilateral arterial shin ulcers  - Peripheral arterial disease  - Nicotine dependence    Plan:   - Devitalized tissue of wound #1 was excisionally debrided with #15 and curette to level of tendon at deepest. Healthy bleeding occurred, no anesthesia necessary.   - Discussed that anterior tendon is now completely ruptured and function is permanently lost. Discussed need for a brace or modified foot wear in the future to help with gait.  - Endoform applied to both wounds today.   - Wound Care Instructions: 1. Clean wound with wound cleanser, dry. 2. Cover with Wound Veil 3. Cover with saline moistened 4x4 gauze 5. Wrap with kerlix. First change on Saturday, then change daily. He verbalized understanding.   - NWB to RLE as much as possible.  - RTC 1 week

## 2018-04-26 NOTE — NURSING NOTE
No chief complaint on file.      There were no vitals filed for this visit.    There is no height or weight on file to calculate BMI.    Jazzy Willingham

## 2018-04-28 DIAGNOSIS — I73.9 PVD (PERIPHERAL VASCULAR DISEASE) (H): ICD-10-CM

## 2018-04-28 DIAGNOSIS — Z98.890 S/P VASCULAR SURGERY: ICD-10-CM

## 2018-05-01 ENCOUNTER — DOCUMENTATION ONLY (OUTPATIENT)
Dept: CARE COORDINATION | Facility: CLINIC | Age: 68
End: 2018-05-01

## 2018-05-01 RX ORDER — GABAPENTIN 600 MG/1
TABLET ORAL
Qty: 30 TABLET | Refills: 0 | OUTPATIENT
Start: 2018-05-01

## 2018-05-01 RX ORDER — GABAPENTIN 300 MG/1
CAPSULE ORAL
Qty: 60 CAPSULE | Refills: 0 | OUTPATIENT
Start: 2018-05-01

## 2018-05-01 NOTE — PROGRESS NOTES
Dear Dr. Lexis Ag  Medicare Home Health regulations requires [facility] to notify the Physician when the plan for visits has been altered.  We have provided fewer visits than ordered.  We are notifying you of a Missed Visit.  Boom Kahn; MRN [id number]  Missed Visit  Is SN  Dates of missed services  4/26/18  Reason: Patient cancelled   Sincerely [facility]  Maeve Baumann  [phone]

## 2018-05-02 ENCOUNTER — TELEPHONE (OUTPATIENT)
Dept: INTERNAL MEDICINE | Facility: CLINIC | Age: 68
End: 2018-05-02

## 2018-05-02 NOTE — TELEPHONE ENCOUNTER
Called patient to get him scheduled with primary and he made an appt with call center already.  Patient will see Dr. Russo on 5/10/18    Hawa Cowan    Primary Care

## 2018-05-02 NOTE — TELEPHONE ENCOUNTER
[facility] Call Center    Phone Message    May a detailed message be left on voicemail: yes    Reason for Call: Other: Call back     Action Taken: Message routed to:  [facility] Clinic

## 2018-05-03 ENCOUNTER — DOCUMENTATION ONLY (OUTPATIENT)
Dept: CARE COORDINATION | Facility: CLINIC | Age: 68
End: 2018-05-03

## 2018-05-03 DIAGNOSIS — I73.9 PVD (PERIPHERAL VASCULAR DISEASE) (H): ICD-10-CM

## 2018-05-03 NOTE — PROGRESS NOTES
Dear Dr. Lexis Ag  Medicare Home Health regulations requires [facility] to notify the Physician when the plan for visits has been altered.  We have provided fewer visits than ordered.  We are notifying you of a Missed Visit.  Boom Kahn; MRN [id number]  Missed Visit  Is SN   Dates of missed services  5/4/18  Reason: Patient cancelled   Sincerely [facility]  Maeve Baumann  [phone]

## 2018-05-04 ENCOUNTER — TELEPHONE (OUTPATIENT)
Dept: INTERNAL MEDICINE | Facility: CLINIC | Age: 68
End: 2018-05-04

## 2018-05-04 DIAGNOSIS — I73.9 PVD (PERIPHERAL VASCULAR DISEASE) (H): ICD-10-CM

## 2018-05-04 DIAGNOSIS — Z98.890 S/P VASCULAR SURGERY: ICD-10-CM

## 2018-05-04 RX ORDER — GABAPENTIN 600 MG/1
600 TABLET ORAL AT BEDTIME
Qty: 90 TABLET | Refills: 1 | Status: CANCELLED | OUTPATIENT
Start: 2018-05-04

## 2018-05-04 RX ORDER — GABAPENTIN 300 MG/1
300 CAPSULE ORAL 2 TIMES DAILY
Qty: 60 CAPSULE | Refills: 0 | Status: CANCELLED | OUTPATIENT
Start: 2018-05-04

## 2018-05-04 RX ORDER — GABAPENTIN 600 MG/1
600 TABLET ORAL AT BEDTIME
Qty: 30 TABLET | Refills: 0 | Status: CANCELLED | OUTPATIENT
Start: 2018-05-04

## 2018-05-04 NOTE — TELEPHONE ENCOUNTER
Gabapentin 300 mg   Last Written Prescription Date:  3/6/18  Last Fill Quantity: 60 # refills: 1  Gabapentin 600 mg     Last Written Prescription Date:  3/6/18  Last Fill Quantity: 30   # refills: 1  Last Office Visit : 3/22/18 Dennise Arita  Future Office visit:  5/10/18 Dr. Russo    Routing refill request to nursefor review/approval because:  Drug not on the [facility], [facility] or  Health refill protocol or controlled substance

## 2018-05-04 NOTE — TELEPHONE ENCOUNTER
[facility] Call Center    Phone Message    May a detailed message be left on voicemail: yes    Reason for Call: Medication Refill Request    Has the patient contacted the pharmacy for the refill? Yes   Name of medication being requested: gabapentin (NEURONTIN) 300 MG capsule , gabapentin (NEURONTIN) 600 MG tablet   Provider who prescribed the medication: Dr. Rodrigez   Pharmacy: [facility]  Date medication is needed: 05/04/2018         Action Taken: Message routed to:  [facility] hemal neri

## 2018-05-04 NOTE — TELEPHONE ENCOUNTER
gabapentin (NEURONTIN) 600 MG tablet      Last Written Prescription Date:  3/6/18  Last Fill Quantity: 30,   # refills: 0  Last Office Visit : 3/22/18  Future Office visit:  5/10/18    Routing refill request to provider for review/approval because:  Drug not on the refill protocol.

## 2018-05-09 ENCOUNTER — TELEPHONE (OUTPATIENT)
Dept: CARE COORDINATION | Facility: CLINIC | Age: 68
End: 2018-05-09

## 2018-05-09 RX ORDER — GABAPENTIN 600 MG/1
600 TABLET ORAL AT BEDTIME
Qty: 30 TABLET | Refills: 1 | Status: SHIPPED | OUTPATIENT
Start: 2018-05-09 | End: 2018-05-21

## 2018-05-09 RX ORDER — GABAPENTIN 300 MG/1
300 CAPSULE ORAL 2 TIMES DAILY
Qty: 60 CAPSULE | Refills: 1 | Status: SHIPPED | OUTPATIENT
Start: 2018-05-09

## 2018-05-09 NOTE — TELEPHONE ENCOUNTER
Social Work Intervention  [facility]    Data/Intervention:    Patient Name:  Boom Kahn  /Age:  1950 (68 year old)    Visit Type: telephone  Referral Source: Patient's son contacted   Reason for Referral:  Home Health Aide    Collaborated With:    -Patient's son (only non-patient specific information was given)    Patient Concerns/Issues:   Patient's son called asking about trying to obtain a home health aide to help Patient with ADLs.    Intervention/Education/Resources Provided:   provided information for [facility], [employer], and [facility] to inquire about Home Health Aide hiring.    Assessment/Plan:  Patient's son to find an acceptable Home Health Aide.    Kathi Foster [facility]  Outpatient Specialty Clinics  Direct Phone: [phone]  Pager:  [phone]

## 2018-05-10 ENCOUNTER — OFFICE VISIT (OUTPATIENT)
Dept: WOUND CARE | Facility: CLINIC | Age: 68
End: 2018-05-10
Payer: COMMERCIAL

## 2018-05-10 ENCOUNTER — OFFICE VISIT (OUTPATIENT)
Dept: FAMILY MEDICINE | Facility: CLINIC | Age: 68
End: 2018-05-10
Payer: COMMERCIAL

## 2018-05-10 VITALS
RESPIRATION RATE: 20 BRPM | HEART RATE: 69 BPM | OXYGEN SATURATION: 97 % | SYSTOLIC BLOOD PRESSURE: 91 MMHG | DIASTOLIC BLOOD PRESSURE: 57 MMHG

## 2018-05-10 DIAGNOSIS — I70.213 ATHEROSCLEROSIS OF NATIVE ARTERIES OF EXTREMITIES WITH INTERMITTENT CLAUDICATION, BILATERAL LEGS (H): ICD-10-CM

## 2018-05-10 DIAGNOSIS — L97.909 ARTERIAL LEG ULCER (H): Primary | ICD-10-CM

## 2018-05-10 DIAGNOSIS — F41.9 ANXIETY: ICD-10-CM

## 2018-05-10 DIAGNOSIS — M79.604 BILATERAL LEG PAIN: Primary | ICD-10-CM

## 2018-05-10 DIAGNOSIS — C34.90 MALIGNANT NEOPLASM OF LUNG, UNSPECIFIED LATERALITY, UNSPECIFIED PART OF LUNG (H): ICD-10-CM

## 2018-05-10 DIAGNOSIS — I25.5 ISCHEMIC CARDIOMYOPATHY: ICD-10-CM

## 2018-05-10 DIAGNOSIS — M79.605 BILATERAL LEG PAIN: Primary | ICD-10-CM

## 2018-05-10 ASSESSMENT — PAIN SCALES - GENERAL: PAINLEVEL: EXTREME PAIN (9)

## 2018-05-10 NOTE — LETTER
5/10/2018       RE: Boom Kahn  [address] [place] [id number]     Dear Colleague,    Thank you for referring your patient, Boom Kahn, to the [facility] WOUND CARE at [facility]. Please see a copy of my visit note below.    Chief Complaint:   Chief Complaint   Patient presents with     Wound Check     f/u        Subjective: Boom is a 68 year old male who presents to the clinic today for follow up of bilateral arterial wounds. Dressing with medihoney and adaptic daily, which is tolerable. Son is present during visit today. No new concerns. Still has not quit smoking.    ROS: Denies increased redness, swelling, purulent drainage, warmth, increased wound pain, n/v, confusion, fevers.    Allergies   Allergen Reactions     Betadine [Povidone Iodine] Blisters     Pt reports erythema, increased pain, and blistering when using betadine on R big toe in past     Collagenase Clostridium Histolyticum      Flagyl [Metronidazole] Other (See Comments)     Flu symptoms  Flu like symptoms     Iodine Unknown     Santyl [Collagenase]      Current Outpatient Rx   Medication Sig Dispense Refill     acetic acid 0.25 % irrigation Irrigate with as directed 2 times daily 10 mL 0     ascorbic acid 500 MG TABS Take 1 tablet (500 mg) by mouth daily 7 tablet 0     ASPIRIN EC PO Take 81 mg by mouth daily       carvedilol (COREG) 6.25 MG tablet Take 1 tablet (6.25 mg) by mouth 2 times daily (with meals) 60 tablet 3     ciprofloxacin (CIPRO) 500 MG tablet Take 1 tablet (500 mg) by mouth 2 times daily 14 tablet 0     clindamycin (CLEOCIN) 300 MG capsule Take 1 capsule (300 mg) by mouth 4 times daily 28 capsule 0     clopidogrel (PLAVIX) 75 MG tablet Take 1 tablet (75 mg) by mouth daily 30 tablet 0     diclofenac (VOLTAREN) 1 % GEL topical gel Place 4 g onto the skin 4 times daily 1 Tube 0     docusate sodium (COLACE) 100 MG capsule Take 1 capsule (100 mg) by mouth daily 60 capsule 0      enalapril (VASOTEC) 2.5 MG tablet Take 1 tablet (2.5 mg) by mouth 2 times daily 60 tablet 11     escitalopram (LEXAPRO) 20 MG tablet Take 1 tablet (20 mg) by mouth daily 15 tablet 0     ezetimibe (ZETIA) 10 MG tablet Take 1 tablet (10 mg) by mouth daily 30 tablet 0     fentaNYL (DURAGESIC) 25 mcg/hr 72 hr patch Place 1 patch onto the skin every 72 hours remove old patch.       folic acid (FOLVITE) 1 MG tablet Take 1 tablet (1 mg) by mouth daily 30 tablet 0     furosemide (LASIX) 20 MG tablet Take 1 tablet (20 mg) by mouth 2 times daily (with meals) 60 tablet 3     gabapentin (NEURONTIN) 300 MG capsule Take 1 capsule (300 mg) by mouth 2 times daily 60 capsule 1     gabapentin (NEURONTIN) 600 MG tablet Take 1 tablet (600 mg) by mouth At Bedtime 30 tablet 1     isosorbide mononitrate (IMDUR) 30 MG 24 hr tablet Take 1 tablet (30 mg) by mouth daily 90 tablet 3     Lidocaine 4 % GEL Externally apply topically 2 times daily 30 g 1     multivitamin, therapeutic with minerals (THERA-VIT-M) TABS tablet Take 1 tablet by mouth daily 30 each 0     order for DME Equipment being ordered: Walker Wheels () and Walker ()  Treatment Diagnosis: impaired mobility 1 each 0     oxyCODONE IR (ROXICODONE) 10 MG tablet Take 1 tablet (10 mg) by mouth every 6 hours as needed for moderate to severe pain 25 tablet 0     polyethylene glycol (MIRALAX/GLYCOLAX) Packet Take 17 g by mouth daily 7 packet 0     predniSONE (DELTASONE) 10 MG tablet Take 1 tablet (10 mg) by mouth daily 30 tablet 0     rosuvastatin (CRESTOR) 20 MG tablet Take 1 tablet (20 mg) by mouth daily 30 tablet 11     vitamin A 39413 UNIT capsule Take 2 capsules (20,000 Units) by mouth daily 7 capsule 0     zinc sulfate (ZINCATE) 220 (50 ZN) MG capsule Take 1 capsule (220 mg) by mouth daily 7 capsule 0       Objective:   There were no vitals taken for this visit.    General: Patient is well groomed, appears stated age, is alert and cooperative, and is in no acute  distress.  Skin: No erythema, mild edema bilaterally R>L, no calor, odor, purulent drainage.     Wound #1  An arterial ulcer is noted at right  anterior shin measuring 13.5 cm x 7 cm x 0.2 cm. Mildly tender to palpation.  Tissue Depth: Tendon  Wound base: Red, Yellow, White/Granulation (more granulation tissue present today in between ruptured tendon), Tendon  Edges: intact, scab  Drainage: light/serous  Odor: No   Undermining: No  Tunneling: No  Bone Exposure: No  Clinical Signs of Infection: No      Wound #2  An arterial ulcer is noted at left  Anterior shin measuring 3 cm x 3.5 cm x 0.2 cm. Mildly tender to palpation.  Tissue Depth: subcutaneous  Wound base: Pink, Yellow/Granulation, Slough  Edges: intact  Drainage: small/serous  Odor: No   Undermining: No  Tunneling: No  Bone Exposure: No  Clinical Signs of Infection: No    Assessment:   - Chronic bilateral arterial shin ulcers  - Peripheral arterial disease  - Nicotine dependence    Plan:   - No excisional debridement needed today.   - Wound Care instructions: 1. Vashe soak x 10 minutes 2. Cover with MediHoney 3. Cover with Adaptic 4. Wrap with 4x4 gauze and kerlix. Perform every other day.   - At this point, there is not much else I can do in clinic. He needs surgical debridement and grafting. Son and Boom verbalize understanding of this. Discussed options for these wounds again with son present: A. Stop smoking, return to Dr. Haynes for tendon debridement and grafting. B. Continue daily wound care with no changes C. Find another provider who will perform the tendon debridement and grafting regardless of smoking habit  - RTC every 2 weeks for wound assessment, sooner if concerns      Again, thank you for allowing me to participate in the care of your patient.      Sincerely,    Tamiko Mcduffie PA-C

## 2018-05-10 NOTE — MR AVS SNAPSHOT
After Visit Summary   5/10/2018    Boom Kahn    MRN: [id number]           Patient Information     Date Of Birth          1950        Visit Information        Provider Department      5/10/2018 2:05 PM Ray Russo MD [facility]        Today's Diagnoses     Bilateral leg pain    -  1    Anxiety        Malignant neoplasm of lung, unspecified laterality, unspecified part of lung (H)          Care Instructions    Primary Care Center: [phone]     Primary Care Center Medication Refill Request Information:  * Please contact your pharmacy regarding ANY request for medication refills.  ** [facility] Prescription Fax = [phone]  * Please allow 3 business days for routine medication refills.  * Please allow 5 business days for controlled substance medication refills.     Primary Care Center Test Result notification information:  *You will be notified with in 7-10 days of your appointment day regarding the results of your test.  If you are on MyChart you will be notified as soon as the provider has reviewed the results and signed off on them.          Follow-ups after your visit        Additional Services     BEHAVIORAL / SPIRITUAL HEALTH ([facility] ONLY)       The [facility] Behavioral / Spiritual Health Team is available to support any patient who receives care at the [facility].  This team provides and/or connects patients and families to appropriate psychological, spiritual, and social work services.     Dr Krishna    The Behavioral / Spiritual Health Team  also provides consultation to medical providers and other care team members regarding patient behavioral/mental health issues, psychosocial concerns, or spiritual needs.  Services are provided by behavioral health clinicians, licensed psychologists, licensed social workers, and a .     The Behavioral / Spiritual Health Team will coordinate with the patient's medical care team to determine the  appropriate response(s) to the current need.        Please provide the following information to assist us in addressing the current concern:    1. Reason for Referral? Anxiety      2. Has clinical staff discussed this referral to the Behavioral / Spiritual Health Team with the patient and/or caregiver? Yes    3. Who should we contact on the patient's care team to discuss this issue further or to coordinate care? Ray Russo MD            [facility] PAIN AND INTERVENTIONAL CLINIC REFERRAL       Seen by Dr Sainz in the hospital here    Please call [phone] to make an appointment. Clinic is located: [facility] [address] [place] [address] [id number] 4th [place] [address]      Please complete the following questions:    Procedure/Referral: Referral Only -  Comprehensive Evaluation and Management    What is your diagnosis for the patient's pain? Leg pain from PAD and non healing ulcers    What are your specific questions for the pain specialist? Best eval/treat; now on oxy (10 mg five a day, medium good control), gabapentin (helps), also drinks at least six beers a day long term (I told him today I would send him to [facility] for eval) and xanax 0.5 tid (sending to psychiatry for help getting off)    Are there any red flags that may impact the assessment or management of the patient? Active or recent alcohol, drug or prescription medication misuse (explain): see above            ONC/HEME ADULT REFERRAL       Your provider has referred you to: [facility]: Cancer Care/Hematology (All Cancer Related Services) - [place] [phone]   [email or web address]    Please be aware that coverage of these services is subject to the terms and limitations of your health insurance plan.  Call member services at your health plan with any benefit or coverage questions.      Please bring the following with you to your appointment:    (1) Any X-Rays, CTs or MRIs which have  been performed.  Contact the facility where they were done to arrange for  prior to your scheduled appointment.   (2) List of current medications  (3) This referral request   (4) Any documents/labs given to you for this referral                  Follow-up notes from your care team     Return in about 1 month (around 6/10/2018).      Your next 10 appointments already scheduled     May 24, 2018 11:30 AM CDT   (Arrive by 11:15 AM)   Return Visit with Tamiko Mcduffie PA-C   [facility] ([facility])    [address] [place] [address] [phone]            Jun 14, 2018  2:00 PM CDT   US LOWER EXTREMITY ARTERIAL DUPLEX RIGHT with [address] [facility] ([facility])    [address] [place] [address] [phone]           Please bring a list of your medicines (including vitamins, minerals and over-the-counter drugs). Also, tell your doctor about any allergies you may have. Wear comfortable clothes and leave your valuables at home.  You do not need to do anything special to prepare for your exam.  Please call the Imaging Department at your exam site with any questions.            Jun 14, 2018  2:30 PM CDT   US RAVI DOPPLER NO EXERCISE 1-2 LVLS BILAT with [address] [facility] ([facility])    [address] [place] [id number] [phone]           Please bring a list of your medicines (including vitamins, minerals and over-the-counter drugs). Also, tell your doctor about any allergies you may have. Wear comfortable clothes and leave your valuables at home.  No caffeine or tobacco for 1 hour prior to exam.  Please call the Imaging Department at your exam site with any questions.            Jun 14, 2018  3:00 PM CDT   (Arrive by 2:45 PM)   Return Vascular Visit with Lexis Ag MD   [facility] ([facility])    [address] [id number] [phone]              Future tests that were ordered for you today     Open Future Orders        Priority Expected Expires Ordered    Urine drug screen Routine 5/10/2018 5/24/2018 5/10/2018    Oxycodone Confirm Ur Routine  5/11/2019 5/10/2018            Who to contact     Please call your clinic at [phone] to:    Ask questions about your health    Make or cancel appointments    Discuss your medicines    Learn about your test results    Speak to your doctor            Additional Information About Your Visit        [employer] Information     [employer] gives you secure access to your electronic health record. If you see a primary care provider, you can also send messages to your care team and make appointments. If you have questions, please call your primary care clinic.  If you do not have a primary care provider, please call [phone] and they will assist you.      [employer] is an electronic gateway that provides easy, online access to your medical records. With [employer], you can request a clinic appointment, read your test results, renew a prescription or communicate with your care team.     To access your existing account, please contact your [facility] or call [phone] for assistance.        Care EveryWhere ID     This is your Care EveryWhere ID. This could be used by other organizations to access your [facility] medical records  [id number]        Your Vitals Were     Pulse Respirations Pulse Oximetry             69 20 97%          Blood Pressure from Last 3 Encounters:   05/10/18 91/57   04/18/18 99/50   04/12/18 (!) 83/55    Weight from Last 3 Encounters:   04/18/18 69.9 kg (154 lb)   03/29/18 70 kg (154 lb 4.8 oz)   03/22/18 69.4 kg (153 lb)              We Performed the Following     BEHAVIORAL / SPIRITUAL HEALTH ([facility] ONLY)     [facility]IN AND [facility] REFERRAL     ONC/HEME  ADULT REFERRAL        Primary Care Provider Office Phone # Fax #    Willian Arrington [phone]       [facility] [address]  [place] [id number]        Equal Access to Services     CHANEL DORADO : Hadii vishnu ku hadmargaritoo Soomaali, waaxda luqadaha, qaybta kaalmada adeshantda, vijay calerokylie evans. So [facility] [phone].    ATENCIÓN: Si habla español, tiene a bhatia disposición servicios gratuitos de asistencia lingüística. Llame al [phone].    We comply with applicable federal civil rights laws and [place] laws. We do not discriminate on the basis of race, color, national origin, age, disability, sex, sexual orientation, or gender identity.            Thank you!     Thank you for choosing [facility]  for your care. Our goal is always to provide you with excellent care. Hearing back from our patients is one way we can continue to improve our services. Please take a few minutes to complete the written survey that you may receive in the mail after your visit with us. Thank you!             Your Updated Medication List - Protect others around you: Learn how to safely use, store and throw away your medicines at www.disposemymeds.org.          This list is accurate as of 5/10/18  3:32 PM.  Always use your most recent med list.                   Brand Name Dispense Instructions for use Diagnosis    acetic acid 0.25 % irrigation     10 mL    Irrigate with as directed 2 times daily    S/P vascular surgery       ascorbic acid 500 MG Tabs     7 tablet    Take 1 tablet (500 mg) by mouth daily    Takes dietary supplements       ASPIRIN EC PO      Take 81 mg by mouth daily        carvedilol 6.25 MG tablet    COREG    60 tablet    Take 1 tablet (6.25 mg) by mouth 2 times daily (with meals)    Ischemic cardiomyopathy       ciprofloxacin 500 MG tablet    CIPRO    14 tablet    Take 1 tablet (500 mg) by mouth 2 times daily    Cellulitis and abscess of leg        clindamycin 300 MG capsule    CLEOCIN    28 capsule    Take 1 capsule (300 mg) by mouth 4 times daily    Cellulitis and abscess of leg       clopidogrel 75 MG tablet    PLAVIX    30 tablet    Take 1 tablet (75 mg) by mouth daily    Ischemic cardiomyopathy       diclofenac 1 % Gel topical gel    VOLTAREN    1 Tube    Place 4 g onto the skin 4 times daily    Pain of left lower extremity       docusate sodium 100 MG capsule    COLACE    60 capsule    Take 1 capsule (100 mg) by mouth daily    Takes dietary supplements       enalapril 2.5 MG tablet    VASOTEC    60 tablet    Take 1 tablet (2.5 mg) by mouth 2 times daily    Ischemic cardiomyopathy       escitalopram 20 MG tablet    LEXAPRO    15 tablet    Take 1 tablet (20 mg) by mouth daily    S/P vascular surgery       ezetimibe 10 MG tablet    ZETIA    30 tablet    Take 1 tablet (10 mg) by mouth daily    Ischemic cardiomyopathy       fentaNYL 25 mcg/hr 72 hr patch    DURAGESIC     Place 1 patch onto the skin every 72 hours remove old patch.        folic acid 1 MG tablet    FOLVITE    30 tablet    Take 1 tablet (1 mg) by mouth daily    Takes dietary supplements       furosemide 20 MG tablet    LASIX    60 tablet    Take 1 tablet (20 mg) by mouth 2 times daily (with meals)    S/P vascular surgery       * gabapentin 600 MG tablet    NEURONTIN    30 tablet    Take 1 tablet (600 mg) by mouth At Bedtime    PVD (peripheral vascular disease) (H)       * gabapentin 300 MG capsule    NEURONTIN    60 capsule    Take 1 capsule (300 mg) by mouth 2 times daily    S/P vascular surgery       isosorbide mononitrate 30 MG 24 hr tablet    IMDUR    90 tablet    Take 1 tablet (30 mg) by mouth daily    Chronic systolic congestive heart failure (H)       Lidocaine 4 % Gel     30 g    Externally apply topically 2 times daily    Cellulitis and abscess of leg       multivitamin, therapeutic with minerals Tabs tablet     30 each    Take 1 tablet by mouth daily    Takes dietary supplements        order for DME     1 each    Equipment being ordered: Walker Wheels () and Walker () Treatment Diagnosis: impaired mobility    Pain of left lower extremity       oxyCODONE IR 10 MG tablet    ROXICODONE    25 tablet    Take 1 tablet (10 mg) by mouth every 6 hours as needed for moderate to severe pain    Narcotic dependence (H)       polyethylene glycol Packet    MIRALAX/GLYCOLAX    7 packet    Take 17 g by mouth daily    Takes dietary supplements       predniSONE 10 MG tablet    DELTASONE    30 tablet    Take 1 tablet (10 mg) by mouth daily    S/P vascular surgery       rosuvastatin 20 MG tablet    CRESTOR    30 tablet    Take 1 tablet (20 mg) by mouth daily    Ischemic cardiomyopathy       vitamin A 01103 UNIT capsule     7 capsule    Take 2 capsules (20,000 Units) by mouth daily    Takes dietary supplements       zinc sulfate 220 (50 Zn) MG capsule    ZINCATE    7 capsule    Take 1 capsule (220 mg) by mouth daily    Takes dietary supplements       * Notice:  This list has 2 medication(s) that are the same as other medications prescribed for you. Read the directions carefully, and ask your doctor or other care provider to review them with you.

## 2018-05-10 NOTE — PROGRESS NOTES
Chief Complaint:   Chief Complaint   Patient presents with     Wound Check     f/u        Subjective: Boom is a 68 year old male who presents to the clinic today for follow up of bilateral arterial wounds. Dressing with medihoney and adaptic daily, which is tolerable. Son is present during visit today. No new concerns. Still has not quit smoking.    ROS: Denies increased redness, swelling, purulent drainage, warmth, increased wound pain, n/v, confusion, fevers.    Allergies   Allergen Reactions     Betadine [Povidone Iodine] Blisters     Pt reports erythema, increased pain, and blistering when using betadine on R big toe in past     Collagenase Clostridium Histolyticum      Flagyl [Metronidazole] Other (See Comments)     Flu symptoms  Flu like symptoms     Iodine Unknown     Santyl [Collagenase]      Current Outpatient Rx   Medication Sig Dispense Refill     acetic acid 0.25 % irrigation Irrigate with as directed 2 times daily 10 mL 0     ascorbic acid 500 MG TABS Take 1 tablet (500 mg) by mouth daily 7 tablet 0     ASPIRIN EC PO Take 81 mg by mouth daily       carvedilol (COREG) 6.25 MG tablet Take 1 tablet (6.25 mg) by mouth 2 times daily (with meals) 60 tablet 3     ciprofloxacin (CIPRO) 500 MG tablet Take 1 tablet (500 mg) by mouth 2 times daily 14 tablet 0     clindamycin (CLEOCIN) 300 MG capsule Take 1 capsule (300 mg) by mouth 4 times daily 28 capsule 0     clopidogrel (PLAVIX) 75 MG tablet Take 1 tablet (75 mg) by mouth daily 30 tablet 0     diclofenac (VOLTAREN) 1 % GEL topical gel Place 4 g onto the skin 4 times daily 1 Tube 0     docusate sodium (COLACE) 100 MG capsule Take 1 capsule (100 mg) by mouth daily 60 capsule 0     enalapril (VASOTEC) 2.5 MG tablet Take 1 tablet (2.5 mg) by mouth 2 times daily 60 tablet 11     escitalopram (LEXAPRO) 20 MG tablet Take 1 tablet (20 mg) by mouth daily 15 tablet 0     ezetimibe (ZETIA) 10 MG tablet Take 1 tablet (10 mg) by mouth daily 30 tablet 0     fentaNYL  (DURAGESIC) 25 mcg/hr 72 hr patch Place 1 patch onto the skin every 72 hours remove old patch.       folic acid (FOLVITE) 1 MG tablet Take 1 tablet (1 mg) by mouth daily 30 tablet 0     furosemide (LASIX) 20 MG tablet Take 1 tablet (20 mg) by mouth 2 times daily (with meals) 60 tablet 3     gabapentin (NEURONTIN) 300 MG capsule Take 1 capsule (300 mg) by mouth 2 times daily 60 capsule 1     gabapentin (NEURONTIN) 600 MG tablet Take 1 tablet (600 mg) by mouth At Bedtime 30 tablet 1     isosorbide mononitrate (IMDUR) 30 MG 24 hr tablet Take 1 tablet (30 mg) by mouth daily 90 tablet 3     Lidocaine 4 % GEL Externally apply topically 2 times daily 30 g 1     multivitamin, therapeutic with minerals (THERA-VIT-M) TABS tablet Take 1 tablet by mouth daily 30 each 0     order for DME Equipment being ordered: Walker Wheels () and Walker ()  Treatment Diagnosis: impaired mobility 1 each 0     oxyCODONE IR (ROXICODONE) 10 MG tablet Take 1 tablet (10 mg) by mouth every 6 hours as needed for moderate to severe pain 25 tablet 0     polyethylene glycol (MIRALAX/GLYCOLAX) Packet Take 17 g by mouth daily 7 packet 0     predniSONE (DELTASONE) 10 MG tablet Take 1 tablet (10 mg) by mouth daily 30 tablet 0     rosuvastatin (CRESTOR) 20 MG tablet Take 1 tablet (20 mg) by mouth daily 30 tablet 11     vitamin A 79113 UNIT capsule Take 2 capsules (20,000 Units) by mouth daily 7 capsule 0     zinc sulfate (ZINCATE) 220 (50 ZN) MG capsule Take 1 capsule (220 mg) by mouth daily 7 capsule 0       Objective:   There were no vitals taken for this visit.    General: Patient is well groomed, appears stated age, is alert and cooperative, and is in no acute distress.  Skin: No erythema, mild edema bilaterally R>L, no calor, odor, purulent drainage.     Wound #1  An arterial ulcer is noted at right  anterior shin measuring 13.5 cm x 7 cm x 0.2 cm. Mildly tender to palpation.  Tissue Depth: Tendon  Wound base: Red, Yellow, White/Granulation  (more granulation tissue present today in between ruptured tendon), Tendon  Edges: intact, scab  Drainage: light/serous  Odor: No   Undermining: No  Tunneling: No  Bone Exposure: No  Clinical Signs of Infection: No      Wound #2  An arterial ulcer is noted at left  Anterior shin measuring 3 cm x 3.5 cm x 0.2 cm. Mildly tender to palpation.  Tissue Depth: subcutaneous  Wound base: Pink, Yellow/Granulation, Slough  Edges: intact  Drainage: small/serous  Odor: No   Undermining: No  Tunneling: No  Bone Exposure: No  Clinical Signs of Infection: No    Assessment:   - Chronic bilateral arterial shin ulcers  - Peripheral arterial disease  - Nicotine dependence    Plan:   - No excisional debridement needed today.   - Wound Care instructions: 1. Vashe soak x 10 minutes 2. Cover with MediHoney 3. Cover with Adaptic 4. Wrap with 4x4 gauze and kerlix. Perform every other day.   - At this point, there is not much else I can do in clinic. He needs surgical debridement and grafting. Son and Boom verbalize understanding of this. Discussed options for these wounds again with son present: A. Stop smoking, return to Dr. Haynes for tendon debridement and grafting. B. Continue daily wound care with no changes C. Find another provider who will perform the tendon debridement and grafting regardless of smoking habit  - RTC every 2 weeks for wound assessment, sooner if concerns

## 2018-05-10 NOTE — MR AVS SNAPSHOT
After Visit Summary   5/10/2018    Boom Kahn    MRN: [id number]           Patient Information     Date Of Birth          1950        Visit Information        Provider Department      5/10/2018 1:00 PM Tamiko Mcduffie PA-C [facility] Wound [facility]        Today's Diagnoses     Arterial leg ulcer (H)    -  1      Care Instructions    Preventive Care:    Colorectal Cancer Screening: During our visit today, we discussed that it is recommended you receive colorectal cancer screening. Please call or make an appointment with your primary care provider to discuss this. You may also call the [facility] scheduling line ([phone]) to set up a colonoscopy appointment.              Follow-ups after your visit        Your next 10 appointments already scheduled     May 10, 2018  2:05 PM CDT   (Arrive by 1:50 PM)   Return Visit with Ray Russo MD   [facility] ([facility])    [address] [place] [address] [id number] [phone]            May 24, 2018 11:30 AM CDT   (Arrive by 11:15 AM)   Return Visit with Tamiko Mcduffie PA-C   [facility] ([facility])    [address] [place] [address] [id number] [phone]            Jun 14, 2018  2:00 PM CDT   US LOWER EXTREMITY ARTERIAL DUPLEX RIGHT with UCUSV2   [facility] Imaging [facility] US ([facility])    [address] [place] [address] [id number] [phone]           Please bring a list of your medicines (including vitamins, minerals and over-the-counter drugs). Also, tell your doctor about any allergies you may have. Wear comfortable clothes and leave your valuables at home.  You do not need to do anything special to prepare for your exam.  Please call the Imaging Department at your exam site with any questions.            Jun 14, 2018  2:30 PM CDT   US RAVI DOPPLER NO EXERCISE 1-2 LVLS BILAT with UCUSV2     [facility]  ([facility])    [address] [place] [id number] [phone]           Please bring a list of your medicines (including vitamins, minerals and over-the-counter drugs). Also, tell your doctor about any allergies you may have. Wear comfortable clothes and leave your valuables at home.  No caffeine or tobacco for 1 hour prior to exam.  Please call the Imaging Department at your exam site with any questions.            Jun 14, 2018  3:00 PM CDT   (Arrive by 2:45 PM)   Return Vascular Visit with Lexis Ag MD   [facility] ([facility])    [address] [place] [id number] [phone]              Who to contact     Please call your clinic at [phone] to:    Ask questions about your health    Make or cancel appointments    Discuss your medicines    Learn about your test results    Speak to your doctor            Additional Information About Your Visit        Code[employer] Information     [employer] gives you secure access to your electronic health record. If you see a primary care provider, you can also send messages to your care team and make appointments. If you have questions, please call your primary care clinic.  If you do not have a primary care provider, please call [phone] and they will assist you.      [employer] is an electronic gateway that provides easy, online access to your medical records. With [employer], you can request a clinic appointment, read your test results, renew a prescription or communicate with your care team.     To access your existing account, please contact your [facility] or call [phone] for assistance.        Care EveryWhere ID     This is your Care EveryWhere ID. This could be used by other organizations to access your [place] medical records  [id number]         Blood Pressure from Last 3 Encounters:   04/18/18  99/50   04/12/18 (!) 83/55   03/29/18 99/58    Weight from Last 3 Encounters:   04/18/18 154 lb   03/29/18 154 lb 4.8 oz   03/22/18 153 lb              Today, you had the following     No orders found for display       Primary Care Provider Office Phone # Fax #    Willian Arrington [phone]       [facility] PRIMARY CARE [address]  [facility] [address]        Equal Access to Services     TANJA DORADO : Hadii aad ku hadasho Soomaali, waaxda luqadaha, qaybta kaalmada adeegyada, waxay idiin hayaan adeeg kharash la'quentinn . So [facility] [phone].    ATENCIÓN: Si habla español, tiene a bhatia disposición servicios gratuitos de asistencia lingüística. [facility] [phone].    We comply with applicable federal civil rights laws and [place] laws. We do not discriminate on the basis of race, color, national origin, age, disability, sex, sexual orientation, or gender identity.            Thank you!     Thank you for choosing [facility] WOUND [facility]  for your care. Our goal is always to provide you with excellent care. Hearing back from our patients is one way we can continue to improve our services. Please take a few minutes to complete the written survey that you may receive in the mail after your visit with us. Thank you!             Your Updated Medication List - Protect others around you: Learn how to safely use, store and throw away your medicines at www.disposemymeds.org.          This list is accurate as of 5/10/18  2:04 PM.  Always use your most recent med list.                   Brand Name Dispense Instructions for use Diagnosis    acetic acid 0.25 % irrigation     10 mL    Irrigate with as directed 2 times daily    S/P vascular surgery       ascorbic acid 500 MG Tabs     7 tablet    Take 1 tablet (500 mg) by mouth daily    Takes dietary supplements       ASPIRIN EC PO      Take 81 mg by mouth daily        carvedilol 6.25 MG tablet    COREG    60 tablet    Take 1 tablet (6.25 mg) by mouth 2 times  daily (with meals)    Ischemic cardiomyopathy       ciprofloxacin 500 MG tablet    CIPRO    14 tablet    Take 1 tablet (500 mg) by mouth 2 times daily    Cellulitis and abscess of leg       clindamycin 300 MG capsule    CLEOCIN    28 capsule    Take 1 capsule (300 mg) by mouth 4 times daily    Cellulitis and abscess of leg       clopidogrel 75 MG tablet    PLAVIX    30 tablet    Take 1 tablet (75 mg) by mouth daily    Ischemic cardiomyopathy       diclofenac 1 % Gel topical gel    VOLTAREN    1 Tube    Place 4 g onto the skin 4 times daily    Pain of left lower extremity       docusate sodium 100 MG capsule    COLACE    60 capsule    Take 1 capsule (100 mg) by mouth daily    Takes dietary supplements       enalapril 2.5 MG tablet    VASOTEC    60 tablet    Take 1 tablet (2.5 mg) by mouth 2 times daily    Ischemic cardiomyopathy       escitalopram 20 MG tablet    LEXAPRO    15 tablet    Take 1 tablet (20 mg) by mouth daily    S/P vascular surgery       ezetimibe 10 MG tablet    ZETIA    30 tablet    Take 1 tablet (10 mg) by mouth daily    Ischemic cardiomyopathy       fentaNYL 25 mcg/hr 72 hr patch    DURAGESIC     Place 1 patch onto the skin every 72 hours remove old patch.        folic acid 1 MG tablet    FOLVITE    30 tablet    Take 1 tablet (1 mg) by mouth daily    Takes dietary supplements       furosemide 20 MG tablet    LASIX    60 tablet    Take 1 tablet (20 mg) by mouth 2 times daily (with meals)    S/P vascular surgery       * gabapentin 600 MG tablet    NEURONTIN    30 tablet    Take 1 tablet (600 mg) by mouth At Bedtime    PVD (peripheral vascular disease) (H)       * gabapentin 300 MG capsule    NEURONTIN    60 capsule    Take 1 capsule (300 mg) by mouth 2 times daily    S/P vascular surgery       isosorbide mononitrate 30 MG 24 hr tablet    IMDUR    90 tablet    Take 1 tablet (30 mg) by mouth daily    Chronic systolic congestive heart failure (H)       Lidocaine 4 % Gel     30 g    Externally apply  topically 2 times daily    Cellulitis and abscess of leg       multivitamin, therapeutic with minerals Tabs tablet     30 each    Take 1 tablet by mouth daily    Takes dietary supplements       order for DME     1 each    Equipment being ordered: Walker Wheels () and Walker () Treatment Diagnosis: impaired mobility    Pain of left lower extremity       oxyCODONE IR 10 MG tablet    ROXICODONE    25 tablet    Take 1 tablet (10 mg) by mouth every 6 hours as needed for moderate to severe pain    Narcotic dependence (H)       polyethylene glycol Packet    MIRALAX/GLYCOLAX    7 packet    Take 17 g by mouth daily    Takes dietary supplements       predniSONE 10 MG tablet    DELTASONE    30 tablet    Take 1 tablet (10 mg) by mouth daily    S/P vascular surgery       rosuvastatin 20 MG tablet    CRESTOR    30 tablet    Take 1 tablet (20 mg) by mouth daily    Ischemic cardiomyopathy       vitamin A 93754 UNIT capsule     7 capsule    Take 2 capsules (20,000 Units) by mouth daily    Takes dietary supplements       zinc sulfate 220 (50 Zn) MG capsule    ZINCATE    7 capsule    Take 1 capsule (220 mg) by mouth daily    Takes dietary supplements       * Notice:  This list has 2 medication(s) that are the same as other medications prescribed for you. Read the directions carefully, and ask your doctor or other care provider to review them with you.

## 2018-05-10 NOTE — NURSING NOTE
"Chief Complaint   Patient presents with     Radiology Visit     \" I need a PET scan ordered.\"     Referral     referral for Pain clinic   Neela Baumann LPN 2:23 PM on 5/10/2018    Rooming Note  Health Maintenance   Health Maintenance Due   Topic Date Due     HF ACTION PLAN Q3 YR  1950     LIPID MONITORING Q1 YEAR  02/19/1952     TETANUS IMMUNIZATION (SYSTEM ASSIGNED)  02/19/1969     HEPATITIS C SCREENING  02/19/1969     COLON CANCER SCREEN (SYSTEM ASSIGNED)  02/19/2001     ADVANCE DIRECTIVE PLANNING Q5 YRS  02/19/2006     PNEUMOCOCCAL (1 of 2 - PCV13) 02/19/2016    Patient declined to discuss HM.  Will do Health Maintenance at future appointment.Neela Baumann LPN 2:24 PM on 5/10/2018  "

## 2018-05-10 NOTE — PROGRESS NOTES
[facility]  Primary Care Center   Ray Russo MD  05/10/2018      Chief Complaint:   Radiology Visit and Referral       History of Present Illness:   Boom Kahn is a 68 year old male with a history of ischemic cardiomyopathy, systolic CHF s/p ICD placement, PVD, hypertension, lung cancer s/p radiation treatment and in remission, CAD s/p bypass recently, Atherosclerotic PVD with ulceration, nonhealing nonsurgical wound, and anxiety who presents for evaluation of his leg pain and referral for further lung cancer studies.    The patient reports that has had bilateral leg wound problems the last 3 years. On June 2017, he began to have a worsening ulcer on his right anterior shin. At this time, he was living in [place] and receiving cares from his PCP and Vascular surgeon who had found weak to absent pulses in his right foot. They felt that further treatment should be handled near one of his sons, prompting him to move here with his son Dallas. The plan was either to amputate or have bypass to the leg as his doctors in [place] noted that his tendon was grossly visualized through his wound. He moved here on January 3rd, expecting to have surgery in February, but after having cardiac work up he was found to need catherization and stent placement. This was performed here on 1/15/18, reestablishing blood circulation to his legs; however, he is still without healing.      The patient was seen in clinic by Dr. Arita, on 3/22/18. They discussed his right lower leg ulcer. He was also having opioid medication monitored by his PCP in [place] while he is in [place]. They discussed moving his pain management here and recommended follow up with clinic/Plastic surgery for possible surgical skin grafting. She provided Clindamycin, Cipro, and Lidocaine gel for management of possible infection.  Since, he has met with Dr. Ag of vascular surgery, Dr. Haynes of plastic surgery, and most recently for wound care by  Tamiko Mcduffie PA-C, for wound care (4/26/18). It was noted that one of his right anterior tendons was completely ruptured with permament loss of function. He was to continue on Endoform, and was given wound care instructions.     Leg pain:  Today, the patient reports that he is in chronic pain due to his bilateral leg ulcers. He, however, reports that 60% of the time, he takes his Roxicodone to avoid withdrawal symptoms, and is otherwise slightly managed on Gabapentin.      Lung cancer:  Additionally, he states that his previous lung cancer was treated well with radiation and is in remission. He was provided a referral by his previous provider in [place] to have routine screening for this.     Tobacco use:  He states that when he was hospitalized he was place on a patch for his smoking. He has also tried Chantix in the past, which did not help him quit. He is currently using an E-cig, ad is still trying to completely quit.     Alcohol consumption:  In regards to his 6-8 beers a day (which is cut back for him), he initially did not feel this was a problem. He has had no trouble with the law due to alcohol use. After his hospitalization, he did have some withdrawal symptoms.     Other topics discussed:  1. He will be trying to get back to [place] once he is able to.      Review of Systems:   Pertinent items are noted in HPI, remainder of complete ROS is negative.      Active Medications:     Current Outpatient Prescriptions:      acetic acid 0.25 % irrigation, Irrigate with as directed 2 times daily, Disp: 10 mL, Rfl: 0     ascorbic acid 500 MG TABS, Take 1 tablet (500 mg) by mouth daily, Disp: 7 tablet, Rfl: 0     ASPIRIN EC PO, Take 81 mg by mouth daily, Disp: , Rfl:      carvedilol (COREG) 6.25 MG tablet, Take 1 tablet (6.25 mg) by mouth 2 times daily (with meals), Disp: 60 tablet, Rfl: 3     ciprofloxacin (CIPRO) 500 MG tablet, Take 1 tablet (500 mg) by mouth 2 times daily, Disp: 14 tablet, Rfl: 0      clindamycin (CLEOCIN) 300 MG capsule, Take 1 capsule (300 mg) by mouth 4 times daily, Disp: 28 capsule, Rfl: 0     clopidogrel (PLAVIX) 75 MG tablet, Take 1 tablet (75 mg) by mouth daily, Disp: 30 tablet, Rfl: 0     diclofenac (VOLTAREN) 1 % GEL topical gel, Place 4 g onto the skin 4 times daily, Disp: 1 Tube, Rfl: 0     docusate sodium (COLACE) 100 MG capsule, Take 1 capsule (100 mg) by mouth daily, Disp: 60 capsule, Rfl: 0     enalapril (VASOTEC) 2.5 MG tablet, Take 1 tablet (2.5 mg) by mouth 2 times daily, Disp: 60 tablet, Rfl: 11     escitalopram (LEXAPRO) 20 MG tablet, Take 1 tablet (20 mg) by mouth daily, Disp: 15 tablet, Rfl: 0     ezetimibe (ZETIA) 10 MG tablet, Take 1 tablet (10 mg) by mouth daily, Disp: 30 tablet, Rfl: 0     fentaNYL (DURAGESIC) 25 mcg/hr 72 hr patch, Place 1 patch onto the skin every 72 hours remove old patch., Disp: , Rfl:      folic acid (FOLVITE) 1 MG tablet, Take 1 tablet (1 mg) by mouth daily, Disp: 30 tablet, Rfl: 0     furosemide (LASIX) 20 MG tablet, Take 1 tablet (20 mg) by mouth 2 times daily (with meals), Disp: 60 tablet, Rfl: 3     gabapentin (NEURONTIN) 300 MG capsule, Take 1 capsule (300 mg) by mouth 2 times daily, Disp: 60 capsule, Rfl: 1     gabapentin (NEURONTIN) 600 MG tablet, Take 1 tablet (600 mg) by mouth At Bedtime, Disp: 30 tablet, Rfl: 1     isosorbide mononitrate (IMDUR) 30 MG 24 hr tablet, Take 1 tablet (30 mg) by mouth daily, Disp: 90 tablet, Rfl: 3     Lidocaine 4 % GEL, Externally apply topically 2 times daily, Disp: 30 g, Rfl: 1     multivitamin, therapeutic with minerals (THERA-VIT-M) TABS tablet, Take 1 tablet by mouth daily, Disp: 30 each, Rfl: 0     order for DME, Equipment being ordered: Walker Wheels () and Walker () Treatment Diagnosis: impaired mobility, Disp: 1 each, Rfl: 0     oxyCODONE IR (ROXICODONE) 10 MG tablet, Take 1 tablet (10 mg) by mouth every 6 hours as needed for moderate to severe pain, Disp: 25 tablet, Rfl: 0     polyethylene  glycol (MIRALAX/GLYCOLAX) Packet, Take 17 g by mouth daily, Disp: 7 packet, Rfl: 0     predniSONE (DELTASONE) 10 MG tablet, Take 1 tablet (10 mg) by mouth daily, Disp: 30 tablet, Rfl: 0     rosuvastatin (CRESTOR) 20 MG tablet, Take 1 tablet (20 mg) by mouth daily, Disp: 30 tablet, Rfl: 11     vitamin A 33348 UNIT capsule, Take 2 capsules (20,000 Units) by mouth daily, Disp: 7 capsule, Rfl: 0     zinc sulfate (ZINCATE) 220 (50 ZN) MG capsule, Take 1 capsule (220 mg) by mouth daily, Disp: 7 capsule, Rfl: 0      Allergies:   Betadine [povidone iodine]; Collagenase clostridium histolyticum; Flagyl [metronidazole]; Iodine; and Santyl [collagenase]      Past Medical History:  Anxiety  CAD  CHF  Chronic pain   Depression  Hyperlipidemia   Hypertension  Lung cancer s/p radiation therapy   PAD s/p lower extremity stents  Nicotine dependence   Ischemic cardiomyopathy   PVD  COPD  Atherosclerosis of native arteries of extremities with intermittent claudication, bilateral legs  Dyspnea on exertion   Malignant neoplasm of lung   MU  Critical lowe limb ischemia  Atherosclerotic PVD with ulceration   Back pain   Nonhealing nonsurgical wound      Past Surgical History:  Angioplasty   Bypass graft artery coronary right  1/2018   Bypass graft femorotibial right   Cardiac defibrillator placement   Chest tube insertion   Colon surgery resection for diverticulitis   Fine needle aspiration   Radiation of lung cancer     Family History:   History reviewed. No pertinent family history.      Social History:   Presents with his sonDallas   Tobacco use: 0.50 PPD   Alcohol consumption: Beer, 6-8 a day, occasional vodka cocktail    Originally from [place]   3 sons, one lives here, another in [place]    Physical Exam:   BP 91/57 (BP Location: Right arm, Patient Position: Sitting, Cuff Size: Adult Regular)  Pulse 69  Resp 20  SpO2 97%      Constitutional: Alert. In no distress.  Head: The scalp, face, and head appear  normal.  Cardiovascular: RRR. No murmurs, clicks, gallops, or rub.   Respiratory: Clear to auscultation bilaterally, no wheezes or crackles.  Gastrointestinal: Abdomen soft. Non-tender. BS normal. No masses or organomegaly.  Neurologic: Speech is normal and fluent.  Psychiatric: Mentation appears normal. Normal affect.     Assessment and Plan:  During the visit I spoke with son, Oleg, who expressed concern that the patient does not have a valid drivers license and may have access to a vehicle. They are hesitant to remove the car from him themselves. I will discuss this with my .     He is also concerned if palliative care should be involved. I will keep this in mind as he goes to the consults I gave him today. Certainly if it turned out that he needed further treatment such as amputation due to his artery disease, or his cancer returned, we would involve palliative care.     CAD: no angina. Recent stent placed here.    Chemical dependency  I would like to refer the patient to a chemical dependency specialist to help him with his alcohol consumption. However, the patient does report that all his current efforts are focused on getting his leg wounds and pains managed, and does not feel that he would be successful at getting rid of alcohol at this time. He declines CD referral now.    Bilateral leg pain  I will refer the patient to our pain clinic to see if we can adjust his pain regiment for better relief.  He notes gabapentin helps but not sure if can tolerate more or not; narcotic helps but not as much as he'd like, and some doses are more to prevent withdrawal sx than to treat pain at that moment. [place] rvwd, [place] very recently filled oxy rx, from an MD in [place], was his primary MD there [employer] [facility] (moved here in January this year, althoug per pt he hopes to move back sometime)  - Urine drug screen  - Oxycodone Confirm Ur  - EALTH PAIN AND INTERVENTIONAL CLINIC REFERRAL    Anxiety  Due to his  chronic use of narcotics/benzos with daily alcohol consumption, I recommended that he meet with psychiatry here for anxiety evaluation and treatment outside of Benzodiazapines.   - BEHAVIORAL / SPIRITUAL HEALTH ([facility] ONLY)    Malignant neoplasm of lung, unspecified laterality, unspecified part of lung (H)  I refer the patient to oncology for evaluation of his lung cancer in remission.   - ONC/HEME ADULT REFERRAL    Rvwd at length and in direct fashion that using narcotic, benzo, and 6-8 beers a day is worrisome for his health.    For PAD/wound care he will cont w/ established providers as is. As he just came from wound clinic we did not unwrap legs.    I've asked my RN to work w/ him by phone to get him into pharmD here for smoke cessation visit, and discussed this option during visit w/ the pt, and the bad effects of smoking and nicotine on his diseases.     Follow-up: One month I asked nursing to help get [facility] shot records to discuss next visit.     One hour visit over half couns/coord care to discuss w/ pt and son in room (and speak to roberth Dejesus by phone too)        Scribe Disclosure:   I, Malick Zapata, am serving as a scribe to document services personally performed by Ray Russo MD at this visit, based upon the provider's statements to me. All documentation has been reviewed by the aforementioned provider prior to being entered into the official medical record.     Portions of this medical record were completed by a scribe. UPON MY REVIEW AND AUTHENTICATION BY ELECTRONIC SIGNATURE, this confirms (a) I performed the applicable clinical services, and (b) the record is accurate.    Ray Russo MD

## 2018-05-10 NOTE — NURSING NOTE
Chief Complaint   Patient presents with     Wound Check     f/u        There were no vitals filed for this visit.    There is no height or weight on file to calculate BMI.      WOUND EVALUATION:        No Vitals per Tamiko Mcduffie PA-C

## 2018-05-10 NOTE — PATIENT INSTRUCTIONS
[facility]: [phone]     [facility] Medication Refill Request Information:  * Please contact your pharmacy regarding ANY request for medication refills.  ** [facility] Prescription Fax = [phone]  * Please allow 3 business days for routine medication refills.  * Please allow 5 business days for controlled substance medication refills.     [facility] Test Result notification information:  *You will be notified with in 7-10 days of your appointment day regarding the results of your test.  If you are on MyChart you will be notified as soon as the provider has reviewed the results and signed off on them.

## 2018-05-14 ENCOUNTER — TELEPHONE (OUTPATIENT)
Dept: INTERNAL MEDICINE | Facility: CLINIC | Age: 68
End: 2018-05-14

## 2018-05-14 ENCOUNTER — TELEPHONE (OUTPATIENT)
Dept: PHARMACY | Facility: OTHER | Age: 68
End: 2018-05-14

## 2018-05-14 DIAGNOSIS — Z71.6 ENCOUNTER FOR SMOKING CESSATION COUNSELING: Primary | ICD-10-CM

## 2018-05-14 NOTE — TELEPHONE ENCOUNTER
MTM referral from: [facility] visit (referral by provider)    MTM referral outreach attempt #1 on May 14, 2018 at 4:27 PM      Outcome: Patient is not interested at this time because he is not ready to stop and has other things going on that he wants to focus on, will route to MTM Pharmacist/Provider as an FYI. Thank you for the referral.     Maribel Mora, MTM Coordinator

## 2018-05-14 NOTE — TELEPHONE ENCOUNTER
Left a detailed message to the pt regarding MTM referral. Pt will be contacted by [facility] for scheduling. Clinic phone number given for additional questions.  Also pt has an upcoming appts with pain clinic & oncology on 5/25/18.    Soon-Mi  -----------------------------------------------------------------        ----- Message from Ray Russo MD sent at 5/11/2018  9:06 AM CDT -----  Also we talked about seeing pharmacist here to quit smoking, I did not refer, but ask him if he'd like to see Anastasiya here for that.

## 2018-05-15 ENCOUNTER — TELEPHONE (OUTPATIENT)
Dept: BEHAVIORAL HEALTH | Facility: CLINIC | Age: 68
End: 2018-05-15

## 2018-05-15 NOTE — TELEPHONE ENCOUNTER
"Writer spoke with client about Dr. Russo's referral to Dr. Krishna for brief medication management. Client stated \"I'm not going to go through with that. Dr. Russo got some wrong information that I should only be taking one xanax pill a day, and I think he thought I was taking more than I am. I'm not going to go through with that appointment.\"  "

## 2018-05-21 ENCOUNTER — OFFICE VISIT (OUTPATIENT)
Dept: ANESTHESIOLOGY | Facility: CLINIC | Age: 68
End: 2018-05-21
Payer: COMMERCIAL

## 2018-05-21 ENCOUNTER — PRE VISIT (OUTPATIENT)
Dept: ANESTHESIOLOGY | Facility: CLINIC | Age: 68
End: 2018-05-21

## 2018-05-21 ENCOUNTER — TELEPHONE (OUTPATIENT)
Dept: CALL CENTER | Age: 68
End: 2018-05-21

## 2018-05-21 VITALS
RESPIRATION RATE: 16 BRPM | HEART RATE: 76 BPM | DIASTOLIC BLOOD PRESSURE: 60 MMHG | SYSTOLIC BLOOD PRESSURE: 85 MMHG | HEIGHT: 68 IN

## 2018-05-21 DIAGNOSIS — I73.9 PERIPHERAL ARTERIAL DISEASE (H): Primary | ICD-10-CM

## 2018-05-21 DIAGNOSIS — S81.801A NON-HEALING WOUND OF LOWER EXTREMITY, RIGHT, INITIAL ENCOUNTER: ICD-10-CM

## 2018-05-21 RX ORDER — GABAPENTIN 600 MG/1
600 TABLET ORAL 3 TIMES DAILY
Qty: 30 TABLET | Refills: 1 | Status: SHIPPED | OUTPATIENT
Start: 2018-05-21

## 2018-05-21 ASSESSMENT — ANXIETY QUESTIONNAIRES
6. BECOMING EASILY ANNOYED OR IRRITABLE: NOT AT ALL
GAD7 TOTAL SCORE: 4
4. TROUBLE RELAXING: SEVERAL DAYS
1. FEELING NERVOUS, ANXIOUS, OR ON EDGE: SEVERAL DAYS
5. BEING SO RESTLESS THAT IT IS HARD TO SIT STILL: NOT AT ALL
7. FEELING AFRAID AS IF SOMETHING AWFUL MIGHT HAPPEN: NOT AT ALL
GAD7 TOTAL SCORE: 4
GAD7 TOTAL SCORE: 4
7. FEELING AFRAID AS IF SOMETHING AWFUL MIGHT HAPPEN: NOT AT ALL
2. NOT BEING ABLE TO STOP OR CONTROL WORRYING: SEVERAL DAYS
3. WORRYING TOO MUCH ABOUT DIFFERENT THINGS: SEVERAL DAYS

## 2018-05-21 ASSESSMENT — ENCOUNTER SYMPTOMS
VOMITING: 0
DECREASED APPETITE: 1
TASTE DISTURBANCE: 0
HYPOTENSION: 0
WEIGHT GAIN: 0
CONSTIPATION: 1
SINUS PAIN: 0
JOINT SWELLING: 1
NERVOUS/ANXIOUS: 1
SPEECH CHANGE: 0
RECTAL PAIN: 1
MYALGIAS: 0
SORE THROAT: 0
WEAKNESS: 1
INCREASED ENERGY: 0
HEARTBURN: 1
HYPERTENSION: 1
MUSCLE WEAKNESS: 1
BOWEL INCONTINENCE: 0
FEVER: 0
DECREASED CONCENTRATION: 1
POOR WOUND HEALING: 1
TROUBLE SWALLOWING: 0
HOARSE VOICE: 0
TREMORS: 0
POLYDIPSIA: 0
NIGHT SWEATS: 1
DEPRESSION: 0
BLOATING: 0
ORTHOPNEA: 0
FATIGUE: 1
SNORES LOUDLY: 1
NUMBNESS: 1
SHORTNESS OF BREATH: 1
STIFFNESS: 1
DIARRHEA: 0
DYSPNEA ON EXERTION: 1
SINUS CONGESTION: 0
MEMORY LOSS: 0
COUGH: 1
POSTURAL DYSPNEA: 0
TINGLING: 0
NECK MASS: 0
NAUSEA: 1
SYNCOPE: 0
DISTURBANCES IN COORDINATION: 0
POLYPHAGIA: 0
PANIC: 0
COUGH DISTURBING SLEEP: 0
SMELL DISTURBANCE: 0
PARALYSIS: 0
LOSS OF CONSCIOUSNESS: 0
INSOMNIA: 1
HEMOPTYSIS: 0
SLEEP DISTURBANCES DUE TO BREATHING: 0
HEADACHES: 1
ARTHRALGIAS: 1
NAIL CHANGES: 0
NECK PAIN: 1
WEIGHT LOSS: 0
EXERCISE INTOLERANCE: 1
BRUISES/BLEEDS EASILY: 1
CHILLS: 1
SPUTUM PRODUCTION: 1
ABDOMINAL PAIN: 0
WHEEZING: 0
MUSCLE CRAMPS: 0
DIZZINESS: 1
SEIZURES: 0
HALLUCINATIONS: 0
LIGHT-HEADEDNESS: 0
PALPITATIONS: 0
BLOOD IN STOOL: 0
SWOLLEN GLANDS: 0
LEG PAIN: 1
SKIN CHANGES: 0
JAUNDICE: 0
BACK PAIN: 0
ALTERED TEMPERATURE REGULATION: 1

## 2018-05-21 ASSESSMENT — PAIN SCALES - GENERAL: PAINLEVEL: EXTREME PAIN (8)

## 2018-05-21 NOTE — MR AVS SNAPSHOT
After Visit Summary   5/21/2018    Boom Kahn    MRN: [id number]           Patient Information     Date Of Birth          1950        Visit Information        Provider Department      5/21/2018 8:40 AM Cari Childress APRN [facility]        Today's Diagnoses     PVD (peripheral vascular disease) (H)          Care Instructions    1. Gabapentin refilled and increased. 600 mg tablets prescribed to you today.    AM   PM   Bedtime  300   300   600mg .  After 1-3 days, increase as tolerated to the next line  600mg      300   600mg .  After 3-4 days, increase as tolerated to the next line  600mg                     600mg                   600mg . Call with any problems or when you are at this dose. We can prescribe 600mg tabs going forwards.     Caution for sedation.   Do not drive until you know how the medication affects you.   You can go slower if you need to or increasing only one dose at a time.  Do not stop abruptly once at higher doses.  This medication must be tapered off.    2. We have provided you with a list of alternate pain clinics.     Follow up: As needed.     To speak with a nurse, schedule/reschedule/cancel a clinic appointment, or request a medication refill call: [phone]     You can also reach us by Mevvy: [email or web address]    For refills, please call on Monday, 1 week before your medication runs out. The doctors are not always in clinic, so this gives us time to get your prescriptions ready.  Please let us know the name of the medication you are requesting a refill of.           Cesario Wolf  [facility]  [address] [place] [address]  [phone]    [facility]  [address] [place] [address]  [phone]    [facility]  [address] [place]  [address]  [phone]      [facility]    [phone]    [facility]  Clinic  [address]  [place] [address]    [facility]  Clinic  [address], [address]  [place] [address]  Phone: [phone]    [facility]  Clinic  [address], [address]  [place] [address]  Phone: [phone]    [facility]  Clinic  [address], [address]  [place] [address]  Phone: [phone]        [facility]  Clinic  [address], [address]   [place] [address]  Phone: [phone]    [facility]  Clinic  [address], [address]  [place] [address]  Phone: [phone]      [facility]  [phone]   [place]   [phone]  All medical and medication questions can be routed to the Pain Nurse Line at [phone].    Rule  25  Contact numbers for each [facility]    [email or web address]     [facility]   [phone]  [facility]    [phone]  [facility]   [phone]  These private agencies/hospitals will evaluate for appropriate level of care and provide care or refer for care as allowed by insurance coverage.      Detox/treatment options:    [facility]:  [phone]   Intake line for 28 day treatment program with lodging and out patient programs    [facility] intake line for detox [phone]    [facility] asks that the patient call this number for information and evaluation.     or the patient can go to the Behavioral Health ED at [facility].   There, patient will be evaluated  for appropriate level of care.   Staff will assist patient in arranging for appropriate care.                                Follow-ups after your visit        Your next 10 appointments already scheduled     May 24, 2018 11:30 AM CDT   (Arrive by 11:15 AM)   Return Visit with  Tamiko Mcduffie PA-C   [facility] ([facility])    [address]  4th Floor  [facility] [id number] [phone]            May 25, 2018 10:30 AM CDT   (Arrive by 10:15 AM)   New Patient Visit with Dave Del Rio MD   [facility] ([facility])    [address]  [address]  [facility] [id number] [phone]            Jun 14, 2018  2:00 PM CDT   US LOWER EXTREMITY ARTERIAL DUPLEX RIGHT with UCUSV2   [facility] ([facility])    [address]  1st Floor  [facility] [id number] [phone]           Please bring a list of your medicines (including vitamins, minerals and over-the-counter drugs). Also, tell your doctor about any allergies you may have. Wear comfortable clothes and leave your valuables at home.  You do not need to do anything special to prepare for your exam.  Please call the Imaging Department at your exam site with any questions.            Jun 14, 2018  2:30 PM CDT   US RAVI DOPPLER NO EXERCISE 1-2 LVLS BILAT with UCUSV2   [facility] ([facility])    [address]  1st Floor  [facility] [id number] [phone]           Please bring a list of your medicines (including vitamins, minerals and over-the-counter drugs). Also, tell your doctor about any allergies you may have. Wear comfortable clothes and leave your valuables at home.  No caffeine or tobacco for 1 hour prior to exam.  Please call the Imaging Department at your exam site with any questions.            Jun 14, 2018  3:00 PM CDT   (Arrive by 2:45 PM)   Return Vascular Visit with Lexis Ag MD   [facility] ([facility])    [address]  3rd Floor  [facility] [id number] [phone]              Who to contact     Please call your clinic at [phone] to:    Ask questions about your  "health    Make or cancel appointments    Discuss your medicines    Learn about your test results    Speak to your doctor            Additional Information About Your Visit        [email or web address] Information     [employer] gives you secure access to your electronic health record. If you see a primary care provider, you can also send messages to your care team and make appointments. If you have questions, please call your primary care clinic.  If you do not have a primary care provider, please call [phone] and they will assist you.      [employer] is an electronic gateway that provides easy, online access to your medical records. With [employer], you can request a clinic appointment, read your test results, renew a prescription or communicate with your care team.     To access your existing account, please contact your [facility] or call [phone] for assistance.        Care EveryWhere ID     This is your Care EveryWhere ID. This could be used by other organizations to access your [facility] medical records  [id number]        Your Vitals Were     Pulse Respirations Height             76 16 1.727 m (5' 8\")          Blood Pressure from Last 3 Encounters:   05/21/18 (!) 85/60   05/10/18 91/57   04/18/18 99/50    Weight from Last 3 Encounters:   04/18/18 69.9 kg (154 lb)   03/29/18 70 kg (154 lb 4.8 oz)   03/22/18 69.4 kg (153 lb)              Today, you had the following     No orders found for display         Today's Medication Changes          These changes are accurate as of 5/21/18 10:22 AM.  If you have any questions, ask your nurse or doctor.               These medicines have changed or have updated prescriptions.        Dose/Directions    * gabapentin 300 MG capsule   Commonly known as:  NEURONTIN   Indication:  Neurogenic Pain   This may have changed:  Another medication with the same name was changed. Make sure you understand how and when to take each.   Used for:  S/P vascular surgery "   Changed by:  Cari Childress APRN CNP        Dose:  300 mg   Take 1 capsule (300 mg) by mouth 2 times daily   Quantity:  60 capsule   Refills:  1       * gabapentin 600 MG tablet   Commonly known as:  NEURONTIN   Indication:  Neurogenic Pain   This may have changed:    - when to take this  - additional instructions   Used for:  PVD (peripheral vascular disease) (H)   Changed by:  Cari Childress APRN CNP        Dose:  600 mg   Take 1 tablet (600 mg) by mouth 3 times daily Increase per clinic directions.   Quantity:  30 tablet   Refills:  1       * Notice:  This list has 2 medication(s) that are the same as other medications prescribed for you. Read the directions carefully, and ask your doctor or other care provider to review them with you.         Where to get your medicines      These medications were sent to [id number] - [place], [place] - [address] [facility] [address]  [address], [place] [address]    Hours:  24-hours Phone:  [phone]     gabapentin 600 MG tablet                Primary Care Provider Office Phone # Fax #    Willian Arrington [phone]       [facility] [address]  [place] [address]        Equal Access to Services     CHANEL DORADO AH: Hadii vishnu soares hadasho Soomaali, waaxda luqadaha, qaybta kaalmada adeegyada, vijay evans. So [facility] [phone].    ATENCIÓN: Si habla español, tiene a bhatia disposición servicios gratuitos de asistencia lingüística. Kevin al [phone].    We comply with applicable federal civil rights laws and [place] laws. We do not discriminate on the basis of race, color, national origin, age, disability, sex, sexual orientation, or gender identity.            Thank you!     Thank you for choosing [facility]  for your care. Our goal is always to provide you with excellent care. Hearing back from our patients is one way  we can continue to improve our services. Please take a few minutes to complete the written survey that you may receive in the mail after your visit with us. Thank you!             Your Updated Medication List - Protect others around you: Learn how to safely use, store and throw away your medicines at www.disposemymeds.org.          This list is accurate as of 5/21/18 10:22 AM.  Always use your most recent med list.                   Brand Name Dispense Instructions for use Diagnosis    acetic acid 0.25 % irrigation     10 mL    Irrigate with as directed 2 times daily    S/P vascular surgery       ascorbic acid 500 MG Tabs     7 tablet    Take 1 tablet (500 mg) by mouth daily    Takes dietary supplements       ASPIRIN EC PO      Take 81 mg by mouth daily        carvedilol 6.25 MG tablet    COREG    60 tablet    Take 1 tablet (6.25 mg) by mouth 2 times daily (with meals)    Ischemic cardiomyopathy       ciprofloxacin 500 MG tablet    CIPRO    14 tablet    Take 1 tablet (500 mg) by mouth 2 times daily    Cellulitis and abscess of leg       clindamycin 300 MG capsule    CLEOCIN    28 capsule    Take 1 capsule (300 mg) by mouth 4 times daily    Cellulitis and abscess of leg       clopidogrel 75 MG tablet    PLAVIX    30 tablet    Take 1 tablet (75 mg) by mouth daily    Ischemic cardiomyopathy       diclofenac 1 % Gel topical gel    VOLTAREN    1 Tube    Place 4 g onto the skin 4 times daily    Pain of left lower extremity       docusate sodium 100 MG capsule    COLACE    60 capsule    Take 1 capsule (100 mg) by mouth daily    Takes dietary supplements       enalapril 2.5 MG tablet    VASOTEC    60 tablet    Take 1 tablet (2.5 mg) by mouth 2 times daily    Ischemic cardiomyopathy       escitalopram 20 MG tablet    LEXAPRO    15 tablet    Take 1 tablet (20 mg) by mouth daily    S/P vascular surgery       ezetimibe 10 MG tablet    ZETIA    30 tablet    Take 1 tablet (10 mg) by mouth daily    Ischemic cardiomyopathy        fentaNYL 25 mcg/hr 72 hr patch    DURAGESIC     Place 1 patch onto the skin every 72 hours remove old patch.        folic acid 1 MG tablet    FOLVITE    30 tablet    Take 1 tablet (1 mg) by mouth daily    Takes dietary supplements       furosemide 20 MG tablet    LASIX    60 tablet    Take 1 tablet (20 mg) by mouth 2 times daily (with meals)    S/P vascular surgery       * gabapentin 300 MG capsule    NEURONTIN    60 capsule    Take 1 capsule (300 mg) by mouth 2 times daily    S/P vascular surgery       * gabapentin 600 MG tablet    NEURONTIN    30 tablet    Take 1 tablet (600 mg) by mouth 3 times daily Increase per clinic directions.    PVD (peripheral vascular disease) (H)       isosorbide mononitrate 30 MG 24 hr tablet    IMDUR    90 tablet    Take 1 tablet (30 mg) by mouth daily    Chronic systolic congestive heart failure (H)       Lidocaine 4 % Gel     30 g    Externally apply topically 2 times daily    Cellulitis and abscess of leg       multivitamin, therapeutic with minerals Tabs tablet     30 each    Take 1 tablet by mouth daily    Takes dietary supplements       order for DME     1 each    Equipment being ordered: Walker Wheels () and Walker () Treatment Diagnosis: impaired mobility    Pain of left lower extremity       oxyCODONE IR 10 MG tablet    ROXICODONE    25 tablet    Take 1 tablet (10 mg) by mouth every 6 hours as needed for moderate to severe pain    Narcotic dependence (H)       polyethylene glycol Packet    MIRALAX/GLYCOLAX    7 packet    Take 17 g by mouth daily    Takes dietary supplements       predniSONE 10 MG tablet    DELTASONE    30 tablet    Take 1 tablet (10 mg) by mouth daily    S/P vascular surgery       rosuvastatin 20 MG tablet    CRESTOR    30 tablet    Take 1 tablet (20 mg) by mouth daily    Ischemic cardiomyopathy       vitamin A 87579 UNIT capsule     7 capsule    Take 2 capsules (20,000 Units) by mouth daily    Takes dietary supplements       zinc sulfate 220 (50 Zn)  MG capsule    ZINCATE    7 capsule    Take 1 capsule (220 mg) by mouth daily    Takes dietary supplements       * Notice:  This list has 2 medication(s) that are the same as other medications prescribed for you. Read the directions carefully, and ask your doctor or other care provider to review them with you.

## 2018-05-21 NOTE — TELEPHONE ENCOUNTER
"[facility]: Nurse Triage Note  SITUATION/BACKGROUND                                                      Boom Kahn is a 68 year old male who calls with chronic leg wound. He reports his wound is about the same, but would like to try \"mometasone furoate\" \"it's a corticosteroid\". He found it in his bag of supplies, thinks it was Rx from prior to moving to [place].    Description:  Leg wound / pain.  He reports he reacts to everything.  Currently only doing saline wound dressings.  Has an appointment Thursday 5/24 with Nick.  Onset/duration:  months  Precip. factors:  States he tried a small amount on a spot by his ankle.  It took the inflammation away.  Want's to try more.  Associated symptoms:  Denies fever or chills.  States wound looks about the same.  Some calf and ankle edema.  Not new.  Fire engine red with inflammation on edges of wound to ankle.  Again not new.    Improves/worsens symptoms:  He reports condition is not changed from last appointment.  Pain scale (0-10)   8/10    MEDICATIONS:   Taking medication(s) as prescribed? Yes  Taking over the counter medication(s?) Yes  Any barriers to taking medication(s) as prescribed?  No  Medication(s) improving/managing symptoms?  Not particularily    Allergies:   Allergies   Allergen Reactions     Betadine [Povidone Iodine] Blisters     Pt reports erythema, increased pain, and blistering when using betadine on R big toe in past     Collagenase Clostridium Histolyticum      Flagyl [Metronidazole] Other (See Comments)     Flu symptoms  Flu like symptoms     Iodine Unknown     Santyl [Collagenase]        ASSESSMENT      Discouraged him from trying medication on wound without wound team's approval.    RECOMMENDATION/PLAN                                                      RECOMMENDED DISPOSITION:Attempted to contact clinic.  Wound nurse not available today.  Will route to Tray, who will forward to Nick. Has appointment on Thursday.  Will comply " with recommendation: Advised waiting to see if Nick responds. He sates he may check with the pharmacy?    If further questions/concerns or if symptoms do not improve, worsen or new symptoms develop, call your PCP or [phone] to talk with the Resident on call, as soon as possible.    Guideline used: Wound healing and infection page 659  Telephone Triage Protocols for Nurses, Fifth Edition, Shana Conroy RN    Called Boom back with update, clinic will attempt to contact Nick.

## 2018-05-21 NOTE — PROGRESS NOTES
"I had the pleasure of meeting Mr. Boom Kahn on 5/21/2018 in the outpatient pain clinic in consult for Dr. Russo with regards to his bilateral lower extremity pain.   HPI:  Patient presents with past medical history of peripheral artery disease, congestive heart failure s/p ICD placement, COPD, lung cancer s/p radiation therapy, CAD s/p CABG; he is here today for evaluation of lower extremity pain with non-healing ulcers. He moved to [place] from [place] in January for surgical procedure for right lower extremity, which would have either involved bypass or amputation. However, after cardiac work up, he was found require catheterizatoin and stent placement which took place 1/15/18. Unfortunately, he has still not experienced healing. He has consulted with vascular surgery, plastic surgery, and wound care.  In regards to his pain management regimen, patient has been managed on oxycodone 10 mg, max of 50 mg/day, lidocaine 4% topical, and gabapentin 300/300/600 mg/day. He reports today that he located an old tube of steroid cream that he is using on the tissue surrounding his wounds - he does find this to be beneficial but has not discussed use with his wound care providers. He notes that he has had adverse reaction of severe irritation with use of saline, honey, silver, and Vaseline. He eventually plans to return to [place], but requires pain management in the interim. His primary care provider offered to manage his prescriptions, but required a urine drug screen and offered referral to chemical dependency due to patient's admitted overuse of alcohol (6-8 beers/night); patient declined each of these, stating today \"I'll just tell you what's in my urine, I'm not taking a test\". He is also seeking other recommendations, if applicable.      ?  Past Medical History:   Diagnosis Date     Anxiety      CAD (coronary artery disease)     s/p 3v CABG     CHF (congestive heart failure) (H)     EF 10-15%     Chronic pain "      COPD (chronic obstructive pulmonary disease) (H)      Depression      Hyperlipidemia      Hypertension      Lung cancer (H)     s/p radiation therapy     PAD (peripheral artery disease) (H)     s/p lower extremity stents     Tobacco abuse     past medical history reviewed with patient.   Past Surgical History:   Procedure Laterality Date     ANGIOPLASTY Right 10/2016     BYPASS GRAFT ARTERY CORONARY  1999    [facility]     BYPASS GRAFT FEMOROTIBIAL Right 1/15/2018    Procedure: BYPASS GRAFT FEMOROTIBIAL;  Right Femorotibial Bypass Graft with insitu saphenous vein, wound debriedment of right lower leg;  Surgeon: Lexis Ag MD;  Location: [facility] OR     cardiac catheterization       Cardiac defibrillator placement       CHEST TUBE INSERTION       COLON SURGERY  2008    colon resection for diverticulitis     FINE NEEDLE ASPIRATION Right 12/2016     IMPLANT PACEMAKER       previous radiation      past surgical history reviewed with patient.   Medications:  Current Outpatient Prescriptions   Medication     acetic acid 0.25 % irrigation     ascorbic acid 500 MG TABS     ASPIRIN EC PO     carvedilol (COREG) 6.25 MG tablet     ciprofloxacin (CIPRO) 500 MG tablet     clindamycin (CLEOCIN) 300 MG capsule     clopidogrel (PLAVIX) 75 MG tablet     diclofenac (VOLTAREN) 1 % GEL topical gel     docusate sodium (COLACE) 100 MG capsule     enalapril (VASOTEC) 2.5 MG tablet     escitalopram (LEXAPRO) 20 MG tablet     ezetimibe (ZETIA) 10 MG tablet     fentaNYL (DURAGESIC) 25 mcg/hr 72 hr patch     folic acid (FOLVITE) 1 MG tablet     furosemide (LASIX) 20 MG tablet     gabapentin (NEURONTIN) 300 MG capsule     gabapentin (NEURONTIN) 600 MG tablet     isosorbide mononitrate (IMDUR) 30 MG 24 hr tablet     Lidocaine 4 % GEL     multivitamin, therapeutic with minerals (THERA-VIT-M) TABS tablet     order for DME     oxyCODONE IR (ROXICODONE) 10 MG tablet     polyethylene glycol (MIRALAX/GLYCOLAX) Packet     predniSONE  "(DELTASONE) 10 MG tablet     rosuvastatin (CRESTOR) 20 MG tablet     vitamin A 85477 UNIT capsule     zinc sulfate (ZINCATE) 220 (50 ZN) MG capsule     No current facility-administered medications for this visit.      A multi-state Prescription Monitoring Program reviewed and no aberrancies noted.     Allergies:     Allergies   Allergen Reactions     Betadine [Povidone Iodine] Blisters     Pt reports erythema, increased pain, and blistering when using betadine on R big toe in past     Collagenase Clostridium Histolyticum      Flagyl [Metronidazole] Other (See Comments)     Flu symptoms  Flu like symptoms     Iodine Unknown     Santyl [Collagenase]      Family History:  family history is not on file.  Social history: he lives in [place], but is originally from [place].     Smoking: current smoker; using e-cigarette in effort to quit. Alcohol: 6-8 beers/day or 50 beers/week. Street drugs: denies.     ROS:  A 14 point review of systems was completed; results are listed at end of note.     Objective:   BP (!) 85/60  Pulse 76  Resp 16  Ht 1.727 m (5' 8\")  There is no height or weight on file to calculate BMI.  General: In no apparent distress, thin  Mental status: Normal mood and affect, pleasant  Neuro: Alert, oriented. No sensory deficits.   Head: Atraumatic, normocephalic  Eyes: Extra-ocular movements grossly intact, no scleral icterus  Neck: Supple, Range of motion full  Respiratory: Non-labored breathing; No respiratory distress  Skin: arterial ulceration observed at right anterior shin with visualized tendon; no drainage or odor present. Surrounding tissue is erythematous. Subcutaneous ulcer noted at left shin without drainage, odor.     Assessment:  Mr. Boom Kahn is a 69 yo male seen today for painful peripheral arterial disease and non-healing ulcers. Currently opioid dependent with noted alcohol abuse.     Plan:   1. Patient education: I went over the above diagnoses and treatment plan with him and " answered all of his questions.  2. Interventions  -could consider lumbar sympathetic block to assist with dilation and hopeful pain relief; this was discussed, but patient did not want to pursue this option today.   3. Medications  1. Increase gabapentin to 600/600/600 mg/day; refill provided. May consider additional dose increase.   -Contacted wound clinic provider, Tamiko Mcduffie PA-C, to question if topical compound in PLO gel (gabapentin-ketamine-lidocaine) wound interfere with wound healing.   Addendum: Did receive information from Tamiko Mcduffie that the topical can be utilized on surrounding tissue with avoidance of open wound. Ordered topical gabapentin in PLO due to higher likelihood of insurance coverage.    -Declined to prescribe patient's ongoing opioid prescriptions; I discussed with him that he should follow with chemical dependency referral. He also had refused to submit for urine drug screen with his primary care. I did offer him a list of alternate pain clinics which he accepted, but informed him that urine drug screening is expected at any facility for chronic opioid use. It would be reasonable to try an extended release formation with decrease in IR oxycodone dose for improved pain control, however, patient should certainly not be using alcohol concurrently.    4. Follow up: RTC in 8-12 weeks or as needed.     Total time spent was 35 minutes, and more than 50% of face to face time was spent in counseling and/or coordination of care regarding the above plan.    Thank you for the consult.   DEDRA Segura, [facility]  Pain and Interventional Clinic

## 2018-05-21 NOTE — PATIENT INSTRUCTIONS
1. Gabapentin refilled and increased. 600 mg tablets prescribed to you today.    AM   PM   Bedtime  300   300   600mg .  After 1-3 days, increase as tolerated to the next line  600mg      300   600mg .  After 3-4 days, increase as tolerated to the next line  600mg                     600mg                   600mg . Call with any problems or when you are at this dose. We can prescribe 600mg tabs going forwards.     Caution for sedation.   Do not drive until you know how the medication affects you.   You can go slower if you need to or increasing only one dose at a time.  Do not stop abruptly once at higher doses.  This medication must be tapered off.    2. We have provided you with a list of alternate pain clinics.     Follow up: As needed.    To speak with a nurse, schedule/reschedule/cancel a clinic appointment, or request a medication refill call: [phone]     You can also reach us by ContactMonkey: [email or web address]    For refills, please call on Monday, 1 week before your medication runs out. The doctors are not always in clinic, so this gives us time to get your prescriptions ready.  Please let us know the name of the medication you are requesting a refill of.           Cesario Wolf  [facility]  [address] [place] [address]  [phone]    [facility]  [address] [id number] [phone]    [facility]  [address] [place], [place] [address] [phone]      [facility]    [phone]    [facility]  Clinic  [address]  [place] [address]    [facility]  Clinic  [address], [address]  [place] [address]  Phone: [phone]    [facility]  Clinic  [address]  [place] [address]  Phone: [phone]    [facility]  Clinic  [address], [address]  [place] [address]  Phone: [phone]        [facility]  Clinic  [address], [address]   [place] [address]  Phone: [phone]    [facility]  Clinic  [address], [address]  [place] [address]  Phone: [phone]      [facility]  [phone]   [place]   [phone]  All medical and medication questions can be routed to the Pain Nurse Line at [phone].    Rule  25  Contact numbers for each [facility]    [email or web address]     [facility]   [phone]  [facility]    [phone]  [place]   [phone]  These private agencies/hospitals will evaluate for appropriate level of care and provide care or refer for care as allowed by insurance coverage.      Detox/treatment options:    [facility]:  [phone]   Intake line for 28 day treatment program with lodging and out patient programs    [facility] intake line for detox [phone]    [facility] asks that the patient call this number for information and evaluation.     or the patient can go to the Behavioral Health ED at [facility].   There, patient will be evaluated  for appropriate level of care.   Staff will assist patient in arranging for appropriate care.

## 2018-05-21 NOTE — LETTER
5/21/2018     RE: Boom Kahn  [address] [place] [id number]     Dear Colleague,    Thank you for referring your patient, Boom Kahn, to the [facility] FOR COMPREHENSIVE PAIN MANAGEMENT at [facility]. Please see a copy of my visit note below.    I had the pleasure of meeting Mr. Boom Kahn on 5/21/2018 in the outpatient pain clinic in consult for Dr. Russo with regards to his bilateral lower extremity pain.   HPI:  Patient presents with past medical history of peripheral artery disease, congestive heart failure s/p ICD placement, COPD, lung cancer s/p radiation therapy, CAD s/p CABG; he is here today for evaluation of lower extremity pain with non-healing ulcers. He moved to [place] from [place] in January for surgical procedure for right lower extremity, which would have either involved bypass or amputation. However, after cardiac work up, he was found require catheterizatoin and stent placement which took place 1/15/18. Unfortunately, he has still not experienced healing. He has consulted with vascular surgery, plastic surgery, and wound care.  In regards to his pain management regimen, patient has been managed on oxycodone 10 mg, max of 50 mg/day, lidocaine 4% topical, and gabapentin 300/300/600 mg/day. He reports today that he located an old tube of steroid cream that he is using on the tissue surrounding his wounds - he does find this to be beneficial but has not discussed use with his wound care providers. He notes that he has had adverse reaction of severe irritation with use of saline, honey, silver, and Vaseline. He eventually plans to return to [place], but requires pain management in the interim. His primary care provider offered to manage his prescriptions, but required a urine drug screen and offered referral to chemical dependency due to patient's admitted overuse of alcohol (6-8 beers/night); patient declined each of these, stating  "today \"I'll just tell you what's in my urine, I'm not taking a test\". He is also seeking other recommendations, if applicable.      ?  Past Medical History:   Diagnosis Date     Anxiety      CAD (coronary artery disease)     s/p 3v CABG     CHF (congestive heart failure) (H)     EF 10-15%     Chronic pain      COPD (chronic obstructive pulmonary disease) (H)      Depression      Hyperlipidemia      Hypertension      Lung cancer (H)     s/p radiation therapy     PAD (peripheral artery disease) (H)     s/p lower extremity stents     Tobacco abuse     past medical history reviewed with patient.   Past Surgical History:   Procedure Laterality Date     ANGIOPLASTY Right 10/2016     BYPASS GRAFT ARTERY CORONARY  1999    [facility]     BYPASS GRAFT FEMOROTIBIAL Right 1/15/2018    Procedure: BYPASS GRAFT FEMOROTIBIAL;  Right Femorotibial Bypass Graft with insitu saphenous vein, wound debriedment of right lower leg;  Surgeon: Lexis Ag MD;  Location: [facility] OR     cardiac catheterization       Cardiac defibrillator placement       CHEST TUBE INSERTION       COLON SURGERY  2008    colon resection for diverticulitis     FINE NEEDLE ASPIRATION Right 12/2016     IMPLANT PACEMAKER       previous radiation      past surgical history reviewed with patient.   Medications:  Current Outpatient Prescriptions   Medication     acetic acid 0.25 % irrigation     ascorbic acid 500 MG TABS     ASPIRIN EC PO     carvedilol (COREG) 6.25 MG tablet     ciprofloxacin (CIPRO) 500 MG tablet     clindamycin (CLEOCIN) 300 MG capsule     clopidogrel (PLAVIX) 75 MG tablet     diclofenac (VOLTAREN) 1 % GEL topical gel     docusate sodium (COLACE) 100 MG capsule     enalapril (VASOTEC) 2.5 MG tablet     escitalopram (LEXAPRO) 20 MG tablet     ezetimibe (ZETIA) 10 MG tablet     fentaNYL (DURAGESIC) 25 mcg/hr 72 hr patch     folic acid (FOLVITE) 1 MG tablet     furosemide (LASIX) 20 MG tablet     gabapentin (NEURONTIN) 300 MG capsule     gabapentin " "(NEURONTIN) 600 MG tablet     isosorbide mononitrate (IMDUR) 30 MG 24 hr tablet     Lidocaine 4 % GEL     multivitamin, therapeutic with minerals (THERA-VIT-M) TABS tablet     order for DME     oxyCODONE IR (ROXICODONE) 10 MG tablet     polyethylene glycol (MIRALAX/GLYCOLAX) Packet     predniSONE (DELTASONE) 10 MG tablet     rosuvastatin (CRESTOR) 20 MG tablet     vitamin A 98530 UNIT capsule     zinc sulfate (ZINCATE) 220 (50 ZN) MG capsule     No current facility-administered medications for this visit.      A multi-state Prescription Monitoring Program reviewed and no aberrancies noted.     Allergies:     Allergies   Allergen Reactions     Betadine [Povidone Iodine] Blisters     Pt reports erythema, increased pain, and blistering when using betadine on R big toe in past     Collagenase Clostridium Histolyticum      Flagyl [Metronidazole] Other (See Comments)     Flu symptoms  Flu like symptoms     Iodine Unknown     Santyl [Collagenase]      Family History:  family history is not on file.  Social history: he lives in [place], but is originally from [place].     Smoking: current smoker; using e-cigarette in effort to quit. Alcohol: 6-8 beers/day or 50 beers/week. Street drugs: denies.     ROS:  A 14 point review of systems was completed; results are listed at end of note.     Objective:   BP (!) 85/60  Pulse 76  Resp 16  Ht 1.727 m (5' 8\")  There is no height or weight on file to calculate BMI.  General: In no apparent distress, thin  Mental status: Normal mood and affect, pleasant  Neuro: Alert, oriented. No sensory deficits.   Head: Atraumatic, normocephalic  Eyes: Extra-ocular movements grossly intact, no scleral icterus  Neck: Supple, Range of motion full  Respiratory: Non-labored breathing; No respiratory distress  Skin: arterial ulceration observed at right anterior shin with visualized tendon; no drainage or odor present. Surrounding tissue is erythematous. Subcutaneous ulcer noted at left shin " without drainage, odor.     Assessment:  Mr. Boom Kahn is a 69 yo male seen today for painful peripheral arterial disease and non-healing ulcers. Currently opioid dependent with noted alcohol abuse.     Plan:   1. Patient education: I went over the above diagnoses and treatment plan with him and answered all of his questions.  2. Interventions  -could consider lumbar sympathetic block to assist with dilation and hopeful pain relief; this was discussed, but patient did not want to pursue this option today.   3. Medications  1. Increase gabapentin to 600/600/600 mg/day; refill provided. May consider additional dose increase.   -Contacted wound clinic provider, Tamiko Mcduffie PA-C, to question if topical compound in PLO gel (gabapentin-ketamine-lidocaine) wound interfere with wound healing.   Addendum: Did receive information from Tamiko Mcduffie that the topical can be utilized on surrounding tissue with avoidance of open wound. Ordered topical gabapentin in PLO due to higher likelihood of insurance coverage.    -Declined to prescribe patient's ongoing opioid prescriptions; I discussed with him that he should follow with chemical dependency referral. He also had refused to submit for urine drug screen with his primary care. I did offer him a list of alternate pain clinics which he accepted, but informed him that urine drug screening is expected at any facility for chronic opioid use. It would be reasonable to try an extended release formation with decrease in IR oxycodone dose for improved pain control, however, patient should certainly not be using alcohol concurrently.    4. Follow up: RTC in 8-12 weeks or as needed.     Total time spent was 35 minutes, and more than 50% of face to face time was spent in counseling and/or coordination of care regarding the above plan.    Thank you for the consult.   DEDRA Segura, [facility]  [facility] Interventional Clinic

## 2018-05-21 NOTE — NURSING NOTE
LPN reviewed AVS with Pt.  Pt verbalized an understanding of information, and was asked to contact clinic with questions.    Celina Stack LPN

## 2018-05-22 ASSESSMENT — ANXIETY QUESTIONNAIRES: GAD7 TOTAL SCORE: 4

## 2018-05-23 ENCOUNTER — MYC MEDICAL ADVICE (OUTPATIENT)
Dept: INTERNAL MEDICINE | Facility: CLINIC | Age: 68
End: 2018-05-23

## 2018-05-24 ENCOUNTER — TELEPHONE (OUTPATIENT)
Dept: WOUND CARE | Facility: CLINIC | Age: 68
End: 2018-05-24

## 2018-05-24 NOTE — TELEPHONE ENCOUNTER
[facility] Call Center    Phone Message    May a detailed message be left on voicemail: yes    Reason for Call: Other: Patient is requesting a call back today from Tamiko Mcduffie. Patient was not feeling well and did not make it to his appointment,     Action Taken: Message routed to:  [facility] ([facility] Wound

## 2018-05-25 ENCOUNTER — RADIANT APPOINTMENT (OUTPATIENT)
Dept: CT IMAGING | Facility: CLINIC | Age: 68
End: 2018-05-25
Attending: INTERNAL MEDICINE
Payer: COMMERCIAL

## 2018-05-25 ENCOUNTER — CARE COORDINATION (OUTPATIENT)
Dept: ONCOLOGY | Facility: CLINIC | Age: 68
End: 2018-05-25

## 2018-05-25 ENCOUNTER — ONCOLOGY VISIT (OUTPATIENT)
Dept: ONCOLOGY | Facility: CLINIC | Age: 68
End: 2018-05-25
Attending: INTERNAL MEDICINE
Payer: COMMERCIAL

## 2018-05-25 VITALS
SYSTOLIC BLOOD PRESSURE: 76 MMHG | TEMPERATURE: 97.3 F | DIASTOLIC BLOOD PRESSURE: 55 MMHG | RESPIRATION RATE: 16 BRPM | OXYGEN SATURATION: 95 % | WEIGHT: 146 LBS | HEIGHT: 68 IN | HEART RATE: 97 BPM | BODY MASS INDEX: 22.13 KG/M2

## 2018-05-25 DIAGNOSIS — C34.91 ADENOCARCINOMA, LUNG, RIGHT (H): Primary | ICD-10-CM

## 2018-05-25 DIAGNOSIS — C34.91 ADENOCARCINOMA, LUNG, RIGHT (H): ICD-10-CM

## 2018-05-25 DIAGNOSIS — C34.90 MALIGNANT NEOPLASM OF LUNG, UNSPECIFIED LATERALITY, UNSPECIFIED PART OF LUNG (H): ICD-10-CM

## 2018-05-25 LAB — RADIOLOGIST FLAGS: NORMAL

## 2018-05-25 PROCEDURE — 99204 OFFICE O/P NEW MOD 45 MIN: CPT | Mod: ZP | Performed by: INTERNAL MEDICINE

## 2018-05-25 PROCEDURE — G0463 HOSPITAL OUTPT CLINIC VISIT: HCPCS | Mod: ZF

## 2018-05-25 RX ORDER — SPIRONOLACTONE 25 MG/1
TABLET ORAL
Refills: 4 | COMMUNITY
Start: 2018-05-08

## 2018-05-25 RX ORDER — LEVOFLOXACIN 500 MG/1
TABLET, FILM COATED ORAL
Refills: 0 | COMMUNITY
Start: 2017-06-16 | End: 2018-08-09

## 2018-05-25 RX ORDER — NITROGLYCERIN 0.4 MG/1
TABLET SUBLINGUAL
Refills: 2 | COMMUNITY
Start: 2018-05-24

## 2018-05-25 RX ORDER — METHOCARBAMOL 500 MG/1
TABLET, FILM COATED ORAL
Refills: 1 | COMMUNITY
Start: 2017-11-16

## 2018-05-25 RX ORDER — ALPRAZOLAM 0.5 MG
TABLET ORAL
Refills: 0 | COMMUNITY
Start: 2018-05-19

## 2018-05-25 ASSESSMENT — PAIN SCALES - GENERAL: PAINLEVEL: NO PAIN (0)

## 2018-05-25 NOTE — PROGRESS NOTES
DATE OF VISIT: May 25, 2018    REASON FOR REFERRAL:   RUL Adenocarcinoma Lung     HISTORY OF PRESENT ILLNESS:     68-year-old male with past medical history significant for coronary artery disease status post CABG, congestive heart failure s/p ICD placement, peripheral arterial disease, tobacco abuse who presents to the oncology clinic today to establish care regarding further management of history of adenocarcinoma lung.    Patient stated that he had a chest x-ray done as part of pre-op workup for vascular surgery in [place] which reported an opacity in the right upper lung. It was not followed up further until patient noticed a and presented himself to PCP for further workup and ultimately a PET scan done on 11/2017 showed a 2.2 x 2 cm right upper lobe nodule with an SUV of 9.9. CT guided FNA biopsy of the nodule on 12/6/16 showed adenocarcinoma that was complicated by pneumothorax requiring chest tube placement. Patient was determined not to be a candidate for surgical resection given his comorbidities and was referred to radiation oncology. He underwent SBR T in Jan 2017 and was following with radiation oncology with follow-up PET scans in March and another one in August with no radiological evidence of recurrence. On lost to follow-up with radiation oncology in February 2018 with repeat CT chest. However patient moved to [place] in January 2018 to be close to family and management of this peripheral arterial disease and associated chronic lower extremity ulcers. He subsequently underwent femoral bypass with improvement in blood flow over he continues to have problems with lower extremity ulcers which are nonhealing.     Today, denies any chest pain, dyspnea on exertion, productive cough, weight loss, worsening fatigue. Stable appetite and energy levels.He is currently following with wound care and narcotics for pain control and has noticed slow improvement. Denies pain, nausea/vomiting, diarrhea, headache or  vision issues, bone pain, weakness numbness in extremities or any other issues      REVIEW OF SYSTEMS:      ROS: 14 point ROS neg other than the symptoms noted above in the HPI.    PAST MEDICAL HISTORY:   Past Medical History:   Diagnosis Date     Anxiety      CAD (coronary artery disease)     s/p 3v CABG     CHF (congestive heart failure) (H)     EF 10-15%     Chronic pain      COPD (chronic obstructive pulmonary disease) (H)      Depression      Hyperlipidemia      Hypertension      Lung cancer (H)     s/p radiation therapy     PAD (peripheral artery disease) (H)     s/p lower extremity stents     Tobacco abuse        PAST SURGICAL HISTORY:   Past Surgical History:   Procedure Laterality Date     ANGIOPLASTY Right 10/2016     BYPASS GRAFT ARTERY CORONARY  1999    [facility]     BYPASS GRAFT FEMOROTIBIAL Right 1/15/2018    Procedure: BYPASS GRAFT FEMOROTIBIAL;  Right Femorotibial Bypass Graft with insitu saphenous vein, wound debriedment of right lower leg;  Surgeon: Lexis Ag MD;  Location: [facility] OR     cardiac catheterization       Cardiac defibrillator placement       CHEST TUBE INSERTION       COLON SURGERY  2008    colon resection for diverticulitis     FINE NEEDLE ASPIRATION Right 12/2016     IMPLANT PACEMAKER       previous radiation         ALLERGIES:   Allergies as of 05/25/2018 - Jona as Reviewed 05/25/2018   Allergen Reaction Noted     Betadine [povidone iodine] Blisters 01/04/2018     Collagenase clostridium histolyticum  04/18/2017     Flagyl [metronidazole] Other (See Comments) 12/21/2016     Iodine Unknown 01/03/2018     Santyl [collagenase]  01/02/2018       MEDICATIONS:   Current Outpatient Prescriptions   Medication Sig Dispense Refill     acetic acid 0.25 % irrigation Irrigate with as directed 2 times daily 10 mL 0     ascorbic acid 500 MG TABS Take 1 tablet (500 mg) by mouth daily 7 tablet 0     ASPIRIN EC PO Take 81 mg by mouth daily       carvedilol (COREG) 6.25 MG tablet Take 1 tablet  (6.25 mg) by mouth 2 times daily (with meals) 60 tablet 3     ciprofloxacin (CIPRO) 500 MG tablet Take 1 tablet (500 mg) by mouth 2 times daily 14 tablet 0     clindamycin (CLEOCIN) 300 MG capsule Take 1 capsule (300 mg) by mouth 4 times daily 28 capsule 0     clopidogrel (PLAVIX) 75 MG tablet Take 1 tablet (75 mg) by mouth daily 30 tablet 0     diclofenac (VOLTAREN) 1 % GEL topical gel Place 4 g onto the skin 4 times daily 1 Tube 0     docusate sodium (COLACE) 100 MG capsule Take 1 capsule (100 mg) by mouth daily 60 capsule 0     enalapril (VASOTEC) 2.5 MG tablet Take 1 tablet (2.5 mg) by mouth 2 times daily 60 tablet 11     escitalopram (LEXAPRO) 20 MG tablet Take 1 tablet (20 mg) by mouth daily 15 tablet 0     ezetimibe (ZETIA) 10 MG tablet Take 1 tablet (10 mg) by mouth daily 30 tablet 0     fentaNYL (DURAGESIC) 25 mcg/hr 72 hr patch Place 1 patch onto the skin every 72 hours remove old patch.       folic acid (FOLVITE) 1 MG tablet Take 1 tablet (1 mg) by mouth daily 30 tablet 0     furosemide (LASIX) 20 MG tablet Take 1 tablet (20 mg) by mouth 2 times daily (with meals) 60 tablet 3     gabapentin (NEURONTIN) 300 MG capsule Take 1 capsule (300 mg) by mouth 2 times daily 60 capsule 1     gabapentin (NEURONTIN) 600 MG tablet Take 1 tablet (600 mg) by mouth 3 times daily Increase per clinic directions. 30 tablet 1     isosorbide mononitrate (IMDUR) 30 MG 24 hr tablet Take 1 tablet (30 mg) by mouth daily 90 tablet 3     Lidocaine 4 % GEL Externally apply topically 2 times daily 30 g 1     multivitamin, therapeutic with minerals (THERA-VIT-M) TABS tablet Take 1 tablet by mouth daily 30 each 0     order for DME Equipment being ordered: Walker Wheels () and Walker ()  Treatment Diagnosis: impaired mobility 1 each 0     oxyCODONE IR (ROXICODONE) 10 MG tablet Take 1 tablet (10 mg) by mouth every 6 hours as needed for moderate to severe pain 25 tablet 0     polyethylene glycol (MIRALAX/GLYCOLAX) Packet Take 17 g  by mouth daily 7 packet 0     predniSONE (DELTASONE) 10 MG tablet Take 1 tablet (10 mg) by mouth daily 30 tablet 0     rosuvastatin (CRESTOR) 20 MG tablet Take 1 tablet (20 mg) by mouth daily 30 tablet 11     vitamin A 66313 UNIT capsule Take 2 capsules (20,000 Units) by mouth daily 7 capsule 0     zinc sulfate (ZINCATE) 220 (50 ZN) MG capsule Take 1 capsule (220 mg) by mouth daily 7 capsule 0        FAMILY HISTORY:   No family history on file.    SOCIAL HISTORY:   Social History     Social History     Marital status: Single     Spouse name: N/A     Number of children: N/A     Years of education: N/A     Social History Main Topics     Smoking status: Current Every Day Smoker     Packs/day: 1.00     Types: Cigarettes     Smokeless tobacco: Never Used      Comment: 0.5-1 ppd.     Alcohol use Yes      Comment: 50 beers/wk     Drug use: No     Sexual activity: Not on file     Other Topics Concern     Not on file     Social History Narrative       PHYSICAL EXAMINATION:   There were no vitals taken for this visit.  Wt Readings from Last 10 Encounters:   04/18/18 69.9 kg (154 lb)   03/29/18 70 kg (154 lb 4.8 oz)   03/22/18 69.4 kg (153 lb)   02/07/18 66.8 kg (147 lb 3.2 oz)   01/29/18 67.4 kg (148 lb 9.6 oz)   01/22/18 65.1 kg (143 lb 8 oz)      ECOG performance status:1  Exam:  Constitutional: healthy, alert and no distress  Head: Normocephalic.   Neck: Neck supple. No adenopathy.  ENT: ENT exam normal, no neck nodes or sinus tenderness  Cardiovascular: RRR. No murmurs, clicks gallops or rub  Respiratory: Lungs clear  Gastrointestinal: Abdomen soft, non-tender. BS normal. No masses, organomegaly  Musculoskeletal: Bilateral lower extremity ulcers - dressings in place  Neurologic: Gait normal. Sensation grossly WNL.  Psychiatric: mentation appears normal and affect normal/bright  Hematologic/Lymphatic/Immunologic: Normal cervical lymph nodes    LABORATORY RESULTS:    Recent Labs   Lab Test  04/18/18   1107  02/10/18   6124    01/18/18   0510   01/16/18   0322   NA  137  140   < >  143   < >  144   POTASSIUM  4.2  4.0   < >  3.2*   < >  3.4  3.3*   CHLORIDE  103  103   < >  111*   < >  111*   BUN  12  13   < >  10   < >  8   CR  0.91  0.63*   < >  0.62*   < >  0.50*   GLC  100*  85   < >  93   < >  112*   SUMAN  9.1  9.0   < >  8.2*   < >  7.9*   MAG   --    --    --   2.2   --   2.0   PHOS   --    --    --   2.6   --   2.8    < > = values in this interval not displayed.     Recent Labs   Lab Test  04/18/18   1107  02/10/18   0726  02/08/18 0628 02/07/18   1616   WBC  6.6  8.2  8.8  13.0*   HGB  12.0*  11.9*  11.1*  11.7*   PLT  347  311  305  344   MCV  98  101*  100  101*   NEUTROPHIL   --   69.0  83.6  90.2     Recent Labs   Lab Test  04/18/18   1107  02/10/18   0726  02/08/18   1143  02/08/18   0628  01/13/18   1009   BILITOTAL   --   0.4   --   0.5  0.4   ALKPHOS   --   53   --   46  46   ALT   --   35   --   32  28   AST   --   14   --   19  21   ALBUMIN  3.2*  3.1*  3.0*  3.0*  2.7*     12/06/16    PRE-OP DX:     RIGHT LUNG NODULE;     PET  ( + )  CPT CODE(S):  SPECIMEN: A. FNA RIGHT  LUNG NODULE  POST-OP DX:  GROSS DESCRIPTION:  REC'D 2 SLIDES AND SPECIMEN SUBMITTED IN CYTOLYT SOLUTION  (CELL BLOCK)        FINAL DIAGNOSIS:  RT. LUNG NODULE, FNA:  Malignant    DIAGNOSTIC COMMENTS:  Cellular changes present consistent with non-small cell carcinoma.  Favor adenocarcinoma.  Immunohistochemical studies pending.    IMAGING RESULTS:  PET scan   Result Date: 8/4/2017      Findings: The exam is compared with 5/4/2017. No abnormal tracer activity is seen in the lower aspect of the brain. Focal tracer activity projects over the anterior left maxilla with an SUV of 5.7. This corresponds to image 47 and is likely a dental etiology related to an incisor. This is a change from the previous study. No abnormal tracer activity is seen in the neck. A more nodular dense lesion was seen in the right lung measuring 3.0 x 2.1 cm on the previous study  with an SUV of 5.7. This is more ill-defined and diffuse now and shows more groundglass adjacent density on today's exam. This is likely radiation related change. The maximum SUV in this focus now measures 3.1. This now roughly measures 2.5 x 1.7 cm on CT image 92. Ill-defined groundglass density extends posterior and lateral to this lesion and shows a diffuse tracer activity of 2.7 SUV. This extends to the pleural margin and is believed radiation related change. No adenopathy is seen at the right hilum and no suspicious tracer activity is seen in the mediastinum, hilar regions or axilla on either side. There is no new or suspicious lung nodule or mass seen. A smooth triangular nodule in the lingula on image 107 is stable on CT demonstrating an SUV of 1.6. This small nodule is stable compared back to 11/22/2016 also and shows less FDG activity. SUV in this small nodule measured 3.0 on the previous 11/22/2016 PET/CT. No pleural effusion or pneumothorax is seen.   There is normal tracer activity seen in the heart, liver, kidneys and urinary bladder. There is normal variable bowel activity noted on the study.   There is no abnormal tracer activity seen in the osseous structures to suggest osseous metastatic disease.   No new adrenal lesion is seen. The previous study showed focal tracer activity along the margin of the left side pacemaker. There is no abnormal tracer activity seen at this location on today's exam.    Interval decrease in size and FDG-avidity of the known right lung mass compared with 5/4/2017. New ill-defined groundglass density posterior and lateral to the known mass, believed radiation-related activity. Stable small lingular nodule showing minimal tracer activity. New focal tracer activity in the anterior left maxilla, believed related to dental etiology. No PET/CT evidence of metastatic disease      PET scan 05/04/17 5/4/2017 BODY PET/CT FUSION STUDY Comparisons: 11/22/16 Indications:Lung  carcinoma Agent/technique: 10.68mCi F-18 FDG injected without incident. Body PET/CT fusion study obtained from skull base to mid-thigh. Discussion:The patient's right upper lobe lung carcinoma is measuring larger on the current study but shows a decrease in PET activity. Currently on axial image 83 is measuring approximately 3.0 cm transverse and 2.1 cm AP dimension, with a maximum SUV of 5.7, it was previously measuring 2.0 x 2.2 cm with a maximum SUV of 9.9 the lower grade nodular activity in the right hilum on the previous study has resolved. Currently, no suspicious hilar or mediastinal activity. There is focal increased activity along the upper margin of the patient's pacemaker device, where on axial image 53 a small nodular density is identified, showing a maximum SUV of 5.2. This is identified on axial image 53. There is no suspicious activity of the neck, abdomen or pelvis, or from bone. Concurrent CT shows no adrenal masses no pathologically abdominal or pelvic lymph nodes. There is no acute or suspicious bony finding. There are no other lung nodules identified. This could be a small nodular metastases. The activity surrounding the pacemaker device is new when compared to the prior study.     . Increased size but decreased PET activity associated with the patient's right upper lobe malignancy. 2. Resolution of the right hilar activity since the prior study. 3. New nodular focus of activity along the upper margin of the patient's pacemaker, where there is a small nodular density, suspicious for a small nodular metastases.    PET scan 11/22/16       ASSESSMENT AND PLAN:    68-year-old male with past medical history significant for coronary artery disease status post CABG, congestive heart failure s/p ICD placement, peripheral arterial disease, tobacco abuse who presents to the oncology clinic today to establish care regarding further management  of adenocarcinoma lung.    Adenocarcinoma Lung  [id number] Stage  1  S/p SBRT in 01/2017 in [place] last follow-up PET/CT in August 2017 by primary radiation oncologist  negative for evidence of recurrence/ metastatic disease  NCCN guidelines were reviewed.  Had a detailed discussion with him today about the prognosis associated with early stage lung cancer as well as a surveillance recommendations which would be history and physical along with CT scans every 3-6 months for the first 3 years followed by CT scans every 6 months for the next 2 years and annually thereafter. Patient is clinically doing well clinically with no concerns of recurrence of cancer. We will obtain a CT thorax today CT thorax called him back on the results once available. If no evidence of recurrence, would have him return to clinic in 6 months with a CT chest prior     - Chronic lower extremity/also has  Secondary to peripheral arterial disease  Patient is following with wound care    History of coronary artery disease/list of heart failure  Status post CABG in ICD placement  Management per PCP and cardiology    Smoking- Working to quit. Encouraged     The patient is ready to learn, no apparent learning barriers were identified, Diagnosis and treatment plans were explained to the patient. The patient expressed understanding of the content. The patient questions were answered to his satisfaction.    Chart documentation with Dragon Voice recognition Software. Although reviewed after completion, some words and grammatical errors may remain.    Dvae Del Rio MD  Attending Physician   Hematology/Medical Oncology

## 2018-05-25 NOTE — LETTER
5/25/2018       RE: Boom Kahn  [address] [place] [id number]     Dear Colleague,    Thank you for referring your patient, Boom Kahn, to the [facility]. Please see a copy of my visit note below.    DATE OF VISIT: May 25, 2018    REASON FOR REFERRAL:   RUL Adenocarcinoma Lung     HISTORY OF PRESENT ILLNESS:     68-year-old male with past medical history significant for coronary artery disease status post CABG, congestive heart failure s/p ICD placement, peripheral arterial disease, tobacco abuse who presents to the oncology clinic today to establish care regarding further management of history of adenocarcinoma lung.    Patient stated that he had a chest x-ray done as part of pre-op workup for vascular surgery in [place] which reported an opacity in the right upper lung. It was not followed up further until patient noticed a and presented himself to PCP for further workup and ultimately a PET scan done on 11/2017 showed a 2.2 x 2 cm right upper lobe nodule with an SUV of 9.9. CT guided FNA biopsy of the nodule on 12/6/16 showed adenocarcinoma that was complicated by pneumothorax requiring chest tube placement. Patient was determined not to be a candidate for surgical resection given his comorbidities and was referred to radiation oncology. He underwent SBR T in Jan 2017 and was following with radiation oncology with follow-up PET scans in March and another one in August with no radiological evidence of recurrence. On lost to follow-up with radiation oncology in February 2018 with repeat CT chest. However patient moved to [place] in January 2018 to be close to family and management of this peripheral arterial disease and associated chronic lower extremity ulcers. He subsequently underwent femoral bypass with improvement in blood flow over he continues to have problems with lower extremity ulcers which are nonhealing.     Today, denies any chest pain, dyspnea on exertion,  productive cough, weight loss, worsening fatigue. Stable appetite and energy levels.He is currently following with wound care and narcotics for pain control and has noticed slow improvement. Denies pain, nausea/vomiting, diarrhea, headache or vision issues, bone pain, weakness numbness in extremities or any other issues      REVIEW OF SYSTEMS:      ROS: 14 point ROS neg other than the symptoms noted above in the HPI.    PAST MEDICAL HISTORY:   Past Medical History:   Diagnosis Date     Anxiety      CAD (coronary artery disease)     s/p 3v CABG     CHF (congestive heart failure) (H)     EF 10-15%     Chronic pain      COPD (chronic obstructive pulmonary disease) (H)      Depression      Hyperlipidemia      Hypertension      Lung cancer (H)     s/p radiation therapy     PAD (peripheral artery disease) (H)     s/p lower extremity stents     Tobacco abuse        PAST SURGICAL HISTORY:   Past Surgical History:   Procedure Laterality Date     ANGIOPLASTY Right 10/2016     BYPASS GRAFT ARTERY CORONARY  1999    [facility]     BYPASS GRAFT FEMOROTIBIAL Right 1/15/2018    Procedure: BYPASS GRAFT FEMOROTIBIAL;  Right Femorotibial Bypass Graft with insitu saphenous vein, wound debriedment of right lower leg;  Surgeon: Lexis Ag MD;  Location: [facility] OR     cardiac catheterization       Cardiac defibrillator placement       CHEST TUBE INSERTION       COLON SURGERY  2008    colon resection for diverticulitis     FINE NEEDLE ASPIRATION Right 12/2016     IMPLANT PACEMAKER       previous radiation         ALLERGIES:   Allergies as of 05/25/2018 - Jona as Reviewed 05/25/2018   Allergen Reaction Noted     Betadine [povidone iodine] Blisters 01/04/2018     Collagenase clostridium histolyticum  04/18/2017     Flagyl [metronidazole] Other (See Comments) 12/21/2016     Iodine Unknown 01/03/2018     Santyl [collagenase]  01/02/2018       MEDICATIONS:   Current Outpatient Prescriptions   Medication Sig Dispense Refill     acetic acid  0.25 % irrigation Irrigate with as directed 2 times daily 10 mL 0     ascorbic acid 500 MG TABS Take 1 tablet (500 mg) by mouth daily 7 tablet 0     ASPIRIN EC PO Take 81 mg by mouth daily       carvedilol (COREG) 6.25 MG tablet Take 1 tablet (6.25 mg) by mouth 2 times daily (with meals) 60 tablet 3     ciprofloxacin (CIPRO) 500 MG tablet Take 1 tablet (500 mg) by mouth 2 times daily 14 tablet 0     clindamycin (CLEOCIN) 300 MG capsule Take 1 capsule (300 mg) by mouth 4 times daily 28 capsule 0     clopidogrel (PLAVIX) 75 MG tablet Take 1 tablet (75 mg) by mouth daily 30 tablet 0     diclofenac (VOLTAREN) 1 % GEL topical gel Place 4 g onto the skin 4 times daily 1 Tube 0     docusate sodium (COLACE) 100 MG capsule Take 1 capsule (100 mg) by mouth daily 60 capsule 0     enalapril (VASOTEC) 2.5 MG tablet Take 1 tablet (2.5 mg) by mouth 2 times daily 60 tablet 11     escitalopram (LEXAPRO) 20 MG tablet Take 1 tablet (20 mg) by mouth daily 15 tablet 0     ezetimibe (ZETIA) 10 MG tablet Take 1 tablet (10 mg) by mouth daily 30 tablet 0     fentaNYL (DURAGESIC) 25 mcg/hr 72 hr patch Place 1 patch onto the skin every 72 hours remove old patch.       folic acid (FOLVITE) 1 MG tablet Take 1 tablet (1 mg) by mouth daily 30 tablet 0     furosemide (LASIX) 20 MG tablet Take 1 tablet (20 mg) by mouth 2 times daily (with meals) 60 tablet 3     gabapentin (NEURONTIN) 300 MG capsule Take 1 capsule (300 mg) by mouth 2 times daily 60 capsule 1     gabapentin (NEURONTIN) 600 MG tablet Take 1 tablet (600 mg) by mouth 3 times daily Increase per clinic directions. 30 tablet 1     isosorbide mononitrate (IMDUR) 30 MG 24 hr tablet Take 1 tablet (30 mg) by mouth daily 90 tablet 3     Lidocaine 4 % GEL Externally apply topically 2 times daily 30 g 1     multivitamin, therapeutic with minerals (THERA-VIT-M) TABS tablet Take 1 tablet by mouth daily 30 each 0     order for DME Equipment being ordered: Walker Wheels () and Walker  ()  Treatment Diagnosis: impaired mobility 1 each 0     oxyCODONE IR (ROXICODONE) 10 MG tablet Take 1 tablet (10 mg) by mouth every 6 hours as needed for moderate to severe pain 25 tablet 0     polyethylene glycol (MIRALAX/GLYCOLAX) Packet Take 17 g by mouth daily 7 packet 0     predniSONE (DELTASONE) 10 MG tablet Take 1 tablet (10 mg) by mouth daily 30 tablet 0     rosuvastatin (CRESTOR) 20 MG tablet Take 1 tablet (20 mg) by mouth daily 30 tablet 11     vitamin A 65023 UNIT capsule Take 2 capsules (20,000 Units) by mouth daily 7 capsule 0     zinc sulfate (ZINCATE) 220 (50 ZN) MG capsule Take 1 capsule (220 mg) by mouth daily 7 capsule 0        FAMILY HISTORY:   No family history on file.    SOCIAL HISTORY:   Social History     Social History     Marital status: Single     Spouse name: N/A     Number of children: N/A     Years of education: N/A     Social History Main Topics     Smoking status: Current Every Day Smoker     Packs/day: 1.00     Types: Cigarettes     Smokeless tobacco: Never Used      Comment: 0.5-1 ppd.     Alcohol use Yes      Comment: 50 beers/wk     Drug use: No     Sexual activity: Not on file     Other Topics Concern     Not on file     Social History Narrative       PHYSICAL EXAMINATION:   There were no vitals taken for this visit.  Wt Readings from Last 10 Encounters:   04/18/18 69.9 kg (154 lb)   03/29/18 70 kg (154 lb 4.8 oz)   03/22/18 69.4 kg (153 lb)   02/07/18 66.8 kg (147 lb 3.2 oz)   01/29/18 67.4 kg (148 lb 9.6 oz)   01/22/18 65.1 kg (143 lb 8 oz)      ECOG performance status:1  Exam:  Constitutional: healthy, alert and no distress  Head: Normocephalic.   Neck: Neck supple. No adenopathy.  ENT: ENT exam normal, no neck nodes or sinus tenderness  Cardiovascular: RRR. No murmurs, clicks gallops or rub  Respiratory: Lungs clear  Gastrointestinal: Abdomen soft, non-tender. BS normal. No masses, organomegaly  Musculoskeletal: Bilateral lower extremity ulcers - dressings in  place  Neurologic: Gait normal. Sensation grossly WNL.  Psychiatric: mentation appears normal and affect normal/bright  Hematologic/Lymphatic/Immunologic: Normal cervical lymph nodes    LABORATORY RESULTS:    Recent Labs   Lab Test  04/18/18   1107  02/10/18   0726   01/18/18   0510   01/16/18   0322   NA  137  140   < >  143   < >  144   POTASSIUM  4.2  4.0   < >  3.2*   < >  3.4  3.3*   CHLORIDE  103  103   < >  111*   < >  111*   BUN  12  13   < >  10   < >  8   CR  0.91  0.63*   < >  0.62*   < >  0.50*   GLC  100*  85   < >  93   < >  112*   SUMAN  9.1  9.0   < >  8.2*   < >  7.9*   MAG   --    --    --   2.2   --   2.0   PHOS   --    --    --   2.6   --   2.8    < > = values in this interval not displayed.     Recent Labs   Lab Test  04/18/18   1107  02/10/18   0726  02/08/18   0628  02/07/18   1616   WBC  6.6  8.2  8.8  13.0*   HGB  12.0*  11.9*  11.1*  11.7*   PLT  347  311  305  344   MCV  98  101*  100  101*   NEUTROPHIL   --   69.0  83.6  90.2     Recent Labs   Lab Test  04/18/18   1107  02/10/18   0726  02/08/18   1143  02/08/18   0628  01/13/18   1009   BILITOTAL   --   0.4   --   0.5  0.4   ALKPHOS   --   53   --   46  46   ALT   --   35   --   32  28   AST   --   14   --   19  21   ALBUMIN  3.2*  3.1*  3.0*  3.0*  2.7*     12/06/16    PRE-OP DX:     RIGHT LUNG NODULE;     PET  ( + )  CPT CODE(S):  SPECIMEN: A. FNA RIGHT  LUNG NODULE  POST-OP DX:  GROSS DESCRIPTION:  REC'D 2 SLIDES AND SPECIMEN SUBMITTED IN CYTOLYT SOLUTION  (CELL BLOCK)        FINAL DIAGNOSIS:  RT. LUNG NODULE, FNA:  Malignant    DIAGNOSTIC COMMENTS:  Cellular changes present consistent with non-small cell carcinoma.  Favor adenocarcinoma.  Immunohistochemical studies pending.    IMAGING RESULTS:  PET scan   Result Date: 8/4/2017      Findings: The exam is compared with 5/4/2017. No abnormal tracer activity is seen in the lower aspect of the brain. Focal tracer activity projects over the anterior left maxilla with an SUV of 5.7. This  corresponds to image 47 and is likely a dental etiology related to an incisor. This is a change from the previous study. No abnormal tracer activity is seen in the neck. A more nodular dense lesion was seen in the right lung measuring 3.0 x 2.1 cm on the previous study with an SUV of 5.7. This is more ill-defined and diffuse now and shows more groundglass adjacent density on today's exam. This is likely radiation related change. The maximum SUV in this focus now measures 3.1. This now roughly measures 2.5 x 1.7 cm on CT image 92. Ill-defined groundglass density extends posterior and lateral to this lesion and shows a diffuse tracer activity of 2.7 SUV. This extends to the pleural margin and is believed radiation related change. No adenopathy is seen at the right hilum and no suspicious tracer activity is seen in the mediastinum, hilar regions or axilla on either side. There is no new or suspicious lung nodule or mass seen. A smooth triangular nodule in the lingula on image 107 is stable on CT demonstrating an SUV of 1.6. This small nodule is stable compared back to 11/22/2016 also and shows less FDG activity. SUV in this small nodule measured 3.0 on the previous 11/22/2016 PET/CT. No pleural effusion or pneumothorax is seen.   There is normal tracer activity seen in the heart, liver, kidneys and urinary bladder. There is normal variable bowel activity noted on the study.   There is no abnormal tracer activity seen in the osseous structures to suggest osseous metastatic disease.   No new adrenal lesion is seen. The previous study showed focal tracer activity along the margin of the left side pacemaker. There is no abnormal tracer activity seen at this location on today's exam.    Interval decrease in size and FDG-avidity of the known right lung mass compared with 5/4/2017. New ill-defined groundglass density posterior and lateral to the known mass, believed radiation-related activity. Stable small lingular nodule  showing minimal tracer activity. New focal tracer activity in the anterior left maxilla, believed related to dental etiology. No PET/CT evidence of metastatic disease      PET scan 05/04/17 5/4/2017 BODY PET/CT FUSION STUDY Comparisons: 11/22/16 Indications:Lung carcinoma Agent/technique: 10.68mCi F-18 FDG injected without incident. Body PET/CT fusion study obtained from skull base to mid-thigh. Discussion:The patient's right upper lobe lung carcinoma is measuring larger on the current study but shows a decrease in PET activity. Currently on axial image 83 is measuring approximately 3.0 cm transverse and 2.1 cm AP dimension, with a maximum SUV of 5.7, it was previously measuring 2.0 x 2.2 cm with a maximum SUV of 9.9 the lower grade nodular activity in the right hilum on the previous study has resolved. Currently, no suspicious hilar or mediastinal activity. There is focal increased activity along the upper margin of the patient's pacemaker device, where on axial image 53 a small nodular density is identified, showing a maximum SUV of 5.2. This is identified on axial image 53. There is no suspicious activity of the neck, abdomen or pelvis, or from bone. Concurrent CT shows no adrenal masses no pathologically abdominal or pelvic lymph nodes. There is no acute or suspicious bony finding. There are no other lung nodules identified. This could be a small nodular metastases. The activity surrounding the pacemaker device is new when compared to the prior study.     . Increased size but decreased PET activity associated with the patient's right upper lobe malignancy. 2. Resolution of the right hilar activity since the prior study. 3. New nodular focus of activity along the upper margin of the patient's pacemaker, where there is a small nodular density, suspicious for a small nodular metastases.    PET scan 11/22/16       ASSESSMENT AND PLAN:    68-year-old male with past medical history significant for coronary artery  disease status post CABG, congestive heart failure s/p ICD placement, peripheral arterial disease, tobacco abuse who presents to the oncology clinic today to establish care regarding further management  of adenocarcinoma lung.    Adenocarcinoma Lung  [id number] Stage 1  S/p SBRT in 01/2017 in [place] last follow-up PET/CT in August 2017 by primary radiation oncologist  negative for evidence of recurrence/ metastatic disease  NCCN guidelines were reviewed.  Had a detailed discussion with him today about the prognosis associated with early stage lung cancer as well as a surveillance recommendations which would be history and physical along with CT scans every 3-6 months for the first 3 years followed by CT scans every 6 months for the next 2 years and annually thereafter. Patient is clinically doing well clinically with no concerns of recurrence of cancer. We will obtain a CT thorax today CT thorax called him back on the results once available. If no evidence of recurrence, would have him return to clinic in 6 months with a CT chest prior     - Chronic lower extremity/also has  Secondary to peripheral arterial disease  Patient is following with wound care    History of coronary artery disease/list of heart failure  Status post CABG in ICD placement  Management per PCP and cardiology    Smoking- Working to quit. Encouraged     The patient is ready to learn, no apparent learning barriers were identified, Diagnosis and treatment plans were explained to the patient. The patient expressed understanding of the content. The patient questions were answered to his satisfaction.    Chart documentation with Dragon Voice recognition Software. Although reviewed after completion, some words and grammatical errors may remain.    Dave Del Rio MD  Attending Physician   Hematology/Medical Oncology

## 2018-05-25 NOTE — PROGRESS NOTES
RN met with pt today.  Introduced self and the care coordinator role as well as how to contact triage vs how to contact RN care coordinator.  Had pt complete authorization to discuss PHI form.  Reviewed learning assessment from 2/17/18 with patient.  He did not identify any changes.  Pt currently here in [place] re: severe  PAD, bilateral lower extremity wounds and critical limb ischemia who is status post right femoral endarterectomy with in-situ GSV femoral to posterior tibial artery bypass on 1/15/2018 with Dr. Ag.  He was hoping to return home at the end of the month but still has bilateral lower leg wounds that are not healing despite working with wound center per pt.  Of[facility] but reports he has plenty of information (not a new diagnosis).

## 2018-05-25 NOTE — NURSING NOTE
"Oncology Rooming Note    May 25, 2018 10:53 AM   Boom Kahn is a 68 year old male who presents for:    Chief Complaint   Patient presents with     Oncology Clinic Visit     Hx of Lung Cancer     Initial Vitals: BP (!) 76/55  Pulse 97  Temp 97.3  F (36.3  C) (Oral)  Resp 16  Ht 1.727 m (5' 8\")  Wt 66.2 kg (146 lb)  SpO2 95%  BMI 22.2 kg/m2 Estimated body mass index is 22.2 kg/(m^2) as calculated from the following:    Height as of this encounter: 1.727 m (5' 8\").    Weight as of this encounter: 66.2 kg (146 lb). Body surface area is 1.78 meters squared.  No Pain (0) Comment: Data Unavailable   No LMP for male patient.  Allergies reviewed: Yes  Medications reviewed: Yes    Medications: Medication refills not needed today.  Pharmacy name entered into [employer]: [id number] - [place], [place] - [address] AT [facility] [address]    Clinical concerns: No New Concerns    5 minutes for nursing intake (face to face time)     MICHAEL Padilla      "

## 2018-05-25 NOTE — MR AVS SNAPSHOT
After Visit Summary   5/25/2018    Boom Kahn    MRN: [id number]           Patient Information     Date Of Birth          1950        Visit Information        Provider Department      5/25/2018 10:30 AM Dave Del Rio MD [facility]        Today's Diagnoses     Adenocarcinoma, lung, right (H)    -  1    Malignant neoplasm of lung, unspecified laterality, unspecified part of lung (H)           Follow-ups after your visit        Your next 10 appointments already scheduled     May 25, 2018  4:00 PM CDT   (Arrive by 3:45 PM)   CT CHEST W CONTRAST with UCCT2   [facility] ([email or web address])    [address]  1st Floor  [facility] [address] [phone]           Please bring any scans or X-rays taken at other hospitals, if similar tests were done. Also bring a list of your medicines, including vitamins, minerals and over-the-counter drugs. It is safest to leave personal items at home.  Be sure to tell your doctor:   If you have any allergies.   If there s any chance you are pregnant.   If you are breastfeeding.    If you have diabetes as your medication may need to be adjusted for this exam.  You will have contrast for this exam. To prepare:   Do not eat or drink for 2 hours before your exam. If you need to take medicine, you may take it with small sips of water. (We may ask you to take liquid medicine as well.)   The day before your exam, drink extra fluids at least six 8-ounce glasses (unless your doctor tells you to restrict your fluids).  Patients over 70 or patients with diabetes or kidney problems:   If you haven t had a blood test (creatinine test) within the last 30 days, the Cardiologist/Radiologist may require you to get this test prior to your exam.  Please wear loose clothing, such as a sweat suit or jogging clothes. Avoid snaps, zippers and other metal. We may ask you to undress and put on a hospital gown.  If you have  any questions, please call the Imaging Department where you will have your exam.            Jun 14, 2018  2:00 PM CDT   US LOWER EXTREMITY ARTERIAL DUPLEX RIGHT with UCUSV2   [facility] US ([facility])    [address] [place] [id number] [phone]           Please bring a list of your medicines (including vitamins, minerals and over-the-counter drugs). Also, tell your doctor about any allergies you may have. Wear comfortable clothes and leave your valuables at home.  You do not need to do anything special to prepare for your exam.  Please call the Imaging Department at your exam site with any questions.            Jun 14, 2018  2:30 PM CDT   US RAVI DOPPLER NO EXERCISE 1-2 LVLS BILAT with UCUSV2   [facility] ([facility])    [address] [place] [id number] [phone]           Please bring a list of your medicines (including vitamins, minerals and over-the-counter drugs). Also, tell your doctor about any allergies you may have. Wear comfortable clothes and leave your valuables at home.  No caffeine or tobacco for 1 hour prior to exam.  Please call the Imaging Department at your exam site with any questions.            Jun 14, 2018  3:00 PM CDT   (Arrive by 2:45 PM)   Return Vascular Visit with Lexis Ag MD   [facility] ([facility])    [address] [place] [address]  3rd Floor  [facility] [id number] [phone]            Nov 30, 2018  2:00 PM CST   (Arrive by 1:45 PM)   Return Visit with Dave Del Rio MD   [facility] ([facility])    [address] [place] [address]  [address]  [facility] [id number] [phone]              Future tests that were ordered for you today     Open Future Orders        Priority Expected Expires Ordered    CT Chest w Contrast Routine  5/25/2019 5/25/2018            Who to  "contact     If you have questions or need follow up information about today's clinic visit or your schedule please contact [facility] directly at [phone].  Normal or non-critical lab and imaging results will be communicated to you by MyChart, letter or phone within 4 business days after the clinic has received the results. If you do not hear from us within 7 days, please contact the clinic through iMICROQhart or phone. If you have a critical or abnormal lab result, we will notify you by phone as soon as possible.  Submit refill requests through AdmitSee or call your pharmacy and they will forward the refill request to us. Please allow 3 business days for your refill to be completed.          Additional Information About Your Visit        AdmitSee Information     AdmitSee gives you secure access to your electronic health record. If you see a primary care provider, you can also send messages to your care team and make appointments. If you have questions, please call your primary care clinic.  If you do not have a primary care provider, please call [phone] and they will assist you.        Care EveryWhere ID     This is your Care EveryWhere ID. This could be used by other organizations to access your [facility] medical records  [id number]        Your Vitals Were     Pulse Temperature Respirations Height Pulse Oximetry BMI (Body Mass Index)    97 97.3  F (36.3  C) (Oral) 16 1.727 m (5' 8\") 95% 22.2 kg/m2       Blood Pressure from Last 3 Encounters:   05/25/18 (!) 76/55   05/21/18 (!) 85/60   05/10/18 91/57    Weight from Last 3 Encounters:   05/25/18 66.2 kg (146 lb)   04/18/18 69.9 kg (154 lb)   03/29/18 70 kg (154 lb 4.8 oz)               Primary Care Provider Office Phone # Fax #    Willian Arrington [phone]       [facility] [address]  [facility] [address]        Equal Access to Services     CHANEL DORADO AH: Zeina Ugalde, sergio agee, " vijay pulido ah. So [facility] [phone].    ATENCIÓN: Si fred florence, tiene a bhatia disposición servicios gratuitos de asistencia lingüística. Kevin al [phone].    We comply with applicable federal civil rights laws and [place] laws. We do not discriminate on the basis of race, color, national origin, age, disability, sex, sexual orientation, or gender identity.            Thank you!     Thank you for choosing [facility]  for your care. Our goal is always to provide you with excellent care. Hearing back from our patients is one way we can continue to improve our services. Please take a few minutes to complete the written survey that you may receive in the mail after your visit with us. Thank you!             Your Updated Medication List - Protect others around you: Learn how to safely use, store and throw away your medicines at www.disposemymeds.org.          This list is accurate as of 5/25/18 12:06 PM.  Always use your most recent med list.                   Brand Name Dispense Instructions for use Diagnosis    acetic acid 0.25 % irrigation     10 mL    Irrigate with as directed 2 times daily    S/P vascular surgery       ALPRAZolam 0.5 MG tablet    XANAX     TK 1 T PO TID PRN        ascorbic acid 500 MG Tabs     7 tablet    Take 1 tablet (500 mg) by mouth daily    Takes dietary supplements       ASPIRIN EC PO      Take 81 mg by mouth daily        carvedilol 6.25 MG tablet    COREG    60 tablet    Take 1 tablet (6.25 mg) by mouth 2 times daily (with meals)    Ischemic cardiomyopathy       ciprofloxacin 500 MG tablet    CIPRO    14 tablet    Take 1 tablet (500 mg) by mouth 2 times daily    Cellulitis and abscess of leg       clindamycin 300 MG capsule    CLEOCIN    28 capsule    Take 1 capsule (300 mg) by mouth 4 times daily    Cellulitis and abscess of leg       clopidogrel 75 MG tablet    PLAVIX    30 tablet    Take 1 tablet (75 mg) by  mouth daily    Ischemic cardiomyopathy       diclofenac 1 % Gel topical gel    VOLTAREN    1 Tube    Place 4 g onto the skin 4 times daily    Pain of left lower extremity       docusate sodium 100 MG capsule    COLACE    60 capsule    Take 1 capsule (100 mg) by mouth daily    Takes dietary supplements       enalapril 2.5 MG tablet    VASOTEC    60 tablet    Take 1 tablet (2.5 mg) by mouth 2 times daily    Ischemic cardiomyopathy       escitalopram 20 MG tablet    LEXAPRO    15 tablet    Take 1 tablet (20 mg) by mouth daily    S/P vascular surgery       ezetimibe 10 MG tablet    ZETIA    30 tablet    Take 1 tablet (10 mg) by mouth daily    Ischemic cardiomyopathy       fentaNYL 25 mcg/hr 72 hr patch    DURAGESIC     Place 1 patch onto the skin every 72 hours remove old patch.        folic acid 1 MG tablet    FOLVITE    30 tablet    Take 1 tablet (1 mg) by mouth daily    Takes dietary supplements       furosemide 20 MG tablet    LASIX    60 tablet    Take 1 tablet (20 mg) by mouth 2 times daily (with meals)    S/P vascular surgery       * gabapentin 300 MG capsule    NEURONTIN    60 capsule    Take 1 capsule (300 mg) by mouth 2 times daily    S/P vascular surgery       * gabapentin 600 MG tablet    NEURONTIN    30 tablet    Take 1 tablet (600 mg) by mouth 3 times daily Increase per clinic directions.        isosorbide mononitrate 30 MG 24 hr tablet    IMDUR    90 tablet    Take 1 tablet (30 mg) by mouth daily    Chronic systolic congestive heart failure (H)       levofloxacin 500 MG tablet    LEVAQUIN          Lidocaine 4 % Gel     30 g    Externally apply topically 2 times daily    Cellulitis and abscess of leg       methocarbamol 500 MG tablet    ROBAXIN          multivitamin, therapeutic with minerals Tabs tablet     30 each    Take 1 tablet by mouth daily    Takes dietary supplements       nitroGLYcerin 0.4 MG sublingual tablet    NITROSTAT     SHARON MAY REPEAT Q 5 MINUTES X 2        order for DME     1 each     Equipment being ordered: Walker Wheels () and Walker () Treatment Diagnosis: impaired mobility    Pain of left lower extremity       oxyCODONE IR 10 MG tablet    ROXICODONE    25 tablet    Take 1 tablet (10 mg) by mouth every 6 hours as needed for moderate to severe pain    Narcotic dependence (H)       polyethylene glycol Packet    MIRALAX/GLYCOLAX    7 packet    Take 17 g by mouth daily    Takes dietary supplements       predniSONE 10 MG tablet    DELTASONE    30 tablet    Take 1 tablet (10 mg) by mouth daily    S/P vascular surgery       rosuvastatin 20 MG tablet    CRESTOR    30 tablet    Take 1 tablet (20 mg) by mouth daily    Ischemic cardiomyopathy       spironolactone 25 MG tablet    ALDACTONE     TK 1 T PO D        vitamin A 08493 UNIT capsule     7 capsule    Take 2 capsules (20,000 Units) by mouth daily    Takes dietary supplements       zinc sulfate 220 (50 Zn) MG capsule    ZINCATE    7 capsule    Take 1 capsule (220 mg) by mouth daily    Takes dietary supplements       * Notice:  This list has 2 medication(s) that are the same as other medications prescribed for you. Read the directions carefully, and ask your doctor or other care provider to review them with you.

## 2018-05-28 ENCOUNTER — MYC MEDICAL ADVICE (OUTPATIENT)
Dept: ANESTHESIOLOGY | Facility: CLINIC | Age: 68
End: 2018-05-28

## 2018-05-30 ENCOUNTER — CARE COORDINATION (OUTPATIENT)
Dept: ONCOLOGY | Facility: CLINIC | Age: 68
End: 2018-05-30

## 2018-05-30 ENCOUNTER — TELEPHONE (OUTPATIENT)
Dept: INTERNAL MEDICINE | Facility: CLINIC | Age: 68
End: 2018-05-30

## 2018-05-30 NOTE — TELEPHONE ENCOUNTER
[facility] Call Center    Phone Message    May a detailed message be left on voicemail: yes    Reason for Call: Medication Question or concern regarding medication   Prescription Clarification  Name of Medication:oxyCODONE IR (ROXICODONE) 10 MG tablet  Prescribing Provider: KARENA BEACH FROM VASCULAR   Pharmacy: [facility]   What on the order needs clarification? PT'S SON, WILBER IS WONDERING IF IT'S POSSIBLE FOR THE PRIMARY CARE CLINIC CAN PRESCRIBE RX OXYCODONE FOR PT SINCE HE HAD JUST  SEEN [facility] RECENTLY    Action Taken: Message routed to:  [facility] ([facility]

## 2018-05-30 NOTE — PROGRESS NOTES
"5/25/18 message from Dr. Del Rio:    \"Ct scan today show stable RUL radiotherapy treated lung mass but a RLL nodule increased slightly from 8 to 9 mm when compared to last scan. Will see him back in 3 months with CT instead of 6 months fro follow up.     I attempted to call patient today but was going to voice mail.     Please contact with him early next week to inform and also help change his appointment. Let me know if he has additional questions and I will be happy to call him again.\"    5/30/18:  Called pt and apologized for not calling yesterday.  Reviewed above information with pt including changing follow up and if has questions he can either send SpokenLayer message or call RN via clinic nurse line.  Released result to SpokenLayer per his request.  Pt agreed with plan to call or send SpokenLayer message if questions.  Message sent to scheduling team to reschedule return appts for 3 months (August) rather than 6 months.  "

## 2018-05-30 NOTE — TELEPHONE ENCOUNTER
Called and left message for patient regarding missed appointment with Tamiko.  Left call back number.     Rupali Palmer LPN

## 2018-05-30 NOTE — TELEPHONE ENCOUNTER
Called out to patient again and left voicemail message for patient to call back for Tamiko, regarding missed appointment from last Thursday.   Let patient know that we can set him up with a time for tomorrow to see her if patient would like.    Left a call back number for patient to call.     Rupali Palmer LPN

## 2018-05-30 NOTE — PROGRESS NOTES
"Message from Dr. Del Rio:    \"Ct scan today show stable RUL radiotherapy treated lung mass but a RLL nodule increased slightly from 8 to 9 mm when compared to last scan. Will see him back in 3 months with CT instead of 6 months fro follow up.     I attempted to call patient today but was going to voice mail.     Please contact with him early next week to inform and also help change his appointment. Let me know if he has additional questions and I will be happy to call him again.\"  "

## 2018-05-30 NOTE — TELEPHONE ENCOUNTER
Spoke with pt. I informed his PCP Dr. Rodrigez is no longer working in our clinic, he needs to establish new care with another provider.  He has an upcoming appointment with Dr. Russo on June 5.  He will discuss about his pain medication during the visit.  I reviewed  data and found that his last oxycodone refill date was May 4,  fentanyl patch on January 31.

## 2018-06-05 ENCOUNTER — TELEPHONE (OUTPATIENT)
Dept: INTERNAL MEDICINE | Facility: CLINIC | Age: 68
End: 2018-06-05

## 2018-06-05 ENCOUNTER — OFFICE VISIT (OUTPATIENT)
Dept: FAMILY MEDICINE | Facility: CLINIC | Age: 68
End: 2018-06-05
Payer: COMMERCIAL

## 2018-06-05 VITALS
BODY MASS INDEX: 21.88 KG/M2 | DIASTOLIC BLOOD PRESSURE: 71 MMHG | WEIGHT: 143.9 LBS | SYSTOLIC BLOOD PRESSURE: 114 MMHG | HEART RATE: 66 BPM

## 2018-06-05 DIAGNOSIS — Z11.59 NEED FOR HEPATITIS C SCREENING TEST: Primary | ICD-10-CM

## 2018-06-05 DIAGNOSIS — I73.9 PVD (PERIPHERAL VASCULAR DISEASE) (H): ICD-10-CM

## 2018-06-05 RX ORDER — OXYCODONE HYDROCHLORIDE 5 MG/1
TABLET ORAL
Qty: 90 TABLET | Refills: 0 | Status: SHIPPED | OUTPATIENT
Start: 2018-06-05 | End: 2018-06-12

## 2018-06-05 ASSESSMENT — PAIN SCALES - GENERAL: PAINLEVEL: EXTREME PAIN (8)

## 2018-06-05 NOTE — TELEPHONE ENCOUNTER
[facility] Call Center    Phone Message    May a detailed message be left on voicemail: yes    Reason for Call: Other: Patient's son Dallas called and states Patient saw  today and  is not refilling patient's Pain medication. Dallas is requesting a call back to discuss this because Patient can not go through without his medication due to he has pain. Dallas states once Boom's medication is out they will probably end up in the hospital therefore he is requesting a call back regarding why the medication won't be refilled. Please follow up with patient's son at      Action Taken: Message routed to:  [facility] ([facility]

## 2018-06-05 NOTE — TELEPHONE ENCOUNTER
[facility] Call Center    Phone Message    May a detailed message be left on voicemail: yes    Reason for Call: Other: Pt's son Dallas called back & requested a call back at his phone number of [phone]     Action Taken: Message routed to:  [facility] ([facility]): Primary

## 2018-06-05 NOTE — NURSING NOTE
Chief Complaint   Patient presents with     Medication Request     pt would like to discuss medications     Wound Check     pt would like wound on right shin checked       Maggie Schumacher CMA at 12:19 PM on 6/5/2018.

## 2018-06-05 NOTE — MR AVS SNAPSHOT
After Visit Summary   6/5/2018    Boom Kahn    MRN: [id number]           Patient Information     Date Of Birth          1950        Visit Information        Provider Department      6/5/2018 11:30 AM Ray Russo MD [facility]        Today's Diagnoses     Need for hepatitis C screening test    -  1    PVD (peripheral vascular disease) (H)          Care Instructions    Primary Care Center: [phone]     Primary Care Center Medication Refill Request Information:  * Please contact your pharmacy regarding ANY request for medication refills.  ** [facility] Prescription Fax = [phone]  * Please allow 3 business days for routine medication refills.  * Please allow 5 business days for controlled substance medication refills.     Primary Care Center Test Result notification information:  *You will be notified with in 7-10 days of your appointment day regarding the results of your test.  If you are on MyChart you will be notified as soon as the provider has reviewed the results and signed off on them.            Follow-ups after your visit        Your next 10 appointments already scheduled     Jun 14, 2018  2:00 PM CDT   US LOWER EXTREMITY ARTERIAL DUPLEX RIGHT with [facility] [facility]  ([facility])    [address] [place] [id number] [phone]           Please bring a list of your medicines (including vitamins, minerals and over-the-counter drugs). Also, tell your doctor about any allergies you may have. Wear comfortable clothes and leave your valuables at home.  You do not need to do anything special to prepare for your exam.  Please call the Imaging Department at your exam site with any questions.            Jun 14, 2018  2:30 PM CDT   US RAVI DOPPLER NO EXERCISE 1-2 LVLS BILAT with [address] [facility] [place] ([facility])    [address] [place] [address]  [id number] [phone]           Please bring a list of your medicines (including vitamins, minerals and over-the-counter drugs). Also, tell your doctor about any allergies you may have. Wear comfortable clothes and leave your valuables at home.  No caffeine or tobacco for 1 hour prior to exam.  Please call the Imaging Department at your exam site with any questions.            Jun 14, 2018  3:00 PM CDT   (Arrive by 2:45 PM)   Return Vascular Visit with Lexis Ag MD   [facility] ([facility])    [address]  3rd Floor  [facility] [address]   [phone]            Aug 31, 2018 11:00 AM CDT   CT CHEST W CONTRAST with UUCT1   [facility] [place] ([facility], [facility])    [address] [address] [phone]           Please bring any scans or X-rays taken at other hospitals, if similar tests were done. Also bring a list of your medicines, including vitamins, minerals and over-the-counter drugs. It is safest to leave personal items at home.  Be sure to tell your doctor:   If you have any allergies.   If there s any chance you are pregnant.   If you are breastfeeding.    If you have diabetes as your medication may need to be adjusted for this exam.  You will have contrast for this exam. To prepare:   Do not eat or drink for 2 hours before your exam. If you need to take medicine, you may take it with small sips of water. (We may ask you to take liquid medicine as well.)   The day before your exam, drink extra fluids at least six 8-ounce glasses (unless your doctor tells you to restrict your fluids).  Patients over 70 or patients with diabetes or kidney problems:   If you haven t had a blood test (creatinine test) within the last 30 days, the Cardiologist/Radiologist may require you to get this test prior to your exam.  Please wear loose clothing, such as a sweat suit or jogging clothes. Avoid snaps,  zippers and other metal. We may ask you to undress and put on a hospital gown.  If you have any questions, please call the Imaging Department where you will have your exam.            Aug 31, 2018  2:00 PM CDT   (Arrive by 1:45 PM)   Return Visit with Dave Del Rio MD   [facility] ([facility])    [address]  [address]  [facility] [address] [phone]              Who to contact     Please call your clinic at [phone] to:    Ask questions about your health    Make or cancel appointments    Discuss your medicines    Learn about your test results    Speak to your doctor            Additional Information About Your Visit        [employer] Information     Backchat gives you secure access to your electronic health record. If you see a primary care provider, you can also send messages to your care team and make appointments. If you have questions, please call your primary care clinic.  If you do not have a primary care provider, please call [phone] and they will assist you.      Backchat is an electronic gateway that provides easy, online access to your medical records. With Backchat, you can request a clinic appointment, read your test results, renew a prescription or communicate with your care team.     To access your existing account, please contact your [facility] or call [phone] for assistance.        Care EveryWhere ID     This is your Care EveryWhere ID. This could be used by other organizations to access your [facility] medical records  [id number]        Your Vitals Were     Pulse BMI (Body Mass Index)                66 21.88 kg/m2           Blood Pressure from Last 3 Encounters:   06/05/18 114/71   05/25/18 (!) 76/55   05/21/18 (!) 85/60    Weight from Last 3 Encounters:   06/05/18 65.3 kg (143 lb 14.4 oz)   05/25/18 66.2 kg (146 lb)   04/18/18 69.9 kg (154 lb)              Today, you had the following     No  orders found for display         Today's Medication Changes          These changes are accurate as of 6/5/18  1:33 PM.  If you have any questions, ask your nurse or doctor.               These medicines have changed or have updated prescriptions.        Dose/Directions    * oxyCODONE IR 10 MG tablet   Commonly known as:  ROXICODONE   This may have changed:  Another medication with the same name was added. Make sure you understand how and when to take each.   Used for:  Narcotic dependence (H)   Changed by:  Ray Russo MD        Dose:  10 mg   Take 1 tablet (10 mg) by mouth every 6 hours as needed for moderate to severe pain   Quantity:  25 tablet   Refills:  0       * oxyCODONE IR 5 MG tablet   Commonly known as:  ROXICODONE   This may have changed:  You were already taking a medication with the same name, and this prescription was added. Make sure you understand how and when to take each.   Used for:  PVD (peripheral vascular disease) (H)   Changed by:  Ray Russo MD        1 po 9 times/day x 2 day, then 1 po 8 times/day x 2 d, then 1 po 7 times/day x 2 d, then 1 po 6 times/day x 2 d, then 1 po 5 times/day x 2 d, then 1 po qid x 2 d, then 1 po tid x 2 d, then 1 po bid x 2 d, then one/day x 2 d, then off   Quantity:  90 tablet   Refills:  0       * Notice:  This list has 2 medication(s) that are the same as other medications prescribed for you. Read the directions carefully, and ask your doctor or other care provider to review them with you.         Where to get your medicines      Some of these will need a paper prescription and others can be bought over the counter.  Ask your nurse if you have questions.     Bring a paper prescription for each of these medications     oxyCODONE IR 5 MG tablet               Information about OPIOIDS     PRESCRIPTION OPIOIDS: WHAT YOU NEED TO KNOW   You have a prescription for an opioid (narcotic) pain medicine. Opioids can cause addiction. If you have a history of  chemical dependency of any type, you are at a higher risk of becoming addicted to opioids. Only take this medicine after all other options have been tried. Take it for as short a time and as few doses as possible.     Do not:    Drive. If you drive while taking these medicines, you could be arrested for driving under the influence (DUI).    Operate heavy machinery    Do any other dangerous activities while taking these medicines.     Drink any alcohol while taking these medicines.      Take with any other medicines that contain acetaminophen. Read all labels carefully. Look for the word  acetaminophen  or  Tylenol.  Ask your pharmacist if you have questions or are unsure.    Store your pills in a secure place, locked if possible. We will not replace any lost or stolen medicine. If you don t finish your medicine, please throw away (dispose) as directed by your pharmacist. The [place] Pollution Control Agency has more information about safe disposal: https://www.pca.[facility].[place]/living-green/managing-unwanted-medications    All opioids tend to cause constipation. Drink plenty of water and eat foods that have a lot of fiber, such as fruits, vegetables, prune juice, apple juice and high-fiber cereal. Take a laxative (Miralax, milk of magnesia, Colace, Senna) if you don t move your bowels at least every other day.          Primary Care Provider Office Phone # Fax #    Willian Arrington [phone]       [facility] [address]  [facility] [address]        Equal Access to Services     [facility] : Hadii vishnu soares hadasho Soomaali, waaxda luqadaha, qaybta kaalmada adeludwigyada, vijay castillo . So [facility] [phone].    ATENCIÓN: Si habla español, tiene a bhatia disposición servicios gratuitos de asistencia lingüística. Llame al [phone].    We comply with applicable federal civil rights laws and [place] laws. We do not discriminate on the basis of race, color,  national origin, age, disability, sex, sexual orientation, or gender identity.            Thank you!     Thank you for choosing [facility]  for your care. Our goal is always to provide you with excellent care. Hearing back from our patients is one way we can continue to improve our services. Please take a few minutes to complete the written survey that you may receive in the mail after your visit with us. Thank you!             Your Updated Medication List - Protect others around you: Learn how to safely use, store and throw away your medicines at www.disposemymeds.org.          This list is accurate as of 6/5/18  1:33 PM.  Always use your most recent med list.                   Brand Name Dispense Instructions for use Diagnosis    acetic acid 0.25 % irrigation     10 mL    Irrigate with as directed 2 times daily    S/P vascular surgery       ALPRAZolam 0.5 MG tablet    XANAX     TK 1 T PO TID PRN        ascorbic acid 500 MG Tabs     7 tablet    Take 1 tablet (500 mg) by mouth daily    Takes dietary supplements       ASPIRIN EC PO      Take 81 mg by mouth daily        carvedilol 6.25 MG tablet    COREG    60 tablet    Take 1 tablet (6.25 mg) by mouth 2 times daily (with meals)    Ischemic cardiomyopathy       ciprofloxacin 500 MG tablet    CIPRO    14 tablet    Take 1 tablet (500 mg) by mouth 2 times daily    Cellulitis and abscess of leg       clindamycin 300 MG capsule    CLEOCIN    28 capsule    Take 1 capsule (300 mg) by mouth 4 times daily    Cellulitis and abscess of leg       clopidogrel 75 MG tablet    PLAVIX    30 tablet    Take 1 tablet (75 mg) by mouth daily    Ischemic cardiomyopathy       diclofenac 1 % Gel topical gel    VOLTAREN    1 Tube    Place 4 g onto the skin 4 times daily    Pain of left lower extremity       docusate sodium 100 MG capsule    COLACE    60 capsule    Take 1 capsule (100 mg) by mouth daily    Takes dietary supplements       enalapril 2.5 MG tablet    VASOTEC     60 tablet    Take 1 tablet (2.5 mg) by mouth 2 times daily    Ischemic cardiomyopathy       escitalopram 20 MG tablet    LEXAPRO    15 tablet    Take 1 tablet (20 mg) by mouth daily    S/P vascular surgery       ezetimibe 10 MG tablet    ZETIA    30 tablet    Take 1 tablet (10 mg) by mouth daily    Ischemic cardiomyopathy       fentaNYL 25 mcg/hr 72 hr patch    DURAGESIC     Place 1 patch onto the skin every 72 hours remove old patch.        folic acid 1 MG tablet    FOLVITE    30 tablet    Take 1 tablet (1 mg) by mouth daily    Takes dietary supplements       furosemide 20 MG tablet    LASIX    60 tablet    Take 1 tablet (20 mg) by mouth 2 times daily (with meals)    S/P vascular surgery       * gabapentin 300 MG capsule    NEURONTIN    60 capsule    Take 1 capsule (300 mg) by mouth 2 times daily    S/P vascular surgery       * gabapentin 600 MG tablet    NEURONTIN    30 tablet    Take 1 tablet (600 mg) by mouth 3 times daily Increase per clinic directions.        gabapentin 8 % Gel topical PLO cream     100 g    Apply 1 g topically every 8 hours Do NOT apply to open wounds    Peripheral arterial disease (H), Non-healing wound of lower extremity, right, initial encounter       isosorbide mononitrate 30 MG 24 hr tablet    IMDUR    90 tablet    Take 1 tablet (30 mg) by mouth daily    Chronic systolic congestive heart failure (H)       levofloxacin 500 MG tablet    LEVAQUIN          Lidocaine 4 % Gel     30 g    Externally apply topically 2 times daily    Cellulitis and abscess of leg       methocarbamol 500 MG tablet    ROBAXIN          multivitamin, therapeutic with minerals Tabs tablet     30 each    Take 1 tablet by mouth daily    Takes dietary supplements       nitroGLYcerin 0.4 MG sublingual tablet    NITROSTAT     SHARON MAY REPEAT Q 5 MINUTES X 2        order for DME     1 each    Equipment being ordered: Walker Wheels () and Walker () Treatment Diagnosis: impaired mobility    Pain of left lower  extremity       * oxyCODONE IR 10 MG tablet    ROXICODONE    25 tablet    Take 1 tablet (10 mg) by mouth every 6 hours as needed for moderate to severe pain    Narcotic dependence (H)       * oxyCODONE IR 5 MG tablet    ROXICODONE    90 tablet    1 po 9 times/day x 2 day, then 1 po 8 times/day x 2 d, then 1 po 7 times/day x 2 d, then 1 po 6 times/day x 2 d, then 1 po 5 times/day x 2 d, then 1 po qid x 2 d, then 1 po tid x 2 d, then 1 po bid x 2 d, then one/day x 2 d, then off    PVD (peripheral vascular disease) (H)       polyethylene glycol Packet    MIRALAX/GLYCOLAX    7 packet    Take 17 g by mouth daily    Takes dietary supplements       predniSONE 10 MG tablet    DELTASONE    30 tablet    Take 1 tablet (10 mg) by mouth daily    S/P vascular surgery       rosuvastatin 20 MG tablet    CRESTOR    30 tablet    Take 1 tablet (20 mg) by mouth daily    Ischemic cardiomyopathy       spironolactone 25 MG tablet    ALDACTONE     TK 1 T PO D        vitamin A 01121 UNIT capsule     7 capsule    Take 2 capsules (20,000 Units) by mouth daily    Takes dietary supplements       zinc sulfate 220 (50 Zn) MG capsule    ZINCATE    7 capsule    Take 1 capsule (220 mg) by mouth daily    Takes dietary supplements       * Notice:  This list has 4 medication(s) that are the same as other medications prescribed for you. Read the directions carefully, and ask your doctor or other care provider to review them with you.

## 2018-06-05 NOTE — TELEPHONE ENCOUNTER
Spoke with pt and discusssed the recommendations as below at length. He thinks UDS is a waist of money, wound not do it. I explained this is one of our clinic policies for narcotic prescriptions. He also states that he has been just fine with all his narcotics & xanax 0.5 mg 3 times per day and why is it so difficult to have those meds in [place] and worried about his heart condition from cold turkey. I reieterated chem dep eval/treat and addiction med or pain clinic recommendations.  He is skeptical on most of these recommendations, but he will think about it and call me back with the decision. He called wound clinic for sooner appt and await for their phone call.    Soon-Mi  ----------------------------------------------------------------------          ----- Message from Ray Russo MD sent at 6/5/2018  4:19 PM CDT -----  Please call him, I have seen him twice. He has been on narcotics long term and asked me to take over. He has not yet done UDS discussed at first visit and ordered then, he did not see psychiatry for requested opinion on xanax use especially in setting of opiods at same time. He and I discussed the risks of these meds together, especially as he also drinks six or more beers a day, I've offered and he's declined chem dep referral for this, and he does not plan to stop on his own. Today he told me he is out of narcotics, and [place] checked [place] Database. He was given a taper off of oxycodone; he has not filled Fentanyl since late March. He was very unhappy I would not continue the same narcotics, he did see our pain clinic once. Given heavy etoh use, no UDS, no opinion from psychiatry as not seen, and no pain clinic advice to continue the narcotics, I did not give him anymore than the one time taper.    Ideas I gave him were to go back to our pain clinic for f/u visit, he's established there now. Also for alcohol again I told him needs chem dep eval/treat.    I told him I would speak to his pain  clinic provider for further ideas if possible after he left, and I was able to. Her ideas were  1--refer to Addiction Med, perhaps they'd have other ideas. I'm not sure if they would do this or not, but she thought they might consider him for suboxone. If he would go, see if any way they could see him this week yet.  2--offer him clonidine for withdrawal symptoms if they occur; the risk is it can lower BP and his BP is not high, and it is long acting. He might tolerate 0.1 mg tabs one po bid for opiod withdrawal if occurs, but if he took it would have to look for lightheadedness and low BP as possible side effects. If he'd like to try it offer him 20 tabs.    Can you call him and offer those things, ideally he'd go to Addiction Medicine.    I'm happy to see him again but not for opiod prescriptions, and for xanax would still need to go to psychiatry for opinion on appropriateness of long term use.    Also--He is established w/ wound clinic, was seeing them weekly he stated but not for three weeks, I told him to call them for appt soon, see if he needs help getting back in there.

## 2018-06-05 NOTE — PROGRESS NOTES
"[facility]  Primary Care [place]   Ray Russo MD  06/05/2018      Chief Complaint:   Medication Request and Wound Check       History of Present Illness:   Boom Kahn is a 68 year old male with a history of CHF, COPD, hypertension, CAD, PAD, and exposed lower extremity wound, who presents alone for a wound check and medication request. The patient has been battling with a chronic right lower shin wound causing associated pain for about the last two to three years. He has been managing this pain with oxycodone which his primary care provider from [place] has been providing regularly per the patient. The patient was seen in our clinic on 5/10/18 with his son, where his chronic leg pain was addressed. At this appointment, he was referred to the pain clinic. It was also recommended that he see a psychiatrist for anxiety evaluation as he is currently using Xanax. He saw Dr. Childress of pain clinic on 05/21/18, where his gabapentin dosage was increased to 600 mg 3x daily.     He has been evaluated by Dr. Haynes of plastic surgery who expressed that the patient was not eligible for surgical intervention of his wound due to the capillary availability of his right lower shin and his suspicion that his wound would not heal well. He has also been working with [facility] to properly treat and dress his chronic wound nearly weekly. He last saw [facility] about 3 weeks ago and expresses intent to follow up with her this Thursday.     Here, he has dressings covering his open wounds on his legs and reports changing these dressings regularly every morning. He last changed the dressings two hours prior to this appointment. The wounds have had a slight odor over the past few days, therefore the patient has been using MediCleanse to assist with this, experiencing good relief. He has also tried hydrogel and Vaseline on the wounds which have not proven to be helpful in the past. He reports \"severe " "inflammation\" after having a number of topical interventions applied to his wound that have deterred the healing process.     The patient was not seen by the recommended psychiatrist since his last appointment in primary care clinic. He also has not completed his urine drug screen as indicated by his chronic narcotic use during his last visit. The patient would like to get a refill on his Oxycodone for which he is currently out of this medication. He has also been using a fentanyl patch, changing this about every five days (instead of three), for additional pain management. He reports having pain 10 out of 10 when he wakes up in the morning. After an extended period of discussing this topic with this patient, he eventually threatens to go to the \"streets\" for drug use if his medication are not refilled.     Of note, he has been drinking about 6-8 beers per day and does not plan to stop this alcohol use. He also is taking 1.5 mg of Xanax daily.     Health Care Maintenance/Other:  1. Pulmonary nodule- increased in size by 1 mm, follow up in 3 months  2. Vascular appointment on 06/14/2018    Review of Systems:   Pertinent items are noted in HPI, remainder of complete ROS is negative.      Active Medications:      acetic acid 0.25 % irrigation, Irrigate with as directed 2 times daily, Disp: 10 mL, Rfl: 0     ALPRAZolam (XANAX) 0.5 MG tablet, TK 1 T PO TID PRN, Disp: , Rfl: 0     ascorbic acid 500 MG TABS, Take 1 tablet (500 mg) by mouth daily, Disp: 7 tablet, Rfl: 0     ASPIRIN EC PO, Take 81 mg by mouth daily, Disp: , Rfl:      carvedilol (COREG) 6.25 MG tablet, Take 1 tablet (6.25 mg) by mouth 2 times daily (with meals), Disp: 60 tablet, Rfl: 3     clindamycin (CLEOCIN) 300 MG capsule, Take 1 capsule (300 mg) by mouth 4 times daily, Disp: 28 capsule, Rfl: 0     clopidogrel (PLAVIX) 75 MG tablet, Take 1 tablet (75 mg) by mouth daily, Disp: 30 tablet, Rfl: 0     diclofenac (VOLTAREN) 1 % GEL topical gel, Place 4 g onto " the skin 4 times daily, Disp: 1 Tube, Rfl: 0     docusate sodium (COLACE) 100 MG capsule, Take 1 capsule (100 mg) by mouth daily, Disp: 60 capsule, Rfl: 0     enalapril (VASOTEC) 2.5 MG tablet, Take 1 tablet (2.5 mg) by mouth 2 times daily, Disp: 60 tablet, Rfl: 11     escitalopram (LEXAPRO) 20 MG tablet, Take 1 tablet (20 mg) by mouth daily, Disp: 15 tablet, Rfl: 0     ezetimibe (ZETIA) 10 MG tablet, Take 1 tablet (10 mg) by mouth daily, Disp: 30 tablet, Rfl: 0     fentaNYL (DURAGESIC) 25 mcg/hr 72 hr patch, Place 1 patch onto the skin every 72 hours remove old patch., Disp: , Rfl:      folic acid (FOLVITE) 1 MG tablet, Take 1 tablet (1 mg) by mouth daily, Disp: 30 tablet, Rfl: 0     furosemide (LASIX) 20 MG tablet, Take 1 tablet (20 mg) by mouth 2 times daily (with meals), Disp: 60 tablet, Rfl: 3     gabapentin (NEURONTIN) 300 MG capsule, Take 1 capsule (300 mg) by mouth 2 times daily, Disp: 60 capsule, Rfl: 1     gabapentin (NEURONTIN) 600 MG tablet, Take 1 tablet (600 mg) by mouth 3 times daily Increase per clinic directions., Disp: 30 tablet, Rfl: 1     gabapentin 8 % GEL topical PLO cream, Apply 1 g topically every 8 hours Do NOT apply to open wounds, Disp: 100 g, Rfl: 1     isosorbide mononitrate (IMDUR) 30 MG 24 hr tablet, Take 1 tablet (30 mg) by mouth daily, Disp: 90 tablet, Rfl: 3     levofloxacin (LEVAQUIN) 500 MG tablet, , Disp: , Rfl: 0     Lidocaine 4 % GEL, Externally apply topically 2 times daily, Disp: 30 g, Rfl: 1     methocarbamol (ROBAXIN) 500 MG tablet, , Disp: , Rfl: 1     multivitamin, therapeutic with minerals (THERA-VIT-M) TABS tablet, Take 1 tablet by mouth daily, Disp: 30 each, Rfl: 0     nitroGLYcerin (NITROSTAT) 0.4 MG sublingual tablet, SHARON MAY REPEAT Q 5 MINUTES X 2, Disp: , Rfl: 2     oxyCODONE IR (ROXICODONE) 10 MG tablet, Take 1 tablet (10 mg) by mouth every 6 hours as needed for moderate to severe pain, Disp: 25 tablet, Rfl: 0     polyethylene glycol (MIRALAX/GLYCOLAX) Packet,  Take 17 g by mouth daily, Disp: 7 packet, Rfl: 0     predniSONE (DELTASONE) 10 MG tablet, Take 1 tablet (10 mg) by mouth daily, Disp: 30 tablet, Rfl: 0     rosuvastatin (CRESTOR) 20 MG tablet, Take 1 tablet (20 mg) by mouth daily, Disp: 30 tablet, Rfl: 11     spironolactone (ALDACTONE) 25 MG tablet, TK 1 T PO D, Disp: , Rfl: 4     vitamin A 16297 UNIT capsule, Take 2 capsules (20,000 Units) by mouth daily, Disp: 7 capsule, Rfl: 0     zinc sulfate (ZINCATE) 220 (50 ZN) MG capsule, Take 1 capsule (220 mg) by mouth daily, Disp: 7 capsule, Rfl: 0      Allergies:   Betadine [povidone iodine]  Collagenase clostridium histolyticum  Flagyl [metronidazole]  Iodine  Santyl [collagenase]      Past Medical History:  Anxiety   Coronary artery disease (CAD) s/p CABG x3  Congestive heart failure (CHF)   Chronic pain   Chronic obstructive pulmonary disease (COPD)   Depression   Hyperlipidemia    Hypertension   Malignant neoplasm of lung s/p radiation therapy   Peripheral artery disease (PAD) s/p lower extremity stents   Tobacco abuse   Ischemic cardiomyopathy  Atherosclerosis of native arteries of extremities with intermittent claudication, bilateral legs   Automatic implantable cardioverter-defibrillator in situ  Myocardial infarction (MI)  Back pain  Nonhealing non surgical wound     Past Surgical History:  Angioplasty   Bypass graft artery coronary   Bypass graft femorotibial   Cardiac catheterization   Cardiac defibrillator placement   Chest tube insertion   Colon resection   Fine needle aspiration   Implant pacemaker      Social History:   The patient is single, a current 1 pack/day tobacco smoker, and does consume alcohol.      Physical Exam:   /71  Pulse 66  Wt 65.3 kg (143 lb 14.4 oz)  BMI 21.88 kg/m2      Constitutional: Alert. In no distress.  Head: Normocephalic. No masses, lesions, or abnormalities.  Musculoskeletal: Over the right shin, a 2 by 4 inch open wound appearing healthy with grandular tissue at the  lower portion revealing raised structure, no pus or deeper areas. Does not appear infected.   Psychiatric: Mentation appears normal. Normal affect.       Assessment and Plan:  1. Chronic pain associated with peripheral vascular disease (PVD)  I had a long discussion with the patient. I have now met him twice and as he is not currently compliant with his UA drug screening, when I explained the reason to him to do one, he said that he can write down what the results will be for me if I'd like. He is consuming 6+ beers daily (not interested in decreasing), continues to decline CD eval, and has not completed psychiatry evaluation in regard to his current Xanax dose and he knows I'm concerned about mixing narcotics, xanax, and alcohol all every day, I reviewed the rational behind not being able to refill the patient's chronic pain medication. I have explained that I would need psychiatry's opinion on his Xanax for anxiety in addition to narcotics. I spoke with the patient's pain clinic provider today who agreed with me that in the absence of a recent UDS and a plan to address the patient's current alcohol use, that ongoing narcotic prescription was not advisable. I made very clear to him that in order to prescribe narcotic pain medication, he would need to compete a routine UDS as well as consult psychiatry regarding his current Xanax and receive indication to continue pain medication per pain clinic. At this time, none of these factors have been addressed in a way that is reasonable for continuing narcotic pain medications. I have provided the patient a short course of oxycodone with the intent to quickly ween the patient off of this medication. I explained that he should be decreasing his daily use by 5 mg every other day. He asked for Fentanyl too--I told him per database not filled since late March, he states that is because he stretched out to every five days. Patient was instructed to follow up with pain clinic as  he is dissatisfied with this current regimen. He indicated that he may seek care elsewhere, which is his choice. He appeared upset that I was not prescribing his normal chronic narcotics. I did check the [place] database and found that his fentanyl patch prescription was last filling 2+ months ago. His last oxycodone refill was provided one month ago. I offered to call and talk with the patient's son who happens to be a physician's assist, however the patient declined.   - oxyCODONE IR (ROXICODONE) 5 MG tablet  Dispense: 90 tablet; Refill: 0    2. Wound care  Patient follows closely with [facility]. I suggested immediate contact for ongoing wound care as patient has not been evaluated in three week. Vascular follow up is also indicated at this time. He has appt there soon.     Follow-up: Patient should follow up with pain clinic for reevaluation of his current condition.      Of note, after pt left I did discuss him at length w/ his pain clinic provider whose other thoughts were clonidine in case withdrawal sx, and addiction med referral; I've asked my RN same day to call and offer both, and also that I'm happy to see him but not for controlled substatce rx without meeting above plan.      1/2 hr visit over half couns/coord care  Scribe Disclosure:  We, Subha Minor and Velma Trujillo, are serving as the scribes to document services personally performed by Ray Russo MD at this visit, based upon the provider's statements to me. All documentation has been reviewed by the aforementioned provider prior to being entered into the official medical record.     Portions of this medical record were completed by a scribe. UPON MY REVIEW AND AUTHENTICATION BY ELECTRONIC SIGNATURE, this confirms (a) I performed the applicable clinical services, and (b) the record is accurate.   Ray Russo MD

## 2018-06-05 NOTE — PATIENT INSTRUCTIONS
[facility]: [phone]     [facility] Medication Refill Request Information:  * Please contact your pharmacy regarding ANY request for medication refills.  ** [facility] Prescription Fax = [phone]  * Please allow 3 business days for routine medication refills.  * Please allow 5 business days for controlled substance medication refills.     [facility] Test Result notification information:  *You will be notified with in 7-10 days of your appointment day regarding the results of your test.  If you are on MyChart you will be notified as soon as the provider has reviewed the results and signed off on them.

## 2018-06-06 ENCOUNTER — TELEPHONE (OUTPATIENT)
Dept: WOUND CARE | Facility: CLINIC | Age: 68
End: 2018-06-06

## 2018-06-06 NOTE — TELEPHONE ENCOUNTER
[facility] Call Center    Phone Message    May a detailed message be left on voicemail: yes    Reason for Call: Medication Question or concern regarding medication   Prescription Clarification  Name of Medication: Almas  Prescribing Provider:Cari Childress   Pharmacy:    What on the order needs clarification? Pt states that he recently saw Dr. Zavaleta whom change in his medications regimen.  Pt states that he would like a call back today to discuss the changes made if possible. Pt is aware of the 24 hour turn over time.            Action Taken: Message routed to:  [facility] pain

## 2018-06-06 NOTE — TELEPHONE ENCOUNTER
[facility] Call Center    Phone Message    May a detailed message be left on voicemail: yes    Reason for Call: Other: Pt son Oleg called requesting to have someone contact him to f/u on his fathers pain medication/pain management. Says he spoke w/ his brother, Dallas who had called yesterday, and they agreed that Boom would be the one to contact for this conversation.      Action Taken: Message routed to:  [facility] Primary

## 2018-06-07 ENCOUNTER — MYC MEDICAL ADVICE (OUTPATIENT)
Dept: FAMILY MEDICINE | Facility: CLINIC | Age: 68
End: 2018-06-07

## 2018-06-07 ENCOUNTER — TELEPHONE (OUTPATIENT)
Dept: INTERNAL MEDICINE | Facility: CLINIC | Age: 68
End: 2018-06-07

## 2018-06-07 ENCOUNTER — TELEPHONE (OUTPATIENT)
Dept: ANESTHESIOLOGY | Facility: CLINIC | Age: 68
End: 2018-06-07

## 2018-06-07 DIAGNOSIS — L08.9 SKIN INFLAMMATION: Primary | ICD-10-CM

## 2018-06-07 NOTE — TELEPHONE ENCOUNTER
Called the son of patient back regarding the chronic narcotics his father has been on.  At the last visit with Dr. Russo the provider offered to speak to the patient's son who is a physician's assistant but the patient declined.  Writer will inform the provider of the son's request to speak to him about the patient, his father.  He can be reached at [phone].

## 2018-06-07 NOTE — TELEPHONE ENCOUNTER
[facility] Call Center    Phone Message    May a detailed message be left on voicemail: yes    Reason for Call: Other: pt's son is returning a phone call. please call him back.     Action Taken: Message routed to:  [facility] halle

## 2018-06-07 NOTE — TELEPHONE ENCOUNTER
LPN spoke to the Pt's son Oleg, who was asking if clinic knew of other pain clinics that prescribe opioids. Son was informed that when pt was seen in clinic on 5/21/18 they were provided with a list of alternate pain clinics that they could reference.   LPN offered to fax or email them the list again if they needed it, but Son declined stating that they had the copy still.   LPN informed Son that any and all prescriptions will be based on the Treating providers discretion, and that LPN could not guarantee that the pt would get a prescription from any of them.   Son verbalized understanding and stated that he would make some calls.   Celina Stack, SABI    
[facility] Call Center    Phone Message    May a detailed message be left on voicemail: no    Reason for Call: Other: Pt son Oleg requesting call back to discuss pt's pain management plan. Oleg stated they would like a possible recommendation to a pain specialist that could assist in a pain plan that would deal with narcotics     Action Taken: Message routed to:  [facility] pain clinic  
no

## 2018-06-07 NOTE — TELEPHONE ENCOUNTER
I spoke w/ his son who's a health care provider himself. Discussed his dad has not wanted to do drug screen, see chem dep about etoh use which is heavy and chronic, or see pschiatry. Son states he'll talk to his dad about those thing. I told him I know his dad has pain but I told him what I told his dad. For me to provide ongoing narcotics, I'd need UDS, psychiatry to see if benzo use if ok in combo or if should switch, chem dep eval for etoh and if considered a problem he'd have to get off etoh, and I'd need pain clinic to say he is a reasonable candidate for opiods chronically (DIRE score or similar). I told son his dad is welcome to f/u with pain clinic for whatever other pain mgmt ideas they might have too. I gave him rx for opiod taper going down by five mg every two days. I told son we can slow that to every four days. I gave him 90 tabs. Five mg each. So now, he will be taking 45 mg/d for four days, then 40 mg/day for four days, then 35 mg/day for four days, then 30 mg/day for four days, then 25 mg/day for four days, then 20 mg d/four days, then 15 mg/day for four days, then 10 mg/day for four days, then 5 mg/day for four days, then off.  He will run out early and will need another ninety tabs (since slowing taper by half), if this occurs while I am out of town other provider can write.     Soon mi his son knows to talk to his dad about;  Could see [place] [facility]  Could f/u w/ [facility] pain clinic  Should see psychiatry, has referral  Could do UDS (if those , reorder)  Should see chem dep for intake evaluation for heavy etoh use.    Can you call his son leslie--the number is , he is a health care provider. If they want to pursue any of those help set them up

## 2018-06-08 ENCOUNTER — TELEPHONE (OUTPATIENT)
Dept: WOUND CARE | Facility: CLINIC | Age: 68
End: 2018-06-08

## 2018-06-08 RX ORDER — MOMETASONE FUROATE 1 MG/G
OINTMENT TOPICAL
Qty: 45 G | Refills: 3 | Status: SHIPPED | OUTPATIENT
Start: 2018-06-08

## 2018-06-08 NOTE — TELEPHONE ENCOUNTER
Called son, Oleg. He will try to discuss/convince his dad over the weekend and will call me on Tuesday next week (6/12).

## 2018-06-08 NOTE — TELEPHONE ENCOUNTER
[facility] Call Center    Phone Message    May a detailed message be left on voicemail: yes    Reason for Call: Order(s): Home Care Orders: Other: Wound Care Orders needed for both of his legs for ulcers - unsure what they need her to do - so she needs specifics, and Pt was just re-certified for Home Care, so they need new [facility] orders - Please return her call - Secure VML - Thanks!    Action Taken: Message routed to:  [facility] Wound Clinc

## 2018-06-11 ENCOUNTER — MYC MEDICAL ADVICE (OUTPATIENT)
Dept: FAMILY MEDICINE | Facility: CLINIC | Age: 68
End: 2018-06-11

## 2018-06-11 DIAGNOSIS — I73.9 PVD (PERIPHERAL VASCULAR DISEASE) (H): ICD-10-CM

## 2018-06-13 RX ORDER — OXYCODONE HYDROCHLORIDE 5 MG/1
TABLET ORAL
Qty: 90 TABLET | Refills: 0 | Status: SHIPPED | OUTPATIENT
Start: 2018-06-14 | End: 2018-07-30

## 2018-06-14 ENCOUNTER — RADIANT APPOINTMENT (OUTPATIENT)
Dept: ULTRASOUND IMAGING | Facility: CLINIC | Age: 68
End: 2018-06-14
Attending: CLINICAL NURSE SPECIALIST
Payer: COMMERCIAL

## 2018-06-14 ENCOUNTER — OFFICE VISIT (OUTPATIENT)
Dept: WOUND CARE | Facility: CLINIC | Age: 68
End: 2018-06-14
Payer: COMMERCIAL

## 2018-06-14 ENCOUNTER — OFFICE VISIT (OUTPATIENT)
Dept: VASCULAR SURGERY | Facility: CLINIC | Age: 68
End: 2018-06-14
Payer: COMMERCIAL

## 2018-06-14 VITALS
WEIGHT: 143 LBS | OXYGEN SATURATION: 98 % | SYSTOLIC BLOOD PRESSURE: 106 MMHG | BODY MASS INDEX: 21.74 KG/M2 | HEART RATE: 55 BPM | DIASTOLIC BLOOD PRESSURE: 68 MMHG

## 2018-06-14 DIAGNOSIS — I73.9 PAD (PERIPHERAL ARTERY DISEASE) (H): ICD-10-CM

## 2018-06-14 DIAGNOSIS — L97.909 ARTERIAL LEG ULCER (H): Primary | ICD-10-CM

## 2018-06-14 DIAGNOSIS — I73.9 PAD (PERIPHERAL ARTERY DISEASE) (H): Primary | ICD-10-CM

## 2018-06-14 ASSESSMENT — PAIN SCALES - GENERAL: PAINLEVEL: SEVERE PAIN (7)

## 2018-06-14 NOTE — PROGRESS NOTES
VASCULAR SURGERY CLINIC ESTABLISHED PATIENT NOTE    HPI:    Boom Kahn is a 68 year old male with history of HTN, anxiety/depression, alcohol abuse, tobacco abuse, ICM/MI, CAD status post 3 X CABG, PCI, HF- EF25-30%, status post ICD, right lung cancer, status post radiation therapy and PAD. He underwent right femoral endarterectomy with in-situ GSV femoral to posterior tibial artery bypass on 1/15/2018 to heal a right anterior shin wound.  He has been following up with ortho for wound care.  He returns to clinic for follow up visit.       SUBJECTIVE:  Patient reports severe right leg pain, upset with narcotic taper from primary care MD.  Reports improvement in right shin wound  Continues to smoke and drink alcohol in excess.      OBJECTIVE:  Vital signs:  106/68  55    143 lbs 0 oz  Body surface area is 1.76 meters squared.    Prior to Admission medications    Medication Sig Start Date End Date Taking? Authorizing Provider   acetic acid 0.25 % irrigation Irrigate with as directed 2 times daily 2/10/18  Yes Modesta Ruiz APRN CNP   ALPRAZolam (XANAX) 0.5 MG tablet TK 1 T PO TID PRN 5/19/18  Yes Reported, Patient   ascorbic acid 500 MG TABS Take 1 tablet (500 mg) by mouth daily 2/1/18  Yes Mariluz Pagan MD   ASPIRIN EC PO Take 81 mg by mouth daily   Yes Reported, Patient   carvedilol (COREG) 6.25 MG tablet Take 1 tablet (6.25 mg) by mouth 2 times daily (with meals) 2/22/18  Yes Jimena Rodriguez MD   ciprofloxacin (CIPRO) 500 MG tablet Take 1 tablet (500 mg) by mouth 2 times daily 3/22/18  Yes Dennise Arita APRN CNP   clindamycin (CLEOCIN) 300 MG capsule Take 1 capsule (300 mg) by mouth 4 times daily 3/22/18  Yes Dennise Arita APRN CNP   clopidogrel (PLAVIX) 75 MG tablet Take 1 tablet (75 mg) by mouth daily 2/1/18  Yes Mariluz Pagna MD   diclofenac (VOLTAREN) 1 % GEL topical gel Place 4 g onto the skin 4 times daily 2/10/18  Yes Modesta Ruiz APRN CNP   docusate  sodium (COLACE) 100 MG capsule Take 1 capsule (100 mg) by mouth daily 2/1/18  Yes Mariluz Pagan MD   enalapril (VASOTEC) 2.5 MG tablet Take 1 tablet (2.5 mg) by mouth 2 times daily 2/22/18  Yes Jimena Rodriguez MD   escitalopram (LEXAPRO) 20 MG tablet Take 1 tablet (20 mg) by mouth daily 2/1/18  Yes Mariluz Pagan MD   ezetimibe (ZETIA) 10 MG tablet Take 1 tablet (10 mg) by mouth daily 1/31/18  Yes Mariluz Pagan MD   fentaNYL (DURAGESIC) 25 mcg/hr 72 hr patch Place 1 patch onto the skin every 72 hours remove old patch.   Yes Reported, Patient   folic acid (FOLVITE) 1 MG tablet Take 1 tablet (1 mg) by mouth daily 2/1/18  Yes Mariluz Pagan MD   furosemide (LASIX) 20 MG tablet Take 1 tablet (20 mg) by mouth 2 times daily (with meals) 2/22/18  Yes Jimena Rodriguez MD   gabapentin (NEURONTIN) 600 MG tablet Take 1 tablet (600 mg) by mouth 3 times daily Increase per clinic directions. 5/21/18  Yes Cari Childress APRN CNP   gabapentin 8 % GEL topical PLO cream Apply 1 g topically every 8 hours Do NOT apply to open wounds 6/2/18  Yes Cari Childress APRN CNP   isosorbide mononitrate (IMDUR) 30 MG 24 hr tablet Take 1 tablet (30 mg) by mouth daily 2/22/18  Yes Jimena Rodriguez MD   levofloxacin (LEVAQUIN) 500 MG tablet  6/16/17  Yes Reported, Patient   Lidocaine 4 % GEL Externally apply topically 2 times daily 3/22/18  Yes Dennise Arita APRN CNP   methocarbamol (ROBAXIN) 500 MG tablet  11/16/17  Yes Reported, Patient   mometasone (ELOCON) 0.1 % ointment Apply affected area daily 6/8/18  Yes Ray Russo MD   multivitamin, therapeutic with minerals (THERA-VIT-M) TABS tablet Take 1 tablet by mouth daily 2/1/18  Yes Mariluz Pagan MD   nitroGLYcerin (NITROSTAT) 0.4 MG sublingual tablet SHARON MAY REPEAT Q 5 MINUTES X 2 5/24/18  Yes Reported, Patient   order for DME Equipment being ordered: Walker Wheels () and Walker ()  Treatment  Diagnosis: impaired mobility 2/10/18  Yes Modesta Ruiz APRN CNP   oxyCODONE IR (ROXICODONE) 5 MG tablet 1 tablet po 6 times/day x 4 day (continued from previous Rx), then 1 po 5 times/day x 4 d, then 1 po 4 times/day x 4 d, then 1 po 3 times/day x 4 d, then 1 po 2 times/day x 4 d, then 1 po 1 time/day x 4 d, then off. 6/14/18  Yes Patricia Marques MD PhD   polyethylene glycol (MIRALAX/GLYCOLAX) Packet Take 17 g by mouth daily 2/1/18  Yes Mariluz Pagan MD   predniSONE (DELTASONE) 10 MG tablet Take 1 tablet (10 mg) by mouth daily 2/1/18  Yes Mariluz Pagan MD   rosuvastatin (CRESTOR) 20 MG tablet Take 1 tablet (20 mg) by mouth daily 2/22/18  Yes Jimena Rodriguez MD   spironolactone (ALDACTONE) 25 MG tablet TK 1 T PO D 5/8/18  Yes Reported, Patient   vitamin A 00497 UNIT capsule Take 2 capsules (20,000 Units) by mouth daily 2/1/18  Yes Mariluz Pagan MD   zinc sulfate (ZINCATE) 220 (50 ZN) MG capsule Take 1 capsule (220 mg) by mouth daily 2/1/18  Yes Mariluz Pagan MD   gabapentin (NEURONTIN) 300 MG capsule Take 1 capsule (300 mg) by mouth 2 times daily  Patient not taking: Reported on 6/5/2018 5/9/18   Patience Almonte MD       PHYSICAL EXAM:  NEURO/PSYCH: The patient is alert and oriented.  Appropriate.  Moves all extremities.   SKIN: warm and dry  PULMONARY: non-labored breathing, not requiring supplemental oxygen   EXTREMITIES: bilateral shin dressings D/I    ULTRASOUND:  Reviewed by Dr. Ag, right leg graft patent.     RAVI:  Right leg 0.95  Left leg 0.65    ASSESSMENT:  68 year old male with history of HTN, anxiety/depression, alcohol abuse, tobacco abuse, ICM/MI, CAD status post 3 X CABG, PCI, HF- EF25-30%, status post ICD, right lung cancer, status post radiation therapy and PAD. He underwent right femoral endarterectomy with in-situ GSV femoral to posterior tibial artery bypass on 1/15/2018 to heal a right anterior shin wound.         Patient Active Problem  List   Diagnosis     Ischemic cardiomyopathy     CHF (congestive heart failure) (H)     PVD (peripheral vascular disease) (H)     COPD (chronic obstructive pulmonary disease) (H)     Anxiety     Atherosclerosis of native arteries of extremities with intermittent claudication, bilateral legs (H)     Automatic implantable cardioverter-defibrillator in situ     Depression     Cardiomyopathy (H)     Hyperlipidemia     HTN (hypertension)     Lung mass     Dyspnea on exertion     Malignant neoplasm of lung (H)     MI (myocardial infarction)     Critical lower limb ischemia     Atherosclerotic PVD with ulceration (H)     S/P vascular surgery     Back pain     Nonhealing nonsurgical wound         PLAN:  - smoking cessation strongly encouraged  - continue wound care per Tamiko Mcduffie, plans for possible Apligraf applications  - patient saw Dr. Haynes who expressed that the patient was not eligible for surgical intervention  - narcotic management per primary team  - follow up with Dr. Wade of vascular surgery in 3 months with RAVI and ultrasound prior         Shannan COLMENARES, CNS  Division of Vascular Surgery  [facility]  Pager [phone]

## 2018-06-14 NOTE — LETTER
6/14/2018       RE: Boom Kahn  [address] [place] [address]     Dear Colleague,    Thank you for referring your patient, Boom Kahn, to the [facility]OUND [facility] at [facility]. Please see a copy of my visit note below.    Chief Complaint: Arterial ulcers    Subjective: Boom is a 68 year old male who presents to the clinic today for follow up of bilateral arterial wounds. Dressing with wet to dry saline dressings. Using mometasone to distal edges of R shin wound, this helps to decrease pain temporarily. PCP is weaning patient off chronic pain medications.      ROS: Denies increased redness, swelling, purulent drainage, warmth, increased wound pain, n/v, confusion, fevers.    Allergies   Allergen Reactions     Betadine [Povidone Iodine] Blisters     Pt reports erythema, increased pain, and blistering when using betadine on R big toe in past     Collagenase Clostridium Histolyticum      Flagyl [Metronidazole] Other (See Comments)     Flu symptoms  Flu like symptoms     Iodine Unknown     Santyl [Collagenase]      Current Outpatient Rx   Medication Sig Dispense Refill     acetic acid 0.25 % irrigation Irrigate with as directed 2 times daily 10 mL 0     ALPRAZolam (XANAX) 0.5 MG tablet TK 1 T PO TID PRN  0     ascorbic acid 500 MG TABS Take 1 tablet (500 mg) by mouth daily 7 tablet 0     ASPIRIN EC PO Take 81 mg by mouth daily       carvedilol (COREG) 6.25 MG tablet Take 1 tablet (6.25 mg) by mouth 2 times daily (with meals) 60 tablet 3     ciprofloxacin (CIPRO) 500 MG tablet Take 1 tablet (500 mg) by mouth 2 times daily 14 tablet 0     clindamycin (CLEOCIN) 300 MG capsule Take 1 capsule (300 mg) by mouth 4 times daily 28 capsule 0     clopidogrel (PLAVIX) 75 MG tablet Take 1 tablet (75 mg) by mouth daily 30 tablet 0     diclofenac (VOLTAREN) 1 % GEL topical gel Place 4 g onto the skin 4 times daily 1 Tube 0     docusate sodium (COLACE) 100 MG capsule Take 1 capsule  (100 mg) by mouth daily 60 capsule 0     enalapril (VASOTEC) 2.5 MG tablet Take 1 tablet (2.5 mg) by mouth 2 times daily 60 tablet 11     escitalopram (LEXAPRO) 20 MG tablet Take 1 tablet (20 mg) by mouth daily 15 tablet 0     ezetimibe (ZETIA) 10 MG tablet Take 1 tablet (10 mg) by mouth daily 30 tablet 0     fentaNYL (DURAGESIC) 25 mcg/hr 72 hr patch Place 1 patch onto the skin every 72 hours remove old patch.       folic acid (FOLVITE) 1 MG tablet Take 1 tablet (1 mg) by mouth daily 30 tablet 0     furosemide (LASIX) 20 MG tablet Take 1 tablet (20 mg) by mouth 2 times daily (with meals) 60 tablet 3     gabapentin (NEURONTIN) 300 MG capsule Take 1 capsule (300 mg) by mouth 2 times daily (Patient not taking: Reported on 6/5/2018) 60 capsule 1     gabapentin (NEURONTIN) 600 MG tablet Take 1 tablet (600 mg) by mouth 3 times daily Increase per clinic directions. 30 tablet 1     gabapentin 8 % GEL topical PLO cream Apply 1 g topically every 8 hours Do NOT apply to open wounds 100 g 1     isosorbide mononitrate (IMDUR) 30 MG 24 hr tablet Take 1 tablet (30 mg) by mouth daily 90 tablet 3     levofloxacin (LEVAQUIN) 500 MG tablet   0     Lidocaine 4 % GEL Externally apply topically 2 times daily 30 g 1     methocarbamol (ROBAXIN) 500 MG tablet   1     mometasone (ELOCON) 0.1 % ointment Apply affected area daily 45 g 3     multivitamin, therapeutic with minerals (THERA-VIT-M) TABS tablet Take 1 tablet by mouth daily 30 each 0     nitroGLYcerin (NITROSTAT) 0.4 MG sublingual tablet SHARON MAY REPEAT Q 5 MINUTES X 2  2     order for DME Equipment being ordered: Walker Wheels () and Walker ()  Treatment Diagnosis: impaired mobility 1 each 0     oxyCODONE IR (ROXICODONE) 5 MG tablet 1 tablet po 6 times/day x 4 day (continued from previous Rx), then 1 po 5 times/day x 4 d, then 1 po 4 times/day x 4 d, then 1 po 3 times/day x 4 d, then 1 po 2 times/day x 4 d, then 1 po 1 time/day x 4 d, then off. 90 tablet 0     polyethylene  glycol (MIRALAX/GLYCOLAX) Packet Take 17 g by mouth daily 7 packet 0     predniSONE (DELTASONE) 10 MG tablet Take 1 tablet (10 mg) by mouth daily 30 tablet 0     rosuvastatin (CRESTOR) 20 MG tablet Take 1 tablet (20 mg) by mouth daily 30 tablet 11     spironolactone (ALDACTONE) 25 MG tablet TK 1 T PO D  4     vitamin A 89853 UNIT capsule Take 2 capsules (20,000 Units) by mouth daily 7 capsule 0     zinc sulfate (ZINCATE) 220 (50 ZN) MG capsule Take 1 capsule (220 mg) by mouth daily 7 capsule 0     Objective:   There were no vitals taken for this visit.     General: Patient is well groomed, appears stated age, is alert and cooperative, and is in no acute distress.  Skin: No erythema, mild edema bilaterally, no calor, odor, purulent drainage.      Wound #1  An arterial ulcer is noted at right  anterior shin measuring 13 cm x 5 cm x 0.2 cm. Mildly tender to palpation.  Tissue Depth: Tendon  Wound base: Red, Yellow, White/Granulation (more granulation tissue present today in between ruptured tendon), Tendon  Edges: intact, scab  Drainage: light/serous  Odor: No   Undermining: No  Tunneling: No  Bone Exposure: No  Clinical Signs of Infection: No      Wound #2  An arterial ulcer is noted at left anterior shin measuring 3 cm x 3.5 cm x 0.2 cm. Mildly tender to palpation.  Tissue Depth: subcutaneous  Wound base: Pink, Yellow/Granulation, Slough  Edges: intact  Drainage: small/serous  Odor: No   Undermining: No  Tunneling: No  Bone Exposure: No  Clinical Signs of Infection: No     Assessment:   - Chronic bilateral arterial shin ulcers  - Peripheral arterial disease  - Nicotine dependence     Plan:   - Devitalized tissue of right shin ulcer was excisionally debrided using a #15 blade to level of tendon. Healthy bleeding occurred with debridement. No anesthesia necessary today. Try lidocaine next visit.  - Wound Care instructions: Continue with daily saline wet to dry dressings if this is the only therapy you can tolerate.   -  Okay to use steroid cream sparingly to areas of inflammation at edges of distal right shin wound.  - Because there are not very many surgical or dressing options, we can try Apligraf applications in clinic and serial debridement of the exposed tendon. Faxed paperwork for Apligraf approval.   - Continue to decrease smoking   - RTC every 2 weeks for wound assessment, sooner if concerns    Again, thank you for allowing me to participate in the care of your patient.      Sincerely,    Tamiko Mcduffie PA-C

## 2018-06-14 NOTE — PLAN OF CARE
Problem: Patient Care Overview  Goal: Plan of Care/Patient Progress Review  Outcome: No Change  Pt is A/Ox4. Pt is having pain and received IV dilaudid this shift and was getting relief.  Pt did get new order for oral pain medications, pt had previously been taking oxycodone orally at home with not much relief pt has new orders for oral dilaudid. Writer did do wound care to right LE this shift. Pt is allergic to Betadine, writer cleansed with V[place] and applied vaseline to help wound stay moist.VSS. Continue to monitor per plan of care.        n/a

## 2018-06-14 NOTE — PATIENT INSTRUCTIONS
Preventive Care:    Colorectal Cancer Screening: During our visit today, we discussed that it is recommended you receive colorectal cancer screening. Please call or make an appointment with your primary care provider to discuss this. You may also call the [facility] scheduling line ([phone]) to set up a colonoscopy appointment.    Follow up with Dr. Wade of vascular surgery in 3  months with RAVI and ultrasound prior    With questions, concerns, or to request an appointment, please call either:    Gretel Acosta, Care Coordinator RN, Vascular Surgery  [phone]    [facility] [place]  [phone]    To contact someone after 5 pm, on a weekend, or on a Holiday, please call:  [facility]  [phone], option 4 to have a member of the Vascular Surgery Service paged.

## 2018-06-14 NOTE — LETTER
6/14/2018       RE: Boom Kahn  [address] [place] [address]     Dear Colleague,    Thank you for referring your patient, Boom Kahn, to the [facility] VASCULAR CLINIC at [facility]. Please see a copy of my visit note below.    VASCULAR SURGERY CLINIC ESTABLISHED PATIENT NOTE    HPI:    Boom Kahn is a 68 year old male with history of HTN, anxiety/depression, alcohol abuse, tobacco abuse, ICM/MI, CAD status post 3 X CABG, PCI, HF- EF25-30%, status post ICD, right lung cancer, status post radiation therapy and PAD. He underwent right femoral endarterectomy with in-situ GSV femoral to posterior tibial artery bypass on 1/15/2018 to heal a right anterior shin wound.  He has been following up with ortho for wound care.  He returns to clinic for follow up visit.       SUBJECTIVE:  Patient reports severe right leg pain, upset with narcotic taper from primary care MD.  Reports improvement in right shin wound  Continues to smoke and drink alcohol in excess.      OBJECTIVE:  Vital signs:  106/68  55    143 lbs 0 oz  Body surface area is 1.76 meters squared.    Prior to Admission medications    Medication Sig Start Date End Date Taking? Authorizing Provider   acetic acid 0.25 % irrigation Irrigate with as directed 2 times daily 2/10/18  Yes Modesta Ruiz APRN CNP   ALPRAZolam (XANAX) 0.5 MG tablet TK 1 T PO TID PRN 5/19/18  Yes Reported, Patient   ascorbic acid 500 MG TABS Take 1 tablet (500 mg) by mouth daily 2/1/18  Yes Mariluz Pagan MD   ASPIRIN EC PO Take 81 mg by mouth daily   Yes Reported, Patient   carvedilol (COREG) 6.25 MG tablet Take 1 tablet (6.25 mg) by mouth 2 times daily (with meals) 2/22/18  Yes Jimena Rodriguez MD   ciprofloxacin (CIPRO) 500 MG tablet Take 1 tablet (500 mg) by mouth 2 times daily 3/22/18  Yes Dennise Arita APRN CNP   clindamycin (CLEOCIN) 300 MG capsule Take 1 capsule (300 mg) by mouth 4 times daily 3/22/18   Yes Dennise Arita APRN CNP   clopidogrel (PLAVIX) 75 MG tablet Take 1 tablet (75 mg) by mouth daily 2/1/18  Yes Mariluz Pagan MD   diclofenac (VOLTAREN) 1 % GEL topical gel Place 4 g onto the skin 4 times daily 2/10/18  Yes Modesta Ruiz APRN CNP   docusate sodium (COLACE) 100 MG capsule Take 1 capsule (100 mg) by mouth daily 2/1/18  Yes Mariluz Pagan MD   enalapril (VASOTEC) 2.5 MG tablet Take 1 tablet (2.5 mg) by mouth 2 times daily 2/22/18  Yes Jimena Rodriguez MD   escitalopram (LEXAPRO) 20 MG tablet Take 1 tablet (20 mg) by mouth daily 2/1/18  Yes Mariluz Pagan MD   ezetimibe (ZETIA) 10 MG tablet Take 1 tablet (10 mg) by mouth daily 1/31/18  Yes Mariluz Pagan MD   fentaNYL (DURAGESIC) 25 mcg/hr 72 hr patch Place 1 patch onto the skin every 72 hours remove old patch.   Yes Reported, Patient   folic acid (FOLVITE) 1 MG tablet Take 1 tablet (1 mg) by mouth daily 2/1/18  Yes Mariluz Pagan MD   furosemide (LASIX) 20 MG tablet Take 1 tablet (20 mg) by mouth 2 times daily (with meals) 2/22/18  Yes Jimena Rodriguez MD   gabapentin (NEURONTIN) 600 MG tablet Take 1 tablet (600 mg) by mouth 3 times daily Increase per clinic directions. 5/21/18  Yes Cari Childress APRN CNP   gabapentin 8 % GEL topical PLO cream Apply 1 g topically every 8 hours Do NOT apply to open wounds 6/2/18  Yes Cari Childress APRN CNP   isosorbide mononitrate (IMDUR) 30 MG 24 hr tablet Take 1 tablet (30 mg) by mouth daily 2/22/18  Yes Jimena Rodriguez MD   levofloxacin (LEVAQUIN) 500 MG tablet  6/16/17  Yes Reported, Patient   Lidocaine 4 % GEL Externally apply topically 2 times daily 3/22/18  Yes Dennise Arita APRN CNP   methocarbamol (ROBAXIN) 500 MG tablet  11/16/17  Yes Reported, Patient   mometasone (ELOCON) 0.1 % ointment Apply affected area daily 6/8/18  Yes Ray Russo MD   multivitamin, therapeutic with minerals (THERA-VIT-M) TABS tablet Take  1 tablet by mouth daily 2/1/18  Yes Mariluz Pagan MD   nitroGLYcerin (NITROSTAT) 0.4 MG sublingual tablet SHARON MAY REPEAT Q 5 MINUTES X 2 5/24/18  Yes Reported, Patient   order for DME Equipment being ordered: Walker Wheels () and Walker ()  Treatment Diagnosis: impaired mobility 2/10/18  Yes Modesta Ruiz APRN CNP   oxyCODONE IR (ROXICODONE) 5 MG tablet 1 tablet po 6 times/day x 4 day (continued from previous Rx), then 1 po 5 times/day x 4 d, then 1 po 4 times/day x 4 d, then 1 po 3 times/day x 4 d, then 1 po 2 times/day x 4 d, then 1 po 1 time/day x 4 d, then off. 6/14/18  Yes Patricia Marques MD PhD   polyethylene glycol (MIRALAX/GLYCOLAX) Packet Take 17 g by mouth daily 2/1/18  Yes Mariluz Pagan MD   predniSONE (DELTASONE) 10 MG tablet Take 1 tablet (10 mg) by mouth daily 2/1/18  Yes Mariluz Pagan MD   rosuvastatin (CRESTOR) 20 MG tablet Take 1 tablet (20 mg) by mouth daily 2/22/18  Yes Jimena Rodriguez MD   spironolactone (ALDACTONE) 25 MG tablet TK 1 T PO D 5/8/18  Yes Reported, Patient   vitamin A 16401 UNIT capsule Take 2 capsules (20,000 Units) by mouth daily 2/1/18  Yes Mariluz Pagan MD   zinc sulfate (ZINCATE) 220 (50 ZN) MG capsule Take 1 capsule (220 mg) by mouth daily 2/1/18  Yes Mariluz Pagan MD   gabapentin (NEURONTIN) 300 MG capsule Take 1 capsule (300 mg) by mouth 2 times daily  Patient not taking: Reported on 6/5/2018 5/9/18   Patience Almonte MD       PHYSICAL EXAM:  NEURO/PSYCH: The patient is alert and oriented.  Appropriate.  Moves all extremities.   SKIN: warm and dry  PULMONARY: non-labored breathing, not requiring supplemental oxygen   EXTREMITIES: bilateral shin dressings D/I    ULTRASOUND:  Reviewed by Dr. Ag, right leg graft patent.     RAVI:  Right leg 0.95  Left leg 0.65    ASSESSMENT:  68 year old male with history of HTN, anxiety/depression, alcohol abuse, tobacco abuse, ICM/MI, CAD status post 3 X CABG, PCI, HF-  EF25-30%, status post ICD, right lung cancer, status post radiation therapy and PAD. He underwent right femoral endarterectomy with in-situ GSV femoral to posterior tibial artery bypass on 1/15/2018 to heal a right anterior shin wound.         Patient Active Problem List   Diagnosis     Ischemic cardiomyopathy     CHF (congestive heart failure) (H)     PVD (peripheral vascular disease) (H)     COPD (chronic obstructive pulmonary disease) (H)     Anxiety     Atherosclerosis of native arteries of extremities with intermittent claudication, bilateral legs (H)     Automatic implantable cardioverter-defibrillator in situ     Depression     Cardiomyopathy (H)     Hyperlipidemia     HTN (hypertension)     Lung mass     Dyspnea on exertion     Malignant neoplasm of lung (H)     MI (myocardial infarction)     Critical lower limb ischemia     Atherosclerotic PVD with ulceration (H)     S/P vascular surgery     Back pain     Nonhealing nonsurgical wound         PLAN:  - smoking cessation strongly encouraged  - continue wound care per Tamiko Mcduffie, plans for possible Apligraf applications  - patient saw Dr. Haynes who expressed that the patient was not eligible for surgical intervention  - narcotic management per primary team  - follow up with Dr. Wade of vascular surgery in 3 months with RAVI and ultrasound prior         Shannan COLMENARES, CNS  Division of Vascular Surgery  [facility]  Pager [phone]          I have seen and assessed this patient with the above provider and agree with her above documentation, impression and plan.  Patient with right leg femoral tibial bypass for limb salvage whose postoperative course has been complicated by alcohol and narcotic dependence, homelessness, and other issues which are difficult for us to help control.  Most recently was seen by plastic surgery who refused him for skin graft stating he has poor nutritional status and ongoing dependence.  His plan for Apligraf  therapy instead.  We did not look at his wound today but he tells us that things are looking much better on the limb.  He is having a tough time dealing with his narcotic dependence: His physician in [place] is no longer sending him narcotics.  His physicians here are refusing to give him any sedating medical legal risk.  He feels that he may have to get some illicit means.  That said he tells me he has not, and is coping with the pain.  He is very appreciative of our care to this point.  He has good Doppler signals in the foot, and remarkably his bypass is clearly still patent.  We will see him back in 3 months time in our NP or Dr Wade's clinic with repeat ABIs and a duplex of the graft as I will no longer have a clinic at the [facility].    I spent>50% of this 15 min visit in counseling.      Again, thank you for allowing me to participate in the care of your patient.      Sincerely,    Lexis Ag MD

## 2018-06-14 NOTE — PROGRESS NOTES
Chief Complaint: Arterial ulcers    Subjective: Boom is a 68 year old male who presents to the clinic today for follow up of bilateral arterial wounds. Dressing with wet to dry saline dressings. Using mometasone to distal edges of R shin wound, this helps to decrease pain temporarily. PCP is weaning patient off chronic pain medications.      ROS: Denies increased redness, swelling, purulent drainage, warmth, increased wound pain, n/v, confusion, fevers.    Allergies   Allergen Reactions     Betadine [Povidone Iodine] Blisters     Pt reports erythema, increased pain, and blistering when using betadine on R big toe in past     Collagenase Clostridium Histolyticum      Flagyl [Metronidazole] Other (See Comments)     Flu symptoms  Flu like symptoms     Iodine Unknown     Santyl [Collagenase]      Current Outpatient Rx   Medication Sig Dispense Refill     acetic acid 0.25 % irrigation Irrigate with as directed 2 times daily 10 mL 0     ALPRAZolam (XANAX) 0.5 MG tablet TK 1 T PO TID PRN  0     ascorbic acid 500 MG TABS Take 1 tablet (500 mg) by mouth daily 7 tablet 0     ASPIRIN EC PO Take 81 mg by mouth daily       carvedilol (COREG) 6.25 MG tablet Take 1 tablet (6.25 mg) by mouth 2 times daily (with meals) 60 tablet 3     ciprofloxacin (CIPRO) 500 MG tablet Take 1 tablet (500 mg) by mouth 2 times daily 14 tablet 0     clindamycin (CLEOCIN) 300 MG capsule Take 1 capsule (300 mg) by mouth 4 times daily 28 capsule 0     clopidogrel (PLAVIX) 75 MG tablet Take 1 tablet (75 mg) by mouth daily 30 tablet 0     diclofenac (VOLTAREN) 1 % GEL topical gel Place 4 g onto the skin 4 times daily 1 Tube 0     docusate sodium (COLACE) 100 MG capsule Take 1 capsule (100 mg) by mouth daily 60 capsule 0     enalapril (VASOTEC) 2.5 MG tablet Take 1 tablet (2.5 mg) by mouth 2 times daily 60 tablet 11     escitalopram (LEXAPRO) 20 MG tablet Take 1 tablet (20 mg) by mouth daily 15 tablet 0     ezetimibe (ZETIA) 10 MG tablet Take 1 tablet (10  mg) by mouth daily 30 tablet 0     fentaNYL (DURAGESIC) 25 mcg/hr 72 hr patch Place 1 patch onto the skin every 72 hours remove old patch.       folic acid (FOLVITE) 1 MG tablet Take 1 tablet (1 mg) by mouth daily 30 tablet 0     furosemide (LASIX) 20 MG tablet Take 1 tablet (20 mg) by mouth 2 times daily (with meals) 60 tablet 3     gabapentin (NEURONTIN) 300 MG capsule Take 1 capsule (300 mg) by mouth 2 times daily (Patient not taking: Reported on 6/5/2018) 60 capsule 1     gabapentin (NEURONTIN) 600 MG tablet Take 1 tablet (600 mg) by mouth 3 times daily Increase per clinic directions. 30 tablet 1     gabapentin 8 % GEL topical PLO cream Apply 1 g topically every 8 hours Do NOT apply to open wounds 100 g 1     isosorbide mononitrate (IMDUR) 30 MG 24 hr tablet Take 1 tablet (30 mg) by mouth daily 90 tablet 3     levofloxacin (LEVAQUIN) 500 MG tablet   0     Lidocaine 4 % GEL Externally apply topically 2 times daily 30 g 1     methocarbamol (ROBAXIN) 500 MG tablet   1     mometasone (ELOCON) 0.1 % ointment Apply affected area daily 45 g 3     multivitamin, therapeutic with minerals (THERA-VIT-M) TABS tablet Take 1 tablet by mouth daily 30 each 0     nitroGLYcerin (NITROSTAT) 0.4 MG sublingual tablet SHARON MAY REPEAT Q 5 MINUTES X 2  2     order for DME Equipment being ordered: Walker Wheels () and Walker ()  Treatment Diagnosis: impaired mobility 1 each 0     oxyCODONE IR (ROXICODONE) 5 MG tablet 1 tablet po 6 times/day x 4 day (continued from previous Rx), then 1 po 5 times/day x 4 d, then 1 po 4 times/day x 4 d, then 1 po 3 times/day x 4 d, then 1 po 2 times/day x 4 d, then 1 po 1 time/day x 4 d, then off. 90 tablet 0     polyethylene glycol (MIRALAX/GLYCOLAX) Packet Take 17 g by mouth daily 7 packet 0     predniSONE (DELTASONE) 10 MG tablet Take 1 tablet (10 mg) by mouth daily 30 tablet 0     rosuvastatin (CRESTOR) 20 MG tablet Take 1 tablet (20 mg) by mouth daily 30 tablet 11     spironolactone  (ALDACTONE) 25 MG tablet TK 1 T PO D  4     vitamin A 60414 UNIT capsule Take 2 capsules (20,000 Units) by mouth daily 7 capsule 0     zinc sulfate (ZINCATE) 220 (50 ZN) MG capsule Take 1 capsule (220 mg) by mouth daily 7 capsule 0     Objective:   There were no vitals taken for this visit.     General: Patient is well groomed, appears stated age, is alert and cooperative, and is in no acute distress.  Skin: No erythema, mild edema bilaterally, no calor, odor, purulent drainage.      Wound #1  An arterial ulcer is noted at right  anterior shin measuring 13 cm x 5 cm x 0.2 cm. Mildly tender to palpation.  Tissue Depth: Tendon  Wound base: Red, Yellow, White/Granulation (more granulation tissue present today in between ruptured tendon), Tendon  Edges: intact, scab  Drainage: light/serous  Odor: No   Undermining: No  Tunneling: No  Bone Exposure: No  Clinical Signs of Infection: No      Wound #2  An arterial ulcer is noted at left anterior shin measuring 3 cm x 3.5 cm x 0.2 cm. Mildly tender to palpation.  Tissue Depth: subcutaneous  Wound base: Pink, Yellow/Granulation, Slough  Edges: intact  Drainage: small/serous  Odor: No   Undermining: No  Tunneling: No  Bone Exposure: No  Clinical Signs of Infection: No     Assessment:   - Chronic bilateral arterial shin ulcers  - Peripheral arterial disease  - Nicotine dependence     Plan:   - Devitalized tissue of right shin ulcer was excisionally debrided using a #15 blade to level of tendon. Healthy bleeding occurred with debridement. No anesthesia necessary today. Try lidocaine next visit.  - Wound Care instructions: Continue with daily saline wet to dry dressings if this is the only therapy you can tolerate.   - Okay to use steroid cream sparingly to areas of inflammation at edges of distal right shin wound.  - Because there are not very many surgical or dressing options, we can try Apligraf applications in clinic and serial debridement of the exposed tendon. Faxed  paperwork for Apligraf approval.   - Continue to decrease smoking   - RTC every 2 weeks for wound assessment, sooner if concerns

## 2018-06-14 NOTE — MR AVS SNAPSHOT
After Visit Summary   6/14/2018    Boom Kahn    MRN: [id number]           Patient Information     Date Of Birth          1950        Visit Information        Provider Department      6/14/2018 1:00 PM Tamiko Mcduffie PA-C [facility]        Today's Diagnoses     Arterial leg ulcer (H)    -  1       Follow-ups after your visit        Your next 10 appointments already scheduled     Jun 14, 2018  2:30 PM CDT   US RAVI DOPPLER NO EXERCISE 1-2 LVLS BILAT with [id number]   [facility] ([facility])    [address] [place] [address]  [address] [facility] [id number] [phone]           Please bring a list of your medicines (including vitamins, minerals and over-the-counter drugs). Also, tell your doctor about any allergies you may have. Wear comfortable clothes and leave your valuables at home.  No caffeine or tobacco for 1 hour prior to exam.  Please call the Imaging Department at your exam site with any questions.            Jun 14, 2018  3:00 PM CDT   (Arrive by 2:45 PM)   Return Vascular Visit with Lexis Ag MD   [facility] ([facility])    [address] [place] [address]  [address] [facility] [id number] [phone]            Jun 28, 2018  2:30 PM CDT   (Arrive by 2:15 PM)   Return Visit with Tamiko Mcduffie PA-C   [facility] ([facility])    [address] [place] [address]  [address] [facility] [id number] [phone]            Aug 31, 2018 11:00 AM CDT   CT CHEST W CONTRAST with UUCT1   [facility] [place] [place] ([facility])    [address] [facility] [id number] [phone]           Please bring any scans or X-rays taken at other hospitals, if similar tests were done. Also bring a list of your medicines, including vitamins, minerals and over-the-counter drugs. It is safest to leave personal items at home.  Be sure to tell your doctor:    If you have any allergies.   If there s any chance you are pregnant.   If you are breastfeeding.    If you have diabetes as your medication may need to be adjusted for this exam.  You will have contrast for this exam. To prepare:   Do not eat or drink for 2 hours before your exam. If you need to take medicine, you may take it with small sips of water. (We may ask you to take liquid medicine as well.)   The day before your exam, drink extra fluids at least six 8-ounce glasses (unless your doctor tells you to restrict your fluids).  Patients over 70 or patients with diabetes or kidney problems:   If you haven t had a blood test (creatinine test) within the last 30 days, the Cardiologist/Radiologist may require you to get this test prior to your exam.  Please wear loose clothing, such as a sweat suit or jogging clothes. Avoid snaps, zippers and other metal. We may ask you to undress and put on a hospital gown.  If you have any questions, please call the Imaging Department where you will have your exam.            Aug 31, 2018  2:00 PM CDT   (Arrive by 1:45 PM)   Return Visit with Dave Del Rio MD   [facility] ([facility])    [address]  [address]  [facility] [address] [phone]              Who to contact     Please call your clinic at [phone] to:    Ask questions about your health    Make or cancel appointments    Discuss your medicines    Learn about your test results    Speak to your doctor            Additional Information About Your Visit        [employer] Information     [employer] gives you secure access to your electronic health record. If you see a primary care provider, you can also send messages to your care team and make appointments. If you have questions, please call your primary care clinic.  If you do not have a primary care provider, please call [phone] and they will assist you.      [employer] is an electronic gateway that provides  easy, online access to your medical records. With [employer], you can request a clinic appointment, read your test results, renew a prescription or communicate with your care team.     To access your existing account, please contact your [facility] or call [phone] for assistance.        Care EveryWhere ID     This is your Care EveryWhere ID. This could be used by other organizations to access your [place] medical records  [id number]         Blood Pressure from Last 3 Encounters:   06/05/18 114/71   05/25/18 (!) 76/55   05/21/18 (!) 85/60    Weight from Last 3 Encounters:   06/05/18 143 lb 14.4 oz   05/25/18 146 lb   04/18/18 154 lb              We Performed the Following     DEBRIDEMENT WOUND > 20 SQ CM        Primary Care Provider Office Phone # Fax #    Willian Arrington [phone]       [facility] [address]  [place] [address]        Equal Access to Services     CHANEL DORADO : Hadii aad ku hadasho Soomaali, waaxda luqadaha, qaybta kaalmada adeegyada, waxay idiin hayaan adeeg kharajovany castillo . So [facility] [phone].    ATENCIÓN: Si habla español, tiene a bhatia disposición servicios gratuitos de asistencia lingüística. Llame al [phone].    We comply with applicable federal civil rights laws and [place] laws. We do not discriminate on the basis of race, color, national origin, age, disability, sex, sexual orientation, or gender identity.            Thank you!     Thank you for choosing [facility]  for your care. Our goal is always to provide you with excellent care. Hearing back from our patients is one way we can continue to improve our services. Please take a few minutes to complete the written survey that you may receive in the mail after your visit with us. Thank you!             Your Updated Medication List - Protect others around you: Learn how to safely use, store and throw away your medicines at www.disposemymeds.org.           This list is accurate as of 6/14/18  2:27 PM.  Always use your most recent med list.                   Brand Name Dispense Instructions for use Diagnosis    acetic acid 0.25 % irrigation     10 mL    Irrigate with as directed 2 times daily    S/P vascular surgery       ALPRAZolam 0.5 MG tablet    XANAX     TK 1 T PO TID PRN        ascorbic acid 500 MG Tabs     7 tablet    Take 1 tablet (500 mg) by mouth daily    Takes dietary supplements       ASPIRIN EC PO      Take 81 mg by mouth daily        carvedilol 6.25 MG tablet    COREG    60 tablet    Take 1 tablet (6.25 mg) by mouth 2 times daily (with meals)    Ischemic cardiomyopathy       ciprofloxacin 500 MG tablet    CIPRO    14 tablet    Take 1 tablet (500 mg) by mouth 2 times daily    Cellulitis and abscess of leg       clindamycin 300 MG capsule    CLEOCIN    28 capsule    Take 1 capsule (300 mg) by mouth 4 times daily    Cellulitis and abscess of leg       clopidogrel 75 MG tablet    PLAVIX    30 tablet    Take 1 tablet (75 mg) by mouth daily    Ischemic cardiomyopathy       diclofenac 1 % Gel topical gel    VOLTAREN    1 Tube    Place 4 g onto the skin 4 times daily    Pain of left lower extremity       docusate sodium 100 MG capsule    COLACE    60 capsule    Take 1 capsule (100 mg) by mouth daily    Takes dietary supplements       enalapril 2.5 MG tablet    VASOTEC    60 tablet    Take 1 tablet (2.5 mg) by mouth 2 times daily    Ischemic cardiomyopathy       escitalopram 20 MG tablet    LEXAPRO    15 tablet    Take 1 tablet (20 mg) by mouth daily    S/P vascular surgery       ezetimibe 10 MG tablet    ZETIA    30 tablet    Take 1 tablet (10 mg) by mouth daily    Ischemic cardiomyopathy       fentaNYL 25 mcg/hr 72 hr patch    DURAGESIC     Place 1 patch onto the skin every 72 hours remove old patch.        folic acid 1 MG tablet    FOLVITE    30 tablet    Take 1 tablet (1 mg) by mouth daily    Takes dietary supplements       furosemide 20 MG tablet    LASIX     60 tablet    Take 1 tablet (20 mg) by mouth 2 times daily (with meals)    S/P vascular surgery       * gabapentin 300 MG capsule    NEURONTIN    60 capsule    Take 1 capsule (300 mg) by mouth 2 times daily    S/P vascular surgery       * gabapentin 600 MG tablet    NEURONTIN    30 tablet    Take 1 tablet (600 mg) by mouth 3 times daily Increase per clinic directions.        gabapentin 8 % Gel topical PLO cream     100 g    Apply 1 g topically every 8 hours Do NOT apply to open wounds    Peripheral arterial disease (H), Non-healing wound of lower extremity, right, initial encounter       isosorbide mononitrate 30 MG 24 hr tablet    IMDUR    90 tablet    Take 1 tablet (30 mg) by mouth daily    Chronic systolic congestive heart failure (H)       levofloxacin 500 MG tablet    LEVAQUIN          Lidocaine 4 % Gel     30 g    Externally apply topically 2 times daily    Cellulitis and abscess of leg       methocarbamol 500 MG tablet    ROBAXIN          mometasone 0.1 % ointment    ELOCON    45 g    Apply affected area daily    Skin inflammation       multivitamin, therapeutic with minerals Tabs tablet     30 each    Take 1 tablet by mouth daily    Takes dietary supplements       nitroGLYcerin 0.4 MG sublingual tablet    NITROSTAT     SHARON MAY REPEAT Q 5 MINUTES X 2        order for DME     1 each    Equipment being ordered: Walker Wheels () and Walker () Treatment Diagnosis: impaired mobility    Pain of left lower extremity       oxyCODONE IR 5 MG tablet    ROXICODONE    90 tablet    1 tablet po 6 times/day x 4 day (continued from previous Rx), then 1 po 5 times/day x 4 d, then 1 po 4 times/day x 4 d, then 1 po 3 times/day x 4 d, then 1 po 2 times/day x 4 d, then 1 po 1 time/day x 4 d, then off.    PVD (peripheral vascular disease) (H)       polyethylene glycol Packet    MIRALAX/GLYCOLAX    7 packet    Take 17 g by mouth daily    Takes dietary supplements       predniSONE 10 MG tablet    DELTASONE    30 tablet     Take 1 tablet (10 mg) by mouth daily    S/P vascular surgery       rosuvastatin 20 MG tablet    CRESTOR    30 tablet    Take 1 tablet (20 mg) by mouth daily    Ischemic cardiomyopathy       spironolactone 25 MG tablet    ALDACTONE     TK 1 T PO D        vitamin A 51900 UNIT capsule     7 capsule    Take 2 capsules (20,000 Units) by mouth daily    Takes dietary supplements       zinc sulfate 220 (50 Zn) MG capsule    ZINCATE    7 capsule    Take 1 capsule (220 mg) by mouth daily    Takes dietary supplements       * Notice:  This list has 2 medication(s) that are the same as other medications prescribed for you. Read the directions carefully, and ask your doctor or other care provider to review them with you.

## 2018-06-23 NOTE — PROGRESS NOTES
I have seen and assessed this patient with the above provider and agree with her above documentation, impression and plan.  Patient with right leg femoral tibial bypass for limb salvage whose postoperative course has been complicated by alcohol and narcotic dependence, homelessness, and other issues which are difficult for us to help control.  Most recently was seen by plastic surgery who refused him for skin graft stating he has poor nutritional status and ongoing dependence.  His plan for Apligraf therapy instead.  We did not look at his wound today but he tells us that things are looking much better on the limb.  He is having a tough time dealing with his narcotic dependence: His physician in [place] is no longer sending him narcotics.  His physicians here are refusing to give him any sedating medical legal risk.  He feels that he may have to get some illicit means.  That said he tells me he has not, and is coping with the pain.  He is very appreciative of our care to this point.  He has good Doppler signals in the foot, and remarkably his bypass is clearly still patent.  We will see him back in 3 months time in our NP or Dr Wade's clinic with repeat ABIs and a duplex of the graft as I will no longer have a clinic at the [facility].    I spent>50% of this 15 min visit in counseling.

## 2018-06-26 ENCOUNTER — MEDICAL CORRESPONDENCE (OUTPATIENT)
Dept: HEALTH INFORMATION MANAGEMENT | Facility: CLINIC | Age: 68
End: 2018-06-26

## 2018-06-27 ENCOUNTER — TELEPHONE (OUTPATIENT)
Dept: WOUND CARE | Facility: CLINIC | Age: 68
End: 2018-06-27

## 2018-06-27 NOTE — TELEPHONE ENCOUNTER
"Spoke with Qi and reiterated Nick's instructions ;Continue with daily saline wet to dry dressings if this is the only therapy you can tolerate. \"Continue with daily saline wet to dry dressings if this is the only therapy you can tolerate. \" Sara Denny      [facility] Call Center    Phone Message    May a detailed message be left on voicemail: yes    Reason for Call: Other: his homecare nurse needs to know the complete instructions on how to take care if pt's wound.  please call her back     Action Taken: Message routed to:  [facility] ([facility] wound  "

## 2018-06-28 ENCOUNTER — OFFICE VISIT (OUTPATIENT)
Dept: WOUND CARE | Facility: CLINIC | Age: 68
End: 2018-06-28
Payer: COMMERCIAL

## 2018-06-28 DIAGNOSIS — L97.909 ARTERIAL LEG ULCER (H): ICD-10-CM

## 2018-06-28 DIAGNOSIS — L30.9 DERMATITIS: Primary | ICD-10-CM

## 2018-06-28 RX ORDER — NYSTATIN AND TRIAMCINOLONE ACETONIDE 100000; 1 [USP'U]/G; MG/G
OINTMENT TOPICAL 2 TIMES DAILY
Qty: 60 G | Refills: 1 | Status: SHIPPED | OUTPATIENT
Start: 2018-06-28

## 2018-06-28 NOTE — MR AVS SNAPSHOT
After Visit Summary   6/28/2018    Boom Kahn    MRN: [id number]           Patient Information     Date Of Birth          1950        Visit Information        Provider Department      6/28/2018 2:30 PM Tamiko Mcduffie PA-C M [facility]        Today's Diagnoses     Dermatitis    -  1    Arterial leg ulcer (H)           Follow-ups after your visit        Your next 10 appointments already scheduled     Jul 12, 2018  1:00 PM CDT   (Arrive by 12:45 PM)   Return Visit with SHAYY Sinclair [facility] ([facility])    [address]  4th Floor  [place] [address]   [phone]            Aug 31, 2018 11:00 AM CDT   CT CHEST W CONTRAST with UUCT1   [facility] [place] [place] ([facility])    [address] [id number] [phone]           Please bring any scans or X-rays taken at other hospitals, if similar tests were done. Also bring a list of your medicines, including vitamins, minerals and over-the-counter drugs. It is safest to leave personal items at home.  Be sure to tell your doctor:   If you have any allergies.   If there s any chance you are pregnant.   If you are breastfeeding.    If you have diabetes as your medication may need to be adjusted for this exam.  You will have contrast for this exam. To prepare:   Do not eat or drink for 2 hours before your exam. If you need to take medicine, you may take it with small sips of water. (We may ask you to take liquid medicine as well.)   The day before your exam, drink extra fluids at least six 8-ounce glasses (unless your doctor tells you to restrict your fluids).  Patients over 70 or patients with diabetes or kidney problems:   If you haven t had a blood test (creatinine test) within the last 30 days, the Cardiologist/Radiologist may require you to get this test prior to your exam.  Please wear loose clothing, such as a sweat suit  or jogging clothes. Avoid snaps, zippers and other metal. We may ask you to undress and put on a hospital gown.  If you have any questions, please call the Imaging Department where you will have your exam.            Aug 31, 2018  2:00 PM CDT   (Arrive by 1:45 PM)   Return Visit with Dave Del Rio MD   [facility] ([facility])    [address]  [address]  [place] [address] [phone]              Who to contact     Please call your clinic at [phone] to:    Ask questions about your health    Make or cancel appointments    Discuss your medicines    Learn about your test results    Speak to your doctor            Additional Information About Your Visit        [employer] Information     [employer] gives you secure access to your electronic health record. If you see a primary care provider, you can also send messages to your care team and make appointments. If you have questions, please call your primary care clinic.  If you do not have a primary care provider, please call [phone] and they will assist you.      [employer] is an electronic gateway that provides easy, online access to your medical records. With [employer], you can request a clinic appointment, read your test results, renew a prescription or communicate with your care team.     To access your existing account, please contact your [facility] or call [phone] for assistance.        Care EveryWhere ID     This is your Care EveryWhere ID. This could be used by other organizations to access your [facility] medical records  [id number]         Blood Pressure from Last 3 Encounters:   06/14/18 106/68   06/05/18 114/71   05/25/18 (!) 76/55    Weight from Last 3 Encounters:   06/14/18 64.9 kg (143 lb)   06/05/18 65.3 kg (143 lb 14.4 oz)   05/25/18 66.2 kg (146 lb)              We Performed the Following     DEBRIDEMENT WOUND UP TO 20 SQ CM          Today's Medication  Changes          These changes are accurate as of 6/28/18 11:59 PM.  If you have any questions, ask your nurse or doctor.               Start taking these medicines.        Dose/Directions    nystatin-triamcinolone ointment   Commonly known as:  MYCOLOG   Used for:  Dermatitis   Started by:  Tamiko Mcduffie PA-C        Apply topically 2 times daily   Quantity:  60 g   Refills:  1            Where to get your medicines      These medications were sent to [facility] [address] [address]  AT [place] [address]  [address] , [facility] [address]     Phone:  [phone]     nystatin-triamcinolone ointment                Primary Care Provider Office Phone # Fax #    Willian Arrington [phone]       [facility] [address]  [facility] [address]        Equal Access to Services     [facility]: Hadii vishnu ku hadasho Soomaali, waaxda luqadaha, qaybta kaalmada adeegyada, vijay zavala hayaan winston castillo . [facility] [phone].    ATENCIÓN: Si habla español, tiene a bhatia disposición servicios gratuitos de asistencia lingüística. [facility] [phone].    We comply with applicable federal civil rights laws and [place] laws. We do not discriminate on the basis of race, color, national origin, age, disability, sex, sexual orientation, or gender identity.            Thank you!     Thank you for choosing [facility]  for your care. Our goal is always to provide you with excellent care. Hearing back from our patients is one way we can continue to improve our services. Please take a few minutes to complete the written survey that you may receive in the mail after your visit with us. Thank you!             Your Updated Medication List - Protect others around you: Learn how to safely use, store and throw away your medicines at www.disposemymeds.org.          This list is accurate as of 6/28/18 11:59 PM.  Always use your most recent  med list.                   Brand Name Dispense Instructions for use Diagnosis    acetic acid 0.25 % irrigation     10 mL    Irrigate with as directed 2 times daily    S/P vascular surgery       ALPRAZolam 0.5 MG tablet    XANAX     TK 1 T PO TID PRN        ascorbic acid 500 MG Tabs     7 tablet    Take 1 tablet (500 mg) by mouth daily    Takes dietary supplements       ASPIRIN EC PO      Take 81 mg by mouth daily        carvedilol 6.25 MG tablet    COREG    60 tablet    Take 1 tablet (6.25 mg) by mouth 2 times daily (with meals)    Ischemic cardiomyopathy       ciprofloxacin 500 MG tablet    CIPRO    14 tablet    Take 1 tablet (500 mg) by mouth 2 times daily    Cellulitis and abscess of leg       clindamycin 300 MG capsule    CLEOCIN    28 capsule    Take 1 capsule (300 mg) by mouth 4 times daily    Cellulitis and abscess of leg       clopidogrel 75 MG tablet    PLAVIX    30 tablet    Take 1 tablet (75 mg) by mouth daily    Ischemic cardiomyopathy       diclofenac 1 % Gel topical gel    VOLTAREN    1 Tube    Place 4 g onto the skin 4 times daily    Pain of left lower extremity       docusate sodium 100 MG capsule    COLACE    60 capsule    Take 1 capsule (100 mg) by mouth daily    Takes dietary supplements       enalapril 2.5 MG tablet    VASOTEC    60 tablet    Take 1 tablet (2.5 mg) by mouth 2 times daily    Ischemic cardiomyopathy       escitalopram 20 MG tablet    LEXAPRO    15 tablet    Take 1 tablet (20 mg) by mouth daily    S/P vascular surgery       ezetimibe 10 MG tablet    ZETIA    30 tablet    Take 1 tablet (10 mg) by mouth daily    Ischemic cardiomyopathy       fentaNYL 25 mcg/hr 72 hr patch    DURAGESIC     Place 1 patch onto the skin every 72 hours remove old patch.        folic acid 1 MG tablet    FOLVITE    30 tablet    Take 1 tablet (1 mg) by mouth daily    Takes dietary supplements       furosemide 20 MG tablet    LASIX    60 tablet    Take 1 tablet (20 mg) by mouth 2 times daily (with meals)     S/P vascular surgery       * gabapentin 300 MG capsule    NEURONTIN    60 capsule    Take 1 capsule (300 mg) by mouth 2 times daily    S/P vascular surgery       * gabapentin 600 MG tablet    NEURONTIN    30 tablet    Take 1 tablet (600 mg) by mouth 3 times daily Increase per clinic directions.        gabapentin 8 % Gel topical PLO cream     100 g    Apply 1 g topically every 8 hours Do NOT apply to open wounds    Peripheral arterial disease (H), Non-healing wound of lower extremity, right, initial encounter       isosorbide mononitrate 30 MG 24 hr tablet    IMDUR    90 tablet    Take 1 tablet (30 mg) by mouth daily    Chronic systolic congestive heart failure (H)       levofloxacin 500 MG tablet    LEVAQUIN          Lidocaine 4 % Gel     30 g    Externally apply topically 2 times daily    Cellulitis and abscess of leg       methocarbamol 500 MG tablet    ROBAXIN          mometasone 0.1 % ointment    ELOCON    45 g    Apply affected area daily    Skin inflammation       multivitamin, therapeutic with minerals Tabs tablet     30 each    Take 1 tablet by mouth daily    Takes dietary supplements       nitroGLYcerin 0.4 MG sublingual tablet    NITROSTAT     SHARON MAY REPEAT Q 5 MINUTES X 2        nystatin-triamcinolone ointment    MYCOLOG    60 g    Apply topically 2 times daily    Dermatitis       order for DME     1 each    Equipment being ordered: Walker Wheels () and Walker () Treatment Diagnosis: impaired mobility    Pain of left lower extremity       oxyCODONE IR 5 MG tablet    ROXICODONE    90 tablet    1 tablet po 6 times/day x 4 day (continued from previous Rx), then 1 po 5 times/day x 4 d, then 1 po 4 times/day x 4 d, then 1 po 3 times/day x 4 d, then 1 po 2 times/day x 4 d, then 1 po 1 time/day x 4 d, then off.    PVD (peripheral vascular disease) (H)       polyethylene glycol Packet    MIRALAX/GLYCOLAX    7 packet    Take 17 g by mouth daily    Takes dietary supplements       predniSONE 10 MG tablet     DELTASONE    30 tablet    Take 1 tablet (10 mg) by mouth daily    S/P vascular surgery       rosuvastatin 20 MG tablet    CRESTOR    30 tablet    Take 1 tablet (20 mg) by mouth daily    Ischemic cardiomyopathy       spironolactone 25 MG tablet    ALDACTONE     TK 1 T PO D        vitamin A 87137 UNIT capsule     7 capsule    Take 2 capsules (20,000 Units) by mouth daily    Takes dietary supplements       zinc sulfate 220 (50 Zn) MG capsule    ZINCATE    7 capsule    Take 1 capsule (220 mg) by mouth daily    Takes dietary supplements       * Notice:  This list has 2 medication(s) that are the same as other medications prescribed for you. Read the directions carefully, and ask your doctor or other care provider to review them with you.

## 2018-06-28 NOTE — PROGRESS NOTES
Chief Complaint:   Chief Complaint   Patient presents with     WOUND CARE     wound check        Subjective: Boom is a 68 year old male who presents to the clinic today for follow up of bilateral arterial wounds. Dressing with wet to dry saline dressings. Still using mometasone to distal edges of R shin wound, this helps to decrease pain temporarily. However, now that his chronic oral pain meds are decreased, this pain is not well controlled with mometasone. Wondering if there is something stronger. Had f/u with vascular service, they are planning repeat ABIs and f/u in 3 months.       ROS: Denies increased redness, swelling, purulent drainage, warmth, increased wound pain, n/v, confusion, fevers.    Allergies   Allergen Reactions     Betadine [Povidone Iodine] Blisters     Pt reports erythema, increased pain, and blistering when using betadine on R big toe in past     Collagenase Clostridium Histolyticum      Flagyl [Metronidazole] Other (See Comments)     Flu symptoms  Flu like symptoms     Iodine Unknown     Santyl [Collagenase]      Current Outpatient Rx   Medication Sig Dispense Refill     acetic acid 0.25 % irrigation Irrigate with as directed 2 times daily 10 mL 0     ALPRAZolam (XANAX) 0.5 MG tablet TK 1 T PO TID PRN  0     ascorbic acid 500 MG TABS Take 1 tablet (500 mg) by mouth daily 7 tablet 0     ASPIRIN EC PO Take 81 mg by mouth daily       carvedilol (COREG) 6.25 MG tablet Take 1 tablet (6.25 mg) by mouth 2 times daily (with meals) 60 tablet 3     ciprofloxacin (CIPRO) 500 MG tablet Take 1 tablet (500 mg) by mouth 2 times daily 14 tablet 0     clindamycin (CLEOCIN) 300 MG capsule Take 1 capsule (300 mg) by mouth 4 times daily 28 capsule 0     clopidogrel (PLAVIX) 75 MG tablet Take 1 tablet (75 mg) by mouth daily 30 tablet 0     diclofenac (VOLTAREN) 1 % GEL topical gel Place 4 g onto the skin 4 times daily 1 Tube 0     docusate sodium (COLACE) 100 MG capsule Take 1 capsule (100 mg) by mouth daily 60  capsule 0     enalapril (VASOTEC) 2.5 MG tablet Take 1 tablet (2.5 mg) by mouth 2 times daily 60 tablet 11     escitalopram (LEXAPRO) 20 MG tablet Take 1 tablet (20 mg) by mouth daily 15 tablet 0     ezetimibe (ZETIA) 10 MG tablet Take 1 tablet (10 mg) by mouth daily 30 tablet 0     fentaNYL (DURAGESIC) 25 mcg/hr 72 hr patch Place 1 patch onto the skin every 72 hours remove old patch.       folic acid (FOLVITE) 1 MG tablet Take 1 tablet (1 mg) by mouth daily 30 tablet 0     furosemide (LASIX) 20 MG tablet Take 1 tablet (20 mg) by mouth 2 times daily (with meals) 60 tablet 3     gabapentin (NEURONTIN) 300 MG capsule Take 1 capsule (300 mg) by mouth 2 times daily 60 capsule 1     gabapentin (NEURONTIN) 600 MG tablet Take 1 tablet (600 mg) by mouth 3 times daily Increase per clinic directions. 30 tablet 1     gabapentin 8 % GEL topical PLO cream Apply 1 g topically every 8 hours Do NOT apply to open wounds 100 g 1     isosorbide mononitrate (IMDUR) 30 MG 24 hr tablet Take 1 tablet (30 mg) by mouth daily 90 tablet 3     levofloxacin (LEVAQUIN) 500 MG tablet   0     Lidocaine 4 % GEL Externally apply topically 2 times daily 30 g 1     methocarbamol (ROBAXIN) 500 MG tablet   1     mometasone (ELOCON) 0.1 % ointment Apply affected area daily 45 g 3     multivitamin, therapeutic with minerals (THERA-VIT-M) TABS tablet Take 1 tablet by mouth daily 30 each 0     nitroGLYcerin (NITROSTAT) 0.4 MG sublingual tablet SHARON MAY REPEAT Q 5 MINUTES X 2  2     nystatin-triamcinolone (MYCOLOG) ointment Apply topically 2 times daily 60 g 1     order for DME Equipment being ordered: Walker Wheels () and Walker ()  Treatment Diagnosis: impaired mobility 1 each 0     oxyCODONE IR (ROXICODONE) 5 MG tablet 1 tablet po 6 times/day x 4 day (continued from previous Rx), then 1 po 5 times/day x 4 d, then 1 po 4 times/day x 4 d, then 1 po 3 times/day x 4 d, then 1 po 2 times/day x 4 d, then 1 po 1 time/day x 4 d, then off. 90 tablet 0      polyethylene glycol (MIRALAX/GLYCOLAX) Packet Take 17 g by mouth daily 7 packet 0     predniSONE (DELTASONE) 10 MG tablet Take 1 tablet (10 mg) by mouth daily 30 tablet 0     rosuvastatin (CRESTOR) 20 MG tablet Take 1 tablet (20 mg) by mouth daily 30 tablet 11     spironolactone (ALDACTONE) 25 MG tablet TK 1 T PO D  4     vitamin A 51210 UNIT capsule Take 2 capsules (20,000 Units) by mouth daily 7 capsule 0     zinc sulfate (ZINCATE) 220 (50 ZN) MG capsule Take 1 capsule (220 mg) by mouth daily 7 capsule 0       Objective:   There were no vitals taken for this visit.    General: Patient is well groomed, appears stated age, is alert and cooperative, and is in no acute distress.  Skin: LE skin color, texture and turgor are normal except immediately surrounding wound. This is erythematous and exquisitely painful to light touch, especially at most distal portion.    Wound #1  An arterial ulcer is noted at right  anterior shin measuring 13 cm x 4.8 cm x 0.2 cm. Mildly tender to palpation.  Tissue Depth: Tendon  Wound base: Red, Yellow, White/Granulation, tendon. Again, increased granulation tissue.  Edges: intact, inflammed  Drainage: light/serous  Odor: No   Undermining: No  Tunneling: No  Bone Exposure: No  Clinical Signs of Infection: No      Wound #2  An arterial ulcer is noted at left anterior shin measuring 3 cm x 3 cm x 0.2 cm. Mildly tender to palpation.  Tissue Depth: subcutaneous  Wound base: Pink, Yellow/Granulation, Slough  Edges: intact  Drainage: small/serous  Odor: No   Undermining: No  Tunneling: No  Bone Exposure: No  Clinical Signs of Infection: No      Assessment:   - Chronic bilateral arterial shin ulcers  - Peripheral arterial disease  - Nicotine dependence      Plan:   - Devitalized tissue of right shin ulcer was excisionally debrided using a #15 blade to level of tendon. Healthy bleeding occurred with debridement. No anesthesia necessary today.   - Wound Care instructions: Continue with daily  saline wet to dry dressings if this is the only therapy you can tolerate.   - Rx'd nystatin-triamcinolone cream to try to periwound.  - Still awaiting apligraf approval.  - RTC every 2 weeks for wound assessment, sooner if concerns

## 2018-06-28 NOTE — LETTER
6/28/2018       RE: Boom Kahn  [address] [place] [address]     Dear Colleague,    Thank you for referring your patient, Boom Kahn, to the [facility] at [facility]. Please see a copy of my visit note below.    Chief Complaint:   Chief Complaint   Patient presents with     WOUND CARE     wound check        Subjective: Boom is a 68 year old male who presents to the clinic today for follow up of bilateral arterial wounds. Dressing with wet to dry saline dressings. Still using mometasone to distal edges of R shin wound, this helps to decrease pain temporarily. However, now that his chronic oral pain meds are decreased, this pain is not well controlled with mometasone. Wondering if there is something stronger. Had f/u with vascular service, they are planning repeat ABIs and f/u in 3 months.       ROS: Denies increased redness, swelling, purulent drainage, warmth, increased wound pain, n/v, confusion, fevers.    Allergies   Allergen Reactions     Betadine [Povidone Iodine] Blisters     Pt reports erythema, increased pain, and blistering when using betadine on R big toe in past     Collagenase Clostridium Histolyticum      Flagyl [Metronidazole] Other (See Comments)     Flu symptoms  Flu like symptoms     Iodine Unknown     Santyl [Collagenase]      Current Outpatient Rx   Medication Sig Dispense Refill     acetic acid 0.25 % irrigation Irrigate with as directed 2 times daily 10 mL 0     ALPRAZolam (XANAX) 0.5 MG tablet TK 1 T PO TID PRN  0     ascorbic acid 500 MG TABS Take 1 tablet (500 mg) by mouth daily 7 tablet 0     ASPIRIN EC PO Take 81 mg by mouth daily       carvedilol (COREG) 6.25 MG tablet Take 1 tablet (6.25 mg) by mouth 2 times daily (with meals) 60 tablet 3     ciprofloxacin (CIPRO) 500 MG tablet Take 1 tablet (500 mg) by mouth 2 times daily 14 tablet 0     clindamycin (CLEOCIN) 300 MG capsule Take 1 capsule (300 mg) by mouth 4 times daily 28  capsule 0     clopidogrel (PLAVIX) 75 MG tablet Take 1 tablet (75 mg) by mouth daily 30 tablet 0     diclofenac (VOLTAREN) 1 % GEL topical gel Place 4 g onto the skin 4 times daily 1 Tube 0     docusate sodium (COLACE) 100 MG capsule Take 1 capsule (100 mg) by mouth daily 60 capsule 0     enalapril (VASOTEC) 2.5 MG tablet Take 1 tablet (2.5 mg) by mouth 2 times daily 60 tablet 11     escitalopram (LEXAPRO) 20 MG tablet Take 1 tablet (20 mg) by mouth daily 15 tablet 0     ezetimibe (ZETIA) 10 MG tablet Take 1 tablet (10 mg) by mouth daily 30 tablet 0     fentaNYL (DURAGESIC) 25 mcg/hr 72 hr patch Place 1 patch onto the skin every 72 hours remove old patch.       folic acid (FOLVITE) 1 MG tablet Take 1 tablet (1 mg) by mouth daily 30 tablet 0     furosemide (LASIX) 20 MG tablet Take 1 tablet (20 mg) by mouth 2 times daily (with meals) 60 tablet 3     gabapentin (NEURONTIN) 300 MG capsule Take 1 capsule (300 mg) by mouth 2 times daily 60 capsule 1     gabapentin (NEURONTIN) 600 MG tablet Take 1 tablet (600 mg) by mouth 3 times daily Increase per clinic directions. 30 tablet 1     gabapentin 8 % GEL topical PLO cream Apply 1 g topically every 8 hours Do NOT apply to open wounds 100 g 1     isosorbide mononitrate (IMDUR) 30 MG 24 hr tablet Take 1 tablet (30 mg) by mouth daily 90 tablet 3     levofloxacin (LEVAQUIN) 500 MG tablet   0     Lidocaine 4 % GEL Externally apply topically 2 times daily 30 g 1     methocarbamol (ROBAXIN) 500 MG tablet   1     mometasone (ELOCON) 0.1 % ointment Apply affected area daily 45 g 3     multivitamin, therapeutic with minerals (THERA-VIT-M) TABS tablet Take 1 tablet by mouth daily 30 each 0     nitroGLYcerin (NITROSTAT) 0.4 MG sublingual tablet SHARON MAY REPEAT Q 5 MINUTES X 2  2     nystatin-triamcinolone (MYCOLOG) ointment Apply topically 2 times daily 60 g 1     order for DME Equipment being ordered: Walker Wheels () and Walker ()  Treatment Diagnosis: impaired mobility 1 each  0     oxyCODONE IR (ROXICODONE) 5 MG tablet 1 tablet po 6 times/day x 4 day (continued from previous Rx), then 1 po 5 times/day x 4 d, then 1 po 4 times/day x 4 d, then 1 po 3 times/day x 4 d, then 1 po 2 times/day x 4 d, then 1 po 1 time/day x 4 d, then off. 90 tablet 0     polyethylene glycol (MIRALAX/GLYCOLAX) Packet Take 17 g by mouth daily 7 packet 0     predniSONE (DELTASONE) 10 MG tablet Take 1 tablet (10 mg) by mouth daily 30 tablet 0     rosuvastatin (CRESTOR) 20 MG tablet Take 1 tablet (20 mg) by mouth daily 30 tablet 11     spironolactone (ALDACTONE) 25 MG tablet TK 1 T PO D  4     vitamin A 93893 UNIT capsule Take 2 capsules (20,000 Units) by mouth daily 7 capsule 0     zinc sulfate (ZINCATE) 220 (50 ZN) MG capsule Take 1 capsule (220 mg) by mouth daily 7 capsule 0       Objective:   There were no vitals taken for this visit.    General: Patient is well groomed, appears stated age, is alert and cooperative, and is in no acute distress.  Skin: LE skin color, texture and turgor are normal except immediately surrounding wound. This is erythematous and exquisitely painful to light touch, especially at most distal portion.    Wound #1  An arterial ulcer is noted at right  anterior shin measuring 13 cm x 4.8 cm x 0.2 cm. Mildly tender to palpation.  Tissue Depth: Tendon  Wound base: Red, Yellow, White/Granulation, tendon. Again, increased granulation tissue.  Edges: intact, inflammed  Drainage: light/serous  Odor: No   Undermining: No  Tunneling: No  Bone Exposure: No  Clinical Signs of Infection: No      Wound #2  An arterial ulcer is noted at left anterior shin measuring 3 cm x 3 cm x 0.2 cm. Mildly tender to palpation.  Tissue Depth: subcutaneous  Wound base: Pink, Yellow/Granulation, Slough  Edges: intact  Drainage: small/serous  Odor: No   Undermining: No  Tunneling: No  Bone Exposure: No  Clinical Signs of Infection: No      Assessment:   - Chronic bilateral arterial shin ulcers  - Peripheral arterial  disease  - Nicotine dependence      Plan:   - Devitalized tissue of right shin ulcer was excisionally debrided using a #15 blade to level of tendon. Healthy bleeding occurred with debridement. No anesthesia necessary today.   - Wound Care instructions: Continue with daily saline wet to dry dressings if this is the only therapy you can tolerate.   - Rx'd nystatin-triamcinolone cream to try to periwound.  - Still awaiting apligraf approval.  - RTC every 2 weeks for wound assessment, sooner if concerns        Again, thank you for allowing me to participate in the care of your patient.      Sincerely,    Tamiko Mcduffie PA-C

## 2018-06-28 NOTE — NURSING NOTE
Chief Complaint   Patient presents with     WOUND CARE     wound check        There were no vitals filed for this visit.    There is no height or weight on file to calculate BMI.      WOUND EVALUATION:                      No vitals per Tamiko Mcduffie PA-C

## 2018-07-05 ENCOUNTER — RECORDS - HEALTHEAST (OUTPATIENT)
Dept: LAB | Facility: CLINIC | Age: 68
End: 2018-07-05

## 2018-07-05 LAB
ALBUMIN SERPL-MCNC: 3.5 G/DL (ref 3.5–5)
ALP SERPL-CCNC: 64 U/L (ref 45–120)
ALT SERPL W P-5'-P-CCNC: 32 U/L (ref 0–45)
ANION GAP SERPL CALCULATED.3IONS-SCNC: 6 MMOL/L (ref 5–18)
AST SERPL W P-5'-P-CCNC: 28 U/L (ref 0–40)
BILIRUB SERPL-MCNC: 0.7 MG/DL (ref 0–1)
BUN SERPL-MCNC: 13 MG/DL (ref 8–22)
CALCIUM SERPL-MCNC: 9.7 MG/DL (ref 8.5–10.5)
CHLORIDE BLD-SCNC: 105 MMOL/L (ref 98–107)
CHOLEST SERPL-MCNC: 130 MG/DL
CO2 SERPL-SCNC: 25 MMOL/L (ref 22–31)
CREAT SERPL-MCNC: 0.87 MG/DL (ref 0.7–1.3)
FASTING STATUS PATIENT QL REPORTED: NO
GFR SERPL CREATININE-BSD FRML MDRD: >60 ML/MIN/1.73M2
GLUCOSE BLD-MCNC: 108 MG/DL (ref 70–125)
HDLC SERPL-MCNC: 50 MG/DL
LDLC SERPL CALC-MCNC: 69 MG/DL
POTASSIUM BLD-SCNC: 5.4 MMOL/L (ref 3.5–5)
PROT SERPL-MCNC: 6.1 G/DL (ref 6–8)
SODIUM SERPL-SCNC: 136 MMOL/L (ref 136–145)
TRIGL SERPL-MCNC: 57 MG/DL

## 2018-07-06 ENCOUNTER — DOCUMENTATION ONLY (OUTPATIENT)
Dept: CARE COORDINATION | Facility: CLINIC | Age: 68
End: 2018-07-06

## 2018-07-06 NOTE — PROGRESS NOTES
[facility] now requests orders and shares plan of care/discharge summaries for some patients through [employer].  Please REPLY TO THIS MESSAGE OR ROUTE BACK TO THE AUTHOR in order to give authorization for orders when needed.  This is considered a verbal order, you will still receive a faxed copy of orders for signature.  Thank you for your assistance in improving collaboration for our patients.    Patient states that he is no longer covering/keeping tendon moist that is showing in right leg wound. Please contact patient if he should be covering/keeping tendon moist.     Thank you,   Michaela Lord  [phone]

## 2018-07-09 ENCOUNTER — DOCUMENTATION ONLY (OUTPATIENT)
Dept: CARE COORDINATION | Facility: CLINIC | Age: 68
End: 2018-07-09

## 2018-07-09 NOTE — PROGRESS NOTES
[facility] now requests orders and shares plan of care/discharge summaries for some patients through [employer].  Please REPLY TO THIS MESSAGE OR ROUTE BACK TO THE AUTHOR in order to give authorization for orders when needed.  This is considered a verbal order, you will still receive a faxed copy of orders for signature.  Thank you for your assistance in improving collaboration for our patients.    ORDER    Patient would like to cancel 7/12 visit due to having appointment with pain clinic at the same time.     Michaela Lord  [phone]

## 2018-07-12 ENCOUNTER — TELEPHONE (OUTPATIENT)
Dept: WOUND CARE | Facility: CLINIC | Age: 68
End: 2018-07-12

## 2018-07-12 ENCOUNTER — OFFICE VISIT (OUTPATIENT)
Dept: WOUND CARE | Facility: CLINIC | Age: 68
End: 2018-07-12
Payer: COMMERCIAL

## 2018-07-12 DIAGNOSIS — L97.909 ARTERIAL LEG ULCER (H): Primary | ICD-10-CM

## 2018-07-12 ASSESSMENT — PAIN SCALES - GENERAL: PAINLEVEL: MODERATE PAIN (5)

## 2018-07-12 NOTE — PROGRESS NOTES
Chief Complaint:   Chief Complaint   Patient presents with     WOUND CARE     Pt here for a wound check       Subjective: Boom is a 68 year old male who presents to the clinic today for follow up of bilateral arterial wounds. Dressing with wet to dry saline dressings. Still using mometasone to distal edges of R shin wound. [facility] and they recommended that he be evaluated today (had appointment today but he canceled it). Patient notes there is a lot of white material coming off of the wound, believes this might be purulence. Says there was a lot of redness, but this has since decreased. Has not been offered many other options for pain besides tapering down on previous regimen. His son is present during encounter today. Still have not heard back about Apligraf.      ROS: Denies increased redness, swelling, purulent drainage, warmth, increased wound pain, n/v, confusion, fevers.    Allergies   Allergen Reactions     Betadine [Povidone Iodine] Blisters     Pt reports erythema, increased pain, and blistering when using betadine on R big toe in past     Collagenase Clostridium Histolyticum      Flagyl [Metronidazole] Other (See Comments)     Flu symptoms  Flu like symptoms     Iodine Unknown     Santyl [Collagenase]      Current Outpatient Rx   Medication Sig Dispense Refill     acetic acid 0.25 % irrigation Irrigate with as directed 2 times daily 10 mL 0     ALPRAZolam (XANAX) 0.5 MG tablet TK 1 T PO TID PRN  0     ascorbic acid 500 MG TABS Take 1 tablet (500 mg) by mouth daily 7 tablet 0     ASPIRIN EC PO Take 81 mg by mouth daily       carvedilol (COREG) 6.25 MG tablet Take 1 tablet (6.25 mg) by mouth 2 times daily (with meals) 60 tablet 3     ciprofloxacin (CIPRO) 500 MG tablet Take 1 tablet (500 mg) by mouth 2 times daily 14 tablet 0     clindamycin (CLEOCIN) 300 MG capsule Take 1 capsule (300 mg) by mouth 4 times daily 28 capsule 0     clopidogrel (PLAVIX) 75 MG tablet Take  1 tablet (75 mg) by mouth daily 30 tablet 0     diclofenac (VOLTAREN) 1 % GEL topical gel Place 4 g onto the skin 4 times daily 1 Tube 0     docusate sodium (COLACE) 100 MG capsule Take 1 capsule (100 mg) by mouth daily 60 capsule 0     enalapril (VASOTEC) 2.5 MG tablet Take 1 tablet (2.5 mg) by mouth 2 times daily 60 tablet 11     escitalopram (LEXAPRO) 20 MG tablet Take 1 tablet (20 mg) by mouth daily 15 tablet 0     ezetimibe (ZETIA) 10 MG tablet Take 1 tablet (10 mg) by mouth daily 30 tablet 0     fentaNYL (DURAGESIC) 25 mcg/hr 72 hr patch Place 1 patch onto the skin every 72 hours remove old patch.       folic acid (FOLVITE) 1 MG tablet Take 1 tablet (1 mg) by mouth daily 30 tablet 0     furosemide (LASIX) 20 MG tablet Take 1 tablet (20 mg) by mouth 2 times daily (with meals) 60 tablet 3     gabapentin (NEURONTIN) 300 MG capsule Take 1 capsule (300 mg) by mouth 2 times daily 60 capsule 1     gabapentin (NEURONTIN) 600 MG tablet Take 1 tablet (600 mg) by mouth 3 times daily Increase per clinic directions. 30 tablet 1     gabapentin 8 % GEL topical PLO cream Apply 1 g topically every 8 hours Do NOT apply to open wounds 100 g 1     isosorbide mononitrate (IMDUR) 30 MG 24 hr tablet Take 1 tablet (30 mg) by mouth daily 90 tablet 3     levofloxacin (LEVAQUIN) 500 MG tablet   0     Lidocaine 4 % GEL Externally apply topically 2 times daily 30 g 1     methocarbamol (ROBAXIN) 500 MG tablet   1     mometasone (ELOCON) 0.1 % ointment Apply affected area daily 45 g 3     multivitamin, therapeutic with minerals (THERA-VIT-M) TABS tablet Take 1 tablet by mouth daily 30 each 0     nitroGLYcerin (NITROSTAT) 0.4 MG sublingual tablet SHARON MAY REPEAT Q 5 MINUTES X 2  2     nystatin-triamcinolone (MYCOLOG) ointment Apply topically 2 times daily 60 g 1     order for DME Equipment being ordered: Walker Wheels () and Walker ()  Treatment Diagnosis: impaired mobility 1 each 0     oxyCODONE IR (ROXICODONE) 5 MG tablet 1 tablet  po 6 times/day x 4 day (continued from previous Rx), then 1 po 5 times/day x 4 d, then 1 po 4 times/day x 4 d, then 1 po 3 times/day x 4 d, then 1 po 2 times/day x 4 d, then 1 po 1 time/day x 4 d, then off. 90 tablet 0     polyethylene glycol (MIRALAX/GLYCOLAX) Packet Take 17 g by mouth daily 7 packet 0     predniSONE (DELTASONE) 10 MG tablet Take 1 tablet (10 mg) by mouth daily 30 tablet 0     rosuvastatin (CRESTOR) 20 MG tablet Take 1 tablet (20 mg) by mouth daily 30 tablet 11     spironolactone (ALDACTONE) 25 MG tablet TK 1 T PO D  4     vitamin A 44210 UNIT capsule Take 2 capsules (20,000 Units) by mouth daily 7 capsule 0     zinc sulfate (ZINCATE) 220 (50 ZN) MG capsule Take 1 capsule (220 mg) by mouth daily 7 capsule 0     Objective:   There were no vitals taken for this visit.    General: Patient is well groomed, appears stated age, is alert and cooperative, and is in no acute distress.  Skin:  LE skin color, texture and turgor are normal except immediately surrounding wound at distal portion. This is erythematous and exquisitely painful to light touch, especially at most distal portion.     Wound #1  An arterial ulcer is noted at right  anterior shin measuring 13 cm x 4.2 cm x 0.2 cm. Mildly tender to palpation.  Tissue Depth: Tendon  Wound base: Red, Yellow, White/Granulation, tendon. Again, increased granulation tissue.  Edges: intact, inflammed  Drainage: light/serous  Odor: No   Undermining: No  Tunneling: No  Bone Exposure: No  Clinical Signs of Infection: No      Wound #2  An arterial ulcer is noted at left anterior shin measuring 2.8 cm x 3.8 cm x 0.2 cm. Mildly tender to palpation.  Tissue Depth: subcutaneous  Wound base: Pink, Yellow/Granulation, Slough  Edges: intact  Drainage: small/serous  Odor: No   Undermining: No  Tunneling: No  Bone Exposure: No  Clinical Signs of Infection: No      Assessment:   - Chronic bilateral arterial shin ulcers  - Peripheral arterial disease  - Nicotine  dependence      Plan:   - No signs of infection, tendon is sloughing appropriately and wound is smaller overall.  - Devitalized tissue of right shin ulcer was excisionally debrided using a #15 blade to level of tendon. Healthy bleeding occurred with debridement. No anesthesia necessary today.   - Wound Care instructions: Continue with daily saline wet to dry dressings, hydrogel on tendon itself.  - Still awaiting apligraf approval.  - RTC every 2 weeks for wound assessment, sooner if concerns

## 2018-07-12 NOTE — LETTER
7/12/2018       RE: Boom Kahn  [address] [place] [address]     Dear Colleague,    Thank you for referring your patient, Boom Kahn, to the [facility] at [facility]. Please see a copy of my visit note below.    Chief Complaint:   Chief Complaint   Patient presents with     WOUND CARE     Pt here for a wound check       Subjective: Boom is a 68 year old male who presents to the clinic today for follow up of bilateral arterial wounds. Dressing with wet to dry saline dressings. Still using mometasone to distal edges of R shin wound. Was seen by his [facility] pain clinic today and they recommended that he be evaluated today (had appointment today but he canceled it). Patient notes there is a lot of white material coming off of the wound, believes this might be purulence. Says there was a lot of redness, but this has since decreased. Has not been offered many other options for pain besides tapering down on previous regimen. His son is present during encounter today. Still have not heard back about Apligraf.      ROS: Denies increased redness, swelling, purulent drainage, warmth, increased wound pain, n/v, confusion, fevers.    Allergies   Allergen Reactions     Betadine [Povidone Iodine] Blisters     Pt reports erythema, increased pain, and blistering when using betadine on R big toe in past     Collagenase Clostridium Histolyticum      Flagyl [Metronidazole] Other (See Comments)     Flu symptoms  Flu like symptoms     Iodine Unknown     Santyl [Collagenase]      Current Outpatient Rx   Medication Sig Dispense Refill     acetic acid 0.25 % irrigation Irrigate with as directed 2 times daily 10 mL 0     ALPRAZolam (XANAX) 0.5 MG tablet TK 1 T PO TID PRN  0     ascorbic acid 500 MG TABS Take 1 tablet (500 mg) by mouth daily 7 tablet 0     ASPIRIN EC PO Take 81 mg by mouth daily       carvedilol (COREG) 6.25 MG tablet Take 1 tablet (6.25 mg) by mouth 2  times daily (with meals) 60 tablet 3     ciprofloxacin (CIPRO) 500 MG tablet Take 1 tablet (500 mg) by mouth 2 times daily 14 tablet 0     clindamycin (CLEOCIN) 300 MG capsule Take 1 capsule (300 mg) by mouth 4 times daily 28 capsule 0     clopidogrel (PLAVIX) 75 MG tablet Take 1 tablet (75 mg) by mouth daily 30 tablet 0     diclofenac (VOLTAREN) 1 % GEL topical gel Place 4 g onto the skin 4 times daily 1 Tube 0     docusate sodium (COLACE) 100 MG capsule Take 1 capsule (100 mg) by mouth daily 60 capsule 0     enalapril (VASOTEC) 2.5 MG tablet Take 1 tablet (2.5 mg) by mouth 2 times daily 60 tablet 11     escitalopram (LEXAPRO) 20 MG tablet Take 1 tablet (20 mg) by mouth daily 15 tablet 0     ezetimibe (ZETIA) 10 MG tablet Take 1 tablet (10 mg) by mouth daily 30 tablet 0     fentaNYL (DURAGESIC) 25 mcg/hr 72 hr patch Place 1 patch onto the skin every 72 hours remove old patch.       folic acid (FOLVITE) 1 MG tablet Take 1 tablet (1 mg) by mouth daily 30 tablet 0     furosemide (LASIX) 20 MG tablet Take 1 tablet (20 mg) by mouth 2 times daily (with meals) 60 tablet 3     gabapentin (NEURONTIN) 300 MG capsule Take 1 capsule (300 mg) by mouth 2 times daily 60 capsule 1     gabapentin (NEURONTIN) 600 MG tablet Take 1 tablet (600 mg) by mouth 3 times daily Increase per clinic directions. 30 tablet 1     gabapentin 8 % GEL topical PLO cream Apply 1 g topically every 8 hours Do NOT apply to open wounds 100 g 1     isosorbide mononitrate (IMDUR) 30 MG 24 hr tablet Take 1 tablet (30 mg) by mouth daily 90 tablet 3     levofloxacin (LEVAQUIN) 500 MG tablet   0     Lidocaine 4 % GEL Externally apply topically 2 times daily 30 g 1     methocarbamol (ROBAXIN) 500 MG tablet   1     mometasone (ELOCON) 0.1 % ointment Apply affected area daily 45 g 3     multivitamin, therapeutic with minerals (THERA-VIT-M) TABS tablet Take 1 tablet by mouth daily 30 each 0     nitroGLYcerin (NITROSTAT) 0.4 MG sublingual tablet SHARON MAY REPEAT Q 5  MINUTES X 2  2     nystatin-triamcinolone (MYCOLOG) ointment Apply topically 2 times daily 60 g 1     order for DME Equipment being ordered: Walker Wheels () and Walker ()  Treatment Diagnosis: impaired mobility 1 each 0     oxyCODONE IR (ROXICODONE) 5 MG tablet 1 tablet po 6 times/day x 4 day (continued from previous Rx), then 1 po 5 times/day x 4 d, then 1 po 4 times/day x 4 d, then 1 po 3 times/day x 4 d, then 1 po 2 times/day x 4 d, then 1 po 1 time/day x 4 d, then off. 90 tablet 0     polyethylene glycol (MIRALAX/GLYCOLAX) Packet Take 17 g by mouth daily 7 packet 0     predniSONE (DELTASONE) 10 MG tablet Take 1 tablet (10 mg) by mouth daily 30 tablet 0     rosuvastatin (CRESTOR) 20 MG tablet Take 1 tablet (20 mg) by mouth daily 30 tablet 11     spironolactone (ALDACTONE) 25 MG tablet TK 1 T PO D  4     vitamin A 46018 UNIT capsule Take 2 capsules (20,000 Units) by mouth daily 7 capsule 0     zinc sulfate (ZINCATE) 220 (50 ZN) MG capsule Take 1 capsule (220 mg) by mouth daily 7 capsule 0     Objective:   There were no vitals taken for this visit.    General: Patient is well groomed, appears stated age, is alert and cooperative, and is in no acute distress.  Skin:  LE skin color, texture and turgor are normal except immediately surrounding wound at distal portion. This is erythematous and exquisitely painful to light touch, especially at most distal portion.     Wound #1  An arterial ulcer is noted at right  anterior shin measuring 13 cm x 4.2 cm x 0.2 cm. Mildly tender to palpation.  Tissue Depth: Tendon  Wound base: Red, Yellow, White/Granulation, tendon. Again, increased granulation tissue.  Edges: intact, inflammed  Drainage: light/serous  Odor: No   Undermining: No  Tunneling: No  Bone Exposure: No  Clinical Signs of Infection: No      Wound #2  An arterial ulcer is noted at left anterior shin measuring 2.8 cm x 3.8 cm x 0.2 cm. Mildly tender to palpation.  Tissue Depth: subcutaneous  Wound base:  Pink, Yellow/Granulation, Slough  Edges: intact  Drainage: small/serous  Odor: No   Undermining: No  Tunneling: No  Bone Exposure: No  Clinical Signs of Infection: No      Assessment:   - Chronic bilateral arterial shin ulcers  - Peripheral arterial disease  - Nicotine dependence      Plan:   - No signs of infection, tendon is sloughing appropriately and wound is smaller overall.  - Devitalized tissue of right shin ulcer was excisionally debrided using a #15 blade to level of tendon. Healthy bleeding occurred with debridement. No anesthesia necessary today.   - Wound Care instructions: Continue with daily saline wet to dry dressings, hydrogel on tendon itself.  - Still awaiting apligraf approval.  - RTC every 2 weeks for wound assessment, sooner if concerns      Again, thank you for allowing me to participate in the care of your patient.      Sincerely,    Tamiko Mcduffie PA-C

## 2018-07-12 NOTE — NURSING NOTE
Chief Complaint   Patient presents with     WOUND CARE     Pt here for a wound check       There were no vitals filed for this visit.    There is no height or weight on file to calculate BMI.      ABRAN Soria, EMT                    No vitals taken per provider

## 2018-07-12 NOTE — TELEPHONE ENCOUNTER
Pt will be seen today    [facility] Call Center    Phone Message    May a detailed message be left on voicemail: yes    Reason for Call: Other: Pt was seen at his pain clinic. They looked at his wound and were very concerned, as it looks to be excreting a lot more white slough. Stated that pt should be seen in wound ASAP. Please call pt ASAP to try to fit him into schedule. They were concerned about sepsis.     Action Taken: Message routed to:  [facility] ([facility] Wound

## 2018-07-13 PROBLEM — L97.909 ARTERIAL LEG ULCER (H): Status: ACTIVE | Noted: 2018-07-13

## 2018-07-30 ENCOUNTER — HOSPITAL ENCOUNTER (EMERGENCY)
Facility: CLINIC | Age: 68
Discharge: HOME | End: 2018-07-30
Attending: EMERGENCY MEDICINE | Admitting: EMERGENCY MEDICINE
Payer: COMMERCIAL

## 2018-07-30 ENCOUNTER — APPOINTMENT (OUTPATIENT)
Dept: GENERAL RADIOLOGY | Facility: CLINIC | Age: 68
End: 2018-07-30
Attending: EMERGENCY MEDICINE
Payer: COMMERCIAL

## 2018-07-30 VITALS
SYSTOLIC BLOOD PRESSURE: 112 MMHG | RESPIRATION RATE: 18 BRPM | TEMPERATURE: 98.5 F | DIASTOLIC BLOOD PRESSURE: 70 MMHG | WEIGHT: 149 LBS | HEART RATE: 99 BPM | OXYGEN SATURATION: 99 % | BODY MASS INDEX: 22.66 KG/M2

## 2018-07-30 DIAGNOSIS — L97.909 ARTERIAL LEG ULCER (H): ICD-10-CM

## 2018-07-30 LAB
ANION GAP SERPL CALCULATED.3IONS-SCNC: 6 MMOL/L (ref 3–14)
BASOPHILS # BLD AUTO: 0 10E9/L (ref 0–0.2)
BASOPHILS NFR BLD AUTO: 0.1 %
BUN SERPL-MCNC: 14 MG/DL (ref 7–30)
CALCIUM SERPL-MCNC: 9 MG/DL (ref 8.5–10.1)
CHLORIDE SERPL-SCNC: 104 MMOL/L (ref 94–109)
CO2 BLDCOV-SCNC: 27 MMOL/L (ref 21–28)
CO2 SERPL-SCNC: 29 MMOL/L (ref 20–32)
CREAT SERPL-MCNC: 1.01 MG/DL (ref 0.66–1.25)
CRP SERPL-MCNC: 5.6 MG/L (ref 0–8)
DIFFERENTIAL METHOD BLD: ABNORMAL
EOSINOPHIL # BLD AUTO: 0 10E9/L (ref 0–0.7)
EOSINOPHIL NFR BLD AUTO: 0.1 %
ERYTHROCYTE [DISTWIDTH] IN BLOOD BY AUTOMATED COUNT: 15.9 % (ref 10–15)
ERYTHROCYTE [SEDIMENTATION RATE] IN BLOOD BY WESTERGREN METHOD: 22 MM/H (ref 0–20)
GFR SERPL CREATININE-BSD FRML MDRD: 74 ML/MIN/1.7M2
GLUCOSE SERPL-MCNC: 110 MG/DL (ref 70–99)
HCT VFR BLD AUTO: 38.1 % (ref 40–53)
HGB BLD-MCNC: 12.6 G/DL (ref 13.3–17.7)
IMM GRANULOCYTES # BLD: 0 10E9/L (ref 0–0.4)
IMM GRANULOCYTES NFR BLD: 0.3 %
INR PPP: 1.03 (ref 0.86–1.14)
LACTATE BLD-SCNC: 1.4 MMOL/L (ref 0.7–2.1)
LYMPHOCYTES # BLD AUTO: 1.1 10E9/L (ref 0.8–5.3)
LYMPHOCYTES NFR BLD AUTO: 11.2 %
MCH RBC QN AUTO: 30.8 PG (ref 26.5–33)
MCHC RBC AUTO-ENTMCNC: 33.1 G/DL (ref 31.5–36.5)
MCV RBC AUTO: 93 FL (ref 78–100)
MONOCYTES # BLD AUTO: 0.4 10E9/L (ref 0–1.3)
MONOCYTES NFR BLD AUTO: 4.2 %
NEUTROPHILS # BLD AUTO: 8.1 10E9/L (ref 1.6–8.3)
NEUTROPHILS NFR BLD AUTO: 84.1 %
NRBC # BLD AUTO: 0 10*3/UL
NRBC BLD AUTO-RTO: 0 /100
PCO2 BLDV: 47 MM HG (ref 40–50)
PH BLDV: 7.37 PH (ref 7.32–7.43)
PLATELET # BLD AUTO: 341 10E9/L (ref 150–450)
PO2 BLDV: 17 MM HG (ref 25–47)
POTASSIUM SERPL-SCNC: 4.7 MMOL/L (ref 3.4–5.3)
RBC # BLD AUTO: 4.09 10E12/L (ref 4.4–5.9)
SAO2 % BLDV FROM PO2: 23 %
SODIUM SERPL-SCNC: 139 MMOL/L (ref 133–144)
WBC # BLD AUTO: 9.7 10E9/L (ref 4–11)

## 2018-07-30 PROCEDURE — 99284 EMERGENCY DEPT VISIT MOD MDM: CPT | Mod: Z6 | Performed by: EMERGENCY MEDICINE

## 2018-07-30 PROCEDURE — 25000128 H RX IP 250 OP 636: Performed by: EMERGENCY MEDICINE

## 2018-07-30 PROCEDURE — 82803 BLOOD GASES ANY COMBINATION: CPT

## 2018-07-30 PROCEDURE — 85025 COMPLETE CBC W/AUTO DIFF WBC: CPT | Performed by: EMERGENCY MEDICINE

## 2018-07-30 PROCEDURE — 73610 X-RAY EXAM OF ANKLE: CPT | Mod: RT

## 2018-07-30 PROCEDURE — 83605 ASSAY OF LACTIC ACID: CPT

## 2018-07-30 PROCEDURE — 99284 EMERGENCY DEPT VISIT MOD MDM: CPT | Mod: 25 | Performed by: EMERGENCY MEDICINE

## 2018-07-30 PROCEDURE — 85652 RBC SED RATE AUTOMATED: CPT | Performed by: EMERGENCY MEDICINE

## 2018-07-30 PROCEDURE — 86140 C-REACTIVE PROTEIN: CPT | Performed by: EMERGENCY MEDICINE

## 2018-07-30 PROCEDURE — 80048 BASIC METABOLIC PNL TOTAL CA: CPT | Performed by: EMERGENCY MEDICINE

## 2018-07-30 PROCEDURE — 96374 THER/PROPH/DIAG INJ IV PUSH: CPT | Performed by: EMERGENCY MEDICINE

## 2018-07-30 PROCEDURE — 25000132 ZZH RX MED GY IP 250 OP 250 PS 637: Performed by: EMERGENCY MEDICINE

## 2018-07-30 PROCEDURE — 85610 PROTHROMBIN TIME: CPT | Performed by: EMERGENCY MEDICINE

## 2018-07-30 RX ORDER — OXYCODONE HYDROCHLORIDE 5 MG/1
10 TABLET ORAL ONCE
Status: COMPLETED | OUTPATIENT
Start: 2018-07-30 | End: 2018-07-30

## 2018-07-30 RX ORDER — KETOROLAC TROMETHAMINE 15 MG/ML
15 INJECTION, SOLUTION INTRAMUSCULAR; INTRAVENOUS ONCE
Status: COMPLETED | OUTPATIENT
Start: 2018-07-30 | End: 2018-07-30

## 2018-07-30 RX ORDER — OXYCODONE HYDROCHLORIDE 5 MG/1
5 TABLET ORAL EVERY 6 HOURS PRN
Qty: 20 TABLET | Refills: 0 | Status: SHIPPED | OUTPATIENT
Start: 2018-07-30 | End: 2018-08-09

## 2018-07-30 RX ADMIN — OXYCODONE HYDROCHLORIDE 10 MG: 5 TABLET ORAL at 20:36

## 2018-07-30 RX ADMIN — KETOROLAC TROMETHAMINE 15 MG: 15 INJECTION, SOLUTION INTRAMUSCULAR; INTRAVENOUS at 18:53

## 2018-07-30 ASSESSMENT — ENCOUNTER SYMPTOMS
WOUND: 1
FEVER: 0

## 2018-07-30 NOTE — ED AVS SNAPSHOT
[facility], [place], Emergency Department    [address]    [facility] [place] [address]    Phone:  [phone]    Fax:  [phone]                                       Boom Kahn   MRN: [id number]    Department:  [facility], Emergency Department   Date of Visit:  7/30/2018           After Visit Summary Signature Page     I have received my discharge instructions, and my questions have been answered. I have discussed any challenges I see with this plan with the nurse or doctor.    ..........................................................................................................................................  Patient/Patient Representative Signature      ..........................................................................................................................................  Patient Representative Print Name and Relationship to Patient    ..................................................               ................................................  Date                                            Time    ..........................................................................................................................................  Reviewed by Signature/Title    ...................................................              ..............................................  Date                                                            Time

## 2018-07-30 NOTE — ED AVS SNAPSHOT
[facility], Emergency Department    [address]    [facility] [place] [address]    Phone:  [phone]    Fax:  [phone]                                       Boom Kahn   MRN: [id number]    Department:  [facility], Emergency Department   Date of Visit:  7/30/2018           Patient Information     Date Of Birth          1950        Your diagnoses for this visit were:     Arterial leg ulcer (H)        You were seen by Mirza Guajardo MD.        Discharge Instructions       Please make an appointment to follow up with the comprehensive pain management clinic ([phone]) and Your Primary Care Provider as soon as possible.    Call chemical dependency intake at [phone] to discuss a detox treatment if desired.    Return to the emergency department for any problems.      Your next 10 appointments already scheduled     Aug 31, 2018 11:00 AM CDT   CT CHEST W CONTRAST with UUCT1   [facility], CT ([facility])    [address] [address] [phone]           Please bring any scans or X-rays taken at other hospitals, if similar tests were done. Also bring a list of your medicines, including vitamins, minerals and over-the-counter drugs. It is safest to leave personal items at home.  Be sure to tell your doctor:   If you have any allergies.   If there s any chance you are pregnant.   If you are breastfeeding.    If you have diabetes as your medication may need to be adjusted for this exam.  You will have contrast for this exam. To prepare:   Do not eat or drink for 2 hours before your exam. If you need to take medicine, you may take it with small sips of water. (We may ask you to take liquid medicine as well.)   The day before your exam, drink extra fluids at least six 8-ounce glasses (unless your doctor tells you to restrict your fluids).  Patients over 70 or patients with diabetes or kidney problems:   If you haven t  had a blood test (creatinine test) within the last 30 days, the Cardiologist/Radiologist may require you to get this test prior to your exam.  Please wear loose clothing, such as a sweat suit or jogging clothes. Avoid snaps, zippers and other metal. We may ask you to undress and put on a hospital gown.  If you have any questions, please call the Imaging Department where you will have your exam.            Aug 31, 2018  2:00 PM CDT   (Arrive by 1:45 PM)   Return Visit with Dave Del Rio MD   [facility] ([facility])    [address]  [address]  [facility] [facility] [address] [phone]              24 Hour Appointment Hotline       To make an appointment at any [facility], call [phone] ([phone]). If you don't have a family doctor or clinic, we will help you find one. [facility] are conveniently located to serve the needs of you and your family.             Review of your medicines      CONTINUE these medicines which may have CHANGED, or have new prescriptions. If we are uncertain of the size of tablets/capsules you have at home, strength may be listed as something that might have changed.        Dose / Directions Last dose taken    oxyCODONE IR 5 MG tablet   Commonly known as:  ROXICODONE   Dose:  5 mg   What changed:    - how much to take  - how to take this  - when to take this  - reasons to take this  - additional instructions   Quantity:  20 tablet        Take 1 tablet (5 mg) by mouth every 6 hours as needed for pain   Refills:  0          Our records show that you are taking the medicines listed below. If these are incorrect, please call your family doctor or clinic.        Dose / Directions Last dose taken    acetic acid 0.25 % irrigation   Quantity:  10 mL        Irrigate with as directed 2 times daily   Refills:  0        ALPRAZolam 0.5 MG tablet   Commonly known as:  XANAX        TK 1 T PO TID PRN   Refills:  0        ascorbic acid  500 MG Tabs   Dose:  500 mg   Quantity:  7 tablet        Take 1 tablet (500 mg) by mouth daily   Refills:  0        ASPIRIN EC PO   Dose:  81 mg        Take 81 mg by mouth daily   Refills:  0        carvedilol 6.25 MG tablet   Commonly known as:  COREG   Dose:  6.25 mg   Indication:  Heart Failure, CAD   Quantity:  60 tablet        Take 1 tablet (6.25 mg) by mouth 2 times daily (with meals)   Refills:  3        ciprofloxacin 500 MG tablet   Commonly known as:  CIPRO   Dose:  500 mg   Quantity:  14 tablet        Take 1 tablet (500 mg) by mouth 2 times daily   Refills:  0        clindamycin 300 MG capsule   Commonly known as:  CLEOCIN   Dose:  300 mg   Quantity:  28 capsule        Take 1 capsule (300 mg) by mouth 4 times daily   Refills:  0        clopidogrel 75 MG tablet   Commonly known as:  PLAVIX   Dose:  75 mg   Indication:  Disease of the Peripheral Arteries   Quantity:  30 tablet        Take 1 tablet (75 mg) by mouth daily   Refills:  0        diclofenac 1 % Gel topical gel   Commonly known as:  VOLTAREN   Dose:  4 g   Quantity:  1 Tube        Place 4 g onto the skin 4 times daily   Refills:  0        docusate sodium 100 MG capsule   Commonly known as:  COLACE   Dose:  100 mg   Indication:  Constipation   Quantity:  60 capsule        Take 1 capsule (100 mg) by mouth daily   Refills:  0        enalapril 2.5 MG tablet   Commonly known as:  VASOTEC   Dose:  2.5 mg   Indication:  Congestive Heart Failure, High Blood Pressure Disorder   Quantity:  60 tablet        Take 1 tablet (2.5 mg) by mouth 2 times daily   Refills:  11        escitalopram 20 MG tablet   Commonly known as:  LEXAPRO   Dose:  20 mg   Indication:  Depression   Quantity:  15 tablet        Take 1 tablet (20 mg) by mouth daily   Refills:  0        ezetimibe 10 MG tablet   Commonly known as:  ZETIA   Dose:  10 mg   Indication:  Elevation of Both Cholesterol and Triglycerides in Blood   Quantity:  30 tablet        Take 1 tablet (10 mg) by mouth daily    Refills:  0        fentaNYL 25 mcg/hr 72 hr patch   Commonly known as:  DURAGESIC   Dose:  1 patch        Place 1 patch onto the skin every 72 hours remove old patch.   Refills:  0        folic acid 1 MG tablet   Commonly known as:  FOLVITE   Dose:  1 mg   Quantity:  30 tablet        Take 1 tablet (1 mg) by mouth daily   Refills:  0        furosemide 20 MG tablet   Commonly known as:  LASIX   Dose:  20 mg   Quantity:  60 tablet        Take 1 tablet (20 mg) by mouth 2 times daily (with meals)   Refills:  3        * gabapentin 300 MG capsule   Commonly known as:  NEURONTIN   Dose:  300 mg   Indication:  Neurogenic Pain   Quantity:  60 capsule        Take 1 capsule (300 mg) by mouth 2 times daily   Refills:  1        * gabapentin 600 MG tablet   Commonly known as:  NEURONTIN   Dose:  600 mg   Indication:  Neurogenic Pain   Quantity:  30 tablet        Take 1 tablet (600 mg) by mouth 3 times daily Increase per clinic directions.   Refills:  1        gabapentin 8 % Gel topical PLO cream   Dose:  1 g   Quantity:  100 g        Apply 1 g topically every 8 hours Do NOT apply to open wounds   Refills:  1        isosorbide mononitrate 30 MG 24 hr tablet   Commonly known as:  IMDUR   Dose:  30 mg   Quantity:  90 tablet        Take 1 tablet (30 mg) by mouth daily   Refills:  3        levofloxacin 500 MG tablet   Commonly known as:  LEVAQUIN        Refills:  0        Lidocaine 4 % Gel   Quantity:  30 g        Externally apply topically 2 times daily   Refills:  1        methocarbamol 500 MG tablet   Commonly known as:  ROBAXIN        Refills:  1        mometasone 0.1 % ointment   Commonly known as:  ELOCON   Quantity:  45 g        Apply affected area daily   Refills:  3        multivitamin, therapeutic with minerals Tabs tablet   Dose:  1 tablet   Quantity:  30 each        Take 1 tablet by mouth daily   Refills:  0        nitroGLYcerin 0.4 MG sublingual tablet   Commonly known as:  NITROSTAT        SHARON MAY REPEAT Q 5 MINUTES X 2    Refills:  2        nystatin-triamcinolone ointment   Commonly known as:  MYCOLOG   Quantity:  60 g        Apply topically 2 times daily   Refills:  1        order for DME   Quantity:  1 each        Equipment being ordered: Walker Wheels () and Walker () Treatment Diagnosis: impaired mobility   Refills:  0        polyethylene glycol Packet   Commonly known as:  MIRALAX/GLYCOLAX   Dose:  17 g   Indication:  Constipation   Quantity:  7 packet        Take 17 g by mouth daily   Refills:  0        predniSONE 10 MG tablet   Commonly known as:  DELTASONE   Dose:  10 mg   Indication:  Chronic Obstructive Lung Disease   Quantity:  30 tablet        Take 1 tablet (10 mg) by mouth daily   Refills:  0        rosuvastatin 20 MG tablet   Commonly known as:  CRESTOR   Dose:  20 mg   Indication:  High Amount of Fats in the Blood   Quantity:  30 tablet        Take 1 tablet (20 mg) by mouth daily   Refills:  11        spironolactone 25 MG tablet   Commonly known as:  ALDACTONE        TK 1 T PO D   Refills:  4        vitamin A 40482 UNIT capsule   Dose:  57153 Units   Quantity:  7 capsule        Take 2 capsules (20,000 Units) by mouth daily   Refills:  0        zinc sulfate 220 (50 Zn) MG capsule   Commonly known as:  ZINCATE   Dose:  220 mg   Quantity:  7 capsule        Take 1 capsule (220 mg) by mouth daily   Refills:  0        * Notice:  This list has 2 medication(s) that are the same as other medications prescribed for you. Read the directions carefully, and ask your doctor or other care provider to review them with you.            Information about OPIOIDS     PRESCRIPTION OPIOIDS: WHAT YOU NEED TO KNOW   We gave you an opioid (narcotic) pain medicine. It is important to manage your pain, but opioids are not always the best choice. You should first try all the other options your care team gave you. Take this medicine for as short a time (and as few doses) as possible.     These medicines have risks:    DO NOT drive when  on new or higher doses of pain medicine. These medicines can affect your alertness and reaction times, and you could be arrested for driving under the influence (DUI). If you need to use opioids long-term, talk to your care team about driving.    DO NOT operate heave machinery    DO NOT do any other dangerous activities while taking these medicines.     DO NOT drink any alcohol while taking these medicines.      If the opioid prescribed includes acetaminophen, DO NOT take with any other medicines that contain acetaminophen. Read all labels carefully. Look for the word  acetaminophen  or  Tylenol.  Ask your pharmacist if you have questions or are unsure.    You can get addicted to pain medicines, especially if you have a history of addiction (chemical, alcohol or substance dependence). Talk to your care team about ways to reduce this risk.    Store your pills in a secure place, locked if possible. We will not replace any lost or stolen medicine. If you don t finish your medicine, please throw away (dispose) as directed by your pharmacist. The [place] [employer]ution [employer] has more information about safe disposal: https://www.pca.[email or web address].[place]/living-green/managing-unwanted-medications.     All opioids tend to cause constipation. Drink plenty of water and eat foods that have a lot of fiber, such as fruits, vegetables, prune juice, apple juice and high-fiber cereal. Take a laxative (Miralax, milk of magnesia, Colace, Senna) if you don t move your bowels at least every other day.         Prescriptions were sent or printed at these locations (1 Prescription)                   Other Prescriptions                Printed at Department/Unit printer (1 of 1)         oxyCODONE IR (ROXICODONE) 5 MG tablet                Procedures and tests performed during your visit     Ankle XR, G/E 3 views, right    Basic metabolic panel    CBC with platelets differential    CRP inflammation    Erythrocyte sedimentation rate auto    INR     ISTAT CG4 gases lactate alison nursing POCT    ISTAT gases lactate alison POCT      Orders Needing Specimen Collection     None      Pending Results     No orders found from 7/28/2018 to 7/31/2018.            Pending Culture Results     No orders found from 7/28/2018 to 7/31/2018.            Pending Results Instructions     If you had any lab results that were not finalized at the time of your Discharge, you can call the ED Lab Result RN at [phone]. You will be contacted by this team for any positive Lab results or changes in treatment. The nurses are available 7 days a week from 10A to 6:30P.  You can leave a message 24 hours per day and they will return your call.        Thank you for choosing [facility]       Thank you for choosing [facility] for your care. Our goal is always to provide you with excellent care. Hearing back from our patients is one way we can continue to improve our services. Please take a few minutes to complete the written survey that you may receive in the mail after you visit with us. Thank you!        [employer] Information     [employer] gives you secure access to your electronic health record. If you see a primary care provider, you can also send messages to your care team and make appointments. If you have questions, please call your primary care clinic.  If you do not have a primary care provider, please call [phone] and they will assist you.        Care EveryWhere ID     This is your Care EveryWhere ID. This could be used by other organizations to access your [facility] medical records  [id number]        Equal Access to Services     CHANEL DORADO : Hadii vishnu Ugalde, waaxda luqadaha, qaybta kaalmada alfonso, waxay sally evans. So [facility] [phone].    ATENCIÓN: Si habla español, tiene a bhatia disposición servicios gratuitos de asistencia lingüística. Llame al [phone].    We comply with applicable federal civil rights laws and [place] laws. We do not  discriminate on the basis of race, color, national origin, age, disability, sex, sexual orientation, or gender identity.            After Visit Summary       This is your record. Keep this with you and show to your community pharmacist(s) and doctor(s) at your next visit.

## 2018-07-30 NOTE — ED PROVIDER NOTES
"  History     Chief Complaint   Patient presents with     Leg Pain     patient has wound to right lower leg for many years, this week has had gradual increase in pain to \"skin\" around wound, has recently been taken off his opioid pain medications due to alcohol use     HPI  Boom Kahn is a 68 year old male with an extensive medical including history of tobacco abuse, alcoholism, chronic opioid use, peripheral vascular disease, ischemic cardiomyopathy, critical lower limb ischemia, s/p right femoral endarterectomy with fem-PT bypass (1/15/2018 Dr. Ag), congestive heart failure, COPD, depression, anxiety, hypertension, and myocardial infarction.     Patient presents to the Emergency Department for evaluation of a chronic wound. Patient has a 13 cm vertical open wound on right shin that reveals the anterior tibial tendon. He states that he has had this wound for many years. Today, he noticed that his right ankle was swollen and became concerned for an infection. He denies any known fevers but notes that his blood pressure has been fluctuating over the past week. Recently, he broke his pain contract and his oxycodone was stopped on 7/19 without a taper due to alcohol use. He reports that today is his 8th day without pain medications. He states that his pain has been increasing over this time and rates the pain a 10/10 currently. He notes trouble standing and ambulating due to pain. Patient's pain doctor, Dr. Joaquín Bianchi, referred him to [place] ED if he began experiencing withdrawal symptoms or intolerable pain.     No current facility-administered medications for this encounter.      Current Outpatient Prescriptions   Medication     oxyCODONE IR (ROXICODONE) 5 MG tablet     acetic acid 0.25 % irrigation     ALPRAZolam (XANAX) 0.5 MG tablet     ascorbic acid 500 MG TABS     ASPIRIN EC PO     carvedilol (COREG) 6.25 MG tablet     ciprofloxacin (CIPRO) 500 MG tablet     clindamycin (CLEOCIN) 300 MG capsule "     clopidogrel (PLAVIX) 75 MG tablet     diclofenac (VOLTAREN) 1 % GEL topical gel     docusate sodium (COLACE) 100 MG capsule     enalapril (VASOTEC) 2.5 MG tablet     escitalopram (LEXAPRO) 20 MG tablet     ezetimibe (ZETIA) 10 MG tablet     fentaNYL (DURAGESIC) 25 mcg/hr 72 hr patch     folic acid (FOLVITE) 1 MG tablet     furosemide (LASIX) 20 MG tablet     gabapentin (NEURONTIN) 300 MG capsule     gabapentin (NEURONTIN) 600 MG tablet     gabapentin 8 % GEL topical PLO cream     isosorbide mononitrate (IMDUR) 30 MG 24 hr tablet     levofloxacin (LEVAQUIN) 500 MG tablet     Lidocaine 4 % GEL     methocarbamol (ROBAXIN) 500 MG tablet     mometasone (ELOCON) 0.1 % ointment     multivitamin, therapeutic with minerals (THERA-VIT-M) TABS tablet     nitroGLYcerin (NITROSTAT) 0.4 MG sublingual tablet     nystatin-triamcinolone (MYCOLOG) ointment     order for DME     polyethylene glycol (MIRALAX/GLYCOLAX) Packet     predniSONE (DELTASONE) 10 MG tablet     rosuvastatin (CRESTOR) 20 MG tablet     spironolactone (ALDACTONE) 25 MG tablet     vitamin A 89329 UNIT capsule     zinc sulfate (ZINCATE) 220 (50 ZN) MG capsule     Past Medical History:   Diagnosis Date     Anxiety      CAD (coronary artery disease)     s/p 3v CABG     CHF (congestive heart failure) (H)     EF 10-15%     Chronic pain      COPD (chronic obstructive pulmonary disease) (H)      Depression      Hyperlipidemia      Hypertension      Lung cancer (H)     s/p radiation therapy     PAD (peripheral artery disease) (H)     s/p lower extremity stents     Tobacco abuse        Past Surgical History:   Procedure Laterality Date     ANGIOPLASTY Right 10/2016     BYPASS GRAFT ARTERY CORONARY  1999    [facility]     BYPASS GRAFT FEMOROTIBIAL Right 1/15/2018    Procedure: BYPASS GRAFT FEMOROTIBIAL;  Right Femorotibial Bypass Graft with insitu saphenous vein, wound debriedment of right lower leg;  Surgeon: Lexis Ag MD;  Location: [facility] OR     cardiac  catheterization       Cardiac defibrillator placement       CHEST TUBE INSERTION       COLON SURGERY  2008    colon resection for diverticulitis     FINE NEEDLE ASPIRATION Right 12/2016     IMPLANT PACEMAKER       previous radiation         No family history on file.    Social History   Substance Use Topics     Smoking status: Current Every Day Smoker     Packs/day: 1.00     Types: Cigarettes     Smokeless tobacco: Never Used      Comment: 0.5-1 ppd.     Alcohol use Yes      Comment: 50 beers/wk, 3-4 beers daily, last drank last night     Allergies   Allergen Reactions     Betadine [Povidone Iodine] Blisters     Pt reports erythema, increased pain, and blistering when using betadine on R big toe in past     Collagenase Clostridium Histolyticum      Flagyl [Metronidazole] Other (See Comments)     Flu symptoms  Flu like symptoms     Iodine Unknown     Santyl [Collagenase]        I have reviewed the Medications, Allergies, Past Medical and Surgical History, and Social History in the Epic system.    Review of Systems   Constitutional: Negative for fever.   Musculoskeletal:        Positive for right ankle swelling   Skin: Positive for wound (13 cm vertical wound on right shin).   All other systems reviewed and are negative.      Physical Exam   BP: (!) 85/51 (normal per patient)  Pulse: 80  Temp: 96.7  F (35.9  C)  Resp: 16  Weight: 67.6 kg (149 lb)  SpO2: 98 %      Physical Exam   Constitutional: He is oriented to person, place, and time. Vital signs are normal. He appears well-developed and well-nourished.  Non-toxic appearance. He does not appear ill. No distress.   Patient is awake and alert, uncomfortable in appearance.  Is otherwise mentating normally and protecting his airway without difficulty.   HENT:   Head: Normocephalic and atraumatic.   Mouth/Throat: Oropharynx is clear and moist. No oropharyngeal exudate.   Eyes: Conjunctivae and EOM are normal. Pupils are equal, round, and reactive to light. No scleral  icterus.   Neck: Normal range of motion. Neck supple. No JVD present. No tracheal deviation present. No thyromegaly present.   Cardiovascular: Normal rate, regular rhythm, normal heart sounds and intact distal pulses.  Exam reveals no gallop and no friction rub.    No murmur heard.  Pulmonary/Chest: Effort normal and breath sounds normal. No respiratory distress.   Musculoskeletal: Normal range of motion. He exhibits no edema or tenderness.   Lymphadenopathy:     He has no cervical adenopathy.   Neurological: He is alert and oriented to person, place, and time. He has normal strength. No cranial nerve deficit or sensory deficit.   Skin: Skin is warm and dry. Lesion noted. No rash noted. No erythema. No pallor.        Psychiatric: He has a normal mood and affect. His behavior is normal.   Nursing note and vitals reviewed.      ED Course   6:25 PM  The patient was seen and examined by Mirza Guajardo MD in Room 4.   ED Course     Procedures        Results for orders placed or performed during the hospital encounter of 07/30/18   Ankle XR, G/E 3 views, right    Narrative    RIGHT ANKLE THREE VIEWS   7/30/2018 7:04 PM     HISTORY: Chronic wound, increased pain, evaluate for signs of  osteomyelitis.     COMPARISON: None.      Impression    IMPRESSION: Mild osteopenia is present diffusely. There is no bony  erosion to suggest osteomyelitis. No evidence for fracture.     FRANCESCO NGUYEN MD   CBC with platelets differential   Result Value Ref Range    WBC 9.7 4.0 - 11.0 10e9/L    RBC Count 4.09 (L) 4.4 - 5.9 10e12/L    Hemoglobin 12.6 (L) 13.3 - 17.7 g/dL    Hematocrit 38.1 (L) 40.0 - 53.0 %    MCV 93 78 - 100 fl    MCH 30.8 26.5 - 33.0 pg    MCHC 33.1 31.5 - 36.5 g/dL    RDW 15.9 (H) 10.0 - 15.0 %    Platelet Count 341 150 - 450 10e9/L    Diff Method Automated Method     % Neutrophils 84.1 %    % Lymphocytes 11.2 %    % Monocytes 4.2 %    % Eosinophils 0.1 %    % Basophils 0.1 %    % Immature Granulocytes 0.3 %    Nucleated  RBCs 0 0 /100    Absolute Neutrophil 8.1 1.6 - 8.3 10e9/L    Absolute Lymphocytes 1.1 0.8 - 5.3 10e9/L    Absolute Monocytes 0.4 0.0 - 1.3 10e9/L    Absolute Eosinophils 0.0 0.0 - 0.7 10e9/L    Absolute Basophils 0.0 0.0 - 0.2 10e9/L    Abs Immature Granulocytes 0.0 0 - 0.4 10e9/L    Absolute Nucleated RBC 0.0    CRP inflammation   Result Value Ref Range    CRP Inflammation 5.6 0.0 - 8.0 mg/L   Erythrocyte sedimentation rate auto   Result Value Ref Range    Sed Rate 22 (H) 0 - 20 mm/h   Basic metabolic panel   Result Value Ref Range    Sodium 139 133 - 144 mmol/L    Potassium 4.7 3.4 - 5.3 mmol/L    Chloride 104 94 - 109 mmol/L    Carbon Dioxide 29 20 - 32 mmol/L    Anion Gap 6 3 - 14 mmol/L    Glucose 110 (H) 70 - 99 mg/dL    Urea Nitrogen 14 7 - 30 mg/dL    Creatinine 1.01 0.66 - 1.25 mg/dL    GFR Estimate 74 >60 mL/min/1.7m2    GFR Estimate If Black 89 >60 mL/min/1.7m2    Calcium 9.0 8.5 - 10.1 mg/dL   INR   Result Value Ref Range    INR 1.03 0.86 - 1.14   ISTAT gases lactate alison POCT   Result Value Ref Range    Ph Venous 7.37 7.32 - 7.43 pH    PCO2 Venous 47 40 - 50 mm Hg    PO2 Venous 17 (L) 25 - 47 mm Hg    Bicarbonate Venous 27 21 - 28 mmol/L    O2 Sat Venous 23 %    Lactic Acid 1.4 0.7 - 2.1 mmol/L            Assessments & Plan (with Medical Decision Making)   This patient presented to the Emergency Department complaining of significantly increased pain in his right leg associated with an arterial leg ulcer that he has. There is some mild surrounding erythema but he is afebrile, does not have a leukocytosis or elevation of CRP and does not have evidence of gas in the soft tissue or bony involvement on x-ray decreasing suspicion for any significant acute infection. He had been maintained on oxycodone for quite some time for this pain but apparently this had been stopped without taper or Suboxone treatment several days ago. I suspect that there is some increase in pain secondary to hyperesthesia from his  chronic opioid use. Apparently, his doctor told him that if he did not stop drinking alcohol completely they would not prescribe this medication for him but I feel that it is not realistic to expect the patient to stop both alcohol and his chronic opiates on his own at home. He is quite uncomfortable here and did not respond to Toradol so he was given two oxycodone and I did discuss with the patient that I felt that he would best be served by undergoing medical detox with Suboxone and detox from alcohol if he does wish to get off of these substances. There are no detox beds currently and patient was told to follow-up with his primary clinic and also given the phone number for the comprehensive pain management clinic to help manage his chronic pain. He was discharged in good condition. He was provided with a small prescription for oxycodone and told that he needs to have this prescribed through his primary clinic if he is to be on this medication for his chronic pain.  This does appear to be a painful ulcer and pain control will be difficult for this patient.    I have reviewed the nursing notes.    I have reviewed the findings, diagnosis, plan and need for follow up with the patient.    Discharge Medication List as of 7/30/2018  8:58 PM          Final diagnoses:   Arterial leg ulcer (H)   I, Dawna Mccall, am serving as a trained medical scribe to document services personally performed by Chandana Guajardo MD, based on the provider's statements to me.   Chandana WILBURN MD, was physically present and have reviewed and verified the accuracy of this note documented by Dawna Mccall.    7/30/2018   [facility], [place], EMERGENCY DEPARTMENT     Mirza Guajardo MD  07/31/18 [id number]

## 2018-07-31 DIAGNOSIS — I25.5 ISCHEMIC CARDIOMYOPATHY: ICD-10-CM

## 2018-07-31 NOTE — DISCHARGE INSTRUCTIONS
Please make an appointment to follow up with the comprehensive pain management clinic ([phone]) and Your Primary Care Provider as soon as possible.    Call chemical dependency intake at [phone] to discuss a detox treatment if desired.    Return to the emergency department for any problems.

## 2018-08-01 ENCOUNTER — TELEPHONE (OUTPATIENT)
Dept: ANESTHESIOLOGY | Facility: CLINIC | Age: 68
End: 2018-08-01

## 2018-08-01 RX ORDER — CARVEDILOL 6.25 MG/1
6.25 TABLET ORAL 2 TIMES DAILY WITH MEALS
Qty: 180 TABLET | Refills: 3 | Status: SHIPPED | OUTPATIENT
Start: 2018-08-01

## 2018-08-01 NOTE — TELEPHONE ENCOUNTER
[facility] Call Center    Phone Message    May a detailed message be left on voicemail: no    Reason for Call: Other: Pt called in to schedule an appt with Dr. Sainz for a follow-up. Pt reports his pain level currently at around an 8 or 9 out of 10. He would like a call back from the nurse to discuss his options.     Action Taken: Message routed to:  [facility] ([facility]

## 2018-08-02 ENCOUNTER — DOCUMENTATION ONLY (OUTPATIENT)
Dept: CARE COORDINATION | Facility: CLINIC | Age: 68
End: 2018-08-02

## 2018-08-02 NOTE — PROGRESS NOTES
[facility] now requests orders and shares plan of care/discharge summaries for some patients through [employer].  Please REPLY TO THIS MESSAGE OR ROUTE BACK TO THE AUTHOR in order to give authorization for orders when needed.  This is considered a verbal order, you will still receive a faxed copy of orders for signature.  Thank you for your assistance in improving collaboration for our patients.    ORDER    I recently did a recert for home care for patient.   I am looking for patients current wound care orders.     Thank you,   Michaela Lord

## 2018-08-07 NOTE — TELEPHONE ENCOUNTER
LPN called and spoke to pt.   Pt was asking if APRN would be comfortable with prescribing Oxycodone 15 mg temporarily until the pt's wound healed on their leg.   LPN informed pt of the Clinic's opioid Policy, but noted that all prescriptions are based on the providers preference. Pt was encouraged to come to the appointment to speak with the provider so that the Pt and provider could formulate a treatment plan together.     Pt stated that they would think about it and call the clinic back if they wanted to cancel . Pt stated that they were waiting to hear back from another doctor regarding a prescription for Oxycodone.       Pt was encouraged to call the clinic back if they had other questions.   Celina Stack LPN

## 2018-08-07 NOTE — TELEPHONE ENCOUNTER
Health Call Center    Phone Message    May a detailed message be left on voicemail: yes    Reason for Call: Other: Pt ran out of oxycodone and stated gabapentin is not working at all for him even at max dose for pain. Stated wound on leg is at 9/10 for pain. Pt stated he was told by Cari Childress at last visit they do not give narcotic prescriptions, pt would like to clarify this with nurse as he would like low dose maintenence prescribed. Pt has visit tomorrow afternoon, please call before then.     Action Taken: Message routed to:  [facility] ([facility] PA[facility]

## 2018-08-08 ENCOUNTER — OFFICE VISIT (OUTPATIENT)
Dept: ANESTHESIOLOGY | Facility: CLINIC | Age: 68
End: 2018-08-08
Payer: COMMERCIAL

## 2018-08-08 VITALS — HEART RATE: 76 BPM | OXYGEN SATURATION: 97 % | DIASTOLIC BLOOD PRESSURE: 63 MMHG | SYSTOLIC BLOOD PRESSURE: 97 MMHG

## 2018-08-08 DIAGNOSIS — I73.9 PERIPHERAL ARTERIAL DISEASE (H): ICD-10-CM

## 2018-08-08 DIAGNOSIS — L97.909 ARTERIAL LEG ULCER (H): Primary | ICD-10-CM

## 2018-08-08 DIAGNOSIS — F10.10 ALCOHOL ABUSE: ICD-10-CM

## 2018-08-08 RX ORDER — MELOXICAM 7.5 MG/1
7.5 TABLET ORAL DAILY
Qty: 30 TABLET | Refills: 1 | Status: SHIPPED | OUTPATIENT
Start: 2018-08-08 | End: 2018-08-08

## 2018-08-08 RX ORDER — MELOXICAM 7.5 MG/1
7.5 TABLET ORAL DAILY
Qty: 30 TABLET | Refills: 0 | Status: SHIPPED | OUTPATIENT
Start: 2018-08-08 | End: 2018-09-05

## 2018-08-08 ASSESSMENT — PAIN SCALES - GENERAL: PAINLEVEL: EXTREME PAIN (9)

## 2018-08-08 NOTE — PROGRESS NOTES
Date of visit: 8/8/2018    Chief complaint:   Chief Complaint   Patient presents with     RECHECK     Would like to discuss medication therapy for bilateral legs       Interval history:  Boom Kahn is a 68 year old male last seen by me on 5/21/18 for painful peripheral arterial disease and non-healing ulcers, s/p fem-PT bypass. He has a  past medical history of peripheral artery disease, congestive heart failure s/p ICD placement, COPD, lung cancer s/p radiation therapy, CAD s/p CABG.    Recommendations/plan at the last visit included:  1. Patient education: I went over the above diagnoses and treatment plan with him and answered all of his questions.  2. Interventions  -could consider lumbar sympathetic block to assist with dilation and hopeful pain relief; this was discussed, but patient did not want to pursue this option today.   3. Medications  1. Increase gabapentin to 600/600/600 mg/day; refill provided. May consider additional dose increase.   -Contacted wound clinic provider, Tamiko Mcduffie PA-C, to question if topical compound in PLO gel (gabapentin-ketamine-lidocaine) wound interfere with wound healing.   Addendum: Did receive information from Tamiko Mcduffie that the topical can be utilized on surrounding tissue with avoidance of open wound. Ordered topical gabapentin in PLO due to higher likelihood of insurance coverage.                    -Declined to prescribe patient's ongoing opioid prescriptions; I discussed with him that he should follow with chemical dependency referral. He also had refused to submit for urine drug screen with his primary care. I did offer him a list of alternate pain clinics which he accepted, but informed him that urine drug screening is expected at any facility for chronic opioid use. It would be reasonable to try an extended release formation with decrease in IR oxycodone dose for improved pain control, however, patient should certainly not be using alcohol concurrently.   "  4. Follow up: RTC in 8-12 weeks or as needed.     Since his last visit, Boom Kahn reports:  As patient was declined opiate therapy, he sought care through [facility] and was originally continued on his regimen of oxycodone 5 mg, max of 6-7 tablets daily, however, due to his admittance of continued alcohol use, he was discontinued from opiate therapy as of 7/19. He has returned to our clinic today to discuss options. He admits he has resumed use of \"left-over\" prednisone to assist with his wound/leg pain. He describes his greatest concern as \"the inflammation\". Continues to use mometasone at distal edges of wound; wound clinic recently ruled out any concern for infection. He performs wet to dry dressings with hydrogel. Also continues on maximum dose of gabapentin.     Assessment:  1. Opioid dependence with opioid-induced disorder (HRC): We discussed that he should never drink alcohol while on chronic opioids. Last week he agreed to do so, however Patient is now unable/unwilling to stop drinking. Given this situation I do not feel that it is safe to prescribe him any more opioids, even for a taper. Discussed possible treatments for opioid withdrawal. Given his low blood pressures I do not feel that clonidine would be safe. We provided a prescription for Zofran. He was also instructed to present to [facility] where there is a detox unit for acute withdrawal patients if he goes into significant opioid withdrawal.  2. Ischemic pain of foot, bilateral   3. Ischemic ulcer  4. ? Wound infection: Patient was evaluated in clinic last week and was thought not to have any sydni infection. He is going to follow closely with wound clinic going forward.  5. Alcoholism: Reported history of alcohol withdrawal symptoms during her hospitalization. Daily alcohol use which patient states is between 4-6 beers. I have suspicion that he is understating his use. He has been unable to quit drinking since last " seen.  6. Extremely high risk patient    Barriers:  1. Chronic opioid use   2. Alcohol use   3. Smoker     Plan:   1. Patient education: I went over the above diagnoses and treatment plan with him and answered all of his questions.  2. Imaging review: None   3. Exercise program: Regular exercise and activity as tolerated.   4. Medications: Titrate gabapentin up to 1200 mg TID. Discontinue opioid use. Zofran prescribed for withdrawal symptoms.   5. Imaging Orders: None   6. Interventions: None   Based on history and physical exam, unlikely tumor or mass causing pain  7. Referrals: None   8. Follow up: 4 weeks     Medications:  Current Outpatient Prescriptions   Medication Sig Dispense Refill     acetic acid 0.25 % irrigation Irrigate with as directed 2 times daily 10 mL 0     ALPRAZolam (XANAX) 0.5 MG tablet TK 1 T PO TID PRN  0     ascorbic acid 500 MG TABS Take 1 tablet (500 mg) by mouth daily 7 tablet 0     ASPIRIN EC PO Take 81 mg by mouth daily       carvedilol (COREG) 6.25 MG tablet Take 1 tablet (6.25 mg) by mouth 2 times daily (with meals) 180 tablet 3     ciprofloxacin (CIPRO) 500 MG tablet Take 1 tablet (500 mg) by mouth 2 times daily 14 tablet 0     clindamycin (CLEOCIN) 300 MG capsule Take 1 capsule (300 mg) by mouth 4 times daily 28 capsule 0     clopidogrel (PLAVIX) 75 MG tablet Take 1 tablet (75 mg) by mouth daily 30 tablet 0     diclofenac (VOLTAREN) 1 % GEL topical gel Place 4 g onto the skin 4 times daily 1 Tube 0     docusate sodium (COLACE) 100 MG capsule Take 1 capsule (100 mg) by mouth daily 60 capsule 0     enalapril (VASOTEC) 2.5 MG tablet Take 1 tablet (2.5 mg) by mouth 2 times daily 60 tablet 11     escitalopram (LEXAPRO) 20 MG tablet Take 1 tablet (20 mg) by mouth daily 15 tablet 0     ezetimibe (ZETIA) 10 MG tablet Take 1 tablet (10 mg) by mouth daily 30 tablet 0     fentaNYL (DURAGESIC) 25 mcg/hr 72 hr patch Place 1 patch onto the skin every 72 hours remove old patch.       folic acid  (FOLVITE) 1 MG tablet Take 1 tablet (1 mg) by mouth daily 30 tablet 0     furosemide (LASIX) 20 MG tablet Take 1 tablet (20 mg) by mouth 2 times daily (with meals) 60 tablet 3     gabapentin (NEURONTIN) 300 MG capsule Take 1 capsule (300 mg) by mouth 2 times daily 60 capsule 1     gabapentin (NEURONTIN) 600 MG tablet Take 1 tablet (600 mg) by mouth 3 times daily Increase per clinic directions. 30 tablet 1     gabapentin 8 % GEL topical PLO cream Apply 1 g topically every 8 hours Do NOT apply to open wounds 100 g 1     isosorbide mononitrate (IMDUR) 30 MG 24 hr tablet Take 1 tablet (30 mg) by mouth daily 90 tablet 3     levofloxacin (LEVAQUIN) 500 MG tablet   0     Lidocaine 4 % GEL Externally apply topically 2 times daily 30 g 1     methocarbamol (ROBAXIN) 500 MG tablet   1     mometasone (ELOCON) 0.1 % ointment Apply affected area daily 45 g 3     multivitamin, therapeutic with minerals (THERA-VIT-M) TABS tablet Take 1 tablet by mouth daily 30 each 0     nitroGLYcerin (NITROSTAT) 0.4 MG sublingual tablet SHARON MAY REPEAT Q 5 MINUTES X 2  2     nystatin-triamcinolone (MYCOLOG) ointment Apply topically 2 times daily 60 g 1     order for DME Equipment being ordered: Walker Wheels () and Walker ()  Treatment Diagnosis: impaired mobility 1 each 0     oxyCODONE IR (ROXICODONE) 5 MG tablet Take 1 tablet (5 mg) by mouth every 6 hours as needed for pain 20 tablet 0     polyethylene glycol (MIRALAX/GLYCOLAX) Packet Take 17 g by mouth daily 7 packet 0     predniSONE (DELTASONE) 10 MG tablet Take 1 tablet (10 mg) by mouth daily 30 tablet 0     rosuvastatin (CRESTOR) 20 MG tablet Take 1 tablet (20 mg) by mouth daily 30 tablet 11     spironolactone (ALDACTONE) 25 MG tablet TK 1 T PO D  4     vitamin A 10020 UNIT capsule Take 2 capsules (20,000 Units) by mouth daily 7 capsule 0     zinc sulfate (ZINCATE) 220 (50 ZN) MG capsule Take 1 capsule (220 mg) by mouth daily 7 capsule 0       Medical History: any changes in medical  history since they were last seen? No    Review of Systems:  The 14 system ROS was reviewed from the intake questionnaire; results listed at end of note.     Physical Exam:  Blood pressure 97/63, pulse 76, SpO2 97 %.  General: NAD; found sleeping upright in chair of exam room.   Psych: Mood and affect appropriate.   Neuro: Alert, oriented; speech is clear.   Gait: Antalgic  MSK exam: Intact edges of wounds with surrounding erythema and blanching.     Assessment:   Boom Kahn is a 68 year old male who is seen at the pain clinic for painful bilateral arterial shin ulcers in the setting of peripheral arterial disease. Etoh abuse with recent discontinuation from opiate regimen due to high risk behavior.     Plan:  1. Interventions:   -Right lumbar sympathetic block recommended and ordered to assist with dilation and hopeful pain relief; Tamiko Mcduffie PA-C was messaged to ensure there are no contraindications from wound care's perspective.   2. Medication Management:   -Morphine solosite 1% gel prescribed; may apply directly to wound Q4-6 hours as needed with dressing changes.   -Start meloxicam 7.5 mg in effort to assist with inflammatory response. Advised patient he should likely discontinue use of prednisone; discuss implications with wound clinic.   3. Follow up:   -RTC 4 weeks following procedure.     Total time spent was 25 minutes, and more than 50% of face to face time was spent in counseling and/or coordination of care regarding plan of care.    DEDRA Segura, CODY-BC  Pain Management  Department of Interventional Pain Management and Anesthesiology   [facility]

## 2018-08-08 NOTE — LETTER
8/8/2018       RE: Boom Kahn  [address] [place] [address] [place] [address]     Dear Colleague,    Thank you for referring your patient, Boom Kahn, to the [facility] at [facility] Please see a copy of my visit note below.    Date of visit: 8/8/2018    Chief complaint:   Chief Complaint   Patient presents with     RECHECK     Would like to discuss medication therapy for bilateral legs       Interval history:  Boom Kahn is a 68 year old male last seen by me on 5/21/18 for painful peripheral arterial disease and non-healing ulcers, s/p fem-PT bypass. He has a  past medical history of peripheral artery disease, congestive heart failure s/p ICD placement, COPD, lung cancer s/p radiation therapy, CAD s/p CABG.    Recommendations/plan at the last visit included:  1. Patient education: I went over the above diagnoses and treatment plan with him and answered all of his questions.  2. Interventions  -could consider lumbar sympathetic block to assist with dilation and hopeful pain relief; this was discussed, but patient did not want to pursue this option today.   3. Medications  1. Increase gabapentin to 600/600/600 mg/day; refill provided. May consider additional dose increase.   -Contacted wound clinic provider, Tamiko Mcduffie PA-C, to question if topical compound in PLO gel (gabapentin-ketamine-lidocaine) wound interfere with wound healing.   Addendum: Did receive information from Tamiko Mcduffie that the topical can be utilized on surrounding tissue with avoidance of open wound. Ordered topical gabapentin in PLO due to higher likelihood of insurance coverage.                    -Declined to prescribe patient's ongoing opioid prescriptions; I discussed with him that he should follow with chemical dependency referral. He also had refused to submit for urine drug screen with his primary care. I did offer him a list of alternate pain clinics  "which he accepted, but informed him that urine drug screening is expected at any facility for chronic opioid use. It would be reasonable to try an extended release formation with decrease in IR oxycodone dose for improved pain control, however, patient should certainly not be using alcohol concurrently.    4. Follow up: RTC in 8-12 weeks or as needed.     Since his last visit, Boom Kahn reports:  As patient was declined opiate therapy, he sought care through [facility] and was originally continued on his regimen of oxycodone 5 mg, max of 6-7 tablets daily, however, due to his admittance of continued alcohol use, he was discontinued from opiate therapy as of 7/19. He has returned to our clinic today to discuss options. He admits he has resumed use of \"left-over\" prednisone to assist with his wound/leg pain. He describes his greatest concern as \"the inflammation\". Continues to use mometasone at distal edges of wound; wound clinic recently ruled out any concern for infection. He performs wet to dry dressings with hydrogel. Also continues on maximum dose of gabapentin.     Assessment:  1. Opioid dependence with opioid-induced disorder (HRC): We discussed that he should never drink alcohol while on chronic opioids. Last week he agreed to do so, however Patient is now unable/unwilling to stop drinking. Given this situation I do not feel that it is safe to prescribe him any more opioids, even for a taper. Discussed possible treatments for opioid withdrawal. Given his low blood pressures I do not feel that clonidine would be safe. We provided a prescription for Zofran. He was also instructed to present to [facility] where there is a detox unit for acute withdrawal patients if he goes into significant opioid withdrawal.  2. Ischemic pain of foot, bilateral   3. Ischemic ulcer  4. ? Wound infection: Patient was evaluated in clinic last week and was thought not to have any sydni infection. He is " going to follow closely with wound clinic going forward.  5. Alcoholism: Reported history of alcohol withdrawal symptoms during her hospitalization. Daily alcohol use which patient states is between 4-6 beers. I have suspicion that he is understating his use. He has been unable to quit drinking since last seen.  6. Extremely high risk patient    Barriers:  1. Chronic opioid use   2. Alcohol use   3. Smoker     Plan:   1. Patient education: I went over the above diagnoses and treatment plan with him and answered all of his questions.  2. Imaging review: None   3. Exercise program: Regular exercise and activity as tolerated.   4. Medications: Titrate gabapentin up to 1200 mg TID. Discontinue opioid use. Zofran prescribed for withdrawal symptoms.   5. Imaging Orders: None   6. Interventions: None   Based on history and physical exam, unlikely tumor or mass causing pain  7. Referrals: None   8. Follow up: 4 weeks     Medications:  Current Outpatient Prescriptions   Medication Sig Dispense Refill     acetic acid 0.25 % irrigation Irrigate with as directed 2 times daily 10 mL 0     ALPRAZolam (XANAX) 0.5 MG tablet TK 1 T PO TID PRN  0     ascorbic acid 500 MG TABS Take 1 tablet (500 mg) by mouth daily 7 tablet 0     ASPIRIN EC PO Take 81 mg by mouth daily       carvedilol (COREG) 6.25 MG tablet Take 1 tablet (6.25 mg) by mouth 2 times daily (with meals) 180 tablet 3     ciprofloxacin (CIPRO) 500 MG tablet Take 1 tablet (500 mg) by mouth 2 times daily 14 tablet 0     clindamycin (CLEOCIN) 300 MG capsule Take 1 capsule (300 mg) by mouth 4 times daily 28 capsule 0     clopidogrel (PLAVIX) 75 MG tablet Take 1 tablet (75 mg) by mouth daily 30 tablet 0     diclofenac (VOLTAREN) 1 % GEL topical gel Place 4 g onto the skin 4 times daily 1 Tube 0     docusate sodium (COLACE) 100 MG capsule Take 1 capsule (100 mg) by mouth daily 60 capsule 0     enalapril (VASOTEC) 2.5 MG tablet Take 1 tablet (2.5 mg) by mouth 2 times daily 60  tablet 11     escitalopram (LEXAPRO) 20 MG tablet Take 1 tablet (20 mg) by mouth daily 15 tablet 0     ezetimibe (ZETIA) 10 MG tablet Take 1 tablet (10 mg) by mouth daily 30 tablet 0     fentaNYL (DURAGESIC) 25 mcg/hr 72 hr patch Place 1 patch onto the skin every 72 hours remove old patch.       folic acid (FOLVITE) 1 MG tablet Take 1 tablet (1 mg) by mouth daily 30 tablet 0     furosemide (LASIX) 20 MG tablet Take 1 tablet (20 mg) by mouth 2 times daily (with meals) 60 tablet 3     gabapentin (NEURONTIN) 300 MG capsule Take 1 capsule (300 mg) by mouth 2 times daily 60 capsule 1     gabapentin (NEURONTIN) 600 MG tablet Take 1 tablet (600 mg) by mouth 3 times daily Increase per clinic directions. 30 tablet 1     gabapentin 8 % GEL topical PLO cream Apply 1 g topically every 8 hours Do NOT apply to open wounds 100 g 1     isosorbide mononitrate (IMDUR) 30 MG 24 hr tablet Take 1 tablet (30 mg) by mouth daily 90 tablet 3     levofloxacin (LEVAQUIN) 500 MG tablet   0     Lidocaine 4 % GEL Externally apply topically 2 times daily 30 g 1     methocarbamol (ROBAXIN) 500 MG tablet   1     mometasone (ELOCON) 0.1 % ointment Apply affected area daily 45 g 3     multivitamin, therapeutic with minerals (THERA-VIT-M) TABS tablet Take 1 tablet by mouth daily 30 each 0     nitroGLYcerin (NITROSTAT) 0.4 MG sublingual tablet SHARON MAY REPEAT Q 5 MINUTES X 2  2     nystatin-triamcinolone (MYCOLOG) ointment Apply topically 2 times daily 60 g 1     order for DME Equipment being ordered: Walker Wheels () and Walker ()  Treatment Diagnosis: impaired mobility 1 each 0     oxyCODONE IR (ROXICODONE) 5 MG tablet Take 1 tablet (5 mg) by mouth every 6 hours as needed for pain 20 tablet 0     polyethylene glycol (MIRALAX/GLYCOLAX) Packet Take 17 g by mouth daily 7 packet 0     predniSONE (DELTASONE) 10 MG tablet Take 1 tablet (10 mg) by mouth daily 30 tablet 0     rosuvastatin (CRESTOR) 20 MG tablet Take 1 tablet (20 mg) by mouth daily  30 tablet 11     spironolactone (ALDACTONE) 25 MG tablet TK 1 T PO D  4     vitamin A 99827 UNIT capsule Take 2 capsules (20,000 Units) by mouth daily 7 capsule 0     zinc sulfate (ZINCATE) 220 (50 ZN) MG capsule Take 1 capsule (220 mg) by mouth daily 7 capsule 0       Medical History: any changes in medical history since they were last seen? No    Review of Systems:  The 14 system ROS was reviewed from the intake questionnaire; results listed at end of note.     Physical Exam:  Blood pressure 97/63, pulse 76, SpO2 97 %.  General: NAD; found sleeping upright in chair of exam room.   Psych: Mood and affect appropriate.   Neuro: Alert, oriented; speech is clear.   Gait: Antalgic  MSK exam: Intact edges of wounds with surrounding erythema and blanching.     Assessment:   Boom Kahn is a 68 year old male who is seen at the pain clinic for painful bilateral arterial shin ulcers in the setting of peripheral arterial disease. Etoh abuse with recent discontinuation from opiate regimen due to high risk behavior.     Plan:  1. Interventions:   -Right lumbar sympathetic block recommended and ordered to assist with dilation and hopeful pain relief; Tamiko Mcduffie PA-C was messaged to ensure there are no contraindications from wound care's perspective.   2. Medication Management:   -Morphine solosite 1% gel prescribed; may apply directly to wound Q4-6 hours as needed with dressing changes.   -Start meloxicam 7.5 mg in effort to assist with inflammatory response. Advised patient he should likely discontinue use of prednisone; discuss implications with wound clinic.   3. Follow up:   -RTC 4 weeks following procedure.     Total time spent was 25 minutes, and more than 50% of face to face time was spent in counseling and/or coordination of care regarding plan of care.      Again, thank you for allowing me to participate in the care of your patient.      Sincerely,    DEDRA Segura CNP

## 2018-08-08 NOTE — PATIENT INSTRUCTIONS
1. Meloxicam 7.5 mg prescribed. Take one tablet every morning.     2. Morphine gel prescribed- Apply small amount, 1 g, every 4 hours as needed with dressing changes.    We also messaged your Wound Care team to be sure that they are agreeable to this course of treatment.   You will need to follow up in clinic for all refills of this medication.    3. Call to schedule your injection.     When calling to schedule your procedure appointment, also make your clinic appointment to follow up 4 weeks after the procedure.    Please call [phone] to schedule, reschedule, or cancel your procedure appointment.   Phones are answered Monday - Friday from 7:30 - 4:00pm.  Leave a voicemail with your name, birth date, and phone number if no one is available to take your call.     Your procedure: Right sided Lumbar Sympathetic Nerve Blocks    On the day of the procedure  1. Arrive 1 hour earlier than your scheduled time, to the [facility]  Address: [address] [place] [address]  2. Check in on the 5th floor for your procedure    Our phone number is [phone].  Our fax number is [phone].    If you must reschedule your procedure more than two times, you must follow up in clinic before rescheduling again.      Patient Pre-Procedure Instructions    CAUTION - FAILURE TO FOLLOW THESE PRE-PROCEDURE INSTRUCTIONS WILL RESULT IN YOUR PROCEDURE BEING RESCHEDULED.            You MUST have a  TO TAKE YOU HOME after your procedure. Transportation by Taxi or Para-transit must have a responsible adult accompany you home (other than the ). Travel by bus or light rail is not acceptable transportation. You must provide your 's full name and contact number at time of check in.   Fasting Protocol You may have NOTHING SOLID TO EAT FOR 8 HOURS prior to arrival at the procedure area.   Broth and candy are considered solid food and require an eight hour fast.   You may have CLEAR LIQUIDS  UP TO 2 HOURS prior to arrival at the clinic.   Clear liquids include water, clear fruit juice (no pulp) carbonated beverages, ice, black coffee, black tea (no milk or cream), chewing gum (un-swallowed), and/or clear jello (no fruit or milk). No alcohol containing beverages.   Medications If you take any medications,  DO NOT STOP. Take your medications as usual the morning of your procedure with a sip of water AT LEAST 2 HOURS PRIOR TO ARRIVAL.    Antibiotics If you are currently taking antibiotics, you must complete the entire dose 7 days prior to your scheduled procedure. You must be clear of any signs or symptoms of infection. If you begin antibiotics, please contact our clinic for instructions.   Fever, Chills, or Rash If you experience a fever of higher than 100 degrees, chills, rash, or open wounds during the one week prior to your procedure, please call the clinic.         Medication Hold List  **Patients under Cardiology/Neurology care should consult their provider prior to the pain procedure to verify pre-procedure medication instructions. The information below contains general guidelines.**    Blood Thinners If you are taking daily ASPIRIN, PLAVIX, OR OTHER BLOOD THINNERS SUCH AS COUMADIN/WARFARIN, we will need your prescribing doctor to sign a release permitting you to stop these medications. Once approved by your prescribing doctor - STOP ALL BLOOD THINNERS BASED ON THE TIME TABLE BELOW PRIOR TO YOUR PROCEDURE. If you have been instructed to stop WARFARIN(COUMADIN), you must have an INR lab drawn the day before your procedure. . Your INR must be within normal limits before we can perform your injection. MEDICATIONS CAN BE RESTARTED AFTER YOUR PROCEDURE.    14 DAY HOLD  Ticlid (ticlopidine)    10 DAY HOLD  Effient (Prasugel)    3 DAY HOLD  Xarelto (rivaroxaban) 7 DAY HOLD  Anacin, Bufferin, Ecotrin, Excedrin, Aggrenox (Aspirin)  Brilinta (ticagrelor)  Coumadin (Warfarin)  Pradexa (Dabigatran)  Elmiron  (Pentosan)  Plavix (Clopidogrel Bisulfate)  Pletal (Cilostazol)    24 HOUR HOLD  Lovenox (enoxaparin)  Agrylin (Anagrelide)                To speak with a nurse, schedule/reschedule/cancel a clinic appointment, or request a medication refill call: [phone]     You can also reach us by [email or web address]: [email or web address]    For refills, please call on Monday, 1 week before your medication runs out. The doctors are not always in clinic, so this gives us time to get your prescriptions ready.  Please let us know the name of the medication you are requesting a refill of.

## 2018-08-08 NOTE — MR AVS SNAPSHOT
After Visit Summary   8/8/2018    Boom Kahn    MRN: [id number]           Patient Information     Date Of Birth          1950        Visit Information        Provider Department      8/8/2018 1:10 PM Cari Childress APRN [facility]        Today's Diagnoses     Arterial leg ulcer (H)    -  1    Peripheral arterial disease (H)          Care Instructions    1. Meloxicam 7.5 mg prescribed. Take one tablet every morning.     2. Morphine gel prescribed- Apply small amount, 1 g, every 4 hours as needed with dressing changes.    We also messaged your Wound Care team to be sure that they are agreeable to this course of treatment.   You will need to follow up in clinic for all refills of this medication.    3. Call to schedule your injection.     When calling to schedule your procedure appointment, also make your clinic appointment to follow up 4 weeks after the procedure.    Please call [phone] to schedule, reschedule, or cancel your procedure appointment.   Phones are answered Monday - Friday from 7:30 - 4:00pm.  Leave a voicemail with your name, birth date, and phone number if no one is available to take your call.     Your procedure: Right sided Lumbar Sympathetic Nerve Blocks    On the day of the procedure  1. Arrive 1 hour earlier than your scheduled time, to the [facility]  Address: [address] [place] [address]  2. Check in on the 5th floor for your procedure    Our phone number is [phone].  Our fax number is [phone].    If you must reschedule your procedure more than two times, you must follow up in clinic before rescheduling again.      Patient Pre-Procedure Instructions    CAUTION - FAILURE TO FOLLOW THESE PRE-PROCEDURE INSTRUCTIONS WILL RESULT IN YOUR PROCEDURE BEING RESCHEDULED.            You MUST have a  TO TAKE YOU HOME after your procedure. Transportation by Taxi or Para-transit must have  a responsible adult accompany you home (other than the ). Travel by bus or light rail is not acceptable transportation. You must provide your 's full name and contact number at time of check in.   Fasting Protocol You may have NOTHING SOLID TO EAT FOR 8 HOURS prior to arrival at the procedure area.   Broth and candy are considered solid food and require an eight hour fast.   You may have CLEAR LIQUIDS UP TO 2 HOURS prior to arrival at the clinic.   Clear liquids include water, clear fruit juice (no pulp) carbonated beverages, ice, black coffee, black tea (no milk or cream), chewing gum (un-swallowed), and/or clear jello (no fruit or milk). No alcohol containing beverages.   Medications If you take any medications,  DO NOT STOP. Take your medications as usual the morning of your procedure with a sip of water AT LEAST 2 HOURS PRIOR TO ARRIVAL.    Antibiotics If you are currently taking antibiotics, you must complete the entire dose 7 days prior to your scheduled procedure. You must be clear of any signs or symptoms of infection. If you begin antibiotics, please contact our clinic for instructions.   Fever, Chills, or Rash If you experience a fever of higher than 100 degrees, chills, rash, or open wounds during the one week prior to your procedure, please call the clinic.         Medication Hold List  **Patients under Cardiology/Neurology care should consult their provider prior to the pain procedure to verify pre-procedure medication instructions. The information below contains general guidelines.**    Blood Thinners If you are taking daily ASPIRIN, PLAVIX, OR OTHER BLOOD THINNERS SUCH AS COUMADIN/WARFARIN, we will need your prescribing doctor to sign a release permitting you to stop these medications. Once approved by your prescribing doctor - STOP ALL BLOOD THINNERS BASED ON THE TIME TABLE BELOW PRIOR TO YOUR PROCEDURE. If you have been instructed to stop WARFARIN(COUMADIN), you must have an INR  lab drawn the day before your procedure. . Your INR must be within normal limits before we can perform your injection. MEDICATIONS CAN BE RESTARTED AFTER YOUR PROCEDURE.    14 DAY HOLD  Ticlid (ticlopidine)    10 DAY HOLD  Effient (Prasugel)    3 DAY HOLD  Xarelto (rivaroxaban) 7 DAY HOLD  Anacin, Bufferin, Ecotrin, Excedrin, Aggrenox (Aspirin)  Brilinta (ticagrelor)  Coumadin (Warfarin)  Pradexa (Dabigatran)  Elmiron (Pentosan)  Plavix (Clopidogrel Bisulfate)  Pletal (Cilostazol)    24 HOUR HOLD  Lovenox (enoxaparin)  Agrylin (Anagrelide)                To speak with a nurse, schedule/reschedule/cancel a clinic appointment, or request a medication refill call: [phone]     You can also reach us by Lindsey Shell: [email or web address]    For refills, please call on Monday, 1 week before your medication runs out. The doctors are not always in clinic, so this gives us time to get your prescriptions ready.  Please let us know the name of the medication you are requesting a refill of.                                     Follow-ups after your visit        Your next 10 appointments already scheduled     Aug 09, 2018  2:30 PM CDT   (Arrive by 2:15 PM)   Return Visit with Tamiko Mcduffie PA-C   [facility] ([facility])    [address] [id number] [phone]            Aug 31, 2018 11:00 AM CDT   CT CHEST W CONTRAST with UUCT1   [facility] [place] ([facility])    [address] [facility] [id number] [phone]           Please bring any scans or X-rays taken at other hospitals, if similar tests were done. Also bring a list of your medicines, including vitamins, minerals and over-the-counter drugs. It is safest to leave personal items at home.  Be sure to tell your doctor:   If you have any allergies.   If there s any chance you are pregnant.   If you are breastfeeding.    If you have  diabetes as your medication may need to be adjusted for this exam.  You will have contrast for this exam. To prepare:   Do not eat or drink for 2 hours before your exam. If you need to take medicine, you may take it with small sips of water. (We may ask you to take liquid medicine as well.)   The day before your exam, drink extra fluids at least six 8-ounce glasses (unless your doctor tells you to restrict your fluids).  Patients over 70 or patients with diabetes or kidney problems:   If you haven t had a blood test (creatinine test) within the last 30 days, the Cardiologist/Radiologist may require you to get this test prior to your exam.  Please wear loose clothing, such as a sweat suit or jogging clothes. Avoid snaps, zippers and other metal. We may ask you to undress and put on a hospital gown.  If you have any questions, please call the Imaging Department where you will have your exam.            Aug 31, 2018  2:00 PM CDT   (Arrive by 1:45 PM)   Return Visit with Dave Del Rio MD   [facility] ([facility])    [address] [place] [address]  [address] [place] [id number] [phone]              Who to contact     Please call your clinic at [phone] to:    Ask questions about your health    Make or cancel appointments    Discuss your medicines    Learn about your test results    Speak to your doctor            Additional Information About Your Visit        [employer] Information     [employer] gives you secure access to your electronic health record. If you see a primary care provider, you can also send messages to your care team and make appointments. If you have questions, please call your primary care clinic.  If you do not have a primary care provider, please call [phone] and they will assist you.      [employer] is an electronic gateway that provides easy, online access to your medical records. With [employer], you can request a clinic appointment, read your test  results, renew a prescription or communicate with your care team.     To access your existing account, please contact your [facility] or call [phone] for assistance.        Care EveryWhere ID     This is your Care EveryWhere ID. This could be used by other organizations to access your [facility] medical records  [id number]        Your Vitals Were     Pulse Pulse Oximetry                76 97%           Blood Pressure from Last 3 Encounters:   08/08/18 97/63   07/30/18 112/70   06/14/18 106/68    Weight from Last 3 Encounters:   07/30/18 67.6 kg (149 lb)   06/14/18 64.9 kg (143 lb)   06/05/18 65.3 kg (143 lb 14.4 oz)              Today, you had the following     No orders found for display         Today's Medication Changes          These changes are accurate as of 8/8/18  2:20 PM.  If you have any questions, ask your nurse or doctor.               Start taking these medicines.        Dose/Directions    meloxicam 7.5 MG tablet   Commonly known as:  MOBIC   Used for:  Arterial leg ulcer (H)   Started by:  Cari Childress APRN CNP        Dose:  7.5 mg   Take 1 tablet (7.5 mg) by mouth daily   Quantity:  30 tablet   Refills:  0       morphine 0.1% in solosite topical gel   Used for:  Arterial leg ulcer (H)   Started by:  Cari Childress APRN CNP        Dose:  1 g   Place 1 g onto the skin every 4 hours as needed   Quantity:  100 g   Refills:  0            Where to get your medicines      These medications were sent to [facility] [id number] [place] [address]  AT [address] [facility] [address]     Phone:  [phone]     meloxicam 7.5 MG tablet         Some of these will need a paper prescription and others can be bought over the counter.  Ask your nurse if you have questions.     Bring a paper prescription for each of these medications     morphine 0.1% in solosite topical gel               Information about  OPIOIDS     PRESCRIPTION OPIOIDS: WHAT YOU NEED TO KNOW   We gave you an opioid (narcotic) pain medicine. It is important to manage your pain, but opioids are not always the best choice. You should first try all the other options your care team gave you. Take this medicine for as short a time (and as few doses) as possible.    Some activities can increase your pain, such as bandage changes or therapy sessions. It may help to take your pain medicine 30 to 60 minutes before these activities. Reduce your stress by getting enough sleep, working on hobbies you enjoy and practicing relaxation or meditation. Talk to your care team about ways to manage your pain beyond prescription opioids.    These medicines have risks:    DO NOT drive when on new or higher doses of pain medicine. These medicines can affect your alertness and reaction times, and you could be arrested for driving under the influence (DUI). If you need to use opioids long-term, talk to your care team about driving.    DO NOT operate heavy machinery    DO NOT do any other dangerous activities while taking these medicines.    DO NOT drink any alcohol while taking these medicines.     If the opioid prescribed includes acetaminophen, DO NOT take with any other medicines that contain acetaminophen. Read all labels carefully. Look for the word  acetaminophen  or  Tylenol.  Ask your pharmacist if you have questions or are unsure.    You can get addicted to pain medicines, especially if you have a history of addiction (chemical, alcohol or substance dependence). Talk to your care team about ways to reduce this risk.    All opioids tend to cause constipation. Drink plenty of water and eat foods that have a lot of fiber, such as fruits, vegetables, prune juice, apple juice and high-fiber cereal. Take a laxative (Miralax, milk of magnesia, Colace, Senna) if you don t move your bowels at least every other day. Other side effects include upset stomach, sleepiness,  dizziness, throwing up, tolerance (needing more of the medicine to have the same effect), physical dependence and slowed breathing.    Store your pills in a secure place, locked if possible. We will not replace any lost or stolen medicine. If you don t finish your medicine, please throw away (dispose) as directed by your pharmacist. The [place] Pollution Control Agency has more information about safe disposal: [email or web address]/[email or web address]-medications         Primary Care Provider Office Phone # Fax #    Willian Arrington [phone]       [facility] [address]  [place] [id number]        Equal Access to Services     TANJA DORADO : Hadii vishnu soares hadasho Sokyleali, waaxda luqadaha, qaybta kaalmada adeludwigyada, vijay castillo . So [facility] [phone].    ATENCIÓN: Si habla español, tiene a bhatia disposición servicios gratuitos de asistencia lingüística. Llame al [phone].    We comply with applicable federal civil rights laws and [place] laws. We do not discriminate on the basis of race, color, national origin, age, disability, sex, sexual orientation, or gender identity.            Thank you!     Thank you for choosing [facility]  for your care. Our goal is always to provide you with excellent care. Hearing back from our patients is one way we can continue to improve our services. Please take a few minutes to complete the written survey that you may receive in the mail after your visit with us. Thank you!             Your Updated Medication List - Protect others around you: Learn how to safely use, store and throw away your medicines at www.disposemymeds.org.          This list is accurate as of 8/8/18  2:20 PM.  Always use your most recent med list.                   Brand Name Dispense Instructions for use Diagnosis    acetic acid 0.25 % irrigation     10 mL    Irrigate with as directed 2  times daily    S/P vascular surgery       ALPRAZolam 0.5 MG tablet    XANAX     TK 1 T PO TID PRN        ascorbic acid 500 MG Tabs     7 tablet    Take 1 tablet (500 mg) by mouth daily    Takes dietary supplements       ASPIRIN EC PO      Take 81 mg by mouth daily        carvedilol 6.25 MG tablet    COREG    180 tablet    Take 1 tablet (6.25 mg) by mouth 2 times daily (with meals)    Ischemic cardiomyopathy       ciprofloxacin 500 MG tablet    CIPRO    14 tablet    Take 1 tablet (500 mg) by mouth 2 times daily    Cellulitis and abscess of leg       clindamycin 300 MG capsule    CLEOCIN    28 capsule    Take 1 capsule (300 mg) by mouth 4 times daily    Cellulitis and abscess of leg       clopidogrel 75 MG tablet    PLAVIX    30 tablet    Take 1 tablet (75 mg) by mouth daily    Ischemic cardiomyopathy       diclofenac 1 % Gel topical gel    VOLTAREN    1 Tube    Place 4 g onto the skin 4 times daily    Pain of left lower extremity       docusate sodium 100 MG capsule    COLACE    60 capsule    Take 1 capsule (100 mg) by mouth daily    Takes dietary supplements       enalapril 2.5 MG tablet    VASOTEC    60 tablet    Take 1 tablet (2.5 mg) by mouth 2 times daily    Ischemic cardiomyopathy       escitalopram 20 MG tablet    LEXAPRO    15 tablet    Take 1 tablet (20 mg) by mouth daily    S/P vascular surgery       ezetimibe 10 MG tablet    ZETIA    30 tablet    Take 1 tablet (10 mg) by mouth daily    Ischemic cardiomyopathy       folic acid 1 MG tablet    FOLVITE    30 tablet    Take 1 tablet (1 mg) by mouth daily    Takes dietary supplements       furosemide 20 MG tablet    LASIX    60 tablet    Take 1 tablet (20 mg) by mouth 2 times daily (with meals)    S/P vascular surgery       * gabapentin 300 MG capsule    NEURONTIN    60 capsule    Take 1 capsule (300 mg) by mouth 2 times daily    S/P vascular surgery       * gabapentin 600 MG tablet    NEURONTIN    30 tablet    Take 1 tablet (600 mg) by mouth 3 times daily  Increase per clinic directions.        gabapentin 8 % Gel topical PLO cream     100 g    Apply 1 g topically every 8 hours Do NOT apply to open wounds    Peripheral arterial disease (H), Non-healing wound of lower extremity, right, initial encounter       isosorbide mononitrate 30 MG 24 hr tablet    IMDUR    90 tablet    Take 1 tablet (30 mg) by mouth daily    Chronic systolic congestive heart failure (H)       levofloxacin 500 MG tablet    LEVAQUIN          Lidocaine 4 % Gel     30 g    Externally apply topically 2 times daily    Cellulitis and abscess of leg       meloxicam 7.5 MG tablet    MOBIC    30 tablet    Take 1 tablet (7.5 mg) by mouth daily    Arterial leg ulcer (H)       methocarbamol 500 MG tablet    ROBAXIN          mometasone 0.1 % ointment    ELOCON    45 g    Apply affected area daily    Skin inflammation       morphine 0.1% in solosite topical gel     100 g    Place 1 g onto the skin every 4 hours as needed    Arterial leg ulcer (H)       multivitamin, therapeutic with minerals Tabs tablet     30 each    Take 1 tablet by mouth daily    Takes dietary supplements       nitroGLYcerin 0.4 MG sublingual tablet    NITROSTAT     SHARON MAY REPEAT Q 5 MINUTES X 2        nystatin-triamcinolone ointment    MYCOLOG    60 g    Apply topically 2 times daily    Dermatitis       order for DME     1 each    Equipment being ordered: Walker Wheels () and Walker () Treatment Diagnosis: impaired mobility    Pain of left lower extremity       oxyCODONE IR 5 MG tablet    ROXICODONE    20 tablet    Take 1 tablet (5 mg) by mouth every 6 hours as needed for pain        polyethylene glycol Packet    MIRALAX/GLYCOLAX    7 packet    Take 17 g by mouth daily    Takes dietary supplements       predniSONE 10 MG tablet    DELTASONE    30 tablet    Take 1 tablet (10 mg) by mouth daily    S/P vascular surgery       rosuvastatin 20 MG tablet    CRESTOR    30 tablet    Take 1 tablet (20 mg) by mouth daily    Ischemic  cardiomyopathy       spironolactone 25 MG tablet    ALDACTONE     TK 1 T PO D        vitamin A 27326 UNIT capsule     7 capsule    Take 2 capsules (20,000 Units) by mouth daily    Takes dietary supplements       zinc sulfate 220 (50 Zn) MG capsule    ZINCATE    7 capsule    Take 1 capsule (220 mg) by mouth daily    Takes dietary supplements       * Notice:  This list has 2 medication(s) that are the same as other medications prescribed for you. Read the directions carefully, and ask your doctor or other care provider to review them with you.

## 2018-08-09 ENCOUNTER — OFFICE VISIT (OUTPATIENT)
Dept: WOUND CARE | Facility: CLINIC | Age: 68
End: 2018-08-09
Payer: COMMERCIAL

## 2018-08-09 DIAGNOSIS — L97.909 ARTERIAL LEG ULCER (H): Primary | ICD-10-CM

## 2018-08-09 ASSESSMENT — PAIN SCALES - GENERAL: PAINLEVEL: SEVERE PAIN (7)

## 2018-08-09 NOTE — NURSING NOTE
Chief Complaint   Patient presents with     Wound Check     2 wk follow up, wound bilateral lower extremities.        There were no vitals filed for this visit.    There is no height or weight on file to calculate BMI.      WOUND EVALUATION: Bilateral lower extremity.     Hui MARIE, MIKEN

## 2018-08-09 NOTE — LETTER
8/9/2018     RE: Boom Kahn  [address] [place] [address] [place] [address]     Dear Colleague,    Thank you for referring your patient, Boom Kahn, to the [facility] WOUND CARE at [facility]. Please see a copy of my visit note below.    Chief Complaint:   Chief Complaint   Patient presents with     Wound Check     2 wk follow up, wound bilateral lower extremities.        Subjective: Boom is a 68 year old male who presents to the clinic today for follow up of arterial leg ulcers. Continuing wet to dry saline dressings daily. Does not tolerate hydrogel. Has appointment with plastic surgeon of health [facility] on Monday for eval of graft and debridement. Saw pain management, recommend morphine gel and meloxicam. Says he tried meloxicam yesterday with no relief. Has not tried morphine gel yet. Considering lumbar block. [place] the wounds are in good enough condition to do so.    Allergies   Allergen Reactions     Betadine [Povidone Iodine] Blisters     Pt reports erythema, increased pain, and blistering when using betadine on R big toe in past     Collagenase Clostridium Histolyticum      Flagyl [Metronidazole] Other (See Comments)     Flu symptoms  Flu like symptoms     Iodine Unknown     Santyl [Collagenase]      Current Outpatient Rx   Medication Sig Dispense Refill     acetic acid 0.25 % irrigation Irrigate with as directed 2 times daily 10 mL 0     ALPRAZolam (XANAX) 0.5 MG tablet TK 1 T PO TID PRN  0     ASPIRIN EC PO Take 81 mg by mouth daily       carvedilol (COREG) 6.25 MG tablet Take 1 tablet (6.25 mg) by mouth 2 times daily (with meals) 180 tablet 3     clopidogrel (PLAVIX) 75 MG tablet Take 1 tablet (75 mg) by mouth daily 30 tablet 0     diclofenac (VOLTAREN) 1 % GEL topical gel Place 4 g onto the skin 4 times daily 1 Tube 0     docusate sodium (COLACE) 100 MG capsule Take 1 capsule (100 mg) by mouth daily 60 capsule 0     enalapril  (VASOTEC) 2.5 MG tablet Take 1 tablet (2.5 mg) by mouth 2 times daily 60 tablet 11     escitalopram (LEXAPRO) 20 MG tablet Take 1 tablet (20 mg) by mouth daily 15 tablet 0     ezetimibe (ZETIA) 10 MG tablet Take 1 tablet (10 mg) by mouth daily 30 tablet 0     furosemide (LASIX) 20 MG tablet Take 1 tablet (20 mg) by mouth 2 times daily (with meals) 60 tablet 3     gabapentin (NEURONTIN) 300 MG capsule Take 1 capsule (300 mg) by mouth 2 times daily 60 capsule 1     gabapentin (NEURONTIN) 600 MG tablet Take 1 tablet (600 mg) by mouth 3 times daily Increase per clinic directions. 30 tablet 1     gabapentin 8 % GEL topical PLO cream Apply 1 g topically every 8 hours Do NOT apply to open wounds 100 g 1     isosorbide mononitrate (IMDUR) 30 MG 24 hr tablet Take 1 tablet (30 mg) by mouth daily 90 tablet 3     Lidocaine 4 % GEL Externally apply topically 2 times daily 30 g 1     meloxicam (MOBIC) 7.5 MG tablet Take 1 tablet (7.5 mg) by mouth daily 30 tablet 0     methocarbamol (ROBAXIN) 500 MG tablet   1     mometasone (ELOCON) 0.1 % ointment Apply affected area daily 45 g 3     nitroGLYcerin (NITROSTAT) 0.4 MG sublingual tablet SHARON MAY REPEAT Q 5 MINUTES X 2  2     nystatin-triamcinolone (MYCOLOG) ointment Apply topically 2 times daily 60 g 1     order for DME Equipment being ordered: Walker Wheels () and Walker ()  Treatment Diagnosis: impaired mobility 1 each 0     predniSONE (DELTASONE) 10 MG tablet Take 1 tablet (10 mg) by mouth daily 30 tablet 0     rosuvastatin (CRESTOR) 20 MG tablet Take 1 tablet (20 mg) by mouth daily 30 tablet 11     spironolactone (ALDACTONE) 25 MG tablet TK 1 T PO D  4       Objective:   There were no vitals taken for this visit.    General: Patient is well groomed, appears stated age, is alert and cooperative, and is in no acute distress.  Skin:  LE skin color, texture and turgor are normal except immediately surrounding wound at distal portion. This is erythematous and exquisitely  painful to light touch, especially at most distal portion.      Wound #1  An arterial ulcer is noted at right  anterior shin measuring 13.5 cm x 3.5 cm x 0.2 cm. Mildly tender to palpation.  Tissue Depth: Tendon  Wound base: Red, Yellow, White/Granulation, tendon. Again, increased granulation tissue.  Edges: intact, inflammed  Drainage: light/serous  Odor: No   Undermining: No  Tunneling: No  Bone Exposure: No  Clinical Signs of Infection: No      Wound #2  An arterial ulcer is noted at left anterior shin measuring 2.5 cm x 3.5 cm x 0.2 cm. Mildly tender to palpation.  Tissue Depth: subcutaneous  Wound base: Pink, Yellow/Granulation, fibrous - possible new tendon exposure on medial side  Edges: intact  Drainage: small/serous  Odor: No   Undermining: No  Tunneling: No  Bone Exposure: No  Clinical Signs of Infection: No      Assessment:   - Chronic bilateral arterial shin ulcers  - Peripheral arterial disease  - Nicotine dependence       Plan:   - No signs of infection, very little slough to remove today. Improvement to proximal right shin wound.  - Minimal debridement today. Devitalized tissue of right shin ulcer was excisionally debrided using a #15 blade to level of tendon. Healthy bleeding occurred with debridement. No anesthesia necessary today.   - Wound Care instructions: Continue with daily saline wet to dry dressings. Does not tolerate hydrogel.  - Await plastic surgeon from [facility] opinion (appointment on Monday)  - RTC 3 weeks    Again, thank you for allowing me to participate in the care of your patient.      Sincerely,    Tamiko Mcduffie PA-C

## 2018-08-09 NOTE — PROGRESS NOTES
Chief Complaint:   Chief Complaint   Patient presents with     Wound Check     2 wk follow up, wound bilateral lower extremities.        Subjective: Boom is a 68 year old male who presents to the clinic today for follow up of arterial leg ulcers. Continuing wet to dry saline dressings daily. Does not tolerate hydrogel. [facility] on [facility] for eval of graft and debridement. Saw pain management, recommend morphine gel and meloxicam. Says he tried meloxicam yesterday with no relief. Has not tried morphine gel yet. Considering lumbar block. Still plans to [place] when the wounds are in good enough condition to do so.    Allergies   Allergen Reactions     Betadine [Povidone Iodine] Blisters     Pt reports erythema, increased pain, and blistering when using betadine on R big toe in past     Collagenase Clostridium Histolyticum      Flagyl [Metronidazole] Other (See Comments)     Flu symptoms  Flu like symptoms     Iodine Unknown     Santyl [Collagenase]      Current Outpatient Rx   Medication Sig Dispense Refill     acetic acid 0.25 % irrigation Irrigate with as directed 2 times daily 10 mL 0     ALPRAZolam (XANAX) 0.5 MG tablet TK 1 T PO TID PRN  0     ASPIRIN EC PO Take 81 mg by mouth daily       carvedilol (COREG) 6.25 MG tablet Take 1 tablet (6.25 mg) by mouth 2 times daily (with meals) 180 tablet 3     clopidogrel (PLAVIX) 75 MG tablet Take 1 tablet (75 mg) by mouth daily 30 tablet 0     diclofenac (VOLTAREN) 1 % GEL topical gel Place 4 g onto the skin 4 times daily 1 Tube 0     docusate sodium (COLACE) 100 MG capsule Take 1 capsule (100 mg) by mouth daily 60 capsule 0     enalapril (VASOTEC) 2.5 MG tablet Take 1 tablet (2.5 mg) by mouth 2 times daily 60 tablet 11     escitalopram (LEXAPRO) 20 MG tablet Take 1 tablet (20 mg) by mouth daily 15 tablet 0     ezetimibe (ZETIA) 10 MG tablet Take 1 tablet (10 mg) by mouth daily 30 tablet 0     furosemide (LASIX) 20  MG tablet Take 1 tablet (20 mg) by mouth 2 times daily (with meals) 60 tablet 3     gabapentin (NEURONTIN) 300 MG capsule Take 1 capsule (300 mg) by mouth 2 times daily 60 capsule 1     gabapentin (NEURONTIN) 600 MG tablet Take 1 tablet (600 mg) by mouth 3 times daily Increase per clinic directions. 30 tablet 1     gabapentin 8 % GEL topical PLO cream Apply 1 g topically every 8 hours Do NOT apply to open wounds 100 g 1     isosorbide mononitrate (IMDUR) 30 MG 24 hr tablet Take 1 tablet (30 mg) by mouth daily 90 tablet 3     Lidocaine 4 % GEL Externally apply topically 2 times daily 30 g 1     meloxicam (MOBIC) 7.5 MG tablet Take 1 tablet (7.5 mg) by mouth daily 30 tablet 0     methocarbamol (ROBAXIN) 500 MG tablet   1     mometasone (ELOCON) 0.1 % ointment Apply affected area daily 45 g 3     nitroGLYcerin (NITROSTAT) 0.4 MG sublingual tablet SHARON MAY REPEAT Q 5 MINUTES X 2  2     nystatin-triamcinolone (MYCOLOG) ointment Apply topically 2 times daily 60 g 1     order for DME Equipment being ordered: Walker Wheels () and Walker ()  Treatment Diagnosis: impaired mobility 1 each 0     predniSONE (DELTASONE) 10 MG tablet Take 1 tablet (10 mg) by mouth daily 30 tablet 0     rosuvastatin (CRESTOR) 20 MG tablet Take 1 tablet (20 mg) by mouth daily 30 tablet 11     spironolactone (ALDACTONE) 25 MG tablet TK 1 T PO D  4       Objective:   There were no vitals taken for this visit.    General: Patient is well groomed, appears stated age, is alert and cooperative, and is in no acute distress.  Skin:  LE skin color, texture and turgor are normal except immediately surrounding wound at distal portion. This is erythematous and exquisitely painful to light touch, especially at most distal portion.      Wound #1  An arterial ulcer is noted at right  anterior shin measuring 13.5 cm x 3.5 cm x 0.2 cm. Mildly tender to palpation.  Tissue Depth: Tendon  Wound base: Red, Yellow, White/Granulation, tendon. Again, increased  granulation tissue.  Edges: intact, inflammed  Drainage: light/serous  Odor: No   Undermining: No  Tunneling: No  Bone Exposure: No  Clinical Signs of Infection: No      Wound #2  An arterial ulcer is noted at left anterior shin measuring 2.5 cm x 3.5 cm x 0.2 cm. Mildly tender to palpation.  Tissue Depth: subcutaneous  Wound base: Pink, Yellow/Granulation, fibrous - possible new tendon exposure on medial side  Edges: intact  Drainage: small/serous  Odor: No   Undermining: No  Tunneling: No  Bone Exposure: No  Clinical Signs of Infection: No      Assessment:   - Chronic bilateral arterial shin ulcers  - Peripheral arterial disease  - Nicotine dependence       Plan:   - No signs of infection, very little slough to remove today. Improvement to proximal right shin wound.  - Minimal debridement today. Devitalized tissue of right shin ulcer was excisionally debrided using a #15 blade to level of tendon. Healthy bleeding occurred with debridement. No anesthesia necessary today.   - Wound Care instructions: Continue with daily saline wet to dry dressings. Does not tolerate hydrogel.  - Await plastic surgeon from [facility] opinion (appointment on Monday)  - RTC 3 weeks

## 2018-08-09 NOTE — MR AVS SNAPSHOT
After Visit Summary   8/9/2018    Boom Kahn    MRN: [id number]           Patient Information     Date Of Birth          1950        Visit Information        Provider Department      8/9/2018 2:30 PM Tamiko Mcduffie PA-C [facility]        Today's Diagnoses     Arterial leg ulcer (H)    -  1       Follow-ups after your visit        Your next 10 appointments already scheduled     Aug 30, 2018  2:00 PM CDT   (Arrive by 1:45 PM)   Return Visit with SHAYY [facility] ([facility])    [address] [facility]  [address] [facility] [id number] [phone]            Aug 31, 2018 11:00 AM CDT   CT CHEST W CONTRAST with UUCT1   [facility], [place], [place] ([facility], [facility])    [address] [facility] [id number] [phone]           Please bring any scans or X-rays taken at other hospitals, if similar tests were done. Also bring a list of your medicines, including vitamins, minerals and over-the-counter drugs. It is safest to leave personal items at home.  Be sure to tell your doctor:   If you have any allergies.   If there s any chance you are pregnant.   If you are breastfeeding.    If you have diabetes as your medication may need to be adjusted for this exam.  You will have contrast for this exam. To prepare:   Do not eat or drink for 2 hours before your exam. If you need to take medicine, you may take it with small sips of water. (We may ask you to take liquid medicine as well.)   The day before your exam, drink extra fluids at least six 8-ounce glasses (unless your doctor tells you to restrict your fluids).  Patients over 70 or patients with diabetes or kidney problems:   If you haven t had a blood test (creatinine test) within the last 30 days, the Cardiologist/Radiologist may require you to get this test prior to your exam.  Please wear loose clothing, such as a sweat suit or jogging clothes.  Avoid snaps, zippers and other metal. We may ask you to undress and put on a hospital gown.  If you have any questions, please call the Imaging Department where you will have your exam.            Aug 31, 2018  2:00 PM CDT   (Arrive by 1:45 PM)   Return Visit with Dave Del Rio MD   [facility] ([facility])    [address] [facility]  [address] [place] [id number] [phone]              Who to contact     Please call your clinic at [phone] to:    Ask questions about your health    Make or cancel appointments    Discuss your medicines    Learn about your test results    Speak to your doctor            Additional Information About Your Visit        [employer] Information     [employer] gives you secure access to your electronic health record. If you see a primary care provider, you can also send messages to your care team and make appointments. If you have questions, please call your primary care clinic.  If you do not have a primary care provider, please call [phone] and they will assist you.      [employer] is an electronic gateway that provides easy, online access to your medical records. With [employer], you can request a clinic appointment, read your test results, renew a prescription or communicate with your care team.     To access your existing account, please contact your [facility] or call [phone] for assistance.        Care EveryWhere ID     This is your Care EveryWhere ID. This could be used by other organizations to access your [facility] medical records  [id number]         Blood Pressure from Last 3 Encounters:   08/08/18 97/63   07/30/18 112/70   06/14/18 106/68    Weight from Last 3 Encounters:   07/30/18 149 lb   06/14/18 143 lb   06/05/18 143 lb 14.4 oz              We Performed the Following     DEBRIDEMENT WOUND UP TO 20 SQ CM        Primary Care Provider Office Phone # Fax #    Willian Arrington [phone]  [phone]       [facility] [address]  [facility] [address]        Equal Access to Services     CHANEL DORADO : Hadii aad ku hadmargaritoo Sokyleali, waaxda luqadaha, qaybta kaalmada adeshantda, vijay woodin hayaan carmenludwig barragan laJosekylie evans. So [facility] [phone].    ATENCIÓN: Si habla español, tiene a bhatia disposición servicios gratuitos de asistencia lingüística. Kevin al [phone].    We comply with applicable federal civil rights laws and [place] laws. We do not discriminate on the basis of race, color, national origin, age, disability, sex, sexual orientation, or gender identity.            Thank you!     Thank you for choosing [facility]  for your care. Our goal is always to provide you with excellent care. Hearing back from our patients is one way we can continue to improve our services. Please take a few minutes to complete the written survey that you may receive in the mail after your visit with us. Thank you!             Your Updated Medication List - Protect others around you: Learn how to safely use, store and throw away your medicines at www.disposemymeds.org.          This list is accurate as of 8/9/18  3:02 PM.  Always use your most recent med list.                   Brand Name Dispense Instructions for use Diagnosis    acetic acid 0.25 % irrigation     10 mL    Irrigate with as directed 2 times daily    S/P vascular surgery       ALPRAZolam 0.5 MG tablet    XANAX     TK 1 T PO TID PRN        ASPIRIN EC PO      Take 81 mg by mouth daily        carvedilol 6.25 MG tablet    COREG    180 tablet    Take 1 tablet (6.25 mg) by mouth 2 times daily (with meals)    Ischemic cardiomyopathy       clopidogrel 75 MG tablet    PLAVIX    30 tablet    Take 1 tablet (75 mg) by mouth daily    Ischemic cardiomyopathy       diclofenac 1 % Gel topical gel    VOLTAREN    1 Tube    Place 4 g onto the skin 4 times daily    Pain of left lower extremity       docusate sodium 100 MG capsule    COLACE    60  capsule    Take 1 capsule (100 mg) by mouth daily    Takes dietary supplements       enalapril 2.5 MG tablet    VASOTEC    60 tablet    Take 1 tablet (2.5 mg) by mouth 2 times daily    Ischemic cardiomyopathy       escitalopram 20 MG tablet    LEXAPRO    15 tablet    Take 1 tablet (20 mg) by mouth daily    S/P vascular surgery       ezetimibe 10 MG tablet    ZETIA    30 tablet    Take 1 tablet (10 mg) by mouth daily    Ischemic cardiomyopathy       furosemide 20 MG tablet    LASIX    60 tablet    Take 1 tablet (20 mg) by mouth 2 times daily (with meals)    S/P vascular surgery       * gabapentin 300 MG capsule    NEURONTIN    60 capsule    Take 1 capsule (300 mg) by mouth 2 times daily    S/P vascular surgery       * gabapentin 600 MG tablet    NEURONTIN    30 tablet    Take 1 tablet (600 mg) by mouth 3 times daily Increase per clinic directions.        gabapentin 8 % Gel topical PLO cream     100 g    Apply 1 g topically every 8 hours Do NOT apply to open wounds    Peripheral arterial disease (H), Non-healing wound of lower extremity, right, initial encounter       isosorbide mononitrate 30 MG 24 hr tablet    IMDUR    90 tablet    Take 1 tablet (30 mg) by mouth daily    Chronic systolic congestive heart failure (H)       Lidocaine 4 % Gel     30 g    Externally apply topically 2 times daily    Cellulitis and abscess of leg       meloxicam 7.5 MG tablet    MOBIC    30 tablet    Take 1 tablet (7.5 mg) by mouth daily    Arterial leg ulcer (H)       methocarbamol 500 MG tablet    ROBAXIN          mometasone 0.1 % ointment    ELOCON    45 g    Apply affected area daily    Skin inflammation       nitroGLYcerin 0.4 MG sublingual tablet    NITROSTAT     SHARON MAY REPEAT Q 5 MINUTES X 2        nystatin-triamcinolone ointment    MYCOLOG    60 g    Apply topically 2 times daily    Dermatitis       order for DME     1 each    Equipment being ordered: Walker Wheels () and Walker () Treatment Diagnosis: impaired mobility     Pain of left lower extremity       predniSONE 10 MG tablet    DELTASONE    30 tablet    Take 1 tablet (10 mg) by mouth daily    S/P vascular surgery       rosuvastatin 20 MG tablet    CRESTOR    30 tablet    Take 1 tablet (20 mg) by mouth daily    Ischemic cardiomyopathy       spironolactone 25 MG tablet    ALDACTONE     TK 1 T PO D        * Notice:  This list has 2 medication(s) that are the same as other medications prescribed for you. Read the directions carefully, and ask your doctor or other care provider to review them with you.

## 2018-08-10 ENCOUNTER — TELEPHONE (OUTPATIENT)
Dept: ANESTHESIOLOGY | Facility: CLINIC | Age: 68
End: 2018-08-10

## 2018-08-10 NOTE — TELEPHONE ENCOUNTER
Health Call Center    Phone Message    May a detailed message be left on voicemail: yes    Reason for Call: pt son Oleg, who is a PA-C, would like to talk to the nurse or provider about his fathers medications. He is better to understand the risks and clarificiations and his fathers  treatment plan.  His father quality of life is low because of the pain and they want to get this under control. Oleg can be reached at  [phone].    Action Taken: Message routed to:  [facility] Pain

## 2018-08-10 NOTE — TELEPHONE ENCOUNTER
LPN called and spoke to both pt's son and the Pt.   Son was advocating for their Dad, and requested that the APRN consider prescribing something to help keep the pt comfortable and out of the ER.     LPN informed Oleg of the clinic's policy on Opioids, and detailed that the provider did not feel comfortable prescribing due to their hx with alchohol abuse.   Son was informed that pt was updated of this information, as well as given a list of alternative pain clinics.     LPN called to follow up with the pt regarding their call to the clinic earlier stating that the medications they were given on 8/8/18 were not effective.  LPN informed pt that they could stop the medication.   Pt stated they had not yet filled the Morphine gel, but was hopefully going to fill next week.     Pt requested something to help them with pain over the weekend. Pt was informed that the provider was not agreeable to prescribe.     Pt was frustrated and ended conversation.     Celina Stack LPN

## 2018-08-10 NOTE — TELEPHONE ENCOUNTER
[facility]    Phone Message    May a detailed message be left on voicemail: yes    Reason for Call: Other: Patient is calling to report the pain medication that he was switched to on Wednesday is not working, please call patient to discuss.      Action Taken: Message routed to:  [facility] Pain Clinic

## 2018-08-13 ENCOUNTER — TELEPHONE (OUTPATIENT)
Dept: ANESTHESIOLOGY | Facility: CLINIC | Age: 68
End: 2018-08-13

## 2018-08-13 NOTE — TELEPHONE ENCOUNTER
Health Call Center    Phone Message    May a detailed message be left on voicemail: yes    Reason for Call: Other: Patient is calling to ask questions about the topical pain medication that he is to use and applying to the wound?  Patient stated that it appears red and inflamed and has some concerns as he has never used a cream/gel like this.  Please call the patient to follow up, patient did request a call this afternoon.     Action Taken: Message routed to:  [facility] Pain CLinic

## 2018-08-14 NOTE — TELEPHONE ENCOUNTER
LPN returned pt's phone call-  Detailed VM left for pt.    Pt was informed on VM to bring their medication to their wound care appointment, so that the provider can demonstrate on how to best use the Morphine gel, as well as assess the wounds for concerns.     Pt was asked to call the clinic back for questions if needed.     Celina Stack LPN

## 2018-08-28 ENCOUNTER — DOCUMENTATION ONLY (OUTPATIENT)
Dept: CARE COORDINATION | Facility: CLINIC | Age: 68
End: 2018-08-28

## 2018-08-28 ENCOUNTER — MYC MEDICAL ADVICE (OUTPATIENT)
Dept: ANESTHESIOLOGY | Facility: CLINIC | Age: 68
End: 2018-08-28

## 2018-08-28 DIAGNOSIS — L97.909 ARTERIAL LEG ULCER (H): ICD-10-CM

## 2018-08-28 NOTE — PROGRESS NOTES
[facility] now requests orders and shares plan of care/discharge summaries for some patients through [employer].  Please REPLY TO THIS MESSAGE OR ROUTE BACK TO THE AUTHOR in order to give authorization for orders when needed.  This is considered a verbal order, you will still receive a faxed copy of orders for signature.  Thank you for your assistance in improving collaboration for our patients.    ORDER    Just an FYI that pts meloxicam ineracts with plavix, pt has multiple meds in NSAID class, and pt needs a refill on morphine 0.1 percent in solosite gel ASAP. Patient will be in the area on Friday if he could  the script then.     Patient is also experiencing withdrawal symptoms including dizziness, GI upset, and flu like symptoms. Is there any way pt could be prescribed something to help with his withdrawal symptoms? Something like vistaril?     Thank you,   Michaela Lord  [phone]

## 2018-08-29 NOTE — TELEPHONE ENCOUNTER
"LPN called pt per their PHHH[employer] Inct message. LPN attempted to assist pt to schedule a follow up appointment.    Pt stated they were going to be out of their Morphine solosite gel before the weekend.      Last refill was 8/8/18.    LPN informed Pt that they were instructed in clinic that they were going to need to follow up in clinic for all refills, as well as that they should only using the medication with dressing changes, which was recommended only once daily by their Wound Care Provider.      Pt stated that  because the script stated that the pt could use the medication Q4 hours pt had been using the medication, Q 4 hours.   Pt states \"I don't remove the dressings, I unwrap the gauze and lift up the dressing at one corner, place the medication on the painful tendon and then replace the gauze.\"    APRN was updated, and was agreeable to refill medication, but changed the instructions to \"Place 1 g onto the skin daily; Use Once DAILY with dressing Change\".    APRN would only like pt to use the medication daily with Dressing changes, to prevent overuse of medication but to also prevent infecting or contaminating the wound. (By unwrapping gauze and tampering with the dressing 6 times a day.)    LPN called pt back and relayed the information from the APRN.   Pt was agreeable to plan and verbalized understanding.   Pt was informed that they could  their refill from the [facility] on the 1st floor of the [facility], when they were at the building on 8/30/18.    Pt was assisted to schedule a follow up appointment on 9/28/18 with APRN, for medication refill and to discuss further treatment planning.     Celina Stack LPN               "

## 2018-08-30 ENCOUNTER — OFFICE VISIT (OUTPATIENT)
Dept: WOUND CARE | Facility: CLINIC | Age: 68
End: 2018-08-30
Payer: COMMERCIAL

## 2018-08-30 DIAGNOSIS — M79.661 PAIN IN BOTH LOWER LEGS: ICD-10-CM

## 2018-08-30 DIAGNOSIS — L97.909 ARTERIAL LEG ULCER (H): Primary | ICD-10-CM

## 2018-08-30 DIAGNOSIS — M79.662 PAIN IN BOTH LOWER LEGS: ICD-10-CM

## 2018-08-30 RX ORDER — ONDANSETRON 4 MG/1
TABLET, ORALLY DISINTEGRATING ORAL
COMMUNITY

## 2018-08-30 ASSESSMENT — PAIN SCALES - GENERAL: PAINLEVEL: WORST PAIN (10)

## 2018-08-30 NOTE — MR AVS SNAPSHOT
After Visit Summary   8/30/2018    Boom Kahn    MRN: [id number]           Patient Information     Date Of Birth          1950        Visit Information        Provider Department      8/30/2018 2:00 PM Tamiko Mcduffie PA-C [facility]        Today's Diagnoses     Arterial leg ulcer (H)    -  1    Pain in both lower legs           Follow-ups after your visit        Your next 10 appointments already scheduled     Aug 31, 2018 11:00 AM CDT   CT CHEST W CONTRAST with UUCT1   [facility] [place], [place] ([facility], [facility])    [address] [id number] [phone]           Please bring any scans or X-rays taken at other hospitals, if similar tests were done. Also bring a list of your medicines, including vitamins, minerals and over-the-counter drugs. It is safest to leave personal items at home.  Be sure to tell your doctor:   If you have any allergies.   If there s any chance you are pregnant.   If you are breastfeeding.    If you have diabetes as your medication may need to be adjusted for this exam.  You will have contrast for this exam. To prepare:   Do not eat or drink for 2 hours before your exam. If you need to take medicine, you may take it with small sips of water. (We may ask you to take liquid medicine as well.)   The day before your exam, drink extra fluids at least six 8-ounce glasses (unless your doctor tells you to restrict your fluids).  Patients over 70 or patients with diabetes or kidney problems:   If you haven t had a blood test (creatinine test) within the last 30 days, the Cardiologist/Radiologist may require you to get this test prior to your exam.  Please wear loose clothing, such as a sweat suit or jogging clothes. Avoid snaps, zippers and other metal. We may ask you to undress and put on a hospital gown.  If you have any questions, please call the Imaging Department where you will have your exam.            Aug  31, 2018  2:00 PM CDT   (Arrive by 1:45 PM)   Return Visit with Dave Del Rio MD   [facility] ([facility])    [address]  [address]  [facility] [address] [phone]            Sep 28, 2018  2:10 PM CDT   (Arrive by 1:55 PM)   Return Visit with DEDRA Segura CNP   [facility] ([facility])    [address]  4th Floor  [facility] [address] [phone]              Who to contact     Please call your clinic at [phone] to:    Ask questions about your health    Make or cancel appointments    Discuss your medicines    Learn about your test results    Speak to your doctor            Additional Information About Your Visit        [employer] Information     Element ID gives you secure access to your electronic health record. If you see a primary care provider, you can also send messages to your care team and make appointments. If you have questions, please call your primary care clinic.  If you do not have a primary care provider, please call [phone] and they will assist you.      Element ID is an electronic gateway that provides easy, online access to your medical records. With Element ID, you can request a clinic appointment, read your test results, renew a prescription or communicate with your care team.     To access your existing account, please contact your [facility] or call [phone] for assistance.        Care EveryWhere ID     This is your Care EveryWhere ID. This could be used by other organizations to access your [place] medical records  [id number]         Blood Pressure from Last 3 Encounters:   08/08/18 97/63   07/30/18 112/70   06/14/18 106/68    Weight from Last 3 Encounters:   07/30/18 149 lb   06/14/18 143 lb   06/05/18 143 lb 14.4 oz              Today, you had the following     No orders found for display       Primary Care  Provider Office Phone # Fax #    Willian Arrington [phone]       [facility] PRIMARY [facility] [address]  [facility] [address]        Equal Access to Services     [address] : Hadii vishnu soares hadmargaritoo Sokyleali, waaxda luqadaha, qaybta kaalmada adeludwigyada, vijay barragan laJosekylie evans. So [facility] [phone].    ATENCIÓN: Si habla español, tiene a bhatia disposición servicios gratuitos de asistencia lingüística. Llame al [phone].    We comply with applicable federal civil rights laws and [place] laws. We do not discriminate on the basis of race, color, national origin, age, disability, sex, sexual orientation, or gender identity.            Thank you!     Thank you for choosing [facility]  for your care. Our goal is always to provide you with excellent care. Hearing back from our patients is one way we can continue to improve our services. Please take a few minutes to complete the written survey that you may receive in the mail after your visit with us. Thank you!             Your Updated Medication List - Protect others around you: Learn how to safely use, store and throw away your medicines at www.disposemymeds.org.          This list is accurate as of 8/30/18  2:33 PM.  Always use your most recent med list.                   Brand Name Dispense Instructions for use Diagnosis    acetic acid 0.25 % irrigation     10 mL    Irrigate with as directed 2 times daily    S/P vascular surgery       ALPRAZolam 0.5 MG tablet    XANAX     TK 1 T PO TID PRN        ASPIRIN EC PO      Take 81 mg by mouth daily        carvedilol 6.25 MG tablet    COREG    180 tablet    Take 1 tablet (6.25 mg) by mouth 2 times daily (with meals)    Ischemic cardiomyopathy       clopidogrel 75 MG tablet    PLAVIX    30 tablet    Take 1 tablet (75 mg) by mouth daily    Ischemic cardiomyopathy       diclofenac 1 % Gel topical gel    VOLTAREN    1 Tube    Place 4 g onto the skin 4 times daily    Pain of left  lower extremity       docusate sodium 100 MG capsule    COLACE    60 capsule    Take 1 capsule (100 mg) by mouth daily    Takes dietary supplements       enalapril 2.5 MG tablet    VASOTEC    60 tablet    Take 1 tablet (2.5 mg) by mouth 2 times daily    Ischemic cardiomyopathy       escitalopram 20 MG tablet    LEXAPRO    15 tablet    Take 1 tablet (20 mg) by mouth daily    S/P vascular surgery       ezetimibe 10 MG tablet    ZETIA    30 tablet    Take 1 tablet (10 mg) by mouth daily    Ischemic cardiomyopathy       furosemide 20 MG tablet    LASIX    60 tablet    Take 1 tablet (20 mg) by mouth 2 times daily (with meals)    S/P vascular surgery       * gabapentin 300 MG capsule    NEURONTIN    60 capsule    Take 1 capsule (300 mg) by mouth 2 times daily    S/P vascular surgery       * gabapentin 600 MG tablet    NEURONTIN    30 tablet    Take 1 tablet (600 mg) by mouth 3 times daily Increase per clinic directions.        gabapentin 8 % Gel topical PLO cream     100 g    Apply 1 g topically every 8 hours Do NOT apply to open wounds    Peripheral arterial disease (H), Non-healing wound of lower extremity, right, initial encounter       isosorbide mononitrate 30 MG 24 hr tablet    IMDUR    90 tablet    Take 1 tablet (30 mg) by mouth daily    Chronic systolic congestive heart failure (H)       Lidocaine 4 % Gel     30 g    Externally apply topically 2 times daily    Cellulitis and abscess of leg       meloxicam 7.5 MG tablet    MOBIC    30 tablet    Take 1 tablet (7.5 mg) by mouth daily    Arterial leg ulcer (H)       methocarbamol 500 MG tablet    ROBAXIN          mometasone 0.1 % ointment    ELOCON    45 g    Apply affected area daily    Skin inflammation       morphine 0.1% in solosite 0.1% topical gel     30 g    Place 1 g onto the skin daily Use with Once DAILY dressing Change.    Arterial leg ulcer (H)       nitroGLYcerin 0.4 MG sublingual tablet    NITROSTAT     SHARON MAY REPEAT Q 5 MINUTES X 2         nystatin-triamcinolone ointment    MYCOLOG    60 g    Apply topically 2 times daily    Dermatitis       ondansetron 4 MG ODT tab    ZOFRAN-ODT     ondansetron 4 mg disintegrating tablet        order for DME     1 each    Equipment being ordered: Walker Wheels () and Walker () Treatment Diagnosis: impaired mobility    Pain of left lower extremity       predniSONE 10 MG tablet    DELTASONE    30 tablet    Take 1 tablet (10 mg) by mouth daily    S/P vascular surgery       rosuvastatin 20 MG tablet    CRESTOR    30 tablet    Take 1 tablet (20 mg) by mouth daily    Ischemic cardiomyopathy       spironolactone 25 MG tablet    ALDACTONE     TK 1 T PO D        * Notice:  This list has 2 medication(s) that are the same as other medications prescribed for you. Read the directions carefully, and ask your doctor or other care provider to review them with you.

## 2018-08-30 NOTE — NURSING NOTE
Chief Complaint   Patient presents with     WOUND CARE     Pt here for bilateral lower leg wounds       There were no vitals filed for this visit.    There is no height or weight on file to calculate BMI.      ABRAN Soria, EMT                    No vitals taken per provider

## 2018-08-30 NOTE — PROGRESS NOTES
Chief Complaint:   Chief Complaint   Patient presents with     WOUND CARE     Pt here for bilateral lower leg wounds     Subjective: Boom is a 68 year old male who presents to the clinic today for follow up of painful BL  arterial leg ulcers. Morphine gel is working fairly well to control pain, however, it began to debride the slough at the distal portion of the R wound so now he is afraid to use it. He has been leaving distal R wound open to air and it now has a scab. Other areas appear to be healing in well. Admits severe pain to both ulcers, especially right.     Allergies   Allergen Reactions     Betadine [Povidone Iodine] Blisters     Pt reports erythema, increased pain, and blistering when using betadine on R big toe in past     Collagenase Clostridium Histolyticum      Flagyl [Metronidazole] Other (See Comments)     Flu symptoms  Flu like symptoms     Iodine Unknown     Santyl [Collagenase]      Current Outpatient Rx   Medication Sig Dispense Refill     acetic acid 0.25 % irrigation Irrigate with as directed 2 times daily 10 mL 0     ALPRAZolam (XANAX) 0.5 MG tablet TK 1 T PO TID PRN  0     ASPIRIN EC PO Take 81 mg by mouth daily       carvedilol (COREG) 6.25 MG tablet Take 1 tablet (6.25 mg) by mouth 2 times daily (with meals) 180 tablet 3     clopidogrel (PLAVIX) 75 MG tablet Take 1 tablet (75 mg) by mouth daily 30 tablet 0     diclofenac (VOLTAREN) 1 % GEL topical gel Place 4 g onto the skin 4 times daily 1 Tube 0     docusate sodium (COLACE) 100 MG capsule Take 1 capsule (100 mg) by mouth daily 60 capsule 0     enalapril (VASOTEC) 2.5 MG tablet Take 1 tablet (2.5 mg) by mouth 2 times daily 60 tablet 11     escitalopram (LEXAPRO) 20 MG tablet Take 1 tablet (20 mg) by mouth daily 15 tablet 0     ezetimibe (ZETIA) 10 MG tablet Take 1 tablet (10 mg) by mouth daily 30 tablet 0     furosemide (LASIX) 20 MG tablet Take 1 tablet (20 mg) by mouth 2 times daily (with meals) 60 tablet 3     gabapentin (NEURONTIN)  300 MG capsule Take 1 capsule (300 mg) by mouth 2 times daily 60 capsule 1     gabapentin (NEURONTIN) 600 MG tablet Take 1 tablet (600 mg) by mouth 3 times daily Increase per clinic directions. 30 tablet 1     gabapentin 8 % GEL topical PLO cream Apply 1 g topically every 8 hours Do NOT apply to open wounds 100 g 1     isosorbide mononitrate (IMDUR) 30 MG 24 hr tablet Take 1 tablet (30 mg) by mouth daily 90 tablet 3     Lidocaine 4 % GEL Externally apply topically 2 times daily 30 g 1     meloxicam (MOBIC) 7.5 MG tablet Take 1 tablet (7.5 mg) by mouth daily 30 tablet 0     methocarbamol (ROBAXIN) 500 MG tablet   1     mometasone (ELOCON) 0.1 % ointment Apply affected area daily 45 g 3     morphine 0.1% in solosite 0.1% topical gel Place 1 g onto the skin daily Use with Once DAILY dressing Change. 30 g 0     nitroGLYcerin (NITROSTAT) 0.4 MG sublingual tablet SHARON MAY REPEAT Q 5 MINUTES X 2  2     nystatin-triamcinolone (MYCOLOG) ointment Apply topically 2 times daily 60 g 1     ondansetron (ZOFRAN-ODT) 4 MG ODT tab ondansetron 4 mg disintegrating tablet       order for DME Equipment being ordered: Walker Wheels () and Walker ()  Treatment Diagnosis: impaired mobility 1 each 0     predniSONE (DELTASONE) 10 MG tablet Take 1 tablet (10 mg) by mouth daily 30 tablet 0     rosuvastatin (CRESTOR) 20 MG tablet Take 1 tablet (20 mg) by mouth daily 30 tablet 11     spironolactone (ALDACTONE) 25 MG tablet TK 1 T PO D  4       Objective:   There were no vitals taken for this visit.    General: Patient is well groomed, appears stated age, is alert and cooperative, and is in no acute distress.  Skin:  LE skin color, texture and turgor are normal except immediately surrounding wound at distal portion. This is mildly erythematous and painful to light touch, especially at most distal portion.      Wound #1  An arterial ulcer is noted at right  anterior shin measuring 13 cm x 3.5 cm x 0.2 cm. Mildly tender to  palpation.  Tissue Depth: Tendon  Wound base: Red, Yellow, White/Granulation, tendon. Again, increased granulation tissue. New black scab at distal portion of wound.  Edges: intact  Drainage: light/serous  Odor: No   Undermining: No  Tunneling: No  Bone Exposure: No  Clinical Signs of Infection: No      Wound #2  An arterial ulcer is noted at left anterior shin measuring 2.5 cm x 3.4 cm x 0.2 cm. Mildly tender to palpation.  Tissue Depth: subcutaneous  Wound base: Pink, Yellow/Granulation, slough  Edges: intact  Drainage: small/serous  Odor: No   Undermining: No  Tunneling: No  Bone Exposure: No  Clinical Signs of Infection: No      Assessment:   - Chronic bilateral arterial shin ulcers  - Chronic pain to ulcers  - Peripheral arterial disease  - Nicotine dependence      Plan:   - No signs of infection, no debridement needed today. Improvement to proximal right shin wound in width.  - Wound Care instructions: Continue with daily saline wet to dry dressings - this is the only type of dressing he tolerates.  - He is planning to return to his home state at the end of the month and will establish care with a plastic or vascular surgeon there. I continue to believe advanced grafting techniques are necessary.  - Cont. With pain management.  - RTC as needed.

## 2018-08-30 NOTE — LETTER
8/30/2018       RE: Boom Kahn  [address] [place] [address]     Dear Colleague,    Thank you for referring your patient, Boom Kahn, to the [facility] at [facility]. Please see a copy of my visit note below.    Chief Complaint:   Chief Complaint   Patient presents with     WOUND CARE     Pt here for bilateral lower leg wounds     Subjective: Boom is a 68 year old male who presents to the clinic today for follow up of painful BL  arterial leg ulcers. Morphine gel is working fairly well to control pain, however, it began to debride the slough at the distal portion of the R wound so now he is afraid to use it. He has been leaving distal R wound open to air and it now has a scab. Other areas appear to be healing in well. Admits severe pain to both ulcers, especially right.     Allergies   Allergen Reactions     Betadine [Povidone Iodine] Blisters     Pt reports erythema, increased pain, and blistering when using betadine on R big toe in past     Collagenase Clostridium Histolyticum      Flagyl [Metronidazole] Other (See Comments)     Flu symptoms  Flu like symptoms     Iodine Unknown     Santyl [Collagenase]      Current Outpatient Rx   Medication Sig Dispense Refill     acetic acid 0.25 % irrigation Irrigate with as directed 2 times daily 10 mL 0     ALPRAZolam (XANAX) 0.5 MG tablet TK 1 T PO TID PRN  0     ASPIRIN EC PO Take 81 mg by mouth daily       carvedilol (COREG) 6.25 MG tablet Take 1 tablet (6.25 mg) by mouth 2 times daily (with meals) 180 tablet 3     clopidogrel (PLAVIX) 75 MG tablet Take 1 tablet (75 mg) by mouth daily 30 tablet 0     diclofenac (VOLTAREN) 1 % GEL topical gel Place 4 g onto the skin 4 times daily 1 Tube 0     docusate sodium (COLACE) 100 MG capsule Take 1 capsule (100 mg) by mouth daily 60 capsule 0     enalapril (VASOTEC) 2.5 MG tablet Take 1 tablet (2.5 mg) by mouth 2 times daily 60 tablet 11     escitalopram (LEXAPRO) 20  MG tablet Take 1 tablet (20 mg) by mouth daily 15 tablet 0     ezetimibe (ZETIA) 10 MG tablet Take 1 tablet (10 mg) by mouth daily 30 tablet 0     furosemide (LASIX) 20 MG tablet Take 1 tablet (20 mg) by mouth 2 times daily (with meals) 60 tablet 3     gabapentin (NEURONTIN) 300 MG capsule Take 1 capsule (300 mg) by mouth 2 times daily 60 capsule 1     gabapentin (NEURONTIN) 600 MG tablet Take 1 tablet (600 mg) by mouth 3 times daily Increase per clinic directions. 30 tablet 1     gabapentin 8 % GEL topical PLO cream Apply 1 g topically every 8 hours Do NOT apply to open wounds 100 g 1     isosorbide mononitrate (IMDUR) 30 MG 24 hr tablet Take 1 tablet (30 mg) by mouth daily 90 tablet 3     Lidocaine 4 % GEL Externally apply topically 2 times daily 30 g 1     meloxicam (MOBIC) 7.5 MG tablet Take 1 tablet (7.5 mg) by mouth daily 30 tablet 0     methocarbamol (ROBAXIN) 500 MG tablet   1     mometasone (ELOCON) 0.1 % ointment Apply affected area daily 45 g 3     morphine 0.1% in solosite 0.1% topical gel Place 1 g onto the skin daily Use with Once DAILY dressing Change. 30 g 0     nitroGLYcerin (NITROSTAT) 0.4 MG sublingual tablet SHARON MAY REPEAT Q 5 MINUTES X 2  2     nystatin-triamcinolone (MYCOLOG) ointment Apply topically 2 times daily 60 g 1     ondansetron (ZOFRAN-ODT) 4 MG ODT tab ondansetron 4 mg disintegrating tablet       order for DME Equipment being ordered: Walker Wheels () and Walker ()  Treatment Diagnosis: impaired mobility 1 each 0     predniSONE (DELTASONE) 10 MG tablet Take 1 tablet (10 mg) by mouth daily 30 tablet 0     rosuvastatin (CRESTOR) 20 MG tablet Take 1 tablet (20 mg) by mouth daily 30 tablet 11     spironolactone (ALDACTONE) 25 MG tablet TK 1 T PO D  4       Objective:   There were no vitals taken for this visit.    General: Patient is well groomed, appears stated age, is alert and cooperative, and is in no acute distress.  Skin:  LE skin color, texture and turgor are normal  except immediately surrounding wound at distal portion. This is mildly erythematous and painful to light touch, especially at most distal portion.      Wound #1  An arterial ulcer is noted at right  anterior shin measuring 13 cm x 3.5 cm x 0.2 cm. Mildly tender to palpation.  Tissue Depth: Tendon  Wound base: Red, Yellow, White/Granulation, tendon. Again, increased granulation tissue. New black scab at distal portion of wound.  Edges: intact  Drainage: light/serous  Odor: No   Undermining: No  Tunneling: No  Bone Exposure: No  Clinical Signs of Infection: No      Wound #2  An arterial ulcer is noted at left anterior shin measuring 2.5 cm x 3.4 cm x 0.2 cm. Mildly tender to palpation.  Tissue Depth: subcutaneous  Wound base: Pink, Yellow/Granulation, slough  Edges: intact  Drainage: small/serous  Odor: No   Undermining: No  Tunneling: No  Bone Exposure: No  Clinical Signs of Infection: No      Assessment:   - Chronic bilateral arterial shin ulcers  - Chronic pain to ulcers  - Peripheral arterial disease  - Nicotine dependence      Plan:   - No signs of infection, no debridement needed today. Improvement to proximal right shin wound in width.  - Wound Care instructions: Continue with daily saline wet to dry dressings - this is the only type of dressing he tolerates.  - He is planning to return to his home state at the end of the month and will establish care with a plastic or vascular surgeon there. I continue to believe advanced grafting techniques are necessary.  - Cont. With pain management.  - RTC as needed.    Again, thank you for allowing me to participate in the care of your patient.      Sincerely,    Tamiko Mcduffie PA-C

## 2018-08-31 ENCOUNTER — HOSPITAL ENCOUNTER (OUTPATIENT)
Dept: CT IMAGING | Facility: CLINIC | Age: 68
Discharge: HOME | End: 2018-08-31
Attending: INTERNAL MEDICINE | Admitting: INTERNAL MEDICINE
Payer: COMMERCIAL

## 2018-08-31 ENCOUNTER — ONCOLOGY VISIT (OUTPATIENT)
Dept: ONCOLOGY | Facility: CLINIC | Age: 68
End: 2018-08-31
Attending: INTERNAL MEDICINE
Payer: COMMERCIAL

## 2018-08-31 VITALS
TEMPERATURE: 98 F | WEIGHT: 148 LBS | BODY MASS INDEX: 22.43 KG/M2 | HEART RATE: 78 BPM | DIASTOLIC BLOOD PRESSURE: 51 MMHG | RESPIRATION RATE: 16 BRPM | SYSTOLIC BLOOD PRESSURE: 84 MMHG | OXYGEN SATURATION: 97 % | HEIGHT: 68 IN

## 2018-08-31 DIAGNOSIS — C34.11 MALIGNANT NEOPLASM OF UPPER LOBE OF RIGHT LUNG (H): Primary | ICD-10-CM

## 2018-08-31 DIAGNOSIS — C34.91 ADENOCARCINOMA, LUNG, RIGHT (H): ICD-10-CM

## 2018-08-31 PROCEDURE — G0463 HOSPITAL OUTPT CLINIC VISIT: HCPCS | Mod: 25

## 2018-08-31 PROCEDURE — 99214 OFFICE O/P EST MOD 30 MIN: CPT | Mod: ZP | Performed by: INTERNAL MEDICINE

## 2018-08-31 PROCEDURE — 71250 CT THORAX DX C-: CPT

## 2018-08-31 ASSESSMENT — PAIN SCALES - GENERAL: PAINLEVEL: EXTREME PAIN (9)

## 2018-08-31 NOTE — PROGRESS NOTES
FOLLOW-UP VISIT NOTE    PATIENT NAME: Boom Kahn MRN # [id number]  DATE OF VISIT: Aug 31, 2018 YOB: 1950    REFERRING PROVIDER: Ray Russo MD  [address] [place] [id number]    CANCER TYPE: RUL Adenocarcinoma Lung   ECOG PS:1     ONCOLOGY HISTORY:  68-year-old male with past medical history significant for coronary artery disease status post CABG, congestive heart failure s/p ICD placement, peripheral arterial disease, tobacco abuse who he had a chest x-ray done as part of pre-op workup for vascular surgery in [place] which reported an opacity in the right upper lung. It was not followed up further until patient noticed a and presented himself to PCP for further workup and ultimately a PET scan done on 11/2017 showed a 2.2 x 2 cm right upper lobe nodule with an SUV of 9.9. CT guided FNA biopsy of the nodule on 12/6/16 showed adenocarcinoma that was complicated by pneumothorax requiring chest tube placement. Patient was determined not to be a candidate for surgical resection given his comorbidities and was referred to radiation oncology. He underwent SBR T in Jan 2017 and was following with radiation oncology with follow-up PET scans in March and another one in August with no radiological evidence of recurrence.    05/25/18 established medical oncology care. CT thorax revealed slight clinical status subtotal lung nodule in the right lower lobe . After discussing, plan was to continue observation at this point      SUBJECTIVE     Patient is here for 3 month follow-up to review recent CT scan. Continues to have chronic wound issues related to severe peripheral arterial disease.He is currently following with wound clinic as well as pain clinic for management of symptoms. Denies dyspnea on exertion, chest pain, palpitations, worsening fatigue, bone pain or any other complaints      PAST MEDICAL HISTORY     Past Medical History:   Diagnosis Date     Anxiety      CAD (coronary  artery disease)     s/p 3v CABG     CHF (congestive heart failure) (H)     EF 10-15%     Chronic pain      COPD (chronic obstructive pulmonary disease) (H)      Depression      Hyperlipidemia      Hypertension      Lung cancer (H)     s/p radiation therapy     PAD (peripheral artery disease) (H)     s/p lower extremity stents     Tobacco abuse          CURRENT OUTPATIENT MEDICATIONS     Current Outpatient Prescriptions   Medication Sig Dispense Refill     acetic acid 0.25 % irrigation Irrigate with as directed 2 times daily 10 mL 0     ALPRAZolam (XANAX) 0.5 MG tablet TK 1 T PO TID PRN  0     ASPIRIN EC PO Take 81 mg by mouth daily       carvedilol (COREG) 6.25 MG tablet Take 1 tablet (6.25 mg) by mouth 2 times daily (with meals) 180 tablet 3     clopidogrel (PLAVIX) 75 MG tablet Take 1 tablet (75 mg) by mouth daily 30 tablet 0     diclofenac (VOLTAREN) 1 % GEL topical gel Place 4 g onto the skin 4 times daily 1 Tube 0     docusate sodium (COLACE) 100 MG capsule Take 1 capsule (100 mg) by mouth daily 60 capsule 0     enalapril (VASOTEC) 2.5 MG tablet Take 1 tablet (2.5 mg) by mouth 2 times daily 60 tablet 11     escitalopram (LEXAPRO) 20 MG tablet Take 1 tablet (20 mg) by mouth daily 15 tablet 0     ezetimibe (ZETIA) 10 MG tablet Take 1 tablet (10 mg) by mouth daily 30 tablet 0     furosemide (LASIX) 20 MG tablet Take 1 tablet (20 mg) by mouth 2 times daily (with meals) 60 tablet 3     gabapentin (NEURONTIN) 300 MG capsule Take 1 capsule (300 mg) by mouth 2 times daily 60 capsule 1     gabapentin (NEURONTIN) 600 MG tablet Take 1 tablet (600 mg) by mouth 3 times daily Increase per clinic directions. 30 tablet 1     gabapentin 8 % GEL topical PLO cream Apply 1 g topically every 8 hours Do NOT apply to open wounds 100 g 1     isosorbide mononitrate (IMDUR) 30 MG 24 hr tablet Take 1 tablet (30 mg) by mouth daily 90 tablet 3     Lidocaine 4 % GEL Externally apply topically 2 times daily 30 g 1     meloxicam (MOBIC) 7.5  MG tablet Take 1 tablet (7.5 mg) by mouth daily 30 tablet 0     methocarbamol (ROBAXIN) 500 MG tablet   1     mometasone (ELOCON) 0.1 % ointment Apply affected area daily 45 g 3     morphine 0.1% in solosite 0.1% topical gel Place 1 g onto the skin daily Use with Once DAILY dressing Change. 30 g 0     nitroGLYcerin (NITROSTAT) 0.4 MG sublingual tablet SHARON MAY REPEAT Q 5 MINUTES X 2  2     nystatin-triamcinolone (MYCOLOG) ointment Apply topically 2 times daily 60 g 1     ondansetron (ZOFRAN-ODT) 4 MG ODT tab ondansetron 4 mg disintegrating tablet       order for DME Equipment being ordered: [employer] Wheels () and Walker ()  Treatment Diagnosis: impaired mobility 1 each 0     predniSONE (DELTASONE) 10 MG tablet Take 1 tablet (10 mg) by mouth daily 30 tablet 0     rosuvastatin (CRESTOR) 20 MG tablet Take 1 tablet (20 mg) by mouth daily 30 tablet 11     spironolactone (ALDACTONE) 25 MG tablet TK 1 T PO D  4        ALLERGIES     Allergies   Allergen Reactions     Betadine [Povidone Iodine] Blisters     Pt reports erythema, increased pain, and blistering when using betadine on R big toe in past     Collagenase Clostridium Histolyticum      Flagyl [Metronidazole] Other (See Comments)     Flu symptoms  Flu like symptoms     Iodine Unknown     Santyl [Collagenase]         REVIEW OF SYSTEMS   As above in the HPI, o/w complete 12-point ROS was negative.     PHYSICAL EXAM   B/P: 84/51, T: 98, P: 78, R: 16  SpO2 Readings from Last 4 Encounters:   08/31/18 97%   08/08/18 97%   07/30/18 99%   06/14/18 98%     Wt Readings from Last 3 Encounters:   08/31/18 67.1 kg (148 lb)   07/30/18 67.6 kg (149 lb)   06/14/18 64.9 kg (143 lb)     GEN: NAD  Mouth/ENT: Oropharynx is clear.  NECK: no lymphadenopathy  LUNGS: clear bilaterally  CV: regular, no murmurs, rubs, or gallops  ABDOMEN: soft, non-tender, non-distended, normal bowel sounds,  EXT: Chronic lower extremity ulcers dressings in place  NEURO: alert  SKIN: no rashes      LABORATORY AND IMAGING STUDIES     Recent Labs   Lab Test  07/30/18   1849  04/18/18   1107   01/18/18   0510   01/16/18   0322   NA  139  137   < >  143   < >  144   POTASSIUM  4.7  4.2   < >  3.2*   < >  3.4  3.3*   CHLORIDE  104  103   < >  111*   < >  111*   CO2  29  28   < >  24   < >  23   ANIONGAP  6  6   < >  8   < >  10   BUN  14  12   < >  10   < >  8   CR  1.01  0.91   < >  0.62*   < >  0.50*   GLC  110*  100*   < >  93   < >  112*   SUMAN  9.0  9.1   < >  8.2*   < >  7.9*   MAG   --    --    --   2.2   --   2.0   PHOS   --    --    --   2.6   --   2.8    < > = values in this interval not displayed.     Recent Labs   Lab Test  07/30/18   1849 04/18/18   1107  02/10/18   0726  02/08/18   0628   WBC  9.7  6.6  8.2  8.8   HGB  12.6*  12.0*  11.9*  11.1*   PLT  341  347  311  305   MCV  93  98  101*  100   NEUTROPHIL  84.1   --   69.0  83.6     Recent Labs   Lab Test  04/18/18   1107  02/10/18   0726  02/08/18   1143  02/08/18   0628  01/13/18   1009   BILITOTAL   --   0.4   --   0.5  0.4   ALKPHOS   --   53   --   46  46   ALT   --   35   --   32  28   AST   --   14   --   19  21   ALBUMIN  3.2*  3.1*  3.0*  3.0*  2.7*     No results found for: TSH]      Results for orders placed or performed during the hospital encounter of 08/31/18   CT Chest w/o Contrast    Narrative    EXAM:  CT CHEST W/O CONTRAST . 8/31/2018 11:20 AM     TECHNIQUE:  Helical CT images from the thoracic inlet through the  upper abdomen were obtained without intravenous contrast.    COMPARISON: CT 5/25/2018, outside facility PET-CT 3/20/2017, outside  facility PET-CT 5/4/2017    HISTORY:   lung ca follow up; Adenocarcinoma, lung, right (H).  68-year-old male with a history of right upper lobe adenocarcinoma  status post radiotherapy.    FINDINGS:  CHEST:  LUNGS: Treated right upper lobe lesion with surrounding postradiation  fibrosis, architectural distortion, and traction bronchiectasis  measuring 4.2 x 2.5 cm is similar to prior 3/5/2018  when measured  similarly (series 6, image 120). Further growth of a now 13 x 13 mm  lobular solid nodule in the right lower lobe, previously 9 x 7 mm on  5/25/2018 (series 6, image 168). Other scattered nodules are  unchanged, for example of a cluster of nodules in the lingula, the  largest measuring 12 x 8 mm (series 6, image 184). Unchanged mild  bronchial wall thickening centrally. No pneumothorax or pleural  effusion.    MEDIASTINUM: Left chest wall implantable cardiac defibrillator.  Cardiomegaly. Extensive coronary artery plaque. No pericardial  effusion. Normal caliber of the thoracic aorta and main pulmonary  artery. No enlarged mediastinal lymph nodes.    UPPER ABDOMEN: Unremarkable.    BONES/SOFT TISSUES: Median sternotomy status post wire fixation.  Multilevel degenerative changes of the spine. Mild bilateral  gynecomastia.      Impression    IMPRESSION:   1. Further growth of the subpleural right lower lobe solid nodule, now  13 mm, previously 8 mm on 5/23/2018. This is suspicious for metastatic  disease versus second lung primary malignancy. Consider tissue  sampling.  2. Unchanged appearance of the treated right upper lobe lesion with  postradiation scarring and architectural distortion.  3. Other scattered pulmonary nodules are unchanged, including the  cluster of likely postinfectious nodules in the lingula.  4. Cardiomegaly with extensive coronary artery calcified plaque.    I have personally reviewed the examination and initial interpretation  and I agree with the findings.    FLORESITA BETANCUR, DO         ASSESSMENT AND PLAN     68-year-old male with past medical history significant for coronary artery disease status post CABG, congestive heart failure s/p ICD placement, peripheral arterial disease, tobacco abuse who presents to the oncology clinic today to establish care regarding further management  of adenocarcinoma lung.     Adenocarcinoma Lung  [id number]- Stage 1  S/p SBRT in 01/2017 in [place]  last follow-up PET/CT in August 2017 by primary radiation oncologist  negative for evidence of recurrence/ metastatic disease  CT scan from today shows further growth of the subpleural right lower lobe nodule 13 mm in size.   Differential includes metastatic disease versus a second lung primary.   Recommended obtaining a tissue sample for further evaluation patient understands and is agreeable to that plan. We'll place a wire guided biopsy of the lung nodule and have second to clinic in 2 weeks for further evaluation. If this is malignant, I'll need staging workup with PET scan and MRI of brain     - Chronic lower extremity ulcers  Secondary to peripheral arterial disease  Patient is following with wound care     History of coronary artery disease/list of heart failure  Status post CABG in ICD placement  Management per PCP and cardiology      Chart documentation with Dragon Voice recognition Software. Although reviewed after completion, some words and grammatical errors may remain.  Dave Del Rio MD  Attending Physician   Hematology/Medical Oncology

## 2018-08-31 NOTE — LETTER
8/31/2018       RE: Boom Kahn  [address] [place] [address]     Dear Colleague,    Thank you for referring your patient, Boom Kahn, to the [facility]. Please see a copy of my visit note below.      FOLLOW-UP VISIT NOTE    PATIENT NAME: Boom Kahn MRN # [id number]  DATE OF VISIT: Aug 31, 2018 YOB: 1950    REFERRING PROVIDER: Ray Russo MD  [address] [place] [id number]    CANCER TYPE: RUL Adenocarcinoma Lung   ECOG PS:1     ONCOLOGY HISTORY:  68-year-old male with past medical history significant for coronary artery disease status post CABG, congestive heart failure s/p ICD placement, peripheral arterial disease, tobacco abuse who he had a chest x-ray done as part of pre-op workup for vascular surgery in [place] which reported an opacity in the right upper lung. It was not followed up further until patient noticed a and presented himself to PCP for further workup and ultimately a PET scan done on 11/2017 showed a 2.2 x 2 cm right upper lobe nodule with an SUV of 9.9. CT guided FNA biopsy of the nodule on 12/6/16 showed adenocarcinoma that was complicated by pneumothorax requiring chest tube placement. Patient was determined not to be a candidate for surgical resection given his comorbidities and was referred to radiation oncology. He underwent SBR T in Jan 2017 and was following with radiation oncology with follow-up PET scans in March and another one in August with no radiological evidence of recurrence.    05/25/18 established medical oncology care. CT thorax revealed slight clinical status subtotal lung nodule in the right lower lobe . After discussing, plan was to continue observation at this point      SUBJECTIVE     Patient is here for 3 month follow-up to review recent CT scan. Continues to have chronic wound issues related to severe peripheral arterial disease.He is currently following with wound clinic as well as pain  clinic for management of symptoms. Denies dyspnea on exertion, chest pain, palpitations, worsening fatigue, bone pain or any other complaints      PAST MEDICAL HISTORY     Past Medical History:   Diagnosis Date     Anxiety      CAD (coronary artery disease)     s/p 3v CABG     CHF (congestive heart failure) (H)     EF 10-15%     Chronic pain      COPD (chronic obstructive pulmonary disease) (H)      Depression      Hyperlipidemia      Hypertension      Lung cancer (H)     s/p radiation therapy     PAD (peripheral artery disease) (H)     s/p lower extremity stents     Tobacco abuse          CURRENT OUTPATIENT MEDICATIONS     Current Outpatient Prescriptions   Medication Sig Dispense Refill     acetic acid 0.25 % irrigation Irrigate with as directed 2 times daily 10 mL 0     ALPRAZolam (XANAX) 0.5 MG tablet TK 1 T PO TID PRN  0     ASPIRIN EC PO Take 81 mg by mouth daily       carvedilol (COREG) 6.25 MG tablet Take 1 tablet (6.25 mg) by mouth 2 times daily (with meals) 180 tablet 3     clopidogrel (PLAVIX) 75 MG tablet Take 1 tablet (75 mg) by mouth daily 30 tablet 0     diclofenac (VOLTAREN) 1 % GEL topical gel Place 4 g onto the skin 4 times daily 1 Tube 0     docusate sodium (COLACE) 100 MG capsule Take 1 capsule (100 mg) by mouth daily 60 capsule 0     enalapril (VASOTEC) 2.5 MG tablet Take 1 tablet (2.5 mg) by mouth 2 times daily 60 tablet 11     escitalopram (LEXAPRO) 20 MG tablet Take 1 tablet (20 mg) by mouth daily 15 tablet 0     ezetimibe (ZETIA) 10 MG tablet Take 1 tablet (10 mg) by mouth daily 30 tablet 0     furosemide (LASIX) 20 MG tablet Take 1 tablet (20 mg) by mouth 2 times daily (with meals) 60 tablet 3     gabapentin (NEURONTIN) 300 MG capsule Take 1 capsule (300 mg) by mouth 2 times daily 60 capsule 1     gabapentin (NEURONTIN) 600 MG tablet Take 1 tablet (600 mg) by mouth 3 times daily Increase per clinic directions. 30 tablet 1     gabapentin 8 % GEL topical PLO cream Apply 1 g topically every 8  hours Do NOT apply to open wounds 100 g 1     isosorbide mononitrate (IMDUR) 30 MG 24 hr tablet Take 1 tablet (30 mg) by mouth daily 90 tablet 3     Lidocaine 4 % GEL Externally apply topically 2 times daily 30 g 1     meloxicam (MOBIC) 7.5 MG tablet Take 1 tablet (7.5 mg) by mouth daily 30 tablet 0     methocarbamol (ROBAXIN) 500 MG tablet   1     mometasone (ELOCON) 0.1 % ointment Apply affected area daily 45 g 3     morphine 0.1% in solosite 0.1% topical gel Place 1 g onto the skin daily Use with Once DAILY dressing Change. 30 g 0     nitroGLYcerin (NITROSTAT) 0.4 MG sublingual tablet SHARON MAY REPEAT Q 5 MINUTES X 2  2     nystatin-triamcinolone (MYCOLOG) ointment Apply topically 2 times daily 60 g 1     ondansetron (ZOFRAN-ODT) 4 MG ODT tab ondansetron 4 mg disintegrating tablet       order for DME Equipment being ordered: [employer] Wheels () and Walker ()  Treatment Diagnosis: impaired mobility 1 each 0     predniSONE (DELTASONE) 10 MG tablet Take 1 tablet (10 mg) by mouth daily 30 tablet 0     rosuvastatin (CRESTOR) 20 MG tablet Take 1 tablet (20 mg) by mouth daily 30 tablet 11     spironolactone (ALDACTONE) 25 MG tablet TK 1 T PO D  4        ALLERGIES     Allergies   Allergen Reactions     Betadine [Povidone Iodine] Blisters     Pt reports erythema, increased pain, and blistering when using betadine on R big toe in past     Collagenase Clostridium Histolyticum      Flagyl [Metronidazole] Other (See Comments)     Flu symptoms  Flu like symptoms     Iodine Unknown     Santyl [Collagenase]         REVIEW OF SYSTEMS   As above in the HPI, o/w complete 12-point ROS was negative.     PHYSICAL EXAM   B/P: 84/51, T: 98, P: 78, R: 16  SpO2 Readings from Last 4 Encounters:   08/31/18 97%   08/08/18 97%   07/30/18 99%   06/14/18 98%     Wt Readings from Last 3 Encounters:   08/31/18 67.1 kg (148 lb)   07/30/18 67.6 kg (149 lb)   06/14/18 64.9 kg (143 lb)     GEN: NAD  Mouth/ENT: Oropharynx is clear.  NECK: no  lymphadenopathy  LUNGS: clear bilaterally  CV: regular, no murmurs, rubs, or gallops  ABDOMEN: soft, non-tender, non-distended, normal bowel sounds,  EXT: Chronic lower extremity ulcers dressings in place  NEURO: alert  SKIN: no rashes     LABORATORY AND IMAGING STUDIES     Recent Labs   Lab Test  07/30/18   1849  04/18/18   1107   01/18/18   0510   01/16/18   0322   NA  139  137   < >  143   < >  144   POTASSIUM  4.7  4.2   < >  3.2*   < >  3.4  3.3*   CHLORIDE  104  103   < >  111*   < >  111*   CO2  29  28   < >  24   < >  23   ANIONGAP  6  6   < >  8   < >  10   BUN  14  12   < >  10   < >  8   CR  1.01  0.91   < >  0.62*   < >  0.50*   GLC  110*  100*   < >  93   < >  112*   SUMAN  9.0  9.1   < >  8.2*   < >  7.9*   MAG   --    --    --   2.2   --   2.0   PHOS   --    --    --   2.6   --   2.8    < > = values in this interval not displayed.     Recent Labs   Lab Test  07/30/18   1849  04/18/18   1107  02/10/18   0726  02/08/18   0628   WBC  9.7  6.6  8.2  8.8   HGB  12.6*  12.0*  11.9*  11.1*   PLT  341  347  311  305   MCV  93  98  101*  100   NEUTROPHIL  84.1   --   69.0  83.6     Recent Labs   Lab Test  04/18/18   1107  02/10/18   0726  02/08/18   1143  02/08/18   0628  01/13/18   1009   BILITOTAL   --   0.4   --   0.5  0.4   ALKPHOS   --   53   --   46  46   ALT   --   35   --   32  28   AST   --   14   --   19  21   ALBUMIN  3.2*  3.1*  3.0*  3.0*  2.7*     No results found for: TSH]      Results for orders placed or performed during the hospital encounter of 08/31/18   CT Chest w/o Contrast    Narrative    EXAM:  CT CHEST W/O CONTRAST . 8/31/2018 11:20 AM     TECHNIQUE:  Helical CT images from the thoracic inlet through the  upper abdomen were obtained without intravenous contrast.    COMPARISON: CT 5/25/2018, outside facility PET-CT 3/20/2017, outside  facility PET-CT 5/4/2017    HISTORY:   lung ca follow up; Adenocarcinoma, lung, right (H).  68-year-old male with a history of right upper lobe  adenocarcinoma  status post radiotherapy.    FINDINGS:  CHEST:  LUNGS: Treated right upper lobe lesion with surrounding postradiation  fibrosis, architectural distortion, and traction bronchiectasis  measuring 4.2 x 2.5 cm is similar to prior 3/5/2018 when measured  similarly (series 6, image 120). Further growth of a now 13 x 13 mm  lobular solid nodule in the right lower lobe, previously 9 x 7 mm on  5/25/2018 (series 6, image 168). Other scattered nodules are  unchanged, for example of a cluster of nodules in the lingula, the  largest measuring 12 x 8 mm (series 6, image 184). Unchanged mild  bronchial wall thickening centrally. No pneumothorax or pleural  effusion.    MEDIASTINUM: Left chest wall implantable cardiac defibrillator.  Cardiomegaly. Extensive coronary artery plaque. No pericardial  effusion. Normal caliber of the thoracic aorta and main pulmonary  artery. No enlarged mediastinal lymph nodes.    UPPER ABDOMEN: Unremarkable.    BONES/SOFT TISSUES: Median sternotomy status post wire fixation.  Multilevel degenerative changes of the spine. Mild bilateral  gynecomastia.      Impression    IMPRESSION:   1. Further growth of the subpleural right lower lobe solid nodule, now  13 mm, previously 8 mm on 5/23/2018. This is suspicious for metastatic  disease versus second lung primary malignancy. Consider tissue  sampling.  2. Unchanged appearance of the treated right upper lobe lesion with  postradiation scarring and architectural distortion.  3. Other scattered pulmonary nodules are unchanged, including the  cluster of likely postinfectious nodules in the lingula.  4. Cardiomegaly with extensive coronary artery calcified plaque.    I have personally reviewed the examination and initial interpretation  and I agree with the findings.    FLORESITA BETANCUR, DO         ASSESSMENT AND PLAN     68-year-old male with past medical history significant for coronary artery disease status post CABG, congestive heart failure  s/p ICD placement, peripheral arterial disease, tobacco abuse who presents to the oncology clinic today to establish care regarding further management  of adenocarcinoma lung.     Adenocarcinoma Lung  [id number] Stage 1  S/p SBRT in 01/2017 in [place] last follow-up PET/CT in August 2017 by primary radiation oncologist  negative for evidence of recurrence/ metastatic disease  CT scan from today shows further growth of the subpleural right lower lobe nodule 13 mm in size.   Differential includes metastatic disease versus a second lung primary.   Recommended obtaining a tissue sample for further evaluation patient understands and is agreeable to that plan. We'll place a wire guided biopsy of the lung nodule and have second to clinic in 2 weeks for further evaluation. If this is malignant, I'll need staging workup with PET scan and MRI of brain     - Chronic lower extremity ulcers  Secondary to peripheral arterial disease  Patient is following with wound care     History of coronary artery disease/list of heart failure  Status post CABG in ICD placement  Management per PCP and cardiology      Chart documentation with Dragon Voice recognition Software. Although reviewed after completion, some words and grammatical errors may remain.  Dave Del Rio MD  Attending Physician   Hematology/Medical Oncology    Again, thank you for allowing me to participate in the care of your patient.      Sincerely,    Dave Del Rio MD

## 2018-08-31 NOTE — NURSING NOTE
"Oncology Rooming Note    August 31, 2018 1:57 PM   Boom Kahn is a 68 year old male who presents for:    Chief Complaint   Patient presents with     RECHECK     ON HX of lung Ca   month f/up     Initial Vitals: BP (!) 84/51 (BP Location: Left arm, Patient Position: Sitting, Cuff Size: Adult Regular)  Pulse 78  Temp 98  F (36.7  C) (Oral)  Resp 16  Ht 1.727 m (5' 7.99\")  Wt 67.1 kg (148 lb)  SpO2 97%  BMI 22.51 kg/m2 Estimated body mass index is 22.51 kg/(m^2) as calculated from the following:    Height as of this encounter: 1.727 m (5' 7.99\").    Weight as of this encounter: 67.1 kg (148 lb). Body surface area is 1.79 meters squared.  Extreme Pain (9) Comment: Right leg    No LMP for male patient.  Allergies reviewed: Yes  Medications reviewed: Yes    Medications: Medication refills not needed today.  Pharmacy name entered into [employer]: [facility] [id number] [place] [address]  AT [facility] [address]    Clinical concerns: none      8 minutes for nursing intake (face to face time)     Anika EDA Hamlin              "

## 2018-08-31 NOTE — MR AVS SNAPSHOT
After Visit Summary   8/31/2018    Boom Kahn    MRN: [id number]           Patient Information     Date Of Birth          1950        Visit Information        Provider Department      8/31/2018 2:00 PM Dave Del Rio MD formerly [facility]        Today's Diagnoses     Malignant neoplasm of upper lobe of right lung (H)    -  1       Follow-ups after your visit        Your next 10 appointments already scheduled     Sep 06, 2018  2:00 PM CDT   (Arrive by 1:45 PM)   New Patient Visit with DEDRA Martinez [facility] Interventional Radiology ([facility])    [address]  1st Floor  [place] [facility] [address]   [phone]            Sep 14, 2018  3:30 PM CDT   (Arrive by 3:15 PM)   Return Visit with Dave Del Rio MD   [facility] ([facility])    [address]  [address]  [place] [facility] [id number] [phone]            Sep 28, 2018  2:10 PM CDT   (Arrive by 1:55 PM)   Return Visit with DEDRA Segura [facility] ([facility])    [address]  4th Floor  [facility] [id number] [phone]              Future tests that were ordered for you today     Open Future Orders        Priority Expected Expires Ordered    IR Lung Biopsy Mediastinum Right Routine  8/31/2019 8/31/2018    CT Chest w/o Contrast Routine 11/25/2018 5/25/2019 5/25/2018            Who to contact     If you have questions or need follow up information about today's clinic visit or your schedule please contact [facility] located within [facility] directly at [phone].  Normal or non-critical lab and imaging results will be communicated to you by MyChart, letter or phone within 4 business days after the clinic has received the results. If you do not hear from us within 7 days, please contact the clinic through MyChart or phone. If you have a  "critical or abnormal lab result, we will notify you by phone as soon as possible.  Submit refill requests through discoapi or call your pharmacy and they will forward the refill request to us. Please allow 3 business days for your refill to be completed.          Additional Information About Your Visit        Paylocity[employer] Information     discoapi gives you secure access to your electronic health record. If you see a primary care provider, you can also send messages to your care team and make appointments. If you have questions, please call your primary care clinic.  If you do not have a primary care provider, please call [phone] and they will assist you.        Care EveryWhere ID     This is your Care EveryWhere ID. This could be used by other organizations to access your [place] medical records  [id number]        Your Vitals Were     Pulse Temperature Respirations Height Pulse Oximetry BMI (Body Mass Index)    78 98  F (36.7  C) (Oral) 16 1.727 m (5' 7.99\") 97% 22.51 kg/m2       Blood Pressure from Last 3 Encounters:   08/31/18 (!) 84/51   08/08/18 97/63   07/30/18 112/70    Weight from Last 3 Encounters:   08/31/18 67.1 kg (148 lb)   07/30/18 67.6 kg (149 lb)   06/14/18 64.9 kg (143 lb)               Primary Care Provider Office Phone # Fax #    Willian Arrington [phone]       [facility] PRIMARY [facility]ARE [address]  [facility] [address]        Equal Access to Services     [facility]: Hadii aad ku hadasho Soomaali, waaxda luqadaha, qaybta kaalmada adeegyada, vijay crow'kylie . So [facility] [phone].    ATENCIÓN: Si habla español, tiene a bhatia disposición servicios gratuitos de asistencia lingüística. Llame al [phone].    We comply with applicable federal civil rights laws and [place] laws. We do not discriminate on the basis of race, color, national origin, age, disability, sex, sexual orientation, or gender identity.            Thank you!     Thank you for " [facility]  for your care. Our goal is always to provide you with excellent care. Hearing back from our patients is one way we can continue to improve our services. Please take a few minutes to complete the written survey that you may receive in the mail after your visit with us. Thank you!             Your Updated Medication List - Protect others around you: Learn how to safely use, store and throw away your medicines at www.disposemymeds.org.          This list is accurate as of 8/31/18  3:07 PM.  Always use your most recent med list.                   Brand Name Dispense Instructions for use Diagnosis    acetic acid 0.25 % irrigation     10 mL    Irrigate with as directed 2 times daily    S/P vascular surgery       ALPRAZolam 0.5 MG tablet    XANAX     TK 1 T PO TID PRN        ASPIRIN EC PO      Take 81 mg by mouth daily        carvedilol 6.25 MG tablet    COREG    180 tablet    Take 1 tablet (6.25 mg) by mouth 2 times daily (with meals)    Ischemic cardiomyopathy       clopidogrel 75 MG tablet    PLAVIX    30 tablet    Take 1 tablet (75 mg) by mouth daily    Ischemic cardiomyopathy       diclofenac 1 % Gel topical gel    VOLTAREN    1 Tube    Place 4 g onto the skin 4 times daily    Pain of left lower extremity       docusate sodium 100 MG capsule    COLACE    60 capsule    Take 1 capsule (100 mg) by mouth daily    Takes dietary supplements       enalapril 2.5 MG tablet    VASOTEC    60 tablet    Take 1 tablet (2.5 mg) by mouth 2 times daily    Ischemic cardiomyopathy       escitalopram 20 MG tablet    LEXAPRO    15 tablet    Take 1 tablet (20 mg) by mouth daily    S/P vascular surgery       ezetimibe 10 MG tablet    ZETIA    30 tablet    Take 1 tablet (10 mg) by mouth daily    Ischemic cardiomyopathy       furosemide 20 MG tablet    LASIX    60 tablet    Take 1 tablet (20 mg) by mouth 2 times daily (with meals)    S/P vascular surgery       * gabapentin 300 MG capsule    NEURONTIN     60 capsule    Take 1 capsule (300 mg) by mouth 2 times daily    S/P vascular surgery       * gabapentin 600 MG tablet    NEURONTIN    30 tablet    Take 1 tablet (600 mg) by mouth 3 times daily Increase per clinic directions.        gabapentin 8 % Gel topical PLO cream     100 g    Apply 1 g topically every 8 hours Do NOT apply to open wounds    Peripheral arterial disease (H), Non-healing wound of lower extremity, right, initial encounter       isosorbide mononitrate 30 MG 24 hr tablet    IMDUR    90 tablet    Take 1 tablet (30 mg) by mouth daily    Chronic systolic congestive heart failure (H)       Lidocaine 4 % Gel     30 g    Externally apply topically 2 times daily    Cellulitis and abscess of leg       meloxicam 7.5 MG tablet    MOBIC    30 tablet    Take 1 tablet (7.5 mg) by mouth daily    Arterial leg ulcer (H)       methocarbamol 500 MG tablet    ROBAXIN          mometasone 0.1 % ointment    ELOCON    45 g    Apply affected area daily    Skin inflammation       morphine 0.1% in solosite 0.1% topical gel     30 g    Place 1 g onto the skin daily Use with Once DAILY dressing Change.    Arterial leg ulcer (H)       nitroGLYcerin 0.4 MG sublingual tablet    NITROSTAT     SHARON MAY REPEAT Q 5 MINUTES X 2        nystatin-triamcinolone ointment    MYCOLOG    60 g    Apply topically 2 times daily    Dermatitis       ondansetron 4 MG ODT tab    ZOFRAN-ODT     ondansetron 4 mg disintegrating tablet        order for DME     1 each    Equipment being ordered: Walker Wheels () and Walker () Treatment Diagnosis: impaired mobility    Pain of left lower extremity       predniSONE 10 MG tablet    DELTASONE    30 tablet    Take 1 tablet (10 mg) by mouth daily    S/P vascular surgery       rosuvastatin 20 MG tablet    CRESTOR    30 tablet    Take 1 tablet (20 mg) by mouth daily    Ischemic cardiomyopathy       spironolactone 25 MG tablet    ALDACTONE     TK 1 T PO D        * Notice:  This list has 2 medication(s)  that are the same as other medications prescribed for you. Read the directions carefully, and ask your doctor or other care provider to review them with you.

## 2018-09-04 ENCOUNTER — TELEPHONE (OUTPATIENT)
Dept: ANESTHESIOLOGY | Facility: CLINIC | Age: 68
End: 2018-09-04

## 2018-09-04 DIAGNOSIS — L97.909 ARTERIAL LEG ULCER (H): ICD-10-CM

## 2018-09-04 NOTE — TELEPHONE ENCOUNTER
Health Call Center    Phone Message    May a detailed message be left on voicemail: yes    Reason for Call: Medication Refill Request    Has the patient contacted the pharmacy for the refill? Yes   Name of medication being requested: Meloxicam  Provider who prescribed the medication: NORRIS Ledezma  Pharmacy: [facility]  Date medication is needed: asap, pt does have a future appointment set 9/17, he has 5 tablets left.          Action Taken: Message routed to:  [facility] ([facility] ump pain

## 2018-09-05 RX ORDER — MELOXICAM 7.5 MG/1
7.5 TABLET ORAL DAILY
Qty: 30 TABLET | Refills: 0 | Status: SHIPPED | OUTPATIENT
Start: 2018-09-07

## 2018-09-05 NOTE — TELEPHONE ENCOUNTER
Last refill of Meloxicam was given at office visit on 8/8/18.    Medication refilled, 9/7/17- 30 tablets with -0- refills.   Pt had originally reported that this medication wasn't effective.     Pt will follow up with APRN on 9/17/18.    Celina Stack LPN

## 2018-09-06 ENCOUNTER — OFFICE VISIT (OUTPATIENT)
Dept: INTERVENTIONAL RADIOLOGY/VASCULAR | Facility: CLINIC | Age: 68
End: 2018-09-06
Payer: COMMERCIAL

## 2018-09-06 VITALS
HEART RATE: 72 BPM | OXYGEN SATURATION: 97 % | WEIGHT: 148.2 LBS | DIASTOLIC BLOOD PRESSURE: 58 MMHG | SYSTOLIC BLOOD PRESSURE: 91 MMHG | BODY MASS INDEX: 22.54 KG/M2

## 2018-09-06 DIAGNOSIS — R91.1 LESION OF RIGHT LUNG: Primary | ICD-10-CM

## 2018-09-06 DIAGNOSIS — R91.1 LESION OF RIGHT LUNG: ICD-10-CM

## 2018-09-06 LAB
ERYTHROCYTE [DISTWIDTH] IN BLOOD BY AUTOMATED COUNT: 13.9 % (ref 10–15)
HCT VFR BLD AUTO: 38.6 % (ref 40–53)
HGB BLD-MCNC: 13 G/DL (ref 13.3–17.7)
INR PPP: 0.95 (ref 0.86–1.14)
MCH RBC QN AUTO: 32.3 PG (ref 26.5–33)
MCHC RBC AUTO-ENTMCNC: 33.7 G/DL (ref 31.5–36.5)
MCV RBC AUTO: 96 FL (ref 78–100)
PLATELET # BLD AUTO: 260 10E9/L (ref 150–450)
RBC # BLD AUTO: 4.03 10E12/L (ref 4.4–5.9)
WBC # BLD AUTO: 11.7 10E9/L (ref 4–11)

## 2018-09-06 NOTE — MR AVS SNAPSHOT
After Visit Summary   9/6/2018    Boom Kahn    MRN: [id number]           Patient Information     Date Of Birth          1950        Visit Information        Provider Department      9/6/2018 2:00 PM Patricia Blackwell [facility]N [facility]        Today's Diagnoses     Lesion of right lung    -  1      Care Instructions    I called Boom and left him a voice mail with the date and time of the biopsy    You are scheduled for your biopsy on Tuesday, September 18, 2018   Report to the [facility] Waiting room at 9:30 AM  The [facility] Waiting Room is located on the 2nd floor (street level) of [address]  Your procedure is scheduled to start at approximately 11:00 AM    No solid foods or milk products for 6 hours prior on the day of the procedure.5:00 AM  You may have clear liquids until 2 hours prior on the day of the procedure.(water, apple juice, broth, coffee or tea without milk or sugar, jell-o, white grape juice) 9:00 AM    He is told the aspirin and plavix for 5 days prior to the biopsy, starting Thursday,. September 13, 2018    You will need a     If you have any questions you may call the Radiology Nurse Line [phone]          Follow-ups after your visit        Follow-up notes from your care team     Discussed this visit      Your next 10 appointments already scheduled     Sep 14, 2018  3:30 PM CDT   (Arrive by 3:15 PM)   Return Visit with Dave Del Rio MD   [facility] ([facility])    [address]  [address]  [place] [id number] [phone]            Sep 17, 2018  2:10 PM CDT   (Arrive by 1:55 PM)   Return Visit with DEDRA Segura [facility] ([facility] [place])    [address]  4th Floor  [place] [id number] [phone]            Sep 18, 2018  9:30 AM CDT   Procedure 4.5 hour with [facility] ROOM  7   [address] [facility] ([facility])    [address] [place] [address]               Sep 18, 2018 11:00 AM CDT   CT LUNG MEDIASTINUM BIOPSY with UUCT2   [facility] [place] Interventional Radiology ([facility])    [address] [address]   [phone]           How do I prepare for my exam? (Food and drink instructions) The day before your exam: Drink extra fluids  at least six 8-ounce glasses (unless your doctor tells you to restrict fluids).  The day of your exam: No eating or drinking for 4 hours before your test. You may take medicine with small sips of water  What should I wear: Please wear loose clothing, such as a sweat suit or jogging clothes. Avoid snaps, zippers and other metal. We may ask you to undress and put on a hospital gown.  How long does the exam take: Plan to spend up to 6 hours at the hospital. The test itself normally takes less than an hour. We will watch for side effects for 1 to 4 hours.  What should I bring: Please bring any scans or X-rays taken at other hospitals, if they may be helpful. Also bring a list of your medicines, including vitamins, minerals and over-the-counter drugs. It is safest to leave personal items at home.  Do I need a : Plan for an adult to drive you home and stay with you until morning.  What do I need to tell my doctor? Tell your doctor in advance: * If you have any allergies. * If you are breastfeeding or there s any chance you are pregnant. * If you are taking Coumadin (or any other blood thinners) 5 days prior to the exam for any special instructions. * If you are diabetic to determine if your insulin needs have to be adjusted for the exam.  What should I do after the exam: When you get home, you ll need to take it easy for the rest of the day.  Do not drive for 24 hours. You may have slight bruising and mild pain in the area. If so,  you may take acetaminophen (Tylenol) or ibuprofen (Motrin, Advil) after you get home.  Some people go home with a small tube (catheter) sewn into the skin. If this case, we will show you how to care for the tube.  What is this test: This test uses a very thin needle to remove tissue, fluid or other cells from your body. We then send the cells to a lab for testing. A biopsy tests for disease in a tissue sample. Aspiration tests for disease or infection in a fluid sample. We use pictures from a CT (computed tomography) scan to guide the needle to the right place. A CT scan is a special X-ray. The scanner creates images of the body in cross sections, much like slices of bread. You may receive contrast (X-ray dye) before or during your scan. Contrast is given through an IV (small needle in your arm) or taken by mouth.  Who should I call with questions: If you have any questions, please call the imaging department where you will have your exam. Directions, parking instructions, and other information is available on our website, [email or web address].              Who to contact     Please call your clinic at [phone] to:    Ask questions about your health    Make or cancel appointments    Discuss your medicines    Learn about your test results    Speak to your doctor            Additional Information About Your Visit        "[employer]" Information     "[employer]" gives you secure access to your electronic health record. If you see a primary care provider, you can also send messages to your care team and make appointments. If you have questions, please call your primary care clinic.  If you do not have a primary care provider, please call [phone] and they will assist you.      "[employer]" is an electronic gateway that provides easy, online access to your medical records. With "[employer]", you can request a clinic appointment, read your test results, renew a prescription or communicate with your care team.     To access your  existing account, please contact your [facility] or call [phone] for assistance.        Care EveryWhere ID     This is your Care EveryWhere ID. This could be used by other organizations to access your [place] medical records  [id number]        Your Vitals Were     Pulse Pulse Oximetry BMI (Body Mass Index)             72 97% 22.54 kg/m2          Blood Pressure from Last 3 Encounters:   09/06/18 91/58   08/31/18 (!) 84/51   08/08/18 97/63    Weight from Last 3 Encounters:   09/06/18 67.2 kg (148 lb 3.2 oz)   08/31/18 67.1 kg (148 lb)   07/30/18 67.6 kg (149 lb)               Primary Care Provider Office Phone # Fax #    Willian Arrington [phone]       [facility] [address]  [place] [id number]        Equal Access to Services     [facility] : Hadii aad ku hadasho Soomaali, waaxda luqadaha, qaybta kaalmada adeegyada, waxay idiin hayaan winston kharajovany medeln . So [facility] [phone].    ATENCIÓN: Si habla español, tiene a bhatia disposición servicios gratuitos de asistencia lingüística. Kevin al [phone].    We comply with applicable federal civil rights laws and [place] laws. We do not discriminate on the basis of race, color, national origin, age, disability, sex, sexual orientation, or gender identity.            Thank you!     Thank you for choosing [facility] INTERVENTIONAL RADIOLOGY  for your care. Our goal is always to provide you with excellent care. Hearing back from our patients is one way we can continue to improve our services. Please take a few minutes to complete the written survey that you may receive in the mail after your visit with us. Thank you!             Your Updated Medication List - Protect others around you: Learn how to safely use, store and throw away your medicines at www.disposemymeds.org.          This list is accurate as of 9/6/18 11:59 PM.  Always use your most recent med list.                   Brand Name  Dispense Instructions for use Diagnosis    acetic acid 0.25 % irrigation     10 mL    Irrigate with as directed 2 times daily    S/P vascular surgery       ALPRAZolam 0.5 MG tablet    XANAX     TK 1 T PO TID PRN        ASPIRIN EC PO      Take 81 mg by mouth daily        carvedilol 6.25 MG tablet    COREG    180 tablet    Take 1 tablet (6.25 mg) by mouth 2 times daily (with meals)    Ischemic cardiomyopathy       clopidogrel 75 MG tablet    PLAVIX    30 tablet    Take 1 tablet (75 mg) by mouth daily    Ischemic cardiomyopathy       diclofenac 1 % Gel topical gel    VOLTAREN    1 Tube    Place 4 g onto the skin 4 times daily    Pain of left lower extremity       docusate sodium 100 MG capsule    COLACE    60 capsule    Take 1 capsule (100 mg) by mouth daily    Takes dietary supplements       enalapril 2.5 MG tablet    VASOTEC    60 tablet    Take 1 tablet (2.5 mg) by mouth 2 times daily    Ischemic cardiomyopathy       escitalopram 20 MG tablet    LEXAPRO    15 tablet    Take 1 tablet (20 mg) by mouth daily    S/P vascular surgery       ezetimibe 10 MG tablet    ZETIA    30 tablet    Take 1 tablet (10 mg) by mouth daily    Ischemic cardiomyopathy       furosemide 20 MG tablet    LASIX    60 tablet    Take 1 tablet (20 mg) by mouth 2 times daily (with meals)    S/P vascular surgery       * gabapentin 300 MG capsule    NEURONTIN    60 capsule    Take 1 capsule (300 mg) by mouth 2 times daily    S/P vascular surgery       * gabapentin 600 MG tablet    NEURONTIN    30 tablet    Take 1 tablet (600 mg) by mouth 3 times daily Increase per clinic directions.        gabapentin 8 % Gel topical PLO cream     100 g    Apply 1 g topically every 8 hours Do NOT apply to open wounds    Peripheral arterial disease (H), Non-healing wound of lower extremity, right, initial encounter       isosorbide mononitrate 30 MG 24 hr tablet    IMDUR    90 tablet    Take 1 tablet (30 mg) by mouth daily    Chronic systolic congestive heart failure  (H)       Lidocaine 4 % Gel     30 g    Externally apply topically 2 times daily    Cellulitis and abscess of leg       meloxicam 7.5 MG tablet    MOBIC    30 tablet    Take 1 tablet (7.5 mg) by mouth daily    Arterial leg ulcer (H)       methocarbamol 500 MG tablet    ROBAXIN          mometasone 0.1 % ointment    ELOCON    45 g    Apply affected area daily    Skin inflammation       morphine 0.1% in solosite 0.1% topical gel     30 g    Place 1 g onto the skin daily Use with Once DAILY dressing Change.    Arterial leg ulcer (H)       nitroGLYcerin 0.4 MG sublingual tablet    NITROSTAT     SHARON MAY REPEAT Q 5 MINUTES X 2        nystatin-triamcinolone ointment    MYCOLOG    60 g    Apply topically 2 times daily    Dermatitis       ondansetron 4 MG ODT tab    ZOFRAN-ODT     ondansetron 4 mg disintegrating tablet        order for DME     1 each    Equipment being ordered: Walker Wheels () and Walker () Treatment Diagnosis: impaired mobility    Pain of left lower extremity       predniSONE 10 MG tablet    DELTASONE    30 tablet    Take 1 tablet (10 mg) by mouth daily    S/P vascular surgery       rosuvastatin 20 MG tablet    CRESTOR    30 tablet    Take 1 tablet (20 mg) by mouth daily    Ischemic cardiomyopathy       spironolactone 25 MG tablet    ALDACTONE     TK 1 T PO D        * Notice:  This list has 2 medication(s) that are the same as other medications prescribed for you. Read the directions carefully, and ask your doctor or other care provider to review them with you.

## 2018-09-06 NOTE — PROGRESS NOTES
First Name: Boom   Age: 68 year old   Referring Physician: Dr. Del Rio   REASON FOR REFERRAL: Consult for right lower lobe lung lesion biopsy    Assessment:  Boom is a 78 yo with a PMH of CAD s/p CABG, CHF s/p ICD placement PAD, tobacco abuse and alcoholism.  Hx of adenocarcinoma of the lung, s/p SBRT to RUL nodule in Jan 2017.  Moved to [place] in Jan 2018.  Has a nonhealing right leg wound, ended up having a right femoral tibial bypass graft with wound debridement to right lower lobe.  Medical oncology is following him here and he has a right lower lobe lung nodule that has been increasing in size and is concerning for malignancy and biopsy is requested.    Plan:  Image guided biopsy of right lower lobe lung biopsy  Approval obtained from vascular surgery to hold the aspirin and plavix for 5 days (Shannan Acevedo, DEDRA).    Blood work today  I explained to the patient that he should refrain from drinking alcohol the day before the procedure, so that he might receive sedation on the day of the procedure.  That we would be checking an ethanol level the morning of the procedure.    HPI: This is a patient with a PMH significant for coronary artery disease status post CABG, congestive heart failure s/p ICD placement, peripheral arterial disease, tobacco abuse who had a chest x-ray done as part of pre-op workup for vascular surgery in [place] which reported an opacity in the right upper lung. It was not followed up further until patient presented himself to PCP for further workup and ultimately a PET scan done on 11/2016 and showed a 2.2 x 2 cm right upper lobe nodule with an SUV of 9.9. CT guided FNA biopsy of the nodule on 12/6/16 showed adenocarcinoma that was complicated by pneumothorax requiring chest tube placement. Patient was determined not to be a candidate for surgical resection given his comorbidities and was referred to radiation oncology. He underwent SBRT in Jan 2017 and was following with radiation oncology  with follow-up CT scans in March and another one in August with no radiological evidence of recurrence.  He moved here to [place] in January 2018 and was hospitalized with a nonhealing right leg wound and ended up having a right femoral tibial bypass graft with insitu saphenous vein, and wound debridement of right lower leg on 1/15/2018.    05/25/18 he established medical oncology care. CT thorax revealed slight clinical status subtotal lung nodule in the right lower lobe . After discussing, plan was to continue observation at this point.  CT from 8/31 showed Further growth of the subpleural right lower lobe solid nodule, now 13 mm, previously 8 mm on 5/23/2018. This is suspicious for metastatic disease versus second lung primary malignancy.  Dr. Martinez from IR reviewed the imaging and approved the bipsy  Patient continues to smoke cigarettes and he drinks about 3-4 beers a day.  [facility]  His son will be available to help him on the day of the biopsy.    PAST MEDICAL HISTORY:  Past Medical History:   Diagnosis Date     Anxiety      CAD (coronary artery disease)     s/p 3v CABG     CHF (congestive heart failure) (H)     EF 10-15%     Chronic pain      COPD (chronic obstructive pulmonary disease) (H)      Depression      Hyperlipidemia      Hypertension      Lung cancer (H)     s/p radiation therapy     PAD (peripheral artery disease) (H)     s/p lower extremity stents     Tobacco abuse      PAST SURGICAL HISTORY:   Past Surgical History:   Procedure Laterality Date     ANGIOPLASTY Right 10/2016     BYPASS GRAFT ARTERY CORONARY  1999    [place]/[facility]     BYPASS GRAFT FEMOROTIBIAL Right 1/15/2018    Procedure: BYPASS GRAFT FEMOROTIBIAL;  Right Femorotibial Bypass Graft with insitu saphenous vein, wound debriedment of right lower leg;  Surgeon: Lexis Ag MD;  Location: [facility] OR     cardiac catheterization       Cardiac defibrillator placement        CHEST TUBE INSERTION       COLON SURGERY  2008    colon resection for diverticulitis     FINE NEEDLE ASPIRATION Right 12/2016     IMPLANT PACEMAKER       previous radiation       FAMILY HISTORY:   Family History   Problem Relation Age of Onset     HEART DISEASE Father      Hypertension Father      SOCIAL HISTORY:   Social History   Substance Use Topics     Smoking status: Current Every Day Smoker     Packs/day: 1.00     Years: 50.00     Types: Cigarettes     Smokeless tobacco: Never Used      Comment: 0.5-1 ppd.     Alcohol use Yes      Comment: 50 beers/wk, 3-4 beers daily, last drank last night     PROBLEM LIST:   Patient Active Problem List    Diagnosis Date Noted     Arterial leg ulcer (H) 07/13/2018     Priority: Medium     Nonhealing nonsurgical wound 04/18/2018     Priority: Medium     Back pain 02/07/2018     Priority: Medium     S/P vascular surgery 01/23/2018     Priority: Medium     Atherosclerotic PVD with ulceration (H) 01/15/2018     Priority: Medium     Critical lower limb ischemia 01/14/2018     Priority: Medium     Ischemic cardiomyopathy 01/02/2018     Priority: Medium     CHF (congestive heart failure) (H) 01/02/2018     Priority: Medium     PVD (peripheral vascular disease) (H) 01/02/2018     Priority: Medium     COPD (chronic obstructive pulmonary disease) (H) 01/02/2018     Priority: Medium     Anxiety 01/02/2018     Priority: Medium     Atherosclerosis of native arteries of extremities with intermittent claudication, bilateral legs (H) 01/02/2018     Priority: Medium     Automatic implantable cardioverter-defibrillator in situ 01/02/2018     Priority: Medium     2009 in [place] at [facility] 01/02/2018     Priority: Medium     Cardiomyopathy (H) 01/02/2018     Priority: Medium     Hyperlipidemia 01/02/2018     Priority: Medium     HTN (hypertension) 01/02/2018     Priority: Medium     Lung mass 01/02/2018     Priority: Medium     Dyspnea on exertion 01/02/2018      Priority: Medium     Malignant neoplasm of lung (H) 01/02/2018     Priority: Medium     MI (myocardial infarction) 01/02/2018     Priority: Medium     MEDICATIONS:   Prescription Medications as of 9/7/2018             acetic acid 0.25 % irrigation Irrigate with as directed 2 times daily    ALPRAZolam (XANAX) 0.5 MG tablet TK 1 T PO TID PRN    ASPIRIN EC PO Take 81 mg by mouth daily    carvedilol (COREG) 6.25 MG tablet Take 1 tablet (6.25 mg) by mouth 2 times daily (with meals)    clopidogrel (PLAVIX) 75 MG tablet Take 1 tablet (75 mg) by mouth daily    diclofenac (VOLTAREN) 1 % GEL topical gel Place 4 g onto the skin 4 times daily    docusate sodium (COLACE) 100 MG capsule Take 1 capsule (100 mg) by mouth daily    enalapril (VASOTEC) 2.5 MG tablet Take 1 tablet (2.5 mg) by mouth 2 times daily    escitalopram (LEXAPRO) 20 MG tablet Take 1 tablet (20 mg) by mouth daily    ezetimibe (ZETIA) 10 MG tablet Take 1 tablet (10 mg) by mouth daily    furosemide (LASIX) 20 MG tablet Take 1 tablet (20 mg) by mouth 2 times daily (with meals)    gabapentin (NEURONTIN) 300 MG capsule Take 1 capsule (300 mg) by mouth 2 times daily    gabapentin (NEURONTIN) 600 MG tablet Take 1 tablet (600 mg) by mouth 3 times daily Increase per clinic directions.    gabapentin 8 % GEL topical PLO cream Apply 1 g topically every 8 hours Do NOT apply to open wounds    isosorbide mononitrate (IMDUR) 30 MG 24 hr tablet Take 1 tablet (30 mg) by mouth daily    Lidocaine 4 % GEL Externally apply topically 2 times daily    meloxicam (MOBIC) 7.5 MG tablet Take 1 tablet (7.5 mg) by mouth daily    methocarbamol (ROBAXIN) 500 MG tablet     mometasone (ELOCON) 0.1 % ointment Apply affected area daily    morphine 0.1% in solosite 0.1% topical gel Place 1 g onto the skin daily Use with Once DAILY dressing Change.    nitroGLYcerin (NITROSTAT) 0.4 MG sublingual tablet SHARON MAY REPEAT Q 5 MINUTES X 2    nystatin-triamcinolone (MYCOLOG) ointment Apply topically 2 times  daily    ondansetron (ZOFRAN-ODT) 4 MG ODT tab ondansetron 4 mg disintegrating tablet    order for DME Equipment being ordered: Walker Wheels () and Walker ()  Treatment Diagnosis: impaired mobility    predniSONE (DELTASONE) 10 MG tablet Take 1 tablet (10 mg) by mouth daily    rosuvastatin (CRESTOR) 20 MG tablet Take 1 tablet (20 mg) by mouth daily    spironolactone (ALDACTONE) 25 MG tablet TK 1 T PO D        ALLERGIES: Betadine [povidone iodine]; Collagenase clostridium histolyticum; Flagyl [metronidazole]; Iodine; and Santyl [collagenase]  VITALS: BP 91/58  Pulse 72  Wt 67.2 kg (148 lb 3.2 oz)  SpO2 97%  BMI 22.54 kg/m2    ROS:  Skin: right leg wound  Eyes: glasses  Ears/Nose/Throat: negative  Respiratory: cough  Cardiovascular: negative  Gastrointestinal: negative  Genitourinary: negative  Musculoskeletal: negative  Neurologic: negative  Psychiatric: negative  Hematologic/Lymphatic/Immunologic: negative  Endocrine: negative      Physical Examination: Vital signs are reviewed and they are stable  Constitutional: older gentleman, in no acute physical distress, came alone to his appt  Cardiovascular: negative , RRR  Respiratory: clear lungs  Musculoskeletal: vascular disease present in both lower extremities, gait normal and normal muscle tone, uses a cane  Skin: wound right lower extremity that is covered with bandages  Neurologic: negative  Psychiatric: affect normal/bright and mentation appears normal.    BMP RESULTS:  Lab Results   Component Value Date     07/30/2018    POTASSIUM 4.7 07/30/2018    CHLORIDE 104 07/30/2018    CO2 29 07/30/2018    ANIONGAP 6 07/30/2018     (H) 07/30/2018    BUN 14 07/30/2018    CR 1.01 07/30/2018    GFRESTIMATED 74 07/30/2018    GFRESTBLACK 89 07/30/2018    SUMAN 9.0 07/30/2018        CBC RESULTS:  Lab Results   Component Value Date    WBC 11.7 (H) 09/06/2018    RBC 4.03 (L) 09/06/2018    HGB 13.0 (L) 09/06/2018    HCT 38.6 (L) 09/06/2018    MCV 96  09/06/2018    MCH 32.3 09/06/2018    MCHC 33.7 09/06/2018    RDW 13.9 09/06/2018     09/06/2018       INR/PTT:  Lab Results   Component Value Date    INR 0.95 09/06/2018    PTT 33 02/07/2018       Diagnostic studies: see CT scan    PROVIDER NOTE:  I explained the procedure to justino.    This included:  Preparing for the procedure, the actual procedure and recovery.  I explained the risk of the lung biopsy:  pneumothorax, hemoptysis, possible need for a chest tube and overnight stay in the hospital, bleeding from the lung requiring an embolization procedure and death from the procedure.  I explained that usually the results return after three to four business days.    I explained that they would be contacted by a nurse coordinator following this to determine a future plan.  Thank you for involving us in the care of your patient.    30 minutes was spent with Justino 25 minutes was spent in counseling.  Patricia Blackwell MS, APRN, CNS, CRN  Clinical Nurse Specialist  Interventional Radiology  [phone] (voice mail)  [phone] (pager)    CC  Patient Care Team:  Willian Arrington MD as PCP - General  Perry Martinez MD as Resident (Radiology)  Dennise Arita APRN CNP as Nurse Practitioner (Nurse Practitioner)  Giovanni Rodrigez MD as Resident (Internal Medicine)  Cari Childress APRN CNP as Nurse Practitioner (Nurse Practitioner - Gerontology)  Dave Jeff MD as Consulting Physician (Hematology & Oncology)  Amairani Almonte RN as Nurse Coordinator (Hematology & Oncology)  DAVE JEFF

## 2018-09-06 NOTE — LETTER
9/6/2018       RE: Boom Kahn  [address] [place] [address] [place] [address]     Dear Colleague,    Thank you for referring your patient, Boom Kahn, to the [facility] INTERVENTIONAL RADIOLOGY at [facility]. Please see a copy of my visit note below.    First Name: Boom   Age: 68 year old   Referring Physician: Dr. Del Rio   REASON FOR REFERRAL: Consult for right lower lobe lung lesion biopsy    Assessment:  Boom is a 78 yo with a PMH of CAD s/p CABG, CHF s/p ICD placement PAD, tobacco abuse and alcoholism.  Hx of adenocarcinoma of the lung, s/p SBRT to RUL nodule in Jan 2017.  Moved to [place] in Jan 2018.  Has a nonhealing right leg wound, ended up having a right femoral tibial bypass graft with wound debridement to right lower lobe.  Medical oncology is following him here and he has a right lower lobe lung nodule that has been increasing in size and is concerning for malignancy and biopsy is requested.    Plan:  Image guided biopsy of right lower lobe lung biopsy  Approval obtained from vascular surgery to hold the aspirin and plavix for 5 days (Shannan Acevedo, APRN).    Blood work today  I explained to the patient that he should refrain from drinking alcohol the day before the procedure, so that he might receive sedation on the day of the procedure.  That we would be checking an ethanol level the morning of the procedure.    HPI: This is a patient with a PMH significant for coronary artery disease status post CABG, congestive heart failure s/p ICD placement, peripheral arterial disease, tobacco abuse who had a chest x-ray done as part of pre-op workup for vascular surgery in [place] which reported an opacity in the right upper lung. It was not followed up further until patient presented himself to PCP for further workup and ultimately a PET scan done on 11/2016 and showed a 2.2 x 2 cm right upper lobe nodule with an SUV of 9.9. CT guided FNA biopsy of the nodule on 12/6/16  showed adenocarcinoma that was complicated by pneumothorax requiring chest tube placement. Patient was determined not to be a candidate for surgical resection given his comorbidities and was referred to radiation oncology. He underwent SBRT in Jan 2017 and was following with radiation oncology with follow-up CT scans in March and another one in August with no radiological evidence of recurrence.  He moved here to [place] in January 2018 and was hospitalized with a nonhealing right leg wound and ended up having a right femoral tibial bypass graft with insitu saphenous vein, and wound debridement of right lower leg on 1/15/2018.    05/25/18 he established medical oncology care. CT thorax revealed slight clinical status subtotal lung nodule in the right lower lobe . After discussing, plan was to continue observation at this point.  CT from 8/31 showed Further growth of the subpleural right lower lobe solid nodule, now 13 mm, previously 8 mm on 5/23/2018. This is suspicious for metastatic disease versus second lung primary malignancy.  Dr. Martinez from IR reviewed the imaging and approved the bipsy  Patient continues to smoke cigarettes and he drinks about 3-4 beers a day.  [facility]  His son will be available to help him on the day of the biopsy.    PAST MEDICAL HISTORY:  Past Medical History:   Diagnosis Date     Anxiety      CAD (coronary artery disease)     s/p 3v CABG     CHF (congestive heart failure) (H)     EF 10-15%     Chronic pain      COPD (chronic obstructive pulmonary disease) (H)      Depression      Hyperlipidemia      Hypertension      Lung cancer (H)     s/p radiation therapy     PAD (peripheral artery disease) (H)     s/p lower extremity stents     Tobacco abuse      PAST SURGICAL HISTORY:   Past Surgical History:   Procedure Laterality Date     ANGIOPLASTY Right 10/2016     BYPASS GRAFT ARTERY CORONARY  1999    [place]/[facility]      BYPASS GRAFT FEMOROTIBIAL Right 1/15/2018    Procedure: BYPASS GRAFT FEMOROTIBIAL;  Right Femorotibial Bypass Graft with insitu saphenous vein, wound debriedment of right lower leg;  Surgeon: Lexis Ag MD;  Location: [facility] OR     cardiac catheterization       Cardiac defibrillator placement       CHEST TUBE INSERTION       COLON SURGERY  2008    colon resection for diverticulitis     FINE NEEDLE ASPIRATION Right 12/2016     IMPLANT PACEMAKER       previous radiation       FAMILY HISTORY:   Family History   Problem Relation Age of Onset     HEART DISEASE Father      Hypertension Father      SOCIAL HISTORY:   Social History   Substance Use Topics     Smoking status: Current Every Day Smoker     Packs/day: 1.00     Years: 50.00     Types: Cigarettes     Smokeless tobacco: Never Used      Comment: 0.5-1 ppd.     Alcohol use Yes      Comment: 50 beers/wk, 3-4 beers daily, last drank last night     PROBLEM LIST:   Patient Active Problem List    Diagnosis Date Noted     Arterial leg ulcer (H) 07/13/2018     Priority: Medium     Nonhealing nonsurgical wound 04/18/2018     Priority: Medium     Back pain 02/07/2018     Priority: Medium     S/P vascular surgery 01/23/2018     Priority: Medium     Atherosclerotic PVD with ulceration (H) 01/15/2018     Priority: Medium     Critical lower limb ischemia 01/14/2018     Priority: Medium     Ischemic cardiomyopathy 01/02/2018     Priority: Medium     CHF (congestive heart failure) (H) 01/02/2018     Priority: Medium     PVD (peripheral vascular disease) (H) 01/02/2018     Priority: Medium     COPD (chronic obstructive pulmonary disease) (H) 01/02/2018     Priority: Medium     Anxiety 01/02/2018     Priority: Medium     Atherosclerosis of native arteries of extremities with intermittent claudication, bilateral legs (H) 01/02/2018     Priority: Medium     Automatic implantable cardioverter-defibrillator in situ 01/02/2018     Priority: Medium     2009 in [facility]          Depression 01/02/2018     Priority: Medium     Cardiomyopathy (H) 01/02/2018     Priority: Medium     Hyperlipidemia 01/02/2018     Priority: Medium     HTN (hypertension) 01/02/2018     Priority: Medium     Lung mass 01/02/2018     Priority: Medium     Dyspnea on exertion 01/02/2018     Priority: Medium     Malignant neoplasm of lung (H) 01/02/2018     Priority: Medium     MI (myocardial infarction) 01/02/2018     Priority: Medium     MEDICATIONS:   Prescription Medications as of 9/7/2018             acetic acid 0.25 % irrigation Irrigate with as directed 2 times daily    ALPRAZolam (XANAX) 0.5 MG tablet TK 1 T PO TID PRN    ASPIRIN EC PO Take 81 mg by mouth daily    carvedilol (COREG) 6.25 MG tablet Take 1 tablet (6.25 mg) by mouth 2 times daily (with meals)    clopidogrel (PLAVIX) 75 MG tablet Take 1 tablet (75 mg) by mouth daily    diclofenac (VOLTAREN) 1 % GEL topical gel Place 4 g onto the skin 4 times daily    docusate sodium (COLACE) 100 MG capsule Take 1 capsule (100 mg) by mouth daily    enalapril (VASOTEC) 2.5 MG tablet Take 1 tablet (2.5 mg) by mouth 2 times daily    escitalopram (LEXAPRO) 20 MG tablet Take 1 tablet (20 mg) by mouth daily    ezetimibe (ZETIA) 10 MG tablet Take 1 tablet (10 mg) by mouth daily    furosemide (LASIX) 20 MG tablet Take 1 tablet (20 mg) by mouth 2 times daily (with meals)    gabapentin (NEURONTIN) 300 MG capsule Take 1 capsule (300 mg) by mouth 2 times daily    gabapentin (NEURONTIN) 600 MG tablet Take 1 tablet (600 mg) by mouth 3 times daily Increase per clinic directions.    gabapentin 8 % GEL topical PLO cream Apply 1 g topically every 8 hours Do NOT apply to open wounds    isosorbide mononitrate (IMDUR) 30 MG 24 hr tablet Take 1 tablet (30 mg) by mouth daily    Lidocaine 4 % GEL Externally apply topically 2 times daily    meloxicam (MOBIC) 7.5 MG tablet Take 1 tablet (7.5 mg) by mouth daily    methocarbamol (ROBAXIN) 500 MG tablet     mometasone (ELOCON) 0.1 % ointment  Apply affected area daily    morphine 0.1% in solosite 0.1% topical gel Place 1 g onto the skin daily Use with Once DAILY dressing Change.    nitroGLYcerin (NITROSTAT) 0.4 MG sublingual tablet SHARON MAY REPEAT Q 5 MINUTES X 2    nystatin-triamcinolone (MYCOLOG) ointment Apply topically 2 times daily    ondansetron (ZOFRAN-ODT) 4 MG ODT tab ondansetron 4 mg disintegrating tablet    order for DME Equipment being ordered: Walker Wheels () and Walker ()  Treatment Diagnosis: impaired mobility    predniSONE (DELTASONE) 10 MG tablet Take 1 tablet (10 mg) by mouth daily    rosuvastatin (CRESTOR) 20 MG tablet Take 1 tablet (20 mg) by mouth daily    spironolactone (ALDACTONE) 25 MG tablet TK 1 T PO D        ALLERGIES: Betadine [povidone iodine]; Collagenase clostridium histolyticum; Flagyl [metronidazole]; Iodine; and Santyl [collagenase]  VITALS: BP 91/58  Pulse 72  Wt 67.2 kg (148 lb 3.2 oz)  SpO2 97%  BMI 22.54 kg/m2    ROS:  Skin: right leg wound  Eyes: glasses  Ears/Nose/Throat: negative  Respiratory: cough  Cardiovascular: negative  Gastrointestinal: negative  Genitourinary: negative  Musculoskeletal: negative  Neurologic: negative  Psychiatric: negative  Hematologic/Lymphatic/Immunologic: negative  Endocrine: negative      Physical Examination: Vital signs are reviewed and they are stable  Constitutional: older gentleman, in no acute physical distress, came alone to his appt  Cardiovascular: negative , RRR  Respiratory: clear lungs  Musculoskeletal: vascular disease present in both lower extremities, gait normal and normal muscle tone, uses a cane  Skin: wound right lower extremity that is covered with bandages  Neurologic: negative  Psychiatric: affect normal/bright and mentation appears normal.    BMP RESULTS:  Lab Results   Component Value Date     07/30/2018    POTASSIUM 4.7 07/30/2018    CHLORIDE 104 07/30/2018    CO2 29 07/30/2018    ANIONGAP 6 07/30/2018     (H) 07/30/2018    BUN 14  07/30/2018    CR 1.01 07/30/2018    GFRESTIMATED 74 07/30/2018    GFRESTBLACK 89 07/30/2018    SUMAN 9.0 07/30/2018        CBC RESULTS:  Lab Results   Component Value Date    WBC 11.7 (H) 09/06/2018    RBC 4.03 (L) 09/06/2018    HGB 13.0 (L) 09/06/2018    HCT 38.6 (L) 09/06/2018    MCV 96 09/06/2018    MCH 32.3 09/06/2018    MCHC 33.7 09/06/2018    RDW 13.9 09/06/2018     09/06/2018       INR/PTT:  Lab Results   Component Value Date    INR 0.95 09/06/2018    PTT 33 02/07/2018       Diagnostic studies: see CT scan    PROVIDER NOTE:  I explained the procedure to justino.    This included:  Preparing for the procedure, the actual procedure and recovery.  I explained the risk of the lung biopsy:  pneumothorax, hemoptysis, possible need for a chest tube and overnight stay in the hospital, bleeding from the lung requiring an embolization procedure and death from the procedure.  I explained that usually the results return after three to four business days.    I explained that they would be contacted by a nurse coordinator following this to determine a future plan.  Thank you for involving us in the care of your patient.    30 minutes was spent with Justino 25 minutes was spent in counseling.    Patricia Blackwell MS, APRN, CNS, CRN  Clinical Nurse Specialist  Interventional Radiology  [phone] (voice mail)  [phone] (pager)    CC  Patient Care Team:  Willian Arrington MD as PCP - General  Perry Martinez MD as Resident (Radiology)  Dennise Arita APRN CNP as Nurse Practitioner (Nurse Practitioner)  Giovanni Rodrigez MD as Resident (Internal Medicine)  Cari Childress APRN CNP as Nurse Practitioner (Nurse Practitioner - Gerontology)  Dave Jeff MD as Consulting Physician (Hematology & Oncology)  Amairani Almonte RN as Nurse Coordinator (Hematology & Oncology)  DAVE JEFF

## 2018-09-07 ENCOUNTER — HOSPITAL ENCOUNTER (OUTPATIENT)
Facility: CLINIC | Age: 68
End: 2018-09-07
Admitting: INTERNAL MEDICINE
Payer: COMMERCIAL

## 2018-09-11 NOTE — PATIENT INSTRUCTIONS
I called Boom and left him a voice mail with the date and time of the biopsy    You are scheduled for your biopsy on Tuesday, September 18, 2018   Report to the [facility] Waiting room at 9:30 AM  The [facility] Waiting Room is located on the 2nd floor (street level) of [address].  Your procedure is scheduled to start at approximately 11:00 AM    No solid foods or milk products for 6 hours prior on the day of the procedure.5:00 AM  You may have clear liquids until 2 hours prior on the day of the procedure.(water, apple juice, broth, coffee or tea without milk or sugar, jell-o, white grape juice) 9:00 AM    He is told the aspirin and plavix for 5 days prior to the biopsy, starting Thursday,. September 13, 2018    You will need a     If you have any questions you may call the Radiology Nurse Line [phone]

## 2018-09-12 ENCOUNTER — TELEPHONE (OUTPATIENT)
Dept: ANESTHESIOLOGY | Facility: CLINIC | Age: 68
End: 2018-09-12

## 2018-09-12 ENCOUNTER — TELEPHONE (OUTPATIENT)
Dept: ORTHOPEDICS | Facility: CLINIC | Age: 68
End: 2018-09-12

## 2018-09-12 NOTE — TELEPHONE ENCOUNTER
Received voicemail message from patient returning a call from Enma. Patient would like a call back to schedule his injection.

## 2018-09-12 NOTE — TELEPHONE ENCOUNTER
Attempted to reach out to patient to schedule Lumbar Sympathetic Block. Patient was unavailable and a voicemail was left for the patient to call back if interested in scheduling.

## 2018-09-13 NOTE — TELEPHONE ENCOUNTER
Attempted to reach out to patient to help schedule injection. Unable to reach patient or leave voicemail.

## 2018-09-14 ENCOUNTER — TELEPHONE (OUTPATIENT)
Dept: INTERVENTIONAL RADIOLOGY/VASCULAR | Facility: CLINIC | Age: 68
End: 2018-09-14

## 2018-09-17 ENCOUNTER — TELEPHONE (OUTPATIENT)
Dept: ONCOLOGY | Facility: CLINIC | Age: 68
End: 2018-09-17

## 2018-09-17 ENCOUNTER — TELEPHONE (OUTPATIENT)
Dept: ANESTHESIOLOGY | Facility: CLINIC | Age: 68
End: 2018-09-17

## 2018-09-17 RX ORDER — SODIUM CHLORIDE 9 MG/ML
INJECTION, SOLUTION INTRAVENOUS CONTINUOUS
Status: CANCELLED | OUTPATIENT
Start: 2018-09-17

## 2018-09-17 RX ORDER — LIDOCAINE 40 MG/G
CREAM TOPICAL
Status: CANCELLED | OUTPATIENT
Start: 2018-09-17

## 2018-09-17 NOTE — TELEPHONE ENCOUNTER
[facility] Call Center    Phone Message    May a detailed message be left on voicemail: yes    Reason for Call: Other: Pt cancelled appt today because he is in too much pain to come in. Please call pt to discuss.     Action Taken: Message routed to:  [facility] Pain

## 2018-09-17 NOTE — TELEPHONE ENCOUNTER
Pt is scheduled for a lung biopsy tomorrow.  He calls to report he got out of [facility] yesterday after a 4 day stay for an infected leg wound. He reported his WBC yesterday was 15.2. He is asking if he should proceed with the planned biopsy.  Leg wounds are not new.  He was discharged on Augmentin 850 mg twice daily for 10 days. He began it  last night. He was on IV antibiotics in the Hospital.   Spoke to Dr Del Rio who states if pt has no fevers pr chills he is fine for pt to proceed with biopsy.  Pt notified.  He feels well and will proceed with biopsy tomorrow.  He will call if anything changes.

## 2018-09-17 NOTE — TELEPHONE ENCOUNTER
"LPN called and spoke to pt regarding this message.   Pt stated that they wished to cancel their appointment because they were having too much pain, and didn't want to come in.   LPN informed pt that because they are getting a prescription for a controlled substance, Morphine gel, that they would have to follow up in clinic for refills.     Pt stated they are no longer using the medication and that they stopped taking their Meloxicam also.  Pt stated they did not want to follow with the clinic anymore and that they were going to \"Look for a real pain clinic.\"    LPN asked that they pt reach out to the clinic if they felt as though they were needing our services.     Celina Stack, SABI         "

## 2018-09-18 ENCOUNTER — TELEPHONE (OUTPATIENT)
Dept: VASCULAR SURGERY | Facility: CLINIC | Age: 68
End: 2018-09-18

## 2018-09-18 NOTE — TELEPHONE ENCOUNTER
[facility] Call Center    Phone Message    May a detailed message be left on voicemail: yes    Reason for Call: Other: Latesha with [facility] is requesting orders for resumption of homecare. Please call her at  [phone] - ok to leave a detailed voicemail     Action Taken: Message routed to:  [facility] vascular surgery

## 2018-09-18 NOTE — TELEPHONE ENCOUNTER
Discussed with JORDEN Taylor Mancera.  Vascular Surgery has not seen this patient in over 3 months.  He has been seen by other providers at other institutions.  We cannot order resumption of home care.

## 2018-09-20 ENCOUNTER — TELEPHONE (OUTPATIENT)
Dept: ONCOLOGY | Facility: CLINIC | Age: 68
End: 2018-09-20

## 2018-09-20 NOTE — TELEPHONE ENCOUNTER
I spoke to patient to inquire about his status at home since he missed lung bx on 9/18/18. Pt reports that he was DC'ed from [facility] (9/13-9/16) for cellulitis of R leg and continues on Augmentin through next week. He felt too tired & burnt out from that hospitalization to continue with another procedure on 9/18.     Pt plans to return to [place] next week (9/27/18) and will establish care w/ his local oncologist Dr Gambino & [facility]. Pt has already requested the release of info from [facility] to [place]. He asks to cancel all follow up here & will call as needed in the future. Dr Del Rio notified.

## 2018-10-03 NOTE — TELEPHONE ENCOUNTER
3rd and final attempt to contact patient to help schedule injection. Voicemail left with best contact number of [phone].

## 2018-11-05 NOTE — TELEPHONE ENCOUNTER
[facility] Call Center    Phone Message    May a detailed message be left on voicemail: yes    Reason for Call: Other: Patient returned a call to the nurse.   Please try him again.      Action Taken: Message routed to:  [facility]     PAIN

## 2018-11-12 ENCOUNTER — TELEPHONE (OUTPATIENT)
Dept: ONCOLOGY | Facility: CLINIC | Age: 68
End: 2018-11-12

## 2018-11-12 NOTE — TELEPHONE ENCOUNTER
Called and spoke with Mr. Kahn on 11/12/2018 regarding the tobacco cessation program and he declined to participate with the program.No further calls needed.

## 2020-03-04 NOTE — MR AVS SNAPSHOT
After Visit Summary   6/14/2018    Boom Kahn    MRN: [id number]           Patient Information     Date Of Birth          1950        Visit Information        Provider Department      6/14/2018 3:00 PM Lexis Ag MD [facility]        Today's Diagnoses     PAD (peripheral artery disease) (H)    -  1      Care Instructions    Preventive Care:    Colorectal Cancer Screening: During our visit today, we discussed that it is recommended you receive colorectal cancer screening. Please call or make an appointment with your primary care provider to discuss this. You may also call the [facility] scheduling line ([phone]) to set up a colonoscopy appointment.    Follow up with Dr. Wade of vascular surgery in 3  months with RAVI and ultrasound prior    With questions, concerns, or to request an appointment, please call either:    Gretel Acosta, Care Coordinator RN, Vascular Surgery  [phone]    [facility] Call Center  [phone]    To contact someone after 5 pm, on a weekend, or on a Holiday, please call:  [facility]  [phone], option 4 to have a member of the Vascular Surgery Service paged.          Follow-ups after your visit        Follow-up notes from your care team     Return in about 3 months (around 9/14/2018).      Your next 10 appointments already scheduled     Jun 28, 2018  2:30 PM CDT   (Arrive by 2:15 PM)   Return Visit with Tamiko Mcduffie PA-C   [facility] ([facility])    [address] [facility] [address] [phone]            Aug 31, 2018 11:00 AM CDT   CT CHEST W CONTRAST with UUCT1   [facility], [place], [place] ([facility] [place])    [address] [facility] [id number] [phone]           Please bring any scans or X-rays taken at other hospitals, if similar tests were done. Also bring a list of your medicines, including  vitamins, minerals and over-the-counter drugs. It is safest to leave personal items at home.  Be sure to tell your doctor:   If you have any allergies.   If there s any chance you are pregnant.   If you are breastfeeding.    If you have diabetes as your medication may need to be adjusted for this exam.  You will have contrast for this exam. To prepare:   Do not eat or drink for 2 hours before your exam. If you need to take medicine, you may take it with small sips of water. (We may ask you to take liquid medicine as well.)   The day before your exam, drink extra fluids at least six 8-ounce glasses (unless your doctor tells you to restrict your fluids).  Patients over 70 or patients with diabetes or kidney problems:   If you haven t had a blood test (creatinine test) within the last 30 days, the Cardiologist/Radiologist may require you to get this test prior to your exam.  Please wear loose clothing, such as a sweat suit or jogging clothes. Avoid snaps, zippers and other metal. We may ask you to undress and put on a hospital gown.  If you have any questions, please call the Imaging Department where you will have your exam.            Aug 31, 2018  2:00 PM CDT   (Arrive by 1:45 PM)   Return Visit with Dave Del Rio MD   [facility] ([facility])    [address] [place] [address]  [address]  [facility] [address] [phone]              Who to contact     Please call your clinic at [phone] to:    Ask questions about your health    Make or cancel appointments    Discuss your medicines    Learn about your test results    Speak to your doctor            Additional Information About Your Visit        MyChart Information     [employer] gives you secure access to your electronic health record. If you see a primary care provider, you can also send messages to your care team and make appointments. If you have questions, please call your primary care clinic.  If you do not have a  primary care provider, please call [phone] and they will assist you.      [employer] is an electronic gateway that provides easy, online access to your medical records. With [employer], you can request a clinic appointment, read your test results, renew a prescription or communicate with your care team.     To access your existing account, please contact your [facility] or call [phone] for assistance.        Care EveryWhere ID     This is your Care EveryWhere ID. This could be used by other organizations to access your [facility] medical records  [id number]        Your Vitals Were     Pulse Pulse Oximetry BMI (Body Mass Index)             55 98% 21.74 kg/m2          Blood Pressure from Last 3 Encounters:   06/14/18 106/68   06/05/18 114/71   05/25/18 (!) 76/55    Weight from Last 3 Encounters:   06/14/18 143 lb   06/05/18 143 lb 14.4 oz   05/25/18 146 lb               Primary Care Provider Office Phone # Fax #    Willian Arrington [phone]       [facility] [address]  [place] [id number]        Equal Access to Services     TANJA [facility] CenterTRICIA AH: Hadii aad ku hadasho Soomaali, waaxda luqadaha, qaybta kaalmada adeegyada, vijay zavala haykylie castillo . So [place] [phone].    ATENCIÓN: Si habla español, tiene a bhatia disposición servicios gratuitos de asistencia lingüística. Ll[facility], [facility] [phone].    We comply with applicable federal civil rights laws and [place] laws. We do not discriminate on the basis of race, color, national origin, age, disability, sex, sexual orientation, or gender identity.            Thank you!     Thank you for choosing [facility]  for your care. Our goal is always to provide you with excellent care. Hearing back from our patients is one way we can continue to improve our services. Please take a few minutes to complete the written survey that you may receive in the mail after your visit with us. Thank  you!             Your Updated Medication List - Protect others around you: Learn how to safely use, store and throw away your medicines at www.disposemymeds.org.          This list is accurate as of 6/14/18 11:59 PM.  Always use your most recent med list.                   Brand Name Dispense Instructions for use Diagnosis    acetic acid 0.25 % irrigation     10 mL    Irrigate with as directed 2 times daily    S/P vascular surgery       ALPRAZolam 0.5 MG tablet    XANAX     TK 1 T PO TID PRN        ascorbic acid 500 MG Tabs     7 tablet    Take 1 tablet (500 mg) by mouth daily    Takes dietary supplements       ASPIRIN EC PO      Take 81 mg by mouth daily        carvedilol 6.25 MG tablet    COREG    60 tablet    Take 1 tablet (6.25 mg) by mouth 2 times daily (with meals)    Ischemic cardiomyopathy       ciprofloxacin 500 MG tablet    CIPRO    14 tablet    Take 1 tablet (500 mg) by mouth 2 times daily    Cellulitis and abscess of leg       clindamycin 300 MG capsule    CLEOCIN    28 capsule    Take 1 capsule (300 mg) by mouth 4 times daily    Cellulitis and abscess of leg       clopidogrel 75 MG tablet    PLAVIX    30 tablet    Take 1 tablet (75 mg) by mouth daily    Ischemic cardiomyopathy       diclofenac 1 % Gel topical gel    VOLTAREN    1 Tube    Place 4 g onto the skin 4 times daily    Pain of left lower extremity       docusate sodium 100 MG capsule    COLACE    60 capsule    Take 1 capsule (100 mg) by mouth daily    Takes dietary supplements       enalapril 2.5 MG tablet    VASOTEC    60 tablet    Take 1 tablet (2.5 mg) by mouth 2 times daily    Ischemic cardiomyopathy       escitalopram 20 MG tablet    LEXAPRO    15 tablet    Take 1 tablet (20 mg) by mouth daily    S/P vascular surgery       ezetimibe 10 MG tablet    ZETIA    30 tablet    Take 1 tablet (10 mg) by mouth daily    Ischemic cardiomyopathy       fentaNYL 25 mcg/hr 72 hr patch    DURAGESIC     Place 1 patch onto the skin every 72 hours remove old  patch.        folic acid 1 MG tablet    FOLVITE    30 tablet    Take 1 tablet (1 mg) by mouth daily    Takes dietary supplements       furosemide 20 MG tablet    LASIX    60 tablet    Take 1 tablet (20 mg) by mouth 2 times daily (with meals)    S/P vascular surgery       * gabapentin 300 MG capsule    NEURONTIN    60 capsule    Take 1 capsule (300 mg) by mouth 2 times daily    S/P vascular surgery       * gabapentin 600 MG tablet    NEURONTIN    30 tablet    Take 1 tablet (600 mg) by mouth 3 times daily Increase per clinic directions.        gabapentin 8 % Gel topical PLO cream     100 g    Apply 1 g topically every 8 hours Do NOT apply to open wounds    Peripheral arterial disease (H), Non-healing wound of lower extremity, right, initial encounter       isosorbide mononitrate 30 MG 24 hr tablet    IMDUR    90 tablet    Take 1 tablet (30 mg) by mouth daily    Chronic systolic congestive heart failure (H)       levofloxacin 500 MG tablet    LEVAQUIN          Lidocaine 4 % Gel     30 g    Externally apply topically 2 times daily    Cellulitis and abscess of leg       methocarbamol 500 MG tablet    ROBAXIN          mometasone 0.1 % ointment    ELOCON    45 g    Apply affected area daily    Skin inflammation       multivitamin, therapeutic with minerals Tabs tablet     30 each    Take 1 tablet by mouth daily    Takes dietary supplements       nitroGLYcerin 0.4 MG sublingual tablet    NITROSTAT     SHARON MAY REPEAT Q 5 MINUTES X 2        order for DME     1 each    Equipment being ordered: Walker Wheels () and Walker () Treatment Diagnosis: impaired mobility    Pain of left lower extremity       oxyCODONE IR 5 MG tablet    ROXICODONE    90 tablet    1 tablet po 6 times/day x 4 day (continued from previous Rx), then 1 po 5 times/day x 4 d, then 1 po 4 times/day x 4 d, then 1 po 3 times/day x 4 d, then 1 po 2 times/day x 4 d, then 1 po 1 time/day x 4 d, then off.    PVD (peripheral vascular disease) (H)        polyethylene glycol Packet    MIRALAX/GLYCOLAX    7 packet    Take 17 g by mouth daily    Takes dietary supplements       predniSONE 10 MG tablet    DELTASONE    30 tablet    Take 1 tablet (10 mg) by mouth daily    S/P vascular surgery       rosuvastatin 20 MG tablet    CRESTOR    30 tablet    Take 1 tablet (20 mg) by mouth daily    Ischemic cardiomyopathy       spironolactone 25 MG tablet    ALDACTONE     TK 1 T PO D        vitamin A 47727 UNIT capsule     7 capsule    Take 2 capsules (20,000 Units) by mouth daily    Takes dietary supplements       zinc sulfate 220 (50 Zn) MG capsule    ZINCATE    7 capsule    Take 1 capsule (220 mg) by mouth daily    Takes dietary supplements       * Notice:  This list has 2 medication(s) that are the same as other medications prescribed for you. Read the directions carefully, and ask your doctor or other care provider to review them with you.       LETTY

## 2020-03-11 ENCOUNTER — HEALTH MAINTENANCE LETTER (OUTPATIENT)
Age: 70
End: 2020-03-11

## 2020-04-03 NOTE — MR AVS SNAPSHOT
After Visit Summary   7/12/2018    Boom Kahn    MRN: [id number]           Patient Information     Date Of Birth          1950        Visit Information        Provider Department      7/12/2018 1:45 PM Tamiko Mcduffie PA-C [facility]        Today's Diagnoses     Arterial leg ulcer (H)    -  1       Follow-ups after your visit        Your next 10 appointments already scheduled     Jul 26, 2018 12:00 PM CDT   (Arrive by 11:45 AM)   Return Visit with SHAYY Sinclair [facility] ([facility])    [address]  4th Floor  [place] [address] [phone]            Aug 31, 2018 11:00 AM CDT   CT CHEST W CONTRAST with UUCT1   [facility], [place], [place] ([facility], [facility])    [address] [id number] [phone]           Please bring any scans or X-rays taken at other hospitals, if similar tests were done. Also bring a list of your medicines, including vitamins, minerals and over-the-counter drugs. It is safest to leave personal items at home.  Be sure to tell your doctor:   If you have any allergies.   If there s any chance you are pregnant.   If you are breastfeeding.    If you have diabetes as your medication may need to be adjusted for this exam.  You will have contrast for this exam. To prepare:   Do not eat or drink for 2 hours before your exam. If you need to take medicine, you may take it with small sips of water. (We may ask you to take liquid medicine as well.)   The day before your exam, drink extra fluids at least six 8-ounce glasses (unless your doctor tells you to restrict your fluids).  Patients over 70 or patients with diabetes or kidney problems:   If you haven t had a blood test (creatinine test) within the last 30 days, the Cardiologist/Radiologist may require you to get this test prior to your exam.  Please wear loose clothing, such as a sweat suit or jogging  From: Haley aVrgas  To: Silas DawsonevetteDO  Sent: 4/3/2020 9:18 AM EDT  Subject: Prescription Question    Good morning Dr. Michael Jones,    I was wondering if you would be able to refill my diclofenac and baclofen for me. Also, I'm not sure if you're able to check how many refills I have left of my lexapro but if you are, can you check to see if it's almost/is out and give me more refills for that? For some reason I'm unable to request refills for these medications through [address] Thank you!      Sincerely,  Lynn Carvalho clothes. Avoid snaps, zippers and other metal. We may ask you to undress and put on a hospital gown.  If you have any questions, please call the Imaging Department where you will have your exam.            Aug 31, 2018  2:00 PM CDT   (Arrive by 1:45 PM)   Return Visit with Dave Del Rio MD   [facility] ([facility] [place])    [address]  [address]  [place] [address] [phone]              Who to contact     Please call your clinic at [phone] to:    Ask questions about your health    Make or cancel appointments    Discuss your medicines    Learn about your test results    Speak to your doctor            Additional Information About Your Visit        [employer] Information     [employer] gives you secure access to your electronic health record. If you see a primary care provider, you can also send messages to your care team and make appointments. If you have questions, please call your primary care clinic.  If you do not have a primary care provider, please call [phone] and they will assist you.      [employer] is an electronic gateway that provides easy, online access to your medical records. With [employer], you can request a clinic appointment, read your test results, renew a prescription or communicate with your care team.     To access your existing account, please contact your [facility] or call [phone] for assistance.        Care EveryWhere ID     This is your Care EveryWhere ID. This could be used by other organizations to access your [place] medical records  [id number]         Blood Pressure from Last 3 Encounters:   06/14/18 106/68   06/05/18 114/71   05/25/18 (!) 76/55    Weight from Last 3 Encounters:   06/14/18 143 lb   06/05/18 143 lb 14.4 oz   05/25/18 146 lb              We Performed the Following     DEBRIDEMENT WOUND UP TO 20 SQ CM        Primary Care Provider Office Phone # Fax #    Willian Arrington MD  [phone]       [facility]IMARY [facility] [address]  [facility] [address]        Equal Access to Services     CHANEL DORADO : Hadii vishnu soares pedro Soant, wajpda luqadaha, qaybta kaalmada alfonso, vijay medeltomasz evans. So [facility] [phone].    ATENCIÓN: Si habla español, tiene a bhatia disposición servicios gratuitos de asistencia lingüística. Llame al [phone].    We comply with applicable federal civil rights laws and [place] laws. We do not discriminate on the basis of race, color, national origin, age, disability, sex, sexual orientation, or gender identity.            Thank you!     Thank you for choosing [facility]  for your care. Our goal is always to provide you with excellent care. Hearing back from our patients is one way we can continue to improve our services. Please take a few minutes to complete the written survey that you may receive in the mail after your visit with us. Thank you!             Your Updated Medication List - Protect others around you: Learn how to safely use, store and throw away your medicines at www.disposemymeds.org.          This list is accurate as of 7/12/18 11:59 PM.  Always use your most recent med list.                   Brand Name Dispense Instructions for use Diagnosis    acetic acid 0.25 % irrigation     10 mL    Irrigate with as directed 2 times daily    S/P vascular surgery       ALPRAZolam 0.5 MG tablet    XANAX     TK 1 T PO TID PRN        ascorbic acid 500 MG Tabs     7 tablet    Take 1 tablet (500 mg) by mouth daily    Takes dietary supplements       ASPIRIN EC PO      Take 81 mg by mouth daily        carvedilol 6.25 MG tablet    COREG    60 tablet    Take 1 tablet (6.25 mg) by mouth 2 times daily (with meals)    Ischemic cardiomyopathy       ciprofloxacin 500 MG tablet    CIPRO    14 tablet    Take 1 tablet (500 mg) by mouth 2 times daily    Cellulitis and abscess of leg       clindamycin 300 MG capsule     CLEOCIN    28 capsule    Take 1 capsule (300 mg) by mouth 4 times daily    Cellulitis and abscess of leg       clopidogrel 75 MG tablet    PLAVIX    30 tablet    Take 1 tablet (75 mg) by mouth daily    Ischemic cardiomyopathy       diclofenac 1 % Gel topical gel    VOLTAREN    1 Tube    Place 4 g onto the skin 4 times daily    Pain of left lower extremity       docusate sodium 100 MG capsule    COLACE    60 capsule    Take 1 capsule (100 mg) by mouth daily    Takes dietary supplements       enalapril 2.5 MG tablet    VASOTEC    60 tablet    Take 1 tablet (2.5 mg) by mouth 2 times daily    Ischemic cardiomyopathy       escitalopram 20 MG tablet    LEXAPRO    15 tablet    Take 1 tablet (20 mg) by mouth daily    S/P vascular surgery       ezetimibe 10 MG tablet    ZETIA    30 tablet    Take 1 tablet (10 mg) by mouth daily    Ischemic cardiomyopathy       fentaNYL 25 mcg/hr 72 hr patch    DURAGESIC     Place 1 patch onto the skin every 72 hours remove old patch.        folic acid 1 MG tablet    FOLVITE    30 tablet    Take 1 tablet (1 mg) by mouth daily    Takes dietary supplements       furosemide 20 MG tablet    LASIX    60 tablet    Take 1 tablet (20 mg) by mouth 2 times daily (with meals)    S/P vascular surgery       * gabapentin 300 MG capsule    NEURONTIN    60 capsule    Take 1 capsule (300 mg) by mouth 2 times daily    S/P vascular surgery       * gabapentin 600 MG tablet    NEURONTIN    30 tablet    Take 1 tablet (600 mg) by mouth 3 times daily Increase per clinic directions.        gabapentin 8 % Gel topical PLO cream     100 g    Apply 1 g topically every 8 hours Do NOT apply to open wounds    Peripheral arterial disease (H), Non-healing wound of lower extremity, right, initial encounter       isosorbide mononitrate 30 MG 24 hr tablet    IMDUR    90 tablet    Take 1 tablet (30 mg) by mouth daily    Chronic systolic congestive heart failure (H)       levofloxacin 500 MG tablet    LEVAQUIN          Lidocaine 4  % Gel     30 g    Externally apply topically 2 times daily    Cellulitis and abscess of leg       methocarbamol 500 MG tablet    ROBAXIN          mometasone 0.1 % ointment    ELOCON    45 g    Apply affected area daily    Skin inflammation       multivitamin, therapeutic with minerals Tabs tablet     30 each    Take 1 tablet by mouth daily    Takes dietary supplements       nitroGLYcerin 0.4 MG sublingual tablet    NITROSTAT     SHARON MAY REPEAT Q 5 MINUTES X 2        nystatin-triamcinolone ointment    MYCOLOG    60 g    Apply topically 2 times daily    Dermatitis       order for DME     1 each    Equipment being ordered: Walker Wheels () and Walker () Treatment Diagnosis: impaired mobility    Pain of left lower extremity       oxyCODONE IR 5 MG tablet    ROXICODONE    90 tablet    1 tablet po 6 times/day x 4 day (continued from previous Rx), then 1 po 5 times/day x 4 d, then 1 po 4 times/day x 4 d, then 1 po 3 times/day x 4 d, then 1 po 2 times/day x 4 d, then 1 po 1 time/day x 4 d, then off.    PVD (peripheral vascular disease) (H)       polyethylene glycol Packet    MIRALAX/GLYCOLAX    7 packet    Take 17 g by mouth daily    Takes dietary supplements       predniSONE 10 MG tablet    DELTASONE    30 tablet    Take 1 tablet (10 mg) by mouth daily    S/P vascular surgery       rosuvastatin 20 MG tablet    CRESTOR    30 tablet    Take 1 tablet (20 mg) by mouth daily    Ischemic cardiomyopathy       spironolactone 25 MG tablet    ALDACTONE     TK 1 T PO D        vitamin A 40249 UNIT capsule     7 capsule    Take 2 capsules (20,000 Units) by mouth daily    Takes dietary supplements       zinc sulfate 220 (50 Zn) MG capsule    ZINCATE    7 capsule    Take 1 capsule (220 mg) by mouth daily    Takes dietary supplements       * Notice:  This list has 2 medication(s) that are the same as other medications prescribed for you. Read the directions carefully, and ask your doctor or other care provider to review them  with you.

## 2020-05-08 NOTE — IP AVS SNAPSHOT
MRN:[id number]                      After Visit Summary   1/3/2018    Boom Kahn    MRN: [id number]           Thank you!     Thank you for choosing [facility] for your care. Our goal is always to provide you with excellent care. Hearing back from our patients is one way we can continue to improve our services. Please take a few minutes to complete the written survey that you may receive in the mail after you visit with us. Thank you!        Patient Information     Date Of Birth          1950        Designated Caregiver       Most Recent Value    Caregiver    Will someone help with your care after discharge? no      About your hospital stay     You were admitted on:  January 3, 2018 You last received care in the:  [address] [facility]    You were discharged on:  January 23, 2018        Reason for your hospital stay       Critical limb ischemia, s/p right femoral to posterior tibial artery bypass                  Who to Call     For medical emergencies, please call 911.  For non-urgent questions about your medical care, please call your primary care provider or clinic, [phone]  For questions related to your surgery, please call your surgery clinic        Attending Provider     Provider Specialty    Paty Reis MD Emergency Medicine    Monroe, Alexandra Penny MD Internal Medicine    \[facility]\"", MD [facility]    Rodriguez Snowden MD Internal Medicine    Faizer, MD [facility]       Primary Care Provider Office Phone # Fax #    Willian Arrington [phone]      After Care Instructions     Activity - Up ad thony           General info for SNF       Length of Stay Estimate: Short Term Care: Estimated # of Days <30  Condition at Discharge: Improving  Level of care:skilled   Rehabilitation Potential: Good  Admission H&P remains valid and up-to-date: Yes  Recent Chemotherapy: N/A  Use Nursing Home Standing Orders: Yes            Mantoux instructions  [facility] Care Coordination Contact    Called member to complete six month assessment and left a message requesting a return call.    Litzy SOL, [facility] Care Coordinator   Telephone: [phone]  Fax: [phone]         Give two-step Mantoux (PPD) Per Facility Policy Yes            Weight bearing status       Full weight bearing as tolerated            Wound care       Site:   Right leg incisions  Instructions:  Keep clean and dry            Wound care (specify)       Site:   Wound vac to right shin  Instructions:  125 mmHg low intensity continuous, change 3 x per week                  Additional Services     CARDIAC REHAB REFERRAL       Patient may choose their preference of the site for Cardiac Rehab.            Physical Therapy Adult Consult       Evaluate and treat as clinically indicated.    Reason:  S/p right femoral to tibial artery bypass                  Future tests that were ordered for you     Basic metabolic panel           CBC with platelets       Last Lab Result: Hemoglobin (g/dL)       Date                     Value                 01/07/2018               13.4             ----------            INR           Red blood cell have available                 General Recommendations To Control Heart Failure When You Get Home       Heart Failure Instructions for Patients and Families: Please read and check off each of these important instructions as you do them when you get home.          Weight and Symptoms       ___ Put a scale in your bathroom.    ___ Post a weight chart or calendar next to your scale.    ___ Weigh yourself everyday as soon as you get up in the morning (before breakfast).  You should only be wearing your pajamas.  Write your weight on the chart/calendar.  ___ Bring your weight chart/calendar with you to all appointments.  ___ Call your doctor or nurse practitioner if you gain 2 pounds (in 1 day) or 5 pounds in (1 week) from your goal  good  weight.  Your good weight is also called your  dry  weight.  Your doctor or nurse will tell you what your good weight should be.    ___ Call your doctor or nurse practitioner if you have shortness of breath that gets worse over time, leg swelling or fatigue.        Medications and Diet       ___ Make sure to take your medication as prescribed.    ___ Bring a current list of your medication and all of your medicine bottles with you to all appointments.    ___ Limit fluids if you still have swelling or shortness of breath, or if your doctor tells you to do so.    ___ Eat less than 2000 mg of sodium (salt) every day. Read food labels, and do not add salt to meals. Remember, if you eat less salt you retain less fluid.  ___ Follow a heart healthy diet that is low in saturated fat.        Activity and Suggested Lifestyle Changes      ___ Stay active. Talk to your doctor about an exercise program that is safe for your heart.  ___ Stop smoking. Reduce alcohol use.      ___ Lose weight if you are overweight. Extra weight puts a lot of stress on the heart.                 Control for Leg Swelling     ___ Keep your legs elevated (raised) as needed for swelling. If swelling is uncomfortable or elevation doesn t help, ask your doctor about using ACE wraps or support stockings.        What is the [facility]?  Cardiomyopathy  Optimization  Rehabilitation  Education    The [facility] is a heart failure specialty clinic within [facility].  It is an outpatient disease management program that is based on a phase-by-phase approach, which is tailored to each patient s individual needs.  The cardiologist, nurse practitioner, physician assistant and nurses provide an ongoing outpatient care and treatment plan that guides heart failure and cardiomyopathy patients from evaluation and education to stabilization. This team works with your current primary care doctor and cardiologist to help you:    - Avoid hospitalizations  - Slow the progression of the disease  - Improve length and quality of life  - Know who and when to call if heart failure symptoms appear  - Receive easy access to quality health care and advice  - Better understand your condition and treatment  - Decrease the  "tremendous cost burden of heart failure care  - Detect future heart problems before they become life threatening                                 Follow-up Appointments     ___ An appointment with the [facility] may already have been made for you (see section   Your next appointments already scheduled ).  If you have a question about your appointment, would like to speak with a C.O.R.E. nurse, or would like to become a [facility] Clinic patient, please call one of the following locations:     - [facility]):                                             [phone]  - [facility]):                                            [phone]  - [facility] ([place]):                 [phone]      ___ Your C.O.R.E. Clinic Team will continue to educate you on your heart failure and may adjust medications based on your vital signs, lab work, and how you are feeling.  Therefore, it is very important to bring the following to all C.O.R.E. appointments:    - An accurate list of your medications  - Your medicine bottles  - Your weight chart/calendar                   Pending Results     No orders found from 1/1/2018 to 1/4/2018.            Statement of Approval     Ordered          01/23/18 1427  I have reviewed and agree with all the recommendations and orders detailed in this document.  EFFECTIVE NOW     Approved and electronically signed by:  Lejeune, Stacey, MD             Admission Information     Date & Time Provider Department Dept. Phone    1/3/2018 Lexis Ag MD [address] [facility] [phone]      Your Vitals Were     Blood Pressure Pulse Temperature Respirations Height Weight    104/48 (BP Location: Right arm) 70 98  F (36.7  C) (Oral) 18 1.727 m (5' 8\") 65.1 kg (143 lb 8 oz)    Pulse Oximetry BMI (Body Mass Index)                100% 21.82 kg/m2          MyChart Information     [facility] lets you send messages to your doctor, " "view your test results, renew your prescriptions, schedule appointments and more. To sign up, go to [email or web address] . Click on \"Log in\" on the left side of the screen, which will take you to the Welcome page. Then click on \"Sign up Now\" on the right side of the page.     You will be asked to enter the access code listed below, as well as some personal information. Please follow the directions to create your username and password.     Your access code is: [email or web address]  Expires: 2018  6:31 AM     Your access code will  in 90 days. If you need help or a new code, please call your [facility] clinic or [phone].        Care EveryWhere ID     This is your Care EveryWhere ID. This could be used by other organizations to access your [facility] medical records  [id number]        Equal Access to Services     [facility] : Zeina Ugalde, sergio agee, yashira contialjulieta hamilton, vijay castillo . So [facility] [phone].    ATENCIÓN: Si habla español, tiene a bhatia disposición servicios gratuitos de asistencia lingüística. Kevin al [phone].    We comply with applicable federal civil rights laws and [place] laws. We do not discriminate on the basis of race, color, national origin, age, disability, sex, sexual orientation, or gender identity.               Review of your medicines      UNREVIEWED medicines. Ask your doctor about these medicines        Dose / Directions    OXYCODONE HCL PO        Dose:  10 mg   Take 10 mg by mouth every 3 hours as needed   Refills:  0         START taking        Dose / Directions    acetaminophen 325 MG tablet   Commonly known as:  TYLENOL   Used for:  Pain of right lower leg        Dose:  975 mg   Take 3 tablets (975 mg) by mouth every 8 hours as needed for mild pain or fever   Quantity:  100 tablet   Refills:  3       ciprofloxacin 500 MG tablet   Commonly known as:  CIPRO   Indication:  Infection of the Skin and/or Related Soft " Tissue   Used for:  PVD (peripheral vascular disease) (H)        Dose:  500 mg   Take 1 tablet (500 mg) by mouth every 12 hours for 7 days   Quantity:  14 tablet   Refills:  0       clindamycin 300 MG capsule   Commonly known as:  CLEOCIN   Indication:  Infection of the Skin and/or Related Soft Tissue   Used for:  Pain of right lower leg        Dose:  300 mg   Take 1 capsule (300 mg) by mouth every 6 hours for 7 days   Quantity:  28 capsule   Refills:  0       * gabapentin 600 MG tablet   Commonly known as:  NEURONTIN   Used for:  Pain of right lower leg        Dose:  600 mg   Take 1 tablet (600 mg) by mouth At Bedtime   Quantity:  90 tablet   Refills:  3       * gabapentin 300 MG capsule   Commonly known as:  NEURONTIN   Used for:  Pain of right lower leg        Dose:  300 mg   Start taking on:  1/24/2018   Take 1 capsule (300 mg) by mouth 2 times daily   Quantity:  90 capsule   Refills:  3       polyethylene glycol Packet   Commonly known as:  MIRALAX/GLYCOLAX   Used for:  Pain of right lower leg        Dose:  17 g   Start taking on:  1/24/2018   Take 17 g by mouth daily   Quantity:  30 packet   Refills:  1       * Notice:  This list has 2 medication(s) that are the same as other medications prescribed for you. Read the directions carefully, and ask your doctor or other care provider to review them with you.      CONTINUE these medicines which may have CHANGED, or have new prescriptions. If we are uncertain of the size of tablets/capsules you have at home, strength may be listed as something that might have changed.        Dose / Directions    * CLOPIDOGREL BISULFATE PO   This may have changed:  Another medication with the same name was added. Make sure you understand how and when to take each.        Dose:  75 mg   Take 75 mg by mouth daily   Refills:  0       * clopidogrel 75 MG tablet   Commonly known as:  PLAVIX   This may have changed:  You were already taking a medication with the same name, and this  prescription was added. Make sure you understand how and when to take each.   Used for:  PVD (peripheral vascular disease) (H)        Dose:  75 mg   Start taking on:  1/24/2018   Take 1 tablet (75 mg) by mouth daily   Quantity:  30 tablet   Refills:  3       * Notice:  This list has 2 medication(s) that are the same as other medications prescribed for you. Read the directions carefully, and ask your doctor or other care provider to review them with you.      CONTINUE these medicines which have NOT CHANGED        Dose / Directions    ASPIRIN EC PO        Dose:  81 mg   Take 81 mg by mouth daily   Refills:  0       CARVEDILOL PO        Dose:  6.25 mg   Take 6.25 mg by mouth 2 times daily (with meals)   Refills:  0       CRESTOR PO        Dose:  20 mg   Take 20 mg by mouth daily   Refills:  0       DOCUSATE SODIUM PO        Dose:  100 mg   Take 100 mg by mouth daily   Refills:  0       ENALAPRIL MALEATE PO        Dose:  2.5 mg   Take 2.5 mg by mouth 2 times daily   Refills:  0       ESCITALOPRAM OXALATE PO        Dose:  20 mg   Take 20 mg by mouth daily   Refills:  0       fentaNYL 25 mcg/hr 72 hr patch   Commonly known as:  DURAGESIC        Dose:  1 patch   Place 1 patch onto the skin every 72 hours   Refills:  0       FUROSEMIDE PO        Dose:  20 mg   Take 20 mg by mouth 2 times daily   Refills:  0       isosorbide mononitrate 60 MG 24 hr tablet   Commonly known as:  IMDUR        Dose:  60 mg   Take 60 mg by mouth daily   Refills:  0       PREDNISONE PO        Dose:  5 mg   Take 5 mg by mouth 2 times daily   Refills:  0       SPIRONOLACTONE PO        Dose:  25 mg   Take 25 mg by mouth daily   Refills:  0       ZETIA PO        Dose:  10 mg   Take 10 mg by mouth daily   Refills:  0            Where to get your medicines      Some of these will need a paper prescription and others can be bought over the counter. Ask your nurse if you have questions.     You don't need a prescription for these medications      acetaminophen 325 MG tablet    ciprofloxacin 500 MG tablet    clindamycin 300 MG capsule    clopidogrel 75 MG tablet    gabapentin 300 MG capsule    gabapentin 600 MG tablet    polyethylene glycol Packet               ANTIBIOTIC INSTRUCTION     You've Been Prescribed an Antibiotic - Now What?  Your healthcare team thinks that you or your loved one might have an infection. Some infections can be treated with antibiotics, which are powerful, life-saving drugs. Like all medications, antibiotics have side effects and should only be used when necessary. There are some important things you should know about your antibiotic treatment.      Your healthcare team may run tests before you start taking an antibiotic.    Your team may take samples (e.g., from your blood, urine or other areas) to run tests to look for bacteria. These test can be important to determine if you need an antibiotic at all and, if you do, which antibiotic will work best.      Within a few days, your healthcare team might change or even stop your antibiotic.    Your team may start you on an antibiotic while they are working to find out what is making you sick.    Your team might change your antibiotic because test results show that a different antibiotic would be better to treat your infection.    In some cases, once your team has more information, they learn that you do not need an antibiotic at all. They may find out that you don't have an infection, or that the antibiotic you're taking won't work against your infection. For example, an infection caused by a virus can't be treated with antibiotics. Staying on an antibiotic when you don't need it is more likely to be harmful than helpful.      You may experience side effects from your antibiotic.    Like all medications, antibiotics have side effects. Some of these can be serious.    Let you healthcare team know if you have any known allergies when you are admitted to the hospital.    One significant  side effect of nearly all antibiotics is the risk of severe and sometimes deadly diarrhea caused by Clostridium difficile (C. Difficile). This occurs when a person takes antibiotics because some good germs are destroyed. Antibiotic use allows C. diificile to take over, putting patients at high risk for this serious infection.    As a patient or caregiver, it is important to understand your or your loved one's antibiotic treatment. It is especially important for caregivers to speak up when patients can't speak for themselves. Here are some important questions to ask your healthcare team.    What infection is this antibiotic treating and how do you know I have that infection?    What side effects might occur from this antibiotic?    How long will I need to take this antibiotic?    Is it safe to take this antibiotic with other medications or supplements (e.g., vitamins) that I am taking?     Are there any special directions I need to know about taking this antibiotic? For example, should I take it with food?    How will I be monitored to know whether my infection is responding to the antibiotic?    What tests may help to make sure the right antibiotic is prescribed for me?      Information provided by:  www.cdc.gov/getsmart  U.S. Department of Health and Human Services  Centers for disease Control and Prevention  National Center for Emerging and Zoonotic Infectious Diseases  Division of Healthcare Quality Promotion         Protect others around you: Learn how to safely use, store and throw away your medicines at www.disposemymeds.org.             Medication List: This is a list of all your medications and when to take them. Check marks below indicate your daily home schedule. Keep this list as a reference.      Medications           Morning Afternoon Evening Bedtime As Needed    acetaminophen 325 MG tablet   Commonly known as:  TYLENOL   Take 3 tablets (975 mg) by mouth every 8 hours as needed for mild pain or fever    Last time this was given:  650 mg on 1/21/2018 10:25 AM                                ASPIRIN EC PO   Take 81 mg by mouth daily   Last time this was given:  81 mg on 1/23/2018  7:59 AM                                CARVEDILOL PO   Take 6.25 mg by mouth 2 times daily (with meals)   Last time this was given:  6.25 mg on 1/23/2018  7:58 AM                                ciprofloxacin 500 MG tablet   Commonly known as:  CIPRO   Take 1 tablet (500 mg) by mouth every 12 hours for 7 days   Last time this was given:  500 mg on 1/23/2018  8:00 AM                                clindamycin 300 MG capsule   Commonly known as:  CLEOCIN   Take 1 capsule (300 mg) by mouth every 6 hours for 7 days   Last time this was given:  300 mg on 1/23/2018 12:14 PM                                * CLOPIDOGREL BISULFATE PO   Take 75 mg by mouth daily   Last time this was given:  75 mg on 1/23/2018  7:58 AM                                * clopidogrel 75 MG tablet   Commonly known as:  PLAVIX   Take 1 tablet (75 mg) by mouth daily   Start taking on:  1/24/2018   Last time this was given:  75 mg on 1/23/2018  7:58 AM                                CRESTOR PO   Take 20 mg by mouth daily   Last time this was given:  20 mg on 1/22/2018  9:11 PM                                DOCUSATE SODIUM PO   Take 100 mg by mouth daily                                ENALAPRIL MALEATE PO   Take 2.5 mg by mouth 2 times daily   Last time this was given:  2.5 mg on 1/17/2018  9:35 AM                                ESCITALOPRAM OXALATE PO   Take 20 mg by mouth daily   Last time this was given:  20 mg on 1/23/2018  7:59 AM                                fentaNYL 25 mcg/hr 72 hr patch   Commonly known as:  DURAGESIC   Place 1 patch onto the skin every 72 hours   Last time this was given:  1 patch on 1/22/2018 10:34 AM                                FUROSEMIDE PO   Take 20 mg by mouth 2 times daily   Last time this was given:  20 mg on 1/23/2018  8:00 AM                                 * gabapentin 600 MG tablet   Commonly known as:  NEURONTIN   Take 1 tablet (600 mg) by mouth At Bedtime   Last time this was given:  600 mg on 1/22/2018 10:10 PM                                * gabapentin 300 MG capsule   Commonly known as:  NEURONTIN   Take 1 capsule (300 mg) by mouth 2 times daily   Start taking on:  1/24/2018   Last time this was given:  300 mg on 1/23/2018  2:11 PM                                isosorbide mononitrate 60 MG 24 hr tablet   Commonly known as:  IMDUR   Take 60 mg by mouth daily   Last time this was given:  60 mg on 1/23/2018  7:59 AM                                OXYCODONE HCL PO   Take 10 mg by mouth every 3 hours as needed   Last time this was given:  15 mg on 1/23/2018  2:36 PM                                polyethylene glycol Packet   Commonly known as:  MIRALAX/GLYCOLAX   Take 17 g by mouth daily   Start taking on:  1/24/2018   Last time this was given:  17 g on 1/20/2018  8:32 AM                                PREDNISONE PO   Take 5 mg by mouth 2 times daily   Last time this was given:  10 mg on 1/23/2018  7:58 AM                                SPIRONOLACTONE PO   Take 25 mg by mouth daily   Last time this was given:  25 mg on 1/4/2018  8:46 AM                                ZETIA PO   Take 10 mg by mouth daily   Last time this was given:  10 mg on 1/22/2018  9:07 PM                                * Notice:  This list has 4 medication(s) that are the same as other medications prescribed for you. Read the directions carefully, and ask your doctor or other care provider to review them with you.

## 2020-07-16 NOTE — PROVIDER NOTIFICATION
Providing team paged: Pt Telemetry strip indicates Trigeminy rhythm; Asymptomatic.   Spoke with [place] pharmacist advise Ruben Josue went through [address] [place] [address] it is $24 00 needs to be ordered will be available tomorrow afternoon advised the patient of this

## 2021-01-03 ENCOUNTER — HEALTH MAINTENANCE LETTER (OUTPATIENT)
Age: 71
End: 2021-01-03

## 2021-03-01 NOTE — PROGRESS NOTES
Mobile Crisis able to obtain minimal information, however advises admission if patient is voluntarily willing to admit.   Social Work Intervention  [facility]    Data/Intervention:    Patient Name:  Boom Kahn  /Age:  1950 (68 year old)    Visit Type: in person  Referral Source: [facility]  Reason for Referral:  Housing concerns    Collaborated With:    -Patient    Patient Concerns/Issues:    met with Patient in clinic today. Patient reported his sons brought him up to [place] from [place] around New Years 2018 for medical care for his wounds. Patient reported he has 3 sons: 1 in [place], 1 in [place], and 1 in the [facility]. Patient reported he is currently living in [facility] (one month at a time). Patient reported he is waiting to see what the plastic surgeon says next week about the timeline for his healing before he decides on a more stable living situation. Patient's eventual goal is to go back to [place] where he has a house. Patient reported that if he has additional medical care in [place], he will be able to find some place to live that is more stable. Patient also reported that the doctor has told him that he needs to have better nutrition, quit smoking, and quit drinking in order for his wound to heal. Patient reported he eats one meal a day (evening meal) where he has a microwave dinner. Patient reported he does not have much of an appetite to eat more meals. Patient reported he consumes 2-3 nutrition drinks (Ensure or Boost) per day. Patient feels he has significantly reduced the amount he smokes per day. Patient reported he drinks 6-8 beers per day and he does not feel like that is a lot. Patient declined CD resources.     Intervention/Education/Resources Provided:   provided supportive listening.  offered CD resources, which were declined.  offered additional housing resources, but Patient declined at this time until he figures out what his wound care plan is going to be.    Assessment/Plan:  Patient to wait to make  decisions until after he meets with plastic surgeon. Patient will contact  with any questions or concerns.    Provided patient/family with contact information and availability.    Kathi Foster [facility]  Outpatient Specialty Clinics  Direct Phone: [phone]  Pager:  [phone]

## 2021-04-25 ENCOUNTER — HEALTH MAINTENANCE LETTER (OUTPATIENT)
Age: 71
End: 2021-04-25

## 2021-10-10 ENCOUNTER — HEALTH MAINTENANCE LETTER (OUTPATIENT)
Age: 71
End: 2021-10-10

## 2022-05-21 ENCOUNTER — HEALTH MAINTENANCE LETTER (OUTPATIENT)
Age: 72
End: 2022-05-21

## 2022-09-17 ENCOUNTER — HEALTH MAINTENANCE LETTER (OUTPATIENT)
Age: 72
End: 2022-09-17

## 2023-06-04 ENCOUNTER — HEALTH MAINTENANCE LETTER (OUTPATIENT)
Age: 73
End: 2023-06-04

## (undated) DEVICE — SU PROLENE 6-0 BV-1DA 18" 8709H

## (undated) DEVICE — ESU PENCIL W/COATED BLADE E2450H

## (undated) DEVICE — SU PROLENE 7-0 BV-1DA 30" 8703H

## (undated) DEVICE — CATH FOGARTY EMBOLECTOMY 3FR 80CM LATEX 120803FP

## (undated) DEVICE — DRAPE TIBURON CARDIOVASCULAR PERI-GROIN LF 9154

## (undated) DEVICE — WIPES FOLEY CARE SURESTEP PROVON DFC100

## (undated) DEVICE — DRAPE ISOLATION BAG 1003

## (undated) DEVICE — GLOVE PROTEXIS W/NEU-THERA 7.0  2D73TE70

## (undated) DEVICE — LINEN TOWEL PACK X30 5481

## (undated) DEVICE — VESSEL LOOPS RED MINI 31145710

## (undated) DEVICE — SOL WATER IRRIG 1000ML BOTTLE 2F7114

## (undated) DEVICE — SOL NACL 0.9% IRRIG 1000ML BOTTLE 2F7124

## (undated) DEVICE — VALVULOTOME LEMAITRE 1.8MMX110CM DISP 1000-90

## (undated) DEVICE — DRAPE IOBAN INCISE 23X17" 6650EZ

## (undated) DEVICE — SU DERMABOND ADVANCED .7ML DNX12

## (undated) DEVICE — PREP CHLORAPREP 26ML TINTED ORANGE  260815

## (undated) DEVICE — SU PROLENE 5-0 C-1 DA 24" 8725H

## (undated) DEVICE — SU SILK 3-0 TIE 12X30" A304H

## (undated) DEVICE — DRSG GAUZE 4X4" TRAY 6939

## (undated) DEVICE — SU PROLENE 6-0 C-1DA 30" 8706H

## (undated) DEVICE — CATH TRAY FOLEY SURESTEP 16FR W/URINE MTR STATLK LF A303416A

## (undated) DEVICE — CLIP HORIZON MED BLUE 002200

## (undated) DEVICE — Device

## (undated) DEVICE — VESSEL LOOPS YELLOW MAXI 31145694

## (undated) DEVICE — STRAP UNIVERSAL POSITIONING 2-PIECE 4X47X76" 91-287

## (undated) DEVICE — PREP CHLORAPREP CLEAR 3ML 260400

## (undated) DEVICE — SU VICRYL 3-0 SH 27" UND J416H

## (undated) DEVICE — CLIP HORIZON SM RED WIDE SLOT 001201

## (undated) DEVICE — BLADE CLIPPER SGL USE 9680

## (undated) DEVICE — SU VICRYL 2-0 SH 27" UND J417H

## (undated) DEVICE — SU SILK 4-0 TIE 12X30" A303H

## (undated) DEVICE — SPONGE RAY-TEC 4X8" 7318

## (undated) DEVICE — COVER NEOPROBE SOFTFLEX 5X96" W/BANDS 20-PC596

## (undated) DEVICE — SYR 30ML LL W/O NDL 302832

## (undated) DEVICE — SU SILK 2-0 TIE 12X30" A305H

## (undated) DEVICE — LINEN TOWEL PACK X6 WHITE 5487

## (undated) DEVICE — SU VICRYL 4-0 PS-2 18" UND J496H

## (undated) DEVICE — GLOVE PROTEXIS BLUE W/NEU-THERA 7.5  2D73EB75

## (undated) DEVICE — GEL ULTRASOUND AQUASONIC 20GM 01-01

## (undated) DEVICE — DRSG KERLIX 4 1/2"X4YDS ROLL 6715

## (undated) RX ORDER — SODIUM CHLORIDE 9 MG/ML
INJECTION, SOLUTION INTRAVENOUS
Status: DISPENSED
Start: 2018-01-15

## (undated) RX ORDER — HYDROMORPHONE HCL/0.9% NACL/PF 0.2MG/0.2
SYRINGE (ML) INTRAVENOUS
Status: DISPENSED
Start: 2018-01-15

## (undated) RX ORDER — FENTANYL CITRATE 50 UG/ML
INJECTION, SOLUTION INTRAMUSCULAR; INTRAVENOUS
Status: DISPENSED
Start: 2018-01-15

## (undated) RX ORDER — LIDOCAINE HYDROCHLORIDE 20 MG/ML
INJECTION, SOLUTION EPIDURAL; INFILTRATION; INTRACAUDAL; PERINEURAL
Status: DISPENSED
Start: 2018-01-15

## (undated) RX ORDER — ALBUMIN, HUMAN INJ 5% 5 %
SOLUTION INTRAVENOUS
Status: DISPENSED
Start: 2018-01-15

## (undated) RX ORDER — ETOMIDATE 2 MG/ML
INJECTION INTRAVENOUS
Status: DISPENSED
Start: 2018-01-15

## (undated) RX ORDER — LIDOCAINE HYDROCHLORIDE 10 MG/ML
INJECTION, SOLUTION EPIDURAL; INFILTRATION; INTRACAUDAL; PERINEURAL
Status: DISPENSED
Start: 2018-01-04

## (undated) RX ORDER — CLOPIDOGREL BISULFATE 75 MG/1
TABLET ORAL
Status: DISPENSED
Start: 2018-01-05

## (undated) RX ORDER — PROPOFOL 10 MG/ML
INJECTION, EMULSION INTRAVENOUS
Status: DISPENSED
Start: 2018-01-15

## (undated) RX ORDER — HALOPERIDOL 5 MG/ML
INJECTION INTRAMUSCULAR
Status: DISPENSED
Start: 2018-01-15

## (undated) RX ORDER — DIPHENHYDRAMINE HYDROCHLORIDE 50 MG/ML
INJECTION INTRAMUSCULAR; INTRAVENOUS
Status: DISPENSED
Start: 2018-01-04

## (undated) RX ORDER — HEPARIN SODIUM 1000 [USP'U]/ML
INJECTION, SOLUTION INTRAVENOUS; SUBCUTANEOUS
Status: DISPENSED
Start: 2018-01-05

## (undated) RX ORDER — NITROGLYCERIN 5 MG/ML
VIAL (ML) INTRAVENOUS
Status: DISPENSED
Start: 2018-01-05

## (undated) RX ORDER — PHENYLEPHRINE HCL IN 0.9% NACL 1 MG/10 ML
SYRINGE (ML) INTRAVENOUS
Status: DISPENSED
Start: 2018-01-15

## (undated) RX ORDER — FENTANYL CITRATE 50 UG/ML
INJECTION, SOLUTION INTRAMUSCULAR; INTRAVENOUS
Status: DISPENSED
Start: 2018-01-05

## (undated) RX ORDER — HEPARIN SODIUM 1000 [USP'U]/ML
INJECTION, SOLUTION INTRAVENOUS; SUBCUTANEOUS
Status: DISPENSED
Start: 2018-01-15

## (undated) RX ORDER — FENTANYL CITRATE 50 UG/ML
INJECTION, SOLUTION INTRAMUSCULAR; INTRAVENOUS
Status: DISPENSED
Start: 2018-01-04